# Patient Record
Sex: FEMALE | Race: WHITE | NOT HISPANIC OR LATINO | Employment: OTHER | URBAN - METROPOLITAN AREA
[De-identification: names, ages, dates, MRNs, and addresses within clinical notes are randomized per-mention and may not be internally consistent; named-entity substitution may affect disease eponyms.]

---

## 2018-04-12 ENCOUNTER — TRANSCRIBE ORDERS (OUTPATIENT)
Dept: ADMINISTRATIVE | Facility: HOSPITAL | Age: 58
End: 2018-04-12

## 2018-04-12 DIAGNOSIS — R91.1 COIN LESION: ICD-10-CM

## 2018-04-12 DIAGNOSIS — Z12.39 SCREENING BREAST EXAMINATION: Primary | ICD-10-CM

## 2018-04-14 ENCOUNTER — HOSPITAL ENCOUNTER (OUTPATIENT)
Dept: MAMMOGRAPHY | Facility: CLINIC | Age: 58
Discharge: HOME | End: 2018-04-14
Payer: COMMERCIAL

## 2018-04-14 DIAGNOSIS — Z12.39 SCREENING BREAST EXAMINATION: ICD-10-CM

## 2018-04-14 PROCEDURE — 77067 SCR MAMMO BI INCL CAD: CPT

## 2018-04-14 PROCEDURE — 77063 BREAST TOMOSYNTHESIS BI: CPT

## 2018-04-20 ENCOUNTER — HOSPITAL ENCOUNTER (OUTPATIENT)
Dept: CT IMAGING | Facility: CLINIC | Age: 58
Discharge: HOME | End: 2018-04-20
Payer: COMMERCIAL

## 2018-04-20 DIAGNOSIS — R91.1 COIN LESION: ICD-10-CM

## 2018-04-20 PROCEDURE — 71250 CT THORAX DX C-: CPT

## 2018-05-30 ENCOUNTER — APPOINTMENT (OUTPATIENT)
Dept: RADIOLOGY | Facility: CLINIC | Age: 58
End: 2018-05-30
Payer: COMMERCIAL

## 2018-05-30 DIAGNOSIS — M54.42 ACUTE BACK PAIN WITH SCIATICA, LEFT: ICD-10-CM

## 2018-05-30 PROCEDURE — 72220 X-RAY EXAM SACRUM TAILBONE: CPT

## 2018-05-30 PROCEDURE — 72110 X-RAY EXAM L-2 SPINE 4/>VWS: CPT

## 2019-03-14 ENCOUNTER — TRANSCRIBE ORDERS (OUTPATIENT)
Dept: ADMINISTRATIVE | Facility: HOSPITAL | Age: 59
End: 2019-03-14

## 2019-03-14 DIAGNOSIS — R91.1 COIN LESION: Primary | ICD-10-CM

## 2019-03-20 ENCOUNTER — HOSPITAL ENCOUNTER (OUTPATIENT)
Dept: CT IMAGING | Facility: CLINIC | Age: 59
Discharge: HOME | End: 2019-03-20
Payer: COMMERCIAL

## 2019-03-20 DIAGNOSIS — R91.1 COIN LESION: ICD-10-CM

## 2019-03-20 PROCEDURE — 71260 CT THORAX DX C+: CPT

## 2019-03-20 RX ADMIN — IOHEXOL 85 ML: 350 INJECTION, SOLUTION INTRAVENOUS at 09:46

## 2019-03-28 ENCOUNTER — TRANSCRIBE ORDERS (OUTPATIENT)
Dept: ADMINISTRATIVE | Facility: HOSPITAL | Age: 59
End: 2019-03-28

## 2019-03-28 DIAGNOSIS — R91.1 PULMONARY NODULE: Primary | ICD-10-CM

## 2019-04-04 ENCOUNTER — HOSPITAL ENCOUNTER (OUTPATIENT)
Dept: RADIOLOGY | Age: 59
Discharge: HOME | End: 2019-04-04
Payer: COMMERCIAL

## 2019-04-04 DIAGNOSIS — R91.1 PULMONARY NODULE: ICD-10-CM

## 2019-04-04 LAB — GLUCOSE SERPL-MCNC: 84 MG/DL (ref 65–140)

## 2019-04-04 PROCEDURE — 82948 REAGENT STRIP/BLOOD GLUCOSE: CPT

## 2019-04-04 PROCEDURE — 78815 PET IMAGE W/CT SKULL-THIGH: CPT

## 2019-04-04 PROCEDURE — A9552 F18 FDG: HCPCS

## 2019-05-16 ENCOUNTER — TRANSCRIBE ORDERS (OUTPATIENT)
Dept: ADMINISTRATIVE | Facility: HOSPITAL | Age: 59
End: 2019-05-16

## 2019-05-16 DIAGNOSIS — C34.90 MALIGNANT NEOPLASM OF BRONCHUS AND LUNG (HCC): Primary | ICD-10-CM

## 2019-06-03 ENCOUNTER — HOSPITAL ENCOUNTER (OUTPATIENT)
Dept: MRI IMAGING | Facility: HOSPITAL | Age: 59
Discharge: HOME | End: 2019-06-03
Payer: COMMERCIAL

## 2019-06-03 DIAGNOSIS — C34.90 MALIGNANT NEOPLASM OF BRONCHUS AND LUNG (HCC): ICD-10-CM

## 2019-06-03 PROCEDURE — 70553 MRI BRAIN STEM W/O & W/DYE: CPT

## 2019-06-03 PROCEDURE — A9585 GADOBUTROL INJECTION: HCPCS | Performed by: RADIOLOGY

## 2019-06-03 RX ADMIN — GADOBUTROL 8 ML: 604.72 INJECTION INTRAVENOUS at 20:02

## 2019-06-06 ENCOUNTER — TRANSCRIBE ORDERS (OUTPATIENT)
Dept: ADMINISTRATIVE | Facility: HOSPITAL | Age: 59
End: 2019-06-06

## 2019-06-06 DIAGNOSIS — R06.02 SHORTNESS OF BREATH: Primary | ICD-10-CM

## 2019-06-12 ENCOUNTER — HOSPITAL ENCOUNTER (OUTPATIENT)
Dept: NON INVASIVE DIAGNOSTICS | Facility: CLINIC | Age: 59
Discharge: HOME | End: 2019-06-12
Payer: COMMERCIAL

## 2019-06-12 DIAGNOSIS — R06.02 SHORTNESS OF BREATH: ICD-10-CM

## 2019-06-12 PROCEDURE — 93018 CV STRESS TEST I&R ONLY: CPT | Performed by: INTERNAL MEDICINE

## 2019-06-12 PROCEDURE — 78452 HT MUSCLE IMAGE SPECT MULT: CPT

## 2019-06-12 PROCEDURE — A9502 TC99M TETROFOSMIN: HCPCS

## 2019-06-12 PROCEDURE — 93017 CV STRESS TEST TRACING ONLY: CPT

## 2019-06-12 PROCEDURE — 93016 CV STRESS TEST SUPVJ ONLY: CPT | Performed by: INTERNAL MEDICINE

## 2019-06-12 PROCEDURE — 78452 HT MUSCLE IMAGE SPECT MULT: CPT | Performed by: INTERNAL MEDICINE

## 2019-06-12 RX ADMIN — REGADENOSON 0.4 MG: 0.08 INJECTION, SOLUTION INTRAVENOUS at 09:37

## 2019-06-13 LAB
CHEST PAIN STATEMENT: NORMAL
MAX DIASTOLIC BP: 82 MMHG
MAX HEART RATE: 103 BPM
MAX PREDICTED HEART RATE: 160 BPM
MAX. SYSTOLIC BP: 173 MMHG
PROTOCOL NAME: NORMAL
REASON FOR TERMINATION: NORMAL
TARGET HR FORMULA: NORMAL
TEST INDICATION: NORMAL
TIME IN EXERCISE PHASE: NORMAL

## 2019-06-18 ENCOUNTER — TRANSCRIBE ORDERS (OUTPATIENT)
Dept: ADMINISTRATIVE | Facility: HOSPITAL | Age: 59
End: 2019-06-18

## 2019-06-18 DIAGNOSIS — R01.1 MURMUR: Primary | ICD-10-CM

## 2019-06-20 ENCOUNTER — HOSPITAL ENCOUNTER (OUTPATIENT)
Dept: NON INVASIVE DIAGNOSTICS | Facility: CLINIC | Age: 59
Discharge: HOME | End: 2019-06-20
Payer: COMMERCIAL

## 2019-06-20 DIAGNOSIS — R01.1 MURMUR: ICD-10-CM

## 2019-06-20 PROCEDURE — 93306 TTE W/DOPPLER COMPLETE: CPT

## 2019-06-20 PROCEDURE — 93306 TTE W/DOPPLER COMPLETE: CPT | Performed by: INTERNAL MEDICINE

## 2019-08-13 ENCOUNTER — CLINICAL SUPPORT (OUTPATIENT)
Dept: PULMONOLOGY | Facility: HOSPITAL | Age: 59
End: 2019-08-13
Payer: COMMERCIAL

## 2019-08-13 DIAGNOSIS — Z90.2 STATUS POST LOBECTOMY OF LUNG: Primary | ICD-10-CM

## 2019-08-13 NOTE — PROGRESS NOTES
PULMONARY REHAB ASSESSMENT    Today's date: 2019  Patient name: Rosa Morales     : 1960       MRN: [id number]  PCP: Saundra Osman MD  Referring Physician: Ventura Yuan MD  Clinician: Kailash Ward MBA, RRT  Dx:RATS and right lower lobe lobectomy    Date of onset: 2019  Cultural needs: none reported    Height:    Wt Readings from Last 1 Encounters:   16 83 kg (183 lb)      Weight:   Ht Readings from Last 1 Encounters:   16 5' 5" (1 651 m)     Medical History:   Past Medical History:   Diagnosis Date    Hyperlipidemia     Hypertension          Physical Limitations: none    Risk Factors   Cholesterol: No  Smoking: Recent user, quit in last 6 months  HTN: No  DM: No  Obesity: No   Inactivity: No  Stress:  perceived  stress: 5/10   Stressors: Family   Goals for [address] more time to take care of herself    Family History:No family history on file  Allergies: Patient has no known allergies  ETOH:   Social History     Substance and Sexual Activity   Alcohol Use Yes    Comment: social         Current Medications:   Current Outpatient Medications   Medication Sig Dispense Refill    atorvastatin (LIPITOR) 20 mg tablet Take 20 mg by mouth daily   lisinopril (ZESTRIL) 20 mg tablet Take 20 mg by mouth daily   sertraline (ZOLOFT) 100 mg tablet Take 150 mg by mouth daily   VERAPAMIL HCL ER PO Take 1 tablet by mouth daily  No current facility-administered medications for this visit  Functional Status Prior to Diagnosis for Treatment   Occupation: unemployed  Recreation: walking  ADLs: No limitations  [place]: No limitations  Exercise: walking several times a week    Patient Specific Goals:   To be able to return to biking and being able to go for longer walks    Short Term Program Goals: increased strength improved energy/stamina with ADLs exercise 120-150 mins/wk wt loss 1-2 ppw    Long Term Goals: increased maximal walking duration  increased intial training workload  Improved functional capacity  Improved Quality of Life - [facility] score reduced    Ability to reach goals/rehabilitation potential:  Very Good     Projected return to function: 18 weeks  Objective tests: 6 MWT      Nutritional   Reviewed details of Rate your Plate  Discussed key elements of heart healthy eating  Reviewed patient goals for dietary modifications and their clinical implications  Reviewed most recent lipid profile  Goals for dietary modification: choose lean cuts of meat  poultry without the skin  low fat ground meat and poultry  eliminate processed meats      Emotional/Social  Patient reports he/she is coping well with good social support and denies depression or anxiety    SOCIAL SUPPORT NETWORK    Marital status:     Rate 1-5:    Marriage: 5   Family: 5   Financial: 5   Relationships: 5   Spirituality: 5   Intellectual: 5      Domestic Violence Screening: No    Comments: Patient reports feeling safe at home

## 2019-08-13 NOTE — PROGRESS NOTES
Exercise Prescription       Exercise Modality  Initial Workload MET Level    Nu-Step (NU) Level: 3 Steps/Minute: 60 2 5 METs         Airdyne Bike (AD-Bike) Level: 0 5 2 5 METs   Arm Ergometer (AE) Level: 2 0 RPM: 40-50 1 8 METs   Treadmill 1 0 mph 0 % grade 1 8 METs   Recumbent Bike (RB) Level: 1 RPM: 50-6- 3 2 METs   Resistance (RES) 5 lbs Weights 30 lbs Pulleys Level red Resistance Bands        Comments: Patient completed 6 min walk test today  She walked a total of 997 5 ft (304 meters)  At 2 5 METs  Will start exercise at 2-2 5 METS and increase time and intensity as tolerated  If necessary will use supplemental oxygen at 2L/ min and titrate as needed with exercise to maintain SpO2>90%

## 2019-08-13 NOTE — PROGRESS NOTES
Pulmonary Rehabilitation Plan of Care   Care Plan       Today's date: 2019   Visits: Initial  Patient name: Silas Jaeger      : 1960  Age: 62 y o  MRN: [id number]  Referring Physician: Ravin Diaz MD  Provider: [facility]  Clinician: Stephania Aguayo MBA, RRT    Dx: RATS and right lower lobe lobectomy  Date of onset: 19      SUMMARY OF PROGRESS:  Soraya Faulkner  Is ready to begin her pulmonary rehabilitation program  She completed her 6MWT, walking a total of 997 5 feet (304 meters) while maintaining her SPO2 between 90-93% with slight/moderate(4)  dyspnea reported  We will monitor her SPO2% while exercising and use oxygen as needed to maintain SPO2 > 90%  She is looking forward to returning  to her prior activity level before her RLL  lobectomy  She does have some family stress from an adult child temporarily living at home with her and her  and also caring for her grandson but  states she has a good support system and denies depression although her PHQ9 scored a 7  We will follow up with patient in 30 days regarding depression  [facility] is very excited to begin the program and will attend twice a week for 18 weeks  Medication compliance: Yes   Comments:     Fall Risk: Low   Comments:     EKG changes:       EXERCISE ASSESSMENT and PLAN    Current Exercise Program in Rehab:       Frequency: 2 days/week        Minutes: 20-30         METS: 2 4            HR: 120   RPE: 4-6         Modalities: Treadmill, Airdyne bike, UBE, Lifecycle, NuStep and Recumbent bike      Exercise Progression 30 Day Goals :    Frequency:  2-3days/week   Minutes: 30-40   METS: 2 5-3   HR: 120    RPE: 4-7   Modalities: Treadmill, Airdyne bike, UBE, Lifecycle, NuStep, Recumbent bike and Room walking    Strength training: Will be added following 2-3 weeks of monitored exercise sessions   Modalities: Leg Press, Chest Press, Pull Downs, Lateral Raise, Arm Extension and Arm Curl    Progressing:   In Progress    Home Exercise: Frequency: 2 days/week    Goals: 10% improvement in functional capacity, Reduced Dyspnea with physical activity  0-1/10, Improved 6MWT distance by 10%, Resume ADLs with increased strength and Exercise 5 days/wk, >150mins/wk  Education: home exercise guidelines and RPE scale   Plan:home exercise 30+ mins 2 days opposite CR  Readiness to change: Preparation      NUTRITION ASSESSMENT AND PLAN    Weight control:    Starting weight: 187   Current weight:     Waist circumference:    Starting:    Current:    Diabetes: N/A  Lipid management: NA  Goals:Wt  loss 1-2 ppw goal of 10 lbs  Education: healthy choices while dining out  portion control  Progressing: In Progress  Plan: Increase whole grains, increase fruits/vegs and eliminate processed meats  Readiness to change: Contemplation      PSYCHOSOCIAL ASSESSMENT AND PLAN    Emotional:  PHQ-9 =      5-9 = Mild Depression  Self-reported stress level: 6   Social support: Very Good  Goals:  Reduce perceived stress to 1-3/10 and PHQ-9 - reduced severity by one level  Education: coping mechanisms  Progressing: In Progress  Plan: PHQ-9 >5 will refer to MD  Readiness to change: Contemplation      OTHER CORE COMPONENTS     Tobacco:   Social History     Tobacco Use   Smoking Status Current Every Day Smoker    Packs/day: 1 00    Types: Cigarettes       Tobacco Use Intervention: Referral to tobacco expert:   N/A    Blood pressure:    Restin/74   Exercise:     Goals: consistent BP < 130/80  Education:  maintenance  Progressing: In Progress  Plan: no needs  Readiness to change: Maintenance

## 2019-08-19 ENCOUNTER — APPOINTMENT (EMERGENCY)
Dept: RADIOLOGY | Facility: HOSPITAL | Age: 59
End: 2019-08-19
Payer: COMMERCIAL

## 2019-08-19 ENCOUNTER — HOSPITAL ENCOUNTER (EMERGENCY)
Facility: HOSPITAL | Age: 59
Discharge: HOME | End: 2019-08-19
Attending: EMERGENCY MEDICINE
Payer: COMMERCIAL

## 2019-08-19 ENCOUNTER — APPOINTMENT (EMERGENCY)
Dept: CT IMAGING | Facility: HOSPITAL | Age: 59
End: 2019-08-19
Payer: COMMERCIAL

## 2019-08-19 VITALS
SYSTOLIC BLOOD PRESSURE: 172 MMHG | HEIGHT: 65 IN | BODY MASS INDEX: 31.16 KG/M2 | WEIGHT: 187 LBS | OXYGEN SATURATION: 93 % | DIASTOLIC BLOOD PRESSURE: 81 MMHG | HEART RATE: 80 BPM | RESPIRATION RATE: 18 BRPM | TEMPERATURE: 97.3 F

## 2019-08-19 DIAGNOSIS — R06.00 DYSPNEA: Primary | ICD-10-CM

## 2019-08-19 DIAGNOSIS — J90 PLEURAL EFFUSION: ICD-10-CM

## 2019-08-19 DIAGNOSIS — J44.1 COPD EXACERBATION (HCC): ICD-10-CM

## 2019-08-19 LAB
ALBUMIN SERPL BCP-MCNC: 3.9 G/DL (ref 3.5–5)
ALP SERPL-CCNC: 91 U/L (ref 46–116)
ALT SERPL W P-5'-P-CCNC: 21 U/L (ref 12–78)
ANION GAP BLD CALC-SCNC: 16 MMOL/L (ref 4–13)
ANION GAP SERPL CALCULATED.3IONS-SCNC: 1 MMOL/L (ref 4–13)
APTT PPP: 28 SECONDS (ref 23–37)
AST SERPL W P-5'-P-CCNC: 16 U/L (ref 5–45)
BASOPHILS # BLD AUTO: 0.03 THOUSANDS/ΜL (ref 0–0.1)
BASOPHILS NFR BLD AUTO: 1 % (ref 0–1)
BILIRUB SERPL-MCNC: 0.2 MG/DL (ref 0.2–1)
BUN BLD-MCNC: 10 MG/DL (ref 5–25)
BUN SERPL-MCNC: 13 MG/DL (ref 5–25)
CA-I BLD-SCNC: 1.2 MMOL/L (ref 1.12–1.32)
CALCIUM SERPL-MCNC: 9.4 MG/DL (ref 8.3–10.1)
CHLORIDE BLD-SCNC: 106 MMOL/L (ref 100–108)
CHLORIDE SERPL-SCNC: 97 MMOL/L (ref 100–108)
CO2 SERPL-SCNC: 25 MMOL/L (ref 21–32)
CREAT BLD-MCNC: 0.5 MG/DL (ref 0.6–1.3)
CREAT SERPL-MCNC: 0.63 MG/DL (ref 0.6–1.3)
DEPRECATED D DIMER PPP: 1133 NG/ML (FEU)
EOSINOPHIL # BLD AUTO: 0.07 THOUSAND/ΜL (ref 0–0.61)
EOSINOPHIL NFR BLD AUTO: 1 % (ref 0–6)
ERYTHROCYTE [DISTWIDTH] IN BLOOD BY AUTOMATED COUNT: 12.9 % (ref 11.6–15.1)
GFR SERPL CREATININE-BSD FRML MDRD: 105 ML/MIN/1.73SQ M
GFR SERPL CREATININE-BSD FRML MDRD: 97 ML/MIN/1.73SQ M
GLUCOSE SERPL-MCNC: 95 MG/DL (ref 65–140)
GLUCOSE SERPL-MCNC: 97 MG/DL (ref 65–140)
HCT VFR BLD AUTO: 39.2 % (ref 34.8–46.1)
HCT VFR BLD CALC: 38 % (ref 34.8–46.1)
HGB BLD-MCNC: 13.2 G/DL (ref 11.5–15.4)
HGB BLDA-MCNC: 12.9 G/DL (ref 11.5–15.4)
IMM GRANULOCYTES # BLD AUTO: 0.02 THOUSAND/UL (ref 0–0.2)
IMM GRANULOCYTES NFR BLD AUTO: 0 % (ref 0–2)
INR PPP: 0.9 (ref 0.84–1.19)
LYMPHOCYTES # BLD AUTO: 2.18 THOUSANDS/ΜL (ref 0.6–4.47)
LYMPHOCYTES NFR BLD AUTO: 34 % (ref 14–44)
MCH RBC QN AUTO: 31 PG (ref 26.8–34.3)
MCHC RBC AUTO-ENTMCNC: 33.7 G/DL (ref 31.4–37.4)
MCV RBC AUTO: 92 FL (ref 82–98)
MONOCYTES # BLD AUTO: 0.67 THOUSAND/ΜL (ref 0.17–1.22)
MONOCYTES NFR BLD AUTO: 11 % (ref 4–12)
NEUTROPHILS # BLD AUTO: 3.36 THOUSANDS/ΜL (ref 1.85–7.62)
NEUTS SEG NFR BLD AUTO: 53 % (ref 43–75)
NRBC BLD AUTO-RTO: 0 /100 WBCS
NT-PROBNP SERPL-MCNC: 170 PG/ML
PCO2 BLD: 23 MMOL/L (ref 21–32)
PLATELET # BLD AUTO: 218 THOUSANDS/UL (ref 149–390)
PMV BLD AUTO: 10.2 FL (ref 8.9–12.7)
POTASSIUM BLD-SCNC: 4.3 MMOL/L (ref 3.5–5.3)
POTASSIUM SERPL-SCNC: 3.4 MMOL/L (ref 3.5–5.3)
PROCALCITONIN SERPL-MCNC: <0.05 NG/ML
PROT SERPL-MCNC: 7.1 G/DL (ref 6.4–8.2)
PROTHROMBIN TIME: 11.9 SECONDS (ref 11.6–14.5)
RBC # BLD AUTO: 4.26 MILLION/UL (ref 3.81–5.12)
SODIUM BLD-SCNC: 140 MMOL/L (ref 136–145)
SODIUM SERPL-SCNC: 123 MMOL/L (ref 136–145)
SPECIMEN SOURCE: ABNORMAL
TROPONIN I SERPL-MCNC: <0.02 NG/ML
WBC # BLD AUTO: 6.33 THOUSAND/UL (ref 4.31–10.16)

## 2019-08-19 PROCEDURE — 71275 CT ANGIOGRAPHY CHEST: CPT

## 2019-08-19 PROCEDURE — 36415 COLL VENOUS BLD VENIPUNCTURE: CPT | Performed by: EMERGENCY MEDICINE

## 2019-08-19 PROCEDURE — 83880 ASSAY OF NATRIURETIC PEPTIDE: CPT | Performed by: EMERGENCY MEDICINE

## 2019-08-19 PROCEDURE — 85730 THROMBOPLASTIN TIME PARTIAL: CPT | Performed by: EMERGENCY MEDICINE

## 2019-08-19 PROCEDURE — 80053 COMPREHEN METABOLIC PANEL: CPT | Performed by: EMERGENCY MEDICINE

## 2019-08-19 PROCEDURE — 85025 COMPLETE CBC W/AUTO DIFF WBC: CPT | Performed by: EMERGENCY MEDICINE

## 2019-08-19 PROCEDURE — 84145 PROCALCITONIN (PCT): CPT | Performed by: EMERGENCY MEDICINE

## 2019-08-19 PROCEDURE — 94640 AIRWAY INHALATION TREATMENT: CPT

## 2019-08-19 PROCEDURE — 80047 BASIC METABLC PNL IONIZED CA: CPT

## 2019-08-19 PROCEDURE — 99285 EMERGENCY DEPT VISIT HI MDM: CPT | Performed by: EMERGENCY MEDICINE

## 2019-08-19 PROCEDURE — 85610 PROTHROMBIN TIME: CPT | Performed by: EMERGENCY MEDICINE

## 2019-08-19 PROCEDURE — 85379 FIBRIN DEGRADATION QUANT: CPT | Performed by: EMERGENCY MEDICINE

## 2019-08-19 PROCEDURE — 84484 ASSAY OF TROPONIN QUANT: CPT | Performed by: EMERGENCY MEDICINE

## 2019-08-19 PROCEDURE — 99285 EMERGENCY DEPT VISIT HI MDM: CPT

## 2019-08-19 PROCEDURE — 85014 HEMATOCRIT: CPT

## 2019-08-19 PROCEDURE — 96360 HYDRATION IV INFUSION INIT: CPT

## 2019-08-19 PROCEDURE — 71046 X-RAY EXAM CHEST 2 VIEWS: CPT

## 2019-08-19 PROCEDURE — 93005 ELECTROCARDIOGRAM TRACING: CPT

## 2019-08-19 RX ORDER — POTASSIUM CHLORIDE 20 MEQ/1
20 TABLET, EXTENDED RELEASE ORAL ONCE
Status: COMPLETED | OUTPATIENT
Start: 2019-08-19 | End: 2019-08-19

## 2019-08-19 RX ORDER — SODIUM CHLORIDE FOR INHALATION 0.9 %
VIAL, NEBULIZER (ML) INHALATION
Status: COMPLETED
Start: 2019-08-19 | End: 2019-08-19

## 2019-08-19 RX ORDER — PREDNISONE 20 MG/1
60 TABLET ORAL DAILY
Qty: 15 TABLET | Refills: 0 | Status: SHIPPED | OUTPATIENT
Start: 2019-08-19 | End: 2019-08-24

## 2019-08-19 RX ORDER — SODIUM CHLORIDE FOR INHALATION 0.9 %
3 VIAL, NEBULIZER (ML) INHALATION ONCE
Status: COMPLETED | OUTPATIENT
Start: 2019-08-19 | End: 2019-08-19

## 2019-08-19 RX ORDER — BUPROPION HYDROCHLORIDE 150 MG/1
300 TABLET ORAL EVERY MORNING
COMMUNITY
Start: 2019-04-20

## 2019-08-19 RX ORDER — ALBUTEROL SULFATE 90 UG/1
2 AEROSOL, METERED RESPIRATORY (INHALATION) EVERY 4 HOURS
Refills: 3 | COMMUNITY
Start: 2019-08-06

## 2019-08-19 RX ORDER — IPRATROPIUM BROMIDE AND ALBUTEROL SULFATE 2.5; .5 MG/3ML; MG/3ML
3 SOLUTION RESPIRATORY (INHALATION)
Status: DISCONTINUED | OUTPATIENT
Start: 2019-08-19 | End: 2019-08-20 | Stop reason: HOSPADM

## 2019-08-19 RX ORDER — TRAMADOL HYDROCHLORIDE 50 MG/1
50 TABLET ORAL EVERY 6 HOURS PRN
Refills: 0 | COMMUNITY
Start: 2019-07-06 | End: 2019-10-25 | Stop reason: ALTCHOICE

## 2019-08-19 RX ADMIN — SODIUM CHLORIDE 1000 ML: 0.9 INJECTION, SOLUTION INTRAVENOUS at 20:22

## 2019-08-19 RX ADMIN — Medication 3 ML: at 21:25

## 2019-08-19 RX ADMIN — IOHEXOL 100 ML: 350 INJECTION, SOLUTION INTRAVENOUS at 19:55

## 2019-08-19 RX ADMIN — IPRATROPIUM BROMIDE AND ALBUTEROL SULFATE 3 ML: 2.5; .5 SOLUTION RESPIRATORY (INHALATION) at 21:25

## 2019-08-19 RX ADMIN — POTASSIUM CHLORIDE 20 MEQ: 20 TABLET, EXTENDED RELEASE ORAL at 20:21

## 2019-08-19 RX ADMIN — ISODIUM CHLORIDE 3 ML: 0.03 SOLUTION RESPIRATORY (INHALATION) at 21:25

## 2019-08-19 NOTE — ED PROVIDER NOTES
History  Chief Complaint   Patient presents with    Shortness of Breath     Patient presents to the ED shortness of breath worsening over the past two days  Patient had right lower lobectomy in June 21st, patient reports having slight SOB since but worsening the past two days  Patient with hx of right lower lung cancer resection 6-19 [place] [place] and COPD complains of SOB over last 2 days worse with exertion and associated dry cough  Took albuterol inhaler without relief  Denies pain or fevers  Prior to Admission Medications   Prescriptions Last Dose Informant Patient Reported? Taking? VERAPAMIL HCL ER PO   Yes No   Sig: Take 1 tablet by mouth daily  albuterol (PROVENTIL HFA,VENTOLIN HFA) 90 mcg/act inhaler   Yes No   Sig: Inhale 2 puffs every 4 (four) hours   atorvastatin (LIPITOR) 20 mg tablet   Yes No   Sig: Take 20 mg by mouth daily  buPROPion (WELLBUTRIN XL) 150 mg 24 hr tablet   Yes Yes   Sig: Take 300 mg by mouth every morning   lisinopril (ZESTRIL) 20 mg tablet   Yes No   Sig: Take 20 mg by mouth daily  sertraline (ZOLOFT) 100 mg tablet   Yes No   Sig: Take 150 mg by mouth daily  traMADol (ULTRAM) 50 mg tablet   Yes No   Sig: Take 50 mg by mouth every 6 (six) hours as needed for pain      Facility-Administered Medications: None       Past Medical History:   Diagnosis Date    Cancer ([facility] [address] )     Hyperlipidemia     Hypertension        Past Surgical History:   Procedure Laterality Date    APPENDECTOMY      COLON SURGERY      HYSTERECTOMY      LUNG LOBECTOMY         History reviewed  No pertinent family history  I have reviewed and agree with the history as documented  Social History     Tobacco Use    Smoking status: Former Smoker     Packs/day: 0 00    Smokeless tobacco: Never Used   Substance Use Topics    Alcohol use: Yes     Comment: social    Drug use: No        Review of Systems   All other systems reviewed and are negative        Physical Exam  Physical Exam Constitutional: She appears well-developed and well-nourished  HENT:   Mouth/Throat: Oropharynx is clear and moist    Eyes: Pupils are equal, round, and reactive to light  Conjunctivae and EOM are normal    Neck: Normal range of motion  Neck supple  No spinous process tenderness present  Cardiovascular: Normal rate, regular rhythm, normal heart sounds and intact distal pulses  Pulmonary/Chest: Effort normal  No respiratory distress  She has decreased breath sounds in the right upper field, the right middle field and the right lower field  She has no wheezes  Abdominal: Soft  Bowel sounds are normal  She exhibits no distension  There is no tenderness  Musculoskeletal: Normal range of motion  Neurological: She is alert  She has normal strength  No sensory deficit  GCS eye subscore is 4  GCS verbal subscore is 5  GCS motor subscore is 6  Skin: Skin is warm and dry  No rash noted  Psychiatric: She has a normal mood and affect  Nursing note and vitals reviewed        Vital Signs  ED Triage Vitals   Temperature Pulse Respirations Blood Pressure SpO2   08/19/19 1829 08/19/19 1829 08/19/19 1829 08/19/19 1845 08/19/19 1829   (!) 97 3 °F (36 3 °C) 89 18 (!) 186/95 98 %      Temp Source Heart Rate Source Patient Position - Orthostatic VS BP Location FiO2 (%)   08/19/19 1829 08/19/19 1829 08/19/19 1845 08/19/19 1845 --   Tympanic Monitor Sitting Left arm       Pain Score       08/19/19 1829       1           Vitals:    08/19/19 1845 08/19/19 1846 08/19/19 2000 08/19/19 2157   BP: (!) 186/95 (!) 186/95 167/77 (!) 172/81   Pulse: 80  80 80   Patient Position - Orthostatic VS: Sitting Sitting Lying Lying         Visual Acuity      ED Medications  Medications   potassium chloride (K-DUR,KLOR-CON) CR tablet 20 mEq (20 mEq Oral Given 8/19/19 2021)   sodium chloride 0 9 % bolus 1,000 mL (0 mL Intravenous Stopped 8/19/19 2124)   iohexol (OMNIPAQUE) 350 MG/ML injection (MULTI-DOSE) 100 mL (100 mL Intravenous Given 8/19/19 1955)   sodium chloride 0 9 % inhalation solution 3 mL (3 mL Nebulization Given 8/19/19 2125)       Diagnostic Studies  Results Reviewed     Procedure Component Value Units Date/Time    POCT Chem 8+ [[id number]]  (Abnormal) Collected:  08/19/19 2139    Lab Status:  Final result Specimen:  Venous Updated:  08/19/19 2143     SODIUM, I-STAT 140 mmol/l      Potassium, i-STAT 4 3 mmol/L      Chloride, istat 106 mmol/L      CO2, i-STAT 23 mmol/L      Anion Gap, i-STAT 16 mmol/L      Calcium, Ionized i-STAT 1 20 mmol/L      BUN, I-STAT 10 mg/dl      Creatinine, i-STAT 0 5 mg/dl      eGFR 105 ml/min/1 73sq m      Glucose, i-STAT 95 mg/dl      Hct, i-STAT 38 %      Hgb, i-STAT 12 9 g/dl      Specimen Type VENOUS    Narrative:       National Kidney Disease Foundation guidelines for Chronic Kidney Disease (CKD):     Stage 1 with normal or high GFR (GFR > 90 mL/min/1 73 square meters)    Stage 2 Mild CKD (GFR = 60-89 mL/min/1 73 square meters)    Stage 3A Moderate CKD (GFR = 45-59 mL/min/1 73 square meters)    Stage 3B Moderate CKD (GFR = 30-44 mL/min/1 73 square meters)    Stage 4 Severe CKD (GFR = 15-29 mL/min/1 73 square meters)    Stage 5 End Stage CKD (GFR <15 mL/min/1 73 square meters)  Note: GFR calculation is accurate only with a steady state creatinine    Procalcitonin [[id number]]  (Normal) Collected:  08/19/19 1844    Lab Status:  Final result Specimen:  Blood from Arm, Right Updated:  08/19/19 2121     Procalcitonin <0 05 ng/ml     B-type natriuretic peptide [[id number]]  (Abnormal) Collected:  08/19/19 1844    Lab Status:  Final result Specimen:  Blood from Arm, Right Updated:  08/19/19 1925     NT-proBNP 170 pg/mL     D-dimer, quantitative [[id number]]  (Abnormal) Collected:  08/19/19 1844    Lab Status:  Final result Specimen:  Blood from Arm, Right Updated:  08/19/19 1921     D-Dimer, Quant 1,133 ng/ml (FEU)     Troponin I [[id number]]  (Normal) Collected:  08/19/19 5890    Lab Status:  Final result Specimen:  Blood from Arm, Right Updated:  08/19/19 1920     Troponin I <0 02 ng/mL     Comprehensive metabolic panel [[id number]]  (Abnormal) Collected:  08/19/19 1844    Lab Status:  Final result Specimen:  Blood from Arm, Right Updated:  08/19/19 1919     Sodium 123 mmol/L      Potassium 3 4 mmol/L      Chloride 97 mmol/L      CO2 25 mmol/L      ANION GAP 1 mmol/L      BUN 13 mg/dL      Creatinine 0 63 mg/dL      Glucose 97 mg/dL      Calcium 9 4 mg/dL      AST 16 U/L      ALT 21 U/L      Alkaline Phosphatase 91 U/L      Total Protein 7 1 g/dL      Albumin 3 9 g/dL      Total Bilirubin 0 20 mg/dL      eGFR 97 ml/min/1 73sq m     Narrative:       [place] guidelines for Chronic Kidney Disease (CKD):     Stage 1 with normal or high GFR (GFR > 90 mL/min/1 73 square meters)    Stage 2 Mild CKD (GFR = 60-89 mL/min/1 73 square meters)    Stage 3A Moderate CKD (GFR = 45-59 mL/min/1 73 square meters)    Stage 3B Moderate CKD (GFR = 30-44 mL/min/1 73 square meters)    Stage 4 Severe CKD (GFR = 15-29 mL/min/1 73 square meters)    Stage 5 End Stage CKD (GFR <15 mL/min/1 73 square meters)  Note: GFR calculation is accurate only with a steady state creatinine    APTT [[id number]]  (Normal) Collected:  08/19/19 1844    Lab Status:  Final result Specimen:  Blood from Arm, Right Updated:  08/19/19 1914     PTT 28 seconds     Protime-INR [[id number]]  (Normal) Collected:  08/19/19 1844    Lab Status:  Final result Specimen:  Blood from Arm, Right Updated:  08/19/19 1914     Protime 11 9 seconds      INR 0 90    CBC and differential [[id number]] Collected:  08/19/19 1844    Lab Status:  Final result Specimen:  Blood from Arm, Right Updated:  08/19/19 1901     WBC 6 33 Thousand/uL      RBC 4 26 Million/uL      Hemoglobin 13 2 g/dL      Hematocrit 39 2 %      MCV 92 fL      MCH 31 0 pg      MCHC 33 7 g/dL      RDW 12 9 %      MPV 10 2 fL      Platelets 759 Thousands/uL      nRBC 0 /100 WBCs Neutrophils Relative 53 %      Immat GRANS % 0 %      Lymphocytes Relative 34 %      Monocytes Relative 11 %      Eosinophils Relative 1 %      Basophils Relative 1 %      Neutrophils Absolute 3 36 Thousands/µL      Immature Grans Absolute 0 02 Thousand/uL      Lymphocytes Absolute 2 18 Thousands/µL      Monocytes Absolute 0 67 Thousand/µL      Eosinophils Absolute 0 07 Thousand/µL      Basophils Absolute 0 03 Thousands/µL                  CTA ED chest PE Study   Final Result by Kaykay Kan MD (08/19 2015)         1  No pulmonary embolism  2  Status post right lower lobectomy since the prior exam       3  New multifocal groundglass opacities in bilateral upper lobes and the right middle lobe, which may be related to infectious/inflammatory processes  Follow-up short-term chest CT in 3 months is recommended to evaluate for resolution  4  Small right pleural effusion, new since the prior exam       5  Stable small pulmonary nodules  Workstation performed: [id number]         XR chest 2 views   ED Interpretation by Renae Lagos MD (59/28 1923)   No PTX visualized      Final Result by Adore Birch MD (08/20 0800)      No acute cardiopulmonary disease  Interval right lower lobectomy  Workstation performed: [id number]                    Procedures  Procedures       ED Course  ED Course as of Aug 21 1948   Mon Aug 19, 2019   1910 Patient signed out to Dr Leo Rodriguez - CXR and labs including d-dimer ordered, may need cta to rule out pe                    PERC Rule for PE      Most Recent Value   PERC Rule for PE   Age >=50  0 Filed at: 08/19/2019 1847   HR >=100  0 Filed at: 08/19/2019 1847   O2 Sat on room air < 95%  0 Filed at: 08/19/2019 1847   History of PE or DVT  0 Filed at: 08/19/2019 1847   Recent trauma or surgery  1 Filed at: 08/19/2019 1847   Hemoptysis  0 Filed at: 08/19/2019 1847   Exogenous estrogen  0 Filed at: 08/19/2019 1847   Unilateral leg swelling  0 Filed at: 08/19/2019 1847   PERC Rule for PE Results  1 Filed at: 08/19/2019 1847                Wells' Criteria for PE      Most Recent Value   Wells' Criteria for PE   Clinical signs and symptoms of DVT  0 Filed at: 08/19/2019 9503   PE is primary diagnosis or equally likely  0 Filed at: 08/19/2019 1848   HR >100  0 Filed at: 08/19/2019 1848   Immobilization at least 3 days or Surgery in the previous 4 weeks  0 Filed at: 08/19/2019 1848   Previous, objectively diagnosed PE or DVT  0 Filed at: 08/19/2019 1848   Hemoptysis  0 Filed at: 08/19/2019 1848   Malignancy with treatment within 6 months or palliative  1 Filed at: 08/19/2019 1848   [facility]' Criteria Total  1 Filed at: 08/19/2019 1848            [facility]  Number of Diagnoses or Management Options  COPD exacerbation ([facility] [address] ): new and requires workup  Dyspnea: new and requires workup  Pleural effusion: new and requires workup     Amount and/or Complexity of Data Reviewed  Clinical lab tests: ordered and reviewed  Tests in the radiology section of CPT®: ordered and reviewed  Discuss the patient with other providers: yes    Patient Progress  Patient progress: improved      Disposition  Final diagnoses:   Dyspnea   COPD exacerbation ([address] )   Pleural effusion     Time reflects when diagnosis was documented in both MDM as applicable and the Disposition within this note     Time User Action Codes Description Comment    8/19/2019 10:19 PM Celio Marie S Add [R06 00] Dyspnea     8/19/2019 10:19 PM Celio BRIGHT Add [J44 1] COPD exacerbation ([address] )     8/19/2019 10:20 PM Génesis Del Cid Add [J90] Pleural effusion       ED Disposition     ED Disposition Condition Date/Time Comment    Discharge Stable Mon Aug 19, 2019 10:19 PM [facility] discharge to home/self care              Follow-up Information     Follow up With Specialties Details Why Contact Info Additional Information    Lanny Lott MD Family Medicine Schedule an appointment as soon as possible for a visit [address]       Your pulmonologist  Schedule an appointment as soon as possible for a visit   Make an appointment as soon as possible for a reevaluation  [facility] Emergency Department Emergency Medicine  If symptoms worsen [address] [facility] [address], [place], [place], [address]          Discharge Medication List as of 8/19/2019 10:21 PM      CONTINUE these medications which have NOT CHANGED    Details   buPROPion (WELLBUTRIN XL) 150 mg 24 hr tablet Take 300 mg by mouth every morning, Starting Sat 4/20/2019, Historical Med      albuterol (PROVENTIL HFA,VENTOLIN HFA) 90 mcg/act inhaler Inhale 2 puffs every 4 (four) hours, Starting Tue 8/6/2019, Historical Med      atorvastatin (LIPITOR) 20 mg tablet Take 20 mg by mouth daily  , Until Discontinued, Historical Med      lisinopril (ZESTRIL) 20 mg tablet Take 20 mg by mouth daily  , Until Discontinued, Historical Med      sertraline (ZOLOFT) 100 mg tablet Take 150 mg by mouth daily  , Until Discontinued, Historical Med      traMADol (ULTRAM) 50 mg tablet Take 50 mg by mouth every 6 (six) hours as needed for pain, Starting Sat 7/6/2019, Historical Med      VERAPAMIL HCL ER PO Take 1 tablet by mouth daily  , Until Discontinued, Historical Med           No discharge procedures on file      ED Provider  Electronically Signed by           Merna Hodge DO  08/21/19 1948

## 2019-08-19 NOTE — ED CARE HANDOFF
Emergency Department Sign Out Note        Sign out and transfer of care from Dr Alejo Payton  See Separate Emergency Department note  The patient, Oscar Thapa, was evaluated by the previous provider for SOB, cough  Workup Completed:  CTA ED chest PE Study   Final Result         1  No pulmonary embolism  2  Status post right lower lobectomy since the prior exam       3  New multifocal groundglass opacities in bilateral upper lobes and the right middle lobe, which may be related to infectious/inflammatory processes  Follow-up short-term chest CT in 3 months is recommended to evaluate for resolution  4  Small right pleural effusion, new since the prior exam       5  Stable small pulmonary nodules  Workstation performed: [id number]         XR chest 2 views   ED Interpretation   No PTX visualized         Labs Reviewed   COMPREHENSIVE METABOLIC PANEL - Abnormal       Result Value Ref Range Status    Sodium 123 (*) 136 - 145 mmol/L Final    Potassium 3 4 (*) 3 5 - 5 3 mmol/L Final    Chloride 97 (*) 100 - 108 mmol/L Final    CO2 25  21 - 32 mmol/L Final    ANION GAP 1 (*) 4 - 13 mmol/L Final    BUN 13  5 - 25 mg/dL Final    Creatinine 0 63  0 60 - 1 30 mg/dL Final    Comment: Standardized to IDMS reference method    Glucose 97  65 - 140 mg/dL Final    Comment:   If the patient is fasting, the ADA then defines impaired fasting glucose as > 100 mg/dL and diabetes as > or equal to 123 mg/dL  Specimen collection should occur prior to Sulfasalazine administration due to the potential for falsely depressed results  Specimen collection should occur prior to Sulfapyridine administration due to the potential for falsely elevated results  Calcium 9 4  8 3 - 10 1 mg/dL Final    AST 16  5 - 45 U/L Final    Comment:   Specimen collection should occur prior to Sulfasalazine administration due to the potential for falsely depressed results       ALT 21  12 - 78 U/L Final    Comment:   Specimen collection should occur prior to Sulfasalazine administration due to the potential for falsely depressed results  Alkaline Phosphatase 91  46 - 116 U/L Final    Total Protein 7 1  6 4 - 8 2 g/dL Final    Albumin 3 9  3 5 - 5 0 g/dL Final    Total Bilirubin 0 20  0 20 - 1 00 mg/dL Final    eGFR 97  ml/min/1 73sq m Final    Narrative:     [place] guidelines for Chronic Kidney Disease (CKD):     Stage 1 with normal or high GFR (GFR > 90 mL/min/1 73 square meters)    Stage 2 Mild CKD (GFR = 60-89 mL/min/1 73 square meters)    Stage 3A Moderate CKD (GFR = 45-59 mL/min/1 73 square meters)    Stage 3B Moderate CKD (GFR = 30-44 mL/min/1 73 square meters)    Stage 4 Severe CKD (GFR = 15-29 mL/min/1 73 square meters)    Stage 5 End Stage CKD (GFR <15 mL/min/1 73 square meters)  Note: GFR calculation is accurate only with a steady state creatinine   NT-BNP PRO (BRAIN NATRIURETIC PEPTIDE) - Abnormal    NT-proBNP 170 (*) <125 pg/mL Final   D-DIMER, QUANTITATIVE - Abnormal    D-Dimer, Quant 1,133 (*) <500 ng/ml (FEU) Final    Comment:   Reference and upper limits to exclude DVT and PE are the same  Do not use to exclude if clinical symptoms are present  Pregnant women:  1st trimester:  <220 - 1060 ng/ml (FEU)  2nd trimester:  <220 - 1880 ng/ml (FEU)  3rd trimester:   238 - 3280 ng/ml (FEU)    Note: Normal ranges may not apply to patients who are transgender, non-binary, or whose legal sex, sex at birth, and gender identity differ  POCT CHEM 8+ - Abnormal    SODIUM, I-STAT 140  136 - 145 mmol/l Final    Potassium, i-STAT 4 3  3 5 - 5 3 mmol/L Final    Chloride, istat 106  100 - 108 mmol/L Final    CO2, i-STAT 23  21 - 32 mmol/L Final    Anion Gap, i-STAT 16 (*) 4 - 13 mmol/L Final    Calcium, Ionized i-STAT 1 20  1  12 - 1 32 mmol/L Final    BUN, I-STAT 10  5 - 25 mg/dl Final    Creatinine, i-STAT 0 5 (*) 0 6 - 1 3 mg/dl Final    eGFR 105  ml/min/1 73sq m Final    Glucose, i-STAT 95  65 - 140 mg/dl Final Hct, i-STAT 38  34 8 - 46 1 % Final    Hgb, i-STAT 12 9  11 5 - 15 4 g/dl Final    Specimen Type VENOUS   Final    Narrative:     National Kidney Disease Foundation guidelines for Chronic Kidney Disease (CKD):     Stage 1 with normal or high GFR (GFR > 90 mL/min/1 73 square meters)    Stage 2 Mild CKD (GFR = 60-89 mL/min/1 73 square meters)    Stage 3A Moderate CKD (GFR = 45-59 mL/min/1 73 square meters)    Stage 3B Moderate CKD (GFR = 30-44 mL/min/1 73 square meters)    Stage 4 Severe CKD (GFR = 15-29 mL/min/1 73 square meters)    Stage 5 End Stage CKD (GFR <15 mL/min/1 73 square meters)  Note: GFR calculation is accurate only with a steady state creatinine   PROCALCITONIN TEST - Normal    Procalcitonin <0 05  <=0 25 ng/ml Final    Comment: Suspected Lower Respiratory Tract Infection (LRTI):  - LESS than or EQUAL to 0 25 ng/mL:   low likelihood for bacterial LRTI; antibiotics DISCOURAGED   - GREATER than 0 25 ng/mL:   increased likelihood for bacterial LRTI; antibiotics ENCOURAGED  Suspected Sepsis:  - Strongly consider initiating antibiotics in ALL UNSTABLE patients  - LESS than or EQUAL to 0 5 ng/mL:   low likelihood for bacterial sepsis; antibiotics DISCOURAGED   - GREATER than 0 5 ng/mL:   increased likelihood for bacterial sepsis; antibiotics ENCOURAGED   - GREATER than 2 ng/mL:   high risk for severe sepsis / septic shock; antibiotics strongly ENCOURAGED  Decisions on antibiotic use should not be based solely on Procalcitonin (PCT) levels  If PCT is low but uncertainty exists with stopping antibiotics, repeat PCT in 6-24 hours to confirm the low level   If antibiotics are administered (regardless if initial PCT was high or low), repeat PCT every 1-2 days to consider early antibiotic cessation (when GREATER than 80% decrease from the peak OR when PCT drops below designated cutoffs, whichever comes first), so long as the infection is NOT one that typically requires prolonged treatment durations (e g , bone/joint infections, endocarditis, Staph  aureus bacteremia)  Situations of FALSE-POSITIVE Procalcitonin values:  1) Newborns < 67 hours old  2) Massive stress from severe trauma / burns, major surgery, acute pancreatitis, cardiogenic / hemorrhagic shock, sickle cell crisis, or other organ perfusion abnormalities  3) Malaria and some Candidal infections  4) Treatment with agents that stimulate cytokines (e g , OKT3, anti-lymphocyte globulins, alemtuzumab, IL-2, granulocyte transfusion [NOT GCSFs])  5) Chronic renal disease causes elevated baseline levels (consider GREATER than 0 75 ng/mL as an abnormal cut-off); initiating HD/CRRT may cause transient decreases  6) Paraneoplastic syndromes from medullary thyroid or SCLC, some forms of vasculitis, and acute fdqwx-si-asic disease    Situations of FALSE-NEGATIVE Procalcitonin values:  1) Too early in clinical course for PCT to have reached its peak (may repeat in 6-24 hours to confirm low level)  2) Localized infection WITHOUT systemic (SIRS / sepsis) response (e g , an abscess, osteomyelitis, cystitis)  3) Mycobacteria (e g , Tuberculosis, MAC)  4) Cystic fibrosis exacerbations     TROPONIN I - Normal    Troponin I <0 02  <=0 04 ng/mL Final    Comment: 3Autovalidation override  [employer] Chemistry analyzer 99% cutoff is > 0 04 ng/mL in network labs     o cTnI 99% cutoff is useful only when applied to patients in the clinical setting of myocardial ischemia   o cTnI 99% cutoff should be interpreted in the context of clinical history, ECG findings and possibly cardiac imaging to establish correct diagnosis  o cTnI 99% cutoff may be suggestive but clearly not indicative of a coronary event without the clinical setting of myocardial ischemia       APTT - Normal    PTT 28  23 - 37 seconds Final    Comment: Therapeutic Heparin Range =  60-90 seconds   PROTIME-INR - Normal    Protime 11 9  11 6 - 14 5 seconds Final    INR 0 90  0 84 - 1 19 Final   CBC AND DIFFERENTIAL    WBC 6 33  4 31 - 10 16 Thousand/uL Final    RBC 4 26  3 81 - 5 12 Million/uL Final    Hemoglobin 13 2  11 5 - 15 4 g/dL Final    Hematocrit 39 2  34 8 - 46 1 % Final    MCV 92  82 - 98 fL Final    MCH 31 0  26 8 - 34 3 pg Final    MCHC 33 7  31 4 - 37 4 g/dL Final    RDW 12 9  11 6 - 15 1 % Final    MPV 10 2  8 9 - 12 7 fL Final    Platelets 612  942 - 390 Thousands/uL Final    nRBC 0  /100 WBCs Final    Neutrophils Relative 53  43 - 75 % Final    Immat GRANS % 0  0 - 2 % Final    Lymphocytes Relative 34  14 - 44 % Final    Monocytes Relative 11  4 - 12 % Final    Eosinophils Relative 1  0 - 6 % Final    Basophils Relative 1  0 - 1 % Final    Neutrophils Absolute 3 36  1 85 - 7 62 Thousands/µL Final    Immature Grans Absolute 0 02  0 00 - 0 20 Thousand/uL Final    Lymphocytes Absolute 2 18  0 60 - 4 47 Thousands/µL Final    Monocytes Absolute 0 67  0 17 - 1 22 Thousand/µL Final    Eosinophils Absolute 0 07  0 00 - 0 61 Thousand/µL Final    Basophils Absolute 0 03  0 00 - 0 10 Thousands/µL Final        ED Course / Workup Pending (followup):              PERC Rule for PE      Most Recent Value   PERC Rule for PE   Age >=50  0 Filed at: 08/19/2019 1847   HR >=100  0 Filed at: 08/19/2019 1847   O2 Sat on room air < 95%  0 Filed at: 08/19/2019 1847   History of PE or DVT  0 Filed at: 08/19/2019 1847   Recent trauma or surgery  1 Filed at: 08/19/2019 1847   Hemoptysis  0 Filed at: 08/19/2019 1847   Exogenous estrogen  0 Filed at: 08/19/2019 1847   Unilateral leg swelling  0 Filed at: 08/19/2019 1847   PERC Rule for PE Results  1 Filed at: 08/19/2019 1847              Wells' Criteria for PE      Most Recent Value   Wells' Criteria for PE   Clinical signs and symptoms of DVT  0 Filed at: 08/19/2019 1848   PE is primary diagnosis or equally likely  0 Filed at: 08/19/2019 1848   HR >100  0 Filed at: 08/19/2019 1848   Immobilization at least 3 days or Surgery in the previous 4 weeks  0 Filed at: 08/19/2019 0421 Previous, objectively diagnosed PE or DVT  0 Filed at: 08/19/2019 1848   Hemoptysis  0 Filed at: 08/19/2019 1848   Malignancy with treatment within 6 months or palliative  1 Filed at: 08/19/2019 1848   Uri' Criteria Total  1 Filed at: 08/19/2019 [facility]            ED Course as of Aug 19 2344   [facility] Aug 19, 2019   1904 Pt signed out from Dr Mirian Mckenna  Labs, imaging pend  Follows at [facility] for lung Ca, had recent R sided resection in June  Per Dr Mirian Mckenna, has been using albuterol at home with no relief  2103 Discussed CT results w/ patient and   She is in no distress  Reports that she has had the groundglass opacities in past as well  Lungs decreased on R, no wheezing  Has had low Na in past as well, but not quite this low  She does drink a lot of water  On my exam she is euvolemic  Will recheck chem 8, give neb, and reassess  2149 Repeat sodium WNL      2206 Pt feels improved after neb  Will pulse ox ambulate  2219 Pt had no issues with ambulation, sats >92% entire time, felt fine  Will d/c home to f/u with PCP and her pulmonologist  RTED instructions given  Pt and  agree with plan           Procedures  MDM  Number of Diagnoses or Management Options  COPD exacerbation ([facility] [address] ): new and requires workup  Dyspnea: new and requires workup  Pleural effusion: new and requires workup     Amount and/or Complexity of Data Reviewed  Clinical lab tests: reviewed  Tests in the radiology section of CPT®: ordered and reviewed  Tests in the medicine section of CPT®: reviewed  Discussion of test results with the performing providers: yes  Review and summarize past medical records: yes  Discuss the patient with other providers: yes  Independent visualization of images, tracings, or specimens: yes    Risk of Complications, Morbidity, and/or Mortality  Presenting problems: moderate  Diagnostic procedures: low  Management options: low    Patient Progress  Patient progress: improved      Disposition  Final diagnoses:   Dyspnea   COPD exacerbation ([facility] [address] )   Pleural effusion     Time reflects when diagnosis was documented in both MDM as applicable and the Disposition within this note     Time User Action Codes Description Comment    8/19/2019 10:19 PM Jesús Spittle S Add [R06 00] Dyspnea     8/19/2019 10:19 PM Jesús Spittle S Add [J44 1] COPD exacerbation ([facility] [address] )     8/19/2019 10:20 PM Jada Pilar Add [J90] Pleural effusion       ED Disposition     ED Disposition Condition Date/Time Comment    Discharge Stable Mon Aug 19, 2019 10:19 PM 1224 [facility] discharge to home/self care  Follow-up Information     Follow up With Specialties Details Why Contact Info Additional Information    Akbar Mario MD Family Medicine Schedule an appointment as soon as possible for a visit   [address] [place] [address]  700 [facility] 120 [facility]       Your pulmonologist  Schedule an appointment as soon as possible for a visit   Make an appointment as soon as possible for a reevaluation  201 [facility] Emergency Department Emergency Medicine  If symptoms worsen [address] ED, [facility], [address] [place], [place], [address]        Discharge Medication List as of 8/19/2019 10:21 PM      CONTINUE these medications which have NOT CHANGED    Details   buPROPion (WELLBUTRIN XL) 150 mg 24 hr tablet Take 300 mg by mouth every morning, Starting Sat 4/20/2019, Historical Med      albuterol (PROVENTIL HFA,VENTOLIN HFA) 90 mcg/act inhaler Inhale 2 puffs every 4 (four) hours, Starting Tue 8/6/2019, Historical Med      atorvastatin (LIPITOR) 20 mg tablet Take 20 mg by mouth daily  , Until Discontinued, Historical Med      lisinopril (ZESTRIL) 20 mg tablet Take 20 mg by mouth daily  , Until Discontinued, Historical Med      sertraline (ZOLOFT) 100 mg tablet Take 150 mg by mouth daily  , Until Discontinued, Historical Med      traMADol (ULTRAM) 50 mg tablet Take 50 mg by mouth every 6 (six) hours as needed for pain, Starting Sat 7/6/2019, Historical Med      VERAPAMIL HCL ER PO Take 1 tablet by mouth daily  , Until Discontinued, Historical Med           No discharge procedures on file         ED Provider  Electronically Signed by     Safia England MD  08/19/19 1608

## 2019-08-20 LAB
ATRIAL RATE: 82 BPM
P AXIS: 73 DEGREES
PR INTERVAL: 174 MS
QRS AXIS: 23 DEGREES
QRSD INTERVAL: 82 MS
QT INTERVAL: 392 MS
QTC INTERVAL: 457 MS
T WAVE AXIS: 68 DEGREES
VENTRICULAR RATE: 82 BPM

## 2019-08-20 PROCEDURE — 93010 ELECTROCARDIOGRAM REPORT: CPT | Performed by: INTERNAL MEDICINE

## 2019-08-20 NOTE — ED NOTES
Pt  Ambulated down patel on portable pulse ox while maintaining SpO2 greater than 90%     Daily Flynn RN  08/19/19 1057

## 2019-08-22 ENCOUNTER — APPOINTMENT (OUTPATIENT)
Dept: PULMONOLOGY | Facility: HOSPITAL | Age: 59
End: 2019-08-22
Payer: COMMERCIAL

## 2019-08-27 ENCOUNTER — CLINICAL SUPPORT (OUTPATIENT)
Dept: PULMONOLOGY | Facility: HOSPITAL | Age: 59
End: 2019-08-27
Payer: COMMERCIAL

## 2019-08-27 DIAGNOSIS — Z90.2 STATUS POST LOBECTOMY OF LUNG: ICD-10-CM

## 2019-08-27 PROCEDURE — G0239 OTH RESP PROC, GROUP: HCPCS

## 2019-08-29 ENCOUNTER — CLINICAL SUPPORT (OUTPATIENT)
Dept: PULMONOLOGY | Facility: HOSPITAL | Age: 59
End: 2019-08-29
Payer: COMMERCIAL

## 2019-08-29 DIAGNOSIS — J98.4 DISEASE OF LUNG: ICD-10-CM

## 2019-08-29 PROCEDURE — G0239 OTH RESP PROC, GROUP: HCPCS

## 2019-09-03 ENCOUNTER — APPOINTMENT (OUTPATIENT)
Dept: PULMONOLOGY | Facility: HOSPITAL | Age: 59
End: 2019-09-03
Payer: COMMERCIAL

## 2019-09-05 ENCOUNTER — APPOINTMENT (OUTPATIENT)
Dept: PULMONOLOGY | Facility: HOSPITAL | Age: 59
End: 2019-09-05
Payer: COMMERCIAL

## 2019-09-17 ENCOUNTER — APPOINTMENT (OUTPATIENT)
Dept: PULMONOLOGY | Facility: HOSPITAL | Age: 59
End: 2019-09-17
Payer: COMMERCIAL

## 2019-09-19 ENCOUNTER — APPOINTMENT (OUTPATIENT)
Dept: PULMONOLOGY | Facility: HOSPITAL | Age: 59
End: 2019-09-19
Payer: COMMERCIAL

## 2019-09-24 ENCOUNTER — APPOINTMENT (OUTPATIENT)
Dept: PULMONOLOGY | Facility: HOSPITAL | Age: 59
End: 2019-09-24
Payer: COMMERCIAL

## 2019-09-26 ENCOUNTER — CLINICAL SUPPORT (OUTPATIENT)
Dept: PULMONOLOGY | Facility: HOSPITAL | Age: 59
End: 2019-09-26
Payer: COMMERCIAL

## 2019-09-26 DIAGNOSIS — J98.4 DISEASE OF LUNG: ICD-10-CM

## 2019-09-26 PROCEDURE — G0239 OTH RESP PROC, GROUP: HCPCS

## 2019-10-01 ENCOUNTER — APPOINTMENT (OUTPATIENT)
Dept: PULMONOLOGY | Facility: HOSPITAL | Age: 59
End: 2019-10-01
Payer: COMMERCIAL

## 2019-10-03 ENCOUNTER — CLINICAL SUPPORT (OUTPATIENT)
Dept: PULMONOLOGY | Facility: HOSPITAL | Age: 59
End: 2019-10-03
Payer: COMMERCIAL

## 2019-10-03 DIAGNOSIS — J98.4 DISEASE OF LUNG: ICD-10-CM

## 2019-10-03 PROCEDURE — G0239 OTH RESP PROC, GROUP: HCPCS

## 2019-10-07 ENCOUNTER — APPOINTMENT (OUTPATIENT)
Dept: PULMONOLOGY | Facility: HOSPITAL | Age: 59
End: 2019-10-07
Payer: COMMERCIAL

## 2019-10-08 ENCOUNTER — APPOINTMENT (OUTPATIENT)
Dept: PULMONOLOGY | Facility: HOSPITAL | Age: 59
End: 2019-10-08
Payer: COMMERCIAL

## 2019-10-10 ENCOUNTER — CLINICAL SUPPORT (OUTPATIENT)
Dept: PULMONOLOGY | Facility: HOSPITAL | Age: 59
End: 2019-10-10
Payer: COMMERCIAL

## 2019-10-10 DIAGNOSIS — J98.4 DISEASE OF LUNG: ICD-10-CM

## 2019-10-10 PROCEDURE — G0239 OTH RESP PROC, GROUP: HCPCS

## 2019-10-14 ENCOUNTER — CLINICAL SUPPORT (OUTPATIENT)
Dept: PULMONOLOGY | Facility: HOSPITAL | Age: 59
End: 2019-10-14
Payer: COMMERCIAL

## 2019-10-14 DIAGNOSIS — J98.4 DISEASE OF LUNG: ICD-10-CM

## 2019-10-14 PROCEDURE — G0239 OTH RESP PROC, GROUP: HCPCS

## 2019-10-15 ENCOUNTER — APPOINTMENT (OUTPATIENT)
Dept: PULMONOLOGY | Facility: HOSPITAL | Age: 59
End: 2019-10-15
Payer: COMMERCIAL

## 2019-10-17 ENCOUNTER — CLINICAL SUPPORT (OUTPATIENT)
Dept: PULMONOLOGY | Facility: HOSPITAL | Age: 59
End: 2019-10-17
Payer: COMMERCIAL

## 2019-10-17 DIAGNOSIS — J98.4 DISEASE OF LUNG: ICD-10-CM

## 2019-10-17 PROCEDURE — G0239 OTH RESP PROC, GROUP: HCPCS

## 2019-10-22 ENCOUNTER — APPOINTMENT (OUTPATIENT)
Dept: PULMONOLOGY | Facility: HOSPITAL | Age: 59
End: 2019-10-22
Payer: COMMERCIAL

## 2019-10-24 ENCOUNTER — APPOINTMENT (OUTPATIENT)
Dept: PULMONOLOGY | Facility: HOSPITAL | Age: 59
End: 2019-10-24
Payer: COMMERCIAL

## 2019-10-25 ENCOUNTER — CONSULT (OUTPATIENT)
Dept: CARDIOLOGY CLINIC | Facility: CLINIC | Age: 59
End: 2019-10-25
Payer: COMMERCIAL

## 2019-10-25 VITALS
SYSTOLIC BLOOD PRESSURE: 122 MMHG | WEIGHT: 193 LBS | HEIGHT: 65 IN | BODY MASS INDEX: 32.15 KG/M2 | HEART RATE: 60 BPM | DIASTOLIC BLOOD PRESSURE: 70 MMHG

## 2019-10-25 DIAGNOSIS — I49.3 PVC (PREMATURE VENTRICULAR CONTRACTION): Primary | ICD-10-CM

## 2019-10-25 DIAGNOSIS — E78.5 DYSLIPIDEMIA: ICD-10-CM

## 2019-10-25 DIAGNOSIS — I10 BENIGN ESSENTIAL HTN: ICD-10-CM

## 2019-10-25 PROCEDURE — 99204 OFFICE O/P NEW MOD 45 MIN: CPT | Performed by: INTERNAL MEDICINE

## 2019-10-25 RX ORDER — INHALER, ASSIST DEVICES
SPACER (EA) MISCELLANEOUS
Refills: 0 | COMMUNITY
Start: 2019-08-07

## 2019-10-25 RX ORDER — DIMENHYDRINATE 50 MG
200 TABLET ORAL DAILY
COMMUNITY

## 2019-10-25 RX ORDER — LISINOPRIL AND HYDROCHLOROTHIAZIDE 25; 20 MG/1; MG/1
1 TABLET ORAL DAILY
COMMUNITY

## 2019-10-25 RX ORDER — IPRATROPIUM BROMIDE AND ALBUTEROL SULFATE 2.5; .5 MG/3ML; MG/3ML
SOLUTION RESPIRATORY (INHALATION)
COMMUNITY
Start: 2019-10-21

## 2019-10-25 RX ORDER — LORAZEPAM 1 MG/1
TABLET ORAL
Refills: 0 | COMMUNITY
Start: 2019-08-28

## 2019-10-25 NOTE — LETTER
October 25, 2019     Diandra Cash MD  901 [facility]  701 [facility]    Patient: Per Leahy   YOB: 1960   Date of Visit: 10/25/2019       Dear Dr Resendiz Members: Thank you for referring Georganna Severe to me for evaluation  Below are my notes for this consultation  If you have questions, please do not hesitate to call me  I look forward to following your patient along with you  Sincerely,        Sridhar Newman,         CC: MD Toño Reyes DO  10/25/2019  9:49 AM  Sign at close encounter                                             Cardiology Consultation        Per Leahy      1960      [id number]      Discussion/Summary:    1  PACs and PVCs:  As reported on sleep study, noted to be intermittent  Frequency not reported  In light of absence of symptoms, and normal cardiac structure and function on recent echo, as well as normal stress test 06/2019, I suspect these are benign  She does have sleep apnea, therefore some atrial or ventricular ectopy during sleep may not be surprising  At this time I do not recommend further cardiac testing at this time  I have requested she call me if she develops any palpitations or symptoms, in which case Holter monitoring may be reasonable  F/U PRN      Interval History: This is a very pleasant 68-year-old female with no prior cardiac history  She has a history of lung cancer, status post right lower lobe lobectomy 06/26/2019 and was a former heavy tobacco user  On 09/29/2019 she had a sleep study as ordered by her pulmonologist, which reported intermittent PVCs and PACs  She was noted to have sleep apnea and a CPAP/BiPAP titration study was subsequently recommended  She presents today for consultation in light of her PVCs and PACs noted during sleep  From a symptomatic standpoint she feels excellent  She denies exertional chest pain, and has chronic but stable exertional dyspnea    She has no lower extremity edema, palpitation, dizziness, presyncope or syncope, or lightheadedness  She denies orthopnea or paroxysmal nocturnal dyspnea  She underwent a nuclear stress test and echocardiogram 06/12/2018, both of which were unremarkable               Vitals:  Vitals:    10/25/19 0912   BP: 122/70   BP Location: Left arm   Patient Position: Sitting   Cuff Size: Standard   Pulse: 60   Weight: 87 5 kg (193 lb)   Height: 5' 5" (1 651 m)         Past Medical History:   Diagnosis Date    [facility]     Hyperlipidemia     Hypertension      Social History     Socioeconomic History    Marital status: /Civil Union     Spouse name: Not on file    Number of children: Not on file    Years of education: Not on file    Highest education level: Not on file   Occupational History    Not on file   Social Needs    Financial resource strain: Not on file    Food insecurity:     Worry: Not on file     Inability: Not on file    Transportation needs:     Medical: Not on file     Non-medical: Not on file   Tobacco Use    Smoking status: Former Smoker     Packs/day: 0 00    Smokeless tobacco: Never Used   Substance and Sexual Activity    Alcohol use: Yes     Comment: social    Drug use: No    Sexual activity: Not on file   Lifestyle    Physical activity:     Days per week: Not on file     Minutes per session: Not on file    Stress: Not on file   Relationships    Social connections:     Talks on phone: Not on file     Gets together: Not on file     Attends Hindu service: Not on file     Active member of club or organization: Not on file     Attends meetings of clubs or organizations: Not on file     Relationship status: Not on file    Intimate partner violence:     Fear of current or ex partner: Not on file     Emotionally abused: Not on file     Physically abused: Not on file     Forced sexual activity: Not on file   Other Topics Concern    Not on file   Social History Narrative    Not on file History reviewed  No pertinent family history  Past Surgical History:   Procedure Laterality Date    APPENDECTOMY      COLON SURGERY      HYSTERECTOMY      LUNG LOBECTOMY         Current Outpatient Medications:     albuterol (PROVENTIL HFA,VENTOLIN HFA) 90 mcg/act inhaler, Inhale 2 puffs every 4 (four) hours, Disp: , Rfl: 3    aspirin 81 MG tablet, Take 81 mg by mouth daily, Disp: , Rfl:     atorvastatin (LIPITOR) 20 mg tablet, Take 20 mg by mouth daily  , Disp: , Rfl:     buPROPion (WELLBUTRIN XL) 150 mg 24 hr tablet, Take 300 mg by mouth every morning, Disp: , Rfl:     coenzyme Q-10 100 MG capsule, Take 200 mg by mouth daily, Disp: , Rfl:     ipratropium-albuterol (DUO-NEB) 0 5-2 5 mg/3 mL nebulizer solution, , Disp: , Rfl:     lisinopril-hydrochlorothiazide (PRINZIDE,ZESTORETIC) 20-25 MG per tablet, Take 1 tablet by mouth daily, Disp: , Rfl:     LORazepam (ATIVAN) 1 mg tablet, 1/2 TABLET TWICE A DAY AS NEEDED FOR ANXIETY ORALLY 30 DAYS, Disp: , Rfl: 0    sertraline (ZOLOFT) 100 mg tablet, Take 200 mg by mouth daily , Disp: , Rfl:     Spacer/Aero-Holding Chambers [facility] JOSE) MISC, Use as directed, Disp: , Rfl: 0    verapamil (VERELAN PM) 180 MG 24 hr capsule, Take 1 tablet by mouth daily , Disp: , Rfl:         Review of Systems:  Review of Systems   Constitutional: Negative for activity change, fever and unexpected weight change  HENT: Negative for facial swelling, nosebleeds and voice change  Respiratory: Positive for shortness of breath  Negative for chest tightness and wheezing  Cardiovascular: Negative for chest pain, palpitations and leg swelling  Gastrointestinal: Negative for abdominal distention  Genitourinary: Negative for hematuria  Musculoskeletal: Negative for arthralgias  Skin: Negative for color change, pallor, rash and wound  Neurological: Negative for dizziness, seizures and syncope  Psychiatric/Behavioral: Negative for agitation           Physical Exam:  Physical Exam   Constitutional: She is oriented to person, place, and time  She appears well-developed and well-nourished  HENT:   Head: Normocephalic and atraumatic  Eyes: EOM are normal    Neck: Normal range of motion  Neck supple  Cardiovascular: Normal rate, regular rhythm, normal heart sounds and intact distal pulses  Pulmonary/Chest: Effort normal    Decreased breath sounds right base   Abdominal: Soft  Musculoskeletal: Normal range of motion  Neurological: She is alert and oriented to person, place, and time  Skin: Skin is warm and dry  Psychiatric: She has a normal mood and affect  Her behavior is normal  Judgment and thought content normal    Vitals reviewed  This note was completed in part utilizing [employer] Software  Grammatical errors, random word insertions, spelling mistakes, and incomplete sentences can be an occasional consequence of this system secondary to software limitations, ambient noise, and hardware issues  If you have any questions or concerns about the content, text, or information contained within the body of this dictation, please contact the provider for clarification

## 2019-10-25 NOTE — PROGRESS NOTES
Cardiology Consultation        Enid Matos      1960      [id number]      Discussion/Summary:    1  PACs and PVCs:  As reported on sleep study, noted to be intermittent  Frequency not reported  In light of absence of symptoms, and normal cardiac structure and function on recent echo, as well as normal stress test 06/2019, I suspect these are benign  She does have sleep apnea, therefore some atrial or ventricular ectopy during sleep may not be surprising  At this time I do not recommend further cardiac testing at this time  I have requested she call me if she develops any palpitations or symptoms, in which case Holter monitoring may be reasonable  F/U PRN      Interval History: This is a very pleasant 58-year-old female with no prior cardiac history  She has a history of lung cancer, status post right lower lobe lobectomy 06/26/2019 and was a former heavy tobacco user  On 09/29/2019 she had a sleep study as ordered by her pulmonologist, which reported intermittent PVCs and PACs  She was noted to have sleep apnea and a CPAP/BiPAP titration study was subsequently recommended  She presents today for consultation in light of her PVCs and PACs noted during sleep  From a symptomatic standpoint she feels excellent  She denies exertional chest pain, and has chronic but stable exertional dyspnea  She has no lower extremity edema, palpitation, dizziness, presyncope or syncope, or lightheadedness  She denies orthopnea or paroxysmal nocturnal dyspnea  She underwent a nuclear stress test and echocardiogram 06/12/2018, both of which were unremarkable               Vitals:  Vitals:    10/25/19 0912   BP: 122/70   BP Location: Left arm   Patient Position: Sitting   Cuff Size: Standard   Pulse: 60   Weight: 87 5 kg (193 lb)   Height: 5' 5" (1 651 m)         Past Medical History:   Diagnosis Date    Cancer ([address] )     Hyperlipidemia     Hypertension      Social History     Socioeconomic History    Marital status: /Civil Union     Spouse name: Not on file    Number of children: Not on file    Years of education: Not on file    Highest education level: Not on file   Occupational History    Not on file   Social Needs    Financial resource strain: Not on file    Food insecurity:     Worry: Not on file     Inability: Not on file    Transportation needs:     Medical: Not on file     Non-medical: Not on file   Tobacco Use    Smoking status: Former Smoker     Packs/day: 0 00    Smokeless tobacco: Never Used   Substance and Sexual Activity    Alcohol use: Yes     Comment: social    Drug use: No    Sexual activity: Not on file   Lifestyle    Physical activity:     Days per week: Not on file     Minutes per session: Not on file    Stress: Not on file   Relationships    Social connections:     Talks on phone: Not on file     Gets together: Not on file     Attends Mu-ism service: Not on file     Active member of club or organization: Not on file     Attends meetings of clubs or organizations: Not on file     Relationship status: Not on file    Intimate partner violence:     Fear of current or ex partner: Not on file     Emotionally abused: Not on file     Physically abused: Not on file     Forced sexual activity: Not on file   Other Topics Concern    Not on file   Social History Narrative    Not on file      History reviewed  No pertinent family history  Past Surgical History:   Procedure Laterality Date    APPENDECTOMY      COLON SURGERY      HYSTERECTOMY      LUNG LOBECTOMY         Current Outpatient Medications:     albuterol (PROVENTIL HFA,VENTOLIN HFA) 90 mcg/act inhaler, Inhale 2 puffs every 4 (four) hours, Disp: , Rfl: 3    aspirin 81 MG tablet, Take 81 mg by mouth daily, Disp: , Rfl:     atorvastatin (LIPITOR) 20 mg tablet, Take 20 mg by mouth daily  , Disp: , Rfl:     buPROPion (WELLBUTRIN XL) 150 mg 24 hr tablet, Take 300 mg by mouth every morning, Disp: , Rfl:     coenzyme Q-10 100 MG capsule, Take 200 mg by mouth daily, Disp: , Rfl:     ipratropium-albuterol (DUO-NEB) 0 5-2 5 mg/3 mL nebulizer solution, , Disp: , Rfl:     lisinopril-hydrochlorothiazide (PRINZIDE,ZESTORETIC) 20-25 MG per tablet, Take 1 tablet by mouth daily, Disp: , Rfl:     LORazepam (ATIVAN) 1 mg tablet, 1/2 TABLET TWICE A DAY AS NEEDED FOR ANXIETY ORALLY 30 DAYS, Disp: , Rfl: 0    sertraline (ZOLOFT) 100 mg tablet, Take 200 mg by mouth daily , Disp: , Rfl:     Spacer/Aero-Holding Chambers [employer] [facility]) MISC, Use as directed, Disp: , Rfl: 0    verapamil (VERELAN PM) 180 MG 24 hr capsule, Take 1 tablet by mouth daily , Disp: , Rfl:         Review of Systems:  Review of Systems   Constitutional: Negative for activity change, fever and unexpected weight change  HENT: Negative for facial swelling, nosebleeds and voice change  Respiratory: Positive for shortness of breath  Negative for chest tightness and wheezing  Cardiovascular: Negative for chest pain, palpitations and leg swelling  Gastrointestinal: Negative for abdominal distention  Genitourinary: Negative for hematuria  Musculoskeletal: Negative for arthralgias  Skin: Negative for color change, pallor, rash and wound  Neurological: Negative for dizziness, seizures and syncope  Psychiatric/Behavioral: Negative for agitation  Physical Exam:  Physical Exam   Constitutional: She is oriented to person, place, and time  She appears well-developed and well-nourished  HENT:   Head: Normocephalic and atraumatic  Eyes: EOM are normal    Neck: Normal range of motion  Neck supple  Cardiovascular: Normal rate, regular rhythm, normal heart sounds and intact distal pulses  Pulmonary/Chest: Effort normal    Decreased breath sounds right base   Abdominal: Soft  Musculoskeletal: Normal range of motion  Neurological: She is alert and oriented to person, place, and time     Skin: Skin is warm and dry    Psychiatric: She has a normal mood and affect  Her behavior is normal  Judgment and thought content normal    Vitals reviewed  This note was completed in part utilizing M-Modal Fluency Direct Software  Grammatical errors, random word insertions, spelling mistakes, and incomplete sentences can be an occasional consequence of this system secondary to software limitations, ambient noise, and hardware issues  If you have any questions or concerns about the content, text, or information contained within the body of this dictation, please contact the provider for clarification

## 2019-10-28 ENCOUNTER — CLINICAL SUPPORT (OUTPATIENT)
Dept: PULMONOLOGY | Facility: HOSPITAL | Age: 59
End: 2019-10-28
Payer: COMMERCIAL

## 2019-10-28 DIAGNOSIS — J98.4 DISEASE OF LUNG: ICD-10-CM

## 2019-10-28 PROCEDURE — G0239 OTH RESP PROC, GROUP: HCPCS

## 2019-10-29 ENCOUNTER — APPOINTMENT (OUTPATIENT)
Dept: PULMONOLOGY | Facility: HOSPITAL | Age: 59
End: 2019-10-29
Payer: COMMERCIAL

## 2019-11-21 NOTE — PROGRESS NOTES
Pulmonary Rehabilitation Plan of Care   Discharge      Today's date: 2019   Visits: 8 (final)  Patient name: Moira Pham      : 1960  Age: 62 y o  MRN: [id number]  Referring Physician: Deon Gates MD  Provider: Laurel  Clinician: Zaida De Guzman MBA, RRT    Dx: RATS and right lower lobe lobectomy  Date of onset: 19      SUMMARY OF PROGRESS:  Patient has been discharged from pulmonary rehab  program  She has only attended 8 sessiosn since start of program at  end of August with no communication in the last month  Pt noncompliant with program and no progress noted

## 2020-02-04 ENCOUNTER — TRANSCRIBE ORDERS (OUTPATIENT)
Dept: ADMINISTRATIVE | Facility: HOSPITAL | Age: 60
End: 2020-02-04

## 2020-02-04 DIAGNOSIS — Z78.0 MENOPAUSE: Primary | ICD-10-CM

## 2020-02-04 DIAGNOSIS — Z12.31 VISIT FOR SCREENING MAMMOGRAM: ICD-10-CM

## 2020-02-24 ENCOUNTER — HOSPITAL ENCOUNTER (OUTPATIENT)
Dept: BONE DENSITY | Facility: CLINIC | Age: 60
Discharge: HOME | End: 2020-02-24
Payer: COMMERCIAL

## 2020-02-24 DIAGNOSIS — Z12.31 VISIT FOR SCREENING MAMMOGRAM: ICD-10-CM

## 2020-02-24 DIAGNOSIS — Z78.0 MENOPAUSE: ICD-10-CM

## 2020-02-24 PROCEDURE — 77080 DXA BONE DENSITY AXIAL: CPT

## 2020-02-24 PROCEDURE — 77063 BREAST TOMOSYNTHESIS BI: CPT

## 2020-02-24 PROCEDURE — 77067 SCR MAMMO BI INCL CAD: CPT

## 2020-06-15 ENCOUNTER — APPOINTMENT (EMERGENCY)
Dept: RADIOLOGY | Facility: HOSPITAL | Age: 60
End: 2020-06-15
Payer: COMMERCIAL

## 2020-06-15 ENCOUNTER — HOSPITAL ENCOUNTER (EMERGENCY)
Facility: HOSPITAL | Age: 60
Discharge: HOME | End: 2020-06-15
Attending: EMERGENCY MEDICINE | Admitting: EMERGENCY MEDICINE
Payer: COMMERCIAL

## 2020-06-15 VITALS
OXYGEN SATURATION: 93 % | TEMPERATURE: 100.2 F | DIASTOLIC BLOOD PRESSURE: 72 MMHG | SYSTOLIC BLOOD PRESSURE: 138 MMHG | HEART RATE: 95 BPM | RESPIRATION RATE: 20 BRPM

## 2020-06-15 DIAGNOSIS — J18.9 COMMUNITY ACQUIRED PNEUMONIA: Primary | ICD-10-CM

## 2020-06-15 LAB
ALBUMIN SERPL BCP-MCNC: 4.1 G/DL (ref 3.5–5)
ALP SERPL-CCNC: 107 U/L (ref 46–116)
ALT SERPL W P-5'-P-CCNC: 37 U/L (ref 12–78)
ANION GAP SERPL CALCULATED.3IONS-SCNC: 8 MMOL/L (ref 4–13)
APTT PPP: 29 SECONDS (ref 23–37)
AST SERPL W P-5'-P-CCNC: 29 U/L (ref 5–45)
ATRIAL RATE: 94 BPM
BASOPHILS # BLD AUTO: 0.04 THOUSANDS/ΜL (ref 0–0.1)
BASOPHILS NFR BLD AUTO: 0 % (ref 0–1)
BILIRUB SERPL-MCNC: 0.7 MG/DL (ref 0.2–1)
BUN SERPL-MCNC: 5 MG/DL (ref 5–25)
CALCIUM SERPL-MCNC: 9.2 MG/DL (ref 8.3–10.1)
CHLORIDE SERPL-SCNC: 98 MMOL/L (ref 100–108)
CO2 SERPL-SCNC: 29 MMOL/L (ref 21–32)
CREAT SERPL-MCNC: 0.61 MG/DL (ref 0.6–1.3)
EOSINOPHIL # BLD AUTO: 0.03 THOUSAND/ΜL (ref 0–0.61)
EOSINOPHIL NFR BLD AUTO: 0 % (ref 0–6)
ERYTHROCYTE [DISTWIDTH] IN BLOOD BY AUTOMATED COUNT: 12.9 % (ref 11.6–15.1)
GFR SERPL CREATININE-BSD FRML MDRD: 98 ML/MIN/1.73SQ M
GLUCOSE SERPL-MCNC: 95 MG/DL (ref 65–140)
HCT VFR BLD AUTO: 39.3 % (ref 34.8–46.1)
HGB BLD-MCNC: 13.5 G/DL (ref 11.5–15.4)
IMM GRANULOCYTES # BLD AUTO: 0.08 THOUSAND/UL (ref 0–0.2)
IMM GRANULOCYTES NFR BLD AUTO: 1 % (ref 0–2)
INR PPP: 1.05 (ref 0.84–1.19)
LACTATE SERPL-SCNC: 1 MMOL/L (ref 0.5–2)
LYMPHOCYTES # BLD AUTO: 1.4 THOUSANDS/ΜL (ref 0.6–4.47)
LYMPHOCYTES NFR BLD AUTO: 9 % (ref 14–44)
MCH RBC QN AUTO: 31.8 PG (ref 26.8–34.3)
MCHC RBC AUTO-ENTMCNC: 34.4 G/DL (ref 31.4–37.4)
MCV RBC AUTO: 93 FL (ref 82–98)
MONOCYTES # BLD AUTO: 1.15 THOUSAND/ΜL (ref 0.17–1.22)
MONOCYTES NFR BLD AUTO: 8 % (ref 4–12)
NEUTROPHILS # BLD AUTO: 12.13 THOUSANDS/ΜL (ref 1.85–7.62)
NEUTS SEG NFR BLD AUTO: 82 % (ref 43–75)
NRBC BLD AUTO-RTO: 0 /100 WBCS
NT-PROBNP SERPL-MCNC: 77 PG/ML
P AXIS: 58 DEGREES
PLATELET # BLD AUTO: 265 THOUSANDS/UL (ref 149–390)
PMV BLD AUTO: 9.6 FL (ref 8.9–12.7)
POTASSIUM SERPL-SCNC: 3.6 MMOL/L (ref 3.5–5.3)
PR INTERVAL: 162 MS
PROCALCITONIN SERPL-MCNC: <0.05 NG/ML
PROT SERPL-MCNC: 8 G/DL (ref 6.4–8.2)
PROTHROMBIN TIME: 13.4 SECONDS (ref 11.6–14.5)
QRS AXIS: 6 DEGREES
QRSD INTERVAL: 78 MS
QT INTERVAL: 364 MS
QTC INTERVAL: 455 MS
RBC # BLD AUTO: 4.25 MILLION/UL (ref 3.81–5.12)
SARS-COV-2 RNA RESP QL NAA+PROBE: NEGATIVE
SODIUM SERPL-SCNC: 135 MMOL/L (ref 136–145)
T WAVE AXIS: 46 DEGREES
TROPONIN I SERPL-MCNC: <0.02 NG/ML
VENTRICULAR RATE: 94 BPM
WBC # BLD AUTO: 14.83 THOUSAND/UL (ref 4.31–10.16)

## 2020-06-15 PROCEDURE — 36415 COLL VENOUS BLD VENIPUNCTURE: CPT | Performed by: PHYSICIAN ASSISTANT

## 2020-06-15 PROCEDURE — 96365 THER/PROPH/DIAG IV INF INIT: CPT

## 2020-06-15 PROCEDURE — 85730 THROMBOPLASTIN TIME PARTIAL: CPT | Performed by: PHYSICIAN ASSISTANT

## 2020-06-15 PROCEDURE — 99285 EMERGENCY DEPT VISIT HI MDM: CPT

## 2020-06-15 PROCEDURE — 85025 COMPLETE CBC W/AUTO DIFF WBC: CPT | Performed by: PHYSICIAN ASSISTANT

## 2020-06-15 PROCEDURE — 84484 ASSAY OF TROPONIN QUANT: CPT | Performed by: PHYSICIAN ASSISTANT

## 2020-06-15 PROCEDURE — 83605 ASSAY OF LACTIC ACID: CPT | Performed by: PHYSICIAN ASSISTANT

## 2020-06-15 PROCEDURE — 93005 ELECTROCARDIOGRAM TRACING: CPT

## 2020-06-15 PROCEDURE — 80053 COMPREHEN METABOLIC PANEL: CPT | Performed by: PHYSICIAN ASSISTANT

## 2020-06-15 PROCEDURE — 71045 X-RAY EXAM CHEST 1 VIEW: CPT

## 2020-06-15 PROCEDURE — 93010 ELECTROCARDIOGRAM REPORT: CPT | Performed by: INTERNAL MEDICINE

## 2020-06-15 PROCEDURE — 83880 ASSAY OF NATRIURETIC PEPTIDE: CPT | Performed by: PHYSICIAN ASSISTANT

## 2020-06-15 PROCEDURE — 84145 PROCALCITONIN (PCT): CPT | Performed by: PHYSICIAN ASSISTANT

## 2020-06-15 PROCEDURE — 87635 SARS-COV-2 COVID-19 AMP PRB: CPT | Performed by: PHYSICIAN ASSISTANT

## 2020-06-15 PROCEDURE — 87040 BLOOD CULTURE FOR BACTERIA: CPT | Performed by: PHYSICIAN ASSISTANT

## 2020-06-15 PROCEDURE — 85610 PROTHROMBIN TIME: CPT | Performed by: PHYSICIAN ASSISTANT

## 2020-06-15 PROCEDURE — 99285 EMERGENCY DEPT VISIT HI MDM: CPT | Performed by: PHYSICIAN ASSISTANT

## 2020-06-15 RX ORDER — AMOXICILLIN 500 MG/1
1000 CAPSULE ORAL EVERY 12 HOURS SCHEDULED
Qty: 28 CAPSULE | Refills: 0 | Status: SHIPPED | OUTPATIENT
Start: 2020-06-15 | End: 2020-06-22

## 2020-06-15 RX ORDER — AZITHROMYCIN 250 MG/1
TABLET, FILM COATED ORAL
Qty: 6 TABLET | Refills: 0 | Status: SHIPPED | OUTPATIENT
Start: 2020-06-15 | End: 2020-06-19

## 2020-06-15 RX ORDER — CEFTRIAXONE 1 G/50ML
1000 INJECTION, SOLUTION INTRAVENOUS ONCE
Status: COMPLETED | OUTPATIENT
Start: 2020-06-15 | End: 2020-06-15

## 2020-06-15 RX ADMIN — CEFTRIAXONE 1000 MG: 1 INJECTION, SOLUTION INTRAVENOUS at 14:19

## 2020-06-16 ENCOUNTER — HOSPITAL ENCOUNTER (EMERGENCY)
Facility: HOSPITAL | Age: 60
Discharge: HOME | End: 2020-06-16
Attending: EMERGENCY MEDICINE | Admitting: EMERGENCY MEDICINE
Payer: COMMERCIAL

## 2020-06-16 VITALS
DIASTOLIC BLOOD PRESSURE: 60 MMHG | OXYGEN SATURATION: 94 % | HEART RATE: 93 BPM | HEIGHT: 65 IN | WEIGHT: 193 LBS | RESPIRATION RATE: 18 BRPM | BODY MASS INDEX: 32.15 KG/M2 | TEMPERATURE: 97.6 F | SYSTOLIC BLOOD PRESSURE: 109 MMHG

## 2020-06-16 DIAGNOSIS — J18.9 PNEUMONIA: ICD-10-CM

## 2020-06-16 DIAGNOSIS — L27.0 DRUG RASH: Primary | ICD-10-CM

## 2020-06-16 PROCEDURE — 99282 EMERGENCY DEPT VISIT SF MDM: CPT

## 2020-06-16 PROCEDURE — 99284 EMERGENCY DEPT VISIT MOD MDM: CPT | Performed by: EMERGENCY MEDICINE

## 2020-06-16 PROCEDURE — 96372 THER/PROPH/DIAG INJ SC/IM: CPT

## 2020-06-16 RX ORDER — METHYLPREDNISOLONE ACETATE 80 MG/ML
80 INJECTION, SUSPENSION INTRA-ARTICULAR; INTRALESIONAL; INTRAMUSCULAR; SOFT TISSUE ONCE
Status: COMPLETED | OUTPATIENT
Start: 2020-06-16 | End: 2020-06-16

## 2020-06-16 RX ORDER — DOXYCYCLINE HYCLATE 100 MG/1
100 CAPSULE ORAL 2 TIMES DAILY
Qty: 20 CAPSULE | Refills: 0 | Status: SHIPPED | OUTPATIENT
Start: 2020-06-16 | End: 2020-06-26

## 2020-06-16 RX ORDER — DIPHENHYDRAMINE HCL 25 MG
50 TABLET ORAL ONCE
Status: COMPLETED | OUTPATIENT
Start: 2020-06-16 | End: 2020-06-16

## 2020-06-16 RX ADMIN — METHYLPREDNISOLONE ACETATE 80 MG: 80 INJECTION, SUSPENSION INTRA-ARTICULAR; INTRALESIONAL; INTRAMUSCULAR; SOFT TISSUE at 09:22

## 2020-06-16 RX ADMIN — DIPHENHYDRAMINE HCL 50 MG: 25 TABLET, COATED ORAL at 09:22

## 2020-06-20 LAB
BACTERIA BLD CULT: NORMAL
BACTERIA BLD CULT: NORMAL

## 2020-11-20 ENCOUNTER — TRANSCRIBE ORDERS (OUTPATIENT)
Dept: ADMINISTRATIVE | Facility: HOSPITAL | Age: 60
End: 2020-11-20

## 2020-11-20 ENCOUNTER — HOSPITAL ENCOUNTER (OUTPATIENT)
Dept: RADIOLOGY | Facility: HOSPITAL | Age: 60
Discharge: HOME | End: 2020-11-20
Payer: COMMERCIAL

## 2020-11-20 DIAGNOSIS — R05.9 COUGH: ICD-10-CM

## 2020-11-20 DIAGNOSIS — R05.9 COUGH: Primary | ICD-10-CM

## 2020-11-20 PROCEDURE — 71046 X-RAY EXAM CHEST 2 VIEWS: CPT

## 2021-03-10 DIAGNOSIS — Z23 ENCOUNTER FOR IMMUNIZATION: ICD-10-CM

## 2021-07-05 ENCOUNTER — HOSPITAL ENCOUNTER (EMERGENCY)
Facility: HOSPITAL | Age: 61
Discharge: HOME | End: 2021-07-05
Attending: EMERGENCY MEDICINE
Payer: COMMERCIAL

## 2021-07-05 ENCOUNTER — APPOINTMENT (EMERGENCY)
Dept: RADIOLOGY | Facility: HOSPITAL | Age: 61
End: 2021-07-05
Payer: COMMERCIAL

## 2021-07-05 VITALS
HEART RATE: 68 BPM | DIASTOLIC BLOOD PRESSURE: 77 MMHG | SYSTOLIC BLOOD PRESSURE: 177 MMHG | RESPIRATION RATE: 16 BRPM | TEMPERATURE: 97 F | OXYGEN SATURATION: 93 %
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
## 2021-07-05 DIAGNOSIS — S30.0XXA COCCYX CONTUSION, INITIAL ENCOUNTER: ICD-10-CM

## 2021-07-05 DIAGNOSIS — S93.601A SPRAIN OF RIGHT FOOT, INITIAL ENCOUNTER: ICD-10-CM

## 2021-07-05 PROCEDURE — 99283 EMERGENCY DEPT VISIT LOW MDM: CPT

## 2021-07-05 PROCEDURE — 99284 EMERGENCY DEPT VISIT MOD MDM: CPT | Performed by: EMERGENCY MEDICINE

## 2021-07-05 PROCEDURE — 72220 X-RAY EXAM SACRUM TAILBONE: CPT

## 2021-07-05 RX ORDER — ACETAMINOPHEN 325 MG/1
975 TABLET ORAL ONCE
Status: COMPLETED | OUTPATIENT
Start: 2021-07-05 | End: 2021-07-05

## 2021-07-05 RX ORDER — LIDOCAINE 50 MG/G
1 PATCH TOPICAL ONCE
Status: DISCONTINUED | OUTPATIENT
Start: 2021-07-05 | End: 2021-07-05 | Stop reason: HOSPADM

## 2021-07-05 RX ADMIN — LIDOCAINE 5% 1 PATCH: 700 PATCH TOPICAL at 15:19

## 2021-07-05 RX ADMIN — ACETAMINOPHEN 975 MG: 325 TABLET, FILM COATED ORAL at 15:19

## 2021-07-05 NOTE — ED PROVIDER NOTES
Emergency Department Trauma Note  Nba Penny 56 y o  female MRN: [id number]  Unit/Bed#: ED 03/ED 03 Encounter: [id number]      Trauma Alert: Trauma Acuity: Trauma Evaluation  Model of Arrival:   via    Trauma Team: Current Providers  Attending Provider: Abbey Rodriguez DO  Registered Nurse: Tova Felix RN  Consultants: None      History of Present Illness     Chief Complaint:   Chief Complaint   Patient presents with    Fall     fall off of side of a truck and hit lower back and groin area - did hit back of head on the ground  - no LOC, no vomiting     HPI:  Nba Penny is a 56 y o  female who presents with fall  Mechanism:             67-year-old female takes aspirin complains  Of coccyx pain since falling last night  She was getting out of her 's truck at home and slipped twisted her right foot landed on her coccyx  She also hit her head on the ground and that hurt yesterday but resolved with Tylenol  Her right foot is sore as well but she is able to apply weight  She has no tenderness or instability at chest wall compression or pelvic compression        Review of Systems   Constitutional: Negative for chills and fever  HENT: Negative for rhinorrhea and sore throat  Respiratory: Negative for shortness of breath  Cardiovascular: Negative for chest pain  Gastrointestinal: Negative for constipation, diarrhea, nausea and vomiting  Genitourinary: Negative for dysuria and frequency  Skin: Negative for rash  All other systems reviewed and are negative  Historical Information     Immunizations: There is no immunization history on file for this patient      Past Medical History:   Diagnosis Date    [facility]     Hyperlipidemia     Hypertension     [facility] 06/26/2019    right lower lobe removed       Family History   Problem Relation Age of Onset    Lung cancer Mother     Colon cancer Maternal Grandmother     Colon cancer Maternal Aunt      Past Surgical History:   Procedure Laterality Date    APPENDECTOMY      BREAST BIOPSY Right     benign    COLON SURGERY      HYSTERECTOMY      age 48    LUNG LOBECTOMY      OOPHORECTOMY Bilateral     age 48     Social History     Tobacco Use    Smoking status: Current Every Day Smoker     Packs/day: 0 50    Smokeless tobacco: Never Used   Vaping Use    Vaping Use: Never assessed   Substance Use Topics    Alcohol use: Yes     Comment: social    Drug use: No     E-Cigarette/Vaping     E-Cigarette/Vaping Substances    Nicotine No     THC No     CBD No     Flavoring No     Other No     Unknown No        Family History: non-contributory    Meds/Allergies   Prior to Admission Medications   Prescriptions Last Dose Informant Patient Reported? Taking? LORazepam (ATIVAN) 1 mg tablet  Self Yes No   Si/2 TABLET TWICE A DAY AS NEEDED FOR ANXIETY ORALLY 30 DAYS   Spacer/Aero-Holding Chambers ([employer]) MISC  Self Yes No   Sig: Use as directed   albuterol (PROVENTIL HFA,VENTOLIN HFA) 90 mcg/act inhaler  Self Yes No   Sig: Inhale 2 puffs every 4 (four) hours   aspirin 81 MG tablet 2021 at Unknown time Self Yes Yes   Sig: Take 81 mg by mouth daily   atorvastatin (LIPITOR) 20 mg tablet  Self Yes No   Sig: Take 20 mg by mouth daily     buPROPion (WELLBUTRIN XL) 150 mg 24 hr tablet  Self Yes No   Sig: Take 300 mg by mouth every morning   coenzyme Q-10 100 MG capsule  Self Yes No   Sig: Take 200 mg by mouth daily   diphenhydrAMINE (BENADRYL) 50 MG tablet   No No   Sig: Take 1 tablet (50 mg total) by mouth every 6 (six) hours as needed for itching (Rash)   ipratropium-albuterol (DUO-NEB) 0 5-2 5 mg/3 mL nebulizer solution  Self Yes No   lisinopril-hydrochlorothiazide (PRINZIDE,ZESTORETIC) 20-25 MG per tablet  Self Yes No   Sig: Take 1 tablet by mouth daily   sertraline (ZOLOFT) 100 mg tablet  Self Yes No   Sig: Take 200 mg by mouth daily    verapamil (VERELAN PM) 180 MG 24 hr capsule  Self Yes No   Sig: Take 1 tablet by mouth daily       Facility-Administered Medications: None       Allergies   Allergen Reactions    Amoxicillin Hives    Vilazodone Hcl Dizziness and Nausea Only    Zithromax [Azithromycin] Hives       PHYSICAL EXAM    PE limited by: nothing    Objective   Vitals:   First set: Temperature: (!) 97 4 °F (36 3 °C) (07/05/21 1428)  Pulse: 82 (07/05/21 1428)  Respirations: 18 (07/05/21 1428)  Blood Pressure: 163/75 (07/05/21 1428)  SpO2: 94 % (07/05/21 1428)    Primary Survey:   (A) Airway: patent  (B) Breathing: clear  (C) Circulation: Pulses:   normal  (D) Disabliity:  GCS Total:  15  (E) Expose:  Completed    Secondary Survey: (Click on Physical Exam tab above)  Physical Exam  Vitals and nursing note reviewed  Constitutional:       Appearance: She is well-developed  HENT:      Head: Normocephalic and atraumatic  Comments: No contusion or hematoma of scalp noted, not tender     Right Ear: External ear normal       Left Ear: External ear normal       Nose: Nose normal    Eyes:      Conjunctiva/sclera: Conjunctivae normal       Pupils: Pupils are equal, round, and reactive to light  Cardiovascular:      Rate and Rhythm: Normal rate and regular rhythm  Heart sounds: Normal heart sounds  Pulmonary:      Effort: Pulmonary effort is normal  No respiratory distress  Breath sounds: Normal breath sounds  No wheezing  Chest:      Chest wall: No tenderness  Abdominal:      General: Bowel sounds are normal  There is no distension  Palpations: Abdomen is soft  Tenderness: There is no abdominal tenderness  Musculoskeletal:         General: No deformity  Normal range of motion  Cervical back: Normal range of motion and neck supple  No bony tenderness  No spinous process tenderness  Thoracic back: No bony tenderness  Lumbar back: No bony tenderness  Back:       Left foot: Normal range of motion  No swelling or deformity  Normal pulse  Legs:    Skin:     General: Skin is warm and dry  Findings: No rash  Neurological:      General: No focal deficit present  Mental Status: She is alert and oriented to person, place, and time  GCS: GCS eye subscore is 4  GCS verbal subscore is 5  GCS motor subscore is 6  Sensory: No sensory deficit  Psychiatric:         Mood and Affect: Mood normal          Cervical spine cleared by clinical criteria? Yes     Invasive Devices     None                 Lab Results:   Results Reviewed     None                 Imaging Studies:   Direct to CT: No  XR sacrum and coccyx   ED Interpretation by Guille Jewell DO (07/05 1628)   No acute abnl            Procedures  Procedures         ED Course           MDM  Number of Diagnoses or Management Options  Coccyx contusion, initial encounter: new and requires workup  Fall, initial encounter: new and requires workup  Sprain of right foot, initial encounter: new and does not require workup     Amount and/or Complexity of Data Reviewed  Tests in the radiology section of CPT®: ordered and reviewed    Patient Progress  Patient progress: improved          Disposition  [facility] Transfer: No  Final diagnoses:   Fall, initial encounter   Coccyx contusion, initial encounter   Sprain of right foot, initial encounter     Time reflects when diagnosis was documented in both MDM as applicable and the Disposition within this note     Time User Action Codes Description Comment    7/5/2021  4:31 PM Malinda Penny Add [W19  MXXP] Fall, initial encounter     7/5/2021  4:32 PM Malinda Penny Add [S30  0XXA] Coccyx contusion, initial encounter     7/5/2021  4:32 PM Malinda Penny Add [E09 855H] Sprain of right foot, initial encounter       ED Disposition     ED Disposition Condition Date/Time Comment    Discharge Stable Mon Jul 5, 2021  4:31 PM [facility] discharge to home/self care              Follow-up Information     Follow up With Specialties Details Why [address] Jazmine Kinney MD Family Medicine Call  As needed, [facility] symptoms worsen [facility] [address]  [phone]          Discharge Medication List as of 7/5/2021  4:32 PM      CONTINUE these medications which have NOT CHANGED    Details   aspirin 81 MG tablet Take 81 mg by mouth daily, Historical Med      albuterol (PROVENTIL HFA,VENTOLIN HFA) 90 mcg/act inhaler Inhale 2 puffs every 4 (four) hours, Starting Tue 8/6/2019, Historical Med      atorvastatin (LIPITOR) 20 mg tablet Take 20 mg by mouth daily  , Historical Med      buPROPion (WELLBUTRIN XL) 150 mg 24 hr tablet Take 300 mg by mouth every morning, Starting Sat 4/20/2019, Historical Med      coenzyme Q-10 100 MG capsule Take 200 mg by mouth daily, Historical Med      diphenhydrAMINE (BENADRYL) 50 MG tablet Take 1 tablet (50 mg total) by mouth every 6 (six) hours as needed for itching (Rash), Starting Tue 6/16/2020, Print      ipratropium-albuterol (DUO-NEB) 0 5-2 5 mg/3 mL nebulizer solution Starting Mon 10/21/2019, Historical Med      lisinopril-hydrochlorothiazide (PRINZIDE,ZESTORETIC) 20-25 MG per tablet Take 1 tablet by mouth daily, Historical Med      LORazepam (ATIVAN) 1 mg tablet 1/2 TABLET TWICE A DAY AS NEEDED FOR ANXIETY ORALLY 30 DAYS, Historical Med      sertraline (ZOLOFT) 100 mg tablet Take 200 mg by mouth daily , Historical Med      Spacer/Aero-Holding Chambers [facility]) MISC Use as directed, Starting Wed 8/7/2019, Historical Med      verapamil (VERELAN PM) 180 MG 24 hr capsule Take 1 tablet by mouth daily , Historical Med           No discharge procedures on file      PDMP Review     None          ED Provider  Electronically Signed by         Lady Negin DO  07/05/21 2776

## 2021-09-14 ENCOUNTER — APPOINTMENT (OUTPATIENT)
Dept: RADIOLOGY | Facility: CLINIC | Age: 61
End: 2021-09-14
Payer: COMMERCIAL

## 2021-09-14 ENCOUNTER — OFFICE VISIT (OUTPATIENT)
Dept: OBGYN CLINIC | Facility: CLINIC | Age: 61
End: 2021-09-14
Payer: COMMERCIAL

## 2021-09-14 VITALS
WEIGHT: 184.4 LBS | HEIGHT: 65 IN | BODY MASS INDEX: 30.72 KG/M2 | SYSTOLIC BLOOD PRESSURE: 146 MMHG | DIASTOLIC BLOOD PRESSURE: 74 MMHG

## 2021-09-14 DIAGNOSIS — M77.8 LEFT SHOULDER TENDINITIS: Primary | ICD-10-CM

## 2021-09-14 DIAGNOSIS — M75.42 IMPINGEMENT SYNDROME OF LEFT SHOULDER: ICD-10-CM

## 2021-09-14 DIAGNOSIS — M25.512 LEFT SHOULDER PAIN, UNSPECIFIED CHRONICITY: ICD-10-CM

## 2021-09-14 PROCEDURE — 99203 OFFICE O/P NEW LOW 30 MIN: CPT | Performed by: ORTHOPAEDIC SURGERY

## 2021-09-14 PROCEDURE — 20610 DRAIN/INJ JOINT/BURSA W/O US: CPT | Performed by: ORTHOPAEDIC SURGERY

## 2021-09-14 PROCEDURE — 73030 X-RAY EXAM OF SHOULDER: CPT

## 2021-09-14 RX ORDER — BETAMETHASONE SODIUM PHOSPHATE AND BETAMETHASONE ACETATE 3; 3 MG/ML; MG/ML
6 INJECTION, SUSPENSION INTRA-ARTICULAR; INTRALESIONAL; INTRAMUSCULAR; SOFT TISSUE
Status: COMPLETED | OUTPATIENT
Start: 2021-09-14 | End: 2021-09-14

## 2021-09-14 RX ORDER — LIDOCAINE HYDROCHLORIDE 10 MG/ML
5 INJECTION, SOLUTION EPIDURAL; INFILTRATION; INTRACAUDAL; PERINEURAL
Status: COMPLETED | OUTPATIENT
Start: 2021-09-14 | End: 2021-09-14

## 2021-09-14 RX ADMIN — LIDOCAINE HYDROCHLORIDE 5 ML: 10 INJECTION, SOLUTION EPIDURAL; INFILTRATION; INTRACAUDAL; PERINEURAL at 10:21

## 2021-09-14 RX ADMIN — BETAMETHASONE SODIUM PHOSPHATE AND BETAMETHASONE ACETATE 6 MG: 3; 3 INJECTION, SUSPENSION INTRA-ARTICULAR; INTRALESIONAL; INTRAMUSCULAR; SOFT TISSUE at 10:21

## 2021-09-14 NOTE — PROGRESS NOTES
Assessment:     1  Left shoulder tendinitis    2  Impingement syndrome of left shoulder        Plan:     Problem List Items Addressed This Visit        Musculoskeletal and Integument    Left shoulder tendinitis - Primary    Relevant Medications    lidocaine (PF) (XYLOCAINE-MPF) 1 % injection 5 mL (Completed)    betamethasone acetate-betamethasone sodium phosphate (CELESTONE) injection 6 mg (Completed)    Other Relevant Orders    XR shoulder 2+ vw left    Ambulatory referral to Physical Therapy    Large joint arthrocentesis: L subacromial bursa (Completed)       Other    Impingement syndrome of left shoulder        78-year-old female with left shoulder tendinitis and impingement  Discussed treatment options with the patient today  Patient's left shoulder x-ray was reviewed with her  Risks and benefits of steroid injection discussed in the office today  Patient elected to proceed  Patient was provided with a steroid injection to the left subacromial space joint in the office today  Patient tolerated this procedure well with no immediate complications  Post-injection instructions discussed with the patient  They should rest, apply ice, or take Tylenol/NSAIDs as needed for post-injection soreness  Also recommend a course of physical therapy for ROM, stretching, strengthening  She can try OTC diclofenac gel if she would like  Contact the office with any further questions or concerns prior to next follow-up, otherwise will plan to see her back in 4-6 weeks for repeat clinical evaluation  All patient's questions were answered to her satisfaction  This note is created using dictation transcription  It may contain typographical errors, grammatical errors, improperly dictated words, background noise and other errors  Subjective:     Patient ID: Leandra Cockayne is a 59 y o  female  Chief Complaint: Left shoulder pain  78-year-old RHD female presenting for evaluation of left shoulder pain    Patient states that she began having diffuse pain in her left shoulder in March of 2021 with no significant injury or inciting event  Her pain has been worsening over time and she has difficulty reaching overhead to him and taking off her shirt especially  She also states that her left shoulder occasionally "locks"  She saw her PCP in May who gave her an injection to the shoulder which did not help  She has also been using Naproxen for pain which is also not helping  Denies any weakness, radiation of pain, numbness, or tingling  She denies neck pain  Patient has not done any physical therapy yet for this  Information on patient's intake form was reviewed  Allergy:  Allergies   Allergen Reactions    Amoxicillin Hives    Vilazodone Hcl Dizziness and Nausea Only    Zithromax [Azithromycin] Hives     Medications:  all current active meds have been reviewed  Past Medical History:  Past Medical History:   Diagnosis Date    [facility] [address] [facility]     Hyperlipidemia     Hypertension     [facility]/2019    right lower lobe removed     Past Surgical History:  Past Surgical History:   Procedure Laterality Date    APPENDECTOMY      BREAST BIOPSY Right     benign    COLON SURGERY      HYSTERECTOMY      age 48   [facility] LUNG LOBECTOMY      OOPHORECTOMY Bilateral     age 48     Family History:  Family History   Problem Relation Age of Onset    Lung cancer Mother     Colon cancer Maternal Grandmother     Colon cancer Maternal Aunt      Social History:  Social History     Substance and Sexual Activity   Alcohol Use Yes    Comment: social     Social History     Substance and Sexual Activity   Drug Use No     Social History     Tobacco Use   Smoking Status Current Every Day Smoker    Packs/day: 0 50   Smokeless Tobacco Never Used     Review of Systems   Constitutional: Negative for appetite change and unexpected weight change  HENT: Negative for congestion and trouble swallowing  Eyes: Negative for visual disturbance     Respiratory: Negative for cough and shortness of breath  Cardiovascular: Negative for chest pain and palpitations  Gastrointestinal: Negative for nausea and vomiting  Endocrine: Negative for cold intolerance and heat intolerance  Genitourinary: Negative  Musculoskeletal: Positive for arthralgias (Left shoulder)  Negative for gait problem and neck pain  Skin: Negative for rash  Neurological: Negative for weakness and numbness  Psychiatric/Behavioral: Negative  Objective:  BP Readings from Last 1 Encounters:   09/14/21 146/74      Wt Readings from Last 1 Encounters:   09/14/21 83 6 kg (184 lb 6 4 oz)      BMI:   Estimated body mass index is 30 69 kg/m² as calculated from the following:    Height as of this encounter: 5' 5" (1 651 m)  Weight as of this encounter: 83 6 kg (184 lb 6 4 oz)  BSA:   Estimated body surface area is 1 91 meters squared as calculated from the following:    Height as of this encounter: 5' 5" (1 651 m)  Weight as of this encounter: 83 6 kg (184 lb 6 4 oz)  Physical Exam  Vitals and nursing note reviewed  Constitutional:       Appearance: Normal appearance  She is well-developed  HENT:      Head: Normocephalic and atraumatic  Right Ear: External ear normal       Left Ear: External ear normal    Eyes:      Extraocular Movements: Extraocular movements intact  Conjunctiva/sclera: Conjunctivae normal    Pulmonary:      Effort: Pulmonary effort is normal    Musculoskeletal:      Cervical back: Neck supple  Comments: See HPI   Skin:     General: Skin is warm and dry  Neurological:      General: No focal deficit present  Mental Status: She is alert and oriented to person, place, and time  Deep Tendon Reflexes: Reflexes are normal and symmetric  Psychiatric:         Mood and Affect: Mood normal          Behavior: Behavior normal        Left Shoulder Exam     Tenderness   The patient is experiencing no tenderness       Range of Motion   Left shoulder active abduction: Pain  Left shoulder forward flexion: Pain  Internal rotation 0 degrees:  Sacrum (Pain) abnormal   Left shoulder internal rotation 90 degrees: Pain  Muscle Strength   Left shoulder normal muscle strength: Pain with resistant abduction and internal rotation  Abduction: 5/5   Internal rotation: 4/5   External rotation: 5/5   Biceps: 5/5     Tests   Cross arm: negative  Impingement: positive  Drop arm: negative    Other   Erythema: absent  Sensation: normal  Pulse: present     Comments:  Skin intact  No significant swelling  ROM limited secondary to pain  No pain with resisted ER  Pain with resisted IR and abduction  Strength WNL  Negative Speed's  Positive empty can              I have personally reviewed pertinent films in PACS and my interpretation is no acute fractures or dislocations  Mild DJD, no soft tissue calcification  Type 2 acromial process present        Large joint arthrocentesis: L subacromial bursa  Universal Protocol:  Consent: Verbal consent obtained    Risks and benefits: risks, benefits and alternatives were discussed  Consent given by: patient  Patient understanding: patient states understanding of the procedure being performed  Site marked: the operative site was marked  Patient identity confirmed: verbally with patient    Supporting Documentation  Indications: pain   Procedure Details  Location: shoulder - L subacromial bursa  Preparation: Patient was prepped and draped in the usual sterile fashion  Needle size: 22 G  Ultrasound guidance: no  Approach: posterolateral  Medications administered: 5 mL lidocaine (PF) 1 %; 6 mg betamethasone acetate-betamethasone sodium phosphate 6 (3-3) mg/mL    Patient tolerance: patient tolerated the procedure well with no immediate complications  Dressing:  Sterile dressing applied        Scribe Attestation    I,:  Stan Mitchell am acting as a scribe while in the presence of the attending physician :       I,:  Dimitri Mensah MD personally performed the services described in this documentation    as scribed in my presence :

## 2021-09-17 ENCOUNTER — EVALUATION (OUTPATIENT)
Dept: PHYSICAL THERAPY | Facility: CLINIC | Age: 61
End: 2021-09-17
Payer: COMMERCIAL

## 2021-09-17 DIAGNOSIS — M77.8 LEFT SHOULDER TENDINITIS: Primary | ICD-10-CM

## 2021-09-17 PROCEDURE — 97162 PT EVAL MOD COMPLEX 30 MIN: CPT | Performed by: PHYSICAL THERAPIST

## 2021-09-17 NOTE — PROGRESS NOTES
PT Evaluation     Today's date: 2021  Patient name: Oscar Thapa  : 1960  MRN: [id number]  Referring provider: Pratik Gray MD  Dx:   Encounter Diagnosis     ICD-10-CM    1  Left shoulder tendinitis  M77 8 Ambulatory referral to Physical Therapy                  Assessment  Assessment details: Pt is a 59y o  year old female coming to outpatient PT with a diagnosis of L shoulder tendonitis and impingement onset several months ago  Pt presents with increased pain and TTP, decreased L shoulder A/PROM, decreased strength, and overall decreased functional mobility  Pt would benefit from skilled PT services in order to address these deficits and reach maximum level of function  Thank you kindly for the referral!  Impairments: abnormal or restricted ROM, activity intolerance, impaired physical strength, lacks appropriate home exercise program and pain with function  Understanding of Dx/Px/POC: good   Prognosis: good    Goals  STG's ( 3-4 weeks)  1  Pt will be independent in HEP  2  Pt will have improved L shoulder AROM by 10*  LTG's ( 6- 8 weeks)  1  Improve FOTO score by 8-10 points  2  Pt will be able to reach overhead with greater ease  3  Pt will have less pain when reaching behind her back  4   Pt will have improved shoulder strength by 1/2 grade    Plan  Patient would benefit from: PT eval and skilled physical therapy  Planned modality interventions: cryotherapy, TENS and ultrasound  Planned therapy interventions: joint mobilization, manual therapy, neuromuscular re-education, strengthening, stretching, therapeutic activities, therapeutic exercise, flexibility, functional ROM exercises and home exercise program  Frequency: 2x week  Duration in weeks: 6  Plan of Care beginning date: 2021  Plan of Care expiration date: 10/29/2021  Treatment plan discussed with: patient        Subjective Evaluation    History of Present Illness  Mechanism of injury: Pt report L shoulder pain onset 3/2021 2* unknown etiology  Pt has increased pain when reaching overhead, putting on deodorant, putting on a shirt, reaching behind her back, and reaching out to the side  Pt has pain at night with sleeping  Pt has had 2 steriod injections without significant relief  Pt has tried Naproxen and Voltaren gel  Pt has increased pain when placing her hand on the grocery cart and she needs to rest her arm in her pocket      Work: not working  Hobbies: taking care of her grandson  Gait: no abnormalities  Pain  At best pain rating: 3  At worst pain ratin  Location: L shoulder- cracking  Quality: burning  Aggravating factors: overhead activity    Social Support    Employment status: not working  Hand dominance: right    Treatments  Previous treatment: injection treatment and medication  Patient Goals  Patient goals for therapy: decreased pain and independence with ADLs/IADLs  Patient goal: to be able to do normal things        Objective     Neurological Testing     Sensation     Shoulder   Left Shoulder   Intact: light touch    Right Shoulder   Intact: light touch    Reflexes   Left   Biceps (C5/C6): normal (2+)  Brachioradialis (C6): normal (2+)  Triceps (C7): normal (2+)    Right   Biceps (C5/C6): normal (2+)  Brachioradialis (C6): normal (2+)  Triceps (C7): normal (2+)    Active Range of Motion   Left Shoulder   Flexion: 110 degrees with pain  Abduction: 80 degrees with pain  External rotation 45°: -10 degrees   Internal rotation 45°: 65 degrees     Right Shoulder   Flexion: 152 degrees   Abduction: WFL    Additional Active Range of Motion Details  (+) supraspinatus test  (+) impingement sign  (+) Speed's test  (+) TTP L shoulder subacromium      Passive Range of Motion   Left Shoulder   Flexion: 135 degrees with pain  Abduction: 110 degrees with pain  External rotation 45°: 25 degrees   Internal rotation 45°: 65 degrees     Additional Passive Range of Motion Details  Pain and hypomobility with inferior GHJ glide    Strength/Myotome Testing     Left Shoulder     Planes of Motion   Left shoulder forward flexion strength: NT    Left shoulder abduction strength: NT    External rotation at 0°: 4+   Internal rotation at 0°: 5     Right Shoulder   Normal muscle strength            Dx: L shoulder tendonitis/ impingement  EPOC: 10/29/21  CO-MORBIDITIES:  PERSONAL FACTORS:  Precautions: high pain      Manuals 9/17            L shoulder PROM             L shoulder MFR             K tape V down th                         Neuro Re-Ed             TB LPD             TB row                                                                              Ther Ex             pendulums             Table slides: flex, scap, ER             L shoulder isometrics: flex, ext, ER,IR, abd             Seated cane ER AAROM             Supine hand hold shld flex                                                    Ther Activity                                       Gait Training                                       Modalities             CUS x8'/1 4w/cm to L shld

## 2021-09-21 ENCOUNTER — OFFICE VISIT (OUTPATIENT)
Dept: PHYSICAL THERAPY | Facility: CLINIC | Age: 61
End: 2021-09-21
Payer: COMMERCIAL

## 2021-09-21 DIAGNOSIS — M77.8 LEFT SHOULDER TENDINITIS: Primary | ICD-10-CM

## 2021-09-21 DIAGNOSIS — M75.42 IMPINGEMENT SYNDROME OF LEFT SHOULDER: ICD-10-CM

## 2021-09-21 PROCEDURE — 97110 THERAPEUTIC EXERCISES: CPT | Performed by: PHYSICAL THERAPIST

## 2021-09-21 PROCEDURE — 97140 MANUAL THERAPY 1/> REGIONS: CPT | Performed by: PHYSICAL THERAPIST

## 2021-09-21 PROCEDURE — 97035 APP MDLTY 1+ULTRASOUND EA 15: CPT | Performed by: PHYSICAL THERAPIST

## 2021-09-21 NOTE — PROGRESS NOTES
Daily Note     Today's date: 2021  Patient name: Simone Paul  : 1960  MRN: [id number]  Referring provider: Emelia Orta MD  Dx:   Encounter Diagnosis     ICD-10-CM    1  Left shoulder tendinitis  M77 8    2  Impingement syndrome of left shoulder  M75 42                   Subjective: Pt reports her shoulder is really hurting her this week  Pain rated 7-8/10 currently  Pt notes the pain comes and goes      Objective: See treatment diary below      Assessment: Tolerated treatment fair  Patient exhibited good technique with therapeutic exercises  Pt is apprehensive about when the pain will start  Plan: Continue per plan of care  Focus on pain reduction and ROM  Pt was issued an updated HEP       Dx: L shoulder tendonitis/ impingement  EPOC: 10/29/21  CO-MORBIDITIES:  PERSONAL FACTORS:  Precautions: high pain      Manuals            L shoulder PROM  th           L shoulder MFR  th           K tape V down  th                        Neuro Re-Ed             TB LPD             TB row                                                                              Ther Ex             Pendulums: CW, CCW  10 ea           Table slides: flex, scap, ER             L shoulder isometrics: flex, ext, ER,IR, abd  5"x5 ea           Seated cane ER AAROM  10x5"           Supine hand hold shld flex  10x5"                                                  Ther Activity                                       Gait Training                                       Modalities             CUS x8'/1 4w/cm to L shld  th

## 2021-09-23 ENCOUNTER — OFFICE VISIT (OUTPATIENT)
Dept: PHYSICAL THERAPY | Facility: CLINIC | Age: 61
End: 2021-09-23
Payer: COMMERCIAL

## 2021-09-23 DIAGNOSIS — M75.42 IMPINGEMENT SYNDROME OF LEFT SHOULDER: ICD-10-CM

## 2021-09-23 DIAGNOSIS — M77.8 LEFT SHOULDER TENDINITIS: Primary | ICD-10-CM

## 2021-09-23 PROCEDURE — 97110 THERAPEUTIC EXERCISES: CPT

## 2021-09-23 PROCEDURE — 97140 MANUAL THERAPY 1/> REGIONS: CPT

## 2021-09-23 PROCEDURE — 97112 NEUROMUSCULAR REEDUCATION: CPT

## 2021-09-23 NOTE — PROGRESS NOTES
Daily Note     Today's date: 2021  Patient name: Roger Calvillo  : 1960  MRN: [id number]  Referring provider: Nba Laboy MD  Dx:   Encounter Diagnosis     ICD-10-CM    1  Left shoulder tendinitis  M77 8    2  Impingement syndrome of left shoulder  M75 42                   Subjective: Pt reports she did get relief post US LV  States she moves around in bed at night and has the shld sore this AM       Objective: See treatment diary below      Assessment: Tolerated treatment well  Patient w/ tightness in bicep and pec  Worked manually to mobilize winging scap  Pt reported no c/o' pain post Rx  Plan: Continue per plan of care  Progress treatment as tolerated         Dx: L shoulder tendonitis/ impingement  EPOC: 10/29/21  CO-MORBIDITIES:  PERSONAL FACTORS:  Precautions: high pain      Manuals           L shoulder PROM  th JK          L shoulder MFR  th JK          L scap PROM   jk          K tape V down th th jk                       Neuro Re-Ed             TB LPD             TB row                                                                              Ther Ex             Pendulums: CW, CCW  10 ea 10 ea          Table slides: flex, scap, ER   10x5"          L shoulder isometrics: flex, ext, ER,IR, abd  5"x5 ea 5"x5 ea          Seated cane ER AAROM  10x5" nv          Supine hand hold shld flex  10x5" nv          AAROM cane ext IR   10x3"                                    Ther Activity                                       Gait Training                                       Modalities             CUS x8'/1 4w/cm to L shld  th jk

## 2021-09-28 ENCOUNTER — OFFICE VISIT (OUTPATIENT)
Dept: PHYSICAL THERAPY | Facility: CLINIC | Age: 61
End: 2021-09-28
Payer: COMMERCIAL

## 2021-09-28 DIAGNOSIS — M75.42 IMPINGEMENT SYNDROME OF LEFT SHOULDER: ICD-10-CM

## 2021-09-28 DIAGNOSIS — M77.8 LEFT SHOULDER TENDINITIS: Primary | ICD-10-CM

## 2021-09-28 PROCEDURE — 97110 THERAPEUTIC EXERCISES: CPT

## 2021-09-28 PROCEDURE — 97112 NEUROMUSCULAR REEDUCATION: CPT

## 2021-09-28 PROCEDURE — 97140 MANUAL THERAPY 1/> REGIONS: CPT

## 2021-09-28 NOTE — PROGRESS NOTES
Daily Note     Today's date: 2021  Patient name: Norberto Burkitt  : 1960  MRN: [id number]  Referring provider: Jaren Yancey MD  Dx:   Encounter Diagnosis     ICD-10-CM    1  Left shoulder tendinitis  M77 8    2  Impingement syndrome of left shoulder  M75 42                   Subjective: Pt reports she had relief post LV the rest of the day  States still getting positions of motion that the shld "catches"    Objective: See treatment diary below      Assessment: Tolerated treatment fair  Patient cont'd w/ pain at end range  COn'td work on [employer]  Pt w/ fwd shld posture  Pt made aware of posture and told to take notice if she guards pulling ant and hiking  Plan: Continue per plan of care  Progress treatment as tolerated         Dx: L shoulder tendonitis/ impingement  EPOC: 10/29/21  CO-MORBIDITIES:  PERSONAL FACTORS:  Precautions: high pain      Manuals          L shoulder PROM  th JK JK         L shoulder MFR   JK JK         L scap PROM   jk JK         K tape V down th th jk                       Neuro Re-Ed             TB LPD    GTB 10         TB row    GTB 10                                                                          Ther Ex             Pendulums: CW, CCW  10 ea 10 ea 10 ea         Table slides: flex, scap, ER   10x5"          L shoulder isometrics: flex, ext, ER,IR, abd  5"x5 ea 5"x5 ea 5"x5 a         Seated cane ER AAROM  10x5" nv 10         Supine hand hold shld flex  10x5" nv 10x5"         AAROM cane ext IR   10x3"                                    Ther Activity                                       Gait Training                                       Modalities             CUS x8'/1 4w/cm to L shld  th jk  jk

## 2021-09-30 ENCOUNTER — APPOINTMENT (OUTPATIENT)
Dept: PHYSICAL THERAPY | Facility: CLINIC | Age: 61
End: 2021-09-30
Payer: COMMERCIAL

## 2021-10-01 ENCOUNTER — TELEPHONE (OUTPATIENT)
Dept: OBGYN CLINIC | Facility: HOSPITAL | Age: 61
End: 2021-10-01

## 2021-10-05 ENCOUNTER — OFFICE VISIT (OUTPATIENT)
Dept: PHYSICAL THERAPY | Facility: CLINIC | Age: 61
End: 2021-10-05
Payer: COMMERCIAL

## 2021-10-05 DIAGNOSIS — M75.42 IMPINGEMENT SYNDROME OF LEFT SHOULDER: ICD-10-CM

## 2021-10-05 DIAGNOSIS — M77.8 LEFT SHOULDER TENDINITIS: Primary | ICD-10-CM

## 2021-10-05 PROCEDURE — 97110 THERAPEUTIC EXERCISES: CPT | Performed by: PHYSICAL THERAPIST

## 2021-10-05 PROCEDURE — 97140 MANUAL THERAPY 1/> REGIONS: CPT | Performed by: PHYSICAL THERAPIST

## 2021-10-07 ENCOUNTER — OFFICE VISIT (OUTPATIENT)
Dept: PHYSICAL THERAPY | Facility: CLINIC | Age: 61
End: 2021-10-07
Payer: COMMERCIAL

## 2021-10-07 DIAGNOSIS — M75.42 IMPINGEMENT SYNDROME OF LEFT SHOULDER: ICD-10-CM

## 2021-10-07 DIAGNOSIS — M77.8 LEFT SHOULDER TENDINITIS: Primary | ICD-10-CM

## 2021-10-07 PROCEDURE — 97110 THERAPEUTIC EXERCISES: CPT

## 2021-10-07 PROCEDURE — 97140 MANUAL THERAPY 1/> REGIONS: CPT

## 2021-10-07 PROCEDURE — 97112 NEUROMUSCULAR REEDUCATION: CPT

## 2021-10-11 ENCOUNTER — HOSPITAL ENCOUNTER (EMERGENCY)
Facility: HOSPITAL | Age: 61
Discharge: HOME | End: 2021-10-11
Attending: EMERGENCY MEDICINE
Payer: COMMERCIAL

## 2021-10-11 VITALS
DIASTOLIC BLOOD PRESSURE: 66 MMHG | HEART RATE: 71 BPM | RESPIRATION RATE: 18 BRPM | OXYGEN SATURATION: 97 % | SYSTOLIC BLOOD PRESSURE: 156 MMHG | TEMPERATURE: 97.2 F

## 2021-10-11 DIAGNOSIS — T63.441A LOCAL REACTION TO BEE STING, ACCIDENTAL OR UNINTENTIONAL, INITIAL ENCOUNTER: Primary | ICD-10-CM

## 2021-10-11 PROCEDURE — 99284 EMERGENCY DEPT VISIT MOD MDM: CPT | Performed by: EMERGENCY MEDICINE

## 2021-10-11 PROCEDURE — 99282 EMERGENCY DEPT VISIT SF MDM: CPT

## 2021-10-11 RX ORDER — EPINEPHRINE 0.3 MG/.3ML
0.3 INJECTION SUBCUTANEOUS ONCE
Qty: 0.6 ML | Refills: 0 | Status: SHIPPED | OUTPATIENT
Start: 2021-10-11 | End: 2021-10-11

## 2021-10-12 ENCOUNTER — APPOINTMENT (OUTPATIENT)
Dept: PHYSICAL THERAPY | Facility: CLINIC | Age: 61
End: 2021-10-12
Payer: COMMERCIAL

## 2021-10-14 ENCOUNTER — EVALUATION (OUTPATIENT)
Dept: PHYSICAL THERAPY | Facility: CLINIC | Age: 61
End: 2021-10-14
Payer: COMMERCIAL

## 2021-10-14 DIAGNOSIS — M77.8 LEFT SHOULDER TENDINITIS: Primary | ICD-10-CM

## 2021-10-14 DIAGNOSIS — M75.42 IMPINGEMENT SYNDROME OF LEFT SHOULDER: ICD-10-CM

## 2021-10-14 PROCEDURE — 97112 NEUROMUSCULAR REEDUCATION: CPT | Performed by: PHYSICAL THERAPIST

## 2021-10-14 PROCEDURE — 97140 MANUAL THERAPY 1/> REGIONS: CPT | Performed by: PHYSICAL THERAPIST

## 2021-11-03 ENCOUNTER — OFFICE VISIT (OUTPATIENT)
Dept: OBGYN CLINIC | Facility: CLINIC | Age: 61
End: 2021-11-03
Payer: COMMERCIAL

## 2021-11-03 VITALS
SYSTOLIC BLOOD PRESSURE: 140 MMHG | DIASTOLIC BLOOD PRESSURE: 85 MMHG | HEIGHT: 65 IN | WEIGHT: 183 LBS | BODY MASS INDEX: 30.49 KG/M2

## 2021-11-03 DIAGNOSIS — M75.02 ADHESIVE CAPSULITIS OF LEFT SHOULDER: Primary | ICD-10-CM

## 2021-11-03 DIAGNOSIS — M77.8 LEFT SHOULDER TENDINITIS: ICD-10-CM

## 2021-11-03 PROCEDURE — 99213 OFFICE O/P EST LOW 20 MIN: CPT | Performed by: ORTHOPAEDIC SURGERY

## 2021-11-04 ENCOUNTER — OFFICE VISIT (OUTPATIENT)
Dept: PHYSICAL THERAPY | Facility: CLINIC | Age: 61
End: 2021-11-04
Payer: COMMERCIAL

## 2021-11-04 DIAGNOSIS — M75.42 IMPINGEMENT SYNDROME OF LEFT SHOULDER: ICD-10-CM

## 2021-11-04 DIAGNOSIS — M77.8 LEFT SHOULDER TENDINITIS: Primary | ICD-10-CM

## 2021-11-04 PROCEDURE — 97140 MANUAL THERAPY 1/> REGIONS: CPT

## 2021-11-04 PROCEDURE — 97112 NEUROMUSCULAR REEDUCATION: CPT

## 2021-11-04 PROCEDURE — 97110 THERAPEUTIC EXERCISES: CPT

## 2021-11-04 PROCEDURE — 97010 HOT OR COLD PACKS THERAPY: CPT

## 2021-11-08 ENCOUNTER — OFFICE VISIT (OUTPATIENT)
Dept: PHYSICAL THERAPY | Facility: CLINIC | Age: 61
End: 2021-11-08
Payer: COMMERCIAL

## 2021-11-08 DIAGNOSIS — M77.8 LEFT SHOULDER TENDINITIS: Primary | ICD-10-CM

## 2021-11-08 DIAGNOSIS — M75.42 IMPINGEMENT SYNDROME OF LEFT SHOULDER: ICD-10-CM

## 2021-11-08 PROCEDURE — 97110 THERAPEUTIC EXERCISES: CPT

## 2021-11-08 PROCEDURE — 97112 NEUROMUSCULAR REEDUCATION: CPT

## 2021-11-08 PROCEDURE — 97140 MANUAL THERAPY 1/> REGIONS: CPT

## 2021-11-08 PROCEDURE — 97010 HOT OR COLD PACKS THERAPY: CPT

## 2021-11-10 ENCOUNTER — EVALUATION (OUTPATIENT)
Dept: PHYSICAL THERAPY | Facility: CLINIC | Age: 61
End: 2021-11-10
Payer: COMMERCIAL

## 2021-11-10 DIAGNOSIS — M75.42 IMPINGEMENT SYNDROME OF LEFT SHOULDER: ICD-10-CM

## 2021-11-10 DIAGNOSIS — M77.8 LEFT SHOULDER TENDINITIS: Primary | ICD-10-CM

## 2021-11-10 DIAGNOSIS — M75.02 ADHESIVE CAPSULITIS OF LEFT SHOULDER: ICD-10-CM

## 2021-11-10 PROCEDURE — 97140 MANUAL THERAPY 1/> REGIONS: CPT | Performed by: PHYSICAL THERAPIST

## 2021-11-10 PROCEDURE — 97112 NEUROMUSCULAR REEDUCATION: CPT | Performed by: PHYSICAL THERAPIST

## 2021-11-10 PROCEDURE — 97110 THERAPEUTIC EXERCISES: CPT | Performed by: PHYSICAL THERAPIST

## 2021-11-16 ENCOUNTER — APPOINTMENT (OUTPATIENT)
Dept: PHYSICAL THERAPY | Facility: CLINIC | Age: 61
End: 2021-11-16
Payer: COMMERCIAL

## 2022-06-06 ENCOUNTER — APPOINTMENT (OUTPATIENT)
Dept: RADIOLOGY | Facility: CLINIC | Age: 62
End: 2022-06-06
Payer: COMMERCIAL

## 2022-06-06 DIAGNOSIS — R05.9 COUGH: ICD-10-CM

## 2022-06-06 PROCEDURE — 71046 X-RAY EXAM CHEST 2 VIEWS: CPT

## 2022-11-13 ENCOUNTER — APPOINTMENT (EMERGENCY)
Dept: CT IMAGING | Facility: HOSPITAL | Age: 62
End: 2022-11-13

## 2022-11-13 ENCOUNTER — HOSPITAL ENCOUNTER (OUTPATIENT)
Facility: HOSPITAL | Age: 62
Setting detail: OBSERVATION
Discharge: HOME | End: 2022-11-14
Attending: EMERGENCY MEDICINE | Admitting: INTERNAL MEDICINE

## 2022-11-13 ENCOUNTER — APPOINTMENT (EMERGENCY)
Dept: RADIOLOGY | Facility: HOSPITAL | Age: 62
End: 2022-11-13

## 2022-11-13 DIAGNOSIS — S22.42XA CLOSED FRACTURE OF MULTIPLE RIBS OF LEFT SIDE, INITIAL ENCOUNTER: Primary | ICD-10-CM

## 2022-11-13 DIAGNOSIS — Z72.0 TOBACCO ABUSE: ICD-10-CM

## 2022-11-13 DIAGNOSIS — J44.9 CHRONIC OBSTRUCTIVE PULMONARY DISEASE, UNSPECIFIED COPD TYPE (HCC): ICD-10-CM

## 2022-11-13 DIAGNOSIS — R91.8 PULMONARY NODULES: ICD-10-CM

## 2022-11-13 DIAGNOSIS — Q04.6 COLLOID CYST OF BRAIN (HCC): ICD-10-CM

## 2022-11-13 DIAGNOSIS — R51.9 ACUTE HEADACHE: ICD-10-CM

## 2022-11-13 DIAGNOSIS — E87.6 HYPOKALEMIA: ICD-10-CM
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
## 2022-11-13 PROBLEM — I10 HYPERTENSION: Status: ACTIVE | Noted: 2022-11-13
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
## 2022-11-13 PROBLEM — E78.00 HYPERCHOLESTEROLEMIA: Status: ACTIVE | Noted: 2022-11-13

## 2022-11-13 LAB
ANION GAP SERPL CALCULATED.3IONS-SCNC: 10 MMOL/L (ref 4–13)
BASOPHILS # BLD AUTO: 0.03 THOUSANDS/ÂΜL (ref 0–0.1)
BASOPHILS NFR BLD AUTO: 0 % (ref 0–1)
BUN SERPL-MCNC: 11 MG/DL (ref 5–25)
CALCIUM SERPL-MCNC: 9.2 MG/DL (ref 8.3–10.1)
CHLORIDE SERPL-SCNC: 101 MMOL/L (ref 96–108)
CO2 SERPL-SCNC: 26 MMOL/L (ref 21–32)
CREAT SERPL-MCNC: 0.68 MG/DL (ref 0.6–1.3)
EOSINOPHIL # BLD AUTO: 0.06 THOUSAND/ÂΜL (ref 0–0.61)
EOSINOPHIL NFR BLD AUTO: 1 % (ref 0–6)
ERYTHROCYTE [DISTWIDTH] IN BLOOD BY AUTOMATED COUNT: 12.4 % (ref 11.6–15.1)
GFR SERPL CREATININE-BSD FRML MDRD: 92 ML/MIN/1.73SQ M
GLUCOSE SERPL-MCNC: 117 MG/DL (ref 65–140)
HCT VFR BLD AUTO: 41 % (ref 34.8–46.1)
HGB BLD-MCNC: 13.7 G/DL (ref 11.5–15.4)
IMM GRANULOCYTES # BLD AUTO: 0.03 THOUSAND/UL (ref 0–0.2)
IMM GRANULOCYTES NFR BLD AUTO: 0 % (ref 0–2)
LYMPHOCYTES # BLD AUTO: 1.91 THOUSANDS/ÂΜL (ref 0.6–4.47)
LYMPHOCYTES NFR BLD AUTO: 24 % (ref 14–44)
MCH RBC QN AUTO: 31.6 PG (ref 26.8–34.3)
MCHC RBC AUTO-ENTMCNC: 33.4 G/DL (ref 31.4–37.4)
MCV RBC AUTO: 95 FL (ref 82–98)
MONOCYTES # BLD AUTO: 0.53 THOUSAND/ÂΜL (ref 0.17–1.22)
MONOCYTES NFR BLD AUTO: 7 % (ref 4–12)
NEUTROPHILS # BLD AUTO: 5.39 THOUSANDS/ÂΜL (ref 1.85–7.62)
NEUTS SEG NFR BLD AUTO: 68 % (ref 43–75)
NRBC BLD AUTO-RTO: 0 /100 WBCS
PLATELET # BLD AUTO: 266 THOUSANDS/UL (ref 149–390)
PMV BLD AUTO: 9.7 FL (ref 8.9–12.7)
POTASSIUM SERPL-SCNC: 3.3 MMOL/L (ref 3.5–5.3)
RBC # BLD AUTO: 4.33 MILLION/UL (ref 3.81–5.12)
SODIUM SERPL-SCNC: 137 MMOL/L (ref 135–147)
WBC # BLD AUTO: 7.95 THOUSAND/UL (ref 4.31–10.16)

## 2022-11-13 RX ORDER — ONDANSETRON 2 MG/ML
4 INJECTION INTRAMUSCULAR; INTRAVENOUS EVERY 6 HOURS PRN
Status: DISCONTINUED | OUTPATIENT
Start: 2022-11-13 | End: 2022-11-14 | Stop reason: HOSPADM

## 2022-11-13 RX ORDER — AMOXICILLIN 250 MG
1 CAPSULE ORAL 2 TIMES DAILY
Status: DISCONTINUED | OUTPATIENT
Start: 2022-11-13 | End: 2022-11-14 | Stop reason: HOSPADM

## 2022-11-13 RX ORDER — IPRATROPIUM BROMIDE AND ALBUTEROL SULFATE 2.5; .5 MG/3ML; MG/3ML
3 SOLUTION RESPIRATORY (INHALATION)
Status: DISCONTINUED | OUTPATIENT
Start: 2022-11-13 | End: 2022-11-13

## 2022-11-13 RX ORDER — ATORVASTATIN CALCIUM 20 MG/1
20 TABLET, FILM COATED ORAL DAILY
Status: DISCONTINUED | OUTPATIENT
Start: 2022-11-13 | End: 2022-11-14 | Stop reason: HOSPADM

## 2022-11-13 RX ORDER — BUPROPION HYDROCHLORIDE 300 MG/1
300 TABLET ORAL EVERY MORNING
Status: DISCONTINUED | OUTPATIENT
Start: 2022-11-13 | End: 2022-11-14 | Stop reason: HOSPADM

## 2022-11-13 RX ORDER — CALCIUM CARBONATE 200(500)MG
1000 TABLET,CHEWABLE ORAL DAILY PRN
Status: DISCONTINUED | OUTPATIENT
Start: 2022-11-13 | End: 2022-11-14 | Stop reason: HOSPADM

## 2022-11-13 RX ORDER — OXYCODONE HYDROCHLORIDE 5 MG/1
5 TABLET ORAL EVERY 4 HOURS PRN
Status: DISCONTINUED | OUTPATIENT
Start: 2022-11-13 | End: 2022-11-14 | Stop reason: HOSPADM

## 2022-11-13 RX ORDER — LEVALBUTEROL 1.25 MG/.5ML
1.25 SOLUTION, CONCENTRATE RESPIRATORY (INHALATION)
Status: DISCONTINUED | OUTPATIENT
Start: 2022-11-13 | End: 2022-11-14

## 2022-11-13 RX ORDER — HYDROMORPHONE HCL IN WATER/PF 6 MG/30 ML
0.2 PATIENT CONTROLLED ANALGESIA SYRINGE INTRAVENOUS EVERY 4 HOURS PRN
Status: DISCONTINUED | OUTPATIENT
Start: 2022-11-13 | End: 2022-11-14 | Stop reason: HOSPADM

## 2022-11-13 RX ORDER — LOSARTAN POTASSIUM 50 MG/1
100 TABLET ORAL DAILY
Status: DISCONTINUED | OUTPATIENT
Start: 2022-11-13 | End: 2022-11-14 | Stop reason: HOSPADM

## 2022-11-13 RX ORDER — NICOTINE 21 MG/24HR
1 PATCH, TRANSDERMAL 24 HOURS TRANSDERMAL DAILY
Status: DISCONTINUED | OUTPATIENT
Start: 2022-11-13 | End: 2022-11-14 | Stop reason: HOSPADM

## 2022-11-13 RX ORDER — LORAZEPAM 0.5 MG/1
0.5 TABLET ORAL 2 TIMES DAILY
Status: DISCONTINUED | OUTPATIENT
Start: 2022-11-13 | End: 2022-11-14 | Stop reason: HOSPADM

## 2022-11-13 RX ORDER — ASPIRIN 81 MG/1
81 TABLET ORAL DAILY
Status: DISCONTINUED | OUTPATIENT
Start: 2022-11-13 | End: 2022-11-14 | Stop reason: HOSPADM

## 2022-11-13 RX ORDER — VERAPAMIL HYDROCHLORIDE 240 MG/1
240 TABLET, FILM COATED, EXTENDED RELEASE ORAL DAILY
Status: DISCONTINUED | OUTPATIENT
Start: 2022-11-13 | End: 2022-11-14 | Stop reason: HOSPADM

## 2022-11-13 RX ORDER — FLUTICASONE FUROATE AND VILANTEROL 200; 25 UG/1; UG/1
1 POWDER RESPIRATORY (INHALATION) DAILY
Status: DISCONTINUED | OUTPATIENT
Start: 2022-11-13 | End: 2022-11-14 | Stop reason: HOSPADM

## 2022-11-13 RX ORDER — ACETAMINOPHEN 325 MG/1
975 TABLET ORAL EVERY 8 HOURS SCHEDULED
Status: DISCONTINUED | OUTPATIENT
Start: 2022-11-13 | End: 2022-11-14 | Stop reason: HOSPADM

## 2022-11-13 RX ORDER — POTASSIUM CHLORIDE 20 MEQ/1
20 TABLET, EXTENDED RELEASE ORAL ONCE
Status: COMPLETED | OUTPATIENT
Start: 2022-11-13 | End: 2022-11-13

## 2022-11-13 RX ORDER — ALBUTEROL SULFATE 90 UG/1
2 AEROSOL, METERED RESPIRATORY (INHALATION) EVERY 4 HOURS
Status: DISCONTINUED | OUTPATIENT
Start: 2022-11-13 | End: 2022-11-14

## 2022-11-13 RX ORDER — MAGNESIUM SULFATE HEPTAHYDRATE 40 MG/ML
2 INJECTION, SOLUTION INTRAVENOUS ONCE
Status: COMPLETED | OUTPATIENT
Start: 2022-11-13 | End: 2022-11-13

## 2022-11-13 RX ORDER — CHOLECALCIFEROL (VITAMIN D3) 125 MCG
200 CAPSULE ORAL DAILY
Status: DISCONTINUED | OUTPATIENT
Start: 2022-11-13 | End: 2022-11-14 | Stop reason: HOSPADM

## 2022-11-13 RX ORDER — LOSARTAN POTASSIUM AND HYDROCHLOROTHIAZIDE 25; 100 MG/1; MG/1
1 TABLET ORAL DAILY
COMMUNITY

## 2022-11-13 RX ORDER — METHOCARBAMOL 500 MG/1
500 TABLET, FILM COATED ORAL EVERY 6 HOURS SCHEDULED
Status: DISCONTINUED | OUTPATIENT
Start: 2022-11-13 | End: 2022-11-14 | Stop reason: HOSPADM

## 2022-11-13 RX ORDER — OXYCODONE HYDROCHLORIDE 5 MG/1
2.5 TABLET ORAL EVERY 4 HOURS PRN
Status: DISCONTINUED | OUTPATIENT
Start: 2022-11-13 | End: 2022-11-14 | Stop reason: HOSPADM

## 2022-11-13 RX ORDER — POLYETHYLENE GLYCOL 3350 17 G/17G
17 POWDER, FOR SOLUTION ORAL DAILY PRN
Status: DISCONTINUED | OUTPATIENT
Start: 2022-11-13 | End: 2022-11-14 | Stop reason: HOSPADM

## 2022-11-13 RX ORDER — DIPHENHYDRAMINE HCL 25 MG
50 TABLET ORAL EVERY 6 HOURS PRN
Status: DISCONTINUED | OUTPATIENT
Start: 2022-11-13 | End: 2022-11-14 | Stop reason: HOSPADM

## 2022-11-13 RX ORDER — HYDROCHLOROTHIAZIDE 25 MG/1
25 TABLET ORAL DAILY
Status: DISCONTINUED | OUTPATIENT
Start: 2022-11-13 | End: 2022-11-14 | Stop reason: HOSPADM

## 2022-11-13 RX ORDER — METOCLOPRAMIDE HYDROCHLORIDE 5 MG/ML
10 INJECTION INTRAMUSCULAR; INTRAVENOUS ONCE
Status: COMPLETED | OUTPATIENT
Start: 2022-11-13 | End: 2022-11-13

## 2022-11-13 RX ORDER — DIPHENHYDRAMINE HYDROCHLORIDE 50 MG/ML
25 INJECTION INTRAMUSCULAR; INTRAVENOUS ONCE
Status: COMPLETED | OUTPATIENT
Start: 2022-11-13 | End: 2022-11-13

## 2022-11-13 RX ORDER — LIDOCAINE 50 MG/G
1 PATCH TOPICAL DAILY
Status: DISCONTINUED | OUTPATIENT
Start: 2022-11-14 | End: 2022-11-14 | Stop reason: HOSPADM

## 2022-11-13 RX ORDER — HEPARIN SODIUM 5000 [USP'U]/ML
5000 INJECTION, SOLUTION INTRAVENOUS; SUBCUTANEOUS EVERY 8 HOURS SCHEDULED
Status: DISCONTINUED | OUTPATIENT
Start: 2022-11-13 | End: 2022-11-14 | Stop reason: HOSPADM

## 2022-11-13 RX ORDER — LIDOCAINE 50 MG/G
1 PATCH TOPICAL ONCE
Status: COMPLETED | OUTPATIENT
Start: 2022-11-13 | End: 2022-11-13

## 2022-11-13 RX ORDER — SERTRALINE HYDROCHLORIDE 100 MG/1
200 TABLET, FILM COATED ORAL DAILY
Status: DISCONTINUED | OUTPATIENT
Start: 2022-11-13 | End: 2022-11-14 | Stop reason: HOSPADM

## 2022-11-13 RX ADMIN — IPRATROPIUM BROMIDE AND ALBUTEROL SULFATE 3 ML: .5; 3 SOLUTION RESPIRATORY (INHALATION) at 13:46

## 2022-11-13 RX ADMIN — LORAZEPAM 0.5 MG: 0.5 TABLET ORAL at 18:36

## 2022-11-13 RX ADMIN — METHOCARBAMOL 500 MG: 500 TABLET ORAL at 18:37

## 2022-11-13 RX ADMIN — SENNOSIDES AND DOCUSATE SODIUM 1 TABLET: 50; 8.6 TABLET ORAL at 18:36

## 2022-11-13 RX ADMIN — ALBUTEROL SULFATE 2 PUFF: 90 AEROSOL, METERED RESPIRATORY (INHALATION) at 18:37

## 2022-11-13 RX ADMIN — HEPARIN SODIUM 5000 UNITS: 5000 INJECTION INTRAVENOUS; SUBCUTANEOUS at 13:46

## 2022-11-13 RX ADMIN — MAGNESIUM SULFATE HEPTAHYDRATE 2 G: 2 INJECTION, SOLUTION INTRAVENOUS at 10:48

## 2022-11-13 RX ADMIN — ACETAMINOPHEN 975 MG: 325 TABLET, FILM COATED ORAL at 13:45

## 2022-11-13 RX ADMIN — METHOCARBAMOL 500 MG: 500 TABLET ORAL at 14:23

## 2022-11-13 RX ADMIN — POTASSIUM CHLORIDE 20 MEQ: 1500 TABLET, EXTENDED RELEASE ORAL at 10:47

## 2022-11-13 RX ADMIN — ACETAMINOPHEN 975 MG: 325 TABLET, FILM COATED ORAL at 21:45

## 2022-11-13 RX ADMIN — METOCLOPRAMIDE HYDROCHLORIDE 10 MG: 5 INJECTION INTRAMUSCULAR; INTRAVENOUS at 10:47

## 2022-11-13 RX ADMIN — DIPHENHYDRAMINE HYDROCHLORIDE 25 MG: 50 INJECTION, SOLUTION INTRAMUSCULAR; INTRAVENOUS at 10:48

## 2022-11-13 RX ADMIN — LIDOCAINE 5% 1 PATCH: 700 PATCH TOPICAL at 11:42

## 2022-11-13 RX ADMIN — IPRATROPIUM BROMIDE 0.5 MG: 0.5 SOLUTION RESPIRATORY (INHALATION) at 19:27

## 2022-11-13 RX ADMIN — ATORVASTATIN CALCIUM 20 MG: 20 TABLET, FILM COATED ORAL at 21:48

## 2022-11-13 RX ADMIN — LEVALBUTEROL HYDROCHLORIDE 1.25 MG: 1.25 SOLUTION, CONCENTRATE RESPIRATORY (INHALATION) at 19:27

## 2022-11-13 RX ADMIN — IOHEXOL 85 ML: 350 INJECTION, SOLUTION INTRAVENOUS at 10:29

## 2022-11-13 RX ADMIN — HEPARIN SODIUM 5000 UNITS: 5000 INJECTION INTRAVENOUS; SUBCUTANEOUS at 21:45

## 2022-11-13 NOTE — H&P
[address] [facility] [address] 1960, 57 y o  female MRN: [id number]  Unit/Bed#: ED 09 Encounter: [id number]  Primary Care Provider: Sin Nunez MD   Date and time admitted to hospital: 11/13/2022  9:57 AM    * Fall at home, initial encounter  Assessment & Plan  Background:  Presented to the ED status post fall on 11/12(day prior to presentation) where she was stepping over a dog gate tripped and fell  · CT head without fracture or acute intracranial process  · CT chest with acute nondisplaced fracture of the left 4th, 6th, and 7th ribs  · Shoulder x-ray completed; awaiting formal read  · Rib fracture protocol  · Early mobilization  · Incentive spirometry  · Pain management  · PT/OT    Closed fracture of multiple ribs of left side  Assessment & Plan  · As noted on imaging  · See plan as noted under primary problem    Essential hypertension  Assessment & Plan  · Blood pressure elevated on admission  · Likely secondary to pain control  · Managed as an outpatient on verapamil, losartan, hydrochlorothiazide  · Continue PTA medication regimen  · Ensure adequate pain control  · Monitor with routine vitals    Hypercholesterolemia  Assessment & Plan  · Continue statin therapy    COPD (chronic obstructive pulmonary disease) (HCC)  Assessment & Plan  · Respiratory protocol  · Rib fracture protocol  · Without acute exacerbation  · Continue PTA medication regimen with nebulizers and inhalers    Tobacco abuse  Assessment & Plan  · Supplementation ordered    VTE Prophylaxis: Heparin  / sequential compression device   Code Status:  Level 3 DNR DNI  Discussion with family:   at the bedside upon admission    Anticipated Length of Stay:  Patient will be admitted on an Observation basis with an anticipated length of stay of  less than 2 midnights     Justification for Hospital Stay:  Rib fracture observation/pain control    Total Time for Visit, including Counseling / Coordination of Care: 45 minutes  Greater than 50% of this total time spent on direct patient counseling and coordination of care  Past Medical History:    Past Medical History:   Diagnosis Date   • Cancer ([place] [address] )    • Hyperlipidemia    • Hypertension    • Lung cancer ([facility] ) 06/26/2019    right lower lobe removed       Chief Complaint:   Fall (Patient complaint of "trip and fall yesterday approx 4 pm  yesterday , left side pain , no blood thinnners , denies loc )      History of Present Illness:    Chris Light is a 57 y o  female with past medical history as noted in table above who presents with Fall (Patient complaint of "trip and fall yesterday approx 4 pm  yesterday , left side pain , no blood thinnners , denies loc )    Patient initially presented to the hospital on 11/13 of following a fall at home  She reported on 11/12 at approximately 4:00 p m  She tripped over a dog gate and fell on her side landing mostly on her ribs, shoulders, and scuffing of her left leg  Trauma workup notable as above  Will admit under observation for rib fractures and ongoing pain control  Review of Systems:    Review of Systems   Constitutional: Negative for chills, fatigue and fever  Respiratory: Positive for cough and shortness of breath ("from the pain")  Cardiovascular: Positive for chest pain  Negative for palpitations and leg swelling  Gastrointestinal: Negative for diarrhea, nausea and vomiting  Genitourinary: Negative for difficulty urinating  Neurological: Positive for headaches  Negative for dizziness         Past Surgical History:     Past Medical History:   Diagnosis Date   • Cancer [facility])    • Hyperlipidemia    • Hypertension    • Lung cancer ([facility] ) 06/26/2019    right lower lobe removed       Past Surgical History:   Procedure Laterality Date   • APPENDECTOMY     • BREAST BIOPSY Right     benign   • COLON SURGERY     • HYSTERECTOMY      age 48   • LUNG LOBECTOMY     • OOPHORECTOMY Bilateral     age 48 Meds/Allergies:    Prior to Admission medications    Medication Sig Start Date End Date Taking? Authorizing Provider   Advair Diskus 100-50 MCG/DOSE inhaler 2 (two) times a day 10/17/21   Historical Provider, MD   albuterol (PROVENTIL HFA,VENTOLIN HFA) 90 mcg/act inhaler Inhale 2 puffs every 4 (four) hours 8/6/19   Historical Provider, MD   aspirin 81 MG tablet Take 81 mg by mouth daily  Patient not taking: Reported on 11/3/2021    Historical Provider, MD   atorvastatin (LIPITOR) 20 mg tablet Take 20 mg by mouth daily  Historical Provider, MD   buPROPion (WELLBUTRIN XL) 150 mg 24 hr tablet Take 300 mg by mouth every morning 4/20/19   Historical Provider, MD   coenzyme Q-10 100 MG capsule Take 200 mg by mouth daily    Historical Provider, MD   diphenhydrAMINE (BENADRYL) 50 MG tablet Take 1 tablet (50 mg total) by mouth every 6 (six) hours as needed for itching (Rash) 6/16/20   Steven Thayer, DO   EPINEPHrine (EPIPEN) 0 3 mg/0 3 mL SOAJ Inject 0 3 mL (0 3 mg total) into a muscle once for 1 dose 10/11/21 10/11/21  Valentino Lynch, DO   ipratropium-albuterol (DUO-NEB) 0 5-2 5 mg/3 mL nebulizer solution  10/21/19   Historical Provider, MD   lisinopril-hydrochlorothiazide (PRINZIDE,ZESTORETIC) 20-25 MG per tablet Take 1 tablet by mouth daily    Historical Provider, MD   LORazepam (ATIVAN) 1 mg tablet 1/2 TABLET TWICE A DAY AS NEEDED FOR ANXIETY ORALLY 30 DAYS 8/28/19   Historical Provider, MD   sertraline (ZOLOFT) 100 mg tablet Take 200 mg by mouth daily     Historical Provider, MD   Spacer/Aero-Holding Chambers ([facility]) MISC Use as directed 8/7/19   Historical Provider, MD   verapamil (VERELAN PM) 180 MG 24 hr capsule Take 360 mg by mouth daily     Historical Provider, MD     I have reviewed home medications with patient personally  Allergies:    Allergies   Allergen Reactions   • Amoxicillin Hives   • Vilazodone Hcl Dizziness and Nausea Only   • Wasp Venom Swelling   • Zithromax [Azithromycin] Hives       Social History:     Marital Status: /Civil Union   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)  Patient Pre-hospital Living Situation: multi-level home   Patient Pre-hospital Level of Mobility: no ambulatory aid   Patient Pre-hospital Diet Restrictions: none   Substance Use History:   Social History     Substance and Sexual Activity   Alcohol Use Yes    Comment: social     Social History     Tobacco Use   Smoking Status Current Every Day Smoker   • Packs/day: 0 50   Smokeless Tobacco Never Used     Social History     Substance and Sexual Activity   Drug Use No       Family History:    Family History   Problem Relation Age of Onset   • Lung cancer Mother    • Colon cancer Maternal Grandmother    • Colon cancer Maternal Aunt        Physical Exam:     Vitals:   Blood Pressure: (!) 180/84 (11/13/22 1040)  Pulse: 62 (11/13/22 1246)  Temperature: 97 9 °F (36 6 °C) (11/13/22 1000)  Temp Source: Oral (11/13/22 1000)  Respirations: 19 (11/13/22 1246)  Height: 5' 5" (165 1 cm) (11/13/22 0954)  Weight - Scale: 81 2 kg (179 lb) (11/13/22 0954)  SpO2: 95 % (11/13/22 1246)    Physical Exam  Constitutional:       Appearance: She is not ill-appearing  Cardiovascular:      Rate and Rhythm: Normal rate  Pulses: Normal pulses  Heart sounds: Normal heart sounds  Pulmonary:      Effort: Pulmonary effort is normal       Breath sounds: Wheezing present  Abdominal:      General: Bowel sounds are normal       Palpations: Abdomen is soft  Musculoskeletal:         General: Tenderness and signs of injury present  No swelling  Skin:     General: Skin is warm and dry  Capillary Refill: Capillary refill takes 2 to 3 seconds  Neurological:      Mental Status: She is alert and oriented to person, place, and time  Psychiatric:         Mood and Affect: Mood normal          Behavior: Behavior normal  Behavior is cooperative               Additional Data:     Lab Results: I have personally reviewed pertinent reports  Results from last 7 days   Lab Units 11/13/22  1018   WBC Thousand/uL 7 95   HEMOGLOBIN g/dL 13 7   HEMATOCRIT % 41 0   PLATELETS Thousands/uL 266   NEUTROS PCT % 68   LYMPHS PCT % 24   MONOS PCT % 7   EOS PCT % 1     Results from last 7 days   Lab Units 11/13/22  1018   SODIUM mmol/L 137   POTASSIUM mmol/L 3 3*   CHLORIDE mmol/L 101   CO2 mmol/L 26   BUN mg/dL 11   CREATININE mg/dL 0 68   ANION GAP mmol/L 10   CALCIUM mg/dL 9 2   GLUCOSE RANDOM mg/dL 117                       Imaging: I have personally reviewed pertinent reports  XR shoulder 2+ views LEFT   ED Interpretation by Carmelina Skinner MD (11/13 1050)   No acute fractures or dislocations      CT chest with contrast   Final Result by Chelsea Chavarria MD (11/13 1134)      No evidence of acute intrathoracic process  Acute nondisplaced fracture of the left fourth, sixth, and seventh ribs  New and enlarging pulmonary nodules since August 2019, the largest of which measures 2 5 cm  Based on current Fleischner Society 2017 Guidelines on incidental pulmonary nodule, either PET/CT scan evaluation, tissue sampling or short term interval    followup non-contrast CT followup (initially in 3 months) may be considered appropriate  Additional chronic findings and negatives as above  The study was marked in [facility] for immediate notification  This study demonstrates a finding requiring imaging follow-up and was documented as such in Epic  Workstation performed: [id number]         TRAUMA - CT head wo contrast   Final Result by Chelsea Chavarria MD (11/13 1134)      No acute intracranial process  No skull fracture  Chronic microangiopathy        5 mm well-circumscribed hyperdense nodule in the root of the third ventricle compatible with a colloid cyst       Workstation performed: [id number]         XR Trauma chest portable   ED Interpretation by Cas Hawk Arik Austin MD (11/13 1037)   No acute pulmonary pathology  No displaced rib fractures  No hemo/pneumothorax  EKG, Pathology, and Other Studies Reviewed on Admission:   · EKG:  No EKG obtained  · Outpatient documentation reviewed when available     Allscripts / Epic Records Reviewed: Yes     ** Please Note: This note has been constructed using a voice recognition system   **

## 2022-11-13 NOTE — ASSESSMENT & PLAN NOTE
Background:  Presented to the ED status post fall on 11/12(day prior to presentation) where she was stepping over a dog gate tripped and fell  · CT head without fracture or acute intracranial process  · CT chest with acute nondisplaced fracture of the left 4th, 6th, and 7th ribs    · Shoulder x-ray completed; awaiting formal read  · Rib fracture protocol  · Early mobilization  · Incentive spirometry  · Pain management  · PT/OT

## 2022-11-13 NOTE — ASSESSMENT & PLAN NOTE
· Respiratory protocol  · Rib fracture protocol  · Without acute exacerbation  · Continue PTA medication regimen with nebulizers and inhalers

## 2022-11-13 NOTE — PLAN OF CARE
Problem: DISCHARGE PLANNING - CARE MANAGEMENT  Goal: Discharge to post-acute care or home with appropriate resources  Description: INTERVENTIONS:   - Conduct assessment to determine patient/family and health care team treatment goals, and need for post-acute services based on payer coverage, community resources, and patient preferences, and barriers to discharge  - Address psychosocial, clinical, and financial barriers to discharge as identified in assessment in conjunction with the patient/family and health care team  - Arrange appropriate level of post-acute services according to patient’s   needs and preference and payer coverage in collaboration with the physician and health care team  - Communicate with and update the patient/family, physician, and health care team regarding progress on the discharge plan  - Arrange appropriate transportation to post-acute venues  Outcome: Progressing     Problem: PAIN - ADULT  Goal: Verbalizes/displays adequate comfort level or baseline comfort level  Description: Interventions:  - Encourage patient to monitor pain and request assistance  - Assess pain using appropriate pain scale  - Administer analgesics based on type and severity of pain and evaluate response  - Implement non-pharmacological measures as appropriate and evaluate response  - Consider cultural and social influences on pain and pain management  - Notify physician/advanced practitioner if interventions unsuccessful or patient reports new pain  Outcome: Progressing     Problem: INFECTION - ADULT  Goal: Absence or prevention of progression during hospitalization  Description: INTERVENTIONS:  - Assess and monitor for signs and symptoms of infection  - Monitor lab/diagnostic results  - Monitor all insertion sites, i e  indwelling lines, tubes, and drains  - Monitor endotracheal if appropriate and nasal secretions for changes in amount and color  - Varney appropriate cooling/warming therapies per order  - Administer medications as ordered  - Instruct and encourage patient and family to use good hand hygiene technique  - Identify and instruct in appropriate isolation precautions for identified infection/condition  Outcome: Progressing     Problem: SAFETY ADULT  Goal: Patient will remain free of falls  Description: INTERVENTIONS:  - Educate patient/family on patient safety including physical limitations  - Instruct patient to call for assistance with activity   - Consult OT/PT to assist with strengthening/mobility   - Keep Call bell within reach  - Keep bed low and locked with side rails adjusted as appropriate  - Keep care items and personal belongings within reach  - Initiate and maintain comfort rounds  - Make Fall Risk Sign visible to staff  - Offer Toileting every 4 Hours, in advance of need  - Obtain necessary fall risk management equipment: bed alarm   - Apply yellow socks and bracelet for high fall risk patients  - Consider moving patient to room near nurses station  Outcome: Progressing  Goal: Maintain or return to baseline ADL function  Description: INTERVENTIONS:  -  Assess patient's ability to carry out ADLs; assess patient's baseline for ADL function and identify physical deficits which impact ability to perform ADLs (bathing, care of mouth/teeth, toileting, grooming, dressing, etc )  - Assess/evaluate cause of self-care deficits   - Assess range of motion  - Assess patient's mobility; develop plan if impaired  - Assess patient's need for assistive devices and provide as appropriate  - Encourage maximum independence but intervene and supervise when necessary  - Involve family in performance of ADLs  - Assess for home care needs following discharge   - Consider OT consult to assist with ADL evaluation and planning for discharge  - Provide patient education as appropriate  Outcome: Progressing  Goal: Maintains/Returns to pre admission functional level  Description: INTERVENTIONS:  - Perform BMAT or MOVE assessment daily    - Set and communicate daily mobility goal to care team and patient/family/caregiver  - Collaborate with rehabilitation services on mobility goals if consulted  - Perform Range of Motion 2 times a day  - Reposition patient every 2 hours  - Dangle patient 2 times a day  - Stand patient 2 times a day  - Ambulate patient 2 times a day  - Out of bed to chair 2 times a day   - Out of bed for meals 2 times a day  - Out of bed for toileting  - Record patient progress and toleration of activity level   Outcome: Progressing     Problem: DISCHARGE PLANNING  Goal: Discharge to home or other facility with appropriate resources  Description: INTERVENTIONS:  - Identify barriers to discharge w/patient and caregiver  - Arrange for needed discharge resources and transportation as appropriate  - Identify discharge learning needs (meds, wound care, etc )  - Arrange for interpretive services to assist at discharge as needed  - Refer to Case Management Department for coordinating discharge planning if the patient needs post-hospital services based on physician/advanced practitioner order or complex needs related to functional status, cognitive ability, or social support system  Outcome: Progressing     Problem: Knowledge Deficit  Goal: Patient/family/caregiver demonstrates understanding of disease process, treatment plan, medications, and discharge instructions  Description: Complete learning assessment and assess knowledge base  Interventions:  - Provide teaching at level of understanding  - Provide teaching via preferred learning methods  Outcome: Progressing     Problem: Nutrition/Hydration-ADULT  Goal: Nutrient/Hydration intake appropriate for improving, restoring or maintaining nutritional needs  Description: Monitor and assess patient's nutrition/hydration status for malnutrition  Collaborate with interdisciplinary team and initiate plan and interventions as ordered    Monitor patient's weight and dietary intake as ordered or per policy  Utilize nutrition screening tool and intervene as necessary  Determine patient's food preferences and provide high-protein, high-caloric foods as appropriate       INTERVENTIONS:  - Monitor oral intake, urinary output, labs, and treatment plans  - Assess nutrition and hydration status and recommend course of action  - Evaluate amount of meals eaten  - Assist patient with eating if necessary   - Allow adequate time for meals  - Recommend/ encourage appropriate diets, oral nutritional supplements, and vitamin/mineral supplements  - Order, calculate, and assess calorie counts as needed  - Recommend, monitor, and adjust tube feedings and TPN/PPN based on assessed needs  - Assess need for intravenous fluids  - Provide specific nutrition/hydration education as appropriate  - Include patient/family/caregiver in decisions related to nutrition  Outcome: Progressing

## 2022-11-13 NOTE — ED PROVIDER NOTES
Emergency Department Trauma Note  Lucina Wilkinson 57 y o  female MRN: [id number]  Unit/Bed#: ED 09/ED 09 Encounter: [id number]      Trauma Alert: Trauma Acuity: Trauma Evaluation  Model of Arrival:   via    Trauma Team: Current Providers  Attending Provider: Daksha Chacon MD  Attending Provider: Heriberto Potts MD  Registered Nurse: Kelli Howard RN  Consultants:     None      History of Present Illness     Chief Complaint:   Chief Complaint   Patient presents with   • Fall     Patient complaint of "trip and fall yesterday approx 4 pm  yesterday , left side pain , no blood thinnners , denies loc      HPI:  Lucina Wilkinson is a 57 y o  female who presents with fall  Mechanism:Details of Incident: fall  Injury Date: 11/12/22 Injury Time: 1600 Injury Occurence Location - [address] [place] [address] Way: mechanical fall at home over a gate, landed on left side and left head hit floor    57year old female presents for evaluation of headache, chest wall pain and left shoulder pain since falling yesterday at 4 pm  Patient had been attempting to walk over a short gate when her foot became caught, causing her to fall forward, striking the left side of her body on the hard floor  No LOC  She is uncertain if she struck her head  Since the fall she has had a persistent left-sided headache which is not typical for her  No numbness or weakness  Patient also reports left chest wall pain which worsens with deep inspiration and with cough  She reports chronic left shoulder pain which is worse than usual  Patient took 500 mg naproxen and a dose of flexeril approximately 1 hour prior to arrival       History provided by:  Patient  Fall  Mechanism of injury: fall    Injury location: left side  Incident location:  Outdoors  Time since incident:  1 day  Fall:     Fall occurred:  Tripped    Height of fall:  Ground level    Impact surface:  Hard floor    Point of impact: left side    Suspicion of alcohol use: no    Suspicion of drug use: no    Tetanus status:  Up to date (2019)  Prior to arrival data:     Patient ambulatory at scene: yes      Blood loss:  None    Loss of consciousness: no      Immobilization:  None  Current pain details:     Pain quality:  Dull    Pain Severity:  Moderate    Pain timing:  Constant  Associated symptoms: chest pain (left chest wall) and headaches (dull, left sided)    Associated symptoms: no back pain      Review of Systems   Constitutional: Negative for chills and fever  HENT: Negative for congestion  Respiratory: Positive for cough (chronic smoker's cough, unchanged)  Negative for shortness of breath  Cardiovascular: Positive for chest pain (left chest wall)  Musculoskeletal: Positive for arthralgias  Negative for back pain  Skin: Negative for rash and wound  Neurological: Positive for headaches (dull, left sided)  All other systems reviewed and are negative        Historical Information     Immunizations:   Immunization History   Administered Date(s) Administered   • COVID-19 MODERNA VACC 0 5 ML IM 02/22/2021, 03/22/2021, 10/22/2021, 04/05/2022       Past Medical History:   Diagnosis Date   • Cancer ([facility] )    • Hyperlipidemia    • Hypertension    • Lung cancer ([facility] ) 06/26/2019    right lower lobe removed       Family History   Problem Relation Age of Onset   • Lung cancer Mother    • Colon cancer Maternal Grandmother    • Colon cancer Maternal Aunt      Past Surgical History:   Procedure Laterality Date   • APPENDECTOMY     • BREAST BIOPSY Right     benign   • COLON SURGERY     • HYSTERECTOMY      age 48   • LUNG LOBECTOMY     • OOPHORECTOMY Bilateral     age 48     Social History     Tobacco Use   • Smoking status: Current Every Day Smoker     Packs/day: 0 50   • Smokeless tobacco: Never Used   Vaping Use   • Vaping Use: Never used   Substance Use Topics   • Alcohol use: Yes     Comment: social   • Drug use: No     E-Cigarette/Vaping   • E-Cigarette Use Never User      E-Cigarette/Vaping Substances • Nicotine No    • THC No    • CBD No    • Flavoring No    • Other No    • Unknown No        Family History: non-contributory    Meds/Allergies   Prior to Admission Medications   Prescriptions Last Dose Informant Patient Reported? Taking? Advair Diskus 100-50 MCG/DOSE inhaler   Yes No   Si (two) times a day   EPINEPHrine (EPIPEN) 0 3 mg/0 3 mL SOAJ   No No   Sig: Inject 0 3 mL (0 3 mg total) into a muscle once for 1 dose   LORazepam (ATIVAN) 1 mg tablet  Self Yes No   Si/2 TABLET TWICE A DAY AS NEEDED FOR ANXIETY ORALLY 30 DAYS   Spacer/Aero-Holding Chambers ([employer]) MISC  Self Yes No   Sig: Use as directed   albuterol (PROVENTIL HFA,VENTOLIN HFA) 90 mcg/act inhaler  Self Yes No   Sig: Inhale 2 puffs every 4 (four) hours   aspirin 81 MG tablet  Self Yes No   Sig: Take 81 mg by mouth daily   Patient not taking: Reported on 11/3/2021   atorvastatin (LIPITOR) 20 mg tablet  Self Yes No   Sig: Take 20 mg by mouth daily     buPROPion (WELLBUTRIN XL) 150 mg 24 hr tablet  Self Yes No   Sig: Take 300 mg by mouth every morning   coenzyme Q-10 100 MG capsule  Self Yes No   Sig: Take 200 mg by mouth daily   diphenhydrAMINE (BENADRYL) 50 MG tablet   No No   Sig: Take 1 tablet (50 mg total) by mouth every 6 (six) hours as needed for itching (Rash)   ipratropium-albuterol (DUO-NEB) 0 5-2 5 mg/3 mL nebulizer solution  Self Yes No   Patient not taking: Reported on 11/3/2021   lisinopril-hydrochlorothiazide (PRINZIDE,ZESTORETIC) 20-25 MG per tablet  Self Yes No   Sig: Take 1 tablet by mouth daily   sertraline (ZOLOFT) 100 mg tablet  Self Yes No   Sig: Take 200 mg by mouth daily    verapamil (VERELAN PM) 180 MG 24 hr capsule  Self Yes No   Sig: Take 360 mg by mouth daily       Facility-Administered Medications: None       Allergies   Allergen Reactions   • Amoxicillin Hives   • Vilazodone Hcl Dizziness and Nausea Only   • Wasp Venom Swelling   • Zithromax [Azithromycin] Hives       PHYSICAL EXAM    PE limited by: none    Objective   Vitals:   First set: Temperature: 97 9 °F (36 6 °C) (11/13/22 0954)  Pulse: 82 (11/13/22 0954)  Respirations: 18 (11/13/22 0954)  Blood Pressure: 170/81 (11/13/22 0954)  SpO2: 93 % (11/13/22 0954)    Primary Survey:   (A) Airway: patent  (B) Breathing: bilateral breath sounds  (C) Circulation: Pulses:   normal  (D) Disabliity:  GCS Total:  15  (E) Expose:  Completed    Secondary Survey: (Click on Physical Exam tab above)  Physical Exam  Vitals and nursing note reviewed  Constitutional:       General: She is not in acute distress  Appearance: She is well-developed  She is not toxic-appearing or diaphoretic  HENT:      Head: Normocephalic and atraumatic  Right Ear: External ear normal       Left Ear: External ear normal       Nose: Nose normal    Eyes:      General: No scleral icterus  Cardiovascular:      Rate and Rhythm: Normal rate and regular rhythm  Heart sounds: Normal heart sounds  Pulmonary:      Effort: Pulmonary effort is normal  No respiratory distress  Breath sounds: Normal breath sounds  Chest:      Chest wall: Tenderness present  No crepitus  Abdominal:      General: There is no distension  Palpations: Abdomen is soft  Tenderness: There is no abdominal tenderness  Musculoskeletal:         General: No deformity  Normal range of motion  Comments: No midline C/T/L spine tenderness  No step offs or deformities  No pelvic tenderness or instability  No bony tenderness over the left shoulder; however, increased pain with ROM  Normal distal sensation and perfusion  Skin:     General: Skin is warm and dry  Findings: No rash  Neurological:      General: No focal deficit present  Mental Status: She is alert  Gait: Gait normal    Psychiatric:         Mood and Affect: Mood normal          Cervical spine cleared by clinical criteria?  Yes     Invasive Devices  Report    Peripheral Intravenous Line  Duration Peripheral IV 11/13/22 Left Antecubital <1 day                Lab Results:   Results Reviewed     Procedure Component Value Units Date/Time    Basic metabolic panel [[id number]]  (Abnormal) Collected: 11/13/22 1018    Lab Status: Final result Specimen: Blood from Arm, Left Updated: 11/13/22 1034     Sodium 137 mmol/L      Potassium 3 3 mmol/L      Chloride 101 mmol/L      CO2 26 mmol/L      ANION GAP 10 mmol/L      BUN 11 mg/dL      Creatinine 0 68 mg/dL      Glucose 117 mg/dL      Calcium 9 2 mg/dL      eGFR 92 ml/min/1 73sq m     Narrative:      [place] guidelines for Chronic Kidney Disease (CKD):   •  Stage 1 with normal or high GFR (GFR > 90 mL/min/1 73 square meters)  •  Stage 2 Mild CKD (GFR = 60-89 mL/min/1 73 square meters)  •  Stage 3A Moderate CKD (GFR = 45-59 mL/min/1 73 square meters)  •  Stage 3B Moderate CKD (GFR = 30-44 mL/min/1 73 square meters)  •  Stage 4 Severe CKD (GFR = 15-29 mL/min/1 73 square meters)  •  Stage 5 End Stage CKD (GFR <15 mL/min/1 73 square meters)  Note: GFR calculation is accurate only with a steady state creatinine    CBC and differential [[id number]] Collected: 11/13/22 1018    Lab Status: Final result Specimen: Blood from Arm, Left Updated: 11/13/22 1024     WBC 7 95 Thousand/uL      RBC 4 33 Million/uL      Hemoglobin 13 7 g/dL      Hematocrit 41 0 %      MCV 95 fL      MCH 31 6 pg      MCHC 33 4 g/dL      RDW 12 4 %      MPV 9 7 fL      Platelets 836 Thousands/uL      nRBC 0 /100 WBCs      Neutrophils Relative 68 %      Immat GRANS % 0 %      Lymphocytes Relative 24 %      Monocytes Relative 7 %      Eosinophils Relative 1 %      Basophils Relative 0 %      Neutrophils Absolute 5 39 Thousands/µL      Immature Grans Absolute 0 03 Thousand/uL      Lymphocytes Absolute 1 91 Thousands/µL      Monocytes Absolute 0 53 Thousand/µL      Eosinophils Absolute 0 06 Thousand/µL      Basophils Absolute 0 03 Thousands/µL                  Imaging Studies:   Direct to CT: No  XR shoulder 2+ views LEFT   ED Interpretation by Waldo Fitzpatrick MD (11/13 1050)   No acute fractures or dislocations      CT chest with contrast   Final Result by Kavon Lunsford MD (11/13 1136)      No evidence of acute intrathoracic process  Acute nondisplaced fracture of the left fourth, sixth, and seventh ribs  New and enlarging pulmonary nodules since August 2019, the largest of which measures 2 5 cm  Based on current Fleischner Society 2017 Guidelines on incidental pulmonary nodule, either PET/CT scan evaluation, tissue sampling or short term interval    followup non-contrast CT followup (initially in 3 months) may be considered appropriate  Additional chronic findings and negatives as above  The study was marked in [facility] for immediate notification  This study demonstrates a finding requiring imaging follow-up and was documented as such in Epic  Workstation performed: [id number]         TRAUMA - CT head wo contrast   Final Result by Kavon Lunsford MD (11/13 2326)      No acute intracranial process  No skull fracture  Chronic microangiopathy  5 mm well-circumscribed hyperdense nodule in the root of the third ventricle compatible with a colloid cyst       Workstation performed: [id number]         XR Trauma chest portable   ED Interpretation by Waldo Fitzpatrick MD (11/13 1037)   No acute pulmonary pathology  No displaced rib fractures  No hemo/pneumothorax  Procedures  Procedures         ED Course  ED Course as of 11/13/22 1152   Sun Nov 13, 2022   1001 Left chest wall tenderness  New persistent headache after closed head injury  Trauma evaluation initiated  Last aspirin dose 1 week ago             MDM  Number of Diagnoses or Management Options  Acute headache: new and requires workup  Closed fracture of multiple ribs of left side, initial encounter: new and requires workup  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  Fall, initial encounter: new and requires workup  Hypokalemia: new and requires workup  Pulmonary nodules: minor  Diagnosis management comments: 57year old female presents for evaluation after fall  Trauma evaluation initiated  Patient found to have 3 nondisplaced left sided rib fractures  Pain control  Incentive spirometry  Admission with rib fracture protocol  Patient informed of incidental findings  20 mEq Kdur given for mild hypokalemia  Amount and/or Complexity of Data Reviewed  Clinical lab tests: ordered and reviewed  Tests in the radiology section of CPT®: ordered and reviewed  Independent visualization of images, tracings, or specimens: yes    Patient Progress  Patient progress: stable          Disposition  [facility] Transfer: No  Final diagnoses:   Fall, initial encounter   Acute headache   Hypokalemia   Closed fracture of multiple ribs of left side, initial encounter - 4, 6, 7   Pulmonary nodules   Colloid cyst of brain ([facility] )     Time reflects when diagnosis was documented in both MDM as applicable and the Disposition within this note     Time User Action Codes Description Comment    11/13/2022 10:38 AM Anupam [place] Add [A88  IEPE] Fall, initial encounter     11/13/2022 10:38 AM Anupam Mindi Add [R51 9] Acute headache     11/13/2022 10:38 AM Paulette PELAEZ Add [E87 6] Hypokalemia     11/13/2022 11:36 AM Matt Pool Awe Add [S22 42XA] Closed fracture of multiple ribs of left side, initial encounter     11/13/2022 11:36 AM YrnIlir mendesius Awe Modify [S22 42XA] Closed fracture of multiple ribs of left side, initial encounter 4, 6, 7    11/13/2022 11:37 AM Anupam [place] Add [R91 8] Pulmonary nodules     11/13/2022 11:37 AM Anupam [place] Modify [X07  REDM] Fall, initial encounter     11/13/2022 11:37 AM YrnIlir mendesius Awe Modify [S22 42XA] Closed fracture of multiple ribs of left side, initial encounter 4, 6, 7    11/13/2022 11:37 AM Yrn Junius Awe Add [Q04 6] Colloid cyst of brain [facility])       ED Disposition     ED Disposition   Admit    Condition   Stable    Date/Time   Sun Nov 13, 2022 11:45 AM    Comment   Case was discussed with KATY and the patient's admission status was agreed to be Admission Status: observation status to the service of Dr Xiang Stephens   Follow-up Information    None       Patient's Medications   Discharge Prescriptions    No medications on file     No discharge procedures on file      PDMP Review     None          ED Provider  Electronically Signed by         Jayne Henry MD  11/13/22 7005

## 2022-11-13 NOTE — ASSESSMENT & PLAN NOTE
· Blood pressure elevated on admission  · Likely secondary to pain control  · Managed as an outpatient on verapamil, losartan, hydrochlorothiazide  · Continue PTA medication regimen  · Ensure adequate pain control  · Monitor with routine vitals

## 2022-11-14 VITALS
TEMPERATURE: 97.8 F | HEIGHT: 65 IN | BODY MASS INDEX: 29.82 KG/M2 | HEART RATE: 68 BPM | DIASTOLIC BLOOD PRESSURE: 57 MMHG | RESPIRATION RATE: 18 BRPM | OXYGEN SATURATION: 95 % | WEIGHT: 179 LBS | SYSTOLIC BLOOD PRESSURE: 120 MMHG

## 2022-11-14 LAB
ALBUMIN SERPL BCP-MCNC: 3.6 G/DL (ref 3.5–5)
ALP SERPL-CCNC: 91 U/L (ref 46–116)
ALT SERPL W P-5'-P-CCNC: 28 U/L (ref 12–78)
ANION GAP SERPL CALCULATED.3IONS-SCNC: 7 MMOL/L (ref 4–13)
APTT PPP: 26 SECONDS (ref 23–37)
AST SERPL W P-5'-P-CCNC: 16 U/L (ref 5–45)
BILIRUB SERPL-MCNC: 0.4 MG/DL (ref 0.2–1)
BUN SERPL-MCNC: 9 MG/DL (ref 5–25)
CALCIUM SERPL-MCNC: 9 MG/DL (ref 8.3–10.1)
CHLORIDE SERPL-SCNC: 99 MMOL/L (ref 96–108)
CO2 SERPL-SCNC: 26 MMOL/L (ref 21–32)
CREAT SERPL-MCNC: 0.66 MG/DL (ref 0.6–1.3)
ERYTHROCYTE [DISTWIDTH] IN BLOOD BY AUTOMATED COUNT: 12.3 % (ref 11.6–15.1)
GFR SERPL CREATININE-BSD FRML MDRD: 93 ML/MIN/1.73SQ M
GLUCOSE P FAST SERPL-MCNC: 94 MG/DL (ref 65–99)
GLUCOSE SERPL-MCNC: 94 MG/DL (ref 65–140)
HCT VFR BLD AUTO: 36.6 % (ref 34.8–46.1)
HGB BLD-MCNC: 12.5 G/DL (ref 11.5–15.4)
INR PPP: 0.87 (ref 0.84–1.19)
MAGNESIUM SERPL-MCNC: 2 MG/DL (ref 1.6–2.6)
MCH RBC QN AUTO: 31 PG (ref 26.8–34.3)
MCHC RBC AUTO-ENTMCNC: 34.2 G/DL (ref 31.4–37.4)
MCV RBC AUTO: 91 FL (ref 82–98)
PLATELET # BLD AUTO: 254 THOUSANDS/UL (ref 149–390)
PMV BLD AUTO: 10 FL (ref 8.9–12.7)
POTASSIUM SERPL-SCNC: 3.3 MMOL/L (ref 3.5–5.3)
PROT SERPL-MCNC: 7 G/DL (ref 6.4–8.4)
PROTHROMBIN TIME: 12.4 SECONDS (ref 11.6–14.5)
RBC # BLD AUTO: 4.03 MILLION/UL (ref 3.81–5.12)
SODIUM SERPL-SCNC: 132 MMOL/L (ref 135–147)
WBC # BLD AUTO: 5.65 THOUSAND/UL (ref 4.31–10.16)

## 2022-11-14 RX ORDER — FLUTICASONE FUROATE AND VILANTEROL 100; 25 UG/1; UG/1
1 POWDER RESPIRATORY (INHALATION) DAILY
Qty: 60 BLISTER | Refills: 0 | Status: SHIPPED | OUTPATIENT
Start: 2022-11-14 | End: 2022-12-14

## 2022-11-14 RX ORDER — ALBUTEROL SULFATE 90 UG/1
2 AEROSOL, METERED RESPIRATORY (INHALATION) EVERY 4 HOURS PRN
Status: DISCONTINUED | OUTPATIENT
Start: 2022-11-14 | End: 2022-11-14 | Stop reason: HOSPADM

## 2022-11-14 RX ORDER — METHOCARBAMOL 500 MG/1
500 TABLET, FILM COATED ORAL EVERY 6 HOURS SCHEDULED
Qty: 56 TABLET | Refills: 0 | Status: SHIPPED | OUTPATIENT
Start: 2022-11-14 | End: 2022-11-28

## 2022-11-14 RX ORDER — NICOTINE 21 MG/24HR
1 PATCH, TRANSDERMAL 24 HOURS TRANSDERMAL DAILY
Qty: 28 PATCH | Refills: 0 | Status: SHIPPED | OUTPATIENT
Start: 2022-11-15

## 2022-11-14 RX ORDER — LEVALBUTEROL 1.25 MG/.5ML
1.25 SOLUTION, CONCENTRATE RESPIRATORY (INHALATION)
Status: DISCONTINUED | OUTPATIENT
Start: 2022-11-14 | End: 2022-11-14 | Stop reason: HOSPADM

## 2022-11-14 RX ORDER — OXYCODONE HYDROCHLORIDE 5 MG/1
2.5 TABLET ORAL EVERY 4 HOURS PRN
Qty: 6 TABLET | Refills: 0 | Status: SHIPPED | OUTPATIENT
Start: 2022-11-14 | End: 2022-11-17

## 2022-11-14 RX ADMIN — ACETAMINOPHEN 975 MG: 325 TABLET, FILM COATED ORAL at 04:43

## 2022-11-14 RX ADMIN — LOSARTAN POTASSIUM 100 MG: 50 TABLET, FILM COATED ORAL at 08:46

## 2022-11-14 RX ADMIN — IPRATROPIUM BROMIDE 0.5 MG: 0.5 SOLUTION RESPIRATORY (INHALATION) at 07:21

## 2022-11-14 RX ADMIN — LORAZEPAM 0.5 MG: 0.5 TABLET ORAL at 08:46

## 2022-11-14 RX ADMIN — ASPIRIN 81 MG: 81 TABLET, COATED ORAL at 08:47

## 2022-11-14 RX ADMIN — BUPROPION HYDROCHLORIDE 300 MG: 300 TABLET, FILM COATED, EXTENDED RELEASE ORAL at 08:46

## 2022-11-14 RX ADMIN — Medication 200 MG: at 08:46

## 2022-11-14 RX ADMIN — LEVALBUTEROL HYDROCHLORIDE 1.25 MG: 1.25 SOLUTION, CONCENTRATE RESPIRATORY (INHALATION) at 07:21

## 2022-11-14 RX ADMIN — SERTRALINE 200 MG: 100 TABLET, FILM COATED ORAL at 08:46

## 2022-11-14 RX ADMIN — HEPARIN SODIUM 5000 UNITS: 5000 INJECTION INTRAVENOUS; SUBCUTANEOUS at 04:43

## 2022-11-14 RX ADMIN — HYDROCHLOROTHIAZIDE 25 MG: 25 TABLET ORAL at 08:47

## 2022-11-14 RX ADMIN — LIDOCAINE 5% 1 PATCH: 700 PATCH TOPICAL at 08:47

## 2022-11-14 RX ADMIN — FLUTICASONE FUROATE AND VILANTEROL TRIFENATATE 1 PUFF: 200; 25 POWDER RESPIRATORY (INHALATION) at 08:49

## 2022-11-14 RX ADMIN — METHOCARBAMOL 500 MG: 500 TABLET ORAL at 04:43

## 2022-11-14 RX ADMIN — ALBUTEROL SULFATE 2 PUFF: 90 AEROSOL, METERED RESPIRATORY (INHALATION) at 04:44

## 2022-11-14 RX ADMIN — SENNOSIDES AND DOCUSATE SODIUM 1 TABLET: 50; 8.6 TABLET ORAL at 08:46

## 2022-11-14 NOTE — PLAN OF CARE
Problem: DISCHARGE PLANNING - CARE MANAGEMENT  Goal: Discharge to post-acute care or home with appropriate resources  Description: INTERVENTIONS:  - Conduct assessment to determine patient/family and health care team treatment goals, and need for post-acute services based on payer coverage, community resources, and patient preferences, and barriers to discharge  - Address psychosocial, clinical, and financial barriers to discharge as identified in assessment in conjunction with the patient/family and health care team  - Arrange appropriate level of post-acute services according to patient’s   needs and preference and payer coverage in collaboration with the physician and health care team  - Communicate with and update the patient/family, physician, and health care team regarding progress on the discharge plan  - Arrange appropriate transportation to post-acute venues  Outcome: Progressing     Problem: PAIN - ADULT  Goal: Verbalizes/displays adequate comfort level or baseline comfort level  Description: Interventions:  - Encourage patient to monitor pain and request assistance  - Assess pain using appropriate pain scale  - Administer analgesics based on type and severity of pain and evaluate response  - Implement non-pharmacological measures as appropriate and evaluate response  - Consider cultural and social influences on pain and pain management  - Notify physician/advanced practitioner if interventions unsuccessful or patient reports new pain  Outcome: Progressing     Problem: DISCHARGE PLANNING  Goal: Discharge to home or other facility with appropriate resources  Description: INTERVENTIONS:  - Identify barriers to discharge w/patient and caregiver  - Arrange for needed discharge resources and transportation as appropriate  - Identify discharge learning needs (meds, wound care, etc )  - Arrange for interpretive services to assist at discharge as needed  - Refer to Case Management Department for coordinating discharge planning if the patient needs post-hospital services based on physician/advanced practitioner order or complex needs related to functional status, cognitive ability, or social support system  Outcome: Progressing     Problem: SAFETY ADULT  Goal: Patient will remain free of falls  Description: INTERVENTIONS:  - Educate patient/family on patient safety including physical limitations  - Instruct patient to call for assistance with activity   - Consult OT/PT to assist with strengthening/mobility   - Keep Call bell within reach  - Keep bed low and locked with side rails adjusted as appropriate  - Keep care items and personal belongings within reach  - Initiate and maintain comfort rounds  - Make Fall Risk Sign visible to staff  - Offer Toileting every 2 Hours, in advance of need  - Initiate/Maintain bed alarm  - Obtain necessary fall risk management equipment: walker  - Apply yellow socks and bracelet for high fall risk patients  - Consider moving patient to room near nurses station  Outcome: Progressing

## 2022-11-14 NOTE — PLAN OF CARE
Problem: PAIN - ADULT  Goal: Verbalizes/displays adequate comfort level or baseline comfort level  Description: Interventions:  - Encourage patient to monitor pain and request assistance  - Assess pain using appropriate pain scale  - Administer analgesics based on type and severity of pain and evaluate response  - Implement non-pharmacological measures as appropriate and evaluate response  - Consider cultural and social influences on pain and pain management  - Notify physician/advanced practitioner if interventions unsuccessful or patient reports new pain  Outcome: Progressing     Problem: INFECTION - ADULT  Goal: Absence or prevention of progression during hospitalization  Description: INTERVENTIONS:  - Assess and monitor for signs and symptoms of infection  - Monitor lab/diagnostic results  - Monitor all insertion sites, i e  indwelling lines, tubes, and drains  - Monitor endotracheal if appropriate and nasal secretions for changes in amount and color  - [place] appropriate cooling/warming therapies per order  - Administer medications as ordered  - Instruct and encourage patient and family to use good hand hygiene technique  - Identify and instruct in appropriate isolation precautions for identified infection/condition  Outcome: Progressing

## 2022-11-14 NOTE — DISCHARGE SUMMARY
[facility]  Discharge- Cedrick Webb 1960, 57 y o  female MRN: [id number]  Unit/Bed#: -01 Encounter: [id number]  Primary Care Provider: Taylor Reinoso MD   Date and time admitted to hospital: 11/13/2022  9:57 AM    * Fall at home, initial encounter  Assessment & Plan  Background:  Presented to the ED status post fall on 11/12(day prior to presentation) where she was stepping over a dog gate tripped and fell  · CT head without fracture or acute intracranial process  · CT chest with acute nondisplaced fracture of the left 4th, 6th, and 7th ribs    · Shoulder x-ray completed unremarkable  · Rib fracture protocol  · Early mobilization  · Incentive spirometry  · Pain management  · PT/OT cancel given fully ambulating and taking care of herself in room    Closed fracture of multiple ribs of left side  Assessment & Plan  · As noted on imaging  · See plan as noted under primary problem    Essential hypertension  Assessment & Plan  · Blood pressure elevated on admission  · Likely secondary to pain control  · Managed as an outpatient on verapamil, losartan, hydrochlorothiazide  · Continue PTA medication regimen  · Ensure adequate pain control  · Monitor with routine vitals    Hypercholesterolemia  Assessment & Plan  · Continue statin therapy    COPD (chronic obstructive pulmonary disease) (HCC)  Assessment & Plan  · Respiratory protocol  · Rib fracture protocol  · Without acute exacerbation  · Continue PTA medication regimen with nebulizers and inhalers    Tobacco abuse  Assessment & Plan  · Supplementation ordered at discharge      Discharging Physician / Practitioner: William Martin  PCP: Taylro Reinoso MD  Admission Date:   Admission Orders (From admission, onward)     Ordered        11/13/22 1148  Place in Observation  Once                      Discharge Date: 11/14/22    Medical Problems             Resolved Problems  Date Reviewed: 11/14/2022   None Consultations During Hospital Stay:  None    Procedures Performed:   XR Trauma chest portable Result Date: 11/13/2022  · Narrative: CHEST INDICATION:   TRAUMA  COMPARISON:  Chest radiograph June 6, 2022 EXAM PERFORMED/VIEWS:  XR CHEST PORTABLE FINDINGS: Cardiomediastinal silhouette appears unremarkable  Elevation of the right hemidiaphragm with right basilar scarring, stable  There are a few small patchy perihilar nodules, right more than left, better appreciated on the same day CT  No pulmonary edema  No pneumothorax or pleural effusion  Rib fractures better identified on concurrent CT  Impression: Few small patchy perihilar nodules, right more than left, better appreciated on the same day CT  Rib fractures better identified on concurrent CT  Workstation performed: [id number]   · XR shoulder 2+ views LEFT Result Date: 11/14/2022  · Narrative: LEFT SHOULDER INDICATION:   injury  Patient fell COMPARISON:  09/14/2021 VIEWS:  XR SHOULDER 2+ VW LEFT Images: 3 FINDINGS: There is no acute fracture or dislocation  Mild osteoarthritis of the glenohumeral and acromioclavicular joints  No lytic or blastic osseous lesion  Soft tissues are unremarkable  Impression: No acute osseous abnormality  Workstation performed: [id number]   · TRAUMA - CT head wo contrast Result Date: 11/13/2022  · Narrative: CT BRAIN - WITHOUT CONTRAST INDICATION:   TRAUMA  COMPARISON:  MRI brain 6/3/2019 TECHNIQUE:  CT examination of the brain was performed  In addition to axial images, sagittal and coronal 2D reformatted images were created and submitted for interpretation  Radiation dose length product (DLP) for this visit:  878 01 mGy-cm   This examination, like all CT scans performed in the [facility], was performed utilizing techniques to minimize radiation dose exposure, including the use of iterative  reconstruction and automated exposure control  IMAGE QUALITY:  Diagnostic   FINDINGS: PARENCHYMA:  No intracranial mass, mass effect or midline shift  No CT signs of acute infarction  No acute parenchymal hemorrhage  Periventricular, centrum semiovale, and subcortical white matter hypodensity is a nonspecific finding likely representing  chronic microangiopathy  Bilateral basal ganglia microcalcification  Calcification bilateral cavernous internal carotid and distal vertebral arteries  VENTRICLES AND EXTRA-AXIAL SPACES:  5 mm well-circumscribed hyperdense nodule in the roof of the third ventricle attributed to a colloid cyst  No hydrocephalus  VISUALIZED ORBITS AND PARANASAL SINUSES:  Small mucous retention cyst left maxillary sinus  Orbits unremarkable  CALVARIUM AND EXTRACRANIAL SOFT TISSUES:  Scalp unremarkable  No skull fracture  Chronic partial opacification of the right mastoid  Impression: No acute intracranial process  No skull fracture  Chronic microangiopathy  5 mm well-circumscribed hyperdense nodule in the root of the third ventricle compatible with a colloid cyst  Workstation performed: [id number]   · CT chest with contrast Result Date: 11/13/2022  · Narrative: CT CHEST WITH IV CONTRAST INDICATION:   Chest wall pain left chest wall tenderness, fall  COMPARISON:  CT chest 8/19/2019 TECHNIQUE: CT examination of the chest was performed  Axial, sagittal, and coronal 2D reformatted images were created from the source data and submitted for interpretation  Radiation dose length product (DLP) for this visit:  441 8 mGy-cm   This examination, like all CT scans performed in the [facility], was performed utilizing techniques to minimize radiation dose exposure, including the use of iterative reconstruction and automated exposure control  IV Contrast:  85 mL of iohexol (OMNIPAQUE)  350 FINDINGS: LUNGS:  No lung contusion or infiltrate  Post right lower lobectomy  COPD  Scattered areas of pulmonary scarring   Several abnormal nodular pulmonary opacities; representative lesions will be measured on series 3: Image 46, posterior right upper lobe, 2 5 cm solid nodule, new  Image 51, left lower lobe, 8 mm groundglass nodule, stable  Image 54, posterior right upper lobe, 1 8 cm groundglass nodule previously 1 5 cm  Image 68, right middle lobe, 1 3 cm solid nodule previously 6 mm  Image 74, posterior right lower lobe, 1 cm solid nodule unchanged in size although previously groundglass in appearance  New clustered punctate juxtapleural nodules in the right middle lobe marked on series 3  Mucosal strands in the trachea  Central airways otherwise clear  PLEURA:  Unremarkable  HEART/GREAT VESSELS: Heart is not enlarged  No pericardial effusion  Aortic and coronary artery calcification  No thoracic aortic aneurysm  MEDIASTINUM AND SUMIT:  Right perihilar postsurgical changes  No enlarged lymph nodes  CHEST WALL AND LOWER NECK:  Grossly stable nodule in the left lobe of the thyroid gland  Stable tiny calcifications in the right breast  VISUALIZED STRUCTURES IN THE UPPER ABDOMEN:  Bilateral extrarenal pelvis, left larger than right  OSSEOUS STRUCTURES:  Subtle acute nondisplaced fracture of the left fourth and sixth anterior and seventh anterolateral ribs  No osseous destructive lesion identified  Degenerative changes of the spine  Mild dextroconvex thoracic scoliosis  Impression: No evidence of acute intrathoracic process  Acute nondisplaced fracture of the left fourth, sixth, and seventh ribs  New and enlarging pulmonary nodules since August 2019, the largest of which measures 2 5 cm  Based on current Fleischner Society 2017 Guidelines on incidental pulmonary nodule, either PET/CT scan evaluation, tissue sampling or short term interval followup non-contrast CT followup (initially in 3 months) may be considered appropriate  Additional chronic findings and negatives as above  The study was marked in [facility] for immediate notification   This study demonstrates a finding requiring imaging follow-up and was documented as such in Epic  Workstation performed: [id number]       Significant Findings / Test Results:   · As noted above     Incidental Findings:   · As noted above     Test Results Pending at Discharge (will require follow up): · None      Outpatient Tests Requested:  · At discretion of PCP    Complications:  None     Past Medical History:   Diagnosis Date   • Cancer [facility])    • COPD (chronic obstructive pulmonary disease) ([facility] )    • Hyperlipidemia    • Hypertension    • Lung cancer ([facility] ) 06/26/2019    right lower lobe removed       Reason for Admission: Fall (Patient complaint of "trip and fall yesterday approx 4 pm  yesterday , left side pain , no blood thinnners , denies loc )       Hospital Course:     Janine Harrison is a 57 y o  female patient with past medical history of as noted in table above who originally presented to the hospital on 11/13/2022 due to Fall (Patient complaint of "trip and fall yesterday approx 4 pm  yesterday , left side pain , no blood thinnners , denies loc )     Patient she presented to the hospital status post fall over a dog gate  Trauma workup positive for rib rib fractures as monitor as an observational status overnight and cleared for discharge on 11/14 with appropriate pain control  Please see above list of diagnoses and related plan for additional information  Condition at Discharge: stable     Discharge Day Visit / Exam:     Subjective:  Pain to a 3 from a 10 on admission   Vitals: Blood Pressure: 120/57 (11/14/22 0740)  Pulse: 68 (11/14/22 0740)  Temperature: 97 8 °F (36 6 °C) (11/14/22 0740)  Temp Source: Oral (11/14/22 0740)  Respirations: 18 (11/14/22 0740)  Height: 5' 5" (165 1 cm) (11/13/22 0954)  Weight - Scale: 81 2 kg (179 lb) (11/13/22 0954)  SpO2: 95 % (11/14/22 0740)  Exam:   Physical Exam  Vitals and nursing note reviewed  Constitutional:       General: She is not in acute distress  Appearance: She is well-developed  She is not ill-appearing     HENT:      Head: Normocephalic and atraumatic  Eyes:      Conjunctiva/sclera: Conjunctivae normal    Cardiovascular:      Rate and Rhythm: Normal rate and regular rhythm  Heart sounds: No murmur heard  Pulmonary:      Effort: Pulmonary effort is normal  No respiratory distress  Breath sounds: Normal breath sounds  Abdominal:      Palpations: Abdomen is soft  Tenderness: There is no abdominal tenderness  Musculoskeletal:      Cervical back: Neck supple  Skin:     General: Skin is warm and dry  Capillary Refill: Capillary refill takes 2 to 3 seconds  Neurological:      Mental Status: She is alert and oriented to person, place, and time  Psychiatric:         Mood and Affect: Mood normal          Behavior: Behavior normal          Discussion with Family:  at the bedside     Discharge instructions/Information to patient and family:   See after visit summary for information provided to patient and family  Provisions for Follow-Up Care:  See after visit summary for information related to follow-up care and any pertinent home health orders  Disposition:     Home    Discharge Statement:  I spent 45 minutes discharging the patient  This time was spent on the day of discharge  I had direct contact with the patient on the day of discharge  Greater than 50% of the total time was spent examining patient, answering all patient questions, arranging and discussing plan of care with patient as well as directly providing post-discharge instructions  Additional time then spent on discharge activities  Discharge Medications:  See after visit summary for reconciled discharge medications provided to patient and family        ** Please Note: This note has been constructed using a voice recognition system **

## 2022-11-14 NOTE — ASSESSMENT & PLAN NOTE
Background:  Presented to the ED status post fall on 11/12(day prior to presentation) where she was stepping over a dog gate tripped and fell  · CT head without fracture or acute intracranial process  · CT chest with acute nondisplaced fracture of the left 4th, 6th, and 7th ribs    · Shoulder x-ray completed unremarkable  · Rib fracture protocol  · Early mobilization  · Incentive spirometry  · Pain management  · PT/OT cancel given fully ambulating and taking care of herself in room

## 2022-11-14 NOTE — CASE MANAGEMENT
Case Management Assessment & Discharge Planning Note    Patient name Samantha Olivas  Location /-53 MRN [id number]  : 1960 Date 2022       Current Admission Date: 2022  Current Admission Diagnosis:Fall at home, initial encounter   Patient Active Problem List    Diagnosis Date Noted   • Fall at home, initial encounter 2022   • Closed fracture of multiple ribs of left side 2022   • Tobacco abuse 2022   • COPD (chronic obstructive pulmonary disease) ([facility] ) 2022   • Hypercholesterolemia 2022   • Essential hypertension 2022   • Adhesive capsulitis of left shoulder 2021   • Left shoulder tendinitis 2021   • Impingement syndrome of left shoulder 2021      LOS (days): 0  Geometric Mean LOS (GMLOS) (days):   Days to GMLOS:     OBJECTIVE:              Current admission status: Observation       Preferred Pharmacy:   [facility]/pharmacy #[id number] [id number]  Phone: [phone] Fax: [phone]    Primary Care Provider: Dipti Good MD    Primary Insurance: BLUE CROSS  Secondary Insurance:     ASSESSMENT:  [address], [address] - Spouse   Primary Phone: [phone] (Mobile)  Home Phone: [phone]               Advance Directives  Does patient have a [address]?: Yes  Does patient have Advance Directives?: Yes  Advance Directives: Living will, Power of  for health care  Primary Contact: Lev Res - spouse         Readmission Root Cause  30 Day Readmission: No    Patient Information  Admitted from[de-identified] Home  Mental Status: Alert  During Assessment patient was accompanied by: Spouse  Assessment information provided by[de-identified] Patient  Primary Caregiver: Self  Support Systems: Spouse/significant other, Deven Whipple 59 of Residence: [address] [place] [address] do you live in?: [address] [place] [address] entry access options  Select all that apply : Stairs  Number of steps to enter home  : 1  Do the steps have railings?: Yes  Type of Current Residence: 2 story home  Upon entering residence, is there a bedroom on the main floor (no further steps)?: No  A bedroom is located on the following floor levels of residence (select all that apply):: 2nd Floor  Upon entering residence, is there a bathroom on the main floor (no further steps)?: Yes  Number of steps to 2nd floor from main floor: One Flight  In the last 12 months, was there a time when you were not able to pay the mortgage or rent on time?: No  In the last 12 months, how many places have you lived?: 1  In the last 12 months, was there a time when you did not have a steady place to sleep or slept in a shelter (including now)?: No  Homeless/housing insecurity resource given?: N/A  Living Arrangements: Lives w/ Spouse/significant other (son and 9year old grandson)  Is patient a ?: No    Activities of Daily Living Prior to Admission  Functional Status: Independent  Completes ADLs independently?: Yes  Ambulates independently?: Yes  Does patient use assisted devices?: No  Does patient currently own DME?: Yes  What DME does the patient currently own?: [employer]  Does patient have a history of Outpatient Therapy (PT/OT)?: Yes  Does the patient have a history of Short-Term Rehab?: No  Does patient have a history of HHC?: Yes ([facility])  Does patient currently have [address]?: No         Patient Information Continued  Income Source: SSI/SSD  Does patient have prescription coverage?: Yes  Within the past 12 months, you worried that your food would run out before you got the money to buy more : Never true  Within the past 12 months, the food you bought just didn't last and you didn't have money to get more : Never true  Food insecurity resource given?: N/A  Does patient receive dialysis treatments?: No  Does patient have a history of substance abuse?: No  Does patient have a history of Mental Health Diagnosis?: Yes (anxiety/depression)  Is patient receiving treatment for mental health?: Yes (medication)  Has patient received inpatient treatment related to mental health in the last 2 years?: No         Means of Transportation  Means of Transport to Appts[de-identified] Drives Self  In the past 12 months, has lack of transportation kept you from medical appointments or from getting medications?: No  In the past 12 months, has lack of transportation kept you from meetings, work, or from getting things needed for daily living?: No  Was application for public transport provided?: N/A        DISCHARGE DETAILS:    Discharge planning discussed with[de-identified] patient  Freedom of Choice: Yes  Comments - Freedom of Choice: patient plans on returning home at discharge - no needs  CM contacted family/caregiver?: Yes  Were Treatment Team discharge recommendations reviewed with patient/caregiver?: Yes  Did patient/caregiver verbalize understanding of patient care needs?: Yes  Were patient/caregiver advised of the risks associated with not following Treatment Team discharge recommendations?: Yes    Contacts  Patient Contacts: Gonsalo Urias - spouse  Relationship to Patient[de-identified] Family  Contact Method: In Person  Reason/Outcome: Emergency [address]         Is the patient interested in [place] [address] at discharge?: No    DME Referral Provided  Referral made for DME?: No      Treatment Team Recommendation: Home  Discharge Destination Plan[de-identified] Home  Transport at Discharge : Automobile, Family       Additional Comments: Patient lives in a two story home with her spouse  1 ELISA  She is independent adl's and ambulation, drives  No services anticipated  Spouse to transport home

## 2023-03-12 ENCOUNTER — HOSPITAL ENCOUNTER (OUTPATIENT)
Dept: CT IMAGING | Facility: HOSPITAL | Age: 63
Discharge: HOME | End: 2023-03-12

## 2023-03-12 DIAGNOSIS — R91.8 OTHER NONSPECIFIC ABNORMAL FINDING OF LUNG FIELD: ICD-10-CM

## 2023-03-23 ENCOUNTER — PATIENT OUTREACH (OUTPATIENT)
Dept: HEMATOLOGY ONCOLOGY | Facility: CLINIC | Age: 63
End: 2023-03-23

## 2023-03-23 ENCOUNTER — DOCUMENTATION (OUTPATIENT)
Dept: HEMATOLOGY ONCOLOGY | Facility: CLINIC | Age: 63
End: 2023-03-23

## 2023-03-23 DIAGNOSIS — C34.91 NON-SMALL CELL CANCER OF RIGHT LUNG (HCC): Primary | ICD-10-CM

## 2023-03-23 NOTE — PROGRESS NOTES
Initial outreach  Called and spoke to patient  Introduced myself and explained the role of a Nurse Navigator  Reviewed upcoming appointments including date, time and location  Assessed for barriers of care  Patient has a history of stage I NCSLC s/p right lower lobectomy on 6/26/19 at [facility]  She was being followed for RUL pulmonary nodules that have increased in size  She is scheduled for PET/CT on 4/6/23  Patient stated she is scheduled for thoracentesis on 3/24/23 in [facility]  She notes some discomfort in her ribs  She had quit smoking but admits to smoking a few cigarettes per day  Discuss the smoking cessation program   She declined at this time  Emailed information on the [employer] to [email or web address]  I provided my direct contact information for questions or concerns  I informed her either myself or Oncology Care Coordinator, Krys Larson will be reaching out periodically  She was appreciative

## 2023-03-23 NOTE — PROGRESS NOTES
Intake received, chart reviewed for need of external records  Patient noted to have a history of stage I NSCLC s/p right lower lobectomy on 6/26/2019 done at [facility]  She has been followed for RUL pulmonary nodules that are enlarging  She is scheduled with IR tomorrow 3/24 for a thoracentesis       Outside records requested from: [facility] [place]     Pathology completed on: 6/26/2019 Accession#: [id number]  Fax: [phone]     Imaging completed on:  - 3/12/23 CT Lung Nodule f/u [facility])  - 11/13/22 CT Chest [facility])  - PET/CT scheduled on 4/6 [facility])

## 2023-03-24 ENCOUNTER — HOSPITAL ENCOUNTER (OUTPATIENT)
Dept: INTERVENTIONAL RADIOLOGY/VASCULAR | Facility: HOSPITAL | Age: 63
Discharge: HOME | End: 2023-03-24
Attending: SPECIALIST

## 2023-03-24 DIAGNOSIS — J90 PLEURAL EFFUSION: ICD-10-CM

## 2023-03-24 LAB — LDH FLD L TO P-CCNC: 856 U/L

## 2023-03-24 NOTE — DISCHARGE INSTRUCTIONS
Thoracentesis   WHAT YOU NEED TO KNOW:   A thoracentesis is a procedure to remove extra fluid or air from between your lungs and your inner chest wall  Air or fluid buildup may make it hard for you to breathe  A thoracentesis allows your lungs to expand fully so you can breathe more easily  DISCHARGE INSTRUCTIONS:   Medicines:   Pain medicine: You may be given a prescription medicine to decrease pain  Do not wait until the pain is severe before you take your medicine  Antibiotics: This medicine helps fight or prevent an infection  Take your medicine as directed  Call your healthcare provider if you think your medicine is not helping or if you have side effects  Tell him if you are allergic to any medicine  Keep a list of the medicines, vitamins, and herbs you take  Include the amounts, and when and why you take them  Bring the list or the pill bottles to follow-up visits  Carry your medicine list with you in case of an emergency  Follow up with your healthcare provider as directed:  Write down your questions so you remember to ask them during your visits  Rest:  Rest when you feel it is needed  Slowly start to do more each day  Return to your daily activities as directed  Do not smoke: If you smoke, it is never too late to quit  Ask for information about how to stop smoking if you need help  Contact your healthcare provider if:   You have a fever  Your puncture site is red, warm, swollen, or draining pus  You have questions or concerns about your procedure, medicine, or care  If you have any questions regarding, call the IR department @ [phone]  Seek care immediately or call [phone] if:   Blood soaks through your bandage  There is blood in your spit

## 2023-03-24 NOTE — SEDATION DOCUMENTATION
Diagnostic right thoracentesis completed  650ml clear yellow fluid removed  Specimens sent to lab  Band aid to site  AVS reviewed and ambulated home with spouse

## 2023-03-26 LAB
BACTERIA SPEC BFLD CULT: NO GROWTH
GRAM STN SPEC: NORMAL
GRAM STN SPEC: NORMAL

## 2023-03-27 ENCOUNTER — PATIENT OUTREACH (OUTPATIENT)
Dept: HEMATOLOGY ONCOLOGY | Facility: CLINIC | Age: 63
End: 2023-03-27

## 2023-03-27 NOTE — PROGRESS NOTES
MSW received a referral for this pt from Navigation  Pt will be seen by Oncology on 4/12  MSW will plan to make outreach after that date to introduce self and complete assessments

## 2023-03-27 NOTE — PROGRESS NOTES
Following up on pathology slides request as we have not received them  I have faxed a request once again to Fax# [phone]  I also attempted to call [facility] pathology at P# [phone]   Phone continues to ring unable to speak with anyone or leave a message       Pathology completed on: 6/26/2019 Accession#: [id number]

## 2023-03-28 LAB
BACTERIA SPEC BFLD CULT: NO GROWTH
GRAM STN SPEC: NORMAL
GRAM STN SPEC: NORMAL

## 2023-03-30 ENCOUNTER — DOCUMENTATION (OUTPATIENT)
Dept: HEMATOLOGY ONCOLOGY | Facility: CLINIC | Age: 63
End: 2023-03-30

## 2023-03-30 NOTE — PROGRESS NOTES
Pathology slides received: 03/30/23  From [facility] via [employer]  Forwarded via [employer] to Aristides Nogueira in Pathology department for processing

## 2023-03-31 ENCOUNTER — PATIENT OUTREACH (OUTPATIENT)
Dept: HEMATOLOGY ONCOLOGY | Facility: CLINIC | Age: 63
End: 2023-03-31

## 2023-03-31 ENCOUNTER — TELEPHONE (OUTPATIENT)
Dept: OBGYN CLINIC | Facility: OTHER | Age: 63
End: 2023-03-31

## 2023-03-31 NOTE — TELEPHONE ENCOUNTER
Appointment Confirmation (to confirm pre existing appointments - ONLY)  No need to route   Who is calling to confirm? Patient   If someone other than patient is calling, are they listed on the communication consent form? N/A   Appointment with  Dr Star Bonds   Appointment date & time 4/12/23 940   Appointment location [facility]   Patient verbilized understanding Yes   Pt called to check if there was a sooner appt   Advised pt that there wasn't a sooner appt however I can add her to the waitlist

## 2023-03-31 NOTE — PROGRESS NOTES
Dr Victorino Sandoval to me in regards to patient  Patient is currently scheduled on 4/12 at [facility]  Dr Kranthi Johansen would like to see if patient would come to [place] on 4/7 or 4/10 at 430  Called and Left VM in detail to give me a callback at my direct number [phone]

## 2023-03-31 NOTE — PROGRESS NOTES
Pt returned my call  Pt agrees with 4/7 appointment at 4:30 at [facility] per our discussion  Provided address/ time and phone number  She also informed me she went for PET/ CT at [facility] today  She has downloaded disk on hand  Advised her to go to hospital up to registration and they will guide her on where to go  I have requested report to be faxed to us  I will reach out to her sometime post medical oncology consult  Provided my direct phone number for questions or concerns moving forward  Patient was appreciative

## 2023-04-07 ENCOUNTER — TELEPHONE (OUTPATIENT)
Dept: HEMATOLOGY ONCOLOGY | Facility: CLINIC | Age: 63
End: 2023-04-07

## 2023-04-07 ENCOUNTER — CONSULT (OUTPATIENT)
Dept: HEMATOLOGY ONCOLOGY | Facility: CLINIC | Age: 63
End: 2023-04-07

## 2023-04-07 VITALS
WEIGHT: 180 LBS | HEART RATE: 100 BPM | OXYGEN SATURATION: 95 % | DIASTOLIC BLOOD PRESSURE: 74 MMHG | SYSTOLIC BLOOD PRESSURE: 128 MMHG | TEMPERATURE: 98.2 F | BODY MASS INDEX: 29.99 KG/M2 | HEIGHT: 65 IN

## 2023-04-07 DIAGNOSIS — C34.91 NON-SMALL CELL CANCER OF RIGHT LUNG (HCC): Primary | ICD-10-CM

## 2023-04-07 RX ORDER — PROCHLORPERAZINE MALEATE 10 MG
10 TABLET ORAL EVERY 6 HOURS PRN
Qty: 30 TABLET | Refills: 3 | Status: SHIPPED | OUTPATIENT
Start: 2023-04-07

## 2023-04-07 RX ORDER — LIDOCAINE AND PRILOCAINE 25; 25 MG/G; MG/G
CREAM TOPICAL
Qty: 30 G | Refills: 3 | Status: SHIPPED | OUTPATIENT
Start: 2023-04-07

## 2023-04-07 RX ORDER — CYANOCOBALAMIN 1000 UG/ML
1000 INJECTION, SOLUTION INTRAMUSCULAR; SUBCUTANEOUS ONCE
OUTPATIENT
Start: 2023-04-07 | End: 2023-04-07

## 2023-04-07 RX ORDER — DEXAMETHASONE 4 MG/1
4 TABLET ORAL 2 TIMES DAILY WITH MEALS
Qty: 6 TABLET | Refills: 6 | Status: SHIPPED | OUTPATIENT
Start: 2023-04-07

## 2023-04-07 RX ORDER — ONDANSETRON HYDROCHLORIDE 8 MG/1
8 TABLET, FILM COATED ORAL EVERY 8 HOURS PRN
Qty: 30 TABLET | Refills: 3 | Status: SHIPPED | OUTPATIENT
Start: 2023-04-07

## 2023-04-07 NOTE — TELEPHONE ENCOUNTER
Schedule port placement and treatment at   Please schedule patient prior to cycle 2 with [facility]

## 2023-04-09 NOTE — PROGRESS NOTES
Oncology Outpatient Consult Note  Stacey Buchanan 57 y o  female MRN: @ Encounter: [id number]        Date:  4/8/2023        CC:        HPI:  Stacey Buchanan is seen for initial consultation 4/8/2023 regarding        ROS: As stated in history of present illness otherwise her 14 point review of systems today was negative  ECOG PS: 0    Cancer Staging:  Cancer Staging   No matching staging information was found for the patient  Molecular Testing:     Oncology History   Non-small cell cancer of right lung ([facility] )   4/7/2023 Initial Diagnosis    Non-small cell cancer of right lung ([facility] )     4/7/2023 -  Chemotherapy    cyanocobalamin, 1,000 mcg, Intramuscular, Once, 0 of 3 cycles  fosaprepitant (EMEND) IVPB, 150 mg, Intravenous, Once, 0 of 6 cycles  CARBOplatin (PARAPLATIN) IVPB (GOG AUC DOSING), , Intravenous, Once, 0 of 6 cycles  pemetrexed (ALIMTA) chemo infusion, 500 mg/m2, Intravenous, Once, 0 of 6 cycles  pembrolizumab (KEYTRUDA) IVPB, 200 mg, Intravenous, Once, 0 of 6 cycles             Test Results:    Imaging: IR thoracentesis    Result Date: 3/27/2023  Narrative: INDICATION: Recurrent pleural effusions PROCEDURE: 1  Ultrasound-guided right thoracentesis FINDINGS: 1   650 mL clear yellow fluid removed from the right pleural space  Impression: Thoracentesis  _________________________________________________________________ COMPARISON: None PROCEDURE DETAILS: Operators: Dr Akil Mccall Anesthesia:  Local Medications: 1% lidocaine COMMENTS: The patient was placed in a sitting position  A preprocedure timeout was performed per [facility] protocol  The back was prepped and draped in the usual sterile fashion  5-Romanian Yueh catheter was advanced using ultrasound guidance into the pleural space  Following drainage, skin was cleansed and sterile dressings were applied   Workstation performed: [id number]     CT lung nodule follow-up    Result Date: 3/20/2023  Narrative: CT CHEST WITHOUT IV CONTRAST INDICATION: R91 8: Other nonspecific abnormal finding of lung field  COMPARISON:  None  TECHNIQUE:  Unenhanced CT examination of the chest was performed utilizing a low radiation dose protocol  Axial, sagittal, and coronal 2D reformatted images were created from the source data and submitted for interpretation  Radiation dose length product (DLP) for this visit:  375 mGy-cm   This examination, like all CT scans performed in the [facility], was performed utilizing techniques to minimize radiation dose exposure, including the use of iterative reconstruction and automated exposure control  FINDINGS: LUNGS:  Moderate emphysema status post right lower lobectomy  Scattered areas of scarring are unchanged  The previously identified nodules in the right lung have increased in size and measured on series 4  The posterior right upper lobe nodule now measures approximately 4 2 x 4 3 cm (image 54) which previously measured 2 5 cm  The mass now extends into the periphery of the hilum  The previously described posterior right upper lobe nodule is now continuous with the above nodules  The right middle lobe nodule (77) is slightly larger in size measuring 1 6 x 1 3 cm, previously 1 3 cm  The inferior right upper lobe nodule, identified is posterior right lower lobe on the prior study, now measures 3 4 x 1 6 cm (72) previously measuring 1 cm  The nodules on the left are unchanged in size  No tracheal or endobronchial lesion  PLEURA:  Interval development of a moderate sized right pleural effusion  HEART/GREAT VESSELS:  New small pericardial effusion  Moderate coronary artery calcifications  Mild atherosclerotic calcifications within the thoracic aorta  MEDIASTINUM AND SUMIT:  Increasing size of multiple pretracheal lymph nodes  Although there is a lack of intravenous contrast, there is a suggestion of right hilar lymphadenopathy  CHEST WALL AND LOWER NECK:  Stable left thyroid nodule    Right breast calcifications are noted  VISUALIZED STRUCTURES IN THE UPPER ABDOMEN:  Unremarkable  OSSEOUS STRUCTURES:  No acute fracture or destructive osseous lesion  Impression: Interval enlargement of the multiple right upper lobe pulmonary nodules with extension into the hilum and increasing mediastinal and possibly right hilar lymph adenopathy  Differential considerations include worsening multifocal pneumonia or malignancy either metastatic or new primary  New moderate right pleural effusion and small pericardial effusion  Multiple stable left pulmonary nodules  The study was marked in EPIC for significant notification   Workstation performed: [id number]     NM PET outside images    Result Date: 3/31/2023  Narrative: 1 2 [id number] 2 55 3 [id number]      Labs:   Lab Results   Component Value Date    WBC 5 65 11/14/2022    HGB 12 5 11/14/2022    HCT 36 6 11/14/2022    MCV 91 11/14/2022     11/14/2022     Lab Results   Component Value Date    K 3 3 (L) 11/14/2022    CL 99 11/14/2022    CO2 26 11/14/2022    BUN 9 11/14/2022    CREATININE 0 66 11/14/2022    GLUCOSE 95 08/19/2019    GLUF 94 11/14/2022    CALCIUM 9 0 11/14/2022    AST 16 11/14/2022    ALT 28 11/14/2022    ALKPHOS 91 11/14/2022    EGFR 93 11/14/2022         No results found for: SPEP, UPEP    No results found for: PSA    No results found for: CEA    No results found for: AFP    No results found for: IRON, TIBC, FERRITIN    No results found for: [id number]            Active Problems:   Patient Active Problem List   Diagnosis   • Left shoulder tendinitis   • Impingement syndrome of left shoulder   • Adhesive capsulitis of left shoulder   • Fall at home, initial encounter   • Closed fracture of multiple ribs of left side   • Tobacco abuse   • COPD (chronic obstructive pulmonary disease) (HCC)   • Hypercholesterolemia   • Essential hypertension   • Non-small cell cancer of right lung [facility])       Past Medical History:   Past Medical History: Diagnosis Date   • Cancer [facility])    • [facility]    • Hyperlipidemia    • Hypertension    • Lung cancer ([facility] ) 06/26/2019    right lower lobe removed       Surgical History:   Past Surgical History:   Procedure Laterality Date   • APPENDECTOMY     • BREAST BIOPSY Right     benign   • COLON SURGERY     • HYSTERECTOMY      age 48   • IR THORACENTESIS  3/24/2023   • LUNG LOBECTOMY     • OOPHORECTOMY Bilateral     age 48       Family History:    Family History   Problem Relation Age of Onset   • Colon cancer Mother    • Hypertension Mother    • Hyperlipidemia Mother    • Hearing loss Mother    • Depression Mother    • COPD Mother    • Cancer Mother    • Arthritis Mother    • Lung cancer Mother    • Drug abuse Brother    • Diabetes Maternal Grandmother    • Cancer Maternal Grandmother    • Colon cancer Maternal Grandmother    • Cancer Maternal Aunt    • Colon cancer Maternal Aunt        Cancer-related family history includes Cancer in her maternal aunt, maternal grandmother, and mother; Colon cancer in her maternal aunt, maternal grandmother, and mother; Lung cancer in her mother      Social History:   Social History     Socioeconomic History   • Marital status: /Civil Union     Spouse name: Not on file   • Number of children: Not on file   • Years of education: Not on file   • Highest education level: Not on file   Occupational History   • Not on file   Tobacco Use   • Smoking status: Every Day     Packs/day: 0 50     Years: 50 00     Pack years: 25 00     Types: Cigarettes   • Smokeless tobacco: Never   Vaping Use   • Vaping Use: Never used   Substance and Sexual Activity   • Alcohol use: Not Currently     Comment: social   • Drug use: No   • Sexual activity: Not on file   Other Topics Concern   • Not on file   Social History Narrative   • Not on file     Social Determinants of Health     Financial Resource Strain: Not on file   Food Insecurity: No Food Insecurity   • Worried About [facility] in the Last Year: Never true   • Ran Out of Food in the Last Year: Never true   Transportation Needs: No Transportation Needs   • Lack of Transportation (Medical): No   • Lack of Transportation (Non-Medical): No   Physical Activity: Not on file   Stress: Not on file   Social Connections: Not on file   Intimate Partner Violence: Not on file   Housing Stability: Low Risk    • Unable to Pay for Housing in the Last Year: No   • Number of Places Lived in the Last Year: 1   • Unstable Housing in the Last Year: No       Current Medications:   Current Outpatient Medications   Medication Sig Dispense Refill   • albuterol (PROVENTIL HFA,VENTOLIN HFA) 90 mcg/act inhaler Inhale 2 puffs every 4 (four) hours  3   • aspirin 81 MG tablet Take 81 mg by mouth daily     • atorvastatin (LIPITOR) 20 mg tablet Take 20 mg by mouth daily       • buPROPion (WELLBUTRIN XL) 150 mg 24 hr tablet Take 300 mg by mouth every morning     • coenzyme Q-10 100 MG capsule Take 200 mg by mouth daily     • diphenhydrAMINE (BENADRYL) 50 MG tablet Take 1 tablet (50 mg total) by mouth every 6 (six) hours as needed for itching (Rash) 30 tablet 0   • lidocaine-prilocaine (EMLA) cream Apply to port 30 to 60 minutes prior to use 30 g 3   • LORazepam (ATIVAN) 1 mg tablet 1/2 TABLET TWICE A DAY AS NEEDED FOR ANXIETY ORALLY 30 DAYS  0   • losartan-hydrochlorothiazide (HYZAAR) 100-25 MG per tablet Take 1 tablet by mouth daily     • ondansetron (ZOFRAN) 8 mg tablet Take 1 tablet (8 mg total) by mouth every 8 (eight) hours as needed for nausea or vomiting 30 tablet 3   • prochlorperazine (COMPAZINE) 10 mg tablet Take 1 tablet (10 mg total) by mouth every 6 (six) hours as needed for nausea 30 tablet 3   • sertraline (ZOLOFT) 100 mg tablet Take 200 mg by mouth daily      • verapamil (VERELAN PM) 180 MG 24 hr capsule Take 360 mg by mouth daily      • dexamethasone (DECADRON) 4 mg tablet Take 1 tablet (4 mg total) by mouth 2 (two) times a day with meals Take 1 tablet by mouth the day before, the day of, and the day after chemotherapy  6 tablet 6   • EPINEPHrine (EPIPEN) 0 3 mg/0 3 mL SOAJ Inject 0 3 mL (0 3 mg total) into a muscle once for 1 dose 0 6 mL 0   • Fluticasone Furoate-Vilanterol (Breo Ellipta) 100-25 mcg/actuation inhaler Inhale 1 puff daily Rinse mouth after use  60 blister 0   • methocarbamol (ROBAXIN) 500 mg tablet Take 1 tablet (500 mg total) by mouth every 6 (six) hours for 14 days 56 tablet 0   • nicotine (NICODERM CQ) 21 mg/24 hr TD 24 hr patch Place 1 patch on the skin daily Do not start before November 15, 2022  28 patch 0   • Spacer/Aero-Holding Chambers [facility]) MISC Use as directed  0     No current facility-administered medications for this visit  Allergies: Allergies   Allergen Reactions   • Amoxicillin Hives   • Vilazodone Hcl Dizziness and Nausea Only   • Wasp Venom Swelling   • Zithromax [Azithromycin] Hives         Physical Exam:    Body surface area is 1 89 meters squared  Wt Readings from Last 3 Encounters:   04/07/23 81 6 kg (180 lb)   11/13/22 81 2 kg (179 lb)   11/03/21 83 kg (183 lb)        Temp Readings from Last 3 Encounters:   04/07/23 98 2 °F (36 8 °C) (Temporal)   11/14/22 97 8 °F (36 6 °C) (Oral)   10/11/21 (!) 97 2 °F (36 2 °C) (Temporal)        BP Readings from Last 3 Encounters:   04/07/23 128/74   11/14/22 120/57   11/03/21 140/85         Pulse Readings from Last 3 Encounters:   04/07/23 100   11/14/22 68   10/11/21 71     @LASTSAO2(3)@    Physical Exam     Constitutional   General appearance: No acute distress, well appearing and well nourished  Eyes   Conjunctiva and lids: No swelling, erythema or discharge  Pupils and irises: Equal, round and reactive to light  Ears, Nose, Mouth, and Throat   External inspection of ears and nose: Normal     Nasal mucosa, septum, and turbinates: Normal without edema or erythema      Oropharynx: Normal with no erythema, edema, exudate or lesions  Pulmonary   Respiratory effort: No increased work of breathing or signs of respiratory distress  Auscultation of lungs: Clear to auscultation  Cardiovascular   Palpation of heart: Normal PMI, no thrills  Auscultation of heart: Normal rate and rhythm, normal S1 and S2, without murmurs  Examination of extremities for edema and/or varicosities: Normal     Carotid pulses: Normal     Abdomen   Abdomen: Non-tender, no masses  Liver and spleen: No hepatomegaly or splenomegaly  Lymphatic   Palpation of lymph nodes in neck: No lymphadenopathy  Musculoskeletal   Gait and station: Normal     Digits and nails: Normal without clubbing or cyanosis  Inspection/palpation of joints, bones, and muscles: Normal     Skin   Skin and subcutaneous tissue: Normal without rashes or lesions  Neurologic   Cranial nerves: Cranial nerves 2-12 intact  Sensation: No sensory loss  Psychiatric   Orientation to person, place, and time: Normal     Mood and affect: Normal           Assessment/ Plan:          I spent 60 minutes in chart review, face to face counseling, coordination of care, and documentation  Normal           Assessment/ Plan:  56 yo female with a Stage IV relapse of her adenocarcinoma of the right lung  So far, it does not appear that she has disease outside of the chest based on PET scan  I am going to get an MRI of the brain to complete staging  We discussed carbo/pemetrexed/pembro as treatment without curative intent  Our goes is to control the disease for as long as possible with good QOL  Risks and benefits of carbo/pem/pembro were reviewed and consent was signed  I am going to try to send caris testing on her cell block if there is enough tissue  She will also get a port placed prior to starting treatment  She was understandably upset about this development with her health and emotional support was offered  It is good that she doesn't have a heavy burden of disease, so I am hopeful she will get good disease control  She will start FA and get a vit b12 injection ASAP to mitigate pemetrexed toxicity  I also send an rx for compazine, zofran and emla creat to her pharmacy  I spent 60 minutes in chart review, face to face counseling, coordination of care, and documentation

## 2023-04-10 NOTE — TELEPHONE ENCOUNTER
Please schedule patient for b12 & keytruda  Port is being place on 4/18  B12 must be 7 days before treatment begins  She does not have any preferences regarding days/times  She also said she will go anywhere except [place] or [place] for treatments  Please also schedule labs through port  Thank you!

## 2023-04-18 ENCOUNTER — APPOINTMENT (OUTPATIENT)
Dept: LAB | Facility: HOSPITAL | Age: 63
End: 2023-04-18
Attending: INTERNAL MEDICINE

## 2023-04-18 DIAGNOSIS — C34.91 NON-SMALL CELL CANCER OF RIGHT LUNG (HCC): ICD-10-CM

## 2023-04-18 LAB
ALBUMIN SERPL BCP-MCNC: 3.7 G/DL (ref 3.5–5)
ALP SERPL-CCNC: 92 U/L (ref 34–104)
ALT SERPL W P-5'-P-CCNC: 20 U/L (ref 7–52)
ANION GAP SERPL CALCULATED.3IONS-SCNC: 8 MMOL/L (ref 4–13)
AST SERPL W P-5'-P-CCNC: 19 U/L (ref 13–39)
BILIRUB SERPL-MCNC: 0.44 MG/DL (ref 0.2–1)
BUN SERPL-MCNC: 10 MG/DL (ref 5–25)
CALCIUM SERPL-MCNC: 9 MG/DL (ref 8.4–10.2)
CHLORIDE SERPL-SCNC: 103 MMOL/L (ref 96–108)
CO2 SERPL-SCNC: 26 MMOL/L (ref 21–32)
CREAT SERPL-MCNC: 0.47 MG/DL (ref 0.6–1.3)
GFR SERPL CREATININE-BSD FRML MDRD: 104 ML/MIN/1.73SQ M
GLUCOSE SERPL-MCNC: 116 MG/DL (ref 65–140)
POTASSIUM SERPL-SCNC: 3.5 MMOL/L (ref 3.5–5.3)
PROT SERPL-MCNC: 6.7 G/DL (ref 6.4–8.4)
SODIUM SERPL-SCNC: 137 MMOL/L (ref 135–147)
T3FREE SERPL-MCNC: 2.18 PG/ML (ref 2.3–4.2)
TSH SERPL DL<=0.05 MIU/L-ACNC: 1.15 UIU/ML (ref 0.45–4.5)

## 2023-04-19 LAB
BASOPHILS # BLD AUTO: 0.03 THOUSANDS/ΜL (ref 0–0.1)
BASOPHILS NFR BLD AUTO: 0 % (ref 0–1)
EOSINOPHIL # BLD AUTO: 0.09 THOUSAND/ΜL (ref 0–0.61)
EOSINOPHIL NFR BLD AUTO: 1 % (ref 0–6)
ERYTHROCYTE [DISTWIDTH] IN BLOOD BY AUTOMATED COUNT: 12.2 % (ref 11.6–15.1)
HCT VFR BLD AUTO: 38.9 % (ref 34.8–46.1)
HGB BLD-MCNC: 13.2 G/DL (ref 11.5–15.4)
IMM GRANULOCYTES # BLD AUTO: 0.05 THOUSAND/UL (ref 0–0.2)
IMM GRANULOCYTES NFR BLD AUTO: 1 % (ref 0–2)
LYMPHOCYTES # BLD AUTO: 1.02 THOUSANDS/ΜL (ref 0.6–4.47)
LYMPHOCYTES NFR BLD AUTO: 11 % (ref 14–44)
MCH RBC QN AUTO: 31.7 PG (ref 26.8–34.3)
MCHC RBC AUTO-ENTMCNC: 33.9 G/DL (ref 31.4–37.4)
MCV RBC AUTO: 94 FL (ref 82–98)
MONOCYTES # BLD AUTO: 0.67 THOUSAND/ΜL (ref 0.17–1.22)
MONOCYTES NFR BLD AUTO: 7 % (ref 4–12)
NEUTROPHILS # BLD AUTO: 7.83 THOUSANDS/ΜL (ref 1.85–7.62)
NEUTS SEG NFR BLD AUTO: 80 % (ref 43–75)
NRBC BLD AUTO-RTO: 0 /100 WBCS
PLATELET # BLD AUTO: 390 THOUSANDS/UL (ref 149–390)
PMV BLD AUTO: 9.2 FL (ref 8.9–12.7)
RBC # BLD AUTO: 4.16 MILLION/UL (ref 3.81–5.12)
WBC # BLD AUTO: 9.5 THOUSAND/UL (ref 4.31–10.16)

## 2023-04-20 ENCOUNTER — HOSPITAL ENCOUNTER (OUTPATIENT)
Dept: INFUSION CENTER | Facility: HOSPITAL | Age: 63
Discharge: HOME | End: 2023-04-20
Attending: INTERNAL MEDICINE

## 2023-04-20 VITALS
RESPIRATION RATE: 18 BRPM | HEART RATE: 89 BPM | DIASTOLIC BLOOD PRESSURE: 64 MMHG | SYSTOLIC BLOOD PRESSURE: 151 MMHG | BODY MASS INDEX: 30.38 KG/M2 | TEMPERATURE: 97.1 F | HEIGHT: 65 IN | WEIGHT: 182.32 LBS | OXYGEN SATURATION: 96 %

## 2023-04-20 DIAGNOSIS — C34.91 NON-SMALL CELL CANCER OF RIGHT LUNG (HCC): Primary | ICD-10-CM

## 2023-04-20 RX ORDER — SODIUM CHLORIDE 9 MG/ML
20 INJECTION, SOLUTION INTRAVENOUS ONCE
Status: COMPLETED | OUTPATIENT
Start: 2023-04-20 | End: 2023-04-20

## 2023-04-20 RX ADMIN — SODIUM CHLORIDE 20 ML/HR: 0.9 INJECTION, SOLUTION INTRAVENOUS at 08:40

## 2023-04-20 RX ADMIN — FOSAPREPITANT 150 MG: 150 INJECTION, POWDER, LYOPHILIZED, FOR SOLUTION INTRAVENOUS at 09:08

## 2023-04-20 RX ADMIN — CARBOPLATIN 553 MG: 10 INJECTION, SOLUTION INTRAVENOUS at 11:21

## 2023-04-20 RX ADMIN — DEXAMETHASONE SODIUM PHOSPHATE: 10 INJECTION, SOLUTION INTRAMUSCULAR; INTRAVENOUS at 08:40

## 2023-04-20 RX ADMIN — SODIUM CHLORIDE 200 MG: 9 INJECTION, SOLUTION INTRAVENOUS at 09:51

## 2023-04-20 RX ADMIN — PEMETREXED DISODIUM 900 MG: 500 INJECTION, POWDER, LYOPHILIZED, FOR SOLUTION INTRAVENOUS at 10:46

## 2023-04-20 NOTE — PROGRESS NOTES
Pt tolerated chemo without incident  Next appt confirmed, AVS declined  Pt provided with number for [facility] [place]

## 2023-04-23 ENCOUNTER — APPOINTMENT (EMERGENCY)
Dept: RADIOLOGY | Facility: HOSPITAL | Age: 63
End: 2023-04-23

## 2023-04-23 ENCOUNTER — HOSPITAL ENCOUNTER (INPATIENT)
Facility: HOSPITAL | Age: 63
LOS: 2 days | Discharge: HOME HEALTH | End: 2023-04-25
Attending: EMERGENCY MEDICINE | Admitting: INTERNAL MEDICINE

## 2023-04-23 ENCOUNTER — TELEPHONE (OUTPATIENT)
Dept: HEMATOLOGY ONCOLOGY | Facility: CLINIC | Age: 63
End: 2023-04-23

## 2023-04-23 ENCOUNTER — APPOINTMENT (EMERGENCY)
Dept: CT IMAGING | Facility: HOSPITAL | Age: 63
End: 2023-04-23

## 2023-04-23 DIAGNOSIS — R53.1 GENERALIZED WEAKNESS: ICD-10-CM

## 2023-04-23 DIAGNOSIS — R79.89 ELEVATED LFTS: ICD-10-CM

## 2023-04-23 DIAGNOSIS — R06.02 SHORTNESS OF BREATH: ICD-10-CM

## 2023-04-23 DIAGNOSIS — D84.9 IMMUNOCOMPROMISED STATE (HCC): ICD-10-CM

## 2023-04-23 DIAGNOSIS — E87.1 HYPONATREMIA: ICD-10-CM

## 2023-04-23 DIAGNOSIS — C34.91 NON-SMALL CELL CANCER OF RIGHT LUNG (HCC): ICD-10-CM

## 2023-04-23 DIAGNOSIS — J90 PLEURAL EFFUSION: Primary | ICD-10-CM

## 2023-04-23 PROBLEM — T45.1X5A CINV (CHEMOTHERAPY-INDUCED NAUSEA AND VOMITING): Status: ACTIVE | Noted: 2023-04-23

## 2023-04-23 PROBLEM — R11.2 CINV (CHEMOTHERAPY-INDUCED NAUSEA AND VOMITING): Status: ACTIVE | Noted: 2023-04-23

## 2023-04-23 LAB
ALBUMIN SERPL BCP-MCNC: 4 G/DL (ref 3.5–5)
ALP SERPL-CCNC: 102 U/L (ref 34–104)
ALT SERPL W P-5'-P-CCNC: 56 U/L (ref 7–52)
ANION GAP SERPL CALCULATED.3IONS-SCNC: 10 MMOL/L (ref 4–13)
APTT PPP: 24 SECONDS (ref 23–37)
AST SERPL W P-5'-P-CCNC: 45 U/L (ref 13–39)
BASOPHILS # BLD AUTO: 0.01 THOUSANDS/ΜL (ref 0–0.1)
BASOPHILS NFR BLD AUTO: 0 % (ref 0–1)
BILIRUB SERPL-MCNC: 0.48 MG/DL (ref 0.2–1)
BILIRUB UR QL STRIP: NEGATIVE
BNP SERPL-MCNC: 38 PG/ML (ref 0–100)
BUN SERPL-MCNC: 14 MG/DL (ref 5–25)
CALCIUM SERPL-MCNC: 9.4 MG/DL (ref 8.4–10.2)
CARDIAC TROPONIN I PNL SERPL HS: 2 NG/L
CHLORIDE SERPL-SCNC: 95 MMOL/L (ref 96–108)
CLARITY UR: CLEAR
CO2 SERPL-SCNC: 26 MMOL/L (ref 21–32)
COLOR UR: YELLOW
CREAT SERPL-MCNC: 0.58 MG/DL (ref 0.6–1.3)
D DIMER PPP FEU-MCNC: 0.88 UG/ML FEU
EOSINOPHIL # BLD AUTO: 0.03 THOUSAND/ΜL (ref 0–0.61)
EOSINOPHIL NFR BLD AUTO: 0 % (ref 0–6)
ERYTHROCYTE [DISTWIDTH] IN BLOOD BY AUTOMATED COUNT: 12.2 % (ref 11.6–15.1)
GFR SERPL CREATININE-BSD FRML MDRD: 97 ML/MIN/1.73SQ M
GLUCOSE SERPL-MCNC: 114 MG/DL (ref 65–140)
GLUCOSE UR STRIP-MCNC: NEGATIVE MG/DL
HCT VFR BLD AUTO: 41.4 % (ref 34.8–46.1)
HGB BLD-MCNC: 13.8 G/DL (ref 11.5–15.4)
HGB UR QL STRIP.AUTO: NEGATIVE
IMM GRANULOCYTES # BLD AUTO: 0.04 THOUSAND/UL (ref 0–0.2)
IMM GRANULOCYTES NFR BLD AUTO: 1 % (ref 0–2)
INR PPP: 0.88 (ref 0.84–1.19)
KETONES UR STRIP-MCNC: NEGATIVE MG/DL
LACTATE SERPL-SCNC: 1.5 MMOL/L (ref 0.5–2)
LEUKOCYTE ESTERASE UR QL STRIP: NEGATIVE
LYMPHOCYTES # BLD AUTO: 1.01 THOUSANDS/ΜL (ref 0.6–4.47)
LYMPHOCYTES NFR BLD AUTO: 11 % (ref 14–44)
MAGNESIUM SERPL-MCNC: 2.2 MG/DL (ref 1.9–2.7)
MCH RBC QN AUTO: 31.2 PG (ref 26.8–34.3)
MCHC RBC AUTO-ENTMCNC: 33.3 G/DL (ref 31.4–37.4)
MCV RBC AUTO: 94 FL (ref 82–98)
MONOCYTES # BLD AUTO: 0.26 THOUSAND/ΜL (ref 0.17–1.22)
MONOCYTES NFR BLD AUTO: 3 % (ref 4–12)
NEUTROPHILS # BLD AUTO: 7.49 THOUSANDS/ΜL (ref 1.85–7.62)
NEUTS SEG NFR BLD AUTO: 85 % (ref 43–75)
NITRITE UR QL STRIP: NEGATIVE
NRBC BLD AUTO-RTO: 0 /100 WBCS
PH UR STRIP.AUTO: 7 [PH]
PLATELET # BLD AUTO: 436 THOUSANDS/UL (ref 149–390)
PMV BLD AUTO: 9.3 FL (ref 8.9–12.7)
POTASSIUM SERPL-SCNC: 3.6 MMOL/L (ref 3.5–5.3)
PROCALCITONIN SERPL-MCNC: 0.06 NG/ML
PROT SERPL-MCNC: 6.9 G/DL (ref 6.4–8.4)
PROT UR STRIP-MCNC: NEGATIVE MG/DL
PROTHROMBIN TIME: 12.5 SECONDS (ref 11.6–14.5)
RBC # BLD AUTO: 4.43 MILLION/UL (ref 3.81–5.12)
SARS-COV-2 RNA RESP QL NAA+PROBE: NEGATIVE
SODIUM 24H UR-SCNC: 28 MOL/L
SODIUM SERPL-SCNC: 131 MMOL/L (ref 135–147)
SP GR UR STRIP.AUTO: 1.01 (ref 1–1.03)
UROBILINOGEN UR STRIP-ACNC: <2 MG/DL
WBC # BLD AUTO: 8.84 THOUSAND/UL (ref 4.31–10.16)

## 2023-04-23 RX ORDER — LEVALBUTEROL INHALATION SOLUTION 0.63 MG/3ML
0.63 SOLUTION RESPIRATORY (INHALATION) EVERY 8 HOURS PRN
Status: DISCONTINUED | OUTPATIENT
Start: 2023-04-23 | End: 2023-04-25 | Stop reason: HOSPADM

## 2023-04-23 RX ORDER — CEFTRIAXONE 1 G/50ML
1000 INJECTION, SOLUTION INTRAVENOUS EVERY 24 HOURS
Status: DISCONTINUED | OUTPATIENT
Start: 2023-04-24 | End: 2023-04-24

## 2023-04-23 RX ORDER — POLYETHYLENE GLYCOL 3350 17 G/17G
17 POWDER, FOR SOLUTION ORAL DAILY
Status: DISCONTINUED | OUTPATIENT
Start: 2023-04-23 | End: 2023-04-25 | Stop reason: HOSPADM

## 2023-04-23 RX ORDER — HYDROCHLOROTHIAZIDE 25 MG/1
25 TABLET ORAL DAILY
Status: DISCONTINUED | OUTPATIENT
Start: 2023-04-24 | End: 2023-04-25 | Stop reason: HOSPADM

## 2023-04-23 RX ORDER — ATORVASTATIN CALCIUM 20 MG/1
20 TABLET, FILM COATED ORAL DAILY
Status: DISCONTINUED | OUTPATIENT
Start: 2023-04-24 | End: 2023-04-25 | Stop reason: HOSPADM

## 2023-04-23 RX ORDER — LORAZEPAM 0.5 MG/1
0.5 TABLET ORAL
Status: DISCONTINUED | OUTPATIENT
Start: 2023-04-23 | End: 2023-04-25 | Stop reason: HOSPADM

## 2023-04-23 RX ORDER — PROCHLORPERAZINE MALEATE 5 MG/1
10 TABLET ORAL EVERY 6 HOURS PRN
Status: DISCONTINUED | OUTPATIENT
Start: 2023-04-23 | End: 2023-04-25 | Stop reason: HOSPADM

## 2023-04-23 RX ORDER — SERTRALINE HYDROCHLORIDE 100 MG/1
200 TABLET, FILM COATED ORAL DAILY
Status: DISCONTINUED | OUTPATIENT
Start: 2023-04-24 | End: 2023-04-25 | Stop reason: HOSPADM

## 2023-04-23 RX ORDER — PROCHLORPERAZINE MALEATE 5 MG/1
10 TABLET ORAL EVERY 6 HOURS PRN
Status: DISCONTINUED | OUTPATIENT
Start: 2023-04-23 | End: 2023-04-23

## 2023-04-23 RX ORDER — ONDANSETRON 2 MG/ML
4 INJECTION INTRAMUSCULAR; INTRAVENOUS EVERY 6 HOURS PRN
Status: DISCONTINUED | OUTPATIENT
Start: 2023-04-23 | End: 2023-04-24

## 2023-04-23 RX ORDER — NICOTINE 21 MG/24HR
1 PATCH, TRANSDERMAL 24 HOURS TRANSDERMAL DAILY
Status: DISCONTINUED | OUTPATIENT
Start: 2023-04-24 | End: 2023-04-25 | Stop reason: HOSPADM

## 2023-04-23 RX ORDER — GUAIFENESIN 600 MG/1
600 TABLET, EXTENDED RELEASE ORAL EVERY 12 HOURS SCHEDULED
Status: DISCONTINUED | OUTPATIENT
Start: 2023-04-23 | End: 2023-04-25 | Stop reason: HOSPADM

## 2023-04-23 RX ORDER — CEFEPIME HYDROCHLORIDE 2 G/50ML
2000 INJECTION, SOLUTION INTRAVENOUS ONCE
Status: COMPLETED | OUTPATIENT
Start: 2023-04-23 | End: 2023-04-23

## 2023-04-23 RX ORDER — SENNOSIDES 8.6 MG
1 TABLET ORAL DAILY
Status: DISCONTINUED | OUTPATIENT
Start: 2023-04-23 | End: 2023-04-25 | Stop reason: HOSPADM

## 2023-04-23 RX ORDER — LANOLIN ALCOHOL/MO/W.PET/CERES
6 CREAM (GRAM) TOPICAL
Status: DISCONTINUED | OUTPATIENT
Start: 2023-04-23 | End: 2023-04-25 | Stop reason: HOSPADM

## 2023-04-23 RX ORDER — ACETAMINOPHEN 325 MG/1
650 TABLET ORAL EVERY 6 HOURS PRN
Status: DISCONTINUED | OUTPATIENT
Start: 2023-04-23 | End: 2023-04-25 | Stop reason: HOSPADM

## 2023-04-23 RX ORDER — ENOXAPARIN SODIUM 100 MG/ML
40 INJECTION SUBCUTANEOUS DAILY
Status: DISCONTINUED | OUTPATIENT
Start: 2023-04-24 | End: 2023-04-24

## 2023-04-23 RX ORDER — BUPROPION HYDROCHLORIDE 300 MG/1
300 TABLET ORAL EVERY MORNING
Status: DISCONTINUED | OUTPATIENT
Start: 2023-04-24 | End: 2023-04-25 | Stop reason: HOSPADM

## 2023-04-23 RX ORDER — LOSARTAN POTASSIUM 50 MG/1
100 TABLET ORAL DAILY
Status: DISCONTINUED | OUTPATIENT
Start: 2023-04-24 | End: 2023-04-25 | Stop reason: HOSPADM

## 2023-04-23 RX ORDER — DOCUSATE SODIUM 100 MG/1
100 CAPSULE, LIQUID FILLED ORAL 2 TIMES DAILY
Status: DISCONTINUED | OUTPATIENT
Start: 2023-04-23 | End: 2023-04-25 | Stop reason: HOSPADM

## 2023-04-23 RX ADMIN — SENNOSIDES 8.6 MG: 8.6 TABLET, FILM COATED ORAL at 18:49

## 2023-04-23 RX ADMIN — POLYETHYLENE GLYCOL 3350 17 G: 17 POWDER, FOR SOLUTION ORAL at 18:48

## 2023-04-23 RX ADMIN — DOCUSATE SODIUM 100 MG: 100 CAPSULE, LIQUID FILLED ORAL at 18:49

## 2023-04-23 RX ADMIN — GUAIFENESIN 600 MG: 600 TABLET ORAL at 20:16

## 2023-04-23 RX ADMIN — VANCOMYCIN HYDROCHLORIDE 1250 MG: 5 INJECTION, POWDER, LYOPHILIZED, FOR SOLUTION INTRAVENOUS at 17:16

## 2023-04-23 RX ADMIN — CEFEPIME HYDROCHLORIDE 2000 MG: 2 INJECTION, SOLUTION INTRAVENOUS at 16:51

## 2023-04-23 RX ADMIN — IOHEXOL 85 ML: 350 INJECTION, SOLUTION INTRAVENOUS at 14:41

## 2023-04-23 RX ADMIN — SODIUM CHLORIDE 500 ML: 0.9 INJECTION, SOLUTION INTRAVENOUS at 16:51

## 2023-04-23 NOTE — ASSESSMENT & PLAN NOTE
· 1st chemo session 4/20, has been experiencing N/V mildly since however tolerating PO diet   · Also notes constipation   · Will treat supportively with antiemetics (), soft diet, bowel regimen

## 2023-04-23 NOTE — ASSESSMENT & PLAN NOTE
· Continue home verapamil 360 mg daily, losartan 100 mg daily, HCTZ 25 mg daily  · Blood pressure controlled

## 2023-04-23 NOTE — ASSESSMENT & PLAN NOTE
Presented for SOB/chest discomfort following initiation of chemotherapy on 4/20  Stable on RA on admission  · S/P thoracentesis x 3 (3/24, 4/12, 4/18) since March in setting of recurrent R pleural effusion likely due to lung cancer    · Last thoracentesis 4/18, 1500 cc removed  · CTA PE: Large R pleural effusion   · Will consult pulmonology & IR, consideration for repeat thora vs asept catheter given recurrence of pleural effusion in setting of malignancy  · Monitor O2 sats

## 2023-04-23 NOTE — ED PROVIDER NOTES
"History  Chief Complaint   Patient presents with   • Shortness of Breath     Pt states \"I have fluid around my lungs and I start getting short of breath  It got worst over night  I have an appointment for a thoracentesis on Friday, but there is no way it can wait\" Pt reports last thoracentesis was done on Tuesday and took out 1 5L  Pt is getting chemo for stage 4 lung ca  Denies cp  Patient is a 57year old female who presents with feeling like her right lung is filling up with fluid, mild shortness of breath with exertion, and feeling generally weak and tired with some chills s/p chemotherapy on   States that she has required prior thoracentesis and is scheduled on Friday, but does not feel that she will make it to Friday  Denies any chest pain, palpitations, fevers, vomiting, abdominal pain, dysuria, hematuria  Prior to Admission Medications   Prescriptions Last Dose Informant Patient Reported? Taking? EPINEPHrine (EPIPEN) 0 3 mg/0 3 mL SOAJ   No No   Sig: Inject 0 3 mL (0 3 mg total) into a muscle once for 1 dose   LORazepam (ATIVAN) 1 mg tablet   Yes No   Si/2 TABLET TWICE A DAY AS NEEDED FOR ANXIETY ORALLY 30 DAYS   albuterol (PROVENTIL HFA,VENTOLIN HFA) 90 mcg/act inhaler   Yes No   Sig: Inhale 2 puffs every 4 (four) hours   aspirin 81 MG tablet   Yes No   Sig: Take 81 mg by mouth daily   atorvastatin (LIPITOR) 20 mg tablet   Yes No   Sig: Take 20 mg by mouth daily  buPROPion (WELLBUTRIN XL) 150 mg 24 hr tablet   Yes No   Sig: Take 300 mg by mouth every morning   coenzyme Q-10 100 MG capsule   Yes No   Sig: Take 200 mg by mouth daily   dexamethasone (DECADRON) 4 mg tablet   No No   Sig: Take 1 tablet (4 mg total) by mouth 2 (two) times a day with meals Take 1 tablet by mouth the day before, the day of, and the day after chemotherapy     diphenhydrAMINE (BENADRYL) 50 MG tablet   No No   Sig: Take 1 tablet (50 mg total) by mouth every 6 (six) hours as needed for itching (Rash) " lidocaine-prilocaine (EMLA) cream   No No   Sig: Apply to port 30 to 60 minutes prior to use   losartan-hydrochlorothiazide (HYZAAR) 100-25 MG per tablet   Yes No   Sig: Take 1 tablet by mouth daily   ondansetron (ZOFRAN) 8 mg tablet   No No   Sig: Take 1 tablet (8 mg total) by mouth every 8 (eight) hours as needed for nausea or vomiting   prochlorperazine (COMPAZINE) 10 mg tablet   No No   Sig: Take 1 tablet (10 mg total) by mouth every 6 (six) hours as needed for nausea   sertraline (ZOLOFT) 100 mg tablet   Yes No   Sig: Take 200 mg by mouth daily    verapamil (VERELAN PM) 180 MG 24 hr capsule   Yes No   Sig: Take 360 mg by mouth daily       Facility-Administered Medications: None       Past Medical History:   Diagnosis Date   • Cancer ([facility] )    • COPD (ch[facility] obstructive [facility] disease[facility] ([facility] )    • Hyperlipidemia    • Hypertension    • Lung cancer ([facility] ) 06/26/2019    right lower lobe removed       Past Surgical History:   Procedure Laterality Date   • APPENDECTOMY     • BREAST BIOPSY Right     benign   • COLON SURGERY     • HYSTERECTOMY      age 48   • IR PORT PLACEMENT  4/18/2023   • IR THORACENTESIS  3/24/2023   • IR THORACENTESIS  4/12/2023   • IR THORACENTESIS  4/18/2023   • LUNG LOBECTOMY     • OOPHORECTOMY Bilateral     age 48       Family History   Problem Relation Age of Onset   • Colon cancer Mother    • Hypertension Mother    • Hyperlipidemia Mother    • Hearing loss Mother    • Depression Mother    • COPD Mother    • Cancer Mother    • Arthritis Mother    • Lung cancer Mother    • Drug abuse Brother    • Diabetes Maternal Grandmother    • Cancer Maternal Grandmother    • Colon cancer Maternal Grandmother    • Cancer Maternal Aunt    • Colon cancer Maternal Aunt      I have reviewed and agree with the history as documented      E-Cigarette/Vaping   • E-Cigarette Use Never User      E-Cigarette/Vaping Substances   • Nicotine No    • THC No    • CBD No    • Flavoring No    • Other No    • Unknown No Social History     Tobacco Use   • Smoking status: Every Day     Packs/day: 0 50     Years: 50 00     Pack years: 25 00     Types: Cigarettes   • Smokeless tobacco: Never   Vaping Use   • Vaping Use: Never used   Substance Use Topics   • Alcohol use: Not Currently     Comment: social   • Drug use: No       Review of Systems   Constitutional: Positive for chills and fatigue  Negative for fever  HENT: Negative for congestion  Respiratory: Positive for shortness of breath  Negative for chest tightness  Cardiovascular: Negative for chest pain, palpitations and leg swelling  Gastrointestinal: Negative for abdominal pain, diarrhea, nausea and vomiting  Musculoskeletal: Negative for back pain and neck pain  Neurological: Positive for weakness (generalized )  Negative for dizziness, light-headedness, numbness and headaches  Physical Exam  Physical Exam  Vitals and nursing note reviewed  Constitutional:       General: She is not in acute distress  Appearance: Normal appearance  She is well-developed  She is not ill-appearing, toxic-appearing or diaphoretic  HENT:      Head: Normocephalic and atraumatic  Mouth/Throat:      Mouth: Mucous membranes are moist    Eyes:      Conjunctiva/sclera: Conjunctivae normal       Pupils: Pupils are equal, round, and reactive to light  Cardiovascular:      Rate and Rhythm: Normal rate and regular rhythm  Pulses: Normal pulses  Heart sounds: Normal heart sounds  No murmur heard  Pulmonary:      Effort: Pulmonary effort is normal  No tachypnea, accessory muscle usage or respiratory distress  Breath sounds: No stridor  Examination of the right-lower field reveals decreased breath sounds  Decreased breath sounds present  No wheezing, rhonchi or rales  Chest:      Chest wall: No tenderness  Abdominal:      General: Bowel sounds are normal  There is no distension  Palpations: Abdomen is soft  Tenderness:  There is no abdominal tenderness  There is no guarding or rebound  Musculoskeletal:      Cervical back: Neck supple  Right lower leg: No edema  Left lower leg: No edema  Skin:     General: Skin is warm and dry  Neurological:      General: No focal deficit present  Mental Status: She is alert and oriented to person, place, and time  Mental status is at baseline     Psychiatric:         Mood and Affect: Mood normal          Behavior: Behavior normal          Vital Signs  ED Triage Vitals [04/23/23 1157]   Temperature Pulse Respirations Blood Pressure SpO2   98 2 °F (36 8 °C) 69 20 148/83 97 %      Temp Source Heart Rate Source Patient Position - Orthostatic VS BP Location FiO2 (%)   Oral Monitor Lying Right arm --      Pain Score       No Pain           Vitals:    04/23/23 1400 04/23/23 1430 04/23/23 1500 04/23/23 1530   BP: 105/56 114/56 122/60 119/58   Pulse: 80 81 83 82   Patient Position - Orthostatic VS: Sitting Sitting Sitting Sitting         Visual Acuity      ED Medications  Medications   cefepime (MAXIPIME) IVPB (premix in dextrose) 2,000 mg 50 mL (has no administration in time range)   vancomycin (VANCOCIN) 1250 mg in sodium chloride 0 9% 250 mL IVPB (has no administration in time range)   sodium chloride 0 9 % bolus 500 mL (has no administration in time range)   iohexol (OMNIPAQUE) 350 MG/ML injection (MULTI-DOSE) 85 mL (85 mL Intravenous Given 4/23/23 1441)       Diagnostic Studies  Results Reviewed     Procedure Component Value Units Date/Time    UA w Reflex to Microscopic w Reflex to Culture [[id number]]     Lab Status: No result Specimen: Urine     Procalcitonin [[id number]]  (Normal) Collected: 04/23/23 1226    Lab Status: Final result Specimen: Blood from Arm, Left Updated: 04/23/23 1332     Procalcitonin 0 06 ng/ml     Comprehensive metabolic panel [[id number]]  (Abnormal) Collected: 04/23/23 1226    Lab Status: Final result Specimen: Blood from Arm, Left Updated: 04/23/23 1317     Sodium 131 mmol/L Potassium 3 6 mmol/L      Chloride 95 mmol/L      CO2 26 mmol/L      ANION GAP 10 mmol/L      BUN 14 mg/dL      Creatinine 0 58 mg/dL      Glucose 114 mg/dL      Calcium 9 4 mg/dL      AST 45 U/L      ALT 56 U/L      Alkaline Phosphatase 102 U/L      Total Protein 6 9 g/dL      Albumin 4 0 g/dL      Total Bilirubin 0 48 mg/dL      eGFR 97 ml/min/1 73sq m     Narrative:      [place] guidelines for Chronic Kidney Disease (CKD):   •  Stage 1 with normal or high GFR (GFR > 90 mL/min/1 73 square meters)  •  Stage 2 Mild CKD (GFR = 60-89 mL/min/1 73 square meters)  •  Stage 3A Moderate CKD (GFR = 45-59 mL/min/1 73 square meters)  •  Stage 3B Moderate CKD (GFR = 30-44 mL/min/1 73 square meters)  •  Stage 4 Severe CKD (GFR = 15-29 mL/min/1 73 square meters)  •  Stage 5 End Stage CKD (GFR <15 mL/min/1 73 square meters)  Note: GFR calculation is accurate only with a steady state creatinine    Lactic acid, plasma (w/reflex if result > 2 0) [[id number]]  (Normal) Collected: 04/23/23 1243    Lab Status: Final result Specimen: Blood from Arm, Left Updated: 04/23/23 1316     LACTIC ACID 1 5 mmol/L     Narrative:      Result may be elevated if tourniquet was used during collection  D-Dimer [[id number]]  (Abnormal) Collected: 04/23/23 1226    Lab Status: Final result Specimen: Blood from Arm, Left Updated: 04/23/23 1313     D-Dimer, Quant 0 88 ug/ml FEU     Narrative: In the evaluation for possible pulmonary embolism, in the appropriate (Well's Score of 4 or less) patient, the age adjusted d-dimer cutoff for this patient can be calculated as:    Age x 0 01 (in ug/mL) for Age-adjusted D-dimer exclusion threshold for a patient over 50 years      HS Troponin 0hr (reflex protocol) [[id number]]  (Normal) Collected: 04/23/23 1226    Lab Status: Final result Specimen: Blood from Arm, Left Updated: 04/23/23 1306     hs TnI 0hr 2 ng/L     Protime-INR [[id number]]  (Normal) Collected: 04/23/23 6582    Lab Status: Final result Specimen: Blood from Arm, Left Updated: 04/23/23 1258     Protime 12 5 seconds      INR 0 88    APTT [[id number]]  (Normal) Collected: 04/23/23 1226    Lab Status: Final result Specimen: Blood from Arm, Left Updated: 04/23/23 1258     PTT 24 seconds     Blood culture #1 [[id number]] Collected: 04/23/23 1251    Lab Status: In process Specimen: Blood from Arm, Right Updated: 04/23/23 1254    Blood culture #2 [[id number]] Collected: 04/23/23 1243    Lab Status: In process Specimen: Blood from Arm, Left Updated: 04/23/23 1249    CBC and differential [[id number]]  (Abnormal) Collected: 04/23/23 1226    Lab Status: Final result Specimen: Blood from Arm, Left Updated: 04/23/23 1237     WBC 8 84 Thousand/uL      RBC 4 43 Million/uL      Hemoglobin 13 8 g/dL      Hematocrit 41 4 %      MCV 94 fL      MCH 31 2 pg      MCHC 33 3 g/dL      RDW 12 2 %      MPV 9 3 fL      Platelets 180 Thousands/uL      nRBC 0 /100 WBCs      Neutrophils Relative 85 %      Immat GRANS % 1 %      Lymphocytes Relative 11 %      Monocytes Relative 3 %      Eosinophils Relative 0 %      Basophils Relative 0 %      Neutrophils Absolute 7 49 Thousands/µL      Immature Grans Absolute 0 04 Thousand/uL      Lymphocytes Absolute 1 01 Thousands/µL      Monocytes Absolute 0 26 Thousand/µL      Eosinophils Absolute 0 03 Thousand/µL      Basophils Absolute 0 01 Thousands/µL                  CTA ED chest PE Study   Final Result by Manisha Mckeon MD (04/23 1626)      1  Again seen ill-defined right upper lobe mass extending to the hilum in keeping with known malignancy grossly similar to prior PET/CT 3/21/2023  Surrounding patchy opacities may be due to lymphangitic spread of tumor, atelectasis, as well as    pneumonia in the appropriate clinical context  This is similar to slightly increased from prior PET/CT  Additional bilateral lung opacities, continued attention on follow up  2   Again noted right hilar lymphadenopathy        3   Large right pleural effusion similar to prior PET/CT 3/31/2023  4   No evidence of pulmonary embolism  The study was marked in [facility] for immediate notification  Workstation performed: [id number]         XR chest 1 view portable   Final Result by Sangita Chairez MD (04/23 1433)      Right pleural effusion  Workstation performed: [id number]                    Procedures  ECG 12 Lead Documentation Only    Date/Time: 4/23/2023 4:10 PM  Performed by: Megan Elliott DO  Authorized by: Megan Elliott DO     Indications / Diagnosis:  SOB  ECG reviewed by me, the ED Provider: yes    Patient location:  ED  Previous ECG:     Previous ECG:  Compared to current    Comparison ECG info:  6/15/2020    Similarity:  No change  Interpretation:     Interpretation: non-specific    Rate:     ECG rate:  86    ECG rate assessment: normal    Rhythm:     Rhythm: sinus rhythm    Ectopy:     Ectopy: none    QRS:     QRS axis:  Normal    QRS intervals:  Normal  Conduction:     Conduction: normal    ST segments:     ST segments:  Non-specific  T waves:     T waves: non-specific    Comments:                     ED Course  ED Course as of 04/23/23 1655   Sun Apr 23, 2023   1313 D-Dimer, Quant(!): 0 88   1613 Called reading room- radiologist reviewing the CTA PE study     5 Tigertexted Dr Kunal Engle, hospitalist for admission             HEART Risk Score    Flowsheet Row Most Recent Value   Heart Score Risk Calculator    History 0 Filed at: 04/23/2023 1654   ECG 1 Filed at: 04/23/2023 1654   Age 1 Filed at: 04/23/2023 1654   Risk Factors 1 Filed at: 04/23/2023 1654   Troponin 0 Filed at: 04/23/2023 1654   HEART Score 3 Filed at: 04/23/2023 1654                        SBIRT 22yo+    Flowsheet Row Most Recent Value   Initial Alcohol Screen: US AUDIT-C     1  How often do you have a drink containing alcohol? 0 Filed at: 04/23/2023 1210   2   How many drinks containing alcohol do you have on a typical day you are drinking? 0 Filed at: 04/23/2023 1210   3a  Male UNDER 65: How often do you have five or more drinks on one occasion? 0 Filed at: 04/23/2023 1210   3b  FEMALE Any Age, or MALE 65+: How often do you have 4 or more drinks on one occassion? 0 Filed at: 04/23/2023 1210   Audit-C Score 0 Filed at: 04/23/2023 1210   [place]: How many times in the past year have you    Used an illegal drug or used a prescription medication for non-medical reasons? Never Filed at: 04/23/2023 1210          Wells' Criteria for PE    Flowsheet Row Most Recent Value   Wells' Criteria for PE    Clinical signs and symptoms of DVT 0 Filed at: 04/23/2023 1654   PE is primary diagnosis or equally likely 3 Filed at: 04/23/2023 1654   HR >100 0 Filed at: 04/23/2023 1654   Immobilization at least 3 days or Surgery in the previous 4 weeks 0 Filed at: 04/23/2023 1654   Previous, objectively diagnosed PE or DVT 0 Filed at: 04/23/2023 1654   Hemoptysis 0 Filed at: 04/23/2023 1654   Malignancy with treatment within 6 months or palliative 1 Filed at: 04/23/2023 1654   Wells' Criteria Total 4 Filed at: 04/23/2023 1654                Medical Decision Making  Assessment and Plan:   Differential includes recurrent right sided malignant pleural effusion v  PE v  ACS v  CHF v  Generalized weakness or dehydration or electrolyte abnormalities or bacteremia s/p recent chemotherapy  Will check labs to assess for electrolyte abnormalities, kidney and liver function, anemia, leukocytosis; chest x-ray to evaluate for pneumonia/right-sided pleural effusion; urinalysis to rule out urinary tract infection; D-dimer to rule out pulmonary embolism; EKG/troponin to evaluate for arrhythmia/ischemia, blood cultures for bacteremia  Review of medical records from discharge summary from 11/14/2022 shows that the patient has a past medical history significant for hypertension, hyperlipidemia, chronic obstructive pulmonary disease, tobacco dependence     Patient also has a past medical history significant for NSCLC that is post right lower lobe RATS, requiring frequent right sided thoracentesis for recurrent right sided malignant effusion    Amount and/or Complexity of Data Reviewed  Labs: ordered  Decision-making details documented in ED Course  Radiology: ordered  Risk  Prescription drug management  Disposition  Final diagnoses:   Generalized weakness   Immunocompromised state ([facility] )   Shortness of breath   Hyponatremia   Pleural effusion   Elevated LFTs     Time reflects when diagnosis was documented in both MDM as applicable and the Disposition within this note     Time User Action Codes Description Comment    4/23/2023  4:36 PM Clearnce Antu Add [R53 1] Generalized weakness     4/23/2023  4:36 PM Jes Joel, [address] [D84 9] Immunocompromised state ([facility] )     4/23/2023  4:36 PM Clearnce Antu Add [R06 02] Shortness of breath     4/23/2023  4:36 PM Clearnce Antu Add [E87 1] Hyponatremia     4/23/2023  4:37 PM Clearnce Antu Add [J90] Pleural effusion     4/23/2023  4:37 PM Clearnce Antu Add [R79 89] Elevated LFTs       ED Disposition     ED Disposition   Admit    Condition   Stable    Date/Time   Sun Apr 23, 2023  4:36 PM    Comment   Case was discussed with hospitalist and the patient's admission status was agreed to be Admission Status: inpatient status to the service of Dr Ester Aguilar   Follow-up Information    None         Patient's Medications   Discharge Prescriptions    No medications on file       No discharge procedures on file      PDMP Review     None          ED Provider  Electronically Signed by           Hector Shane DO  04/24/23 [id number]

## 2023-04-23 NOTE — PLAN OF CARE
Problem: Potential for Falls  Goal: Patient will remain free of falls  Description: INTERVENTIONS:  - Educate patient/family on patient safety including physical limitations  - Instruct patient to call for assistance with activity   - Consult OT/PT to assist with strengthening/mobility   - Keep Call bell within reach  - Keep bed low and locked with side rails adjusted as appropriate  - Keep care items and personal belongings within reach  - Initiate and maintain comfort rounds  - Make Fall Risk Sign visible to staff  - Offer Toileting every 3 Hours, in advance of need  - Initiate/Maintain alarm  - Obtain necessary fall risk management equipment  - Apply yellow socks and bracelet for high fall risk patients  - Consider moving patient to room near nurses station  Outcome: Progressing     Problem: PAIN - ADULT  Goal: Verbalizes/displays adequate comfort level or baseline comfort level  Description: Interventions:  - Encourage patient to monitor pain and request assistance  - Assess pain using appropriate pain scale  - Administer analgesics based on type and severity of pain and evaluate response  - Implement non-pharmacological measures as appropriate and evaluate response  - Consider cultural and social influences on pain and pain management  - Notify physician/advanced practitioner if interventions unsuccessful or patient reports new pain  Outcome: Progressing     Problem: INFECTION - ADULT  Goal: Absence or prevention of progression during hospitalization  Description: INTERVENTIONS:  - Assess and monitor for signs and symptoms of infection  - Monitor lab/diagnostic results  - Monitor all insertion sites, i e  indwelling lines, tubes, and drains  - Monitor endotracheal if appropriate and nasal secretions for changes in amount and color  - [place] appropriate cooling/warming therapies per order  - Administer medications as ordered  - Instruct and encourage patient and family to use good hand hygiene technique  - Identify and instruct in appropriate isolation precautions for identified infection/condition  Outcome: Progressing  Goal: Absence of fever/infection during neutropenic period  Description: INTERVENTIONS:  - Monitor WBC    Outcome: Progressing     Problem: SAFETY ADULT  Goal: Patient will remain free of falls  Description: INTERVENTIONS:  - Educate patient/family on patient safety including physical limitations  - Instruct patient to call for assistance with activity   - Consult OT/PT to assist with strengthening/mobility   - Keep Call bell within reach  - Keep bed low and locked with side rails adjusted as appropriate  - Keep care items and personal belongings within reach  - Initiate and maintain comfort rounds  - Make Fall Risk Sign visible to staff  - Offer Toileting every 3 Hours, in advance of need  - Initiate/Maintain alarm  - Obtain necessary fall risk management equipment  - Apply yellow socks and bracelet for high fall risk patients  - Consider moving patient to room near nurses station  Outcome: Progressing  Goal: Maintain or return to baseline ADL function  Description: INTERVENTIONS:  -  Assess patient's ability to carry out ADLs; assess patient's baseline for ADL function and identify physical deficits which impact ability to perform ADLs (bathing, care of mouth/teeth, toileting, grooming, dressing, etc )  - Assess/evaluate cause of self-care deficits   - Assess range of motion  - Assess patient's mobility; develop plan if impaired  - Assess patient's need for assistive devices and provide as appropriate  - Encourage maximum independence but intervene and supervise when necessary  - Involve family in performance of ADLs  - Assess for home care needs following discharge   - Consider OT consult to assist with ADL evaluation and planning for discharge  - Provide patient education as appropriate  Outcome: Progressing  Goal: Maintains/Returns to pre admission functional level  Description: INTERVENTIONS:  - Perform BMAT or MOVE assessment daily    - Set and communicate daily mobility goal to care team and patient/family/caregiver  - Collaborate with rehabilitation services on mobility goals if consulted  - Perform Range of Motion 3 times a day  - Reposition patient every 3 hours  - Dangle patient 3 times a day  - Stand patient 3 times a day  - Ambulate patient 3 times a day  - Out of bed to chair 3 times a day   - Out of bed for meals 3 times a day  - Out of bed for toileting  - Record patient progress and toleration of activity level   Outcome: Progressing     Problem: DISCHARGE PLANNING  Goal: Discharge to home or other facility with appropriate resources  Description: INTERVENTIONS:  - Identify barriers to discharge w/patient and caregiver  - Arrange for needed discharge resources and transportation as appropriate  - Identify discharge learning needs (meds, wound care, etc )  - Arrange for interpretive services to assist at discharge as needed  - Refer to Case Management Department for coordinating discharge planning if the patient needs post-hospital services based on physician/advanced practitioner order or complex needs related to functional status, cognitive ability, or social support system  Outcome: Progressing     Problem: Knowledge Deficit  Goal: Patient/family/caregiver demonstrates understanding of disease process, treatment plan, medications, and discharge instructions  Description: Complete learning assessment and assess knowledge base  Interventions:  - Provide teaching at level of understanding  - Provide teaching via preferred learning methods  Outcome: Progressing     Problem: Nutrition/Hydration-ADULT  Goal: Nutrient/Hydration intake appropriate for improving, restoring or maintaining nutritional needs  Description: Monitor and assess patient's nutrition/hydration status for malnutrition  Collaborate with interdisciplinary team and initiate plan and interventions as ordered    Monitor patient's weight and dietary intake as ordered or per policy  Utilize nutrition screening tool and intervene as necessary  Determine patient's food preferences and provide high-protein, high-caloric foods as appropriate       INTERVENTIONS:  - Monitor oral intake, urinary output, labs, and treatment plans  - Assess nutrition and hydration status and recommend course of action  - Evaluate amount of meals eaten  - Assist patient with eating if necessary   - Allow adequate time for meals  - Recommend/ encourage appropriate diets, oral nutritional supplements, and vitamin/mineral supplements  - Order, calculate, and assess calorie counts as needed  - Recommend, monitor, and adjust tube feedings and TPN/PPN based on assessed needs  - Assess need for intravenous fluids  - Provide specific nutrition/hydration education as appropriate  - Include patient/family/caregiver in decisions related to nutrition  Outcome: Progressing

## 2023-04-23 NOTE — ASSESSMENT & PLAN NOTE
"· CTA PE without evidence of PE, shows R pleural effusion + stable known lung mass  Also noted: \" Surrounding patchy opacities may be due to lymphangitic spread of tumor, atelectasis, as well as pneumonia in the appropriate clinical context  This is similar to sightly increased from prior PET/CT  \"  · Patient does not meet any SIRS criteria however is immunocompromised   · Clinically, no changes to chronic cough, chest pain, or other concern for PNA  · Procal neg x 1  · Covid neg, trop WNL  · ED started cefepime/vanc --> can continue on ceftriaxone for now given immunocompromise however do not suspect PNA, consider DC tomorrow if procal neg x 2  · Follow up PNA work up & [facility] ordered by ED    "

## 2023-04-23 NOTE — LETTER
Thank you for allowing us to participate in the care of your patient, Toribio Ochoa, who was hospitalized from 4/23 through 4/25/2023 with the admitting diagnosis of recurrent right pleural effusion in the setting of lung cancer  The patient was evaluated by pulmonology who recommended a separate catheter placement  Patient had a subcatheter placed by IR on 4/25, 1500 cc removed  Patient will be discharged with VNA services to help manage drainage every other day  If you have any additional questions or would like to discuss further, please feel free to contact me      [facility], JON[facility] [address] Internal Medicine, Hospitalist  [phone]

## 2023-04-23 NOTE — H&P
"[place]  H&P  Name: Yimi Luo 57 y o  female I MRN: [id number]  Unit/Bed#: -01 I Date of Admission: 4/23/2023   Date of Service: 4/23/2023 I Hospital Day: 0      Assessment/Plan   * Pleural effusion  Assessment & Plan  Presented for SOB/chest discomfort following initiation of chemotherapy on 4/20  Stable on RA on admission  · S/P thoracentesis x 3 (3/24, 4/12, 4/18) since March in setting of recurrent R pleural effusion likely due to lung cancer  · Last thoracentesis 4/18, 1500 cc removed  · CTA PE: Large R pleural effusion   · Will consult pulmonology & IR, consideration for repeat thora vs asept catheter given recurrence of pleural effusion in setting of malignancy  · Monitor O2 sats    Non-small cell cancer of right lung [facility])  Assessment & Plan  S/P prior right lower lobe resection due to lung cancer years ago, recently diagnosed with recurrent lung cancer, NSCLC  · Patient follows with Dr Radha Fofana, recurrence was diagnosed at the end of March 2023  · First round of chemotherapy initiated on 4/20/23  · CTA PE study: Again seen ill-defined RUL mass extending to the hilum in keeping with known malignancy grossly similar to prior PET/CT 3/21/2023  Surrounding patchy opacities may be due to lymphangitic spread of tumor, atelectasis, as well as pneumonia in the appropriate clinical context  This is similar to slightly increased from prior PET/CT  Additional bilateral lung opacities, continued attention on follow up  Again noted right hilar lymphadenopathy  SOB (shortness of breath)  Assessment & Plan  · CTA PE without evidence of PE, shows R pleural effusion + stable known lung mass  Also noted: \" Surrounding patchy opacities may be due to lymphangitic spread of tumor, atelectasis, as well as pneumonia in the appropriate clinical context  This is similar to sightly increased from prior PET/CT  \"  · Patient does not meet any SIRS criteria however is " immunocompromised   · Clinically, no changes to chronic cough, chest pain, or other concern for PNA  · Procal neg x 1  · Covid neg, trop WNL  · ED started cefepime/vanc --> can continue on ceftriaxone for now given immunocompromise however do not suspect PNA, consider DC tomorrow if procal neg x 2  · Follow up PNA work up & [facility] ordered by ED      CINV (chemotherapy-induced nausea and vomiting)  Assessment & Plan  · 1st chemo session 4/20, has been experiencing N/V mildly since however tolerating PO diet   · Also notes constipation   · Will treat supportively with antiemetics, soft diet, bowel regimen    Essential hypertension  Assessment & Plan  · Continue home verapamil 360 mg daily, losartan 100 mg daily, HCTZ 25 mg daily  · Blood pressure controlled    COPD (chronic obstructive pulmonary disease) ([facility])  Assessment & Plan  · No acute exacerbation  · Nebs as needed           VTE Pharmacologic Prophylaxis: VTE Score: 4 Moderate Risk (Score 3-4) - Pharmacological DVT Prophylaxis Ordered: enoxaparin (Lovenox)  Code Status: Level 1 - Full Code per patient   Discussion with family: Updated  () at bedside  Anticipated Length of Stay: Patient will be admitted on an inpatient basis with an anticipated length of stay of greater than 2 midnights secondary to IV meds  Total Time Spent on Date of Encounter in care of patient: 65 minutes This time was spent on one or more of the following: performing physical exam; counseling and coordination of care; obtaining or reviewing history; documenting in the medical record; reviewing/ordering tests, medications or procedures; communicating with other healthcare professionals and discussing with patient's family/caregivers      Chief Complaint: SOB, fatigue, N/V    History of Present Illness:  Kurt Weaver is a 57 y o  female with a PMH of HTN, H LD, tobacco use, non-small cell lung cancer currently undergoing chemotherapy who presents with complaint of "shortness of breath, fatigue, nausea/vomiting  Patient reports she has history of right-sided lung cancer years ago at which time she received right lower lobe resection however no chemotherapy  She was recently diagnosed with recurrent right-sided non-small cell lung cancer, follows with Dr Miesha Gupta as outpatient  Notably patient has had 3x thoracentesis (3/24, 4/12, last on 4/18 1 5 L removed) and had upcoming thoracentesis scheduled this coming Friday for recurrent pleural effusions however came to the ED today because she \" cannot make it that long\" due to discomfort in chest   Notably, patient began chemotherapy on 4/20, since which she has been feeling fatigued, with intermittent nausea and vomiting with one episode of small emesis this morning however she has been eating well  She denies any fevers or chills, chest pain, palpitations, changes in her chronic cough or new sputum production, localized abdominal pain, diarrhea (in fact reports constipation), dysuria, or other complaints  Patient is stable on room air, no increased work of breathing  Review of Systems:  Review of Systems   Constitutional: Positive for activity change, appetite change and fatigue  Negative for chills and fever  HENT: Negative for congestion, rhinorrhea and sore throat  Eyes: Negative for visual disturbance  Respiratory: Positive for chest tightness and shortness of breath  Negative for cough and wheezing  Cardiovascular: Negative for chest pain, palpitations and leg swelling  Gastrointestinal: Positive for constipation, nausea and vomiting  Negative for abdominal pain and diarrhea  Genitourinary: Negative for difficulty urinating, dysuria, frequency and urgency  Musculoskeletal: Negative for arthralgias, back pain and myalgias  Skin: Negative for rash and wound  Neurological: Negative for dizziness, light-headedness and headaches  All other systems reviewed and are negative        Past Medical and " Surgical History:   Past Medical History:   Diagnosis Date   • Cancer [facility])    • COPD (chronic obstructive pulmonary disease) ([facility] [address] )    • Hyperlipidemia    • Hypertension    • Lung cancer ([facility] [address] ) 06/26/2019    right lower lobe removed       Past Surgical History:   Procedure Laterality Date   • APPENDECTOMY     • BREAST BIOPSY Right     benign   • COLON SURGERY     • HYSTERECTOMY      age 48   • IR PORT PLACEMENT  4/18/2023   • IR THORACENTESIS  3/24/2023   • IR THORACENTESIS  4/12/2023   • IR THORACENTESIS  4/18/2023   • LUNG LOBECTOMY     • OOPHORECTOMY Bilateral     age 48       Meds/Allergies:  Prior to Admission medications    Medication Sig Start Date End Date Taking? Authorizing Provider   albuterol (PROVENTIL HFA,VENTOLIN HFA) 90 mcg/act inhaler Inhale 2 puffs every 4 (four) hours 8/6/19   Historical Provider, MD   aspirin 81 MG tablet Take 81 mg by mouth daily    Historical Provider, MD   atorvastatin (LIPITOR) 20 mg tablet Take 20 mg by mouth daily  Historical Provider, MD   buPROPion (WELLBUTRIN XL) 150 mg 24 hr tablet Take 300 mg by mouth every morning 4/20/19   Historical Provider, MD   coenzyme Q-10 100 MG capsule Take 200 mg by mouth daily    Historical Provider, MD   dexamethasone (DECADRON) 4 mg tablet Take 1 tablet (4 mg total) by mouth 2 (two) times a day with meals Take 1 tablet by mouth the day before, the day of, and the day after chemotherapy   4/7/23   Natanael Salazar, DO   diphenhydrAMINE (BENADRYL) 50 MG tablet Take 1 tablet (50 mg total) by mouth every 6 (six) hours as needed for itching (Rash) 6/16/20   Ivone May, DO   EPINEPHrine (EPIPEN) 0 3 mg/0 3 mL SOAJ Inject 0 3 mL (0 3 mg total) into a muscle once for 1 dose 10/11/21 11/13/22  Manuel Lindsay, DO   lidocaine-prilocaine (EMLA) cream Apply to port 30 to 60 minutes prior to use 4/7/23   Natanael Salazar, DO   LORazepam (ATIVAN) 1 mg tablet 1/2 TABLET TWICE A DAY AS NEEDED FOR ANXIETY ORALLY 30 DAYS 8/28/19   Historical Provider, MD   losartan-hydrochlorothiazide (HYZAAR) 100-25 MG per tablet Take 1 tablet by mouth daily    Historical Provider, MD   ondansetron (ZOFRAN) 8 mg tablet Take 1 tablet (8 mg total) by mouth every 8 (eight) hours as needed for nausea or vomiting 4/7/23   Barber Walton DO   prochlorperazine (COMPAZINE) 10 mg tablet Take 1 tablet (10 mg total) by mouth every 6 (six) hours as needed for nausea 4/7/23   Barber Walton DO   sertraline (ZOLOFT) 100 mg tablet Take 200 mg by mouth daily     Historical Provider, MD   verapamil (VERELAN PM) 180 MG 24 hr capsule Take 360 mg by mouth daily     Historical Provider, MD     I have reviewed home medications with patient personally  Allergies:    Allergies   Allergen Reactions   • Amoxicillin Hives   • Vilazodone Hcl Dizziness and Nausea Only   • Wasp Venom Swelling   • Zithromax [Azithromycin] Hives       Social History:  Marital Status: /Civil Union   Occupation: not assessed   Patient Pre-hospital Living Situation: Home, With spouse  Patient Pre-hospital Level of Mobility: walks  Patient Pre-hospital Diet Restrictions: none   Substance Use History:   Social History     Substance and Sexual Activity   Alcohol Use Not Currently    Comment: social     Social History     Tobacco Use   Smoking Status Every Day   • Packs/day: 0 50   • Years: 50 00   • Pack years: 25 00   • Types: Cigarettes   Smokeless Tobacco Never     Social History     Substance and Sexual Activity   Drug Use No       Family History:  Family History   Problem Relation Age of Onset   • Colon cancer Mother    • Hypertension Mother    • Hyperlipidemia Mother    • Hearing loss Mother    • Depression Mother    • COPD Mother    • Cancer Mother    • Arthritis Mother    • Lung cancer Mother    • Drug abuse Brother    • Diabetes Maternal Grandmother    • Cancer Maternal Grandmother    • Colon cancer Maternal Grandmother    • Cancer Maternal Aunt    • Colon cancer Maternal Aunt        Physical Exam: "    Vitals:   Blood Pressure: 137/63 (04/23/23 1700)  Pulse: 85 (04/23/23 1700)  Temperature: 98 2 °F (36 8 °C) (04/23/23 1157)  Temp Source: Oral (04/23/23 1157)  Respirations: 20 (04/23/23 1700)  Height: 5' 4 5\" (163 8 cm) (04/23/23 1157)  Weight - Scale: 81 6 kg (180 lb) (04/23/23 1157)  SpO2: 96 % (04/23/23 1700)    Physical Exam  Vitals and nursing note reviewed  Constitutional:       General: She is not in acute distress  Appearance: She is well-developed  She is not ill-appearing  HENT:      Head: Normocephalic and atraumatic  Eyes:      General:         Right eye: No discharge  Left eye: No discharge  Extraocular Movements: Extraocular movements intact  Conjunctiva/sclera: Conjunctivae normal    Cardiovascular:      Rate and Rhythm: Normal rate and regular rhythm  Heart sounds: No murmur heard  Pulmonary:      Effort: Pulmonary effort is normal  No respiratory distress  Breath sounds: No wheezing, rhonchi or rales  Comments: Stable on room air, BS mildly decreased on right side  Abdominal:      General: Bowel sounds are normal  There is no distension  Palpations: Abdomen is soft  Tenderness: There is no abdominal tenderness  Musculoskeletal:      Cervical back: Neck supple  Right lower leg: No edema  Left lower leg: No edema  Skin:     General: Skin is warm and dry  Capillary Refill: Capillary refill takes less than 2 seconds  Neurological:      General: No focal deficit present  Mental Status: She is alert and oriented to person, place, and time  Mental status is at baseline  Cranial Nerves: No cranial nerve deficit     Psychiatric:         Mood and Affect: Mood normal          Behavior: Behavior normal           Additional Data:     Lab Results:  Results from last 7 days   Lab Units 04/23/23  1226   WBC Thousand/uL 8 84   HEMOGLOBIN g/dL 13 8   HEMATOCRIT % 41 4   PLATELETS Thousands/uL 436*   NEUTROS PCT % 85*   LYMPHS PCT " % 11*   MONOS PCT % 3*   EOS PCT % 0     Results from last 7 days   Lab Units 04/23/23  1226   SODIUM mmol/L 131*   POTASSIUM mmol/L 3 6   CHLORIDE mmol/L 95*   CO2 mmol/L 26   BUN mg/dL 14   CREATININE mg/dL 0 58*   ANION GAP mmol/L 10   CALCIUM mg/dL 9 4   ALBUMIN g/dL 4 0   TOTAL BILIRUBIN mg/dL 0 48   ALK PHOS U/L 102   ALT U/L 56*   AST U/L 45*   GLUCOSE RANDOM mg/dL 114     Results from last 7 days   Lab Units 04/23/23  1226   INR  0 88             Results from last 7 days   Lab Units 04/23/23  1243 04/23/23  1226   LACTIC ACID mmol/L 1 5  --    PROCALCITONIN ng/ml  --  0 06       Lines/Drains:  Invasive Devices     Central Venous Catheter Line  Duration           Port A Cath 04/18/23 Right Subclavian 5 days          Peripheral Intravenous Line  Duration           Peripheral IV 04/23/23 Left Antecubital <1 day                Central Line:  Goal for removal: chronic port           Imaging: Reviewed radiology reports from this admission including: chest xray and chest CT scan  CTA ED chest PE Study   Final Result by Adiel Hinojosa MD (04/23 5836)      1  Again seen ill-defined right upper lobe mass extending to the hilum in keeping with known malignancy grossly similar to prior PET/CT 3/21/2023  Surrounding patchy opacities may be due to lymphangitic spread of tumor, atelectasis, as well as    pneumonia in the appropriate clinical context  This is similar to slightly increased from prior PET/CT  Additional bilateral lung opacities, continued attention on follow up  2   Again noted right hilar lymphadenopathy  3   Large right pleural effusion similar to prior PET/CT 3/31/2023  4   No evidence of pulmonary embolism  The study was marked in [facility] for immediate notification  Workstation performed: [id number]         XR chest 1 view portable   Final Result by Kervin Plascencia MD (04/23 5982)      Right pleural effusion                    Workstation performed: [id number]         IR IN-Patient Thoracentesis    (Results Pending)       EKG and Other Studies Reviewed on Admission:   · EKG: NSR  HR 86     ** Please Note: This note has been constructed using a voice recognition system   **

## 2023-04-23 NOTE — TELEPHONE ENCOUNTER
Patient stated chemo on Thursday she tends to get fluid around her lung she feels like there is alot there, very uncomfortable and a little shortness of breath from it

## 2023-04-23 NOTE — ASSESSMENT & PLAN NOTE
S/P prior right lower lobe resection due to lung cancer years ago, recently diagnosed with recurrent lung cancer, NSCLC  · Patient follows with Dr Cliff Dennison, recurrence was diagnosed at the end of March 2023  · First round of chemotherapy initiated on 4/20/23  · CTA PE study: Again seen ill-defined RUL mass extending to the hilum in keeping with known malignancy grossly similar to prior PET/CT 3/21/2023  Surrounding patchy opacities may be due to lymphangitic spread of tumor, atelectasis, as well as pneumonia in the appropriate clinical context  This is similar to slightly increased from prior PET/CT  Additional bilateral lung opacities, continued attention on follow up  Again noted right hilar lymphadenopathy

## 2023-04-24 LAB
ALBUMIN SERPL BCP-MCNC: 3.4 G/DL (ref 3.5–5)
ALP SERPL-CCNC: 84 U/L (ref 34–104)
ALT SERPL W P-5'-P-CCNC: 39 U/L (ref 7–52)
ANION GAP SERPL CALCULATED.3IONS-SCNC: 7 MMOL/L (ref 4–13)
AST SERPL W P-5'-P-CCNC: 23 U/L (ref 13–39)
BASOPHILS # BLD AUTO: 0.01 THOUSANDS/ΜL (ref 0–0.1)
BASOPHILS NFR BLD AUTO: 0 % (ref 0–1)
BILIRUB SERPL-MCNC: 0.54 MG/DL (ref 0.2–1)
BUN SERPL-MCNC: 10 MG/DL (ref 5–25)
CALCIUM ALBUM COR SERPL-MCNC: 9.3 MG/DL (ref 8.3–10.1)
CALCIUM SERPL-MCNC: 8.8 MG/DL (ref 8.4–10.2)
CHLORIDE SERPL-SCNC: 99 MMOL/L (ref 96–108)
CO2 SERPL-SCNC: 26 MMOL/L (ref 21–32)
CREAT SERPL-MCNC: 0.42 MG/DL (ref 0.6–1.3)
EOSINOPHIL # BLD AUTO: 0.08 THOUSAND/ΜL (ref 0–0.61)
EOSINOPHIL NFR BLD AUTO: 1 % (ref 0–6)
ERYTHROCYTE [DISTWIDTH] IN BLOOD BY AUTOMATED COUNT: 12 % (ref 11.6–15.1)
GFR SERPL CREATININE-BSD FRML MDRD: 108 ML/MIN/1.73SQ M
GLUCOSE SERPL-MCNC: 101 MG/DL (ref 65–140)
HBV CORE AB SER QL: NORMAL
HBV CORE IGM SER QL: NORMAL
HBV SURFACE AG SER QL: NORMAL
HCT VFR BLD AUTO: 36 % (ref 34.8–46.1)
HCV AB SER QL: NORMAL
HGB BLD-MCNC: 12.1 G/DL (ref 11.5–15.4)
IMM GRANULOCYTES # BLD AUTO: 0.02 THOUSAND/UL (ref 0–0.2)
IMM GRANULOCYTES NFR BLD AUTO: 0 % (ref 0–2)
INR PPP: 0.89 (ref 0.84–1.19)
L PNEUMO1 AG UR QL IA.RAPID: NEGATIVE
LYMPHOCYTES # BLD AUTO: 0.95 THOUSANDS/ΜL (ref 0.6–4.47)
LYMPHOCYTES NFR BLD AUTO: 14 % (ref 14–44)
MAGNESIUM SERPL-MCNC: 2.1 MG/DL (ref 1.9–2.7)
MCH RBC QN AUTO: 31.2 PG (ref 26.8–34.3)
MCHC RBC AUTO-ENTMCNC: 33.6 G/DL (ref 31.4–37.4)
MCV RBC AUTO: 93 FL (ref 82–98)
MONOCYTES # BLD AUTO: 0.21 THOUSAND/ΜL (ref 0.17–1.22)
MONOCYTES NFR BLD AUTO: 3 % (ref 4–12)
NEUTROPHILS # BLD AUTO: 5.39 THOUSANDS/ΜL (ref 1.85–7.62)
NEUTS SEG NFR BLD AUTO: 82 % (ref 43–75)
NRBC BLD AUTO-RTO: 0 /100 WBCS
OSMOLALITY UR: 237 MMOL/KG
PHOSPHATE SERPL-MCNC: 3 MG/DL (ref 2.3–4.1)
PLATELET # BLD AUTO: 361 THOUSANDS/UL (ref 149–390)
PMV BLD AUTO: 9 FL (ref 8.9–12.7)
POTASSIUM SERPL-SCNC: 3.9 MMOL/L (ref 3.5–5.3)
PROCALCITONIN SERPL-MCNC: 0.06 NG/ML
PROT SERPL-MCNC: 6.1 G/DL (ref 6.4–8.4)
PROTHROMBIN TIME: 12.6 SECONDS (ref 11.6–14.5)
RBC # BLD AUTO: 3.88 MILLION/UL (ref 3.81–5.12)
S PNEUM AG UR QL: NEGATIVE
SODIUM SERPL-SCNC: 132 MMOL/L (ref 135–147)
URATE SERPL-MCNC: 2.6 MG/DL (ref 2–7.5)
WBC # BLD AUTO: 6.66 THOUSAND/UL (ref 4.31–10.16)

## 2023-04-24 RX ORDER — ENOXAPARIN SODIUM 100 MG/ML
40 INJECTION SUBCUTANEOUS DAILY
Status: DISCONTINUED | OUTPATIENT
Start: 2023-04-26 | End: 2023-04-25 | Stop reason: HOSPADM

## 2023-04-24 RX ORDER — ONDANSETRON 2 MG/ML
4 INJECTION INTRAMUSCULAR; INTRAVENOUS EVERY 6 HOURS PRN
Status: DISCONTINUED | OUTPATIENT
Start: 2023-04-24 | End: 2023-04-25 | Stop reason: HOSPADM

## 2023-04-24 RX ORDER — FLUTICASONE PROPIONATE 220 UG/1
2 AEROSOL, METERED RESPIRATORY (INHALATION) EVERY 12 HOURS SCHEDULED
Status: DISCONTINUED | OUTPATIENT
Start: 2023-04-24 | End: 2023-04-25 | Stop reason: HOSPADM

## 2023-04-24 RX ADMIN — GUAIFENESIN 600 MG: 600 TABLET ORAL at 20:12

## 2023-04-24 RX ADMIN — ONDANSETRON 4 MG: 2 INJECTION INTRAMUSCULAR; INTRAVENOUS at 11:31

## 2023-04-24 RX ADMIN — FLUTICASONE PROPIONATE 2 PUFF: 220 AEROSOL, METERED RESPIRATORY (INHALATION) at 20:17

## 2023-04-24 RX ADMIN — ENOXAPARIN SODIUM 40 MG: 100 INJECTION SUBCUTANEOUS at 09:04

## 2023-04-24 RX ADMIN — HYDROCHLOROTHIAZIDE 25 MG: 25 TABLET ORAL at 09:05

## 2023-04-24 RX ADMIN — SERTRALINE HYDROCHLORIDE 200 MG: 100 TABLET ORAL at 09:05

## 2023-04-24 RX ADMIN — UMECLIDINIUM BROMIDE AND VILANTEROL TRIFENATATE 1 PUFF: 62.5; 25 POWDER RESPIRATORY (INHALATION) at 09:15

## 2023-04-24 RX ADMIN — ATORVASTATIN CALCIUM 20 MG: 20 TABLET, FILM COATED ORAL at 20:12

## 2023-04-24 RX ADMIN — DOCUSATE SODIUM 100 MG: 100 CAPSULE, LIQUID FILLED ORAL at 09:05

## 2023-04-24 RX ADMIN — BUPROPION HYDROCHLORIDE 300 MG: 300 TABLET, FILM COATED, EXTENDED RELEASE ORAL at 09:06

## 2023-04-24 RX ADMIN — DOCUSATE SODIUM 100 MG: 100 CAPSULE, LIQUID FILLED ORAL at 17:04

## 2023-04-24 RX ADMIN — CEFTRIAXONE 1000 MG: 1 INJECTION, SOLUTION INTRAVENOUS at 04:21

## 2023-04-24 RX ADMIN — POLYETHYLENE GLYCOL 3350 17 G: 17 POWDER, FOR SOLUTION ORAL at 09:04

## 2023-04-24 RX ADMIN — SENNOSIDES 8.6 MG: 8.6 TABLET, FILM COATED ORAL at 09:05

## 2023-04-24 RX ADMIN — GUAIFENESIN 600 MG: 600 TABLET ORAL at 09:06

## 2023-04-24 RX ADMIN — VERAPAMIL HYDROCHLORIDE 360 MG: 180 TABLET ORAL at 20:13

## 2023-04-24 RX ADMIN — LOSARTAN POTASSIUM 100 MG: 50 TABLET, FILM COATED ORAL at 09:05

## 2023-04-24 NOTE — ASSESSMENT & PLAN NOTE
Presented for SOB/chest discomfort following initiation of chemotherapy on 4/20  Stable on RA  · S/P thoracentesis x 3 (3/24, 4/12, 4/18) since March in setting of recurrent R pleural effusion likely due to lung cancer    · Last thoracentesis 4/18, 1500 cc removed  · CTA PE: Large R pleural effusion   · Will consult pulmonology & IR--> planning for asept catheter placement 4/25 if IR schedule is open  · Monitor O2 sats

## 2023-04-24 NOTE — ASSESSMENT & PLAN NOTE
"· CTA PE without evidence of PE, shows R pleural effusion + stable known lung mass  Also noted: \" Surrounding patchy opacities may be due to lymphangitic spread of tumor, atelectasis, as well as pneumonia in the appropriate clinical context  This is similar to sightly increased from prior PET/CT  \"  · Patient does not meet any SIRS criteria however is immunocompromised   · Clinically, no changes to chronic cough, chest pain, or other concern for PNA  · Procal neg x 1  · Covid neg, trop WNL  · ED started cefepime/vanc --> ABX DC'd as no evidence of infection and procal neg x 2  · BC still pending     "

## 2023-04-24 NOTE — ASSESSMENT & PLAN NOTE
S/P prior right lower lobe resection due to lung cancer years ago, recently diagnosed with recurrent lung cancer, NSCLC  · Patient follows with Dr Pablito Márquez, recurrence was diagnosed at the end of March 2023  · First round of chemotherapy initiated on 4/20/23  · CTA PE study: Again seen ill-defined RUL mass extending to the hilum in keeping with known malignancy grossly similar to prior PET/CT 3/21/2023  Surrounding patchy opacities may be due to lymphangitic spread of tumor, atelectasis, as well as pneumonia in the appropriate clinical context  This is similar to slightly increased from prior PET/CT  Additional bilateral lung opacities, continued attention on follow up  Again noted right hilar lymphadenopathy

## 2023-04-24 NOTE — PLAN OF CARE
Problem: Potential for Falls  Goal: Patient will remain free of falls  Description: INTERVENTIONS:  - Educate patient/family on patient safety including physical limitations  - Instruct patient to call for assistance with activity   - Consult OT/PT to assist with strengthening/mobility   - Keep Call bell within reach  - Keep bed low and locked with side rails adjusted as appropriate  - Keep care items and personal belongings within reach  - Initiate and maintain comfort rounds  - Make Fall Risk Sign visible to staff  - Offer Toileting every 2 Hours, in advance of need  - Initiate/Maintain alarm  - Obtain necessary fall risk management equipment: nonskid socks  - Apply yellow socks and bracelet for high fall risk patients  - Consider moving patient to room near nurses station  Outcome: Progressing     Problem: PAIN - ADULT  Goal: Verbalizes/displays adequate comfort level or baseline comfort level  Description: Interventions:  - Encourage patient to monitor pain and request assistance  - Assess pain using appropriate pain scale  - Administer analgesics based on type and severity of pain and evaluate response  - Implement non-pharmacological measures as appropriate and evaluate response  - Consider cultural and social influences on pain and pain management  - Notify physician/advanced practitioner if interventions unsuccessful or patient reports new pain  Outcome: Progressing     Problem: INFECTION - ADULT  Goal: Absence or prevention of progression during hospitalization  Description: INTERVENTIONS:  - Assess and monitor for signs and symptoms of infection  - Monitor lab/diagnostic results  - Monitor all insertion sites, i e  indwelling lines, tubes, and drains  - Monitor endotracheal if appropriate and nasal secretions for changes in amount and color  - [place] appropriate cooling/warming therapies per order  - Administer medications as ordered  - Instruct and encourage patient and family to use good hand hygiene technique  - Identify and instruct in appropriate isolation precautions for identified infection/condition  Outcome: Progressing  Goal: Absence of fever/infection during neutropenic period  Description: INTERVENTIONS:  - Monitor WBC    Outcome: Progressing     Problem: SAFETY ADULT  Goal: Patient will remain free of falls  Description: INTERVENTIONS:  - Educate patient/family on patient safety including physical limitations  - Instruct patient to call for assistance with activity   - Consult OT/PT to assist with strengthening/mobility   - Keep Call bell within reach  - Keep bed low and locked with side rails adjusted as appropriate  - Keep care items and personal belongings within reach  - Initiate and maintain comfort rounds  - Make Fall Risk Sign visible to staff  - Offer Toileting every 2 Hours, in advance of need  - Initiate/Maintain alarm  - Obtain necessary fall risk management equipment: nonskid socks  - Apply yellow socks and bracelet for high fall risk patients  - Consider moving patient to room near nurses station  Outcome: Progressing  Goal: Maintain or return to baseline ADL function  Description: INTERVENTIONS:  -  Assess patient's ability to carry out ADLs; assess patient's baseline for ADL function and identify physical deficits which impact ability to perform ADLs (bathing, care of mouth/teeth, toileting, grooming, dressing, etc )  - Assess/evaluate cause of self-care deficits   - Assess range of motion  - Assess patient's mobility; develop plan if impaired  - Assess patient's need for assistive devices and provide as appropriate  - Encourage maximum independence but intervene and supervise when necessary  - Involve family in performance of ADLs  - Assess for home care needs following discharge   - Consider OT consult to assist with ADL evaluation and planning for discharge  - Provide patient education as appropriate  Outcome: Progressing  Goal: Maintains/Returns to pre admission functional level  Description: INTERVENTIONS:  - Perform BMAT or MOVE assessment daily    - Set and communicate daily mobility goal to care team and patient/family/caregiver  - Collaborate with rehabilitation services on mobility goals if consulted  - Perform Range of Motion 3 times a day  - Reposition patient every 2 hours  - Dangle patient 3 times a day  - Stand patient 3 times a day  - Ambulate patient 3 times a day  - Out of bed to chair 3 times a day   - Out of bed for meals 3 times a day  - Out of bed for toileting  - Record patient progress and toleration of activity level   Outcome: Progressing     Problem: DISCHARGE PLANNING  Goal: Discharge to home or other facility with appropriate resources  Description: INTERVENTIONS:  - Identify barriers to discharge w/patient and caregiver  - Arrange for needed discharge resources and transportation as appropriate  - Identify discharge learning needs (meds, wound care, etc )  - Arrange for interpretive services to assist at discharge as needed  - Refer to Case Management Department for coordinating discharge planning if the patient needs post-hospital services based on physician/advanced practitioner order or complex needs related to functional status, cognitive ability, or social support system  Outcome: Progressing     Problem: Knowledge Deficit  Goal: Patient/family/caregiver demonstrates understanding of disease process, treatment plan, medications, and discharge instructions  Description: Complete learning assessment and assess knowledge base  Interventions:  - Provide teaching at level of understanding  - Provide teaching via preferred learning methods  Outcome: Progressing     Problem: Nutrition/Hydration-ADULT  Goal: Nutrient/Hydration intake appropriate for improving, restoring or maintaining nutritional needs  Description: Monitor and assess patient's nutrition/hydration status for malnutrition   Collaborate with interdisciplinary team and initiate plan and interventions as ordered  Monitor patient's weight and dietary intake as ordered or per policy  Utilize nutrition screening tool and intervene as necessary  Determine patient's food preferences and provide high-protein, high-caloric foods as appropriate       INTERVENTIONS:  - Monitor oral intake, urinary output, labs, and treatment plans  - Assess nutrition and hydration status and recommend course of action  - Evaluate amount of meals eaten  - Assist patient with eating if necessary   - Allow adequate time for meals  - Recommend/ encourage appropriate diets, oral nutritional supplements, and vitamin/mineral supplements  - Order, calculate, and assess calorie counts as needed  - Recommend, monitor, and adjust tube feedings and TPN/PPN based on assessed needs  - Assess need for intravenous fluids  - Provide specific nutrition/hydration education as appropriate  - Include patient/family/caregiver in decisions related to nutrition  Outcome: Progressing

## 2023-04-24 NOTE — CASE MANAGEMENT
Case Management Assessment & Discharge Planning Note    Patient name Delphia Klinefelter  Location /-01 MRN [id number]  : 1960 Date 2023       Current Admission Date: 2023  Current Admission Diagnosis:Pleural effusion   Patient Active Problem List    Diagnosis Date Noted   • Pleural effusion 2023   • SOB (shortness of breath) 2023   • CINV (chemotherapy-induced nausea and vomiting) 2023   • Non-small cell cancer of right lung ([facility] [address] ) 2023   • Fall at home, initial encounter 2022   • Closed fracture of multiple ribs of left side 2022   • Tobacco abuse 2022   • COPD (chronic obstructive pulmonary disease) ([facility] [address] ) 2022   • Hypercholesterolemia 2022   • Essential hypertension 2022   • Adhesive capsulitis of left shoulder 2021   • Left shoulder tendinitis 2021   • Impingement syndrome of left shoulder 2021      LOS (days): 1  Geometric Mean LOS (GMLOS) (days):   Days to GMLOS:     OBJECTIVE:    Risk of Unplanned Readmission Score: 20 24      Current admission status: Inpatient  Referral Reason: VNA    Preferred Pharmacy:   [facility]/pharmacy [id number] [address]  Phone: [phone] Fax: [phone]    Primary Care Provider: CAT Gutierrez    Primary Insurance: BLUE CROSS  Secondary Insurance:     ASSESSMENT:  [address] [facility], [address] - Spouse   Primary Phone: [phone] (Mobile)  Home Phone: [phone]               Advance Directives  Does patient have a [address]?: Yes  Does patient have Advance Directives?: Yes  Advance Directives: Living will, Power of  for health care  Primary Contact: Clemencia Whitehead    Readmission Root Cause  30 Day Readmission: No    Patient Information  Admitted from[de-identified] Home  Mental Status: Alert  During Assessment patient was accompanied by: Not accompanied during assessment  Assessment information provided by[de-identified] Patient  Primary Caregiver: Self  Support Systems: Spouse/significant other, Family members  [place] of Residence: [address] [facility] do you live in?: [address] [facility] [address] [facility] entry access options  Select all that apply : Stairs  Number of steps to enter home  : 1  Do the steps have railings?: Yes  Type of Current Residence: 2 story home  Upon entering residence, is there a bedroom on the main floor (no further steps)?: No  A bedroom is located on the following floor levels of residence (select all that apply):: 2nd Floor  Upon entering residence, is there a bathroom on the main floor (no further steps)?: Yes  Number of steps to 2nd floor from main floor: One Flight  In the last 12 months, was there a time when you were not able to pay the mortgage or rent on time?: No  In the last 12 months, how many places have you lived?: 1  In the last 12 months, was there a time when you did not have a steady place to sleep or slept in a shelter (including now)?: No  Homeless/housing insecurity resource given?: N/A  Living Arrangements: Lives w/ Spouse/significant other  Is patient a ?: No    Activities of Daily Living Prior to Admission  Functional Status: Independent  Completes ADLs independently?: Yes  Ambulates independently?: Yes  Does patient use assisted devices?: No  Does patient currently own DME?: Yes  What DME does the patient currently own?: [facility]  Does patient have a history of Outpatient Therapy (PT/OT)?: Yes  Does the patient have a history of Short-Term Rehab?: No  Does patient have a history of HHC?: Yes  Does patient currently have [facility]?: No    Patient Information Continued  Does patient have prescription coverage?: Yes  Within the past 12 months, you worried that your food would run out before you got the money to buy more : Never true  Within the past 12 months, the food you bought just didn't last and you didn't have money to get more : Never true  Food insecurity resource given?: N/A  Does patient receive dialysis treatments?: No  Does patient have a history of substance abuse?: No  Does patient have a history of Mental Health Diagnosis?: Yes  Is patient receiving treatment for mental health?: Yes  Has patient received inpatient treatment related to mental health in the last 2 years?: No    Means of Transportation  Means of Transport to Appts[de-identified] Drives Self  In the past 12 months, has lack of transportation kept you from medical appointments or from getting medications?: No  In the past 12 months, has lack of transportation kept you from meetings, work, or from getting things needed for daily living?: No  Was application for public transport provided?: N/A    DISCHARGE DETAILS:    Discharge planning discussed with[de-identified] patient  Freedom of Choice: Yes  Comments - Freedom of Choice: Discussed cm role  CM contacted family/caregiver?: No- see comments (pt is alert and oriented)  Were Treatment Team discharge recommendations reviewed with patient/caregiver?: Yes  Did patient/caregiver verbalize understanding of patient care needs?: Yes  Were patient/caregiver advised of the risks associated with not following Treatment Team discharge recommendations?: Yes    [address] [place] [address]         Is the patient interested in [address] at discharge?: Yes  Via Angle Braxton requested[de-identified] [address] Name[de-identified] [facility] Provider[de-identified] PCP  Home Health Services Needed[de-identified] Other (comment) (ascept cath drainage)  Homebound Criteria Met[de-identified] Uses an Assist Device (i e  cane, walker, etc), Requires the Assistance of Another Person for Safe Ambulation or to Leave the Home  Supporting Clincal Findings[de-identified] Limited Endurance, Fatigues Easliy in [employer]    DME Referral Provided  Referral made for DME?: No    Other Referral/Resources/Interventions Provided:  Interventions: C  Referral Comments: Home Rn referral sent through [facility] to [facility]      Treatment Team Recommendation: [address]  Discharge Destination Plan[de-identified] Home with Karen at Discharge : Family     Additional Comments: Cm met with pt and reviewed cm role  She lives in [place] with her spouse with 1 ELISA  Bedroom is on second floor  Pt is independent  She has a walker but does not use it  She drives  Spouse to transport home  Home health referral placed via [facility]idin for home RN with [facility]

## 2023-04-24 NOTE — PROGRESS NOTES
"Sohan Hardin  Progress Note  Name: Amira Moise  MRN: [id number]  Unit/Bed#: -01 I Date of Admission: 4/23/2023   Date of Service: 4/24/2023 I Hospital Day: 1    Assessment/Plan   * Pleural effusion  Assessment & Plan  Presented for SOB/chest discomfort following initiation of chemotherapy on 4/20  Stable on RA  · S/P thoracentesis x 3 (3/24, 4/12, 4/18) since March in setting of recurrent R pleural effusion likely due to lung cancer  · Last thoracentesis 4/18, 1500 cc removed  · CTA PE: Large R pleural effusion   · Will consult pulmonology & IR--> planning for asept catheter placement 4/25 if IR schedule is open  · Monitor O2 sats    Non-small cell cancer of right lung [facility])  Assessment & Plan  S/P prior right lower lobe resection due to lung cancer years ago, recently diagnosed with recurrent lung cancer, NSCLC  · Patient follows with Dr Jose Cain, recurrence was diagnosed at the end of March 2023  · First round of chemotherapy initiated on 4/20/23  · CTA PE study: Again seen ill-defined RUL mass extending to the hilum in keeping with known malignancy grossly similar to prior PET/CT 3/21/2023  Surrounding patchy opacities may be due to lymphangitic spread of tumor, atelectasis, as well as pneumonia in the appropriate clinical context  This is similar to slightly increased from prior PET/CT  Additional bilateral lung opacities, continued attention on follow up  Again noted right hilar lymphadenopathy  SOB (shortness of breath)  Assessment & Plan  · CTA PE without evidence of PE, shows R pleural effusion + stable known lung mass  Also noted: \" Surrounding patchy opacities may be due to lymphangitic spread of tumor, atelectasis, as well as pneumonia in the appropriate clinical context  This is similar to sightly increased from prior PET/CT  \"  · Patient does not meet any SIRS criteria however is immunocompromised   · Clinically, no changes to chronic cough, chest " pain, or other concern for PNA  · Procal neg x 1  · Covid neg, trop WNL  · ED started cefepime/vanc --> ABX DC'd as no evidence of infection and procal neg x 2  · BC still pending       CINV (chemotherapy-induced nausea and vomiting)  Assessment & Plan  · 1st chemo session 4/20, has been experiencing N/V mildly since however tolerating PO diet   · Also notes constipation   · Will treat supportively with antiemetics (), soft diet, bowel regimen    Essential hypertension  Assessment & Plan  · Continue home verapamil 360 mg daily, losartan 100 mg daily, HCTZ 25 mg daily  · Blood pressure controlled    COPD (chronic obstructive pulmonary disease) ([facility] [address] )  Assessment & Plan  · No acute exacerbation  · Nebs as needed               VTE Pharmacologic Prophylaxis: VTE Score: 4 Moderate Risk (Score 3-4) - Pharmacological DVT Prophylaxis Ordered: enoxaparin (Lovenox)  Patient Centered Rounds: I performed bedside rounds with nursing staff today  Discussions with Specialists or Other Care Team Provider: Pulm and IR     Education and Discussions with Family / Patient: Updated  () at bedside  Total Time Spent on Date of Encounter in care of patient: 35 minutes This time was spent on one or more of the following: performing physical exam; counseling and coordination of care; obtaining or reviewing history; documenting in the medical record; reviewing/ordering tests, medications or procedures; communicating with other healthcare professionals and discussing with patient's family/caregivers  Current Length of Stay: 1 day(s)  Current Patient Status: Inpatient   Certification Statement: The patient will continue to require additional inpatient hospital stay due to Asept catheter tomorrow   Discharge Plan: Anticipate discharge in 24-48 hrs to home  Code Status: Level 1 - Full Code    Subjective:   Mild nausea and 1 episode vomiting this am, resp status has remained stable   Nausea improving with zofran  Objective:     Vitals:   Temp (24hrs), Av 4 °F (36 9 °C), Min:98 3 °F (36 8 °C), Max:98 5 °F (36 9 °C)    Temp:  [98 3 °F (36 8 °C)-98 5 °F (36 9 °C)] 98 5 °F (36 9 °C)  HR:  [75-90] 90  Resp:  [14-20] 16  BP: (105-149)/(56-77) 128/77  SpO2:  [92 %-96 %] 92 %  Body mass index is 30 59 kg/m²  Input and Output Summary (last 24 hours): Intake/Output Summary (Last 24 hours) at 2023 1254  Last data filed at 2023 2100  Gross per 24 hour   Intake 50 ml   Output 400 ml   Net -350 ml       Physical Exam:   Physical Exam  Vitals and nursing note reviewed  Constitutional:       General: She is not in acute distress  Appearance: She is well-developed  She is not ill-appearing  HENT:      Head: Normocephalic and atraumatic  Eyes:      General:         Right eye: No discharge  Left eye: No discharge  Extraocular Movements: Extraocular movements intact  Conjunctiva/sclera: Conjunctivae normal    Cardiovascular:      Rate and Rhythm: Normal rate and regular rhythm  Heart sounds: No murmur heard  Pulmonary:      Effort: Pulmonary effort is normal  No respiratory distress  Breath sounds: No wheezing, rhonchi or rales  Comments: Diminished BS on R  Abdominal:      General: Bowel sounds are normal  There is no distension  Palpations: Abdomen is soft  Tenderness: There is no abdominal tenderness  Musculoskeletal:      Cervical back: Neck supple  Right lower leg: No edema  Left lower leg: No edema  Skin:     General: Skin is warm and dry  Capillary Refill: Capillary refill takes less than 2 seconds  Neurological:      General: No focal deficit present  Mental Status: She is alert and oriented to person, place, and time  Mental status is at baseline  Cranial Nerves: No cranial nerve deficit     Psychiatric:         Mood and Affect: Mood normal          Behavior: Behavior normal           Additional Data:     Labs:  Results from last 7 days   Lab Units 04/24/23  0417   WBC Thousand/uL 6 66   HEMOGLOBIN g/dL 12 1   HEMATOCRIT % 36 0   PLATELETS Thousands/uL 361   NEUTROS PCT % 82*   LYMPHS PCT % 14   MONOS PCT % 3*   EOS PCT % 1     Results from last 7 days   Lab Units 04/24/23  0417   SODIUM mmol/L 132*   POTASSIUM mmol/L 3 9   CHLORIDE mmol/L 99   CO2 mmol/L 26   BUN mg/dL 10   CREATININE mg/dL 0 42*   ANION GAP mmol/L 7   CALCIUM mg/dL 8 8   ALBUMIN g/dL 3 4*   TOTAL BILIRUBIN mg/dL 0 54   ALK PHOS U/L 84   ALT U/L 39   AST U/L 23   GLUCOSE RANDOM mg/dL 101     Results from last 7 days   Lab Units 04/24/23  0417   INR  0 89             Results from last 7 days   Lab Units 04/24/23  0417 04/23/23  1243 04/23/23  1226   LACTIC ACID mmol/L  --  1 5  --    PROCALCITONIN ng/ml 0 06  --  0 06       Lines/Drains:  Invasive Devices     Central Venous Catheter Line  Duration           Port A Cath 04/18/23 Right Subclavian 6 days          Peripheral Intravenous Line  Duration           Peripheral IV 04/23/23 Left Antecubital 1 day                Central Line:  Goal for removal: NA- port             Imaging: No pertinent imaging reviewed  Recent Cultures (last 7 days):   Results from last 7 days   Lab Units 04/23/23 2003 04/23/23  1251 04/23/23  1243   BLOOD CULTURE   --  Received in Microbiology Lab  Culture in Progress  Received in Microbiology Lab  Culture in Progress     LEGIONELLA URINARY ANTIGEN  Negative  --   --        Last 24 Hours Medication List:   Current Facility-Administered Medications   Medication Dose Route Frequency Provider Last Rate   • acetaminophen  650 mg Oral Q6H PRN Charles Arita PA-C     • atorvastatin  20 mg Oral Daily Charles Yu PA-C     • buPROPion  300 mg Oral QAM Charles Yu PA-C     • docusate sodium  100 mg Oral BID Charles Arita PA-C     • enoxaparin  40 mg Subcutaneous Daily Charles Arita PA-C     • guaiFENesin  600 mg Oral Q12H [facility] Sonya Feeling JOHN Arita     • losartan  100 mg Oral Daily Sonya Feeling [place], [place]      And   • hydrochlorothiazide  25 mg Oral Daily Osnya Feeling [place], [place]     • levalbuterol  0 63 mg Nebulization Q8H PRN Sonya Feeling THOMPSON AritaC     • LORazepam  0 5 mg Oral HS PRN Sonya Feeling THOMPSON AritaC     • melatonin  6 mg Oral HS PRN Sonya Feeling JOHN Arita     • nicotine  1 patch Transdermal Daily Sonya Feeling JOHN Arita     • ondansetron  4 mg Intravenous Q6H PRN Sonya Feeling JOHN Arita     • polyethylene glycol  17 g Oral Daily Sonya Feeling JOHN Yu     • prochlorperazine  10 mg Oral Q6H PRN Sonya Feeling JOHN Arita     • senna  1 tablet Oral Daily Sonya Feeling JOHN Yu     • sertraline  200 mg Oral Daily Sonya Feeling [place], [place]     • umeclidinium-vilanterol  1 puff Inhalation Daily Sonya Feeling [place], [place]     • verapamil  360 mg Oral Daily [place], [place]          [facility], [facility]    **Please Note: This note may have been constructed using a voice recognition system  **

## 2023-04-24 NOTE — UTILIZATION REVIEW
"Initial Clinical Review    Admission: Date/Time/Statement:   Admission Orders (From admission, onward)     Ordered        04/23/23 1719  INPATIENT ADMISSION  Once                      Orders Placed This Encounter   Procedures   • INPATIENT ADMISSION     Standing Status:   Standing     Number of Occurrences:   1     Order Specific Question:   Level of Care     Answer:   Med Surg [16]     Order Specific Question:   Estimated length of stay     Answer:   More than 2 Midnights     Order Specific Question:   Certification     Answer:   I certify that inpatient services are medically necessary for this patient for a duration of greater than two midnights  See H&P and MD Progress Notes for additional information about the patient's course of treatment  ED Arrival Information     Expected   -    Arrival   4/23/2023 11:50    Acuity   Urgent            Means of arrival   Walk-In    Escorted by   Family Member    Service   Hospitalist    Admission type   Emergency            Arrival complaint   Sob            Chief Complaint   Patient presents with   • Shortness of Breath     Pt states \"I have fluid around my lungs and I start getting short of breath  It got worst over night  I have an appointment for a thoracentesis on Friday, but there is no way it can wait\" Pt reports last thoracentesis was done on Tuesday and took out 1 5L  Pt is getting chemo for stage 4 lung ca  Denies cp  Initial Presentation: 57 y o  female to the ED from home with complaints of shortness of breath  H/O HTN, H LD, tobacco use, non-small cell lung cancer currently undergoing chemotherapy   Admitted to inpatient for pleural effusion, shortness of breath  Arrives with tachypnea, fatigue  REcently had thoracentesis x 3 (3/24, 4/12, 4/18 1 5 liters removed)  Has chest discomfort  Breath sounds decreased on right   CT chest shows mass right side of lung,Surrounding patchy opacities may be due to lymphangitic spread of tumor, atelectasis, as " well as pneumonia in the appropriate clinical context and large right pleural effusion  ED started on IV abx  Blood cultures pending  Continue iv abx  DC with procal neg x 2  Consult pulmonary and IR  Date: 4/24   Day 2: Will plan for asept catheter placement if ir able 4/25  Procal neg  X 2 Abx discontinued  Blood cultures pending  Breath sounds diminished  ED Triage Vitals [04/23/23 1157]   Temperature Pulse Respirations Blood Pressure SpO2   98 2 °F (36 8 °C) 69 20 148/83 97 %      Temp Source Heart Rate Source Patient Position - Orthostatic VS BP Location FiO2 (%)   Oral Monitor Lying Right arm --      Pain Score       No Pain          Wt Readings from Last 1 Encounters:   04/24/23 82 1 kg (181 lb)     Additional Vital Signs:   /Time Temp Pulse Resp BP MAP (mmHg) SpO2 O2 Device Patient Position - Orthostatic VS   04/24/23 07:36:18 98 5 °F (36 9 °C) 90 16 128/77 94 92 % -- --   04/23/23 21:33:27 98 3 °F (36 8 °C) 75 -- 147/72 97 92 % -- --   04/23/23 1800 98 3 °F (36 8 °C) 79 -- 149/74 99 -- -- --   04/23/23 1700 -- 85 20 137/63 91 96 % None (Room air) Sitting   04/23/23 1630 -- 78 16 120/60 -- 93 % None (Room air) Sitting   04/23/23 1530 -- 82 14 119/58 83 94 % None (Room air) Sitting   04/23/23 1500 -- 83 15 122/60 87 94 % None (Room air) Sitting   04/23/23 1430 -- 81 16 114/56 80 94 % None (Room air) Sitting   04/23/23 1400 -- 80 14 105/56 76 93 % None (Room air) Sitting   04/23/23 1300 -- 79 17 109/57 77 94 % None (Room air) Sitting   04/23/23 1230 -- 81 19 130/62 87 95 % None (Room air) Sitting   04/23/23 1210 -- -- -- -- -- -- None (Room air) --   04/23/23 1200 -- 85 19 134/70 94 96 % None (Room air) Sitting   04/23/23 1157 98 2 °F (36 8 °C) 69 20 148/83 108 97 % None (Room air) Lying       Pertinent Labs/Diagnostic Test Results:     CTA ED chest PE Study   Final Result by Niki Sanchez MD (04/23 9125)      1    Again seen ill-defined right upper lobe mass extending to the hilum in keeping with known malignancy grossly similar to prior PET/CT 3/21/2023  Surrounding patchy opacities may be due to lymphangitic spread of tumor, atelectasis, as well as    pneumonia in the appropriate clinical context  This is similar to slightly increased from prior PET/CT  Additional bilateral lung opacities, continued attention on follow up  2   Again noted right hilar lymphadenopathy  3   Large right pleural effusion similar to prior PET/CT 3/31/2023  4   No evidence of pulmonary embolism  The study was marked in [facility] for immediate notification  Workstation performed: [id number]         XR chest 1 view portable   Final Result by Leslee Taylor MD (04/23 1433)      Right pleural effusion                    Workstation performed: [id number]         IR IN-Patient Thoracentesis    (Results Pending)     Results from last 7 days   Lab Units 04/23/23  1715   SARS-COV-2  Negative     Results from last 7 days   Lab Units 04/24/23 0417 04/23/23  1226 04/18/23  0857   WBC Thousand/uL 6 66 8 84 9 50  9 50   HEMOGLOBIN g/dL 12 1 13 8 13 2  13 2   HEMATOCRIT % 36 0 41 4 38 9  38 9   PLATELETS Thousands/uL 361 436* 390  390   NEUTROS ABS Thousands/µL 5 39 7 49 7 83*         Results from last 7 days   Lab Units 04/24/23 0417 04/23/23  1226 04/18/23  0922   SODIUM mmol/L 132* 131* 137   POTASSIUM mmol/L 3 9 3 6 3 5   CHLORIDE mmol/L 99 95* 103   CO2 mmol/L 26 26 26   ANION GAP mmol/L 7 10 8   BUN mg/dL 10 14 10   CREATININE mg/dL 0 42* 0 58* 0 47*   EGFR ml/min/1 73sq m 108 97 104   CALCIUM mg/dL 8 8 9 4 9 0   MAGNESIUM mg/dL 2 1 2 2  --    PHOSPHORUS mg/dL 3 0  --   --      Results from last 7 days   Lab Units 04/24/23  0417 04/23/23  1226 04/18/23  0922   AST U/L 23 45* 19   ALT U/L 39 56* 20   ALK PHOS U/L 84 102 92   TOTAL PROTEIN g/dL 6 1* 6 9 6 7   ALBUMIN g/dL 3 4* 4 0 3 7   TOTAL BILIRUBIN mg/dL 0 54 0 48 0 44         Results from last 7 days   Lab Units 04/24/23  0417 04/23/23  1226 04/18/23  0922   GLUCOSE RANDOM mg/dL 101 114 116         Results from last 7 days   Lab Units 04/23/23  1226   HS TNI 0HR ng/L 2     Results from last 7 days   Lab Units 04/23/23  1226   D-DIMER QUANTITATIVE ug/ml FEU 0 88*     Results from last 7 days   Lab Units 04/24/23  0417 04/23/23  1226 04/18/23  0857   PROTIME seconds 12 6 12 5 13 6   INR  0 89 0 88 0 97   PTT seconds  --  24  --      Results from last 7 days   Lab Units 04/18/23  0922   TSH 3RD GENERATON uIU/mL 1 148     Results from last 7 days   Lab Units 04/24/23 0417 04/23/23  1226   PROCALCITONIN ng/ml 0 06 0 06     Results from last 7 days   Lab Units 04/23/23  1243   LACTIC ACID mmol/L 1 5             Results from last 7 days   Lab Units 04/23/23  1226   BNP pg/mL 38         Results from last 7 days   Lab Units 04/23/23 2003   OSMO UR mmol/*     Results from last 7 days   Lab Units 04/23/23 2003 04/23/23  1655   CLARITY UA   --  Clear   COLOR UA   --  Yellow   SPEC GRAV UA   --  1 010   PH UA   --  7 0   GLUCOSE UA mg/dl  --  Negative   KETONES UA mg/dl  --  Negative   BLOOD UA   --  Negative   PROTEIN UA mg/dl  --  Negative   NITRITE UA   --  Negative   BILIRUBIN UA   --  Negative   UROBILINOGEN UA (BE) mg/dl  --  <2 0   LEUKOCYTES UA   --  Negative   SODIUM UR  28  --      Results from last 7 days   Lab Units 04/23/23 2003 04/23/23  1655   STREP PNEUMONIAE ANTIGEN, URINE   --  Negative   LEGIONELLA URINARY ANTIGEN  Negative  --        Results from last 7 days   Lab Units 04/23/23  1251 04/23/23  1243   BLOOD CULTURE  Received in Microbiology Lab  Culture in Progress  Received in Microbiology Lab  Culture in Progress         ED Treatment:   Medication Administration from 04/23/2023 1149 to 04/23/2023 1753       Date/Time Order Dose Route Action Comments     04/23/2023 1651 EDT cefepime (MAXIPIME) IVPB (premix in dextrose) 2,000 mg 50 mL 2,000 mg Intravenous New Bag --     04/23/2023 1716 EDT vancomycin (VANCOCIN) 1250 mg in sodium chloride 0 9% 250 mL IVPB 1,250 mg Intravenous New Bag --     04/23/2023 1651 EDT sodium chloride 0 9 % bolus 500 mL 500 mL Intravenous New Bag --        Past Medical History:   Diagnosis Date   • Cancer ([place] [address] )    • COPD (chronic obstructive pulmonary disease) ([place] [address] )    • Hyperlipidemia    • Hypertension    • Lung cancer ([place] [address] ) 06/26/2019    right lower lobe removed     Admitting Diagnosis: Shortness of breath [R06 02]  Hyponatremia [E87 1]  Pleural effusion [J90]  Elevated LFTs [R79 89]  Immunocompromised state ([place] [address] ) [D84 9]  Generalized weakness [R53 1]  Non-small cell cancer of right lung (HCC) [C34 91]  Age/Sex: 57 y o  female  Admission Orders:  UP and OOB  Scds    Scheduled Medications:  atorvastatin, 20 mg, Oral, Daily  buPROPion, 300 mg, Oral, QAM  docusate sodium, 100 mg, Oral, BID  enoxaparin, 40 mg, Subcutaneous, Daily  guaiFENesin, 600 mg, Oral, Q12H ULISES  losartan, 100 mg, Oral, Daily   And  hydrochlorothiazide, 25 mg, Oral, Daily  nicotine, 1 patch, Transdermal, Daily  polyethylene glycol, 17 g, Oral, Daily  senna, 1 tablet, Oral, Daily  sertraline, 200 mg, Oral, Daily  umeclidinium-vilanterol, 1 puff, Inhalation, Daily  verapamil, 360 mg, Oral, Daily      Continuous IV Infusions:     PRN Meds:  acetaminophen, 650 mg, Oral, Q6H PRN  levalbuterol, 0 63 mg, Nebulization, Q8H PRN  LORazepam, 0 5 mg, Oral, HS PRN  melatonin, 6 mg, Oral, HS PRN  ondansetron, 4 mg, Intravenous, Q6H PRN  prochlorperazine, 10 mg, Oral, Q6H PRN        IP CONSULT TO PULMONOLOGY    Network Utilization Review Department  ATTENTION: Please call with any questions or concerns to [phone] and carefully listen to the prompts so that you are directed to the right person  All voicemails are confidential   Gregg Sanchez all requests for admission clinical reviews, approved or denied determinations and any other requests to dedicated fax number below belonging to the campus where the patient is receiving treatment   List of dedicated fax numbers for the Facilities:  FACILITY NAME UR FAX NUMBER   ADMISSION DENIALS (Administrative/Medical Necessity) [phone]   [address]  (Maternity/NICU/Pediatrics) [phone]   [address] [phone]   [address] [address] [facility] [address] [facility] [address] [facility] [phone]

## 2023-04-24 NOTE — CONSULTS
Pulmonary Consultation   Davina Weems 57 y o  female MRN: [id number]  Unit/Bed#: -01 Encounter: [id number]      Reason for consultation: Pleural effusion    Requesting physician: lElie Cooley    Impressions/Plans:    1  Acute respiratory insufficiency secondary to large metastatic recurrent right pleural effusion  · Consult IR for aseptic pleural catheter placement  · We will start with every other day drainage by [facility]NA as an outpatient  · Continue to observe off antibiotics    2  Metastatic adenocarcinoma right lung  · Chemotherapy/follow-up with oncology    3  COPD/emphysema without acute exacerbation  · Continue home Anoro once daily, Flovent twice daily, albuterol as needed    4  Nicotine dependence-recent smoking cessation  · Encouraged ongoing cessation  · Nicotine patch      History of Present Illness   HPI:  Davina Weems is a 57 y o  female who presents to the hospital with acute dyspnea on exertion as well as right-sided pleuritic chest pain and inability to take a deep breath  She has recently been diagnosed with metastatic/recurrent adenocarcinoma of the right lung  She was initially diagnosed with stage Ia in 2019 and had lobectomy  She had recurrence now with new pleural effusion  She has undergone 2 previous thoracentesis both for 1 5 L of fluid by interventional radiology  She tells me that both times the following day she would notice a significant improvement in her respiratory status but shortly thereafter her shortness of breath would recur  Most recently she has only had 5-6 days in between drainage  She has COPD/emphysema without significant debilitation  She uses Anoro once daily with Flovent twice daily  She has albuterol as needed  She does not require supplemental oxygen  She is a former smoker who has recently cut down after diagnosis of lung cancer, and over the past couple weeks has drastically cut down and has had none in the past 8 days      She denies any lower extremity edema  No abdominal pain  Review of systems:  12 point review of systems was completed and was otherwise negative except as listed in HPI        Historical Information   Past Medical History:   Diagnosis Date   • Cancer ([facility] )    • COPD (chronic obstructive pulmonary disease) ([facility] )    • Hyperlipidemia    • Hypertension    • Lung cancer ([facility] ) 06/26/2019    right lower lobe removed     Past Surgical History:   Procedure Laterality Date   • APPENDECTOMY     • BREAST BIOPSY Right     benign   • COLON SURGERY     • HYSTERECTOMY      age 48   • IR PORT PLACEMENT  4/18/2023   • IR THORACENTESIS  3/24/2023   • IR THORACENTESIS  4/12/2023   • IR THORACENTESIS  4/18/2023   • LUNG LOBECTOMY     • OOPHORECTOMY Bilateral     age 48     Family History   Problem Relation Age of Onset   • Colon cancer Mother    • Hypertension Mother    • Hyperlipidemia Mother    • Hearing loss Mother    • Depression Mother    • COPD Mother    • Cancer Mother    • Arthritis Mother    • Lung cancer Mother    • Drug abuse Brother    • Diabetes Maternal Grandmother    • Cancer Maternal Grandmother    • Colon cancer Maternal Grandmother    • Cancer Maternal Aunt    • Colon cancer Maternal Aunt      Social History:  Social History     Tobacco Use   Smoking Status Every Day   • Packs/day: 0 50   • Years: 50 00   • Pack years: 25 00   • Types: Cigarettes   Smokeless Tobacco Never     Social History     Substance and Sexual Activity   Alcohol Use Not Currently    Comment: social     Social History     Substance and Sexual Activity   Drug Use No     Marital Status: /Civil Union      Meds/Allergies   Current Facility-Administered Medications   Medication Dose Route Frequency   • acetaminophen (TYLENOL) tablet 650 mg  650 mg Oral Q6H PRN   • atorvastatin (LIPITOR) tablet 20 mg  20 mg Oral Daily   • buPROPion (WELLBUTRIN XL) 24 hr tablet 300 mg  300 mg Oral QAM   • docusate sodium (COLACE) capsule 100 mg  100 mg Oral BID   • [START ON 4/26/2023] enoxaparin (LOVENOX) subcutaneous injection 40 mg  40 mg Subcutaneous Daily   • guaiFENesin (MUCINEX) 12 hr tablet 600 mg  600 mg Oral Q12H [address]   • losartan (COZAAR) tablet 100 mg  100 mg Oral Daily    And   • hydrochlorothiazide (HYDRODIURIL) tablet 25 mg  25 mg Oral Daily   • levalbuterol (XOPENEX) inhalation solution 0 63 mg  0 63 mg Nebulization Q8H PRN   • LORazepam (ATIVAN) tablet 0 5 mg  0 5 mg Oral HS PRN   • melatonin tablet 6 mg  6 mg Oral HS PRN   • nicotine (NICODERM CQ) 14 mg/24hr TD 24 hr patch 1 patch  1 patch Transdermal Daily   • ondansetron (ZOFRAN) injection 4 mg  4 mg Intravenous Q6H PRN   • polyethylene glycol (MIRALAX) packet 17 g  17 g Oral Daily   • prochlorperazine (COMPAZINE) tablet 10 mg  10 mg Oral Q6H PRN   • senna (SENOKOT) tablet 8 6 mg  1 tablet Oral Daily   • sertraline (ZOLOFT) tablet 200 mg  200 mg Oral Daily   • umeclidinium-vilanterol 62 5-25 mcg/actuation inhaler 1 puff  1 puff Inhalation Daily   • verapamil (CALAN-SR) CR tablet 360 mg  360 mg Oral Daily     Medications Prior to Admission   Medication   • albuterol (PROVENTIL HFA,VENTOLIN HFA) 90 mcg/act inhaler   • aspirin 81 MG tablet   • atorvastatin (LIPITOR) 20 mg tablet   • buPROPion (WELLBUTRIN XL) 150 mg 24 hr tablet   • coenzyme Q-10 100 MG capsule   • dexamethasone (DECADRON) 4 mg tablet   • diphenhydrAMINE (BENADRYL) 50 MG tablet   • lidocaine-prilocaine (EMLA) cream   • losartan-hydrochlorothiazide (HYZAAR) 100-25 MG per tablet   • ondansetron (ZOFRAN) 8 mg tablet   • sertraline (ZOLOFT) 100 mg tablet   • verapamil (VERELAN PM) 180 MG 24 hr capsule   • EPINEPHrine (EPIPEN) 0 3 mg/0 3 mL SOAJ   • LORazepam (ATIVAN) 1 mg tablet   • prochlorperazine (COMPAZINE) 10 mg tablet     Allergies   Allergen Reactions   • Amoxicillin Hives   • Vilazodone Hcl Dizziness and Nausea Only   • Wasp Venom Swelling   • Zithromax [Azithromycin] Hives       Vitals: Blood pressure 129/77, pulse 79, temperature 98 7 °F (37 1 °C), "resp  rate 16, height 5' 4 5\" (1 638 m), weight 82 1 kg (181 lb), SpO2 90 %  ,  Room air, Body mass index is 30 59 kg/m²  Intake/Output Summary (Last 24 hours) at 4/24/2023 1539  Last data filed at 4/24/2023 0900  Gross per 24 hour   Intake 50 ml   Output 700 ml   Net -650 ml       Physical exam:    General Appearance:    Alert, cooperative, no conversational dyspnea or accessory     muscle use       Head/eyes:    Normocephalic, without obvious abnormality, atraumatic,         PERRL, sclera non-injected, nonicteric    Nose:   Nares normal, mucosa normal, no drainage or sinus tenderness   Mouth:   Moist mucous membranes, no thrush, normal dentition   Neck:   Supple, trachea midline, no adenopathy; JVD not elevated   Lungs:     Decreased BS right base - no wheeze   Chest Wall:    No tenderness or deformity    Heart:    Regular rate and rhythm, S1 and S2 normal, no murmur, rub   or gallop   Abdomen:     Soft, non-tender, bowel sounds active all four quadrants,     no masses, no organomegaly   Extremities:   Extremities normal, atraumatic, no cyanosis or edema   Skin:   Warm, dry, turgor normal, no rashes or lesions   Lymph nodes:   No Cervical or supraclavicular lymphadenopathy   Neurologic:   CNII-XII grossly intact, normal strength, non-focal         Labs:   CBC:   Lab Results   Component Value Date    WBC 6 66 04/24/2023    HGB 12 1 04/24/2023    HCT 36 0 04/24/2023    MCV 93 04/24/2023     04/24/2023    MCH 31 2 04/24/2023    MCHC 33 6 04/24/2023    RDW 12 0 04/24/2023    MPV 9 0 04/24/2023    NRBC 0 04/24/2023   , CMP:   Lab Results   Component Value Date    SODIUM 132 (L) 04/24/2023    K 3 9 04/24/2023    CL 99 04/24/2023    CO2 26 04/24/2023    BUN 10 04/24/2023    CREATININE 0 42 (L) 04/24/2023    CALCIUM 8 8 04/24/2023    AST 23 04/24/2023    ALT 39 04/24/2023    ALKPHOS 84 04/24/2023    EGFR 108 04/24/2023       Imaging and other studies: I have personally reviewed pertinent reports     and I have " "personally reviewed pertinent films in PACS  CT chest - large right pleural effusion  Central hilar mass      Abhinav De La Torre DO      Portions of the record may have been created with voice recognition software  Occasional wrong word or \"sound a like\" substitutions may have occurred due to the inherent limitations of voice recognition software  Read the chart carefully and recognize, using context, where substitutions have occurred      "

## 2023-04-25 ENCOUNTER — HOME HEALTH ADMISSION (OUTPATIENT)
Dept: HOME HEALTH SERVICES | Facility: HOME HEALTHCARE | Age: 63
End: 2023-04-25
Payer: COMMERCIAL

## 2023-04-25 ENCOUNTER — APPOINTMENT (INPATIENT)
Dept: RADIOLOGY | Facility: HOSPITAL | Age: 63
End: 2023-04-25

## 2023-04-25 ENCOUNTER — APPOINTMENT (INPATIENT)
Dept: INTERVENTIONAL RADIOLOGY/VASCULAR | Facility: HOSPITAL | Age: 63
End: 2023-04-25
Attending: RADIOLOGY

## 2023-04-25 VITALS
SYSTOLIC BLOOD PRESSURE: 120 MMHG | RESPIRATION RATE: 15 BRPM | WEIGHT: 179.6 LBS | OXYGEN SATURATION: 90 % | DIASTOLIC BLOOD PRESSURE: 56 MMHG | HEIGHT: 65 IN | BODY MASS INDEX: 29.92 KG/M2 | TEMPERATURE: 98.7 F | HEART RATE: 79 BPM

## 2023-04-25 LAB
ALBUMIN SERPL BCP-MCNC: 3.5 G/DL (ref 3.5–5)
ALP SERPL-CCNC: 87 U/L (ref 34–104)
ALT SERPL W P-5'-P-CCNC: 40 U/L (ref 7–52)
ANION GAP SERPL CALCULATED.3IONS-SCNC: 6 MMOL/L (ref 4–13)
AST SERPL W P-5'-P-CCNC: 24 U/L (ref 13–39)
BASOPHILS # BLD AUTO: 0.01 THOUSANDS/ΜL (ref 0–0.1)
BASOPHILS NFR BLD AUTO: 0 % (ref 0–1)
BILIRUB SERPL-MCNC: 0.55 MG/DL (ref 0.2–1)
BUN SERPL-MCNC: 11 MG/DL (ref 5–25)
CALCIUM SERPL-MCNC: 8.8 MG/DL (ref 8.4–10.2)
CHLORIDE SERPL-SCNC: 98 MMOL/L (ref 96–108)
CO2 SERPL-SCNC: 28 MMOL/L (ref 21–32)
CREAT SERPL-MCNC: 0.43 MG/DL (ref 0.6–1.3)
EOSINOPHIL # BLD AUTO: 0.09 THOUSAND/ΜL (ref 0–0.61)
EOSINOPHIL NFR BLD AUTO: 1 % (ref 0–6)
ERYTHROCYTE [DISTWIDTH] IN BLOOD BY AUTOMATED COUNT: 11.9 % (ref 11.6–15.1)
GFR SERPL CREATININE-BSD FRML MDRD: 107 ML/MIN/1.73SQ M
GLUCOSE SERPL-MCNC: 102 MG/DL (ref 65–140)
HCT VFR BLD AUTO: 36.7 % (ref 34.8–46.1)
HGB BLD-MCNC: 12.3 G/DL (ref 11.5–15.4)
IMM GRANULOCYTES # BLD AUTO: 0.03 THOUSAND/UL (ref 0–0.2)
IMM GRANULOCYTES NFR BLD AUTO: 1 % (ref 0–2)
LYMPHOCYTES # BLD AUTO: 0.77 THOUSANDS/ΜL (ref 0.6–4.47)
LYMPHOCYTES NFR BLD AUTO: 12 % (ref 14–44)
MCH RBC QN AUTO: 31 PG (ref 26.8–34.3)
MCHC RBC AUTO-ENTMCNC: 33.5 G/DL (ref 31.4–37.4)
MCV RBC AUTO: 92 FL (ref 82–98)
MONOCYTES # BLD AUTO: 0.26 THOUSAND/ΜL (ref 0.17–1.22)
MONOCYTES NFR BLD AUTO: 4 % (ref 4–12)
MRSA NOSE QL CULT: NORMAL
NEUTROPHILS # BLD AUTO: 5.27 THOUSANDS/ΜL (ref 1.85–7.62)
NEUTS SEG NFR BLD AUTO: 82 % (ref 43–75)
NRBC BLD AUTO-RTO: 0 /100 WBCS
PLATELET # BLD AUTO: 363 THOUSANDS/UL (ref 149–390)
PMV BLD AUTO: 9.3 FL (ref 8.9–12.7)
POTASSIUM SERPL-SCNC: 3.8 MMOL/L (ref 3.5–5.3)
PROCALCITONIN SERPL-MCNC: 0.06 NG/ML
PROT SERPL-MCNC: 6 G/DL (ref 6.4–8.4)
RBC # BLD AUTO: 3.97 MILLION/UL (ref 3.81–5.12)
SODIUM SERPL-SCNC: 132 MMOL/L (ref 135–147)
WBC # BLD AUTO: 6.43 THOUSAND/UL (ref 4.31–10.16)

## 2023-04-25 PROCEDURE — 0W9930Z DRAINAGE OF RIGHT PLEURAL CAVITY WITH DRAINAGE DEVICE, PERCUTANEOUS APPROACH: ICD-10-PCS | Performed by: RADIOLOGY

## 2023-04-25 RX ORDER — MIDAZOLAM HYDROCHLORIDE 2 MG/2ML
INJECTION, SOLUTION INTRAMUSCULAR; INTRAVENOUS AS NEEDED
Status: COMPLETED | OUTPATIENT
Start: 2023-04-25 | End: 2023-04-25

## 2023-04-25 RX ORDER — ONDANSETRON HYDROCHLORIDE 8 MG/1
8 TABLET, FILM COATED ORAL EVERY 8 HOURS PRN
Qty: 30 TABLET | Refills: 0 | Status: SHIPPED | OUTPATIENT
Start: 2023-04-25 | End: 2023-05-25

## 2023-04-25 RX ORDER — FENTANYL CITRATE 50 UG/ML
INJECTION, SOLUTION INTRAMUSCULAR; INTRAVENOUS AS NEEDED
Status: COMPLETED | OUTPATIENT
Start: 2023-04-25 | End: 2023-04-25

## 2023-04-25 RX ADMIN — GUAIFENESIN 600 MG: 600 TABLET ORAL at 09:26

## 2023-04-25 RX ADMIN — BUPROPION HYDROCHLORIDE 300 MG: 300 TABLET, FILM COATED, EXTENDED RELEASE ORAL at 09:26

## 2023-04-25 RX ADMIN — DOCUSATE SODIUM 100 MG: 100 CAPSULE, LIQUID FILLED ORAL at 09:26

## 2023-04-25 RX ADMIN — ONDANSETRON 4 MG: 2 INJECTION INTRAMUSCULAR; INTRAVENOUS at 13:13

## 2023-04-25 RX ADMIN — MIDAZOLAM 0.5 MG: 1 INJECTION INTRAMUSCULAR; INTRAVENOUS at 14:00

## 2023-04-25 RX ADMIN — DOCUSATE SODIUM 100 MG: 100 CAPSULE, LIQUID FILLED ORAL at 17:12

## 2023-04-25 RX ADMIN — FENTANYL CITRATE 50 MCG: 50 INJECTION, SOLUTION INTRAMUSCULAR; INTRAVENOUS at 14:00

## 2023-04-25 RX ADMIN — MIDAZOLAM 0.5 MG: 1 INJECTION INTRAMUSCULAR; INTRAVENOUS at 14:07

## 2023-04-25 RX ADMIN — LOSARTAN POTASSIUM 100 MG: 50 TABLET, FILM COATED ORAL at 09:26

## 2023-04-25 RX ADMIN — UMECLIDINIUM BROMIDE AND VILANTEROL TRIFENATATE 1 PUFF: 62.5; 25 POWDER RESPIRATORY (INHALATION) at 09:27

## 2023-04-25 RX ADMIN — FENTANYL CITRATE 50 MCG: 50 INJECTION, SOLUTION INTRAMUSCULAR; INTRAVENOUS at 14:07

## 2023-04-25 RX ADMIN — HYDROCHLOROTHIAZIDE 25 MG: 25 TABLET ORAL at 09:26

## 2023-04-25 RX ADMIN — Medication 10 ML: at 14:09

## 2023-04-25 RX ADMIN — VERAPAMIL HYDROCHLORIDE 360 MG: 180 TABLET ORAL at 09:26

## 2023-04-25 RX ADMIN — SERTRALINE HYDROCHLORIDE 200 MG: 100 TABLET ORAL at 09:26

## 2023-04-25 RX ADMIN — SENNOSIDES 8.6 MG: 8.6 TABLET, FILM COATED ORAL at 09:26

## 2023-04-25 RX ADMIN — Medication 10 ML: at 14:08

## 2023-04-25 RX ADMIN — FLUTICASONE PROPIONATE 2 PUFF: 220 AEROSOL, METERED RESPIRATORY (INHALATION) at 09:27

## 2023-04-25 NOTE — BRIEF OP NOTE (RAD/CATH)
INTERVENTIONAL RADIOLOGY PROCEDURE NOTE    Date: 4/25/2023    Procedure: Tunnelled right pleural catheter  Procedure Summary     Date:  Room / Location:     Anesthesia Start:  Anesthesia Stop:     Procedure:  Diagnosis:     Scheduled Providers:  Responsible Provider:     Anesthesia Type: Not recorded ASA Status: Not recorded          Preoperative diagnosis:   1  Generalized weakness    2  Immunocompromised state ([address] )    3  Shortness of breath    4  Hyponatremia    5  Pleural effusion    6  Elevated LFTs    7  Non-small cell cancer of right lung (HCC)         Postoperative diagnosis: Same  Surgeon: Shruti Oswald MD     Assistant: None  No qualified resident was available  Blood loss: None    Specimens: None     Findings: Right Asept catheter placed and 1500cc of serous fluid aspirated at which time patient started coughing  The patient has shortness of breath secondary to accumulation of pleural fluid  The patient had a Pleural Asept catheter placed and requires Home Health for teaching and catheter management  Complications: None immediate      Anesthesia: local and IV sedation

## 2023-04-25 NOTE — DISCHARGE INSTRUCTIONS
How to Care for Your Chest or Abdominal Catheter   WHAT YOU NEED TO KNOW:   A chest or abdominal catheter helps remove fluid from your chest or abdomen  This may decrease symptoms such as shortness of breath, pain, or nausea  One end of the catheter sits inside your abdomen or chest  The other end sits outside of your body  Your healthcare provider will show you or your caregiver how to drain fluid through the catheter  DISCHARGE INSTRUCTIONS:   Call 911 or have someone else call for any of the following: You have trouble breathing  You faint or lose consciousness when you drain your catheter  Seek care immediately if:   Your catheter comes out  You feel weak, dizzy, or faint  You have severe pain or shortness of breath when you drain fluid or after you stop draining  Contact your healthcare provider if:   Your symptoms, such as pain or shortness of breath, do not get better after you drain fluid  You have redness, pain, or pus where the catheter is located  You have a fever  You cannot drain fluid  You see a change in the color of fluid that you drain  You see fluid leaking from around your catheter  You drain foul-smelling fluid or bloody fluid from your catheter  You see a hole or tear in your catheter and need to use the emergency clip  You have questions or concerns about your condition or care  Inteventional Radiology [phone]  How often you should drain fluid: Your healthcare provider will tell you how often to drain fluid  He may tell you to follow a schedule, such as draining once a day or once every other day  He may instead tell you to drain fluid when you have symptoms such as pain  Before you drain fluid from your catheter:   Wash your hands  Remove all rings  Use soap and water or an alcohol-based hand rub  This will help prevent an infection  Gather your supplies  Get a drainage kit and place it on a firm, clean surface   Drainage kits contain either a 500 mL or 1000 mL bottle and a procedure kit  Your healthcare provider will tell you which bottle to use  Gently remove the old bandage  Do not pull on the drain when you remove the bandage  Throw the bandage away  Wash your hands a second time  Use soap and water or an alcohol-based hand rub  Open the drainage kit and remove the procedure kit  Remove the bandage and place it on your clean surface  Leave the bottle with drainage tubing in the package  Remove the procedure kit and place it on your clean surface with the folded side facing up  Carefully unfold the corners of the procedure kit  Do not touch anything inside of the procedure kit  Everything inside of the procedure kit is sterile  Prepare the drainage tubing and bottle  Uncoil the drainage tubing that is connected to the bottle  Do not touch the end of the drainage tubing  Do not remove the cover from the end of the drainage tubing  Lay the drainage tubing on the procedure kit  Put on gloves  Both gloves fit either hand   each glove up by the folded part and put them on  Do not touch any part of the outside of the gloves with your bare hand  Do not let them touch anything that is not sterile  Open the smaller packages inside of the procedure kit  Open the package that contains the new catheter cap  Let the cap gently fall onto the procedure kit  Tear open the alcohol pads, but do not remove them from their pouches  Squeeze or roll the clamp closed on the drainage tubing  If the clamp rolls, roll it towards the bottle  Remove the cap on the end of your catheter  Hold the catheter close to the cap  Twist the cap counter clockwise and pull it off gently  Throw the cap away  Do not let the end of the catheter touch anything  Clean the end of the catheter  Use 1 alcohol pad from the procedure kit  Wipe around the end of the catheter in circles   Do not put anything into the end of the catheter to clean it  This could damage the catheter  How to drain fluid from your catheter:   Connect the end of your catheter to the drainage tubing  Remove the cover from the end of the drainage tubing  Hold the end of your catheter in one hand and the drainage tubing in your other hand  Insert the drainage tubing into your catheter until you hear or feel a click  Remove the lock clip on the bottle  Twist and pull gently to remove it  Push down on the bottle's plunger  Use 1 hand to hold the bottle steady  Push down gently on the T-shaped plunger at the top of the bottle  This will create a vacuum inside the bottle and help drain fluid  Keep the bottle on a firm surface  Open the clamp on the drainage tubing  This will start the flow of fluid  Drain as much fluid as directed  Do not drain more than 1000 mL of fluid from a chest catheter  Do not remove more than 2000 mL of fluid from an abdominal catheter  To make the fluid drain slower, roll the clamp toward the bottle or gently squeeze the clamp  To make the fluid drain faster, slide the clamp away from the bottle or slowly release the clamp  It is normal to feel pain or cough when fluid is drained  To decrease pain or coughing, slow down the flow of fluid  You can also close the clamp and take a break  If your symptoms do not get better when you stop draining, do not continue to drain fluid  Change the bottle when it is full  If you need to use a second bottle, close the clamp on the drainage tubing  This will stop fluid from draining  Hold the end of your catheter  Pull out the end of the drainage tubing from your catheter  Do not let the end of your catheter touch anything  Remove the cap on the end of the new drainage tubing  Insert the drainage tubing into your catheter  Remove the support clip on the bottle  Push down on the bottle plunger  Open the clamp on the drainage tubing  Continue to remove as much fluid as directed  Close the clamp on the drainage tubing to stop fluid from draining  Check that the clamp is completely closed  Pull out the end of the drainage tubing from your catheter  Do not let the end of your catheter touch anything  Clean the end of your catheter with a new alcohol pad  This will help prevent infection  Place the new cap over the end of your catheter  Twist it clockwise until you hear or feel a click  After you drain fluid from your catheter:   Clean the skin around your catheter with a new alcohol pad  Start closest to where the catheter is inserted in your skin  Move the alcohol pad in circles away from your catheter  Place the foam pad around your catheter  The foam pad should have a small cut in it  This cut lets you fit the foam pad around your catheter  Loop the end of the catheter  Place the looped catheter on top of the foam pad  Hold it on top of the foam pad  Place the gauze from your procedure kit over the catheter  Make sure the catheter is completely covered by the gauze  Remove your gloves  This will make it easier to handle the clear sticky bandage  Place the sticky bandage over the gauze  There are 3 layers you need to remove from the bandage  Remove the larger white layer from the bandage  Place the bandage over your gauze so the gauze is in the center of the bandage  Hold one corner of the bandage and remove the larger clear layer of the bandage  Remove the last white layer of the bandage  Press gently on all corners of the bandage to help it stick to your skin  Write down the amount of fluid you drained  There are measurements on the side of the bottle  Also write down the color of the fluid, the date, and the time  Bring this record with you to your follow-up visits  Empty the fluid from the bottle  Hold the bottle steady with one hand  Push down on the plunger at the top of the bottle  Move the plunger in circles   Open the clamp on the drainage tubing for 10 seconds and then close it  Pull back the plunger  Push sideways and down on the side of the bottle cap to loosen it  Remove the drainage tubing from the bottle  Empty the bottle of fluid into the toilet  Place the drainage tubing and bottle in a sealable plastic bag  Throw the bag away with your regular garbage  Never recycle the plastic bottle  Other important information:   If your catheter comes out, cover the opening in your skin with sterile gauze and a bandage  Use the sterile gauze and bandage from your drainage kit  Do not take a bath or swim in hot tubs or pools  This can cause an infection  You can shower or take a sponge bath  Make sure your bandage covers your catheter before you bathe  The edges of the bandage should make contact with your skin on all sides  This will prevent water from getting into your drain  If your bandage gets wet, remove the bandage  Clean your skin around the catheter and replace the bandage as directed  Follow up with your healthcare provider as directed: Write down your questions so you remember to ask them during your visits  © 2017 [facility] Information is for End User's use only and may not be sold, redistributed or otherwise used for commercial purposes  All illustrations and images included in CareNotes® are the copyrighted property of [employer]  or [employer]  The above information is an  only  It is not intended as medical advice for individual conditions or treatments  Talk to your doctor, nurse or pharmacist before following any medical regimen to see if it is safe and effective for you

## 2023-04-25 NOTE — PLAN OF CARE
Problem: Potential for Falls  Goal: Patient will remain free of falls  Description: INTERVENTIONS:  - Educate patient/family on patient safety including physical limitations  - Instruct patient to call for assistance with activity   - Consult OT/PT to assist with strengthening/mobility   - Keep Call bell within reach  - Keep bed low and locked with side rails adjusted as appropriate  - Keep care items and personal belongings within reach  - Initiate and maintain comfort rounds  - Make Fall Risk Sign visible to staff  - Offer Toileting every 2 Hours, in advance of need    - Apply yellow socks and bracelet for high fall risk patients  - Consider moving patient to room near nurses station  Outcome: Progressing     Problem: PAIN - ADULT  Goal: Verbalizes/displays adequate comfort level or baseline comfort level  Description: Interventions:  - Encourage patient to monitor pain and request assistance  - Assess pain using appropriate pain scale  - Administer analgesics based on type and severity of pain and evaluate response  - Implement non-pharmacological measures as appropriate and evaluate response  - Consider cultural and social influences on pain and pain management  - Notify physician/advanced practitioner if interventions unsuccessful or patient reports new pain  Outcome: Progressing     Problem: INFECTION - ADULT  Goal: Absence or prevention of progression during hospitalization  Description: INTERVENTIONS:  - Assess and monitor for signs and symptoms of infection  - Monitor lab/diagnostic results  - Monitor all insertion sites, i e  indwelling lines, tubes, and drains  - Monitor endotracheal if appropriate and nasal secretions for changes in amount and color  - [place] appropriate cooling/warming therapies per order  - Administer medications as ordered  - Instruct and encourage patient and family to use good hand hygiene technique  - Identify and instruct in appropriate isolation precautions for identified infection/condition  Outcome: Progressing  Goal: Absence of fever/infection during neutropenic period  Description: INTERVENTIONS:  - Monitor WBC    Outcome: Progressing     Problem: SAFETY ADULT  Goal: Patient will remain free of falls  Description: INTERVENTIONS:  - Educate patient/family on patient safety including physical limitations  - Instruct patient to call for assistance with activity   - Consult OT/PT to assist with strengthening/mobility   - Keep Call bell within reach  - Keep bed low and locked with side rails adjusted as appropriate  - Keep care items and personal belongings within reach  - Initiate and maintain comfort rounds  - Make Fall Risk Sign visible to staff  - Offer Toileting every 2 Hours, in advance of need    - Apply yellow socks and bracelet for high fall risk patients  - Consider moving patient to room near nurses station  Outcome: Progressing  Goal: Maintain or return to baseline ADL function  Description: INTERVENTIONS:  - Educate patient/family on patient safety including physical limitations  - Instruct patient to call for assistance with activity   - Consult OT/PT to assist with strengthening/mobility   - Keep Call bell within reach  - Keep bed low and locked with side rails adjusted as appropriate  - Keep care items and personal belongings within reach  - Initiate and maintain comfort rounds  - Make Fall Risk Sign visible to staff  - Offer Toileting every 2 Hours, in advance of need    - Apply yellow socks and bracelet for high fall risk patients  - Consider moving patient to room near nurses station  Outcome: Progressing  Goal: Maintains/Returns to pre admission functional level  Description: INTERVENTIONS:  - Perform BMAT or MOVE assessment daily    - Set and communicate daily mobility goal to care team and patient/family/caregiver  - Collaborate with rehabilitation services on mobility goals if consulted  - Perform Range of Motion 2 times a day    - Reposition patient every 2 hours   - Dangle patient 2 times a day  - Stand patient 2 times a day  - Ambulate patient 2 times a day  - Out of bed to chair 2 times a day   - Out of bed for meals 2 times a day  - Out of bed for toileting  - Record patient progress and toleration of activity level   Outcome: Progressing     Problem: DISCHARGE PLANNING  Goal: Discharge to home or other facility with appropriate resources  Description: INTERVENTIONS:  - Identify barriers to discharge w/patient and caregiver  - Arrange for needed discharge resources and transportation as appropriate  - Identify discharge learning needs (meds, wound care, etc )  - Arrange for interpretive services to assist at discharge as needed  - Refer to Case Management Department for coordinating discharge planning if the patient needs post-hospital services based on physician/advanced practitioner order or complex needs related to functional status, cognitive ability, or social support system  Outcome: Progressing     Problem: Knowledge Deficit  Goal: Patient/family/caregiver demonstrates understanding of disease process, treatment plan, medications, and discharge instructions  Description: Complete learning assessment and assess knowledge base  Interventions:  - Provide teaching at level of understanding  - Provide teaching via preferred learning methods  Outcome: Progressing     Problem: Nutrition/Hydration-ADULT  Goal: Nutrient/Hydration intake appropriate for improving, restoring or maintaining nutritional needs  Description: Monitor and assess patient's nutrition/hydration status for malnutrition  Collaborate with interdisciplinary team and initiate plan and interventions as ordered  Monitor patient's weight and dietary intake as ordered or per policy  Utilize nutrition screening tool and intervene as necessary  Determine patient's food preferences and provide high-protein, high-caloric foods as appropriate       INTERVENTIONS:  - Monitor oral intake, urinary output, labs, and treatment plans  - Assess nutrition and hydration status and recommend course of action  - Evaluate amount of meals eaten  - Assist patient with eating if necessary   - Allow adequate time for meals  - Recommend/ encourage appropriate diets, oral nutritional supplements, and vitamin/mineral supplements  - Order, calculate, and assess calorie counts as needed  - Recommend, monitor, and adjust tube feedings and TPN/PPN based on assessed needs  - Assess need for intravenous fluids  - Provide specific nutrition/hydration education as appropriate  - Include patient/family/caregiver in decisions related to nutrition  Outcome: Progressing

## 2023-04-25 NOTE — SEDATION DOCUMENTATION
Right ASEPT catheter placed in IR by Dr Mina Restrepo  [facility] start time [id number]

## 2023-04-25 NOTE — INCIDENTAL FINDINGS
The following findings require follow up:  Radiographic finding   Finding: Again seen ill-defined right upper lobe mass extending to the hilum in keeping with known malignancy grossly similar to prior PET/CT 3/21/2023  Surrounding patchy opacities may be due to lymphangitic spread of tumor, atelectasis, as well as pneumonia in the appropriate clinical context  This is similar to slightly increased from prior PET/CT  Additional bilateral lung opacities, continued attention on follow up  Again noted right hilar lymphadenopathy      Follow up: outpatient lung cancer surveillance per outpatient oncology     Please notify the following clinician to assist with the follow up:   Dr Donna Mensah

## 2023-04-25 NOTE — ASSESSMENT & PLAN NOTE
Presented for SOB/chest discomfort following initiation of chemotherapy on 4/20  Stable on RA  · S/P thoracentesis x 3 (3/24, 4/12, 4/18) since March in setting of recurrent R pleural effusion likely due to lung cancer  · Last thoracentesis 4/18, 1500 cc removed  · CTA PE: Large R pleural effusion   · Pulmonology following  · S/P IR asept catheter placement 4/25  · Repeat CXR- decreased size R pleural effusion, no pneumothorax  · Cleared for DC by pulm today 4/25, OP follow up on 5/2  · Monitor O2 sats  · Home VNA with Della Patience established by CM --> will have drainage every other day, no more than 1000 cc drained per session  Next expected drainage on 4/27 at earliest  Supplies provided at discharge

## 2023-04-25 NOTE — DISCHARGE SUMMARY
[place]  Discharge- Diomedes Alfredo 1960, 57 y o  female MRN: [id number]  Unit/Bed#: -01 Encounter: [id number]  Primary Care Provider: CAT Hernandez   Date and time admitted to hospital: 4/23/2023 11:52 AM    * Pleural effusion  Assessment & Plan  Presented for SOB/chest discomfort following initiation of chemotherapy on 4/20  Stable on RA  · S/P thoracentesis x 3 (3/24, 4/12, 4/18) since March in setting of recurrent R pleural effusion likely due to lung cancer  · Last thoracentesis 4/18, 1500 cc removed  · CTA PE: Large R pleural effusion   · Pulmonology following  · S/P IR asept catheter placement 4/25  · Repeat CXR- decreased size R pleural effusion, no pneumothorax  · Cleared for DC by pulm today 4/25, OP follow up on 5/2  · Monitor O2 sats  · Home VNA with Catha Stalls established by CM --> will have drainage every other day, no more than 1000 cc drained per session  Next expected drainage on 4/27 at earliest  Supplies provided at discharge  Non-small cell cancer of right lung [facility])  Assessment & Plan  S/P prior right lower lobe resection due to lung cancer years ago, recently diagnosed with recurrent lung cancer, NSCLC  · Patient follows with Dr Bhumika Amaya, recurrence was diagnosed at the end of March 2023  · First round of chemotherapy initiated on 4/20/23  · CTA PE study: Again seen ill-defined RUL mass extending to the hilum in keeping with known malignancy grossly similar to prior PET/CT 3/21/2023  Surrounding patchy opacities may be due to lymphangitic spread of tumor, atelectasis, as well as pneumonia in the appropriate clinical context  This is similar to slightly increased from prior PET/CT  Additional bilateral lung opacities, continued attention on follow up  Again noted right hilar lymphadenopathy    · Next OP appt tomorrow morning     SOB (shortness of breath)  Assessment & Plan  · CTA PE without evidence of PE, shows R pleural effusion + "stable known lung mass  Also noted: \" Surrounding patchy opacities may be due to lymphangitic spread of tumor, atelectasis, as well as pneumonia in the appropriate clinical context  This is similar to sightly increased from prior PET/CT  \"  · Patient does not meet any SIRS criteria however is immunocompromised   · Clinically, no changes to chronic cough, chest pain, or other concern for PNA  · Procal neg x 1  · Covid neg, trop WNL  · ED started cefepime/vanc --> ABX DC'd as no evidence of infection and procal neg x 2  · BC neg x 24 hours       CINV (chemotherapy-induced nausea and vomiting)  Assessment & Plan  · 1st chemo session 4/20, has been experiencing N/V mildly since however tolerating PO diet   · Also notes constipation   · Will treat supportively with antiemetics (), soft diet, bowel regimen    Essential hypertension  Assessment & Plan  · Continue home verapamil 360 mg daily, losartan 100 mg daily, HCTZ 25 mg daily  · Blood pressure controlled    COPD (chronic obstructive pulmonary disease) ([facility] [address] )  Assessment & Plan  · No acute exacerbation  · Nebs as needed      Medical Problems     Resolved Problems  Date Reviewed: 4/25/2023   None       Discharging Physician / Practitioner: Selena Coreas PA-C  PCP: CAT Hammond  Admission Date:   Admission Orders (From admission, onward)     Ordered        04/23/23 1719  INPATIENT ADMISSION  Once                      Discharge Date: 04/25/23    Consultations During Hospital Stay:  · Pulmonology     Procedures Performed:   · Asept catheter placement with IR on 4/25     Significant Findings / Test Results:   XR chest portable   Final Result by Rolanda Galan MD (04/25 6905)   Moderate right pleural effusion is slightly decreased in size     Right perihilar opacity is similar to prior study, please refer to prior CT for further discussion         Workstation performed: [id number]         IR pleural effusion long-term catheter placement   Final " Result by Juanita Prasad MD (04/25 3044)   Impression:   1  Successful placement of a 16 Bulgarian tunneled Asept catheter into the right pleural cavity  2  Successful thorocentesis of 1500 cc's of serous yellow pleural fluid  Workstation performed: [id number]         CTA ED chest PE Study   Final Result by Kiana Mcconnell MD (04/23 1626)      1  Again seen ill-defined right upper lobe mass extending to the hilum in keeping with known malignancy grossly similar to prior PET/CT 3/21/2023  Surrounding patchy opacities may be due to lymphangitic spread of tumor, atelectasis, as well as    pneumonia in the appropriate clinical context  This is similar to slightly increased from prior PET/CT  Additional bilateral lung opacities, continued attention on follow up  2   Again noted right hilar lymphadenopathy  3   Large right pleural effusion similar to prior PET/CT 3/31/2023  4   No evidence of pulmonary embolism  The study was marked in [facility] for immediate notification  Workstation performed: [id number]         XR chest 1 view portable   Final Result by Jair Tarango MD (04/23 9411)      Right pleural effusion  Workstation performed: [id number]           ·     Incidental Findings:   · None     Test Results Pending at Discharge (will require follow up): · None      Outpatient Tests Requested:  · None     Complications:  None     Reason for Admission: SOB     Hospital Course:   Therese Heard is a 57 y o  female patient with PMH of HTN, H LD, tobacco use, non-small cell lung cancer currently undergoing chemotherapy  who originally presented to the hospital on 4/23/2023 due to SOB, fatigue, N/V  CTA PE study on admission showed recurrent large R pleural effusion as well as stable known hilar lymphadenopathy and lung cancer   Patient notably has had 3 thoracentesis procedures since march for recurrent R pleural effusion and had one scheduled the "following week however was not able to make it to next appt given her symptoms  Pulmonology recommended placement of asept catheter rather than repeat thoracentesis  This was performed by IR on 3/25, 1500 cc pleural fluid drained today  CXR following asept catheter showed slightly decreased size of R pleural effusion, no pneumothorax  Patient received catheter/drainage material and education prior to discharge  Throughout admission she remained stable on RA without additional complaints/lab abnormalities  Post chemo nausea was relieved by zofran  She will be discharged home with Home VNA for tube care  She has follow up appointment scheduled with pulmonology on may 2  She has follow up with heme onc tomorrow  Please see above list of diagnoses and related plan for additional information  Condition at Discharge: stable    Discharge Day Visit / Exam:   Subjective:  Patient feeling well, breathing improved following placement of asept catheter  Mild tenderness around site  Vitals: Blood Pressure: 120/56 (04/25/23 1448)  Pulse: 79 (04/25/23 1448)  Temperature: 98 7 °F (37 1 °C) (04/25/23 1448)  Temp Source: Oral (04/25/23 1448)  Respirations: 15 (04/25/23 1419)  Height: 5' 4 5\" (163 8 cm) (04/23/23 1157)  Weight - Scale: 81 5 kg (179 lb 9 6 oz) (04/25/23 0541)  SpO2: 90 % (04/25/23 1448)  Exam:   Physical Exam  Vitals and nursing note reviewed  Constitutional:       General: She is not in acute distress  Appearance: She is well-developed  HENT:      Head: Normocephalic and atraumatic  Eyes:      General:         Right eye: No discharge  Left eye: No discharge  Extraocular Movements: Extraocular movements intact  Conjunctiva/sclera: Conjunctivae normal    Cardiovascular:      Rate and Rhythm: Normal rate and regular rhythm  Heart sounds: No murmur heard  Pulmonary:      Effort: Pulmonary effort is normal  No respiratory distress  Breath sounds: Normal breath sounds   No " wheezing, rhonchi or rales  Comments: Stable on RA, BS on R improved after catheter placement  Aspet in place  Abdominal:      General: Bowel sounds are normal  There is no distension  Palpations: Abdomen is soft  Tenderness: There is no abdominal tenderness  Musculoskeletal:      Cervical back: Neck supple  Right lower leg: No edema  Left lower leg: No edema  Skin:     General: Skin is warm and dry  Capillary Refill: Capillary refill takes less than 2 seconds  Neurological:      General: No focal deficit present  Mental Status: She is alert and oriented to person, place, and time  Mental status is at baseline  Cranial Nerves: No cranial nerve deficit  Psychiatric:         Mood and Affect: Mood normal          Behavior: Behavior normal           Discussion with Family: Updated  () at bedside  Discharge instructions/Information to patient and family:   See after visit summary for information provided to patient and family  Provisions for Follow-Up Care:  See after visit summary for information related to follow-up care and any pertinent home health orders  Disposition:   Home with VNA Services (Reminder: Complete face to face encounter)    Planned Readmission: None      Discharge Statement:  I spent 65 minutes discharging the patient  This time was spent on the day of discharge  I had direct contact with the patient on the day of discharge  Greater than 50% of the total time was spent examining patient, answering all patient questions, arranging and discussing plan of care with patient as well as directly providing post-discharge instructions  Additional time then spent on discharge activities  Discharge Medications:  See after visit summary for reconciled discharge medications provided to patient and/or family        **Please Note: This note may have been constructed using a voice recognition system**

## 2023-04-25 NOTE — DISCHARGE INSTR - AVS FIRST PAGE
Please follow up with PCP within 1 week, we sent a message to your PCP with details regarding your treatment  Please follow up with Pulmonology at scheduled appointment on May 2, 2023  Please follow up with your oncologist tomorrow April 26, 2023    Please continue all previous home medications  You may treat pain and tenderness around site with tylenol/ibuprofen as needed  Please drain asept catheter with assistance from home nursing every other day beginning on 4/27 at the earliest   Please do not drain more than 1000 cc fluid in 1 session  Please refer to instruction sheet provided by IR

## 2023-04-25 NOTE — ASSESSMENT & PLAN NOTE
"· CTA PE without evidence of PE, shows R pleural effusion + stable known lung mass  Also noted: \" Surrounding patchy opacities may be due to lymphangitic spread of tumor, atelectasis, as well as pneumonia in the appropriate clinical context  This is similar to sightly increased from prior PET/CT  \"  · Patient does not meet any SIRS criteria however is immunocompromised   · Clinically, no changes to chronic cough, chest pain, or other concern for PNA  · Procal neg x 1  · Covid neg, trop WNL  · ED started cefepime/vanc --> ABX DC'd as no evidence of infection and procal neg x 2  · BC neg x 24 hours     "

## 2023-04-25 NOTE — ASSESSMENT & PLAN NOTE
S/P prior right lower lobe resection due to lung cancer years ago, recently diagnosed with recurrent lung cancer, NSCLC  · Patient follows with Dr Jonah Martinez, recurrence was diagnosed at the end of March 2023  · First round of chemotherapy initiated on 4/20/23  · CTA PE study: Again seen ill-defined RUL mass extending to the hilum in keeping with known malignancy grossly similar to prior PET/CT 3/21/2023  Surrounding patchy opacities may be due to lymphangitic spread of tumor, atelectasis, as well as pneumonia in the appropriate clinical context  This is similar to slightly increased from prior PET/CT  Additional bilateral lung opacities, continued attention on follow up  Again noted right hilar lymphadenopathy    · Next OP appt tomorrow morning

## 2023-04-25 NOTE — CASE MANAGEMENT
Case Management Discharge Planning Note    Patient name Toribio Ochoa  Location /-63 MRN [id number]  : 1960 Date 2023       Current Admission Date: 2023  Current Admission Diagnosis:Pleural effusion   Patient Active Problem List    Diagnosis Date Noted   • Pleural effusion 2023   • SOB (shortness of breath) 2023   • CINV (chemotherapy-induced nausea and vomiting) 2023   • Non-small cell cancer of right lung ([facility] [address] ) 2023   • Fall at home, initial encounter 2022   • Closed fracture of multiple ribs of left side 2022   • Tobacco abuse 2022   • COPD (chronic obstructive pulmonary disease) ([facility] [address] ) 2022   • Hypercholesterolemia 2022   • Essential hypertension 2022   • Adhesive capsulitis of left shoulder 2021   • Left shoulder tendinitis 2021   • Impingement syndrome of left shoulder 2021      LOS (days): 2  Geometric Mean LOS (GMLOS) (days):   Days to GMLOS:     OBJECTIVE:  Risk of Unplanned Readmission Score: 20 47      Current admission status: Inpatient   Preferred Pharmacy:   [facility]pharmacy [id number] [id number]  Phone: [phone] Fax: [phone]    Primary Care Provider: CAT Nash    Primary Insurance: BLUE CROSS  Secondary Insurance:     DISCHARGE DETAILS:    Discharge planning discussed with[de-identified] patient  Freedom of Choice: Yes  Comments - Freedom of Choice: Home RN scheduled through [facility]    CM contacted family/caregiver?: No- see comments (pt is alert and oriented)  Were Treatment Team discharge recommendations reviewed with patient/caregiver?: Yes  Did patient/caregiver verbalize understanding of patient care needs?: Yes  Were patient/caregiver advised of the risks associated with not following Treatment Team discharge recommendations?: Yes    Requested  [address]         Is the patient interested in [facility] at discharge?: Yes  Home Health Discipline requested[de-identified] [address] Name[de-identified] [facility] Provider[de-identified] PCP  Home Health Services Needed[de-identified] Other (comment) (asept cath drainage)  Homebound Criteria Met[de-identified] Uses an Assist Device (i e  cane, walker, etc), Requires the Assistance of Another Person for Safe Ambulation or to Leave the Home  Supporting Clincal Findings[de-identified] Limited Endurance, Fatigues Easliy in [employer]    DME Referral Provided  Referral made for DME?: No    Other Referral/Resources/Interventions Provided:  Interventions: HHC  Referral Comments: [facility]    Treatment Team Recommendation: Home with [address]  Discharge Destination Plan[de-identified] Home with [place] at Discharge : Family     Additional Comments: AR[place]INA placed [facility] on AVS  [place] will [facility] pharmacy at [phone] once cm is aware what time pt is leaving today

## 2023-04-25 NOTE — PROGRESS NOTES
"Pulmonary Progress Note  Cruz Rivers 57 y o  female MRN: [id number]  Unit/Bed#: -01 Encounter: [id number]      Impressions:    1  Acute respiratory insufficiency secondary to large metastatic recurrent right pleural effusion  • Plan for aseptic pleural catheter placement by IR today  • Initial drainage every other day by VNA as an outpatient     2  Metastatic adenocarcinoma right lung  • Outpatient follow-up with oncology for ongoing treatment     3  COPD/emphysema without acute exacerbation  • Anoro daily, Flovent twice daily, albuterol as needed (Trelegy not covered by her insurance)     4  Nicotine dependence-recent smoking cessation  • Encourage ongoing smoking cessation    After aseptic pleural catheter placement today, patient will be stable for discharge home from a pulmonary standpoint  She will follow-up with pulmonary as per discharge instructions    Chief Complaint:  Doing better    Subjective:  Overall the patient states that she is doing better  While in bed she has no significant respiratory limitations  She occasionally still feels like she is unable to take a deep breath  Vitals: Blood pressure 137/80, pulse 94, temperature 98 8 °F (37 1 °C), resp  rate 16, height 5' 4 5\" (1 638 m), weight 81 5 kg (179 lb 9 6 oz), SpO2 92 %  ,  Room air, Body mass index is 30 35 kg/m²        Intake/Output Summary (Last 24 hours) at 4/25/2023 0912  Last data filed at 4/24/2023 2018  Gross per 24 hour   Intake 480 ml   Output --   Net 480 ml       Physical exam:  General:  Patient is awake, alert, non-toxic and in no acute respiratory distress  Neck: No JVD  CV:  Regular, +S1 and S2, No murmurs, gallops or rubs appreciated  Lungs: Diminished breath sounds right lung base, otherwise lungs are clear without wheeze or Rales  Abdomen: Soft, +BS, Non-tender, non-distended  Extremities: No clubbing, cyanosis or edema  Neuro: No focal deficits    Labs:   CBC:   Lab Results   Component Value Date    WBC 6 43 " "04/25/2023    HGB 12 3 04/25/2023    HCT 36 7 04/25/2023    MCV 92 04/25/2023     04/25/2023    MCH 31 0 04/25/2023    MCHC 33 5 04/25/2023    RDW 11 9 04/25/2023    MPV 9 3 04/25/2023    NRBC 0 04/25/2023   , CMP:   Lab Results   Component Value Date    SODIUM 132 (L) 04/25/2023    K 3 8 04/25/2023    CL 98 04/25/2023    CO2 28 04/25/2023    BUN 11 04/25/2023    CREATININE 0 43 (L) 04/25/2023    CALCIUM 8 8 04/25/2023    AST 24 04/25/2023    ALT 40 04/25/2023    ALKPHOS 87 04/25/2023    EGFR 107 04/25/2023       [place], DO      Portions of the record may have been created with voice recognition software  Occasional wrong word or \"sound a like\" substitutions may have occurred due to the inherent limitations of voice recognition software  Read the chart carefully and recognize, using context, where substitutions have occurred      "

## 2023-04-26 ENCOUNTER — TELEPHONE (OUTPATIENT)
Age: 63
End: 2023-04-26

## 2023-04-26 ENCOUNTER — OFFICE VISIT (OUTPATIENT)
Age: 63
End: 2023-04-26

## 2023-04-26 VITALS
HEIGHT: 64 IN | RESPIRATION RATE: 18 BRPM | OXYGEN SATURATION: 94 % | WEIGHT: 177 LBS | HEART RATE: 96 BPM | TEMPERATURE: 98 F | SYSTOLIC BLOOD PRESSURE: 120 MMHG | BODY MASS INDEX: 30.22 KG/M2 | DIASTOLIC BLOOD PRESSURE: 90 MMHG

## 2023-04-26 DIAGNOSIS — C34.91 NON-SMALL CELL CANCER OF RIGHT LUNG (HCC): Primary | ICD-10-CM

## 2023-04-26 LAB
ATRIAL RATE: 86 BPM
P AXIS: 70 DEGREES
PR INTERVAL: 160 MS
QRS AXIS: -17 DEGREES
QRSD INTERVAL: 78 MS
QT INTERVAL: 372 MS
QTC INTERVAL: 445 MS
T WAVE AXIS: 71 DEGREES
VENTRICULAR RATE: 86 BPM

## 2023-04-26 RX ORDER — FOLIC ACID 1 MG/1
1 TABLET ORAL DAILY
Qty: 90 TABLET | Refills: 1 | Status: SHIPPED | OUTPATIENT
Start: 2023-04-26

## 2023-04-26 RX ORDER — SODIUM CHLORIDE 9 MG/ML
20 INJECTION, SOLUTION INTRAVENOUS ONCE
OUTPATIENT
Start: 2023-05-11

## 2023-04-26 NOTE — UTILIZATION REVIEW
NOTIFICATION OF ADMISSION DISCHARGE   This is a Notification of Discharge from [address]  Please be advised that this patient has been discharge from our facility  Below you will find the admission and discharge date and time including the patient’s disposition  UTILIZATION REVIEW CONTACT:  Marino King  Utilization   Network Utilization Review Department  Phone: [phone] carefully listen to the prompts  All voicemails are confidential   Email: [email or web address]     ADMISSION INFORMATION  PRESENTATION DATE: 4/23/2023 11:52 AM  OBERVATION ADMISSION DATE:   INPATIENT ADMISSION DATE: 4/23/23  5:19 PM   DISCHARGE DATE: 4/25/2023  6:47 PM   DISPOSITION:Home with Home Health Care    IMPORTANT INFORMATION:  Send all requests for admission clinical reviews, approved or denied determinations and any other requests to dedicated fax number below belonging to the campus where the patient is receiving treatment   List of dedicated fax numbers:  [address] DENIALS (Administrative/Medical Necessity) [phone]   [address] (Maternity/NICU/Pediatrics) [phone]   [facility] [phone]   [place] [address] [phone]   [address] [phone]   220 [facility] [phone] 90 [facility] [phone]   [address] [place] [address] 119 [phone]   [facility]  [phone] [facility] [phone]   [address] [facility] [address] [facility] [phone]

## 2023-04-26 NOTE — TELEPHONE ENCOUNTER
Medication Timeout    Patient scheduled 4/28/23  Alimta dose reduced from 500mg/m2 to 400mg/m2 due to nausea  Carboplatin dose reduced from AUC 5 to AUC 4 due to nausea  Patient made aware at her appointment today with Dr Maty Tran

## 2023-04-27 ENCOUNTER — HOME CARE VISIT (OUTPATIENT)
Dept: HOME HEALTH SERVICES | Facility: HOME HEALTHCARE | Age: 63
End: 2023-04-27

## 2023-04-27 VITALS
DIASTOLIC BLOOD PRESSURE: 70 MMHG | HEART RATE: 83 BPM | OXYGEN SATURATION: 96 % | SYSTOLIC BLOOD PRESSURE: 132 MMHG | RESPIRATION RATE: 16 BRPM

## 2023-04-28 ENCOUNTER — TELEPHONE (OUTPATIENT)
Dept: HEMATOLOGY ONCOLOGY | Facility: CLINIC | Age: 63
End: 2023-04-28

## 2023-04-28 LAB
BACTERIA BLD CULT: NORMAL
BACTERIA BLD CULT: NORMAL

## 2023-04-28 NOTE — TELEPHONE ENCOUNTER
Zoltan Elliott,   Dr Frances Stanley is going to certify me for the medical card  She said I needed to register, which I have done  Then she needed a number from me, I'm guessing she needs my patient ID from my registration  My patient ID is [id number], if that's not the number she wanted, let me know and I will get her the correct information  I hope you all have a great weekend  Thank you for all your help,  Grady Bishop  1960  [email or web address]  [phone]    Returned e-mail to patient and told her I will let Dr Frances Stanley know to certify her

## 2023-04-29 ENCOUNTER — HOME CARE VISIT (OUTPATIENT)
Dept: HOME HEALTH SERVICES | Facility: HOME HEALTHCARE | Age: 63
End: 2023-04-29

## 2023-04-29 VITALS
RESPIRATION RATE: 20 BRPM | SYSTOLIC BLOOD PRESSURE: 120 MMHG | DIASTOLIC BLOOD PRESSURE: 68 MMHG | TEMPERATURE: 97 F | HEART RATE: 85 BPM | OXYGEN SATURATION: 97 %

## 2023-04-29 NOTE — Clinical Note
Austen Black,    At the time of that message, she had only been drained 2x  500 ml 1st and 550 ml 2nd  Thank you,  Yesenia Decker   ----- Message -----  From: Jewell Barrera DO  Sent: 5/1/2023   5:32 PM EDT  To: YARIEL Gutiérrez  How many times has she been drained? On average how much with each drainage?     Thanks    ----- Message -----  From: Chris Chavez RN  Sent: 4/29/2023  11:21 AM EDT  To: Swathi García pleural cath drainage total this week since her admission to [facility]: 1050 ml

## 2023-04-29 NOTE — PROGRESS NOTES
HEMATOLOGY / [address] FOLLOW UP NOTE    Primary Care Provider: CAT Monroe  Referring Provider:    MRN: [id number]  : 1960    Reason for Encounter:       Oncology History Overview Note   2019 - Stage IA adenocarcinoma of the right lung s/p RLLectomy    2022 - fall after tripping on a dog gate - CT showed pulmonary nodules that required 3 month followup    3/2023 - CT showed right pleural effusion with enlarging lung lesion, she was otherwise symptomatic, thoracentesis cell block showed adenocarcinoma c/w her prior lung primary    PET - no disease outside the chest     Non-small cell cancer of right lung ([facility] )   2023 Initial Diagnosis    Non-small cell cancer of right lung ([facility] )     2023 -  Chemotherapy    cyanocobalamin, 1,000 mcg, Intramuscular, Once, 1 of 3 cycles  Administration: 1,000 mcg (2023)  fosaprepitant (EMEND) IVPB, 150 mg, Intravenous, Once, 1 of 6 cycles  Administration: 150 mg (2023)  CARBOplatin (PARAPLATIN) IVPB (GOG AUC DOSING), 553 mg, Intravenous, Once, 1 of 6 cycles  Administration: 553 mg (2023)  pemetrexed (ALIMTA) chemo infusion, 945 mg, Intravenous, Once, 1 of 6 cycles  Dose modification: 400 mg/m2 (original dose 500 mg/m2, Cycle 3, Reason: Nausea/Vomiting, Comment: 20% dose reduction due to nausea)  Administration: 900 mg (2023)  pembrolizumab (KEYTRUDA) IVPB, 200 mg, Intravenous, Once, 1 of 6 cycles  Administration: 200 mg (2023)         Interval History:         REVIEW OF SYSTEMS:  Please note that a 14-point review of systems was performed to include Constitutional, HEENT, Respiratory, CVS, GI, , Musculoskeletal, Integumentary, Neurologic, Rheumatologic, Endocrinologic, Psychiatric, Lymphatic, and Hematologic/Oncologic systems were reviewed and are negative unless otherwise stated in HPI  Positive and negative findings pertinent to this evaluation are incorporated into the history of present illness        ECOG PS: 0    PROBLEM LIST:  Patient Active Problem List   Diagnosis   • Left shoulder tendinitis   • Impingement syndrome of left shoulder   • Adhesive capsulitis of left shoulder   • Fall at home, initial encounter   • Closed fracture of multiple ribs of left side   • Tobacco abuse   • COPD (chronic obstructive pulmonary disease) (HCC)   • Hypercholesterolemia   • Essential hypertension   • Non-small cell cancer of right lung (HCC)   • Pleural effusion   • SOB (shortness of breath)   • CINV (chemotherapy-induced nausea and vomiting)       Assessment / Plan:        I spent 30 minutes on chart review, face to face counseling time, coordination of care and documentation  Past Medical History:   has a past medical history of Cancer ([facility] ), [facility] disease[facility] ([facility] ), Hyperlipidemia, Hypertension, and Lung cancer ([facility] ) (06/26/2019)  PAST SURGICAL HISTORY:   has a past surgical history that includes Appendectomy; Colon surgery; Lung lobectomy; Hysterectomy; Oophorectomy (Bilateral); Breast biopsy (Right); IR thoracentesis (3/24/2023); IR thoracentesis (4/12/2023); IR thoracentesis (4/18/2023); IR port placement (4/18/2023); and IR pleural effusion long-term catheter placement (4/25/2023)  CURRENT MEDICATIONS  Current Outpatient Medications   Medication Sig Dispense Refill   • albuterol (PROVENTIL HFA,VENTOLIN HFA) 90 mcg/act inhaler Inhale 2 puffs every 4 (four) hours  3   • aspirin 81 MG tablet Take 81 mg by mouth daily     • atorvastatin (LIPITOR) 20 mg tablet Take 20 mg by mouth daily  • buPROPion (WELLBUTRIN XL) 150 mg 24 hr tablet Take 300 mg by mouth every morning     • coenzyme Q-10 100 MG capsule Take 200 mg by mouth daily     • dexamethasone (DECADRON) 4 mg tablet Take 1 tablet (4 mg total) by mouth 2 (two) times a day with meals Take 1 tablet by mouth the day before, the day of, and the day after chemotherapy   6 tablet 6   • diphenhydrAMINE (BENADRYL) 50 MG tablet Take 1 tablet (50 mg total) by mouth every 6 (six) hours as needed for itching (Rash) 30 tablet 0   • EPINEPHrine (EPIPEN) 0 3 mg/0 3 mL SOAJ Inject 0 3 mL (0 3 mg total) into a muscle once for 1 dose 0 6 mL 0   • folic acid (KP Folic Acid) 1 mg tablet Take 1 tablet (1 mg total) by mouth daily 90 tablet 1   • lidocaine-prilocaine (EMLA) cream Apply to port 30 to 60 minutes prior to use 30 g 3   • LORazepam (ATIVAN) 1 mg tablet 1/2 TABLET TWICE A DAY AS NEEDED FOR ANXIETY ORALLY 30 DAYS  0   • losartan-hydrochlorothiazide (HYZAAR) 100-25 MG per tablet Take 1 tablet by mouth daily     • ondansetron (ZOFRAN) 8 mg tablet Take 1 tablet (8 mg total) by mouth every 8 (eight) hours as needed for nausea or vomiting 30 tablet 0   • sertraline (ZOLOFT) 100 mg tablet Take 200 mg by mouth daily      • verapamil (VERELAN PM) 180 MG 24 hr capsule Take 360 mg by mouth daily      • prochlorperazine (COMPAZINE) 10 mg tablet Take 1 tablet (10 mg total) by mouth every 6 (six) hours as needed for nausea (Patient not taking: Reported on 4/23/2023) 30 tablet 3     No current facility-administered medications for this visit  [unfilled]    SOCIAL HISTORY:   reports that she has been smoking  She has a 25 00 pack-year smoking history  She has never used smokeless tobacco  She reports that she does not currently use alcohol  She reports that she does not use drugs  FAMILY HISTORY:  family history includes Arthritis in her mother; COPD in her mother; Cancer in her maternal aunt, maternal grandmother, and mother; Colon cancer in her maternal aunt, maternal grandmother, and mother; Depression in her mother; Diabetes in her maternal grandmother; Drug abuse in her brother; Hearing loss in her mother; Hyperlipidemia in her mother; Hypertension in her mother; Lung cancer in her mother  ALLERGIES:  is allergic to amoxicillin, vilazodone hcl, wasp venom, and zithromax [azithromycin]        Physical Exam:  Vital Signs:   Visit Vitals  /90 (BP Location: Right arm, "Patient Position: Sitting, Cuff Size: Adult)   Pulse 96   Temp 98 °F (36 7 °C)   Resp 18   Ht 5' 4\" (1 626 m)   Wt 80 3 kg (177 lb)   SpO2 94%   BMI 30 38 kg/m²   OB Status Hysterectomy   Smoking Status Every Day   BSA 1 86 m²     Body mass index is 30 38 kg/m²  Body surface area is 1 86 meters squared  GEN: Alert, awake oriented x3, in no acute distress  HEENT- No pallor, icterus, cyanosis, no oral mucosal lesions,   LAD - no palpable cervical, clavicle, axillary, inguinal LAD  Heart- normal S1 S2, regular rate and rhythm, No murmur, rubs     Lungs- clear breathing sound bilateral    Abdomen- soft, Non tender, bowel sounds present  Extremities- No cyanosis, clubbing, edema  Neuro- No focal neurological deficit    Labs:  Lab Results   Component Value Date    WBC 6 43 04/25/2023    HGB 12 3 04/25/2023    HCT 36 7 04/25/2023    MCV 92 04/25/2023     04/25/2023     Lab Results   Component Value Date    SODIUM 132 (L) 04/25/2023    K 3 8 04/25/2023    CL 98 04/25/2023    CO2 28 04/25/2023    AGAP 6 04/25/2023    BUN 11 04/25/2023    CREATININE 0 43 (L) 04/25/2023    GLUC 102 04/25/2023    GLUF 94 11/14/2022    CALCIUM 8 8 04/25/2023    AST 24 04/25/2023    ALT 40 04/25/2023    ALKPHOS 87 04/25/2023    TP 6 0 (L) 04/25/2023    TBILI 0 55 04/25/2023    EGFR 107 04/25/2023       "

## 2023-04-29 NOTE — Clinical Note
Of course! I'll put it in her facesheet instructions   ----- Message -----  From: Duane Lucas DO  Sent: 5/4/2023  10:02 AM EDT  To: Adiel Sousa RN  Subject: RE:                                                Ana Demetrius  Thanks  Could you have someone let me know the drainage amounts every 1-2 weeks please? Julia Yates  ----- Message -----  From: Adiel Sousa RN  Sent: 5/4/2023   7:36 AM EDT  To: Duane Lucas DO  Subject: RE:                                              Austen Escalante,    At the time of that message, she had only been drained 2x  500 ml 1st and 550 ml 2nd  Thank you,  Kurt Pedro   ----- Message -----  From: Duane Lucas DO  Sent: 5/1/2023   5:32 PM EDT  To: YARIEL Cantu  How many times has she been drained? On average how much with each drainage?     Thanks    ----- Message -----  From: Adiel Sousa RN  Sent: 4/29/2023  11:21 AM EDT  To: Yanci Moncada pleural cath drainage total this week since her admission to [facility]: 1050 ml

## 2023-04-29 NOTE — CASE COMMUNICATION
Fay Cline pleural cath drainage [facility]

## 2023-05-01 ENCOUNTER — HOME CARE VISIT (OUTPATIENT)
Dept: HOME HEALTH SERVICES | Facility: HOME HEALTHCARE | Age: 63
End: 2023-05-01

## 2023-05-02 ENCOUNTER — OFFICE VISIT (OUTPATIENT)
Dept: PULMONOLOGY | Facility: HOSPITAL | Age: 63
End: 2023-05-02

## 2023-05-02 VITALS
WEIGHT: 178.1 LBS | OXYGEN SATURATION: 97 % | HEART RATE: 92 BPM | DIASTOLIC BLOOD PRESSURE: 80 MMHG | BODY MASS INDEX: 30.57 KG/M2 | SYSTOLIC BLOOD PRESSURE: 132 MMHG | TEMPERATURE: 98.1 F

## 2023-05-02 DIAGNOSIS — J44.9 CHRONIC OBSTRUCTIVE PULMONARY DISEASE, UNSPECIFIED COPD TYPE (HCC): Primary | ICD-10-CM

## 2023-05-02 DIAGNOSIS — J91.0 MALIGNANT PLEURAL EFFUSION: ICD-10-CM

## 2023-05-02 PROBLEM — R06.02 SOB (SHORTNESS OF BREATH): Status: RESOLVED | Noted: 2023-04-23 | Resolved: 2023-05-02

## 2023-05-02 PROBLEM — F17.211 CIGARETTE NICOTINE DEPENDENCE IN REMISSION: Status: ACTIVE | Noted: 2022-11-13

## 2023-05-02 RX ORDER — IPRATROPIUM BROMIDE AND ALBUTEROL SULFATE 2.5; .5 MG/3ML; MG/3ML
SOLUTION RESPIRATORY (INHALATION)
COMMUNITY
Start: 2023-04-20 | End: 2023-05-02

## 2023-05-02 RX ORDER — UMECLIDINIUM BROMIDE AND VILANTEROL TRIFENATATE 62.5; 25 UG/1; UG/1
POWDER RESPIRATORY (INHALATION)
COMMUNITY
Start: 2023-04-27

## 2023-05-02 RX ORDER — DEXAMETHASONE 4 MG/1
TABLET ORAL
COMMUNITY
Start: 2023-04-27 | End: 2023-05-02

## 2023-05-02 RX ORDER — ALBUTEROL SULFATE 2.5 MG/3ML
2.5 SOLUTION RESPIRATORY (INHALATION) EVERY 6 HOURS PRN
Qty: 270 ML | Refills: 4 | Status: SHIPPED | OUTPATIENT
Start: 2023-05-02

## 2023-05-02 NOTE — PATIENT INSTRUCTIONS
-Stop DuoNeb nebulizer treatments and Flovent and Mucinex  -Continue Anoro once daily  -Albuterol puffer or nebulizer as needed for shortness of breath/chest tightness/coughing

## 2023-05-02 NOTE — ASSESSMENT & PLAN NOTE
She had ASEPT placed last week  Has had 3 drainages since discharge 500cc-800cc)  She will continue to drain every other day at this time  Because she had some discomfort at 800 cc, I have asked that they slowly increase with next drainage maxing at 500 cc, then 600 cc, then 700 cc  If she is having significant discomfort, will back off again  I will be getting values from VNA for us to determine amount of drainage days per week

## 2023-05-02 NOTE — ASSESSMENT & PLAN NOTE
She is doing well in regards to COPD  I have asked her to stop Flovent  She will continue Anoro once daily with albuterol as needed  She will increase activity as tolerated  Fortunately she has quit smoking

## 2023-05-02 NOTE — ASSESSMENT & PLAN NOTE
She has recently quit smoking, and I congratulated her on her efforts  She will continue with goal of ongoing abstinence    She is not appropriate for lung cancer screening CTs, as she will have frequent imaging with active lung cancer

## 2023-05-02 NOTE — PROGRESS NOTES
Assessment:    Malignant pleural effusion  She had ASEPT placed last week  Has had 3 drainages since discharge 500cc-800cc)  She will continue to drain every other day at this time  Because she had some discomfort at 800 cc, I have asked that they slowly increase with next drainage maxing at 500 cc, then 600 cc, then 700 cc  If she is having significant discomfort, will back off again  I will be getting values from VNA for us to determine amount of drainage days per week  COPD (chronic obstructive pulmonary disease) ([facility] )  She is doing well in regards to COPD  I have asked her to stop Flovent  She will continue Anoro once daily with albuterol as needed  She will increase activity as tolerated  Fortunately she has quit smoking  Cigarette nicotine dependence in remission  She has recently quit smoking, and I congratulated her on her efforts  She will continue with goal of ongoing abstinence  She is not appropriate for lung cancer screening CTs, as she will have frequent imaging with active lung cancer      Plan:    Diagnoses and all orders for this visit:    Chronic obstructive pulmonary disease, unspecified COPD type ([facility] )  -     albuterol (2 5 mg/3 mL) 0 083 % nebulizer solution; Take 3 mL (2 5 mg total) by nebulization every 6 (six) hours as needed for wheezing or shortness of breath    Malignant pleural effusion    Other orders  -     Discontinue: Flovent  MCG/ACT inhaler  -     Anoro Ellipta 62 5-25 MCG/ACT inhaler  -     Discontinue: ipratropium-albuterol (DUO-NEB) 0 5-2 5 mg/3 mL nebulizer solution; INHALE 3 ML TWICE A DAY BY NEBULIZATION ROUTE FOR 30 DAYS  Follow-up: 3 months      HPI:  Was hospitalized last week and had ASEPT pleural catheter placed   She has  3 TIMES since discharge: [address] [place] [address], [address] [place] [id number]    No pain with drainage but there was a discomfort yesterday  She is more active since starting the drainage - slightly less dyspnea  She has never felt good after breaking ribs in the fall  No cough - stop mucinex  She is on Anoro and flovent  Albuterol prn - Not really using    Dexamethasone for chemo only    No smoking at all in 3-4 weeks      Review of Systems   Constitutional: Negative for appetite change and fever  HENT: Negative for ear pain, postnasal drip, rhinorrhea, sneezing, sore throat and trouble swallowing  Respiratory: Negative for cough and wheezing  Cardiovascular: Negative for chest pain  Musculoskeletal: Negative for myalgias  Neurological: Negative for headaches         The following portions of the patient's history were reviewed and updated as appropriate: allergies, current medications, past family history, past medical history, past social history, past surgical history and problem list     VITALS:  Vitals:    05/02/23 1310 05/02/23 1313   BP: 132/80    BP Location: Left arm    Patient Position: Sitting    Cuff Size: Standard    Pulse: 92    Temp: 98 1 °F (36 7 °C)    TempSrc: Tympanic    SpO2: 97% 97%   Weight: 80 8 kg (178 lb 1 6 oz)        Physical Exam  General:  Patient is awake, alert, non-toxic and in no acute respiratory distress  Neck: No JVD  CV:  Regular, +S1 and S2, No murmurs, gallops or rubs appreciated  Lungs: Diminished breath sounds at right lung base with mild inspiratory crackles, no wheezes appreciated  Abdomen: Soft, +BS, Non-tender, non-distended  Extremities: No clubbing, cyanosis or edema  Neuro: No focal deficits        Diagnostic Testing:    CBC:  Lab Results   Component Value Date    WBC 6 43 04/25/2023    HGB 12 3 04/25/2023    HCT 36 7 04/25/2023    MCV 92 04/25/2023     04/25/2023         BMP:   Lab Results   Component Value Date    K 3 8 04/25/2023    CL 98 04/25/2023    CO2 28 04/25/2023    BUN 11 04/25/2023    CREATININE 0 43 (L) 04/25/2023    GLUCOSE 95 08/19/2019    GLUF 94 11/14/2022    CALCIUM 8 8 04/25/2023    CORRECTEDCA 9 3 04/24/2023    AST 24 04/25/2023    ALT 40 04/25/2023    ALKPHOS 87 04/25/2023 EGFR 107 04/25/2023       Deborah Galicia, DO  Answers for HPI/ROS submitted by the patient on 4/29/2023  Chronicity: chronic  When did you first notice your symptoms?: more than 1 month ago  How often do your symptoms occur?: intermittently  Since you first noticed this problem, how has it changed?: gradually improving  Do you have shortness of breath that occurs with effort or exertion?: Yes  Do you have ear congestion?: No  Do you have heartburn?: No  Do you have fatigue?: Yes  Do you have nasal congestion?: No  Do you have shortness of breath when lying flat?: No  Do you have shortness of breath when you wake up?: No  Do you have sweats?: No  Have you experienced weight loss?: No  Which of the following makes your symptoms worse?: nothing  Which of the following makes your symptoms better?: nothing  Risk factors for lung disease: smoking/tobacco exposure

## 2023-05-03 ENCOUNTER — HOME CARE VISIT (OUTPATIENT)
Dept: HOME HEALTH SERVICES | Facility: HOME HEALTHCARE | Age: 63
End: 2023-05-03

## 2023-05-03 VITALS
OXYGEN SATURATION: 98 % | TEMPERATURE: 97.6 F | SYSTOLIC BLOOD PRESSURE: 130 MMHG | RESPIRATION RATE: 18 BRPM | HEART RATE: 76 BPM | DIASTOLIC BLOOD PRESSURE: 60 MMHG

## 2023-05-05 ENCOUNTER — HOME CARE VISIT (OUTPATIENT)
Dept: HOME HEALTH SERVICES | Facility: HOME HEALTHCARE | Age: 63
End: 2023-05-05

## 2023-05-05 VITALS
TEMPERATURE: 97.8 F | HEART RATE: 78 BPM | DIASTOLIC BLOOD PRESSURE: 70 MMHG | OXYGEN SATURATION: 96 % | SYSTOLIC BLOOD PRESSURE: 130 MMHG

## 2023-05-07 ENCOUNTER — HOME CARE VISIT (OUTPATIENT)
Dept: HOME HEALTH SERVICES | Facility: HOME HEALTHCARE | Age: 63
End: 2023-05-07

## 2023-05-07 VITALS
RESPIRATION RATE: 18 BRPM | OXYGEN SATURATION: 93 % | DIASTOLIC BLOOD PRESSURE: 64 MMHG | SYSTOLIC BLOOD PRESSURE: 118 MMHG | HEART RATE: 84 BPM | TEMPERATURE: 97.7 F

## 2023-05-07 NOTE — CASE COMMUNICATION
Fay Miller,    week of 5/1/23-5/7/23 drainage total: 2360 ml  Drainage between 460-800 ml with an average between 500-600 ml

## 2023-05-09 ENCOUNTER — HOSPITAL ENCOUNTER (OUTPATIENT)
Dept: INFUSION CENTER | Facility: HOSPITAL | Age: 63
Discharge: HOME | End: 2023-05-09

## 2023-05-09 ENCOUNTER — PATIENT OUTREACH (OUTPATIENT)
Dept: HEMATOLOGY ONCOLOGY | Facility: CLINIC | Age: 63
End: 2023-05-09

## 2023-05-09 ENCOUNTER — HOME CARE VISIT (OUTPATIENT)
Dept: HOME HEALTH SERVICES | Facility: HOME HEALTHCARE | Age: 63
End: 2023-05-09

## 2023-05-09 VITALS
OXYGEN SATURATION: 96 % | TEMPERATURE: 97.6 F | RESPIRATION RATE: 20 BRPM | HEART RATE: 74 BPM | DIASTOLIC BLOOD PRESSURE: 54 MMHG | SYSTOLIC BLOOD PRESSURE: 138 MMHG

## 2023-05-09 DIAGNOSIS — C34.91 NON-SMALL CELL CANCER OF RIGHT LUNG (HCC): Primary | ICD-10-CM

## 2023-05-09 LAB
ALBUMIN SERPL BCP-MCNC: 3.7 G/DL (ref 3.5–5)
ALP SERPL-CCNC: 100 U/L (ref 34–104)
ALT SERPL W P-5'-P-CCNC: 26 U/L (ref 7–52)
ANION GAP SERPL CALCULATED.3IONS-SCNC: 7 MMOL/L (ref 4–13)
AST SERPL W P-5'-P-CCNC: 22 U/L (ref 13–39)
BASOPHILS # BLD AUTO: 0.01 THOUSANDS/ÂΜL (ref 0–0.1)
BASOPHILS NFR BLD AUTO: 0 % (ref 0–1)
BILIRUB SERPL-MCNC: 0.36 MG/DL (ref 0.2–1)
BUN SERPL-MCNC: 10 MG/DL (ref 5–25)
CALCIUM SERPL-MCNC: 9 MG/DL (ref 8.4–10.2)
CHLORIDE SERPL-SCNC: 102 MMOL/L (ref 96–108)
CO2 SERPL-SCNC: 25 MMOL/L (ref 21–32)
CREAT SERPL-MCNC: 0.47 MG/DL (ref 0.6–1.3)
EOSINOPHIL # BLD AUTO: 0.02 THOUSAND/ÂΜL (ref 0–0.61)
EOSINOPHIL NFR BLD AUTO: 0 % (ref 0–6)
ERYTHROCYTE [DISTWIDTH] IN BLOOD BY AUTOMATED COUNT: 13.4 % (ref 11.6–15.1)
GFR SERPL CREATININE-BSD FRML MDRD: 104 ML/MIN/1.73SQ M
GLUCOSE SERPL-MCNC: 101 MG/DL (ref 65–140)
HCT VFR BLD AUTO: 33.7 % (ref 34.8–46.1)
HGB BLD-MCNC: 11.3 G/DL (ref 11.5–15.4)
IMM GRANULOCYTES # BLD AUTO: 0.02 THOUSAND/UL (ref 0–0.2)
IMM GRANULOCYTES NFR BLD AUTO: 0 % (ref 0–2)
LYMPHOCYTES # BLD AUTO: 0.82 THOUSANDS/ÂΜL (ref 0.6–4.47)
LYMPHOCYTES NFR BLD AUTO: 17 % (ref 14–44)
MCH RBC QN AUTO: 31.2 PG (ref 26.8–34.3)
MCHC RBC AUTO-ENTMCNC: 33.5 G/DL (ref 31.4–37.4)
MCV RBC AUTO: 93 FL (ref 82–98)
MONOCYTES # BLD AUTO: 0.62 THOUSAND/ÂΜL (ref 0.17–1.22)
MONOCYTES NFR BLD AUTO: 13 % (ref 4–12)
NEUTROPHILS # BLD AUTO: 3.27 THOUSANDS/ÂΜL (ref 1.85–7.62)
NEUTS SEG NFR BLD AUTO: 70 % (ref 43–75)
NRBC BLD AUTO-RTO: 0 /100 WBCS
PLATELET # BLD AUTO: 359 THOUSANDS/UL (ref 149–390)
PMV BLD AUTO: 8.4 FL (ref 8.9–12.7)
POTASSIUM SERPL-SCNC: 3.3 MMOL/L (ref 3.5–5.3)
PROT SERPL-MCNC: 6.3 G/DL (ref 6.4–8.4)
RBC # BLD AUTO: 3.62 MILLION/UL (ref 3.81–5.12)
SODIUM SERPL-SCNC: 134 MMOL/L (ref 135–147)
T3FREE SERPL-MCNC: 2.63 PG/ML (ref 2.3–4.2)
TSH SERPL DL<=0.05 MIU/L-ACNC: 1.89 UIU/ML (ref 0.45–4.5)
WBC # BLD AUTO: 4.76 THOUSAND/UL (ref 4.31–10.16)

## 2023-05-09 NOTE — PROGRESS NOTES
Pt's port accessed using sterile technique  Labs drawn without difficulty  Port de-accessed  Band-aid applied  Pt ambulated with a steady gait off unit   Declined AVS

## 2023-05-09 NOTE — PROGRESS NOTES
MSW met with the pt when she was in the infusion center this day  The pt was pleasant and offered no complaints  She explained how sick she had been after her first chemo which led to a hospitalization  The pt then further shared that as a result, she had Asept catheters placed and has [facility] coming out to drain them  The pt is hopeful that her next infusion is much easier  The infusion nurse was present and offered suggestions to the pt for her nausea which the pt was going to try in the hopes of eliminating another hospital stay  Pt's  was present and the pt shared how supportive he has been and how he has been taking over many household duties  The pt expressed feeling quite lethargic and unable to do many tasks  She has been listening to her body and resting as needed  She shared that her grandson has been doing well with everything and adjusting appropriately  The pt is eagerly awaiting her son to visit from [facility] as he is coming in for work and staying for a few days  Support was offered and MSW will continue to follow

## 2023-05-11 ENCOUNTER — HOME CARE VISIT (OUTPATIENT)
Dept: HOME HEALTH SERVICES | Facility: HOME HEALTHCARE | Age: 63
End: 2023-05-11

## 2023-05-11 ENCOUNTER — HOSPITAL ENCOUNTER (OUTPATIENT)
Dept: INFUSION CENTER | Facility: HOSPITAL | Age: 63
Discharge: HOME | End: 2023-05-11
Attending: INTERNAL MEDICINE

## 2023-05-11 VITALS
RESPIRATION RATE: 18 BRPM | TEMPERATURE: 97.6 F | DIASTOLIC BLOOD PRESSURE: 70 MMHG | OXYGEN SATURATION: 97 % | HEART RATE: 60 BPM | SYSTOLIC BLOOD PRESSURE: 130 MMHG

## 2023-05-11 VITALS
WEIGHT: 181 LBS | TEMPERATURE: 98.2 F | RESPIRATION RATE: 18 BRPM | HEIGHT: 64 IN | OXYGEN SATURATION: 95 % | SYSTOLIC BLOOD PRESSURE: 142 MMHG | HEART RATE: 91 BPM | DIASTOLIC BLOOD PRESSURE: 63 MMHG | BODY MASS INDEX: 30.9 KG/M2

## 2023-05-11 DIAGNOSIS — C34.91 NON-SMALL CELL CANCER OF RIGHT LUNG (HCC): Primary | ICD-10-CM

## 2023-05-11 RX ORDER — SODIUM CHLORIDE 9 MG/ML
20 INJECTION, SOLUTION INTRAVENOUS ONCE
Status: COMPLETED | OUTPATIENT
Start: 2023-05-11 | End: 2023-05-11

## 2023-05-11 RX ADMIN — DEXAMETHASONE SODIUM PHOSPHATE: 10 INJECTION, SOLUTION INTRAMUSCULAR; INTRAVENOUS at 08:37

## 2023-05-11 RX ADMIN — CARBOPLATIN 442.4 MG: 10 INJECTION, SOLUTION INTRAVENOUS at 10:58

## 2023-05-11 RX ADMIN — SODIUM CHLORIDE 20 ML/HR: 0.9 INJECTION, SOLUTION INTRAVENOUS at 08:38

## 2023-05-11 RX ADMIN — SODIUM CHLORIDE 200 MG: 9 INJECTION, SOLUTION INTRAVENOUS at 09:42

## 2023-05-11 RX ADMIN — FOSAPREPITANT 150 MG: 150 INJECTION, POWDER, LYOPHILIZED, FOR SOLUTION INTRAVENOUS at 09:04

## 2023-05-11 RX ADMIN — PEMETREXED DISODIUM 756 MG: 100 INJECTION, POWDER, LYOPHILIZED, FOR SOLUTION INTRAVENOUS at 10:36

## 2023-05-11 NOTE — PROGRESS NOTES
Patient completed chemotherapy infusion with no adverse reactions  Dressing CD&I  Declined AVS and left unit ambulatory with steady gait accompanied by

## 2023-05-13 ENCOUNTER — HOME CARE VISIT (OUTPATIENT)
Dept: HOME HEALTH SERVICES | Facility: HOME HEALTHCARE | Age: 63
End: 2023-05-13

## 2023-05-13 VITALS
HEART RATE: 78 BPM | OXYGEN SATURATION: 99 % | SYSTOLIC BLOOD PRESSURE: 138 MMHG | DIASTOLIC BLOOD PRESSURE: 64 MMHG | RESPIRATION RATE: 20 BRPM | TEMPERATURE: 97.5 F

## 2023-05-15 ENCOUNTER — HOME CARE VISIT (OUTPATIENT)
Dept: HOME HEALTH SERVICES | Facility: HOME HEALTHCARE | Age: 63
End: 2023-05-15

## 2023-05-15 VITALS
HEART RATE: 84 BPM | SYSTOLIC BLOOD PRESSURE: 110 MMHG | TEMPERATURE: 98.6 F | DIASTOLIC BLOOD PRESSURE: 70 MMHG | RESPIRATION RATE: 16 BRPM | OXYGEN SATURATION: 96 %

## 2023-05-17 ENCOUNTER — HOME CARE VISIT (OUTPATIENT)
Dept: HOME HEALTH SERVICES | Facility: HOME HEALTHCARE | Age: 63
End: 2023-05-17

## 2023-05-17 VITALS
OXYGEN SATURATION: 98 % | DIASTOLIC BLOOD PRESSURE: 80 MMHG | SYSTOLIC BLOOD PRESSURE: 140 MMHG | TEMPERATURE: 97.5 F | HEART RATE: 80 BPM | RESPIRATION RATE: 18 BRPM

## 2023-05-19 ENCOUNTER — HOME CARE VISIT (OUTPATIENT)
Dept: HOME HEALTH SERVICES | Facility: HOME HEALTHCARE | Age: 63
End: 2023-05-19

## 2023-05-19 VITALS
TEMPERATURE: 97.9 F | OXYGEN SATURATION: 98 % | DIASTOLIC BLOOD PRESSURE: 70 MMHG | SYSTOLIC BLOOD PRESSURE: 130 MMHG | RESPIRATION RATE: 18 BRPM | HEART RATE: 76 BPM

## 2023-05-20 ENCOUNTER — HOME CARE VISIT (OUTPATIENT)
Dept: HOME HEALTH SERVICES | Facility: HOME HEALTHCARE | Age: 63
End: 2023-05-20

## 2023-05-20 NOTE — CASE COMMUNICATION
5 20 23 at 0920  pt called asking if the nurse is coming tomorrow 5/21     Pt is on the schedule to be seen by

## 2023-05-21 ENCOUNTER — HOME CARE VISIT (OUTPATIENT)
Dept: HOME HEALTH SERVICES | Facility: HOME HEALTHCARE | Age: 63
End: 2023-05-21

## 2023-05-21 VITALS
SYSTOLIC BLOOD PRESSURE: 120 MMHG | DIASTOLIC BLOOD PRESSURE: 70 MMHG | HEART RATE: 78 BPM | OXYGEN SATURATION: 98 % | RESPIRATION RATE: 20 BRPM | TEMPERATURE: 98.4 F

## 2023-05-22 DIAGNOSIS — J91.0 MALIGNANT PLEURAL EFFUSION: Primary | ICD-10-CM

## 2023-05-23 ENCOUNTER — HOME CARE VISIT (OUTPATIENT)
Dept: HOME HEALTH SERVICES | Facility: HOME HEALTHCARE | Age: 63
End: 2023-05-23

## 2023-05-23 VITALS
HEART RATE: 68 BPM | SYSTOLIC BLOOD PRESSURE: 120 MMHG | RESPIRATION RATE: 18 BRPM | OXYGEN SATURATION: 98 % | TEMPERATURE: 96.6 F | DIASTOLIC BLOOD PRESSURE: 64 MMHG

## 2023-05-25 ENCOUNTER — HOME CARE VISIT (OUTPATIENT)
Dept: HOME HEALTH SERVICES | Facility: HOME HEALTHCARE | Age: 63
End: 2023-05-25

## 2023-05-25 VITALS
TEMPERATURE: 97.4 F | SYSTOLIC BLOOD PRESSURE: 120 MMHG | OXYGEN SATURATION: 98 % | RESPIRATION RATE: 18 BRPM | HEART RATE: 68 BPM | DIASTOLIC BLOOD PRESSURE: 60 MMHG

## 2023-05-26 ENCOUNTER — APPOINTMENT (OUTPATIENT)
Dept: RADIOLOGY | Facility: HOSPITAL | Age: 63
End: 2023-05-26
Payer: COMMERCIAL

## 2023-05-26 ENCOUNTER — HOSPITAL ENCOUNTER (OUTPATIENT)
Dept: RADIOLOGY | Facility: HOSPITAL | Age: 63
End: 2023-05-26
Attending: INTERNAL MEDICINE

## 2023-05-26 DIAGNOSIS — J91.0 MALIGNANT PLEURAL EFFUSION: ICD-10-CM

## 2023-05-27 ENCOUNTER — HOME CARE VISIT (OUTPATIENT)
Dept: HOME HEALTH SERVICES | Facility: HOME HEALTHCARE | Age: 63
End: 2023-05-27

## 2023-05-27 VITALS
RESPIRATION RATE: 20 BRPM | TEMPERATURE: 97.2 F | DIASTOLIC BLOOD PRESSURE: 58 MMHG | OXYGEN SATURATION: 97 % | SYSTOLIC BLOOD PRESSURE: 120 MMHG | HEART RATE: 84 BPM

## 2023-05-27 NOTE — CASE COMMUNICATION
Hi Dr Asif Mary you are well! asept cath drainage for 5/21/23-5/27/23: Total drainage: 1090 ml  Average drainage is variable due to patient's discomfort  Ranged from 150-400 ml      Thank you,  Anatoliy Carrington

## 2023-05-27 NOTE — PROGRESS NOTES
Patient has had steady decrease in pleural fluid output with drainage every other day of aseptic pleural catheter  Asept cath drainage for 5/21/23-5/27/23: Total drainage: 1090 ml   Average  drainage is variable due to patient's discomfort  Ranged from 150-400 ml  I reviewed chest x-ray which showed overall decreased size of pleural effusion, pleural catheter in place     -I asked visiting nurses to decrease drainage to 2 times weekly

## 2023-05-29 ENCOUNTER — HOME CARE VISIT (OUTPATIENT)
Dept: HOME HEALTH SERVICES | Facility: HOME HEALTHCARE | Age: 63
End: 2023-05-29

## 2023-05-30 ENCOUNTER — HOSPITAL ENCOUNTER (OUTPATIENT)
Dept: INFUSION CENTER | Facility: HOSPITAL | Age: 63
Discharge: HOME | End: 2023-05-30
Payer: COMMERCIAL

## 2023-05-30 DIAGNOSIS — C34.91 NON-SMALL CELL CANCER OF RIGHT LUNG (HCC): Primary | ICD-10-CM

## 2023-05-30 LAB
ALBUMIN SERPL BCP-MCNC: 3.7 G/DL (ref 3.5–5)
ALP SERPL-CCNC: 114 U/L (ref 34–104)
ALT SERPL W P-5'-P-CCNC: 42 U/L (ref 7–52)
ANION GAP SERPL CALCULATED.3IONS-SCNC: 8 MMOL/L (ref 4–13)
AST SERPL W P-5'-P-CCNC: 27 U/L (ref 13–39)
BASOPHILS # BLD AUTO: 0 THOUSANDS/ÂΜL (ref 0–0.1)
BASOPHILS NFR BLD AUTO: 0 % (ref 0–1)
BILIRUB SERPL-MCNC: 0.38 MG/DL (ref 0.2–1)
BUN SERPL-MCNC: 8 MG/DL (ref 5–25)
CALCIUM SERPL-MCNC: 9.1 MG/DL (ref 8.4–10.2)
CHLORIDE SERPL-SCNC: 103 MMOL/L (ref 96–108)
CO2 SERPL-SCNC: 25 MMOL/L (ref 21–32)
CREAT SERPL-MCNC: 0.58 MG/DL (ref 0.6–1.3)
EOSINOPHIL # BLD AUTO: 0.03 THOUSAND/ÂΜL (ref 0–0.61)
EOSINOPHIL NFR BLD AUTO: 1 % (ref 0–6)
ERYTHROCYTE [DISTWIDTH] IN BLOOD BY AUTOMATED COUNT: 15.3 % (ref 11.6–15.1)
GFR SERPL CREATININE-BSD FRML MDRD: 97 ML/MIN/1.73SQ M
GLUCOSE P FAST SERPL-MCNC: 123 MG/DL (ref 65–99)
GLUCOSE SERPL-MCNC: 123 MG/DL (ref 65–140)
HCT VFR BLD AUTO: 34.2 % (ref 34.8–46.1)
HGB BLD-MCNC: 11.5 G/DL (ref 11.5–15.4)
IMM GRANULOCYTES # BLD AUTO: 0.01 THOUSAND/UL (ref 0–0.2)
IMM GRANULOCYTES NFR BLD AUTO: 0 % (ref 0–2)
LYMPHOCYTES # BLD AUTO: 0.79 THOUSANDS/ÂΜL (ref 0.6–4.47)
LYMPHOCYTES NFR BLD AUTO: 20 % (ref 14–44)
MCH RBC QN AUTO: 32.1 PG (ref 26.8–34.3)
MCHC RBC AUTO-ENTMCNC: 33.6 G/DL (ref 31.4–37.4)
MCV RBC AUTO: 96 FL (ref 82–98)
MONOCYTES # BLD AUTO: 0.57 THOUSAND/ÂΜL (ref 0.17–1.22)
MONOCYTES NFR BLD AUTO: 14 % (ref 4–12)
NEUTROPHILS # BLD AUTO: 2.63 THOUSANDS/ÂΜL (ref 1.85–7.62)
NEUTS SEG NFR BLD AUTO: 65 % (ref 43–75)
NRBC BLD AUTO-RTO: 0 /100 WBCS
PLATELET # BLD AUTO: 407 THOUSANDS/UL (ref 149–390)
PMV BLD AUTO: 8.6 FL (ref 8.9–12.7)
POTASSIUM SERPL-SCNC: 3.5 MMOL/L (ref 3.5–5.3)
PROT SERPL-MCNC: 6.4 G/DL (ref 6.4–8.4)
RBC # BLD AUTO: 3.58 MILLION/UL (ref 3.81–5.12)
SODIUM SERPL-SCNC: 136 MMOL/L (ref 135–147)
T3FREE SERPL-MCNC: 7.07 PG/ML (ref 2.5–3.9)
TSH SERPL DL<=0.05 MIU/L-ACNC: 1.51 UIU/ML (ref 0.45–4.5)
WBC # BLD AUTO: 4.03 THOUSAND/UL (ref 4.31–10.16)

## 2023-05-30 PROCEDURE — 84443 ASSAY THYROID STIM HORMONE: CPT | Performed by: INTERNAL MEDICINE

## 2023-05-30 PROCEDURE — 84481 FREE ASSAY (FT-3): CPT | Performed by: INTERNAL MEDICINE

## 2023-05-30 PROCEDURE — 80053 COMPREHEN METABOLIC PANEL: CPT | Performed by: INTERNAL MEDICINE

## 2023-05-30 PROCEDURE — 85025 COMPLETE CBC W/AUTO DIFF WBC: CPT | Performed by: INTERNAL MEDICINE

## 2023-05-31 ENCOUNTER — OFFICE VISIT (OUTPATIENT)
Age: 63
End: 2023-05-31

## 2023-05-31 ENCOUNTER — HOME CARE VISIT (OUTPATIENT)
Dept: HOME HEALTH SERVICES | Facility: HOME HEALTHCARE | Age: 63
End: 2023-05-31

## 2023-05-31 VITALS
TEMPERATURE: 97.9 F | OXYGEN SATURATION: 96 % | SYSTOLIC BLOOD PRESSURE: 160 MMHG | DIASTOLIC BLOOD PRESSURE: 80 MMHG | RESPIRATION RATE: 20 BRPM | HEART RATE: 88 BPM

## 2023-05-31 VITALS
OXYGEN SATURATION: 93 % | HEIGHT: 64 IN | WEIGHT: 186 LBS | RESPIRATION RATE: 17 BRPM | TEMPERATURE: 98 F | SYSTOLIC BLOOD PRESSURE: 120 MMHG | HEART RATE: 99 BPM | DIASTOLIC BLOOD PRESSURE: 70 MMHG | BODY MASS INDEX: 31.76 KG/M2

## 2023-05-31 DIAGNOSIS — C34.91 NON-SMALL CELL CANCER OF RIGHT LUNG (HCC): Primary | ICD-10-CM

## 2023-05-31 RX ORDER — CYANOCOBALAMIN 1000 UG/ML
1000 INJECTION, SOLUTION INTRAMUSCULAR; SUBCUTANEOUS ONCE
Status: CANCELLED | OUTPATIENT
Start: 2023-06-01 | End: 2023-06-01

## 2023-05-31 RX ORDER — SODIUM CHLORIDE 9 MG/ML
20 INJECTION, SOLUTION INTRAVENOUS ONCE
Status: CANCELLED | OUTPATIENT
Start: 2023-06-01

## 2023-06-01 ENCOUNTER — PATIENT OUTREACH (OUTPATIENT)
Dept: HEMATOLOGY ONCOLOGY | Facility: CLINIC | Age: 63
End: 2023-06-01

## 2023-06-01 ENCOUNTER — HOSPITAL ENCOUNTER (OUTPATIENT)
Dept: INFUSION CENTER | Facility: HOSPITAL | Age: 63
Discharge: HOME | End: 2023-06-01
Attending: INTERNAL MEDICINE
Payer: COMMERCIAL

## 2023-06-01 VITALS
WEIGHT: 185.63 LBS | OXYGEN SATURATION: 98 % | SYSTOLIC BLOOD PRESSURE: 123 MMHG | HEART RATE: 83 BPM | BODY MASS INDEX: 31.69 KG/M2 | HEIGHT: 64 IN | RESPIRATION RATE: 16 BRPM | TEMPERATURE: 97.1 F | DIASTOLIC BLOOD PRESSURE: 57 MMHG

## 2023-06-01 DIAGNOSIS — C34.91 NON-SMALL CELL CANCER OF RIGHT LUNG (HCC): Primary | ICD-10-CM

## 2023-06-01 PROCEDURE — 96367 TX/PROPH/DG ADDL SEQ IV INF: CPT

## 2023-06-01 PROCEDURE — 96413 CHEMO IV INFUSION 1 HR: CPT

## 2023-06-01 PROCEDURE — 96417 CHEMO IV INFUS EACH ADDL SEQ: CPT

## 2023-06-01 PROCEDURE — 96411 CHEMO IV PUSH ADDL DRUG: CPT

## 2023-06-01 PROCEDURE — 96372 THER/PROPH/DIAG INJ SC/IM: CPT

## 2023-06-01 RX ORDER — CYANOCOBALAMIN 1000 UG/ML
1000 INJECTION, SOLUTION INTRAMUSCULAR; SUBCUTANEOUS ONCE
Status: COMPLETED | OUTPATIENT
Start: 2023-06-01 | End: 2023-06-01

## 2023-06-01 RX ORDER — SODIUM CHLORIDE 9 MG/ML
20 INJECTION, SOLUTION INTRAVENOUS ONCE
Status: COMPLETED | OUTPATIENT
Start: 2023-06-01 | End: 2023-06-01

## 2023-06-01 RX ADMIN — SODIUM CHLORIDE 200 MG: 9 INJECTION, SOLUTION INTRAVENOUS at 09:35

## 2023-06-01 RX ADMIN — FOSAPREPITANT 150 MG: 150 INJECTION, POWDER, LYOPHILIZED, FOR SOLUTION INTRAVENOUS at 08:56

## 2023-06-01 RX ADMIN — PEMETREXED DISODIUM 756 MG: 100 INJECTION, POWDER, LYOPHILIZED, FOR SOLUTION INTRAVENOUS at 10:38

## 2023-06-01 RX ADMIN — SODIUM CHLORIDE 20 ML/HR: 0.9 INJECTION, SOLUTION INTRAVENOUS at 08:29

## 2023-06-01 RX ADMIN — CYANOCOBALAMIN 1000 MCG: 1000 INJECTION, SOLUTION INTRAMUSCULAR at 09:18

## 2023-06-01 RX ADMIN — CARBOPLATIN 442.4 MG: 10 INJECTION, SOLUTION INTRAVENOUS at 10:58

## 2023-06-01 RX ADMIN — DEXAMETHASONE SODIUM PHOSPHATE: 10 INJECTION, SOLUTION INTRAMUSCULAR; INTRAVENOUS at 08:29

## 2023-06-01 NOTE — PROGRESS NOTES
MSW met with the pt in the infusion center this day  Pt was pleasant and conversational   She denied pain at this time  The pt's spouse was present with her and he continues to be a good support for the pt   Pt shared that her son, who lives out of the [place], came in for a visit and she enjoyed their time together  Pt continues to deal with lethargy and finds it much worse the few days post treatment  She has learned to acclimate to this new normal   She spoke of her grandson and how she is hopeful to take him to the beach in August   Pt expressed concern with planning as she still has [facility] for her drains  Pt was encouraged to plan the trip and MSW can make accommodations for the pt, if needed, while she is away  Support was offered and MSW will continue to follow

## 2023-06-02 ENCOUNTER — HOME CARE VISIT (OUTPATIENT)
Dept: HOME HEALTH SERVICES | Facility: HOME HEALTHCARE | Age: 63
End: 2023-06-02

## 2023-06-02 VITALS
SYSTOLIC BLOOD PRESSURE: 140 MMHG | TEMPERATURE: 97.5 F | RESPIRATION RATE: 18 BRPM | HEART RATE: 84 BPM | OXYGEN SATURATION: 97 % | DIASTOLIC BLOOD PRESSURE: 70 MMHG

## 2023-06-02 NOTE — PROGRESS NOTES
HEMATOLOGY / [address] FOLLOW UP NOTE    Primary Care Provider: CAT Garza  Referring Provider:    MRN: [id number]  : 1960    Reason for Encounter: follow up       Oncology History Overview Note   2019 - Stage IA adenocarcinoma of the right lung s/p RLLectomy    2022 - fall after tripping on a dog gate - CT showed pulmonary nodules that required 3 month followup    3/2023 - CT showed right pleural effusion with enlarging lung lesion, she was otherwise symptomatic, thoracentesis cell block showed adenocarcinoma c/w her prior lung primary    PET - no disease outside the chest    2023 - carbo/pem/pembro    -23 - cycle 2, pemetrexed dose reduced by 20% and carbo dose reduced to AUC 4 due to nausea and fatigue     Non-small cell cancer of right lung ([facility] [address] )   2023 Initial Diagnosis    Non-small cell cancer of right lung ([facility] [address] )     2023 -  Chemotherapy    cyanocobalamin, 1,000 mcg, Intramuscular, Once, 2 of 3 cycles  Administration: 1,000 mcg (2023), 1,000 mcg (2023)  alteplase (CATHFLO), 2 mg, Intracatheter, Every 1 Minute as needed, 3 of 6 cycles  fosaprepitant (EMEND) IVPB, 150 mg, Intravenous, Once, 3 of 6 cycles  Administration: 150 mg (2023), 150 mg (2023), 150 mg (2023)  CARBOplatin (PARAPLATIN) IVPB (GOG AUC DOSING), 553 mg, Intravenous, Once, 3 of 6 cycles  Administration: 553 mg (2023), 442 4 mg (2023), 442 4 mg (2023)  pemetrexed (ALIMTA) chemo infusion, 945 mg, Intravenous, Once, 3 of 6 cycles  Dose modification: 400 mg/m2 (original dose 500 mg/m2, Cycle 3, Reason: Nausea/Vomiting, Comment: 20% dose reduction due to nausea)  Administration: 900 mg (2023), 756 mg (2023), 756 mg (2023)  pembrolizumab (KEYTRUDA) IVPB, 200 mg, Intravenous, Once, 3 of 6 cycles  Administration: 200 mg (2023), 200 mg (2023), 200 mg (2023)         Interval History: Patient presents for follow up of          REVIEW OF SYSTEMS:  Please note that a 14-point review of systems was performed to include Constitutional, HEENT, Respiratory, CVS, GI, , Musculoskeletal, Integumentary, Neurologic, Rheumatologic, Endocrinologic, Psychiatric, Lymphatic, and Hematologic/Oncologic systems were reviewed and are negative unless otherwise stated in HPI  Positive and negative findings pertinent to this evaluation are incorporated into the history of present illness  ECOG PS: 0    PROBLEM LIST:  Patient Active Problem List   Diagnosis   • Left shoulder tendinitis   • Impingement syndrome of left shoulder   • Adhesive capsulitis of left shoulder   • Fall at home, initial encounter   • Closed fracture of multiple ribs of left side   • Cigarette nicotine dependence in remission   • COPD (chronic obstructive pulmonary disease) ([facility])   • Hypercholesterolemia   • Essential hypertension   • Non-small cell cancer of right lung (HCC)   • Malignant pleural effusion   • CINV (chemotherapy-induced nausea and vomiting)       Assessment / Plan:       I spent 20 minutes on chart review, face to face counseling time, coordination of care and documentation  Past Medical History:   has a past medical history of Cancer ([facility] [address] ), COPD (chronic obstructive pulmonary disease) ([facility] [address] ), Emphysema of lung ([facility] [address] ), Hyperlipidemia, Hypertension, Lung cancer ([facility] [address] ) (06/26/2019), Lung nodule, and Pleural effusion  PAST SURGICAL HISTORY:   has a past surgical history that includes Appendectomy; Colon surgery; Lung lobectomy; Hysterectomy; Oophorectomy (Bilateral); Breast biopsy (Right); IR thoracentesis (03/24/2023); IR thoracentesis (04/12/2023); IR thoracentesis (04/18/2023); IR port placement (04/18/2023); IR pleural effusion long-term catheter placement (04/25/2023); Bronchoscopy (06/2019); and Lung biopsy (06/2019)      CURRENT MEDICATIONS  Current Outpatient Medications   Medication Sig Dispense Refill   • albuterol (2 5 mg/3 mL) 0 083 % nebulizer solution Take 3 mL (2 5 mg total) by nebulization every 6 (six) hours as needed for wheezing or shortness of breath 270 mL 4   • albuterol (PROVENTIL HFA,VENTOLIN HFA) 90 mcg/act inhaler Inhale 2 puffs every 4 (four) hours  3   • Anoro Ellipta 62 5-25 MCG/ACT inhaler      • aspirin 81 MG tablet Take 81 mg by mouth daily     • atorvastatin (LIPITOR) 20 mg tablet Take 20 mg by mouth daily  • buPROPion (WELLBUTRIN XL) 150 mg 24 hr tablet Take 300 mg by mouth every morning     • coenzyme Q-10 100 MG capsule Take 200 mg by mouth daily     • dexamethasone (DECADRON) 4 mg tablet Take 1 tablet (4 mg total) by mouth 2 (two) times a day with meals Take 1 tablet by mouth the day before, the day of, and the day after chemotherapy  6 tablet 6   • diphenhydrAMINE (BENADRYL) 50 MG tablet Take 1 tablet (50 mg total) by mouth every 6 (six) hours as needed for itching (Rash) 30 tablet 0   • diphenhydrAMINE-APAP, sleep, (TYLENOL PM EXTRA STRENGTH PO) Take 325 mg by mouth daily at bedtime  Indications: sleep     • folic acid (KP Folic Acid) 1 mg tablet Take 1 tablet (1 mg total) by mouth daily 90 tablet 1   • lidocaine-prilocaine (EMLA) cream Apply to port 30 to 60 minutes prior to use 30 g 3   • LORazepam (ATIVAN) 1 mg tablet 1/2 TABLET TWICE A DAY AS NEEDED FOR ANXIETY ORALLY 30 DAYS  0   • losartan-hydrochlorothiazide (HYZAAR) 100-25 MG per tablet Take 1 tablet by mouth daily     • prochlorperazine (COMPAZINE) 10 mg tablet Take 1 tablet (10 mg total) by mouth every 6 (six) hours as needed for nausea 30 tablet 3   • Sennosides (Senna) 8 6 MG CAPS Take 2 capsules by mouth as needed (constipation)  Indications: Constipation     • sertraline (ZOLOFT) 100 mg tablet Take 200 mg by mouth daily      • verapamil (VERELAN PM) 180 MG 24 hr capsule Take 360 mg by mouth daily      • EPINEPHrine (EPIPEN) 0 3 mg/0 3 mL SOAJ Inject 0 3 mL (0 3 mg total) into a muscle once for 1 dose 0 6 mL 0   • famotidine (PEPCID) 20 mg tablet Take 20 mg by mouth daily  "Indications: Heartburn     • ondansetron (ZOFRAN) 8 mg tablet Take 1 tablet (8 mg total) by mouth every 8 (eight) hours as needed for nausea or vomiting 30 tablet 0     No current facility-administered medications for this visit  Facility-Administered Medications Ordered in Other Visits   Medication Dose Route Frequency Provider Last Rate Last Admin   • alteplase (CATHFLO) injection 2 mg  2 mg Intracatheter Q1MIN PRN Zayda Rios DO         [unfilled]    SOCIAL HISTORY:   reports that she quit smoking about 2 months ago  Her smoking use included cigarettes  She has a 25 00 pack-year smoking history  She has never used smokeless tobacco  She reports that she does not currently use alcohol  She reports that she does not use drugs  FAMILY HISTORY:  family history includes Arthritis in her mother; COPD in her mother; Cancer in her maternal aunt, maternal aunt, maternal grandmother, and mother; Colon cancer in her maternal aunt, maternal grandmother, and mother; Depression in her mother; Diabetes in her maternal grandmother; Drug abuse in her brother; Hearing loss in her mother; Hyperlipidemia in her mother; Hypertension in her mother; Lung cancer in her mother  ALLERGIES:  is allergic to amoxicillin, vilazodone hcl, wasp venom, and zithromax [azithromycin]  Physical Exam:  Vital Signs:   Visit Vitals  /70 (BP Location: Left arm, Patient Position: Sitting, Cuff Size: Adult)   Pulse 99   Temp 98 °F (36 7 °C)   Resp 17   Ht 5' 4\" (1 626 m)   Wt 84 4 kg (186 lb)   SpO2 93%   BMI 31 93 kg/m²   OB Status Hysterectomy   Smoking Status Former   BSA 1 9 m²     Body mass index is 31 93 kg/m²  Body surface area is 1 9 meters squared  GEN: Alert, awake oriented x3, in no acute distress  HEENT- No pallor, icterus, cyanosis, no oral mucosal lesions,   LAD - no palpable cervical, clavicle, axillary, inguinal LAD  Heart- normal S1 S2, regular rate and rhythm, No murmur, rubs     Lungs- clear breathing sound " bilateral    Abdomen- soft, Non tender, bowel sounds present  Extremities- No cyanosis, clubbing, edema  Neuro- No focal neurological deficit    Labs:  Lab Results   Component Value Date    HCT 34 2 (L) 05/30/2023    HGB 11 5 05/30/2023    MCV 96 05/30/2023     (H) 05/30/2023    WBC 4 03 (L) 05/30/2023     Lab Results   Component Value Date    AGAP 8 05/30/2023    ALKPHOS 114 (H) 05/30/2023    ALT 42 05/30/2023    AST 27 05/30/2023    BUN 8 05/30/2023    CALCIUM 9 1 05/30/2023     05/30/2023    CO2 25 05/30/2023    CREATININE 0 58 (L) 05/30/2023    EGFR 97 05/30/2023    GLUC 123 05/30/2023    GLUF 123 (H) 05/30/2023    K 3 5 05/30/2023    SODIUM 136 05/30/2023    TBILI 0 38 05/30/2023    TP 6 4 05/30/2023

## 2023-06-03 VITALS
RESPIRATION RATE: 18 BRPM | HEART RATE: 79 BPM | OXYGEN SATURATION: 96 % | DIASTOLIC BLOOD PRESSURE: 62 MMHG | SYSTOLIC BLOOD PRESSURE: 140 MMHG

## 2023-06-06 ENCOUNTER — HOME CARE VISIT (OUTPATIENT)
Dept: HOME HEALTH SERVICES | Facility: HOME HEALTHCARE | Age: 63
End: 2023-06-06
Payer: COMMERCIAL

## 2023-06-06 ENCOUNTER — CONSULT (OUTPATIENT)
Age: 63
End: 2023-06-06

## 2023-06-06 VITALS
DIASTOLIC BLOOD PRESSURE: 62 MMHG | BODY MASS INDEX: 32.03 KG/M2 | HEART RATE: 99 BPM | WEIGHT: 186.6 LBS | TEMPERATURE: 97.6 F | SYSTOLIC BLOOD PRESSURE: 124 MMHG | OXYGEN SATURATION: 95 %

## 2023-06-06 VITALS — SYSTOLIC BLOOD PRESSURE: 130 MMHG | HEART RATE: 70 BPM | DIASTOLIC BLOOD PRESSURE: 70 MMHG | OXYGEN SATURATION: 98 %

## 2023-06-06 DIAGNOSIS — J44.9 CHRONIC OBSTRUCTIVE PULMONARY DISEASE, UNSPECIFIED COPD TYPE (HCC): ICD-10-CM

## 2023-06-06 DIAGNOSIS — Z51.5 PALLIATIVE CARE PATIENT: ICD-10-CM

## 2023-06-06 DIAGNOSIS — J91.0 MALIGNANT PLEURAL EFFUSION: Primary | ICD-10-CM

## 2023-06-06 DIAGNOSIS — T45.1X5A CINV (CHEMOTHERAPY-INDUCED NAUSEA AND VOMITING): ICD-10-CM

## 2023-06-06 DIAGNOSIS — R11.2 CINV (CHEMOTHERAPY-INDUCED NAUSEA AND VOMITING): ICD-10-CM

## 2023-06-06 DIAGNOSIS — C34.91 NON-SMALL CELL CANCER OF RIGHT LUNG (HCC): ICD-10-CM

## 2023-06-06 PROCEDURE — G0299 HHS/HOSPICE OF RN EA 15 MIN: HCPCS

## 2023-06-06 NOTE — PROGRESS NOTES
Outpatient Consultation - Palliative and [facility] Roshan Smith [age] y o  female [id number]    Assessment & Plan  Problems actively addressed:  1  Malignant pleural effusion    2  Non-small cell cancer of right lung ([facility] [address] )    3  Chronic obstructive pulmonary disease, unspecified COPD type ([facility] [address] )    4  CINV (chemotherapy-induced nausea and vomiting)    5  Palliative care patient      Plan:  · I introduced the role of palliative and supportive care  · At this time, Ronny Brown reports stable symptoms  · She has been certified for [employer] and has product at home, however has not yet felt the need to use it  · Overall, has good appetite  · Visiting nursing comes in to assist with Ascept catheter 2/x week  · Will have next chemo on the 22nd, then follow up imaging to help determine plan  · ACP - Ronny Brown reports that she is DNR/DNI  She has a Living Will/POA, however would like to review documents before bringing them in to be scanned  I offered to assist with ACP completion if that is helpful  · RTC - 2 months for symptom follow up  Medications adjusted this encounter:  Requested Prescriptions      No prescriptions requested or ordered in this encounter     No orders of the defined types were placed in this encounter  There are no discontinued medications  PPS: 90%      Minor Corin was seen today for symptoms and planning cares related to above illnesses  Above orders were sent electronically, or provided in hardcopy in clinic  I have reviewed the patient's controlled substance dispensing history in the Prescription Drug Monitoring Program in compliance with the [facility] regulations before prescribing any controlled substances    Filled  Written  Sold  ID  Drug  QTY  Days  Prescriber  RX #  Dispenser  Refill  Daily Dose*  Pymt Type      11/14/2022 11/14/2022   1  Oxycodone Hcl (Ir) 5 Mg Tablet 6 00  2  Ma Zba  [id number]   Pen (4854) 89 57 MME  Comm Ins  [place]    09/01/2022 09/01/2022   1 Lorazepam 1 Mg Tablet 30 00  30  Al Zel  [id number]   Pen (6052)   1 00 LME  Comm Ins  [place]    11/23/2021 11/23/2021   1  Lorazepam 1 Mg Tablet 30 00  30  An Pel  [id number]   Pen (6052)   1 00 LME  Comm Ins  [place]        We appreciate the referral, and wish for her to continue to follow with us  If there are questions or concerns, please contact us through our clinic/answering service 24 hours a day, seven days a week  Sienna Astorga PA-C  [facility]  [phone]    Visit Information    Accompanied By: presents alone (spouse drove her to appointment but reamined in waiting room)      Source of History: Self    History Limitations: None    History of Present Illness      Rosenda Raymond is a 57 y o  female who presents as a referral from Dr Yoana Gonzáles of heme/onc for primary palliative diagnosis of metastatic lung cancer  Consultation is requested for: symptom management, goal of care assessment and decisional support, disease process education and discussion of prognosis, advance care planning, emotional support in the setting of serious illness  Gabriela Nogueira is being treated for metastatic, recurrent stage IV lung cancer with Poly, pemetrexed, and pembro with palliative intent  She also has been set up with FA and vitamin B12 injection to mitigate drug toxicity  She has visiting nursing coming to the home to assist with pleural catheter  Today, 6/6/2023, Gabriela Nogueira presents for palliative and supportive care for initial consultation  I reviewed the role of palliative and supportive care, how we come alongside her treatment team to help promote quality of life and functionality  We reviewed diabetes symptoms  She states that she is coping well and has an upbeat mood  Her appetite is adequate  There will be a day or 2 after treatment that she has to be careful about what she eats/listen to her body for what foods are appealing, otherwise she is keeping up with her nutrition  Nausea is minimal   He is not having significant pain at the port or the catheter site  She is independent with ADLs  Her  drives her to appointments  Farnaz Hummel states her grandson Bean Nguyen is her inspiration and she wants to be around for him  She quit smoking because of him wants as much time as possible to be with him  Social:  · Supportive spouse who works from home  · 2 adult sons  · One local grandson Bean Nguyen (7) and another grandson Boris Mccloud (2) who lives in [place]  · Has good social support       Pertinent Palliative Care Domains    Physical Symptoms:ambulates    Psychological Symptoms:no anxiety, good insight, coping well    Social Aspects: good family support, particularly from spouse      Advance Directive and Living Will:     yes however she would like to amend before submitting to EMR  Power of :   yes but not on file   POLST:      Historical Data  Past Medical History:   Diagnosis Date   • Cancer ([facility] )    • COPD (chronic obstructive pulmonary disease) ([facility] )    • Emphysema of lung ([facility] )    • Hyperlipidemia    • Hypertension    • Lung cancer ([facility] ) 06/26/2019    right lower lobe removed   • Lung nodule    • Pleural effusion      Past Surgical History:   Procedure Laterality Date   • APPENDECTOMY     • BREAST BIOPSY Right     benign   • BRONCHOSCOPY  06/2019   • COLON SURGERY     • HYSTERECTOMY      age 48   • IR PLEURAL EFFUSION LONG-TERM CATHETER PLACEMENT  04/25/2023   • IR PORT PLACEMENT  04/18/2023   • IR THORACENTESIS  03/24/2023   • IR THORACENTESIS  04/12/2023   • IR THORACENTESIS  04/18/2023   • LUNG BIOPSY  06/2019   • LUNG LOBECTOMY     • OOPHORECTOMY Bilateral     age 48     Social History     Socioeconomic History   • Marital status: /Civil Union     Spouse name: Not on file   • Number of children: Not on file   • Years of education: Not on file   • Highest education level: Not on file   Occupational History   • Not on file   Tobacco Use   • Smoking status: Former Packs/day: 0 50     Years: 50 00     Total pack years: 25 00     Types: Cigarettes     Quit date: 3/15/2023     Years since quittin 2   • Smokeless tobacco: Never   Vaping Use   • Vaping Use: Never used   Substance and Sexual Activity   • Alcohol use: Not Currently     Comment: Rarely drink, socially only   • Drug use: No   • Sexual activity: Not Currently     Partners: Male     Birth control/protection: Post-menopausal, Female Sterilization   Other Topics Concern   • Not on file   Social History Narrative   • Not on file     Social Determinants of Health     Financial Resource Strain: Not on file   Food Insecurity: No Food Insecurity (2023)    Hunger Vital Sign    • Worried About Running Out of Food in the Last Year: Never true    • Ran Out of Food in the Last Year: Never true   Transportation Needs: No Transportation Needs (2023)    PRAPARE - Transportation    • Lack of Transportation (Medical): No    • Lack of Transportation (Non-Medical):  No   Physical Activity: Not on file   Stress: Not on file   Social Connections: Not on file   Intimate Partner Violence: Not on file   Housing Stability: Low Risk  (2023)    Housing Stability Vital Sign    • Unable to Pay for Housing in the Last Year: No    • Number of Places Lived in the Last Year: 1    • Unstable Housing in the Last Year: No     Family History   Problem Relation Age of Onset   • Colon cancer Mother    • Hypertension Mother    • Hyperlipidemia Mother    • Hearing loss Mother    • Depression Mother    • COPD Mother    • Cancer Mother         Lung   • Arthritis Mother    • Lung cancer Mother    • Drug abuse Brother    • Diabetes Maternal Grandmother    • Cancer Maternal Grandmother         Colon   • Colon cancer Maternal Grandmother    • Cancer Maternal Aunt    • Colon cancer Maternal Aunt    • Cancer Maternal Aunt         Colon     Allergies   Allergen Reactions   • Amoxicillin Hives   • Vilazodone Hcl Dizziness and Nausea Only   • Wasp Venom Swelling   • Zithromax [Azithromycin] Hives     Current Outpatient Medications   Medication Sig Dispense Refill   • albuterol (2 5 mg/3 mL) 0 083 % nebulizer solution Take 3 mL (2 5 mg total) by nebulization every 6 (six) hours as needed for wheezing or shortness of breath 270 mL 4   • albuterol (PROVENTIL HFA,VENTOLIN HFA) 90 mcg/act inhaler Inhale 2 puffs every 4 (four) hours  3   • Anoro Ellipta 62 5-25 MCG/ACT inhaler      • aspirin 81 MG tablet Take 81 mg by mouth daily     • atorvastatin (LIPITOR) 20 mg tablet Take 20 mg by mouth daily  • buPROPion (WELLBUTRIN XL) 150 mg 24 hr tablet Take 300 mg by mouth every morning     • coenzyme Q-10 100 MG capsule Take 200 mg by mouth daily     • dexamethasone (DECADRON) 4 mg tablet Take 1 tablet (4 mg total) by mouth 2 (two) times a day with meals Take 1 tablet by mouth the day before, the day of, and the day after chemotherapy  6 tablet 6   • diphenhydrAMINE-APAP, sleep, (TYLENOL PM EXTRA STRENGTH PO) Take 325 mg by mouth daily at bedtime  Indications: sleep     • famotidine (PEPCID) 20 mg tablet Take 20 mg by mouth daily  Indications: Heartburn     • folic acid (KP Folic Acid) 1 mg tablet Take 1 tablet (1 mg total) by mouth daily 90 tablet 1   • LORazepam (ATIVAN) 1 mg tablet 1/2 TABLET TWICE A DAY AS NEEDED FOR ANXIETY ORALLY 30 DAYS  0   • losartan-hydrochlorothiazide (HYZAAR) 100-25 MG per tablet Take 1 tablet by mouth daily     • prochlorperazine (COMPAZINE) 10 mg tablet Take 1 tablet (10 mg total) by mouth every 6 (six) hours as needed for nausea 30 tablet 3   • Sennosides (Senna) 8 6 MG CAPS Take 2 capsules by mouth as needed (constipation)   Indications: Constipation     • sertraline (ZOLOFT) 100 mg tablet Take 200 mg by mouth daily      • verapamil (VERELAN PM) 180 MG 24 hr capsule Take 360 mg by mouth daily      • diphenhydrAMINE (BENADRYL) 50 MG tablet Take 1 tablet (50 mg total) by mouth every 6 (six) hours as needed for itching (Rash) (Patient not taking: Reported on 6/6/2023) 30 tablet 0   • EPINEPHrine (EPIPEN) 0 3 mg/0 3 mL SOAJ Inject 0 3 mL (0 3 mg total) into a muscle once for 1 dose 0 6 mL 0   • lidocaine-prilocaine (EMLA) cream Apply to port 30 to 60 minutes prior to use 30 g 3   • ondansetron (ZOFRAN) 8 mg tablet Take 1 tablet (8 mg total) by mouth every 8 (eight) hours as needed for nausea or vomiting 30 tablet 0     No current facility-administered medications for this visit  Review of Systems   Constitutional: Positive for decreased appetite (very mild for 2 days after tx)  HENT: Negative for congestion  Eyes: Negative for blurred vision and double vision  Cardiovascular: Negative for chest pain and claudication  Musculoskeletal: Negative for arthritis and back pain  Gastrointestinal: Negative for bloating, abdominal pain, nausea and vomiting  Genitourinary: Negative for bladder incontinence, decreased libido, pelvic pain and urgency  Neurological: Negative for aphonia and brief paralysis  Psychiatric/Behavioral: Negative for altered mental status, depression and memory loss  The patient is not nervous/anxious  Vital Signs    /62 (BP Location: Left arm, Patient Position: Sitting, Cuff Size: Standard)   Pulse 99   Temp 97 6 °F (36 4 °C) (Temporal)   Wt 84 6 kg (186 lb 9 6 oz)   SpO2 95%   BMI 32 03 kg/m²     Physical Exam and Objective Data  Physical Exam  Vitals and nursing note reviewed  Constitutional:       General: She is not in acute distress  Appearance: She is well-developed  She is obese  She is not ill-appearing  Comments: Extremely pleasant and fully conversational     HENT:      Head: Normocephalic and atraumatic  Eyes:      General:         Right eye: No discharge  Left eye: No discharge  Conjunctiva/sclera: Conjunctivae normal    Cardiovascular:      Rate and Rhythm: Normal rate  Pulmonary:      Effort: Pulmonary effort is normal  No respiratory distress  Musculoskeletal:         General: No swelling  Cervical back: Neck supple  Skin:     General: Skin is warm and dry  Neurological:      Mental Status: She is alert and oriented to person, place, and time  Psychiatric:         Mood and Affect: Mood normal          Behavior: Behavior normal        Radiology and Laboratory:  I personally reviewed and interpreted the following results: CBC, CMP    60 minutes was spent face to face with [address] [place] [address] with greater than 50% of the time spent in counseling or coordination of care including discussions of etiology of diagnosis, risk factors and risk reduction of disease, patient and family counseling/involvement in care and compliance with treatment regimen  reviewed chart, reviewed lab values, reviewed imaging, provided medical updates, discussed palliative care and symptom management, opioid titration, discussed code status, discussed advanced directives, assessed POA/HCA, provided supportive listening, provided anticipatory guidance, provided psychosocial and emotional support, assessed competency and decision-making and facilitated interdisciplinary communication  All of the patient's questions were answered during this discussion

## 2023-06-08 ENCOUNTER — OFFICE VISIT (OUTPATIENT)
Dept: FAMILY MEDICINE CLINIC | Facility: HOSPITAL | Age: 63
End: 2023-06-08
Payer: COMMERCIAL

## 2023-06-08 VITALS
OXYGEN SATURATION: 95 % | RESPIRATION RATE: 16 BRPM | HEART RATE: 89 BPM | SYSTOLIC BLOOD PRESSURE: 128 MMHG | WEIGHT: 185 LBS | HEIGHT: 64 IN | BODY MASS INDEX: 31.58 KG/M2 | DIASTOLIC BLOOD PRESSURE: 50 MMHG

## 2023-06-08 DIAGNOSIS — I10 ESSENTIAL HYPERTENSION: ICD-10-CM

## 2023-06-08 DIAGNOSIS — Z12.12 SCREENING FOR COLORECTAL CANCER: ICD-10-CM

## 2023-06-08 DIAGNOSIS — J44.9 CHRONIC OBSTRUCTIVE PULMONARY DISEASE, UNSPECIFIED COPD TYPE (HCC): Primary | ICD-10-CM

## 2023-06-08 DIAGNOSIS — I10 ESSENTIAL HYPERTENSION: Primary | ICD-10-CM

## 2023-06-08 DIAGNOSIS — Z12.11 SCREENING FOR COLORECTAL CANCER: ICD-10-CM

## 2023-06-08 DIAGNOSIS — E78.00 HYPERCHOLESTEROLEMIA: ICD-10-CM

## 2023-06-08 DIAGNOSIS — C34.91 NON-SMALL CELL CANCER OF RIGHT LUNG (HCC): ICD-10-CM

## 2023-06-08 PROBLEM — M75.02 ADHESIVE CAPSULITIS OF LEFT SHOULDER: Status: RESOLVED | Noted: 2021-11-03 | Resolved: 2023-06-08
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
## 2023-06-08 PROBLEM — M77.8 LEFT SHOULDER TENDINITIS: Status: RESOLVED | Noted: 2021-09-14 | Resolved: 2023-06-08
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
## 2023-06-08 PROCEDURE — 99204 OFFICE O/P NEW MOD 45 MIN: CPT | Performed by: INTERNAL MEDICINE

## 2023-06-08 RX ORDER — UMECLIDINIUM BROMIDE AND VILANTEROL TRIFENATATE 62.5; 25 UG/1; UG/1
1 POWDER RESPIRATORY (INHALATION) DAILY
Qty: 90 BLISTER | Refills: 3 | Status: SHIPPED | OUTPATIENT
Start: 2023-06-08 | End: 2023-09-06

## 2023-06-08 RX ORDER — VERAPAMIL HYDROCHLORIDE 360 MG/1
360 CAPSULE, DELAYED RELEASE PELLETS ORAL
Qty: 90 CAPSULE | Refills: 3 | Status: SHIPPED | OUTPATIENT
Start: 2023-06-08 | End: 2023-06-08

## 2023-06-08 RX ORDER — GLUCOSAMINE/D3/BOSWELLIA SERRA 1500MG-400
TABLET ORAL
COMMUNITY

## 2023-06-08 RX ORDER — AMLODIPINE BESYLATE 5 MG/1
5 TABLET ORAL DAILY
COMMUNITY
Start: 2023-05-27 | End: 2023-06-08

## 2023-06-08 RX ORDER — MULTIVIT WITH MINERALS/LUTEIN
1 TABLET ORAL EVERY 24 HOURS
COMMUNITY
End: 2023-06-08

## 2023-06-08 RX ORDER — VERAPAMIL HYDROCHLORIDE 300 MG/1
CAPSULE, EXTENDED RELEASE ORAL
Refills: 0 | OUTPATIENT
Start: 2023-06-08

## 2023-06-08 RX ORDER — LANOLIN ALCOHOL/MO/W.PET/CERES
CREAM (GRAM) TOPICAL EVERY 24 HOURS
COMMUNITY
End: 2023-06-08

## 2023-06-08 RX ORDER — MIRTAZAPINE 45 MG/1
45 TABLET, FILM COATED ORAL
COMMUNITY
Start: 2023-03-28 | End: 2023-06-08 | Stop reason: ALTCHOICE

## 2023-06-08 RX ORDER — FLUTICASONE PROPIONATE 110 UG/1
AEROSOL, METERED RESPIRATORY (INHALATION) EVERY 12 HOURS
COMMUNITY
End: 2023-06-08 | Stop reason: ALTCHOICE

## 2023-06-08 RX ORDER — MULTIVIT WITH MINERALS/LUTEIN
TABLET ORAL
COMMUNITY

## 2023-06-08 RX ORDER — IPRATROPIUM BROMIDE AND ALBUTEROL SULFATE 2.5; .5 MG/3ML; MG/3ML
SOLUTION RESPIRATORY (INHALATION)
COMMUNITY
Start: 2023-05-15 | End: 2023-06-08 | Stop reason: ALTCHOICE

## 2023-06-08 RX ORDER — LIDOCAINE HYDROCHLORIDE 30 MG/G
CREAM TOPICAL
COMMUNITY

## 2023-06-08 RX ORDER — VERAPAMIL HYDROCHLORIDE 240 MG/1
240 TABLET, FILM COATED, EXTENDED RELEASE ORAL
Qty: 90 TABLET | Refills: 3 | Status: SHIPPED | OUTPATIENT
Start: 2023-06-08

## 2023-06-08 RX ORDER — VERAPAMIL HYDROCHLORIDE 240 MG/1
TABLET, FILM COATED, EXTENDED RELEASE ORAL
COMMUNITY
Start: 2023-06-03 | End: 2023-06-08

## 2023-06-08 RX ORDER — BUPROPION HYDROCHLORIDE 300 MG/1
TABLET ORAL
COMMUNITY
Start: 2023-05-27

## 2023-06-08 NOTE — ASSESSMENT & PLAN NOTE
Pt has copd  Breathing is stable  Lungs are clear  Former smoker  Continue anoro elipta and albuterol

## 2023-06-08 NOTE — PATIENT INSTRUCTIONS
DASH Eating Plan   AMBULATORY CARE:   The DASH (Dietary Approaches to Stop Hypertension) Eating Plan  is designed to help prevent or lower high blood pressure  It can also help to lower LDL (bad) cholesterol and decrease your risk for heart disease  The plan is low in sodium, sugar, unhealthy fats, and total fat  It is high in potassium, calcium, magnesium, and fiber  These nutrients are added when you eat more fruits, vegetables, and whole grains  With the DASH eating plan, you need to eat a certain number of servings from each food group  This will help you get enough of certain nutrients and limit others  The amount of servings you should eat depends on how many calories you need  Your dietitian can help you create meal plans with the right number of servings for each food group  What you need to know about sodium:  Your dietitian will tell you how much sodium is safe for you to have each day  People with high blood pressure should have no more than 1,500 to 2,300 mg of sodium in a day  A teaspoon (tsp) of salt has 2,300 mg of sodium  This may seem like a difficult goal, but small changes to the foods you eat can make a big difference  Your healthcare provider or dietitian can help you create a meal plan that follows your sodium limit  Read food labels  Food labels can help you choose foods that are low in sodium  The amount of sodium is listed in milligrams (mg)  The % Daily Value (DV) column tells you how much of your daily needs are met by 1 serving of the food for each nutrient listed  Choose foods that have less than 5% of the DV of sodium  These foods are considered low in sodium  Foods that have 20% or more of the DV of sodium are considered high in sodium  Avoid foods that have more than 300 mg of sodium in each serving  Choose foods that say low-sodium, reduced-sodium, or no salt added on the food label  Limit added salt  Do not salt food at the table if you add salt when you cook  Use herbs and spices, such as onions, garlic, and salt-free seasonings to add flavor  Try lemon or lime juice or vinegar to add a tart flavor  Use hot peppers or a small amount of hot pepper sauce to add a spicy flavor  Limit foods high in added salt, such as the following:    Seasonings made with salt, such as garlic salt, celery salt, onion salt, seasoned salt, meat tenderizers, and monosodium glutamate (MSG)    Miso soup and canned or dried soup mixes    Regular soy sauce, barbecue sauce, teriyaki sauce, steak sauce, Worcestershire sauce, and most flavored vinegars    Snack foods, such as salted chips, popcorn, pretzels, pork rinds, salted crackers, and salted nuts    Frozen foods, such as dinners, entrees, vegetables with sauces, and breaded meats    Ask about salt substitutes  Ask your healthcare provider if you may use salt substitutes  Some salt substitutes have ingredients that can be harmful if you have certain health conditions  Choose foods carefully at restaurants  Meals from restaurants, especially fast food restaurants, are often high in sodium  Some restaurants have nutrition information that tells you the amount of sodium in their foods  Ask to have your food prepared with less, or no salt  What you need to know about fats:  Healthy fats include unsaturated fats and omega-3 fatty acids  Unhealthy fats include saturated fats and trans fats    Include healthy fats, such as the following:      Cooking oils, such as soybean, canola, olive, or sunflower    Fatty fish, such as salmon, tuna, mackerel, or sardines    Flaxseed oil or ground flaxseed    ½ cup of cooked beans, such as black beans, kidney beans, or null beans    1½ ounces of low-sodium nuts, such as almonds or walnuts    Low-sugar, low-sodium peanut butter    Seeds such as willam seeds or sunflower seeds       Limit or do not have unhealthy fats, such as the following:      Foods that contain fat from animals, such as fatty meats, whole milk, butter, and cream    Shortening, stick margarine, palm oil, and coconut oil    Full-fat or creamy salad dressing    Creamy soup    Crackers, chips, and baked goods made with margarine or shortening    Foods that are fried in unhealthy fats    Gravy and sauces, such as Mack or cheese sauces    What you need to know about carbohydrates (carbs): All carbs break down into sugar  Complex carbs contain more fiber than simple carbs  This means complex carbs go into the bloodstream more slowly and cause less of a blood sugar spike  Try to include more complex carbs and fewer simple carbs  Include complex carbs, such as the followin slice of whole-grain bread    1 ounce of dry cereal that does not contain added sugar    ½ cup of cooked oatmeal    2 ounces of cooked whole-grain pasta    ½ cup of cooked brown rice    Limit or do not have simple carbs, such as the following:      [employer], such as doughnuts, pastries, and cookies    Mixes for cornbread and biscuits    White rice and pasta mixes, such as boxed macaroni and cheese    Instant and cold cereals that contain sugar    Jelly, jam, and ice cream that contain sugar    Condiments such as ketchup    Drinks high in sugar, such as soft drinks, lemonade, and fruit juice    What you need to know about vegetables and fruits:  Vegetables and fruits can be fresh, frozen, or canned  If possible, try to choose low-sodium canned options    Include a variety of vegetables and fruits, such as the followin medium apple, pear, or peach (about ½ cup chopped)    ½ small banana    ½ cup berries, such as blueberries, strawberries, or blackberries    1 cup of raw leafy greens, such as lettuce, spinach, kale, or galdino greens    ½ cup of frozen or canned (no added salt) vegetables, such as green beans    ½ cup of fresh, frozen, or canned fruit (canned in light syrup or fruit juice)    ½ cup of vegetable or fruit juice    Limit or do not have vegetables and fruits made in the following ways:      Frozen fruit such as cherries that have added sugar    Fruit in cream or butter sauce    Canned vegetables that are high in sodium    Sauerkraut, pickled vegetables, and other foods prepared in brine    Fried vegetables or vegetables in butter or high-fat sauces    What you need to know about protein foods: Include lean or low-fat protein foods, such as the following:      Poultry (chicken, turkey) with no skin    Fish (especially fatty fish, such as salmon, fresh tuna, or mackerel)    Lean beef and pork (loin, round, extra lean hamburger)    Egg whites and egg substitutes    1 cup of nonfat (skim) or 1% milk    1½ ounces of fat-free or low-fat cheese    6 ounces of nonfat or low-fat yogurt    Limit or do not have high-fat protein foods, such as the following:      Smoked or cured meat, such as corned beef, stevens, ham, hot dogs, and sausage    Canned beans and canned meats or spreads, such as potted meats, sardines, anchovies, and imitation seafood    Deli or lunch meats, such as bologna, ham, turkey, and roast beef    High-fat meat (T-bone steak, regular hamburger, and ribs)    Whole eggs and egg yolks    Whole milk, 2% milk, and cream    Regular cheese and processed cheese    Other guidelines to follow:   Maintain a healthy weight  Your risk for heart disease is higher if you are overweight  Your healthcare provider may suggest that you lose weight if you are overweight  You can lose weight by eating fewer calories and foods that have added sugars and fat  The DASH meal plan can help you do this  Decrease calories by eating smaller portions at each meal and fewer snacks  Ask your healthcare provider for more information about how to lose weight  Exercise regularly  Regular exercise can help you reach or maintain a healthy weight  Regular exercise can also help decrease your blood pressure and improve your cholesterol levels   Get 30 minutes or more of moderate exercise each day of the week  To lose weight, get at least 60 minutes of exercise  Talk to your healthcare provider about the best exercise program for you  Limit alcohol  Women should limit alcohol to 1 drink a day  Men should limit alcohol to 2 drinks a day  A drink of alcohol is 12 ounces of beer, 5 ounces of wine, or 1½ ounces of liquor  For more information:   [facility] [address]  [address]  [place] [address]  Phone: [phone]  Web Address: [facility] no    © Copyright [employer] 2022 Information is for End User's use only and may not be sold, redistributed or otherwise used for commercial purposes  The above information is an  only  It is not intended as medical advice for individual conditions or treatments  Talk to your doctor, nurse or pharmacist before following any medical regimen to see if it is safe and effective for you

## 2023-06-08 NOTE — ASSESSMENT & PLAN NOTE
Pt presents as a new pt to establish care at this practice accompanied by     She has chronic htn  Electrolytes and thyroid fxn as well as renal fxn were normal on 5/30    BP is controlled    Continue losartan/hctz, verapamil 360mg qd  I discontinued norvasc since she's on verapamil  Recheck in 2 months

## 2023-06-08 NOTE — ASSESSMENT & PLAN NOTE
Pt has one more chemo treatment left for right nscca of the lung  Has indwelling pleural catheter on the right for recurrent effusion

## 2023-06-08 NOTE — PROGRESS NOTES
Assessment/Plan:    Essential hypertension  Pt presents as a new pt to establish care at this practice accompanied by     She has chronic htn  Electrolytes and thyroid fxn as well as renal fxn were normal on 5/30    BP is controlled    Continue losartan/hctz, verapamil 360mg qd  I discontinued norvasc since she's on verapamil  Recheck in 2 months    COPD (chronic obstructive pulmonary disease) ([facility] [address] )  Pt has copd  Breathing is stable  Lungs are clear  Former smoker  Continue anoro elipta and albuterol    Non-small cell cancer of right lung ([facility] [address] )  Pt has one more chemo treatment left for right nscca of the lung  Has indwelling pleural catheter on the right for recurrent effusion    Hypercholesterolemia  She has hyperlipidemia  Denies myalgias on verapamil and lipitor  LFTs were normal on 5/30  Continue lipitor  She has no hx of ASCVD, d/c aspirin       Diagnoses and all orders for this visit:    Chronic obstructive pulmonary disease, unspecified COPD type (HCC)  -     Anoro Ellipta 62 5-25 MCG/ACT inhaler; Inhale 1 puff daily 1 puff once daily    Non-small cell cancer of right lung (HCC)    Essential hypertension  -     verapamil (VERELAN PM) 360 MG 24 hr capsule; Take 1 capsule (360 mg total) by mouth daily at bedtime    Hypercholesterolemia    Screening for colorectal cancer  -     Ambulatory referral to Gastroenterology; Future    Other orders  -     Discontinue: mirtazapine (REMERON) 45 MG tablet; Take 45 mg by mouth daily at bedtime (Patient not taking: Reported on 6/8/2023)  -     Lidocaine HCl 3 % CREA; as directed Externally  -     buPROPion (WELLBUTRIN XL) 300 mg 24 hr tablet; TAKE 1 TABLET BY MOUTH EVERY DAY IN THE MORNING FOR 90 DAYS  -     Discontinue: ipratropium-albuterol (DUO-NEB) 0 5-2 5 mg/3 mL nebulizer solution;  (Patient not taking: Reported on 6/8/2023)  -     Discontinue: fluticasone (Flovent HFA) 110 MCG/ACT inhaler;  Every 12 hours (Patient not taking: Reported on 6/8/2023)  - "Discontinue: amLODIPine (NORVASC) 5 mg tablet; Take 5 mg by mouth daily  -     Discontinue: Fish Oil-Krill Oil (MEGARED ADVANCED 4 IN 1 PO); 1 daily  -     Biotin 95895 MCG TABS; 1 daily  -     Discontinue: verapamil (CALAN-SR) 240 mg CR tablet; 1 hs  -     Discontinue: verapamil (CALAN-SR) 120 mg CR tablet; 1 hs  -     Discontinue: vitamin E, tocopherol, 1,000 units capsule; 1 capsule every 24 hours  -     Cholecalciferol (Vitamin D3) 25 MCG (1000 UT) CAPS; 1 capsule every 24 hours  -     Ascorbic Acid (vitamin C) 1000 MG tablet; 1 daily  -     Discontinue: vitamin B-12 (VITAMIN B-12) 1,000 mcg tablet; every 24 hours  -     Probiotic Product (PROBIOTIC DAILY PO); 1 daily  -     Multiple Vitamin (MULTIVITAMINS PO); every 24 hours  -     Discontinue: Milk Thistle 250 MG CAPS; 1 daily          Subjective:      Patient ID: Ofelia Louis is a 57 y o  female  HPI    Patient presents for follow-up of chronic conditions as detailed in the assessment and plan  The following portions of the patient's history were reviewed and updated as appropriate: current medications, past family history, past medical history, past social history, past surgical history and problem list     Review of Systems   Constitutional: Negative for fever  HENT: Negative for hearing loss  Eyes: Negative for visual disturbance  Respiratory: Negative for cough and wheezing  Cardiovascular: Negative for chest pain and palpitations  Gastrointestinal: Negative for abdominal pain, blood in stool, constipation and diarrhea  Endocrine: Negative for polydipsia and polyphagia  Genitourinary: Negative for dysuria and hematuria  Musculoskeletal: Negative for arthralgias and myalgias  Skin: Negative for rash  Neurological: Negative for headaches  Psychiatric/Behavioral: Negative for dysphoric mood  All other systems reviewed and are negative          Objective:    /50   Pulse 89   Resp 16   Ht 5' 3 5\" (1 613 m)   Wt " 83 9 kg (185 lb)   SpO2 95%   BMI 32 26 kg/m²      Physical Exam  HENT:      Head: Normocephalic  Mouth/Throat:      Mouth: Mucous membranes are moist    Eyes:      Conjunctiva/sclera: Conjunctivae normal    Cardiovascular:      Rate and Rhythm: Normal rate  Heart sounds: No murmur heard  Comments: Extra beats are present but rhythm is regular  Pulmonary:      Effort: No respiratory distress  Breath sounds: No wheezing or rales  Abdominal:      General: Bowel sounds are normal       Palpations: Abdomen is soft  Tenderness: There is no abdominal tenderness  Musculoskeletal:         General: No tenderness  Cervical back: Neck supple  Skin:     General: Skin is warm and dry  Neurological:      Mental Status: She is alert and oriented to person, place, and time  Cranial Nerves: No cranial nerve deficit  Motor: No weakness  Gait: Gait normal    Psychiatric:         Mood and Affect: Mood normal              Depression Screening and Follow-up Plan: Patient was screened for depression during today's encounter  They screened negative with a PHQ-2 score of 1        Nika Woods MD

## 2023-06-08 NOTE — TELEPHONE ENCOUNTER
Mess to pt  Chart updated  ----- Message from Patricia Garsia MD sent at 6/8/2023  1:49 PM EDT -----  Please call pt: her pharmacy is not able to get verapamil 360mg pill therefore she should continue taking the 120 and 240mg pills  We tried but they are not able to order it

## 2023-06-08 NOTE — ASSESSMENT & PLAN NOTE
She has hyperlipidemia  Denies myalgias on verapamil and lipitor  LFTs were normal on 5/30  Continue lipitor  She has no hx of ASCVD, d/c aspirin

## 2023-06-09 ENCOUNTER — HOME CARE VISIT (OUTPATIENT)
Dept: HOME HEALTH SERVICES | Facility: HOME HEALTHCARE | Age: 63
End: 2023-06-09
Payer: COMMERCIAL

## 2023-06-09 VITALS
SYSTOLIC BLOOD PRESSURE: 130 MMHG | OXYGEN SATURATION: 98 % | DIASTOLIC BLOOD PRESSURE: 70 MMHG | TEMPERATURE: 97.6 F | HEART RATE: 72 BPM | RESPIRATION RATE: 18 BRPM

## 2023-06-09 PROCEDURE — G0299 HHS/HOSPICE OF RN EA 15 MIN: HCPCS

## 2023-06-11 ENCOUNTER — HOME CARE VISIT (OUTPATIENT)
Dept: HOME HEALTH SERVICES | Facility: HOME HEALTHCARE | Age: 63
End: 2023-06-11
Payer: COMMERCIAL

## 2023-06-13 ENCOUNTER — HOME CARE VISIT (OUTPATIENT)
Dept: HOME HEALTH SERVICES | Facility: HOME HEALTHCARE | Age: 63
End: 2023-06-13
Payer: COMMERCIAL

## 2023-06-13 VITALS
HEART RATE: 80 BPM | DIASTOLIC BLOOD PRESSURE: 60 MMHG | SYSTOLIC BLOOD PRESSURE: 120 MMHG | OXYGEN SATURATION: 96 % | TEMPERATURE: 97.6 F | RESPIRATION RATE: 16 BRPM

## 2023-06-13 PROCEDURE — G0300 HHS/HOSPICE OF LPN EA 15 MIN: HCPCS

## 2023-06-16 ENCOUNTER — HOME CARE VISIT (OUTPATIENT)
Dept: HOME HEALTH SERVICES | Facility: HOME HEALTHCARE | Age: 63
End: 2023-06-16
Payer: COMMERCIAL

## 2023-06-16 VITALS
DIASTOLIC BLOOD PRESSURE: 60 MMHG | SYSTOLIC BLOOD PRESSURE: 140 MMHG | OXYGEN SATURATION: 95 % | TEMPERATURE: 97.5 F | RESPIRATION RATE: 16 BRPM | HEART RATE: 89 BPM

## 2023-06-16 PROCEDURE — G0299 HHS/HOSPICE OF RN EA 15 MIN: HCPCS

## 2023-06-19 ENCOUNTER — TELEPHONE (OUTPATIENT)
Dept: HEMATOLOGY ONCOLOGY | Facility: CLINIC | Age: 63
End: 2023-06-19

## 2023-06-19 ENCOUNTER — HOME CARE VISIT (OUTPATIENT)
Dept: HOME HEALTH SERVICES | Facility: HOME HEALTHCARE | Age: 63
End: 2023-06-19
Payer: COMMERCIAL

## 2023-06-19 ENCOUNTER — TELEPHONE (OUTPATIENT)
Age: 63
End: 2023-06-19

## 2023-06-19 VITALS
SYSTOLIC BLOOD PRESSURE: 136 MMHG | TEMPERATURE: 97.2 F | OXYGEN SATURATION: 97 % | HEART RATE: 80 BPM | DIASTOLIC BLOOD PRESSURE: 76 MMHG | RESPIRATION RATE: 18 BRPM

## 2023-06-19 PROCEDURE — G0299 HHS/HOSPICE OF RN EA 15 MIN: HCPCS

## 2023-06-19 NOTE — TELEPHONE ENCOUNTER
Appointment Change  Cancel, Reschedule, Change to Virtual      Who are you speaking with? self   If it is not the patient, are they listed on an active communication consent form? self   Which provider is the appointment scheduled with? Natalio Hester   When is the appointment scheduled? Please list date and time 6/21   At which location is the appointment scheduled to take place? [facility]   Was the appointment rescheduled or changed from an in person visit to a virtual visit? If so, please list the details of the change  Yes, 6/21 8am Jo-Ann Dawn   What is the reason for the appointment change? sooner   Was [facility] transport scheduled for this visit? no   Does [facility] transport need to be scheduled for the new visit (if applicable) no   Does the patient need an infusion appointment rescheduled? no   Does the patient have an infusion appointment scheduled? If so, when? 6/20 10am   Is the patient undergoing chemotherapy? yes   Was the no-show policy reviewed for appointments being changed with less then 24 hours of notice?  yes

## 2023-06-19 NOTE — TELEPHONE ENCOUNTER
Called patient and LVM to see if patient could see Violeta at 8:00-9:00 on the same day  LVM with hope line # provided

## 2023-06-20 ENCOUNTER — HOSPITAL ENCOUNTER (OUTPATIENT)
Dept: INFUSION CENTER | Facility: HOSPITAL | Age: 63
Discharge: HOME | End: 2023-06-20
Payer: COMMERCIAL

## 2023-06-20 DIAGNOSIS — C34.91 NON-SMALL CELL CANCER OF RIGHT LUNG (HCC): Primary | ICD-10-CM

## 2023-06-20 LAB
ALBUMIN SERPL BCP-MCNC: 3.8 G/DL (ref 3.5–5)
ALP SERPL-CCNC: 101 U/L (ref 34–104)
ALT SERPL W P-5'-P-CCNC: 36 U/L (ref 7–52)
ANION GAP SERPL CALCULATED.3IONS-SCNC: 6 MMOL/L
AST SERPL W P-5'-P-CCNC: 30 U/L (ref 13–39)
BASOPHILS # BLD AUTO: 0.02 THOUSANDS/ÂΜL (ref 0–0.1)
BASOPHILS NFR BLD AUTO: 1 % (ref 0–1)
BILIRUB SERPL-MCNC: 0.39 MG/DL (ref 0.2–1)
BUN SERPL-MCNC: 7 MG/DL (ref 5–25)
CALCIUM SERPL-MCNC: 9.1 MG/DL (ref 8.4–10.2)
CHLORIDE SERPL-SCNC: 103 MMOL/L (ref 96–108)
CO2 SERPL-SCNC: 27 MMOL/L (ref 21–32)
CREAT SERPL-MCNC: 0.58 MG/DL (ref 0.6–1.3)
EOSINOPHIL # BLD AUTO: 0.04 THOUSAND/ÂΜL (ref 0–0.61)
EOSINOPHIL NFR BLD AUTO: 1 % (ref 0–6)
ERYTHROCYTE [DISTWIDTH] IN BLOOD BY AUTOMATED COUNT: 16 % (ref 11.6–15.1)
GFR SERPL CREATININE-BSD FRML MDRD: 97 ML/MIN/1.73SQ M
GLUCOSE SERPL-MCNC: 97 MG/DL (ref 65–140)
HCT VFR BLD AUTO: 33.4 % (ref 34.8–46.1)
HGB BLD-MCNC: 11.3 G/DL (ref 11.5–15.4)
IMM GRANULOCYTES # BLD AUTO: 0.02 THOUSAND/UL (ref 0–0.2)
IMM GRANULOCYTES NFR BLD AUTO: 1 % (ref 0–2)
LYMPHOCYTES # BLD AUTO: 0.85 THOUSANDS/ÂΜL (ref 0.6–4.47)
LYMPHOCYTES NFR BLD AUTO: 20 % (ref 14–44)
MCH RBC QN AUTO: 32.6 PG (ref 26.8–34.3)
MCHC RBC AUTO-ENTMCNC: 33.8 G/DL (ref 31.4–37.4)
MCV RBC AUTO: 96 FL (ref 82–98)
MONOCYTES # BLD AUTO: 0.63 THOUSAND/ÂΜL (ref 0.17–1.22)
MONOCYTES NFR BLD AUTO: 15 % (ref 4–12)
NEUTROPHILS # BLD AUTO: 2.66 THOUSANDS/ÂΜL (ref 1.85–7.62)
NEUTS SEG NFR BLD AUTO: 62 % (ref 43–75)
NRBC BLD AUTO-RTO: 0 /100 WBCS
PLATELET # BLD AUTO: 310 THOUSANDS/UL (ref 149–390)
PMV BLD AUTO: 8.9 FL (ref 8.9–12.7)
POTASSIUM SERPL-SCNC: 3.5 MMOL/L (ref 3.5–5.3)
PROT SERPL-MCNC: 6.5 G/DL (ref 6.4–8.4)
RBC # BLD AUTO: 3.47 MILLION/UL (ref 3.81–5.12)
SODIUM SERPL-SCNC: 136 MMOL/L (ref 135–147)
T3FREE SERPL-MCNC: 3.02 PG/ML (ref 2.5–3.9)
TSH SERPL DL<=0.05 MIU/L-ACNC: 1.69 UIU/ML (ref 0.45–4.5)
WBC # BLD AUTO: 4.22 THOUSAND/UL (ref 4.31–10.16)

## 2023-06-20 PROCEDURE — 84443 ASSAY THYROID STIM HORMONE: CPT | Performed by: INTERNAL MEDICINE

## 2023-06-20 PROCEDURE — 85025 COMPLETE CBC W/AUTO DIFF WBC: CPT | Performed by: INTERNAL MEDICINE

## 2023-06-20 PROCEDURE — 84481 FREE ASSAY (FT-3): CPT | Performed by: INTERNAL MEDICINE

## 2023-06-20 PROCEDURE — 80053 COMPREHEN METABOLIC PANEL: CPT | Performed by: INTERNAL MEDICINE

## 2023-06-20 NOTE — PROGRESS NOTES
HEMATOLOGY / [address] FOLLOW UP NOTE    Primary Care Provider: Yessy Tay MD  Referring Provider:    MRN: [id number]  : 1960    Reason for Encounter: Follow-up stage IV non-small cell lung cancer    Oncology History Overview Note   2019 - Stage IA adenocarcinoma of the right lung s/p RLLectomy    2022 - fall after tripping on a dog gate - CT showed pulmonary nodules that required 3 month followup    3/2023 - CT showed right pleural effusion with enlarging lung lesion, she was otherwise symptomatic, thoracentesis cell block showed adenocarcinoma c/w her prior lung primary    PET - no disease outside the chest    2023 - carbo/pem/pembro    -23 - cycle 2, pemetrexed dose reduced by 20% and carbo dose reduced to AUC 4 due to nausea and fatigue     Non-small cell cancer of right lung ([facility] [address] )   2023 Initial Diagnosis    Non-small cell cancer of right lung ([facility] [address] )     2023 -  Chemotherapy    cyanocobalamin, 1,000 mcg, Intramuscular, Once, 2 of 3 cycles  Administration: 1,000 mcg (2023), 1,000 mcg (2023)  alteplase (CATHFLO), 2 mg, Intracatheter, Every 1 Minute as needed, 3 of 6 cycles  fosaprepitant (EMEND) IVPB, 150 mg, Intravenous, Once, 3 of 6 cycles  Administration: 150 mg (2023), 150 mg (2023), 150 mg (2023)  CARBOplatin (PARAPLATIN) IVPB (GOG AUC DOSING), 553 mg, Intravenous, Once, 3 of 6 cycles  Administration: 553 mg (2023), 442 4 mg (2023), 442 4 mg (2023)  pemetrexed (ALIMTA) chemo infusion, 945 mg, Intravenous, Once, 3 of 6 cycles  Dose modification: 400 mg/m2 (original dose 500 mg/m2, Cycle 3, Reason: Nausea/Vomiting, Comment: 20% dose reduction due to nausea)  Administration: 900 mg (2023), 756 mg (2023), 756 mg (2023)  pembrolizumab (KEYTRUDA) IVPB, 200 mg, Intravenous, Once, 3 of 6 cycles  Administration: 200 mg (2023), 200 mg (2023), 200 mg (2023)     2023 -  Cancer Staged    Staging form: Guillermo Sidhu 8th Edition  - Clinical: Stage ALESHA (cT1c, cN2, cM1a) - Signed by Ernie Roberson DO on 6/9/2023           Interval History: Patient presents for follow-up prior to cycle 4 of carbo Alimta and Pembro scheduled for 6/22  Blood work is in acceptable treatment range  Thyroid panel normal  Overall, she has tolerated treatment fairly well  She states she has 2 days where fatigue gets the best of her, that is usually Sunday and Monday following treatment on Thursdays  Otherwise, denies nausea/vomiting  No neuropathy  No chest pain, shortness of breath  Does not always sleep well, secondary to anxiety  She has been certified for medical marijuana use has not tried yet  She is taking folic acid  Draining pleural catheter twice a week for approximately 500 mL      REVIEW OF SYSTEMS:  Please note that a 14-point review of systems was performed to include Constitutional, HEENT, Respiratory, CVS, GI, , Musculoskeletal, Integumentary, Neurologic, Rheumatologic, Endocrinologic, Psychiatric, Lymphatic, and Hematologic/Oncologic systems were reviewed and are negative unless otherwise stated in HPI  Positive and negative findings pertinent to this evaluation are incorporated into the history of present illness  ECOG PS: 0    PROBLEM LIST:  Patient Active Problem List   Diagnosis   • Impingement syndrome of left shoulder   • Closed fracture of multiple ribs of left side   • Cigarette nicotine dependence in remission   • COPD (chronic obstructive pulmonary disease) (HCC)   • Hypercholesterolemia   • Essential hypertension   • Non-small cell cancer of right lung (HCC)   • Malignant pleural effusion   • CINV (chemotherapy-induced nausea and vomiting)   • Palliative care patient       Assessment / Plan:  1  Non-small cell cancer of right lung ([address] )    2  Malignant pleural effusion      Overall, she is doing quite well  Blood work remains in acceptable treatment range    She will proceed with cycle 4 of carbo Alimta and Keytruda without any further dose adjustment as scheduled  I have placed orders for PET CT scan to be done prior to cycle 5 to assess for treatment response  She is already scheduled with Dr Carson Dodge for follow-up to review these findings  She is encouraged to try medical marijuana for symptom management  Time spent providing supportive listening and encouragement  Patient was in agreement with plan of care  She knows to call with any questions or concerns      I spent 30 minutes on chart review, face to face counseling time, coordination of care and documentation  Past Medical History:   has a past medical history of Cancer ([facility] [address] ), COPD (chronic obstructive pulmonary disease) ([facility] [address] ), [facility] lung ([facility] ), Hyperlipidemia, Hypertension, Lung cancer ([facility] ) (06/26/2019), Lung nodule, and Pleural effusion  PAST SURGICAL HISTORY:   has a past surgical history that includes Appendectomy; Colon surgery; Lung lobectomy; Hysterectomy; Oophorectomy (Bilateral); Breast biopsy (Right); IR thoracentesis (03/24/2023); IR thoracentesis (04/12/2023); IR thoracentesis (04/18/2023); IR port placement (04/18/2023); IR pleural effusion long-term catheter placement (04/25/2023); Bronchoscopy (06/2019); and Lung biopsy (06/2019)  CURRENT MEDICATIONS  Current Outpatient Medications   Medication Sig Dispense Refill   • albuterol (2 5 mg/3 mL) 0 083 % nebulizer solution Take 3 mL (2 5 mg total) by nebulization every 6 (six) hours as needed for wheezing or shortness of breath 270 mL 4   • albuterol (PROVENTIL HFA,VENTOLIN HFA) 90 mcg/act inhaler Inhale 2 puffs every 4 (four) hours  3   • Anoro Ellipta 62 5-25 MCG/ACT inhaler Inhale 1 puff daily 1 puff once daily 90 blister 3   • Ascorbic Acid (vitamin C) 1000 MG tablet 1 daily     • atorvastatin (LIPITOR) 20 mg tablet Take 20 mg by mouth daily       • Biotin 31735 MCG TABS 1 daily     • buPROPion (WELLBUTRIN XL) 300 mg 24 hr tablet TAKE 1 TABLET BY MOUTH EVERY DAY IN THE MORNING FOR 90 DAYS     • Cholecalciferol (Vitamin D3) 25 MCG (1000 UT) CAPS 1 capsule every 24 hours     • dexamethasone (DECADRON) 4 mg tablet Take 1 tablet (4 mg total) by mouth 2 (two) times a day with meals Take 1 tablet by mouth the day before, the day of, and the day after chemotherapy  6 tablet 6   • diphenhydrAMINE (BENADRYL) 50 MG tablet Take 1 tablet (50 mg total) by mouth every 6 (six) hours as needed for itching (Rash) 30 tablet 0   • diphenhydrAMINE-APAP, sleep, (TYLENOL PM EXTRA STRENGTH PO) Take 325 mg by mouth daily at bedtime  Indications: sleep     • famotidine (PEPCID) 20 mg tablet Take 20 mg by mouth daily  Indications: Heartburn     • folic acid (KP Folic Acid) 1 mg tablet Take 1 tablet (1 mg total) by mouth daily 90 tablet 1   • Lidocaine HCl 3 % CREA as directed Externally     • lidocaine-prilocaine (EMLA) cream Apply to port 30 to 60 minutes prior to use 30 g 3   • LORazepam (ATIVAN) 1 mg tablet 1/2 TABLET TWICE A DAY AS NEEDED FOR ANXIETY ORALLY 30 DAYS  0   • losartan-hydrochlorothiazide (HYZAAR) 100-25 MG per tablet Take 1 tablet by mouth daily     • Multiple Vitamin (MULTIVITAMINS PO) every 24 hours     • Probiotic Product (PROBIOTIC DAILY PO) 1 daily     • prochlorperazine (COMPAZINE) 10 mg tablet Take 1 tablet (10 mg total) by mouth every 6 (six) hours as needed for nausea 30 tablet 3   • Sennosides (Senna) 8 6 MG CAPS Take 2 capsules by mouth as needed (constipation)   Indications: Constipation     • sertraline (ZOLOFT) 100 mg tablet 2 daily     • verapamil (CALAN-SR) 120 mg CR tablet Take 1 tablet (120 mg total) by mouth daily at bedtime 1 hs 90 tablet 3   • verapamil (CALAN-SR) 240 mg CR tablet Take 1 tablet (240 mg total) by mouth daily at bedtime 1 hs 90 tablet 3   • EPINEPHrine (EPIPEN) 0 3 mg/0 3 mL SOAJ Inject 0 3 mL (0 3 mg total) into a muscle once for 1 dose (Patient not taking: Reported on 6/8/2023) 0 6 mL 0   • ondansetron (ZOFRAN) 8 mg tablet Take 1 tablet (8 mg total) by "mouth every 8 (eight) hours as needed for nausea or vomiting 30 tablet 0     No current facility-administered medications for this visit  Facility-Administered Medications Ordered in Other Visits   Medication Dose Route Frequency Provider Last Rate Last Admin   • alteplase (CATHFLO) injection 2 mg  2 mg Intracatheter Q1MIN PRN Tarah Mckenna DO         [unfilled]    SOCIAL HISTORY:   reports that she quit smoking about 3 months ago  Her smoking use included cigarettes  She has a 25 00 pack-year smoking history  She has never used smokeless tobacco  She reports that she does not currently use alcohol  She reports that she does not use drugs  FAMILY HISTORY:  family history includes Arthritis in her mother; COPD in her mother; Cancer in her maternal aunt, maternal aunt, maternal grandmother, and mother; Colon cancer in her maternal aunt, maternal aunt, maternal grandmother, and mother; Depression in her mother; Diabetes in her maternal grandmother; Drug abuse in her brother; Hearing loss in her mother; Hyperlipidemia in her mother; Hypertension in her mother; Lung cancer in her mother  ALLERGIES:  is allergic to amoxicillin, vilazodone hcl, wasp venom, and zithromax [azithromycin]  Physical Exam:  Vital Signs:   Visit Vitals  /76 (BP Location: Left arm, Patient Position: Sitting, Cuff Size: Adult)   Pulse 85   Temp 98 °F (36 7 °C)   Resp 17   Ht 5' 3\" (1 6 m)   Wt 83 9 kg (185 lb)   SpO2 96%   BMI 32 77 kg/m²   OB Status Hysterectomy   Smoking Status Former   BSA 1 87 m²     Body mass index is 32 77 kg/m²  Body surface area is 1 87 meters squared  Physical Exam  Constitutional:       General: She is not in acute distress  Appearance: Normal appearance  HENT:      Head: Normocephalic and atraumatic  Eyes:      General: No scleral icterus  Right eye: No discharge  Left eye: No discharge        Conjunctiva/sclera: Conjunctivae normal    Cardiovascular:      Rate and " Rhythm: Normal rate and regular rhythm  Pulmonary:      Effort: Pulmonary effort is normal  No respiratory distress  Breath sounds: Normal breath sounds  Abdominal:      General: Bowel sounds are normal  There is no distension  Palpations: Abdomen is soft  There is no mass  Tenderness: There is no abdominal tenderness  Musculoskeletal:         General: Normal range of motion  Lymphadenopathy:      Cervical: No cervical adenopathy  Upper Body:      Right upper body: No supraclavicular, axillary or pectoral adenopathy  Left upper body: No supraclavicular, axillary or pectoral adenopathy  Skin:     General: Skin is warm and dry  Neurological:      General: No focal deficit present  Mental Status: She is alert and oriented to person, place, and time     Psychiatric:         Mood and Affect: Mood normal          Behavior: Behavior normal          Labs:  Lab Results   Component Value Date    WBC 4 22 (L) 06/20/2023    HGB 11 3 (L) 06/20/2023    HCT 33 4 (L) 06/20/2023    MCV 96 06/20/2023     06/20/2023     Lab Results   Component Value Date    SODIUM 136 06/20/2023    K 3 5 06/20/2023     06/20/2023    CO2 27 06/20/2023    AGAP 6 06/20/2023    BUN 7 06/20/2023    CREATININE 0 58 (L) 06/20/2023    GLUC 97 06/20/2023    GLUF 123 (H) 05/30/2023    CALCIUM 9 1 06/20/2023    AST 30 06/20/2023    ALT 36 06/20/2023    ALKPHOS 101 06/20/2023    TP 6 5 06/20/2023    TBILI 0 39 06/20/2023    EGFR 97 06/20/2023

## 2023-06-21 ENCOUNTER — OFFICE VISIT (OUTPATIENT)
Age: 63
End: 2023-06-21
Payer: COMMERCIAL

## 2023-06-21 VITALS
OXYGEN SATURATION: 96 % | SYSTOLIC BLOOD PRESSURE: 149 MMHG | WEIGHT: 185 LBS | RESPIRATION RATE: 17 BRPM | TEMPERATURE: 98 F | HEIGHT: 63 IN | DIASTOLIC BLOOD PRESSURE: 76 MMHG | BODY MASS INDEX: 32.78 KG/M2 | HEART RATE: 85 BPM

## 2023-06-21 DIAGNOSIS — J91.0 MALIGNANT PLEURAL EFFUSION: ICD-10-CM

## 2023-06-21 DIAGNOSIS — C34.91 NON-SMALL CELL CANCER OF RIGHT LUNG (HCC): Primary | ICD-10-CM

## 2023-06-21 PROCEDURE — 99214 OFFICE O/P EST MOD 30 MIN: CPT | Performed by: NURSE PRACTITIONER

## 2023-06-21 RX ORDER — SODIUM CHLORIDE 9 MG/ML
20 INJECTION, SOLUTION INTRAVENOUS ONCE
Status: CANCELLED | OUTPATIENT
Start: 2023-06-22

## 2023-06-22 ENCOUNTER — HOSPITAL ENCOUNTER (OUTPATIENT)
Dept: INFUSION CENTER | Facility: HOSPITAL | Age: 63
Discharge: HOME | End: 2023-06-22
Attending: INTERNAL MEDICINE
Payer: COMMERCIAL

## 2023-06-22 VITALS
DIASTOLIC BLOOD PRESSURE: 79 MMHG | HEART RATE: 79 BPM | OXYGEN SATURATION: 96 % | TEMPERATURE: 98.3 F | WEIGHT: 186.6 LBS | HEIGHT: 63 IN | RESPIRATION RATE: 16 BRPM | SYSTOLIC BLOOD PRESSURE: 150 MMHG | BODY MASS INDEX: 33.06 KG/M2

## 2023-06-22 DIAGNOSIS — C34.91 NON-SMALL CELL CANCER OF RIGHT LUNG (HCC): Primary | ICD-10-CM

## 2023-06-22 DIAGNOSIS — I10 ESSENTIAL HYPERTENSION: Primary | ICD-10-CM

## 2023-06-22 PROCEDURE — 96411 CHEMO IV PUSH ADDL DRUG: CPT

## 2023-06-22 PROCEDURE — 96417 CHEMO IV INFUS EACH ADDL SEQ: CPT

## 2023-06-22 PROCEDURE — 96367 TX/PROPH/DG ADDL SEQ IV INF: CPT

## 2023-06-22 PROCEDURE — 96413 CHEMO IV INFUSION 1 HR: CPT

## 2023-06-22 RX ORDER — SODIUM CHLORIDE 9 MG/ML
20 INJECTION, SOLUTION INTRAVENOUS ONCE
Status: COMPLETED | OUTPATIENT
Start: 2023-06-22 | End: 2023-06-22

## 2023-06-22 RX ORDER — TELMISARTAN AND HYDROCHLORTHIAZIDE 80; 25 MG/1; MG/1
1 TABLET ORAL DAILY
Qty: 30 TABLET | Refills: 3 | Status: SHIPPED | OUTPATIENT
Start: 2023-06-22

## 2023-06-22 RX ADMIN — SODIUM CHLORIDE 200 MG: 9 INJECTION, SOLUTION INTRAVENOUS at 09:27

## 2023-06-22 RX ADMIN — CARBOPLATIN 442.4 MG: 10 INJECTION, SOLUTION INTRAVENOUS at 10:44

## 2023-06-22 RX ADMIN — SODIUM CHLORIDE 20 ML/HR: 9 INJECTION, SOLUTION INTRAVENOUS at 08:19

## 2023-06-22 RX ADMIN — FOSAPREPITANT 150 MG: 150 INJECTION, POWDER, LYOPHILIZED, FOR SOLUTION INTRAVENOUS at 08:44

## 2023-06-22 RX ADMIN — PEMETREXED DISODIUM 756 MG: 500 INJECTION, POWDER, LYOPHILIZED, FOR SOLUTION INTRAVENOUS at 10:31

## 2023-06-22 RX ADMIN — DEXAMETHASONE SODIUM PHOSPHATE: 10 INJECTION, SOLUTION INTRAMUSCULAR; INTRAVENOUS at 08:19

## 2023-06-22 NOTE — PROGRESS NOTES
Pt here for chemo tolerated well no adverse reactions  AVS provided and left ambulatory with steady gait

## 2023-06-23 ENCOUNTER — HOME CARE VISIT (OUTPATIENT)
Dept: HOME HEALTH SERVICES | Facility: HOME HEALTHCARE | Age: 63
End: 2023-06-23
Payer: COMMERCIAL

## 2023-06-23 VITALS
OXYGEN SATURATION: 98 % | RESPIRATION RATE: 20 BRPM | HEART RATE: 72 BPM | DIASTOLIC BLOOD PRESSURE: 80 MMHG | SYSTOLIC BLOOD PRESSURE: 140 MMHG | TEMPERATURE: 97.1 F

## 2023-06-23 PROCEDURE — G0299 HHS/HOSPICE OF RN EA 15 MIN: HCPCS

## 2023-06-26 ENCOUNTER — HOME CARE VISIT (OUTPATIENT)
Dept: HOME HEALTH SERVICES | Facility: HOME HEALTHCARE | Age: 63
End: 2023-06-26
Payer: COMMERCIAL

## 2023-06-26 VITALS
SYSTOLIC BLOOD PRESSURE: 140 MMHG | DIASTOLIC BLOOD PRESSURE: 82 MMHG | HEART RATE: 84 BPM | RESPIRATION RATE: 18 BRPM | OXYGEN SATURATION: 98 % | TEMPERATURE: 97.8 F

## 2023-06-26 PROCEDURE — G0299 HHS/HOSPICE OF RN EA 15 MIN: HCPCS

## 2023-06-26 PROCEDURE — 400014 VN F/U

## 2023-06-30 ENCOUNTER — HOME CARE VISIT (OUTPATIENT)
Dept: HOME HEALTH SERVICES | Facility: HOME HEALTHCARE | Age: 63
End: 2023-06-30
Payer: COMMERCIAL

## 2023-06-30 VITALS
TEMPERATURE: 97.8 F | OXYGEN SATURATION: 99 % | HEART RATE: 76 BPM | SYSTOLIC BLOOD PRESSURE: 140 MMHG | RESPIRATION RATE: 18 BRPM | DIASTOLIC BLOOD PRESSURE: 80 MMHG

## 2023-06-30 PROCEDURE — G0299 HHS/HOSPICE OF RN EA 15 MIN: HCPCS

## 2023-07-03 ENCOUNTER — HOME CARE VISIT (OUTPATIENT)
Dept: HOME HEALTH SERVICES | Facility: HOME HEALTHCARE | Age: 63
End: 2023-07-03
Payer: COMMERCIAL

## 2023-07-03 VITALS
TEMPERATURE: 97.8 F | SYSTOLIC BLOOD PRESSURE: 138 MMHG | RESPIRATION RATE: 20 BRPM | OXYGEN SATURATION: 99 % | DIASTOLIC BLOOD PRESSURE: 80 MMHG | HEART RATE: 80 BPM

## 2023-07-03 PROCEDURE — G0299 HHS/HOSPICE OF RN EA 15 MIN: HCPCS

## 2023-07-06 ENCOUNTER — TELEPHONE (OUTPATIENT)
Dept: HEMATOLOGY ONCOLOGY | Facility: CLINIC | Age: 63
End: 2023-07-06

## 2023-07-07 ENCOUNTER — HOME CARE VISIT (OUTPATIENT)
Dept: HOME HEALTH SERVICES | Facility: HOME HEALTHCARE | Age: 63
End: 2023-07-07
Payer: COMMERCIAL

## 2023-07-07 VITALS
OXYGEN SATURATION: 96 % | SYSTOLIC BLOOD PRESSURE: 134 MMHG | RESPIRATION RATE: 16 BRPM | HEART RATE: 83 BPM | DIASTOLIC BLOOD PRESSURE: 64 MMHG | TEMPERATURE: 97.2 F

## 2023-07-07 PROCEDURE — G0299 HHS/HOSPICE OF RN EA 15 MIN: HCPCS

## 2023-07-07 NOTE — CASE COMMUNICATION
Pt had an output of 500ml on 7/3/23 and 400ml on 7/7/23 from her right asept catheter. VNA will continue 2 x week drains on Monday and Friday.

## 2023-07-10 ENCOUNTER — HOSPITAL ENCOUNTER (OUTPATIENT)
Dept: NUCLEAR MEDICINE | Facility: HOSPITAL | Age: 63
Discharge: HOME | End: 2023-07-10
Payer: COMMERCIAL

## 2023-07-10 DIAGNOSIS — C34.91 NON-SMALL CELL CANCER OF RIGHT LUNG (HCC): ICD-10-CM

## 2023-07-10 LAB — GLUCOSE SERPL-MCNC: 85 MG/DL (ref 65–140)

## 2023-07-10 PROCEDURE — G1004 CDSM NDSC: HCPCS

## 2023-07-10 PROCEDURE — 78815 PET IMAGE W/CT SKULL-THIGH: CPT

## 2023-07-10 PROCEDURE — A9552 F18 FDG: HCPCS

## 2023-07-10 PROCEDURE — 82948 REAGENT STRIP/BLOOD GLUCOSE: CPT

## 2023-07-11 ENCOUNTER — HOME CARE VISIT (OUTPATIENT)
Dept: HOME HEALTH SERVICES | Facility: HOME HEALTHCARE | Age: 63
End: 2023-07-11
Payer: COMMERCIAL

## 2023-07-11 VITALS
HEART RATE: 87 BPM | RESPIRATION RATE: 18 BRPM | SYSTOLIC BLOOD PRESSURE: 146 MMHG | OXYGEN SATURATION: 96 % | DIASTOLIC BLOOD PRESSURE: 80 MMHG

## 2023-07-11 PROCEDURE — G0299 HHS/HOSPICE OF RN EA 15 MIN: HCPCS

## 2023-07-12 ENCOUNTER — TELEPHONE (OUTPATIENT)
Age: 63
End: 2023-07-12

## 2023-07-12 ENCOUNTER — OFFICE VISIT (OUTPATIENT)
Age: 63
End: 2023-07-12
Payer: MEDICARE

## 2023-07-12 ENCOUNTER — HOSPITAL ENCOUNTER (OUTPATIENT)
Dept: INFUSION CENTER | Facility: HOSPITAL | Age: 63
Discharge: HOME | End: 2023-07-12
Payer: COMMERCIAL

## 2023-07-12 VITALS
OXYGEN SATURATION: 96 % | SYSTOLIC BLOOD PRESSURE: 142 MMHG | TEMPERATURE: 97 F | HEART RATE: 90 BPM | RESPIRATION RATE: 17 BRPM | BODY MASS INDEX: 33.31 KG/M2 | DIASTOLIC BLOOD PRESSURE: 90 MMHG | HEIGHT: 63 IN | WEIGHT: 188 LBS

## 2023-07-12 DIAGNOSIS — C34.91 NON-SMALL CELL CANCER OF RIGHT LUNG (HCC): Primary | ICD-10-CM

## 2023-07-12 LAB
ALBUMIN SERPL BCP-MCNC: 3.9 G/DL (ref 3.5–5)
ALP SERPL-CCNC: 96 U/L (ref 34–104)
ALT SERPL W P-5'-P-CCNC: 26 U/L (ref 7–52)
ANION GAP SERPL CALCULATED.3IONS-SCNC: 8 MMOL/L
AST SERPL W P-5'-P-CCNC: 25 U/L (ref 13–39)
BASOPHILS # BLD AUTO: 0.01 THOUSANDS/ÂΜL (ref 0–0.1)
BASOPHILS NFR BLD AUTO: 0 % (ref 0–1)
BILIRUB SERPL-MCNC: 0.28 MG/DL (ref 0.2–1)
BUN SERPL-MCNC: 10 MG/DL (ref 5–25)
CALCIUM SERPL-MCNC: 9.4 MG/DL (ref 8.4–10.2)
CHLORIDE SERPL-SCNC: 100 MMOL/L (ref 96–108)
CO2 SERPL-SCNC: 26 MMOL/L (ref 21–32)
CREAT SERPL-MCNC: 0.57 MG/DL (ref 0.6–1.3)
EOSINOPHIL # BLD AUTO: 0.03 THOUSAND/ÂΜL (ref 0–0.61)
EOSINOPHIL NFR BLD AUTO: 1 % (ref 0–6)
ERYTHROCYTE [DISTWIDTH] IN BLOOD BY AUTOMATED COUNT: 15.9 % (ref 11.6–15.1)
GFR SERPL CREATININE-BSD FRML MDRD: 98 ML/MIN/1.73SQ M
GLUCOSE SERPL-MCNC: 118 MG/DL (ref 65–140)
HCT VFR BLD AUTO: 31.2 % (ref 34.8–46.1)
HGB BLD-MCNC: 10.6 G/DL (ref 11.5–15.4)
IMM GRANULOCYTES # BLD AUTO: 0.02 THOUSAND/UL (ref 0–0.2)
IMM GRANULOCYTES NFR BLD AUTO: 1 % (ref 0–2)
LYMPHOCYTES # BLD AUTO: 0.62 THOUSANDS/ÂΜL (ref 0.6–4.47)
LYMPHOCYTES NFR BLD AUTO: 15 % (ref 14–44)
MCH RBC QN AUTO: 33.3 PG (ref 26.8–34.3)
MCHC RBC AUTO-ENTMCNC: 34 G/DL (ref 31.4–37.4)
MCV RBC AUTO: 98 FL (ref 82–98)
MONOCYTES # BLD AUTO: 0.73 THOUSAND/ÂΜL (ref 0.17–1.22)
MONOCYTES NFR BLD AUTO: 17 % (ref 4–12)
NEUTROPHILS # BLD AUTO: 2.88 THOUSANDS/ÂΜL (ref 1.85–7.62)
NEUTS SEG NFR BLD AUTO: 66 % (ref 43–75)
NRBC BLD AUTO-RTO: 0 /100 WBCS
PLATELET # BLD AUTO: 322 THOUSANDS/UL (ref 149–390)
PMV BLD AUTO: 8.7 FL (ref 8.9–12.7)
POTASSIUM SERPL-SCNC: 3 MMOL/L (ref 3.5–5.3)
PROT SERPL-MCNC: 6.7 G/DL (ref 6.4–8.4)
RBC # BLD AUTO: 3.18 MILLION/UL (ref 3.81–5.12)
SODIUM SERPL-SCNC: 134 MMOL/L (ref 135–147)
T3FREE SERPL-MCNC: 2.89 PG/ML (ref 2.5–3.9)
TSH SERPL DL<=0.05 MIU/L-ACNC: 1.33 UIU/ML (ref 0.45–4.5)
WBC # BLD AUTO: 4.29 THOUSAND/UL (ref 4.31–10.16)

## 2023-07-12 PROCEDURE — 84443 ASSAY THYROID STIM HORMONE: CPT | Performed by: INTERNAL MEDICINE

## 2023-07-12 PROCEDURE — 85025 COMPLETE CBC W/AUTO DIFF WBC: CPT | Performed by: INTERNAL MEDICINE

## 2023-07-12 PROCEDURE — 84481 FREE ASSAY (FT-3): CPT | Performed by: INTERNAL MEDICINE

## 2023-07-12 PROCEDURE — 80053 COMPREHEN METABOLIC PANEL: CPT | Performed by: INTERNAL MEDICINE

## 2023-07-12 PROCEDURE — 99214 OFFICE O/P EST MOD 30 MIN: CPT | Performed by: INTERNAL MEDICINE

## 2023-07-12 RX ORDER — CYANOCOBALAMIN 1000 UG/ML
1000 INJECTION, SOLUTION INTRAMUSCULAR; SUBCUTANEOUS ONCE
OUTPATIENT
Start: 2023-08-03 | End: 2023-08-03

## 2023-07-12 RX ORDER — SODIUM CHLORIDE 9 MG/ML
20 INJECTION, SOLUTION INTRAVENOUS ONCE
OUTPATIENT
Start: 2023-08-03

## 2023-07-12 RX ORDER — SODIUM CHLORIDE 9 MG/ML
20 INJECTION, SOLUTION INTRAVENOUS ONCE
Status: CANCELLED | OUTPATIENT
Start: 2023-07-13

## 2023-07-12 NOTE — PROGRESS NOTES
Pt due for chemo tomorrow and needs labs drawn. Arrived amb. Port accessed, labs drawn, de-accessed, dsd applied. Disch amb to home, steady gait.

## 2023-07-13 ENCOUNTER — HOSPITAL ENCOUNTER (OUTPATIENT)
Dept: INFUSION CENTER | Facility: HOSPITAL | Age: 63
Discharge: HOME | End: 2023-07-13
Attending: INTERNAL MEDICINE
Payer: COMMERCIAL

## 2023-07-13 VITALS
HEART RATE: 87 BPM | BODY MASS INDEX: 33.16 KG/M2 | OXYGEN SATURATION: 96 % | DIASTOLIC BLOOD PRESSURE: 63 MMHG | SYSTOLIC BLOOD PRESSURE: 128 MMHG | RESPIRATION RATE: 16 BRPM | TEMPERATURE: 98.4 F | WEIGHT: 187.17 LBS | HEIGHT: 63 IN

## 2023-07-13 DIAGNOSIS — E87.6 HYPOKALEMIA: ICD-10-CM

## 2023-07-13 DIAGNOSIS — C34.91 NON-SMALL CELL CANCER OF RIGHT LUNG (HCC): Primary | ICD-10-CM

## 2023-07-13 PROCEDURE — 96413 CHEMO IV INFUSION 1 HR: CPT

## 2023-07-13 PROCEDURE — 96367 TX/PROPH/DG ADDL SEQ IV INF: CPT

## 2023-07-13 PROCEDURE — 96411 CHEMO IV PUSH ADDL DRUG: CPT

## 2023-07-13 RX ORDER — SODIUM CHLORIDE 9 MG/ML
20 INJECTION, SOLUTION INTRAVENOUS ONCE
Status: COMPLETED | OUTPATIENT
Start: 2023-07-13 | End: 2023-07-13

## 2023-07-13 RX ORDER — POTASSIUM CHLORIDE 20 MEQ/1
20 TABLET, EXTENDED RELEASE ORAL DAILY
Qty: 30 TABLET | Refills: 1 | Status: SHIPPED | OUTPATIENT
Start: 2023-07-13

## 2023-07-13 RX ADMIN — SODIUM CHLORIDE 200 MG: 9 INJECTION, SOLUTION INTRAVENOUS at 09:39

## 2023-07-13 RX ADMIN — SODIUM CHLORIDE 20 ML/HR: 0.9 INJECTION, SOLUTION INTRAVENOUS at 08:44

## 2023-07-13 RX ADMIN — PEMETREXED DISODIUM 756 MG: 100 INJECTION, POWDER, LYOPHILIZED, FOR SOLUTION INTRAVENOUS at 10:34

## 2023-07-13 RX ADMIN — DEXAMETHASONE SODIUM PHOSPHATE: 10 INJECTION, SOLUTION INTRAMUSCULAR; INTRAVENOUS at 08:44

## 2023-07-13 NOTE — PROGRESS NOTES
Pt tolerated chemotherapy infusion without any adverse reactions. Port flushed and de-accessed. Pt ambulated out of unit with a steady gait.  Declined AVS

## 2023-07-14 ENCOUNTER — HOME CARE VISIT (OUTPATIENT)
Dept: HOME HEALTH SERVICES | Facility: HOME HEALTHCARE | Age: 63
End: 2023-07-14
Payer: COMMERCIAL

## 2023-07-14 VITALS
TEMPERATURE: 98.2 F | SYSTOLIC BLOOD PRESSURE: 120 MMHG | OXYGEN SATURATION: 97 % | DIASTOLIC BLOOD PRESSURE: 60 MMHG | HEART RATE: 60 BPM | RESPIRATION RATE: 16 BRPM

## 2023-07-14 DIAGNOSIS — I10 ESSENTIAL HYPERTENSION: ICD-10-CM

## 2023-07-14 PROCEDURE — G0300 HHS/HOSPICE OF LPN EA 15 MIN: HCPCS

## 2023-07-14 RX ORDER — TELMISARTAN AND HYDROCHLORTHIAZIDE 80; 25 MG/1; MG/1
TABLET ORAL
Qty: 90 TABLET | Refills: 2 | Status: SHIPPED | OUTPATIENT
Start: 2023-07-14

## 2023-07-15 NOTE — PROGRESS NOTES
HEMATOLOGY / [address] [facility] FOLLOW UP NOTE    Primary Care Provider: Chastity Cantor MD  Referring Provider:    MRN: [id number]  : 1960    Reason for Encounter: follow up stage IV non-small cell lung cancer       Oncology History Overview Note   2019 - Stage IA adenocarcinoma of the right lung s/p RLLectomy    2022 - fall after tripping on a dog gate - CT showed pulmonary nodules that required 3 month followup    3/2023 - CT showed right pleural effusion with enlarging lung lesion, she was otherwise symptomatic, thoracentesis cell block showed adenocarcinoma c/w her prior lung primary    PET - no disease outside the chest    2023 - carbo/pem/pembro    - - cycle 2, pemetrexed dose reduced by 20% and carbo dose reduced to AUC 4 due to nausea and fatigue    2023 - removed Poly as of cycle 5 due to good response and increasing fatigue     Non-small cell cancer of right lung ([address])   2023 Initial Diagnosis    Non-small cell cancer of right lung ([address])     2023 -  Chemotherapy    cyanocobalamin, 1,000 mcg, Intramuscular, Once, 2 of 8 cycles  Administration: 1,000 mcg (2023), 1,000 mcg (2023)  alteplase (CATHFLO), 2 mg, Intracatheter, Every 1 Minute as needed, 5 of 11 cycles  fosaprepitant (EMEND) IVPB, 150 mg, Intravenous, Once, 4 of 4 cycles  Administration: 150 mg (2023), 150 mg (2023), 150 mg (2023), 150 mg (2023)  CARBOplatin (PARAPLATIN) IVPB (GOG AUC DOSING), 553 mg, Intravenous, Once, 4 of 4 cycles  Administration: 553 mg (2023), 442.4 mg (2023), 442.4 mg (2023), 442.4 mg (2023)  pemetrexed (ALIMTA) chemo infusion, 945 mg, Intravenous, Once, 5 of 11 cycles  Dose modification: 400 mg/m2 (original dose 500 mg/m2, Cycle 3, Reason: Nausea/Vomiting, Comment: 20% dose reduction due to nausea)  Administration: 900 mg (2023), 756 mg (2023), 756 mg (2023), 756 mg (2023), 756 mg (2023)  pembrolizumab (KEYTRUDA) IVPB, 200 mg, Intravenous, Once, 5 of 11 cycles  Administration: 200 mg (4/20/2023), 200 mg (6/1/2023), 200 mg (6/22/2023), 200 mg (5/11/2023), 200 mg (7/13/2023)     6/9/2023 -  Cancer Staged    Staging form: Lung, AJCC 8th Edition  - Clinical: Stage ALESHA (cT1c, cN2, cM1a) - Signed by Anali Kennedy DO on 6/9/2023           Interval History: Patient presents for follow up of stage IV non-small cell lung cancer. She is status post 4 cycles of carboplatin, pembrolizumab, pemetrexed. She has tolerated it well. She is starting to have a buildup of fatigue that is limiting her a bit more. She had a PET scan prior to this visit that showed a very good partial response to therapy. She denies any fevers or infections. No neuropathy. No diarrhea. No rash. REVIEW OF SYSTEMS:  Please note that a 14-point review of systems was performed to include Constitutional, HEENT, Respiratory, CVS, GI, , Musculoskeletal, Integumentary, Neurologic, Rheumatologic, Endocrinologic, Psychiatric, Lymphatic, and Hematologic/Oncologic systems were reviewed and are negative unless otherwise stated in HPI. Positive and negative findings pertinent to this evaluation are incorporated into the history of present illness. ECOG PS: 0    PROBLEM LIST:  Patient Active Problem List   Diagnosis   • Impingement syndrome of left shoulder   • Closed fracture of multiple ribs of left side   • Cigarette nicotine dependence in remission   • COPD (chronic obstructive pulmonary disease) (HCC)   • Hypercholesterolemia   • Essential hypertension   • Non-small cell cancer of right lung (HCC)   • Malignant pleural effusion   • CINV (chemotherapy-induced nausea and vomiting)   • Palliative care patient       Assessment / Plan: 47-year-old female with a stage IV adenocarcinoma of the lung. Her PET scan shows a good response to therapy and there are no new areas of disease.   Because of increasing fatigue I am going to take carboplatin out of the treatment regimen as of this cycle. She will be maintained with Alimta and pemetrexed for as long as she is tolerating it for up to 2 years. I will see her with every other cycle of treatment since we are taking the carboplatin now. It should be much better tolerated at this point. She will follow-up in 6 weeks. I spent 30 minutes on chart review, face to face counseling time, coordination of care and documentation. Past Medical History:   has a past medical history of Cancer ([address]), COPD (chronic obstructive pulmonary disease) ([address]), Emphysema of lung ([address]), Hyperlipidemia, Hypertension, Lung cancer ([address]) (06/26/2019), Lung nodule, and Pleural effusion. PAST SURGICAL HISTORY:   has a past surgical history that includes Appendectomy; Colon surgery; Lung lobectomy; Hysterectomy; Oophorectomy (Bilateral); Breast biopsy (Right); IR thoracentesis (03/24/2023); IR thoracentesis (04/12/2023); IR thoracentesis (04/18/2023); IR port placement (04/18/2023); IR pleural effusion long-term catheter placement (04/25/2023); Bronchoscopy (06/2019); and Lung biopsy (06/2019). CURRENT MEDICATIONS  Current Outpatient Medications   Medication Sig Dispense Refill   • albuterol (2.5 mg/3 mL) 0.083 % nebulizer solution Take 3 mL (2.5 mg total) by nebulization every 6 (six) hours as needed for wheezing or shortness of breath 270 mL 4   • albuterol (PROVENTIL HFA,VENTOLIN HFA) 90 mcg/act inhaler Inhale 2 puffs every 4 (four) hours  3   • Anoro Ellipta 62.5-25 MCG/ACT inhaler Inhale 1 puff daily 1 puff once daily 90 blister 3   • Ascorbic Acid (vitamin C) 1000 MG tablet 1 daily     • atorvastatin (LIPITOR) 20 mg tablet Take 20 mg by mouth daily.      • Biotin 45924 MCG TABS 1 daily     • buPROPion (WELLBUTRIN XL) 300 mg 24 hr tablet TAKE 1 TABLET BY MOUTH EVERY DAY IN THE MORNING FOR 90 DAYS     • Cholecalciferol (Vitamin D3) 25 MCG (1000 UT) CAPS 1 capsule every 24 hours     • dexamethasone (DECADRON) 4 mg tablet Take 1 tablet (4 mg total) by mouth 2 (two) times a day with meals Take 1 tablet by mouth the day before, the day of, and the day after chemotherapy. 6 tablet 6   • diphenhydrAMINE (BENADRYL) 50 MG tablet Take 1 tablet (50 mg total) by mouth every 6 (six) hours as needed for itching (Rash) 30 tablet 0   • famotidine (PEPCID) 20 mg tablet Take 20 mg by mouth daily. Indications: Heartburn     • folic acid ( Folic Acid) 1 mg tablet Take 1 tablet (1 mg total) by mouth daily 90 tablet 1   • Lidocaine HCl 3 % CREA as directed Externally     • lidocaine-prilocaine (EMLA) cream Apply to port 30 to 60 minutes prior to use 30 g 3   • LORazepam (ATIVAN) 1 mg tablet 1/2 TABLET TWICE A DAY AS NEEDED FOR ANXIETY ORALLY 30 DAYS  0   • Multiple Vitamin (MULTIVITAMINS PO) every 24 hours     • Probiotic Product (PROBIOTIC DAILY PO) 1 daily     • prochlorperazine (COMPAZINE) 10 mg tablet Take 1 tablet (10 mg total) by mouth every 6 (six) hours as needed for nausea 30 tablet 3   • Sennosides (Senna) 8.6 MG CAPS Take 2 capsules by mouth as needed (constipation). Indications: Constipation     • sertraline (ZOLOFT) 100 mg tablet 2 daily     • telmisartan-hydrochlorothiazide (MICARDIS HCT) 80-25 MG per tablet Take 1 tablet by mouth daily. Indications: High Blood Pressure Disorder     • verapamil (CALAN-SR) 120 mg CR tablet Take 1 tablet (120 mg total) by mouth daily at bedtime 1 hs 90 tablet 3   • verapamil (CALAN-SR) 240 mg CR tablet Take 1 tablet (240 mg total) by mouth daily at bedtime 1 hs 90 tablet 3   • diphenhydrAMINE-APAP, sleep, (TYLENOL PM EXTRA STRENGTH PO) Take 325 mg by mouth daily at bedtime.  Indications: sleep (Patient not taking: Reported on 7/12/2023)     • EPINEPHrine (EPIPEN) 0.3 mg/0.3 mL SOAJ Inject 0.3 mL (0.3 mg total) into a muscle once for 1 dose (Patient not taking: Reported on 6/8/2023) 0.6 mL 0   • ondansetron (ZOFRAN) 8 mg tablet Take 1 tablet (8 mg total) by mouth every 8 (eight) hours as needed for nausea or vomiting 30 tablet 0   • potassium chloride (K-DUR,KLOR-CON) 20 mEq tablet Take 1 tablet (20 mEq total) by mouth daily 30 tablet 1   • telmisartan-hydrochlorothiazide (MICARDIS HCT) 80-25 MG per tablet TAKE 1 TABLET BY MOUTH EVERY DAY 90 tablet 2     No current facility-administered medications for this visit. Facility-Administered Medications Ordered in Other Visits   Medication Dose Route Frequency Provider Last Rate Last Admin   • alteplase (CATHFLO) injection 2 mg  2 mg Intracatheter Q1MIN PRN Margo Valencia DO         [unfilled]    SOCIAL HISTORY:   reports that she quit smoking about 4 months ago. Her smoking use included cigarettes. She has a 25.00 pack-year smoking history. She has never used smokeless tobacco. She reports that she does not currently use alcohol. She reports that she does not use drugs. FAMILY HISTORY:  family history includes Arthritis in her mother; COPD in her mother; Cancer in her maternal aunt, maternal aunt, maternal grandmother, and mother; Colon cancer in her maternal aunt, maternal aunt, maternal grandmother, and mother; Depression in her mother; Diabetes in her maternal grandmother; Drug abuse in her brother; Hearing loss in her mother; Hyperlipidemia in her mother; Hypertension in her mother; Lung cancer in her mother. ALLERGIES:  is allergic to amoxicillin, vilazodone hcl, wasp venom, and zithromax [azithromycin]. Physical Exam:  Vital Signs:   Visit Vitals  /90 (BP Location: Left arm, Patient Position: Sitting, Cuff Size: Adult)   Pulse 90   Temp (!) 97 °F (36.1 °C)   Resp 17   Ht 5' 3" (1.6 m)   Wt 85.3 kg (188 lb)   SpO2 96%   BMI 33.30 kg/m²   OB Status Hysterectomy   Smoking Status Former   BSA 1.88 m²     Body mass index is 33.3 kg/m². Body surface area is 1.88 meters squared.     GEN: Alert, awake oriented x3, in no acute distress  HEENT- No pallor, icterus, cyanosis, no oral mucosal lesions,   LAD - no palpable cervical, clavicle, axillary, inguinal LAD  Heart- normal S1 S2, regular rate and rhythm, No murmur, rubs.    Lungs- clear breathing sound bilateral.   Abdomen- soft, Non tender, bowel sounds present  Extremities- No cyanosis, clubbing, edema  Neuro- No focal neurological deficit    Labs:  Lab Results   Component Value Date    WBC 4.29 (L) 07/12/2023    HGB 10.6 (L) 07/12/2023    HCT 31.2 (L) 07/12/2023    MCV 98 07/12/2023     07/12/2023     Lab Results   Component Value Date    SODIUM 134 (L) 07/12/2023    K 3.0 (L) 07/12/2023     07/12/2023    CO2 26 07/12/2023    AGAP 8 07/12/2023    BUN 10 07/12/2023    CREATININE 0.57 (L) 07/12/2023    GLUC 118 07/12/2023    GLUF 123 (H) 05/30/2023    CALCIUM 9.4 07/12/2023    AST 25 07/12/2023    ALT 26 07/12/2023    ALKPHOS 96 07/12/2023    TP 6.7 07/12/2023    TBILI 0.28 07/12/2023    EGFR 98 07/12/2023

## 2023-07-17 ENCOUNTER — HOME CARE VISIT (OUTPATIENT)
Dept: HOME HEALTH SERVICES | Facility: HOME HEALTHCARE | Age: 63
End: 2023-07-17
Payer: COMMERCIAL

## 2023-07-17 VITALS
TEMPERATURE: 97.5 F | DIASTOLIC BLOOD PRESSURE: 70 MMHG | OXYGEN SATURATION: 96 % | RESPIRATION RATE: 18 BRPM | HEART RATE: 96 BPM | SYSTOLIC BLOOD PRESSURE: 130 MMHG

## 2023-07-17 PROCEDURE — G0299 HHS/HOSPICE OF RN EA 15 MIN: HCPCS

## 2023-07-21 ENCOUNTER — HOME CARE VISIT (OUTPATIENT)
Dept: HOME HEALTH SERVICES | Facility: HOME HEALTHCARE | Age: 63
End: 2023-07-21
Payer: COMMERCIAL

## 2023-07-21 VITALS
OXYGEN SATURATION: 95 % | SYSTOLIC BLOOD PRESSURE: 150 MMHG | TEMPERATURE: 97.5 F | DIASTOLIC BLOOD PRESSURE: 80 MMHG | HEART RATE: 88 BPM | RESPIRATION RATE: 20 BRPM

## 2023-07-21 PROCEDURE — G0299 HHS/HOSPICE OF RN EA 15 MIN: HCPCS

## 2023-07-24 ENCOUNTER — HOME CARE VISIT (OUTPATIENT)
Dept: HOME HEALTH SERVICES | Facility: HOME HEALTHCARE | Age: 63
End: 2023-07-24
Payer: COMMERCIAL

## 2023-07-24 VITALS
DIASTOLIC BLOOD PRESSURE: 90 MMHG | HEART RATE: 80 BPM | OXYGEN SATURATION: 97 % | SYSTOLIC BLOOD PRESSURE: 140 MMHG | TEMPERATURE: 97.5 F | RESPIRATION RATE: 18 BRPM

## 2023-07-24 DIAGNOSIS — J44.9 CHRONIC OBSTRUCTIVE PULMONARY DISEASE, UNSPECIFIED COPD TYPE (HCC): ICD-10-CM

## 2023-07-24 PROCEDURE — G0299 HHS/HOSPICE OF RN EA 15 MIN: HCPCS

## 2023-07-24 RX ORDER — UMECLIDINIUM BROMIDE AND VILANTEROL TRIFENATATE 62.5; 25 UG/1; UG/1
1 POWDER RESPIRATORY (INHALATION) DAILY
Qty: 90 BLISTER | Refills: 3 | Status: SHIPPED | OUTPATIENT
Start: 2023-07-24 | End: 2023-10-22

## 2023-07-28 ENCOUNTER — HOME CARE VISIT (OUTPATIENT)
Dept: HOME HEALTH SERVICES | Facility: HOME HEALTHCARE | Age: 63
End: 2023-07-28
Payer: COMMERCIAL

## 2023-07-28 ENCOUNTER — TELEPHONE (OUTPATIENT)
Age: 63
End: 2023-07-28

## 2023-07-28 PROCEDURE — G0299 HHS/HOSPICE OF RN EA 15 MIN: HCPCS

## 2023-07-28 NOTE — TELEPHONE ENCOUNTER
Per Dr. Douglas Bennett, schedule patient for 1:40pm on 7/31. Left detailed message with appointment details and my teams number in case she needs to call back.

## 2023-07-29 VITALS
DIASTOLIC BLOOD PRESSURE: 82 MMHG | OXYGEN SATURATION: 98 % | TEMPERATURE: 97.4 F | RESPIRATION RATE: 18 BRPM | HEART RATE: 64 BPM | SYSTOLIC BLOOD PRESSURE: 120 MMHG

## 2023-07-31 ENCOUNTER — TELEPHONE (OUTPATIENT)
Age: 63
End: 2023-07-31

## 2023-07-31 ENCOUNTER — HOME CARE VISIT (OUTPATIENT)
Dept: HOME HEALTH SERVICES | Facility: HOME HEALTHCARE | Age: 63
End: 2023-07-31
Payer: COMMERCIAL

## 2023-07-31 ENCOUNTER — HOSPITAL ENCOUNTER (OUTPATIENT)
Dept: INFUSION CENTER | Facility: HOSPITAL | Age: 63
Discharge: HOME | End: 2023-07-31
Payer: COMMERCIAL

## 2023-07-31 ENCOUNTER — OFFICE VISIT (OUTPATIENT)
Age: 63
End: 2023-07-31
Payer: COMMERCIAL

## 2023-07-31 VITALS
DIASTOLIC BLOOD PRESSURE: 80 MMHG | BODY MASS INDEX: 35.33 KG/M2 | TEMPERATURE: 98 F | HEIGHT: 62 IN | WEIGHT: 192 LBS | RESPIRATION RATE: 17 BRPM | SYSTOLIC BLOOD PRESSURE: 160 MMHG | HEART RATE: 85 BPM | OXYGEN SATURATION: 98 %

## 2023-07-31 VITALS
SYSTOLIC BLOOD PRESSURE: 150 MMHG | RESPIRATION RATE: 20 BRPM | HEART RATE: 84 BPM | DIASTOLIC BLOOD PRESSURE: 82 MMHG | TEMPERATURE: 97.4 F | OXYGEN SATURATION: 97 %

## 2023-07-31 DIAGNOSIS — C34.91 NON-SMALL CELL CANCER OF RIGHT LUNG (HCC): Primary | ICD-10-CM

## 2023-07-31 LAB
ALBUMIN SERPL BCP-MCNC: 3.9 G/DL (ref 3.5–5)
ALP SERPL-CCNC: 100 U/L (ref 34–104)
ALT SERPL W P-5'-P-CCNC: 24 U/L (ref 7–52)
ANION GAP SERPL CALCULATED.3IONS-SCNC: 8 MMOL/L
AST SERPL W P-5'-P-CCNC: 23 U/L (ref 13–39)
BASOPHILS # BLD AUTO: 0.01 THOUSANDS/ÂΜL (ref 0–0.1)
BASOPHILS NFR BLD AUTO: 0 % (ref 0–1)
BILIRUB SERPL-MCNC: 0.36 MG/DL (ref 0.2–1)
BUN SERPL-MCNC: 6 MG/DL (ref 5–25)
CALCIUM SERPL-MCNC: 9.4 MG/DL (ref 8.4–10.2)
CHLORIDE SERPL-SCNC: 103 MMOL/L (ref 96–108)
CO2 SERPL-SCNC: 25 MMOL/L (ref 21–32)
CREAT SERPL-MCNC: 0.55 MG/DL (ref 0.6–1.3)
EOSINOPHIL # BLD AUTO: 0.02 THOUSAND/ÂΜL (ref 0–0.61)
EOSINOPHIL NFR BLD AUTO: 0 % (ref 0–6)
ERYTHROCYTE [DISTWIDTH] IN BLOOD BY AUTOMATED COUNT: 15 % (ref 11.6–15.1)
GFR SERPL CREATININE-BSD FRML MDRD: 98 ML/MIN/1.73SQ M
GLUCOSE P FAST SERPL-MCNC: 102 MG/DL (ref 65–99)
GLUCOSE SERPL-MCNC: 102 MG/DL (ref 65–140)
HCT VFR BLD AUTO: 32.5 % (ref 34.8–46.1)
HGB BLD-MCNC: 10.8 G/DL (ref 11.5–15.4)
IMM GRANULOCYTES # BLD AUTO: 0.02 THOUSAND/UL (ref 0–0.2)
IMM GRANULOCYTES NFR BLD AUTO: 0 % (ref 0–2)
LYMPHOCYTES # BLD AUTO: 0.66 THOUSANDS/ÂΜL (ref 0.6–4.47)
LYMPHOCYTES NFR BLD AUTO: 12 % (ref 14–44)
MCH RBC QN AUTO: 32.9 PG (ref 26.8–34.3)
MCHC RBC AUTO-ENTMCNC: 33.2 G/DL (ref 31.4–37.4)
MCV RBC AUTO: 99 FL (ref 82–98)
MONOCYTES # BLD AUTO: 0.72 THOUSAND/ÂΜL (ref 0.17–1.22)
MONOCYTES NFR BLD AUTO: 13 % (ref 4–12)
NEUTROPHILS # BLD AUTO: 4.26 THOUSANDS/ÂΜL (ref 1.85–7.62)
NEUTS SEG NFR BLD AUTO: 75 % (ref 43–75)
NRBC BLD AUTO-RTO: 0 /100 WBCS
PLATELET # BLD AUTO: 385 THOUSANDS/UL (ref 149–390)
PMV BLD AUTO: 9 FL (ref 8.9–12.7)
POTASSIUM SERPL-SCNC: 3.5 MMOL/L (ref 3.5–5.3)
PROT SERPL-MCNC: 7 G/DL (ref 6.4–8.4)
RBC # BLD AUTO: 3.28 MILLION/UL (ref 3.81–5.12)
SODIUM SERPL-SCNC: 136 MMOL/L (ref 135–147)
T3FREE SERPL-MCNC: 3.14 PG/ML (ref 2.5–3.9)
TSH SERPL DL<=0.05 MIU/L-ACNC: 1.86 UIU/ML (ref 0.45–4.5)
WBC # BLD AUTO: 5.69 THOUSAND/UL (ref 4.31–10.16)

## 2023-07-31 PROCEDURE — 85025 COMPLETE CBC W/AUTO DIFF WBC: CPT | Performed by: INTERNAL MEDICINE

## 2023-07-31 PROCEDURE — 80053 COMPREHEN METABOLIC PANEL: CPT | Performed by: INTERNAL MEDICINE

## 2023-07-31 PROCEDURE — 84481 FREE ASSAY (FT-3): CPT | Performed by: INTERNAL MEDICINE

## 2023-07-31 PROCEDURE — 84443 ASSAY THYROID STIM HORMONE: CPT | Performed by: INTERNAL MEDICINE

## 2023-07-31 PROCEDURE — G0299 HHS/HOSPICE OF RN EA 15 MIN: HCPCS

## 2023-07-31 PROCEDURE — 99214 OFFICE O/P EST MOD 30 MIN: CPT | Performed by: INTERNAL MEDICINE

## 2023-07-31 NOTE — PROGRESS NOTES
Pt had labs drawn without difficulty. Port de-accessed and bandage applied. Pt aware of next appt. Left ambulatory for d/c.

## 2023-07-31 NOTE — PROGRESS NOTES
HEMATOLOGY / [facility] FOLLOW UP NOTE    Primary Care Provider: Jatin Torres MD  Referring Provider:    MRN: [id number]  : 1960    Reason for Encounter: follow up stage IV non-small cell lung cancer       Oncology History Overview Note   2019 - Stage IA adenocarcinoma of the right lung s/p RLLectomy    2022 - fall after tripping on a dog gate - CT showed pulmonary nodules that required 3 month followup    3/2023 - CT showed right pleural effusion with enlarging lung lesion, she was otherwise symptomatic, thoracentesis cell block showed adenocarcinoma c/w her prior lung primary    PET - no disease outside the chest    2023 - carbo/pem/pembro    - - cycle 2, pemetrexed dose reduced by 20% and carbo dose reduced to AUC 4 due to nausea and fatigue    2023 - removed Poly as of cycle 5 due to good response and increasing fatigue    2023 - add back Poly for cycle 6 and 7 due to increasing output from right pleural catheter     Non-small cell cancer of right lung ([address])   2023 Initial Diagnosis    Non-small cell cancer of right lung ([address])     2023 -  Chemotherapy    cyanocobalamin, 1,000 mcg, Intramuscular, Once, 2 of 8 cycles  Administration: 1,000 mcg (2023), 1,000 mcg (2023)  alteplase (CATHFLO), 2 mg, Intracatheter, Every 1 Minute as needed, 5 of 11 cycles  fosaprepitant (EMEND) IVPB, 150 mg, Intravenous, Once, 4 of 4 cycles  Administration: 150 mg (2023), 150 mg (2023), 150 mg (2023), 150 mg (2023)  CARBOplatin (PARAPLATIN) IVPB (GOG AUC DOSING), 553 mg, Intravenous, Once, 4 of 4 cycles  Administration: 553 mg (2023), 442.4 mg (2023), 442.4 mg (2023), 442.4 mg (2023)  pemetrexed (ALIMTA) chemo infusion, 945 mg, Intravenous, Once, 5 of 11 cycles  Dose modification: 400 mg/m2 (original dose 500 mg/m2, Cycle 3, Reason: Nausea/Vomiting, Comment: 20% dose reduction due to nausea)  Administration: 900 mg (2023), 756 mg (6/1/2023), 756 mg (6/22/2023), 756 mg (5/11/2023), 756 mg (7/13/2023)  pembrolizumab (KEYTRUDA) IVPB, 200 mg, Intravenous, Once, 5 of 11 cycles  Administration: 200 mg (4/20/2023), 200 mg (6/1/2023), 200 mg (6/22/2023), 200 mg (5/11/2023), 200 mg (7/13/2023)     6/9/2023 -  Cancer Staged    Staging form: Lung, AJCC 8th Edition  - Clinical: Stage ALESHA (cT1c, cN2, cM1a) - Signed by Jesu Harrison DO on 6/9/2023           Interval History: Patient presents for follow up of her stage IV non-small cell lung cancer. I brought Emelina García in today to discuss the increasing output of her right pleural catheter. Over the past week it has increased to 600 to 650 mL of fluid. This is up from the 350 that was her norm prior to this last cycle of chemotherapy. I took out carboplatin for the last cycle because she was starting to have increased fatigue. She is feeling nervous that stopping the carboplatin is leading to worsening of her disease. She denies any fevers or cough. She did feel better in terms of her energy with the removal of the carboplatin. REVIEW OF SYSTEMS:  Please note that a 14-point review of systems was performed to include Constitutional, HEENT, Respiratory, CVS, GI, , Musculoskeletal, Integumentary, Neurologic, Rheumatologic, Endocrinologic, Psychiatric, Lymphatic, and Hematologic/Oncologic systems were reviewed and are negative unless otherwise stated in HPI. Positive and negative findings pertinent to this evaluation are incorporated into the history of present illness.       ECOG PS: 0    PROBLEM LIST:  Patient Active Problem List   Diagnosis   • Impingement syndrome of left shoulder   • Closed fracture of multiple ribs of left side   • Cigarette nicotine dependence in remission   • COPD (chronic obstructive pulmonary disease) (HCC)   • Hypercholesterolemia   • Essential hypertension   • Non-small cell cancer of right lung (HCC)   • Malignant pleural effusion   • CINV (chemotherapy-induced nausea and vomiting)   • Palliative care patient       Assessment / Plan: 18-year-old female with stage IV non-small cell lung cancer. I am dictating this note after our visit. She went home and the visiting nurse came to drain the catheter again. It put out 550 mL. Because of the increased volumes I will add back in carboplatin for 2 more cycles and see if that makes a difference. I explained to her that there is always going to be some level of active disease with her cancer. We are controlling it with the treatments but it will always be present to some degree. It will be hard to do carboplatin for more than 6 cycles but we can add on a few more for her comfort level. We will plan on doing another PET scan after cycle 7 of treatment. I am hoping to get to a point where we can maintain her on the Alimta and Keytruda. I will see her back prior to the next cycle of treatment and we will monitor what happens with her pleural fluid volumes. I spent 30 minutes on chart review, face to face counseling time, coordination of care and documentation. Past Medical History:   has a past medical history of Cancer ([address]), COPD (chronic obstructive pulmonary disease) ([address]), Emphysema of lung ([address]), Hyperlipidemia, Hypertension, Lung cancer ([address]) (06/26/2019), Lung nodule, and Pleural effusion. PAST SURGICAL HISTORY:   has a past surgical history that includes Appendectomy; Colon surgery; Lung lobectomy; Hysterectomy; Oophorectomy (Bilateral); Breast biopsy (Right); IR thoracentesis (03/24/2023); IR thoracentesis (04/12/2023); IR thoracentesis (04/18/2023); IR port placement (04/18/2023); IR pleural effusion long-term catheter placement (04/25/2023); Bronchoscopy (06/2019); and Lung biopsy (06/2019).     CURRENT MEDICATIONS  Current Outpatient Medications   Medication Sig Dispense Refill   • albuterol (2.5 mg/3 mL) 0.083 % nebulizer solution Take 3 mL (2.5 mg total) by nebulization every 6 (six) hours as needed for wheezing or shortness of breath 270 mL 4   • albuterol (PROVENTIL HFA,VENTOLIN HFA) 90 mcg/act inhaler Inhale 2 puffs every 4 (four) hours  3   • Anoro Ellipta 62.5-25 MCG/ACT inhaler Inhale 1 puff daily 1 puff once daily 90 blister 3   • Ascorbic Acid (vitamin C) 1000 MG tablet 1 daily     • atorvastatin (LIPITOR) 20 mg tablet Take 20 mg by mouth daily. • Biotin 00717 MCG TABS 1 daily     • buPROPion (WELLBUTRIN XL) 300 mg 24 hr tablet TAKE 1 TABLET BY MOUTH EVERY DAY IN THE MORNING FOR 90 DAYS     • Cholecalciferol (Vitamin D3) 25 MCG (1000 UT) CAPS 1 capsule every 24 hours     • dexamethasone (DECADRON) 4 mg tablet Take 1 tablet (4 mg total) by mouth 2 (two) times a day with meals Take 1 tablet by mouth the day before, the day of, and the day after chemotherapy. 6 tablet 6   • diphenhydrAMINE (BENADRYL) 50 MG tablet Take 1 tablet (50 mg total) by mouth every 6 (six) hours as needed for itching (Rash) 30 tablet 0   • famotidine (PEPCID) 20 mg tablet Take 20 mg by mouth daily. Indications: Heartburn     • folic acid (KP Folic Acid) 1 mg tablet Take 1 tablet (1 mg total) by mouth daily 90 tablet 1   • Lidocaine HCl 3 % CREA as directed Externally     • lidocaine-prilocaine (EMLA) cream Apply to port 30 to 60 minutes prior to use 30 g 3   • LORazepam (ATIVAN) 1 mg tablet 1/2 TABLET TWICE A DAY AS NEEDED FOR ANXIETY ORALLY 30 DAYS  0   • Multiple Vitamin (MULTIVITAMINS PO) every 24 hours     • potassium chloride (K-DUR,KLOR-CON) 20 mEq tablet Take 1 tablet (20 mEq total) by mouth daily 30 tablet 1   • Probiotic Product (PROBIOTIC DAILY PO) 1 daily     • prochlorperazine (COMPAZINE) 10 mg tablet Take 1 tablet (10 mg total) by mouth every 6 (six) hours as needed for nausea 30 tablet 3   • Sennosides (Senna) 8.6 MG CAPS Take 2 capsules by mouth as needed (constipation).  Indications: Constipation     • sertraline (ZOLOFT) 100 mg tablet 2 daily     • telmisartan-hydrochlorothiazide (MICARDIS HCT) 80-25 MG per tablet Take 1 tablet by mouth daily. Indications: High Blood Pressure Disorder     • telmisartan-hydrochlorothiazide (MICARDIS HCT) 80-25 MG per tablet TAKE 1 TABLET BY MOUTH EVERY DAY 90 tablet 2   • verapamil (CALAN-SR) 120 mg CR tablet Take 1 tablet (120 mg total) by mouth daily at bedtime 1 hs 90 tablet 3   • verapamil (CALAN-SR) 240 mg CR tablet Take 1 tablet (240 mg total) by mouth daily at bedtime 1 hs 90 tablet 3   • diphenhydrAMINE-APAP, sleep, (TYLENOL PM EXTRA STRENGTH PO) Take 325 mg by mouth daily at bedtime. Indications: sleep (Patient not taking: Reported on 7/12/2023)     • EPINEPHrine (EPIPEN) 0.3 mg/0.3 mL SOAJ Inject 0.3 mL (0.3 mg total) into a muscle once for 1 dose (Patient not taking: Reported on 6/8/2023) 0.6 mL 0   • ondansetron (ZOFRAN) 8 mg tablet Take 1 tablet (8 mg total) by mouth every 8 (eight) hours as needed for nausea or vomiting 30 tablet 0     No current facility-administered medications for this visit. Facility-Administered Medications Ordered in Other Visits   Medication Dose Route Frequency Provider Last Rate Last Admin   • alteplase (CATHFLO) injection 2 mg  2 mg Intracatheter Q1MIN PRN Beverly Talamantes DO         [unfilled]    SOCIAL HISTORY:   reports that she quit smoking about 4 months ago. Her smoking use included cigarettes. She has a 25.00 pack-year smoking history. She has never used smokeless tobacco. She reports that she does not currently use alcohol. She reports that she does not use drugs. FAMILY HISTORY:  family history includes Arthritis in her mother; COPD in her mother; Cancer in her maternal aunt, maternal aunt, maternal grandmother, and mother; Colon cancer in her maternal aunt, maternal aunt, maternal grandmother, and mother; Depression in her mother; Diabetes in her maternal grandmother; Drug abuse in her brother; Hearing loss in her mother; Hyperlipidemia in her mother; Hypertension in her mother; Lung cancer in her mother.      ALLERGIES:  is allergic to amoxicillin, vilazodone hcl, wasp venom, and zithromax [azithromycin]. Physical Exam:  Vital Signs:   Visit Vitals  /80 (BP Location: Left arm, Patient Position: Sitting, Cuff Size: Adult)   Pulse 85   Temp 98 °F (36.7 °C)   Resp 17   Ht 5' 2" (1.575 m)   Wt 87.1 kg (192 lb)   SpO2 98%   BMI 35.12 kg/m²   OB Status Hysterectomy   Smoking Status Former   BSA 1.88 m²     Body mass index is 35.12 kg/m². Body surface area is 1.88 meters squared. GEN: Alert, awake oriented x3, in no acute distress  HEENT- No pallor, icterus, cyanosis, no oral mucosal lesions,   LAD - no palpable cervical, clavicle, axillary, inguinal LAD  Heart- normal S1 S2, regular rate and rhythm, No murmur, rubs.    Lungs- clear breathing sound bilateral.   Abdomen- soft, Non tender, bowel sounds present  Extremities- No cyanosis, clubbing, edema  Neuro- No focal neurological deficit    Labs:  Lab Results   Component Value Date    WBC 5.69 07/31/2023    HGB 10.8 (L) 07/31/2023    HCT 32.5 (L) 07/31/2023    MCV 99 (H) 07/31/2023     07/31/2023     Lab Results   Component Value Date    SODIUM 136 07/31/2023    K 3.5 07/31/2023     07/31/2023    CO2 25 07/31/2023    AGAP 8 07/31/2023    BUN 6 07/31/2023    CREATININE 0.55 (L) 07/31/2023    GLUC 102 07/31/2023    GLUF 102 (H) 07/31/2023    CALCIUM 9.4 07/31/2023    AST 23 07/31/2023    ALT 24 07/31/2023    ALKPHOS 100 07/31/2023    TP 7.0 07/31/2023    TBILI 0.36 07/31/2023    EGFR 98 07/31/2023

## 2023-07-31 NOTE — TELEPHONE ENCOUNTER
Medication Timeout    Date patient scheduled: 8/3    Carboplatin added back into plan for cycle 6,     Office RN notified patient? ? Yes, patient in the office for a visit with Dr. Stevan Mccray. Is the patient scheduled within 24 hours? ? If yes, follow up with verbal telephone call. Office RN to route to [facility]. Infusion  pool routes to [facility]. Infusion tech to receive message, confirm scheduled treatment duration matches ordered treatment duration or adjust accordingly, and re-link appointment request orders. Infusion tech to notify pharmacy and finance.

## 2023-07-31 NOTE — PLAN OF CARE
Problem: DISCHARGE PLANNING  Goal: Discharge to home or other facility with appropriate resources  Description: INTERVENTIONS:  - Identify barriers to discharge w/patient and caregiver  - Arrange for needed discharge resources and transportation as appropriate  - Identify discharge learning needs (meds, wound care, etc.)  - Arrange for interpretive services to assist at discharge as needed  - Refer to Case Management Department for coordinating discharge planning if the patient needs post-hospital services based on physician/advanced practitioner order or complex needs related to functional status, cognitive ability, or social support system  7/31/2023 1044 by Vic Stone RN  Outcome: Progressing  7/31/2023 1043 by Vic Stone RN  Outcome: Progressing     Problem: Knowledge Deficit  Goal: Patient/family/caregiver demonstrates understanding of disease process, treatment plan, medications, and discharge instructions  Description: Complete learning assessment and assess knowledge base.   Interventions:  - Provide teaching at level of understanding  - Provide teaching via preferred learning methods  7/31/2023 1044 by Vic Stone RN  Outcome: Progressing  7/31/2023 1043 by Vic Stone RN  Outcome: Progressing

## 2023-08-02 ENCOUNTER — HOSPITAL ENCOUNTER (OUTPATIENT)
Dept: INFUSION CENTER | Facility: HOSPITAL | Age: 63
End: 2023-08-02
Attending: INTERNAL MEDICINE

## 2023-08-03 ENCOUNTER — HOSPITAL ENCOUNTER (OUTPATIENT)
Dept: INFUSION CENTER | Facility: HOSPITAL | Age: 63
Discharge: HOME | End: 2023-08-03
Attending: INTERNAL MEDICINE
Payer: COMMERCIAL

## 2023-08-03 VITALS
DIASTOLIC BLOOD PRESSURE: 77 MMHG | WEIGHT: 188.71 LBS | BODY MASS INDEX: 33.44 KG/M2 | TEMPERATURE: 97.6 F | HEIGHT: 63 IN | OXYGEN SATURATION: 96 % | SYSTOLIC BLOOD PRESSURE: 149 MMHG | HEART RATE: 86 BPM

## 2023-08-03 DIAGNOSIS — C34.91 NON-SMALL CELL CANCER OF RIGHT LUNG (HCC): Primary | ICD-10-CM

## 2023-08-03 PROCEDURE — 96367 TX/PROPH/DG ADDL SEQ IV INF: CPT

## 2023-08-03 PROCEDURE — 96413 CHEMO IV INFUSION 1 HR: CPT

## 2023-08-03 PROCEDURE — 96372 THER/PROPH/DIAG INJ SC/IM: CPT

## 2023-08-03 PROCEDURE — 96417 CHEMO IV INFUS EACH ADDL SEQ: CPT

## 2023-08-03 RX ORDER — CYANOCOBALAMIN 1000 UG/ML
1000 INJECTION, SOLUTION INTRAMUSCULAR; SUBCUTANEOUS ONCE
Status: COMPLETED | OUTPATIENT
Start: 2023-08-03 | End: 2023-08-03

## 2023-08-03 RX ORDER — SODIUM CHLORIDE 9 MG/ML
20 INJECTION, SOLUTION INTRAVENOUS ONCE
Status: COMPLETED | OUTPATIENT
Start: 2023-08-03 | End: 2023-08-03

## 2023-08-03 RX ADMIN — SODIUM CHLORIDE 20 ML/HR: 9 INJECTION, SOLUTION INTRAVENOUS at 08:33

## 2023-08-03 RX ADMIN — CYANOCOBALAMIN 1000 MCG: 1000 INJECTION, SOLUTION INTRAMUSCULAR at 08:36

## 2023-08-03 RX ADMIN — CARBOPLATIN 438 MG: 10 INJECTION, SOLUTION INTRAVENOUS at 11:06

## 2023-08-03 RX ADMIN — DEXAMETHASONE SODIUM PHOSPHATE: 10 INJECTION, SOLUTION INTRAMUSCULAR; INTRAVENOUS at 08:33

## 2023-08-03 RX ADMIN — FOSAPREPITANT 150 MG: 150 INJECTION, POWDER, LYOPHILIZED, FOR SOLUTION INTRAVENOUS at 09:00

## 2023-08-03 RX ADMIN — PEMETREXED DISODIUM 756 MG: 100 INJECTION, POWDER, LYOPHILIZED, FOR SOLUTION INTRAVENOUS at 10:40

## 2023-08-03 RX ADMIN — SODIUM CHLORIDE 200 MG: 9 INJECTION, SOLUTION INTRAVENOUS at 09:40

## 2023-08-04 ENCOUNTER — HOME CARE VISIT (OUTPATIENT)
Dept: HOME HEALTH SERVICES | Facility: HOME HEALTHCARE | Age: 63
End: 2023-08-04
Payer: COMMERCIAL

## 2023-08-04 PROCEDURE — G0299 HHS/HOSPICE OF RN EA 15 MIN: HCPCS

## 2023-08-06 VITALS
OXYGEN SATURATION: 97 % | RESPIRATION RATE: 18 BRPM | SYSTOLIC BLOOD PRESSURE: 140 MMHG | HEART RATE: 72 BPM | DIASTOLIC BLOOD PRESSURE: 80 MMHG

## 2023-08-07 ENCOUNTER — HOME CARE VISIT (OUTPATIENT)
Dept: HOME HEALTH SERVICES | Facility: HOME HEALTHCARE | Age: 63
End: 2023-08-07
Payer: COMMERCIAL

## 2023-08-07 VITALS
HEART RATE: 88 BPM | TEMPERATURE: 97.4 F | DIASTOLIC BLOOD PRESSURE: 62 MMHG | OXYGEN SATURATION: 100 % | SYSTOLIC BLOOD PRESSURE: 122 MMHG

## 2023-08-07 PROCEDURE — G0299 HHS/HOSPICE OF RN EA 15 MIN: HCPCS

## 2023-08-10 ENCOUNTER — OFFICE VISIT (OUTPATIENT)
Age: 63
End: 2023-08-10
Payer: COMMERCIAL

## 2023-08-10 VITALS
DIASTOLIC BLOOD PRESSURE: 68 MMHG | SYSTOLIC BLOOD PRESSURE: 132 MMHG | BODY MASS INDEX: 33.49 KG/M2 | OXYGEN SATURATION: 96 % | TEMPERATURE: 98.7 F | HEIGHT: 63 IN | HEART RATE: 92 BPM | WEIGHT: 189 LBS

## 2023-08-10 DIAGNOSIS — C34.91 NON-SMALL CELL CANCER OF RIGHT LUNG (HCC): ICD-10-CM

## 2023-08-10 DIAGNOSIS — J44.9 CHRONIC OBSTRUCTIVE PULMONARY DISEASE, UNSPECIFIED COPD TYPE (HCC): Primary | ICD-10-CM

## 2023-08-10 DIAGNOSIS — J91.0 MALIGNANT PLEURAL EFFUSION: ICD-10-CM

## 2023-08-10 PROCEDURE — 99213 OFFICE O/P EST LOW 20 MIN: CPT | Performed by: INTERNAL MEDICINE

## 2023-08-10 NOTE — PROGRESS NOTES
Pulmonary Follow Up Note   Ibrahima Yen 70 y.o. female MRN: [id number]  8/10/2023      Assessment/Plan:     COPD (chronic obstructive pulmonary disease) ([address])  COPD appears to be well-controlled with Anoro Ellipta once daily and albuterol as needed. She is up-to-date on her prescription    Malignant pleural effusion  She has a right-sided malignant pleural effusion status post indwelling pleural catheter placement in April 2023. Visiting nurses are draining it twice weekly, typically between 300-600 mL. She does have some discomfort with the drainage sessions, but it is tolerable. She understands that the catheter will remain in place until she has less than 50 mL per session on at least 3 consecutive drainage attempts. Non-small cell cancer of right lung [facility])  She is currently on chemotherapy at the direction of Dr. Lord August. She is hoping to transition to immunotherapy in the near future. We will review PET scan once completed per oncology      Visit orders:    Diagnoses and all orders for this visit:    Chronic obstructive pulmonary disease, unspecified COPD type ([address])    Malignant pleural effusion    Non-small cell cancer of right lung ([address])        No follow-ups on file. History of Present Illness   HPI:  Ibrahima Yen is a 70 y.o. female who is here today for follow-up regarding COPD and non-small cell lung cancer status post right lower lobectomy, currently on chemotherapy. She has a right-sided malignant pleural effusion and an indwelling pleural catheter was placed her in April of this year. Visiting nurses are draining it twice weekly, yielding between 300-600 mL per session. She still has some shortness of breath with exertion but it is reasonably well-controlled. She is compliant with Anoro Ellipta once daily and rarely needs albuterol. Review of Systems   Constitutional: Negative for appetite change and fever.    HENT: Negative for ear pain, postnasal drip, rhinorrhea, sneezing, sore throat and trouble swallowing. Cardiovascular: Negative for chest pain. Musculoskeletal: Negative for myalgias. Neurological: Positive for headaches.    All others negative    Medical, Family and Social history reviewed and updated as appropriate    Historical Information   Past Medical History:   Diagnosis Date   • Cancer ([address])    • COPD (chronic obstructive pulmonary disease) ([address])    • Emphysema of lung ([address])    • Hyperlipidemia    • Hypertension    • Lung cancer ([address]) 2019    right lower lobe removed   • Lung nodule    • Pleural effusion      Past Surgical History:   Procedure Laterality Date   • APPENDECTOMY     • BREAST BIOPSY Right     benign   • BRONCHOSCOPY  2019   • COLON SURGERY     • HYSTERECTOMY      age 48   • IR PLEURAL EFFUSION LONG-TERM CATHETER PLACEMENT  2023   • IR PORT PLACEMENT  2023   • IR THORACENTESIS  2023   • IR THORACENTESIS  2023   • IR THORACENTESIS  2023   • LUNG BIOPSY  2019   • LUNG LOBECTOMY     • OOPHORECTOMY Bilateral     age 48     Family History   Problem Relation Age of Onset   • Colon cancer Mother    • Hypertension Mother    • Hyperlipidemia Mother    • Hearing loss Mother    • Depression Mother    • COPD Mother    • Cancer Mother         Lung   • Arthritis Mother    • Lung cancer Mother    • Drug abuse Brother    • Diabetes Maternal Grandmother    • Cancer Maternal Grandmother         Colon   • Colon cancer Maternal Grandmother    • Cancer Maternal Aunt    • Colon cancer Maternal Aunt    • Colon cancer Maternal Aunt    • Cancer Maternal Aunt         Colon       Social History     Tobacco Use   Smoking Status Former   • Packs/day: 1.50   • Years: 50.00   • Total pack years: 75.00   • Types: Cigarettes   • Start date: 5   • Quit date: 3/15/2023   • Years since quittin.4   Smokeless Tobacco Never     Meds/Allergies     Current Outpatient Medications:   •  albuterol (2.5 mg/3 mL) 0.083 % nebulizer solution, Take 3 mL (2.5 mg total) by nebulization every 6 (six) hours as needed for wheezing or shortness of breath, Disp: 270 mL, Rfl: 4  •  albuterol (PROVENTIL HFA,VENTOLIN HFA) 90 mcg/act inhaler, Inhale 2 puffs every 4 (four) hours, Disp: , Rfl: 3  •  Anoro Ellipta 62.5-25 MCG/ACT inhaler, Inhale 1 puff daily 1 puff once daily, Disp: 90 blister, Rfl: 3  •  Ascorbic Acid (vitamin C) 1000 MG tablet, 1 daily, Disp: , Rfl:   •  atorvastatin (LIPITOR) 20 mg tablet, Take 20 mg by mouth daily. , Disp: , Rfl:   •  Biotin 64358 MCG TABS, 1 daily, Disp: , Rfl:   •  buPROPion (WELLBUTRIN XL) 300 mg 24 hr tablet, TAKE 1 TABLET BY MOUTH EVERY DAY IN THE MORNING FOR 90 DAYS, Disp: , Rfl:   •  Cholecalciferol (Vitamin D3) 25 MCG (1000 UT) CAPS, 1 capsule every 24 hours, Disp: , Rfl:   •  dexamethasone (DECADRON) 4 mg tablet, Take 1 tablet (4 mg total) by mouth 2 (two) times a day with meals Take 1 tablet by mouth the day before, the day of, and the day after chemotherapy. , Disp: 6 tablet, Rfl: 6  •  diphenhydrAMINE (BENADRYL) 50 MG tablet, Take 1 tablet (50 mg total) by mouth every 6 (six) hours as needed for itching (Rash), Disp: 30 tablet, Rfl: 0  •  diphenhydrAMINE-APAP, sleep, (TYLENOL PM EXTRA STRENGTH PO), Take 325 mg by mouth daily at bedtime, Disp: , Rfl:   •  EPINEPHrine (EPIPEN) 0.3 mg/0.3 mL SOAJ, Inject 0.3 mL (0.3 mg total) into a muscle once for 1 dose, Disp: 0.6 mL, Rfl: 0  •  famotidine (PEPCID) 20 mg tablet, Take 20 mg by mouth daily.  Indications: Heartburn, Disp: , Rfl:   •  folic acid (KP Folic Acid) 1 mg tablet, Take 1 tablet (1 mg total) by mouth daily, Disp: 90 tablet, Rfl: 1  •  Lidocaine HCl 3 % CREA, as directed Externally, Disp: , Rfl:   •  lidocaine-prilocaine (EMLA) cream, Apply to port 30 to 60 minutes prior to use, Disp: 30 g, Rfl: 3  •  LORazepam (ATIVAN) 1 mg tablet, 1/2 TABLET TWICE A DAY AS NEEDED FOR ANXIETY ORALLY 30 DAYS, Disp: , Rfl: 0  •  Multiple Vitamin (MULTIVITAMINS PO), every 24 hours, Disp: , Rfl:   • ondansetron (ZOFRAN) 8 mg tablet, Take 1 tablet (8 mg total) by mouth every 8 (eight) hours as needed for nausea or vomiting, Disp: 30 tablet, Rfl: 0  •  potassium chloride (K-DUR,KLOR-CON) 20 mEq tablet, Take 1 tablet (20 mEq total) by mouth daily, Disp: 30 tablet, Rfl: 1  •  Probiotic Product (PROBIOTIC DAILY PO), 1 daily, Disp: , Rfl:   •  Sennosides (Senna) 8.6 MG CAPS, Take 2 capsules by mouth as needed (constipation). Indications: Constipation, Disp: , Rfl:   •  sertraline (ZOLOFT) 100 mg tablet, 2 daily, Disp: , Rfl:   •  telmisartan-hydrochlorothiazide (MICARDIS HCT) 80-25 MG per tablet, Take 1 tablet by mouth daily. Indications: High Blood Pressure Disorder, Disp: , Rfl:   •  telmisartan-hydrochlorothiazide (MICARDIS HCT) 80-25 MG per tablet, TAKE 1 TABLET BY MOUTH EVERY DAY, Disp: 90 tablet, Rfl: 2  •  verapamil (CALAN-SR) 120 mg CR tablet, Take 1 tablet (120 mg total) by mouth daily at bedtime 1 hs, Disp: 90 tablet, Rfl: 3  •  verapamil (CALAN-SR) 240 mg CR tablet, Take 1 tablet (240 mg total) by mouth daily at bedtime 1 hs, Disp: 90 tablet, Rfl: 3  •  prochlorperazine (COMPAZINE) 10 mg tablet, Take 1 tablet (10 mg total) by mouth every 6 (six) hours as needed for nausea (Patient not taking: Reported on 8/10/2023), Disp: 30 tablet, Rfl: 3  Allergies   Allergen Reactions   • Amoxicillin Hives   • Vilazodone Hcl Nausea Only and Dizziness     (Viibryd)   • Wasp Venom Swelling   • Zithromax [Azithromycin] Hives       Vitals: Blood pressure 132/68, pulse 92, temperature 98.7 °F (37.1 °C), temperature source Tympanic, height 5' 2.9" (1.598 m), weight 85.7 kg (189 lb), SpO2 96 %. Body mass index is 33.59 kg/m². Oxygen Therapy  SpO2: 96 %  Oxygen Therapy: None (Room air)    Physical Exam   Physical Exam  Constitutional:       General: She is not in acute distress. HENT:      Head: Normocephalic. Mouth/Throat:      Pharynx: No oropharyngeal exudate. Eyes:      General: No scleral icterus.   Neck: Vascular: No JVD. Cardiovascular:      Rate and Rhythm: Normal rate and regular rhythm. Pulmonary:      Breath sounds: No wheezing, rhonchi or rales. Comments: Decreased breath sounds right mid to lower hemithorax  Abdominal:      Palpations: Abdomen is soft. Tenderness: There is no abdominal tenderness. Musculoskeletal:      Cervical back: Neck supple. Skin:     General: Skin is warm and dry. Neurological:      Mental Status: She is alert and oriented to person, place, and time. Psychiatric:         Mood and Affect: Mood normal.         Labs: I have personally reviewed pertinent lab results. Lab Results   Component Value Date    WBC 5.69 07/31/2023    HGB 10.8 (L) 07/31/2023    HCT 32.5 (L) 07/31/2023    MCV 99 (H) 07/31/2023     07/31/2023     Lab Results   Component Value Date    GLUCOSE 95 08/19/2019    CALCIUM 9.4 07/31/2023    K 3.5 07/31/2023    CO2 25 07/31/2023     07/31/2023    BUN 6 07/31/2023    CREATININE 0.55 (L) 07/31/2023     No results found for: "IGE"  Lab Results   Component Value Date    ALT 24 07/31/2023    AST 23 07/31/2023    ALKPHOS 100 07/31/2023       Imaging and other studies: I have personally reviewed pertinent reports. and I have personally reviewed pertinent films in PACS PET/CT from July 2023 shows partial metabolic response to therapy. There is a moderate right pleural effusion.   Answers for HPI/ROS submitted by the patient on 8/3/2023  Do you have shortness of breath that occurs with effort or exertion?: Yes  Do you have ear congestion?: No  Do you have heartburn?: No  Do you have fatigue?: Yes  Do you have nasal congestion?: No  Do you have shortness of breath when lying flat?: No  Do you have shortness of breath when you wake up?: No  Do you have sweats?: No  Have you experienced weight loss?: No

## 2023-08-10 NOTE — ASSESSMENT & PLAN NOTE
COPD appears to be well-controlled with Anoro Ellipta once daily and albuterol as needed.   She is up-to-date on her prescription

## 2023-08-10 NOTE — LETTER
August 10, 2023     [place] [address]    Patient: Zee Miranda   YOB: 1960   Date of Visit: 8/10/2023       Dear Dr. Donis Stoner: Thank you for referring Anna Titus to me for evaluation. Below are my notes for this consultation. If you have questions, please do not hesitate to call me. I look forward to following your patient along with you. Sincerely,        Jose Hartley DO        CC: DO Alan Granados DO Velna Singh, DO  8/10/2023 10:57 AM  Sign when Signing Visit  Pulmonary Follow Up Note   Zee Miranda 70 y.o. female MRN: [id number]  8/10/2023      Assessment/Plan:     COPD (chronic obstructive pulmonary disease) ([address])  COPD appears to be well-controlled with Anoro Ellipta once daily and albuterol as needed. She is up-to-date on her prescription    Malignant pleural effusion  She has a right-sided malignant pleural effusion status post indwelling pleural catheter placement in April 2023. Visiting nurses are draining it twice weekly, typically between 300-600 mL. She does have some discomfort with the drainage sessions, but it is tolerable. She understands that the catheter will remain in place until she has less than 50 mL per session on at least 3 consecutive drainage attempts. Non-small cell cancer of right lung [facility])  She is currently on chemotherapy at the direction of Dr. Mat Powell. She is hoping to transition to immunotherapy in the near future. We will review PET scan once completed per oncology      Visit orders:    Diagnoses and all orders for this visit:    Chronic obstructive pulmonary disease, unspecified COPD type ([address])    Malignant pleural effusion    Non-small cell cancer of right lung ([address])        No follow-ups on file.     History of Present Illness   HPI:  Zee Miranda is a 70 y.o. female who is here today for follow-up regarding COPD and non-small cell lung cancer status post right lower lobectomy, currently on chemotherapy. She has a right-sided malignant pleural effusion and an indwelling pleural catheter was placed her in April of this year. Visiting nurses are draining it twice weekly, yielding between 300-600 mL per session. She still has some shortness of breath with exertion but it is reasonably well-controlled. She is compliant with Anoro Ellipta once daily and rarely needs albuterol. Review of Systems   Constitutional: Negative for appetite change and fever. HENT: Negative for ear pain, postnasal drip, rhinorrhea, sneezing, sore throat and trouble swallowing. Cardiovascular: Negative for chest pain. Musculoskeletal: Negative for myalgias. Neurological: Positive for headaches.    All others negative    Medical, Family and Social history reviewed and updated as appropriate    Historical Information   Past Medical History:   Diagnosis Date   • Cancer ([address])    • COPD (chronic obstructive pulmonary disease) ([address])    • Emphysema of lung ([address])    • Hyperlipidemia    • Hypertension    • Lung cancer ([address]) 06/26/2019    right lower lobe removed   • Lung nodule    • Pleural effusion      Past Surgical History:   Procedure Laterality Date   • APPENDECTOMY     • BREAST BIOPSY Right     benign   • BRONCHOSCOPY  06/2019   • COLON SURGERY     • HYSTERECTOMY      age 48   • IR PLEURAL EFFUSION LONG-TERM CATHETER PLACEMENT  04/25/2023   • IR PORT PLACEMENT  04/18/2023   • IR THORACENTESIS  03/24/2023   • IR THORACENTESIS  04/12/2023   • IR THORACENTESIS  04/18/2023   • LUNG BIOPSY  06/2019   • LUNG LOBECTOMY     • OOPHORECTOMY Bilateral     age 48     Family History   Problem Relation Age of Onset   • Colon cancer Mother    • Hypertension Mother    • Hyperlipidemia Mother    • Hearing loss Mother    • Depression Mother    • COPD Mother    • Cancer Mother         Lung   • Arthritis Mother    • Lung cancer Mother    • Drug abuse Brother    • Diabetes Maternal Grandmother    • Cancer Maternal Grandmother         Colon   • Colon cancer Maternal Grandmother    • Cancer Maternal Aunt    • Colon cancer Maternal Aunt    • Colon cancer Maternal Aunt    • Cancer Maternal Aunt         Colon       Social History     Tobacco Use   Smoking Status Former   • Packs/day: 1.50   • Years: 50.00   • Total pack years: 75.00   • Types: Cigarettes   • Start date: 5   • Quit date: 3/15/2023   • Years since quittin.4   Smokeless Tobacco Never     Meds/Allergies     Current Outpatient Medications:   •  albuterol (2.5 mg/3 mL) 0.083 % nebulizer solution, Take 3 mL (2.5 mg total) by nebulization every 6 (six) hours as needed for wheezing or shortness of breath, Disp: 270 mL, Rfl: 4  •  albuterol (PROVENTIL HFA,VENTOLIN HFA) 90 mcg/act inhaler, Inhale 2 puffs every 4 (four) hours, Disp: , Rfl: 3  •  Anoro Ellipta 62.5-25 MCG/ACT inhaler, Inhale 1 puff daily 1 puff once daily, Disp: 90 blister, Rfl: 3  •  Ascorbic Acid (vitamin C) 1000 MG tablet, 1 daily, Disp: , Rfl:   •  atorvastatin (LIPITOR) 20 mg tablet, Take 20 mg by mouth daily. , Disp: , Rfl:   •  Biotin 73823 MCG TABS, 1 daily, Disp: , Rfl:   •  buPROPion (WELLBUTRIN XL) 300 mg 24 hr tablet, TAKE 1 TABLET BY MOUTH EVERY DAY IN THE MORNING FOR 90 DAYS, Disp: , Rfl:   •  Cholecalciferol (Vitamin D3) 25 MCG (1000 UT) CAPS, 1 capsule every 24 hours, Disp: , Rfl:   •  dexamethasone (DECADRON) 4 mg tablet, Take 1 tablet (4 mg total) by mouth 2 (two) times a day with meals Take 1 tablet by mouth the day before, the day of, and the day after chemotherapy. , Disp: 6 tablet, Rfl: 6  •  diphenhydrAMINE (BENADRYL) 50 MG tablet, Take 1 tablet (50 mg total) by mouth every 6 (six) hours as needed for itching (Rash), Disp: 30 tablet, Rfl: 0  •  diphenhydrAMINE-APAP, sleep, (TYLENOL PM EXTRA STRENGTH PO), Take 325 mg by mouth daily at bedtime, Disp: , Rfl:   •  EPINEPHrine (EPIPEN) 0.3 mg/0.3 mL SOAJ, Inject 0.3 mL (0.3 mg total) into a muscle once for 1 dose, Disp: 0.6 mL, Rfl: 0  •  famotidine (PEPCID) 20 mg tablet, Take 20 mg by mouth daily. Indications: Heartburn, Disp: , Rfl:   •  folic acid (KP Folic Acid) 1 mg tablet, Take 1 tablet (1 mg total) by mouth daily, Disp: 90 tablet, Rfl: 1  •  Lidocaine HCl 3 % CREA, as directed Externally, Disp: , Rfl:   •  lidocaine-prilocaine (EMLA) cream, Apply to port 30 to 60 minutes prior to use, Disp: 30 g, Rfl: 3  •  LORazepam (ATIVAN) 1 mg tablet, 1/2 TABLET TWICE A DAY AS NEEDED FOR ANXIETY ORALLY 30 DAYS, Disp: , Rfl: 0  •  Multiple Vitamin (MULTIVITAMINS PO), every 24 hours, Disp: , Rfl:   •  ondansetron (ZOFRAN) 8 mg tablet, Take 1 tablet (8 mg total) by mouth every 8 (eight) hours as needed for nausea or vomiting, Disp: 30 tablet, Rfl: 0  •  potassium chloride (K-DUR,KLOR-CON) 20 mEq tablet, Take 1 tablet (20 mEq total) by mouth daily, Disp: 30 tablet, Rfl: 1  •  Probiotic Product (PROBIOTIC DAILY PO), 1 daily, Disp: , Rfl:   •  Sennosides (Senna) 8.6 MG CAPS, Take 2 capsules by mouth as needed (constipation). Indications: Constipation, Disp: , Rfl:   •  sertraline (ZOLOFT) 100 mg tablet, 2 daily, Disp: , Rfl:   •  telmisartan-hydrochlorothiazide (MICARDIS HCT) 80-25 MG per tablet, Take 1 tablet by mouth daily.  Indications: High Blood Pressure Disorder, Disp: , Rfl:   •  telmisartan-hydrochlorothiazide (MICARDIS HCT) 80-25 MG per tablet, TAKE 1 TABLET BY MOUTH EVERY DAY, Disp: 90 tablet, Rfl: 2  •  verapamil (CALAN-SR) 120 mg CR tablet, Take 1 tablet (120 mg total) by mouth daily at bedtime 1 hs, Disp: 90 tablet, Rfl: 3  •  verapamil (CALAN-SR) 240 mg CR tablet, Take 1 tablet (240 mg total) by mouth daily at bedtime 1 hs, Disp: 90 tablet, Rfl: 3  •  prochlorperazine (COMPAZINE) 10 mg tablet, Take 1 tablet (10 mg total) by mouth every 6 (six) hours as needed for nausea (Patient not taking: Reported on 8/10/2023), Disp: 30 tablet, Rfl: 3  Allergies   Allergen Reactions   • Amoxicillin Hives   • Vilazodone Hcl Nausea Only and Dizziness     (Viibryd)   • Wasp Venom Swelling   • Zithromax [Azithromycin] Hives       Vitals: Blood pressure 132/68, pulse 92, temperature 98.7 °F (37.1 °C), temperature source Tympanic, height 5' 2.9" (1.598 m), weight 85.7 kg (189 lb), SpO2 96 %. Body mass index is 33.59 kg/m². Oxygen Therapy  SpO2: 96 %  Oxygen Therapy: None (Room air)    Physical Exam   Physical Exam  Constitutional:       General: She is not in acute distress. HENT:      Head: Normocephalic. Mouth/Throat:      Pharynx: No oropharyngeal exudate. Eyes:      General: No scleral icterus. Neck:      Vascular: No JVD. Cardiovascular:      Rate and Rhythm: Normal rate and regular rhythm. Pulmonary:      Breath sounds: No wheezing, rhonchi or rales. Comments: Decreased breath sounds right mid to lower hemithorax  Abdominal:      Palpations: Abdomen is soft. Tenderness: There is no abdominal tenderness. Musculoskeletal:      Cervical back: Neck supple. Skin:     General: Skin is warm and dry. Neurological:      Mental Status: She is alert and oriented to person, place, and time. Psychiatric:         Mood and Affect: Mood normal.         Labs: I have personally reviewed pertinent lab results. Lab Results   Component Value Date    WBC 5.69 07/31/2023    HGB 10.8 (L) 07/31/2023    HCT 32.5 (L) 07/31/2023    MCV 99 (H) 07/31/2023     07/31/2023     Lab Results   Component Value Date    GLUCOSE 95 08/19/2019    CALCIUM 9.4 07/31/2023    K 3.5 07/31/2023    CO2 25 07/31/2023     07/31/2023    BUN 6 07/31/2023    CREATININE 0.55 (L) 07/31/2023     No results found for: "IGE"  Lab Results   Component Value Date    ALT 24 07/31/2023    AST 23 07/31/2023    ALKPHOS 100 07/31/2023       Imaging and other studies: I have personally reviewed pertinent reports. and I have personally reviewed pertinent films in PACS PET/CT from July 2023 shows partial metabolic response to therapy.   There is a moderate right pleural effusion.   Answers for HPI/ROS submitted by the patient on 8/3/2023  Do you have shortness of breath that occurs with effort or exertion?: Yes  Do you have ear congestion?: No  Do you have heartburn?: No  Do you have fatigue?: Yes  Do you have nasal congestion?: No  Do you have shortness of breath when lying flat?: No  Do you have shortness of breath when you wake up?: No  Do you have sweats?: No  Have you experienced weight loss?: No

## 2023-08-10 NOTE — ASSESSMENT & PLAN NOTE
She is currently on chemotherapy at the direction of Dr. Kelly Chen. She is hoping to transition to immunotherapy in the near future.   We will review PET scan once completed per oncology

## 2023-08-10 NOTE — ASSESSMENT & PLAN NOTE
She has a right-sided malignant pleural effusion status post indwelling pleural catheter placement in April 2023. Visiting nurses are draining it twice weekly, typically between 300-600 mL. She does have some discomfort with the drainage sessions, but it is tolerable. She understands that the catheter will remain in place until she has less than 50 mL per session on at least 3 consecutive drainage attempts.

## 2023-08-11 ENCOUNTER — HOME CARE VISIT (OUTPATIENT)
Dept: HOME HEALTH SERVICES | Facility: HOME HEALTHCARE | Age: 63
End: 2023-08-11
Payer: COMMERCIAL

## 2023-08-11 PROCEDURE — G0299 HHS/HOSPICE OF RN EA 15 MIN: HCPCS

## 2023-08-12 VITALS
TEMPERATURE: 97.6 F | DIASTOLIC BLOOD PRESSURE: 60 MMHG | HEART RATE: 72 BPM | SYSTOLIC BLOOD PRESSURE: 146 MMHG | OXYGEN SATURATION: 97 % | RESPIRATION RATE: 18 BRPM

## 2023-08-14 ENCOUNTER — HOME CARE VISIT (OUTPATIENT)
Dept: HOME HEALTH SERVICES | Facility: HOME HEALTHCARE | Age: 63
End: 2023-08-14
Payer: COMMERCIAL

## 2023-08-14 VITALS
DIASTOLIC BLOOD PRESSURE: 70 MMHG | TEMPERATURE: 97.9 F | OXYGEN SATURATION: 98 % | RESPIRATION RATE: 16 BRPM | HEART RATE: 88 BPM | SYSTOLIC BLOOD PRESSURE: 138 MMHG

## 2023-08-14 PROCEDURE — G0300 HHS/HOSPICE OF LPN EA 15 MIN: HCPCS

## 2023-08-15 ENCOUNTER — OFFICE VISIT (OUTPATIENT)
Age: 63
End: 2023-08-15
Payer: COMMERCIAL

## 2023-08-15 ENCOUNTER — TELEPHONE (OUTPATIENT)
Dept: PALLIATIVE MEDICINE | Facility: CLINIC | Age: 63
End: 2023-08-15

## 2023-08-15 ENCOUNTER — CLINICAL SUPPORT (OUTPATIENT)
Age: 63
End: 2023-08-15
Payer: COMMERCIAL

## 2023-08-15 VITALS
SYSTOLIC BLOOD PRESSURE: 160 MMHG | BODY MASS INDEX: 32.6 KG/M2 | HEART RATE: 87 BPM | HEIGHT: 63 IN | OXYGEN SATURATION: 97 % | WEIGHT: 184 LBS | DIASTOLIC BLOOD PRESSURE: 88 MMHG | TEMPERATURE: 98.9 F

## 2023-08-15 DIAGNOSIS — J44.9 CHRONIC OBSTRUCTIVE PULMONARY DISEASE, UNSPECIFIED COPD TYPE (HCC): ICD-10-CM

## 2023-08-15 DIAGNOSIS — T45.1X5A CINV (CHEMOTHERAPY-INDUCED NAUSEA AND VOMITING): ICD-10-CM

## 2023-08-15 DIAGNOSIS — J91.0 MALIGNANT PLEURAL EFFUSION: ICD-10-CM

## 2023-08-15 DIAGNOSIS — Z71.89 COUNSELING AND COORDINATION OF CARE: Primary | ICD-10-CM

## 2023-08-15 DIAGNOSIS — Z51.5 PALLIATIVE CARE PATIENT: ICD-10-CM

## 2023-08-15 DIAGNOSIS — R11.2 CINV (CHEMOTHERAPY-INDUCED NAUSEA AND VOMITING): ICD-10-CM

## 2023-08-15 DIAGNOSIS — C34.91 NON-SMALL CELL CANCER OF RIGHT LUNG (HCC): Primary | ICD-10-CM

## 2023-08-15 PROCEDURE — 99497 ADVNCD CARE PLAN 30 MIN: CPT | Performed by: PHYSICIAN ASSISTANT

## 2023-08-15 PROCEDURE — 99214 OFFICE O/P EST MOD 30 MIN: CPT | Performed by: PHYSICIAN ASSISTANT

## 2023-08-15 NOTE — PROGRESS NOTES
This note was not shared with the patient due to this is a psychotherapy note    Palliative Supportive Care  met with patient/family to continue to provide emotional support and guidance. Updated biopsychosocial information relevant to support:  Identified areas of need include:  Patient was able to identify that she has emotionally struggled at times with her physical limitations. She has always been a strong independent woman who took care of everything inside while  did the outside chores. She does at times struggle to ask for help and  struggles at times to allow her independence. LCSW offered for both to come into the office to discuss the emotional aspect of a cancer diagnosis. Patient also identifies that the focus of her concerns revolve around providing support and nurturing to her 6year old grandson. She has unfortunately had to change some of her activities she does with him - playing outside and the beach. Resources provided:  [facility] advanced directive  Areas that need future follow-up include:  Potential therapy for patient and .   Assistance with completion of advanced directive documentation     I have spent 40 minutes with Patient  today in which greater than 50% of this time was spent in counseling/coordination of care     Palliative  will follow-up as requested by patient, family, and primary team.  Please contact with any specific requests

## 2023-08-15 NOTE — TELEPHONE ENCOUNTER
Patient was seen today in [address] [place] [address] with Dieudonne. She needs a 4 month follow up appt per Dieudonne. Left a message for patient to call office back to schedule an appointment with Palliative Care.

## 2023-08-15 NOTE — PROGRESS NOTES
Outpatient Follow-Up - Palliative and [address] [facility] [address] Emile 70 y.o. female [id number]    Assessment & Plan  1. Non-small cell cancer of right lung ([address])    2. Chronic obstructive pulmonary disease, unspecified COPD type ([address])    3. CINV (chemotherapy-induced nausea and vomiting)    4. Malignant pleural effusion    5. Palliative care patient      Plan:  · Overall, Izabella has stable symptoms. · She has excellent appetite and adequate energy at this time. · Sleep is OK but limited due to nocturia after tx. Takes tylenol-3. Did not tolerate Ambien in the past.   · Denies pain. 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 Has LW/POA however we gave her updated ACP documents today as she wants to make some changes. · RTC in 4 months     Medications adjusted this encounter:  Requested Prescriptions      No prescriptions requested or ordered in this encounter     No orders of the defined types were placed in this encounter. There are no discontinued medications. Zee Miranda was seen today for symptoms and planning cares related to above illnesses. I have reviewed the patient's controlled substance dispensing history in the Prescription Drug Monitoring Program in compliance with the [facility] regulations before prescribing any controlled substances. Filled  Written  Sold  ID  Drug  QTY  Days  Prescriber  RX #  Dispenser  Refill  Daily Dose*  Pymt Type      11/14/2022 11/14/2022   1  Oxycodone Hcl (Ir) 5 Mg Tablet 6.00  2  Ma Zba  [id number]   Pen (6052)  0  22.50 MME  Comm Ins  [place]    09/01/2022 09/01/2022   1  Lorazepam 1 Mg Tablet 30.00  30  Al Zel  [id number]   Pen (6052)  0  1.00 LME  Comm Ins  [place]    11/23/2021 11/23/2021   1  Lorazepam 1 Mg Tablet 30.00  30  An Pel  [id number]   Pen (6052)  0  1.00 LME  Comm Ins  [place]        They are invited to continue to follow with us. If there are questions or concerns, please contact us through our clinic/answering service 24 hours a day, seven days a week. Zoraida Gaspar PA-C  [facility]  [phone]    Visit Information    Accompanied By: presents alone    Source of History: Self    History Limitations: None    History of Present Illness      Bobo Solis is a 70 y.o. female who presents in follow up of symptoms related to stage IV non-small cell lung cancer. Pertinent issues include: symptom management, pain, neoplasm related, breathlessness, depression or anxiety, fatigue, assessment of goals of care, disease process education and discussion of prognosis, advance care planning. Diana Kumar is under the care of Dr. Michael Posey for stage IV non-small cell lung cancer, being treated with Alimta, Keytruda, and Carboplatin. For a brief period of time the carboplatin was discontinued due to fatigue however Diana Kumar had increased output from her pleural catheter for malignant effusion and carboplatin was re-added. Eventually, the hope is for her to be maintained on Alimta and Keytruda. Diana Kumar established care with palliative and supportive care in June 2023. At that time she had relatively stable symptoms. We discussed advance care planning and she reported that she had a living will/POA and was DNR/DNI. She wanted to review the documents before brining them in. Today, 8/15/2023, Diana Kumar presents for routine follow-up with palliative care. Her symptoms are overall stable and she is not requiring any medication adjustments at this time. We discussed advance care planning extensively and gave her new documents to complete. She is welcome to call us if she needs anything explained and we could have her and her spouse come in for an appointment for this as well. We also discussed therapy as they processed difficult changes she will consider. Social:   · Diana Kumar has a supportive spouse.   They may consider doing therapy together to help with emotional processing. · She also has a very supportive friend who helps her process. · She is very close with her grandson Heather Alexander who is 8. He inspires her. Past medical, surgical, social, and family histories are reviewed and pertinent updates are made. Review of Systems   Constitutional: Negative for decreased appetite and malaise/fatigue. Cardiovascular: Positive for dyspnea on exertion. Negative for chest pain. Respiratory: Negative for cough. Gastrointestinal: Negative for bloating, abdominal pain, anorexia, nausea and vomiting. Genitourinary: Negative for bladder incontinence. Neurological: Negative for aphonia and difficulty with concentration. Psychiatric/Behavioral: Negative for altered mental status and hypervigilance. The patient has insomnia (occasional). Vital Signs    /88   Pulse 87   Temp 98.9 °F (37.2 °C)   Ht 5' 2.9" (1.598 m)   Wt 83.5 kg (184 lb)   SpO2 97%   BMI 32.70 kg/m²      Physical Exam and Objective Data  Physical Exam  Vitals and nursing note reviewed. Exam conducted with a chaperone present. Constitutional:       General: She is not in acute distress. Appearance: She is well-developed. She is not ill-appearing. Comments: Extremely pleasant and in good humor    HENT:      Head: Normocephalic and atraumatic. Eyes:      General:         Right eye: No discharge. Left eye: No discharge. Conjunctiva/sclera: Conjunctivae normal.   Cardiovascular:      Rate and Rhythm: Normal rate. Pulmonary:      Effort: Pulmonary effort is normal. No respiratory distress. Musculoskeletal:         General: No swelling. Cervical back: Neck supple. Skin:     General: Skin is warm and dry. Capillary Refill: Capillary refill takes less than 2 seconds. Coloration: Skin is not pale. Neurological:      Mental Status: She is alert and oriented to person, place, and time.  Mental status is at baseline. Psychiatric:         Mood and Affect: Mood normal.         Behavior: Behavior normal.       Radiology and Laboratory:  I personally reviewed and interpreted the following results: CBC, CMP    40 minutes was spent face to face with [address] [place] [address] with greater than 50% of the time spent in counseling or coordination of care including discussions of etiology of diagnosis, pathogenesis of diagnosis, follow up requirements, risk factors and risk reduction of disease, patient and family counseling/involvement in care and compliance with treatment regimen. reviewed chart, reviewed lab values, reviewed imaging, provided medical updates, discussed palliative care and symptom management, discussed goals of care, discussed code status, discussed advanced directives, assessed POA/HCA, provided supportive listening, provided anticipatory guidance, provided psychosocial and emotional support, assessed competency and decision-making and facilitated interdisciplinary communication. All of the patient's or agent's questions were answered during this discussion.

## 2023-08-18 ENCOUNTER — HOME CARE VISIT (OUTPATIENT)
Dept: HOME HEALTH SERVICES | Facility: HOME HEALTHCARE | Age: 63
End: 2023-08-18
Payer: COMMERCIAL

## 2023-08-18 VITALS
OXYGEN SATURATION: 95 % | HEART RATE: 78 BPM | DIASTOLIC BLOOD PRESSURE: 68 MMHG | TEMPERATURE: 97.3 F | RESPIRATION RATE: 20 BRPM | SYSTOLIC BLOOD PRESSURE: 140 MMHG

## 2023-08-18 PROCEDURE — G0299 HHS/HOSPICE OF RN EA 15 MIN: HCPCS

## 2023-08-21 ENCOUNTER — HOME CARE VISIT (OUTPATIENT)
Dept: HOME HEALTH SERVICES | Facility: HOME HEALTHCARE | Age: 63
End: 2023-08-21
Payer: COMMERCIAL

## 2023-08-21 ENCOUNTER — HOSPITAL ENCOUNTER (OUTPATIENT)
Dept: INFUSION CENTER | Facility: HOSPITAL | Age: 63
Discharge: HOME | End: 2023-08-21
Payer: COMMERCIAL

## 2023-08-21 VITALS
OXYGEN SATURATION: 96 % | RESPIRATION RATE: 18 BRPM | DIASTOLIC BLOOD PRESSURE: 80 MMHG | TEMPERATURE: 97.8 F | HEART RATE: 76 BPM | SYSTOLIC BLOOD PRESSURE: 160 MMHG

## 2023-08-21 DIAGNOSIS — C34.91 NON-SMALL CELL CANCER OF RIGHT LUNG (HCC): Primary | ICD-10-CM

## 2023-08-21 LAB
ALBUMIN SERPL BCP-MCNC: 3.8 G/DL (ref 3.5–5)
ALP SERPL-CCNC: 100 U/L (ref 34–104)
ALT SERPL W P-5'-P-CCNC: 22 U/L (ref 7–52)
ANION GAP SERPL CALCULATED.3IONS-SCNC: 4 MMOL/L
AST SERPL W P-5'-P-CCNC: 21 U/L (ref 13–39)
BASOPHILS # BLD AUTO: 0.01 THOUSANDS/ÂΜL (ref 0–0.1)
BASOPHILS NFR BLD AUTO: 0 % (ref 0–1)
BILIRUB SERPL-MCNC: 0.36 MG/DL (ref 0.2–1)
BUN SERPL-MCNC: 8 MG/DL (ref 5–25)
CALCIUM SERPL-MCNC: 9.3 MG/DL (ref 8.4–10.2)
CHLORIDE SERPL-SCNC: 100 MMOL/L (ref 96–108)
CO2 SERPL-SCNC: 28 MMOL/L (ref 21–32)
CREAT SERPL-MCNC: 0.5 MG/DL (ref 0.6–1.3)
EOSINOPHIL # BLD AUTO: 0.03 THOUSAND/ÂΜL (ref 0–0.61)
EOSINOPHIL NFR BLD AUTO: 1 % (ref 0–6)
ERYTHROCYTE [DISTWIDTH] IN BLOOD BY AUTOMATED COUNT: 13.8 % (ref 11.6–15.1)
GFR SERPL CREATININE-BSD FRML MDRD: 101 ML/MIN/1.73SQ M
GLUCOSE SERPL-MCNC: 105 MG/DL (ref 65–140)
HCT VFR BLD AUTO: 31.7 % (ref 34.8–46.1)
HGB BLD-MCNC: 10.7 G/DL (ref 11.5–15.4)
IMM GRANULOCYTES # BLD AUTO: 0.01 THOUSAND/UL (ref 0–0.2)
IMM GRANULOCYTES NFR BLD AUTO: 0 % (ref 0–2)
LYMPHOCYTES # BLD AUTO: 0.6 THOUSANDS/ÂΜL (ref 0.6–4.47)
LYMPHOCYTES NFR BLD AUTO: 13 % (ref 14–44)
MCH RBC QN AUTO: 33.1 PG (ref 26.8–34.3)
MCHC RBC AUTO-ENTMCNC: 33.8 G/DL (ref 31.4–37.4)
MCV RBC AUTO: 98 FL (ref 82–98)
MONOCYTES # BLD AUTO: 0.68 THOUSAND/ÂΜL (ref 0.17–1.22)
MONOCYTES NFR BLD AUTO: 15 % (ref 4–12)
NEUTROPHILS # BLD AUTO: 3.21 THOUSANDS/ÂΜL (ref 1.85–7.62)
NEUTS SEG NFR BLD AUTO: 71 % (ref 43–75)
NRBC BLD AUTO-RTO: 0 /100 WBCS
PLATELET # BLD AUTO: 294 THOUSANDS/UL (ref 149–390)
PMV BLD AUTO: 8.6 FL (ref 8.9–12.7)
POTASSIUM SERPL-SCNC: 3.5 MMOL/L (ref 3.5–5.3)
PROT SERPL-MCNC: 6.8 G/DL (ref 6.4–8.4)
RBC # BLD AUTO: 3.23 MILLION/UL (ref 3.81–5.12)
SODIUM SERPL-SCNC: 132 MMOL/L (ref 135–147)
T3FREE SERPL-MCNC: 2.8 PG/ML (ref 2.5–3.9)
TSH SERPL DL<=0.05 MIU/L-ACNC: 1.67 UIU/ML (ref 0.45–4.5)
WBC # BLD AUTO: 4.54 THOUSAND/UL (ref 4.31–10.16)

## 2023-08-21 PROCEDURE — 85025 COMPLETE CBC W/AUTO DIFF WBC: CPT | Performed by: INTERNAL MEDICINE

## 2023-08-21 PROCEDURE — G0299 HHS/HOSPICE OF RN EA 15 MIN: HCPCS

## 2023-08-21 PROCEDURE — 80053 COMPREHEN METABOLIC PANEL: CPT | Performed by: INTERNAL MEDICINE

## 2023-08-21 PROCEDURE — 84481 FREE ASSAY (FT-3): CPT | Performed by: INTERNAL MEDICINE

## 2023-08-21 PROCEDURE — 84443 ASSAY THYROID STIM HORMONE: CPT | Performed by: INTERNAL MEDICINE

## 2023-08-21 NOTE — PROGRESS NOTES
Pt here for port labs tolerated well no adverse reactions declined AVS and left ambulatory with steady gait.

## 2023-08-22 ENCOUNTER — OFFICE VISIT (OUTPATIENT)
Dept: FAMILY MEDICINE CLINIC | Facility: HOSPITAL | Age: 63
End: 2023-08-22
Payer: COMMERCIAL

## 2023-08-22 VITALS
SYSTOLIC BLOOD PRESSURE: 138 MMHG | OXYGEN SATURATION: 96 % | HEIGHT: 63 IN | RESPIRATION RATE: 16 BRPM | WEIGHT: 185 LBS | HEART RATE: 76 BPM | DIASTOLIC BLOOD PRESSURE: 78 MMHG | BODY MASS INDEX: 32.78 KG/M2

## 2023-08-22 DIAGNOSIS — I10 ESSENTIAL HYPERTENSION: ICD-10-CM

## 2023-08-22 DIAGNOSIS — E87.1 HYPONATREMIA: ICD-10-CM

## 2023-08-22 DIAGNOSIS — Z00.00 ANNUAL PHYSICAL EXAM: Primary | ICD-10-CM

## 2023-08-22 DIAGNOSIS — J44.9 CHRONIC OBSTRUCTIVE PULMONARY DISEASE, UNSPECIFIED COPD TYPE (HCC): ICD-10-CM

## 2023-08-22 DIAGNOSIS — Z78.0 ASYMPTOMATIC POSTMENOPAUSAL STATE: ICD-10-CM

## 2023-08-22 DIAGNOSIS — Z12.31 ENCOUNTER FOR SCREENING MAMMOGRAM FOR BREAST CANCER: ICD-10-CM

## 2023-08-22 PROCEDURE — 99397 PER PM REEVAL EST PAT 65+ YR: CPT | Performed by: INTERNAL MEDICINE

## 2023-08-22 PROCEDURE — 99213 OFFICE O/P EST LOW 20 MIN: CPT | Performed by: INTERNAL MEDICINE

## 2023-08-22 RX ORDER — FUROSEMIDE 20 MG/1
20 TABLET ORAL EVERY MORNING
Qty: 90 TABLET | Refills: 1 | Status: SHIPPED | OUTPATIENT
Start: 2023-08-22 | End: 2024-02-18

## 2023-08-22 RX ORDER — TELMISARTAN 80 MG/1
80 TABLET ORAL DAILY
Qty: 90 TABLET | Refills: 1 | Status: SHIPPED | OUTPATIENT
Start: 2023-08-22 | End: 2024-02-18

## 2023-08-22 NOTE — ASSESSMENT & PLAN NOTE
Sodium was 132 yesterday. Patient is euvolemic on examination. Renal function was normal.  TSH was normal.  Hyponatremia is likely due to HCTZ.   I discontinued HCTZ and replaced it with furosemide

## 2023-08-22 NOTE — PROGRESS NOTES
[address] [facility]  PRIMARY CARE [address]    NAME: Faviola Austin  AGE: 70 y.o. SEX: female  : 1960     DATE: 2023     Assessment and Plan:     Problem List Items Addressed This Visit        Respiratory    COPD (chronic obstructive pulmonary disease) ([address])     Patient abstains from smoking. I heard no expiratory wheezing today on exam.  Continue Anoro Ellipta and as needed albuterol. COPD is stable            Cardiovascular and Mediastinum    Essential hypertension     Patient has hypertension. She was not taking her medication for 2 weeks due to a mistake. Blood pressure is acceptable today. She was hyponatremic yesterday with normal renal function and thyroid function. She is euvolemic today. Suspect hyponatremia is due to HCTZ    Discontinued HCTZ    Continue telmisartan 80 mg daily and verapamil 360 mg daily  Start furosemide 20 mg in the morning    Recheck electrolytes with next blood draw in the middle of September         Relevant Medications    telmisartan (MICARDIS) 80 MG tablet    furosemide (LASIX) 20 mg tablet    Other Relevant Orders    Basic metabolic panel    Lipid panel       Other    Hyponatremia     Sodium was 132 yesterday. Patient is euvolemic on examination. Renal function was normal.  TSH was normal.  Hyponatremia is likely due to HCTZ. I discontinued HCTZ and replaced it with furosemide        Other Visit Diagnoses     Annual physical exam    -  Primary    Encounter for screening mammogram for breast cancer        Relevant Orders    Mammo screening bilateral w 3d & cad    Asymptomatic postmenopausal state        Relevant Orders    DXA bone density spine hip and pelvis          Immunizations and preventive care screenings were discussed with patient today. Appropriate education was printed on patient's after visit summary. Counseling:  She will get colonoscopy done once her chemotherapy is finished. I ordered mammogram to screen for breast cancer. Advised her to get the flu shot and COVID booster shot this fall  Ordered DEXA scan      Falls Plan of Care: balance, strength, and gait training instructions were provided. Return in about 4 months (around 12/22/2023) for Recheck. Chief Complaint:     Chief Complaint   Patient presents with   • Annual Exam      History of Present Illness:     Adult Annual Physical   Patient here for a comprehensive physical exam. The patient reports problems - As above. Diet and Physical Activity  Diet/Nutrition: well balanced diet. Exercise: walking. Depression Screening  PHQ-2/9 Depression Screening         General Health  Sleep: Variable sleep. Hearing: normal - bilateral.  Vision: no vision problems. Dental: regular dental visits. Review of Systems:     Review of Systems   Constitutional: Negative for fever. HENT: Negative for hearing loss. Eyes: Negative for visual disturbance. Respiratory: Negative for cough and shortness of breath. Cardiovascular: Negative for chest pain and palpitations. Gastrointestinal: Negative for abdominal pain, blood in stool, constipation and diarrhea. Endocrine: Negative for polydipsia and polyphagia. Genitourinary: Negative for dysuria and hematuria. Musculoskeletal: Negative for arthralgias and myalgias. Skin: Negative for rash. Neurological: Negative for headaches. Psychiatric/Behavioral: Negative for dysphoric mood. All other systems reviewed and are negative.      Past Medical History:     Past Medical History:   Diagnosis Date   • Cancer ([address])    • COPD (chronic obstructive pulmonary disease) ([address])    • Emphysema of lung ([address])    • Hyperlipidemia    • Hypertension    • Lung cancer ([address]) 06/26/2019    right lower lobe removed   • Lung nodule    • Pleural effusion       Past Surgical History:     Past Surgical History:   Procedure Laterality Date   • APPENDECTOMY     • BREAST BIOPSY Right     benign   • BRONCHOSCOPY  2019   • COLON SURGERY     • HYSTERECTOMY      age 48   • IR PLEURAL EFFUSION LONG-TERM CATHETER PLACEMENT  2023   • IR PORT PLACEMENT  2023   • IR THORACENTESIS  2023   • IR THORACENTESIS  2023   • IR THORACENTESIS  2023   • LUNG BIOPSY  2019   • LUNG LOBECTOMY     • OOPHORECTOMY Bilateral     age 48      Social History:     Social History     Socioeconomic History   • Marital status: /Civil Union     Spouse name: None   • Number of children: None   • Years of education: None   • Highest education level: None   Occupational History   • None   Tobacco Use   • Smoking status: Former     Packs/day: 1.50     Years: 50.00     Total pack years: 75.00     Types: Cigarettes     Start date: 5     Quit date: 3/15/2023     Years since quittin.4   • Smokeless tobacco: Never   Vaping Use   • Vaping Use: Never used   Substance and Sexual Activity   • Alcohol use: Not Currently     Comment: Rarely drink, socially only   • Drug use: No   • Sexual activity: Not Currently     Partners: Male     Birth control/protection: Post-menopausal, Female Sterilization   Other Topics Concern   • None   Social History Narrative    Lives with      Social Determinants of Health     Financial Resource Strain: Not on file   Food Insecurity: No Food Insecurity (2023)    Hunger Vital Sign    • Worried About Running Out of Food in the Last Year: Never true    • Ran Out of Food in the Last Year: Never true   Transportation Needs: No Transportation Needs (2023)    PRAPARE - Transportation    • Lack of Transportation (Medical): No    • Lack of Transportation (Non-Medical):  No   Physical Activity: Not on file   Stress: Not on file   Social Connections: Not on file   Intimate Partner Violence: Not on file   Housing Stability: Low Risk  (2023)    Housing Stability Vital Sign    • Unable to Pay for Housing in the Last Year: No    • Number of Places Lived in the Last Year: 1    • Unstable Housing in the Last Year: No      Family History:     Family History   Problem Relation Age of Onset   • Colon cancer Mother    • Hypertension Mother    • Hyperlipidemia Mother    • Hearing loss Mother    • Depression Mother    • COPD Mother    • Cancer Mother         Lung   • Arthritis Mother    • Lung cancer Mother    • Drug abuse Brother    • Diabetes Maternal Grandmother    • Cancer Maternal Grandmother         Colon   • Colon cancer Maternal Grandmother    • Cancer Maternal Aunt    • Colon cancer Maternal Aunt    • Colon cancer Maternal Aunt    • Cancer Maternal Aunt         Colon      Current Medications:     Current Outpatient Medications   Medication Sig Dispense Refill   • albuterol (2.5 mg/3 mL) 0.083 % nebulizer solution Take 3 mL (2.5 mg total) by nebulization every 6 (six) hours as needed for wheezing or shortness of breath 270 mL 4   • albuterol (PROVENTIL HFA,VENTOLIN HFA) 90 mcg/act inhaler Inhale 2 puffs every 4 (four) hours  3   • Anoro Ellipta 62.5-25 MCG/ACT inhaler Inhale 1 puff daily 1 puff once daily 90 blister 3   • Ascorbic Acid (vitamin C) 1000 MG tablet 1 daily     • atorvastatin (LIPITOR) 20 mg tablet Take 20 mg by mouth daily. • Biotin 84173 MCG TABS 1 daily     • buPROPion (WELLBUTRIN XL) 300 mg 24 hr tablet TAKE 1 TABLET BY MOUTH EVERY DAY IN THE MORNING FOR 90 DAYS     • Cholecalciferol (Vitamin D3) 25 MCG (1000 UT) CAPS 1 capsule every 24 hours     • dexamethasone (DECADRON) 4 mg tablet Take 1 tablet (4 mg total) by mouth 2 (two) times a day with meals Take 1 tablet by mouth the day before, the day of, and the day after chemotherapy.  6 tablet 6   • diphenhydrAMINE (BENADRYL) 50 MG tablet Take 1 tablet (50 mg total) by mouth every 6 (six) hours as needed for itching (Rash) 30 tablet 0   • diphenhydrAMINE-APAP, sleep, (TYLENOL PM EXTRA STRENGTH PO) Take 325 mg by mouth daily at bedtime     • EPINEPHrine (EPIPEN) 0.3 mg/0.3 mL SOAJ Inject 0.3 mL (0.3 mg total) into a muscle once for 1 dose 0.6 mL 0   • famotidine (PEPCID) 20 mg tablet Take 20 mg by mouth daily. Indications: Heartburn     • folic acid (KP Folic Acid) 1 mg tablet Take 1 tablet (1 mg total) by mouth daily 90 tablet 1   • furosemide (LASIX) 20 mg tablet Take 1 tablet (20 mg total) by mouth every morning 90 tablet 1   • lidocaine-prilocaine (EMLA) cream Apply to port 30 to 60 minutes prior to use 30 g 3   • LORazepam (ATIVAN) 1 mg tablet 1/2 TABLET TWICE A DAY AS NEEDED FOR ANXIETY ORALLY 30 DAYS  0   • Multiple Vitamin (MULTIVITAMINS PO) every 24 hours     • ondansetron (ZOFRAN) 8 mg tablet Take 1 tablet (8 mg total) by mouth every 8 (eight) hours as needed for nausea or vomiting 30 tablet 0   • potassium chloride (K-DUR,KLOR-CON) 20 mEq tablet Take 1 tablet (20 mEq total) by mouth daily 30 tablet 1   • Probiotic Product (PROBIOTIC DAILY PO) 1 daily     • Sennosides (Senna) 8.6 MG CAPS Take 2 capsules by mouth as needed (constipation). Indications: Constipation     • sertraline (ZOLOFT) 100 mg tablet 2 daily     • telmisartan (MICARDIS) 80 MG tablet Take 1 tablet (80 mg total) by mouth daily 90 tablet 1   • verapamil (CALAN-SR) 120 mg CR tablet Take 1 tablet (120 mg total) by mouth daily at bedtime 1 hs 90 tablet 3   • verapamil (CALAN-SR) 240 mg CR tablet Take 1 tablet (240 mg total) by mouth daily at bedtime 1 hs 90 tablet 3   • prochlorperazine (COMPAZINE) 10 mg tablet Take 1 tablet (10 mg total) by mouth every 6 (six) hours as needed for nausea (Patient not taking: Reported on 8/22/2023) 30 tablet 3     No current facility-administered medications for this visit. Facility-Administered Medications Ordered in Other Visits   Medication Dose Route Frequency Provider Last Rate Last Admin   • alteplase (CATHFLO) injection 2 mg  2 mg Intracatheter Q1MIN PRN Carole Chester DO          Allergies:      Allergies   Allergen Reactions   • Amoxicillin Hives   • Vilazodone Hcl Nausea Only and Dizziness     (Viibryd)   • Wasp Venom Swelling   • Zithromax [Azithromycin] Hives      Physical Exam:     /78 (BP Location: Left arm, Patient Position: Sitting)   Pulse 76   Resp 16   Ht 5' 2.9" (1.598 m)   Wt 83.9 kg (185 lb)   SpO2 96%   BMI 32.88 kg/m²     Physical Exam  Constitutional:       General: She is not in acute distress. Appearance: She is well-developed. She is not toxic-appearing. HENT:      Head: Normocephalic. Right Ear: Tympanic membrane normal. There is no impacted cerumen. Left Ear: Tympanic membrane normal. There is no impacted cerumen. Mouth/Throat:      Mouth: Mucous membranes are moist.      Pharynx: No oropharyngeal exudate. Eyes:      Conjunctiva/sclera: Conjunctivae normal.   Cardiovascular:      Rate and Rhythm: Normal rate and regular rhythm. Heart sounds: No murmur heard. Pulmonary:      Effort: No respiratory distress. Breath sounds: No wheezing or rales. Abdominal:      General: Bowel sounds are normal.      Palpations: Abdomen is soft. Tenderness: There is no abdominal tenderness. Musculoskeletal:      Cervical back: Neck supple. Skin:     General: Skin is warm and dry. Findings: No rash. Neurological:      Mental Status: She is alert and oriented to person, place, and time. Cranial Nerves: No cranial nerve deficit. Motor: No weakness.       Gait: Gait normal.   Psychiatric:         Mood and Affect: Mood normal.          Angel Forman MD  [address]

## 2023-08-22 NOTE — ASSESSMENT & PLAN NOTE
Patient has hypertension. She was not taking her medication for 2 weeks due to a mistake. Blood pressure is acceptable today. She was hyponatremic yesterday with normal renal function and thyroid function. She is euvolemic today.   Suspect hyponatremia is due to HCTZ    Discontinued HCTZ    Continue telmisartan 80 mg daily and verapamil 360 mg daily  Start furosemide 20 mg in the morning    Recheck electrolytes with next blood draw in the middle of September

## 2023-08-22 NOTE — ASSESSMENT & PLAN NOTE
Patient abstains from smoking. I heard no expiratory wheezing today on exam.  Continue Anoro Ellipta and as needed albuterol.   COPD is stable

## 2023-08-23 ENCOUNTER — OFFICE VISIT (OUTPATIENT)
Age: 63
End: 2023-08-23
Payer: COMMERCIAL

## 2023-08-23 VITALS
WEIGHT: 189.2 LBS | TEMPERATURE: 98 F | HEIGHT: 63 IN | BODY MASS INDEX: 33.52 KG/M2 | RESPIRATION RATE: 16 BRPM | SYSTOLIC BLOOD PRESSURE: 138 MMHG | OXYGEN SATURATION: 97 % | HEART RATE: 81 BPM | DIASTOLIC BLOOD PRESSURE: 76 MMHG

## 2023-08-23 DIAGNOSIS — H10.89 OTHER CONJUNCTIVITIS OF BOTH EYES: ICD-10-CM

## 2023-08-23 DIAGNOSIS — C34.91 NON-SMALL CELL CANCER OF RIGHT LUNG (HCC): ICD-10-CM

## 2023-08-23 DIAGNOSIS — C34.91 NON-SMALL CELL CANCER OF RIGHT LUNG (HCC): Primary | ICD-10-CM

## 2023-08-23 PROCEDURE — 99214 OFFICE O/P EST MOD 30 MIN: CPT | Performed by: INTERNAL MEDICINE

## 2023-08-23 RX ORDER — DEXAMETHASONE 4 MG/1
4 TABLET ORAL 2 TIMES DAILY WITH MEALS
Qty: 6 TABLET | Refills: 6 | Status: SHIPPED | OUTPATIENT
Start: 2023-08-23

## 2023-08-23 RX ORDER — CYANOCOBALAMIN 1000 UG/ML
1000 INJECTION, SOLUTION INTRAMUSCULAR; SUBCUTANEOUS ONCE
OUTPATIENT
Start: 2023-10-05 | End: 2023-10-05

## 2023-08-23 RX ORDER — CYANOCOBALAMIN 1000 UG/ML
1000 INJECTION, SOLUTION INTRAMUSCULAR; SUBCUTANEOUS ONCE
Status: CANCELLED | OUTPATIENT
Start: 2023-08-24 | End: 2023-08-24

## 2023-08-23 RX ORDER — CYANOCOBALAMIN 1000 UG/ML
1000 INJECTION, SOLUTION INTRAMUSCULAR; SUBCUTANEOUS ONCE
OUTPATIENT
Start: 2023-09-14 | End: 2023-09-14

## 2023-08-23 RX ORDER — SODIUM CHLORIDE 9 MG/ML
20 INJECTION, SOLUTION INTRAVENOUS ONCE
Status: CANCELLED | OUTPATIENT
Start: 2023-08-24

## 2023-08-23 RX ORDER — SODIUM CHLORIDE 9 MG/ML
20 INJECTION, SOLUTION INTRAVENOUS ONCE
OUTPATIENT
Start: 2023-09-14

## 2023-08-23 RX ORDER — SODIUM CHLORIDE 9 MG/ML
20 INJECTION, SOLUTION INTRAVENOUS ONCE
OUTPATIENT
Start: 2023-10-05

## 2023-08-24 ENCOUNTER — HOME CARE VISIT (OUTPATIENT)
Dept: HOME HEALTH SERVICES | Facility: HOME HEALTHCARE | Age: 63
End: 2023-08-24
Payer: COMMERCIAL

## 2023-08-24 ENCOUNTER — HOSPITAL ENCOUNTER (OUTPATIENT)
Dept: INFUSION CENTER | Facility: HOSPITAL | Age: 63
Discharge: HOME | End: 2023-08-24
Attending: INTERNAL MEDICINE
Payer: COMMERCIAL

## 2023-08-24 VITALS
TEMPERATURE: 98.4 F | HEIGHT: 63 IN | DIASTOLIC BLOOD PRESSURE: 78 MMHG | RESPIRATION RATE: 16 BRPM | SYSTOLIC BLOOD PRESSURE: 158 MMHG | HEART RATE: 87 BPM | WEIGHT: 190.48 LBS | OXYGEN SATURATION: 95 % | BODY MASS INDEX: 33.75 KG/M2

## 2023-08-24 VITALS
TEMPERATURE: 97.5 F | SYSTOLIC BLOOD PRESSURE: 140 MMHG | DIASTOLIC BLOOD PRESSURE: 76 MMHG | HEART RATE: 72 BPM | OXYGEN SATURATION: 95 % | RESPIRATION RATE: 18 BRPM

## 2023-08-24 DIAGNOSIS — C34.91 NON-SMALL CELL CANCER OF RIGHT LUNG (HCC): Primary | ICD-10-CM

## 2023-08-24 PROCEDURE — 96411 CHEMO IV PUSH ADDL DRUG: CPT

## 2023-08-24 PROCEDURE — 96372 THER/PROPH/DIAG INJ SC/IM: CPT

## 2023-08-24 PROCEDURE — 96367 TX/PROPH/DG ADDL SEQ IV INF: CPT

## 2023-08-24 PROCEDURE — G0299 HHS/HOSPICE OF RN EA 15 MIN: HCPCS

## 2023-08-24 PROCEDURE — 96413 CHEMO IV INFUSION 1 HR: CPT

## 2023-08-24 RX ORDER — DEXAMETHASONE SODIUM PHOSPHATE 1 MG/ML
1 SOLUTION/ DROPS OPHTHALMIC
Qty: 5 ML | Refills: 5 | Status: SHIPPED | OUTPATIENT
Start: 2023-08-24

## 2023-08-24 RX ORDER — CYANOCOBALAMIN 1000 UG/ML
1000 INJECTION, SOLUTION INTRAMUSCULAR; SUBCUTANEOUS ONCE
Status: COMPLETED | OUTPATIENT
Start: 2023-08-24 | End: 2023-08-24

## 2023-08-24 RX ORDER — SODIUM CHLORIDE 9 MG/ML
20 INJECTION, SOLUTION INTRAVENOUS ONCE
Status: COMPLETED | OUTPATIENT
Start: 2023-08-24 | End: 2023-08-24

## 2023-08-24 RX ADMIN — PEMETREXED DISODIUM 756 MG: 100 INJECTION, POWDER, LYOPHILIZED, FOR SOLUTION INTRAVENOUS at 10:07

## 2023-08-24 RX ADMIN — CYANOCOBALAMIN 1000 MCG: 1000 INJECTION, SOLUTION INTRAMUSCULAR at 08:50

## 2023-08-24 RX ADMIN — SODIUM CHLORIDE 200 MG: 9 INJECTION, SOLUTION INTRAVENOUS at 09:16

## 2023-08-24 RX ADMIN — DEXAMETHASONE SODIUM PHOSPHATE: 10 INJECTION, SOLUTION INTRAMUSCULAR; INTRAVENOUS at 08:49

## 2023-08-24 RX ADMIN — SODIUM CHLORIDE 20 ML/HR: 0.9 INJECTION, SOLUTION INTRAVENOUS at 08:49

## 2023-08-26 NOTE — PROGRESS NOTES
HEMATOLOGY / [facility] FOLLOW UP NOTE    Primary Care Provider: Kelly Whitehead MD  Referring Provider:    MRN: [id number]  : 1960    Reason for Encounter: follow up [facility]       Oncology History Overview Note   2019 - Stage IA adenocarcinoma of the right lung s/p RLLectomy    2022 - fall after tripping on a dog gate - CT showed pulmonary nodules that required 3 month followup    3/2023 - CT showed right pleural effusion with enlarging lung lesion, she was otherwise symptomatic, thoracentesis cell block showed adenocarcinoma c/w her prior lung primary    PET - no disease outside the chest    2023 - carbo/pem/pembro    - - cycle 2, pemetrexed dose reduced by 20% and carbo dose reduced to AUC 4 due to nausea and fatigue    2023 - removed Poly as of cycle 5 due to good response and increasing fatigue    2023 - add back Poly for cycle 6 due to increasing output from right pleural catheter    2023 - remove carboplatin for cycle 7 because no impact was made on output from pleural catheter     Non-small cell cancer of right lung (HCC)   2023 Initial Diagnosis    Non-small cell cancer of right lung ([address])     2023 -  Chemotherapy    cyanocobalamin, 1,000 mcg, Intramuscular, Once, 4 of 10 cycles  Administration: 1,000 mcg (2023), 1,000 mcg (2023), 1,000 mcg (8/3/2023), 1,000 mcg (2023)  alteplase (CATHFLO), 2 mg, Intracatheter, Every 1 Minute as needed, 7 of 13 cycles  fosaprepitant (EMEND) IVPB, 150 mg, Intravenous, Once, 5 of 5 cycles  Administration: 150 mg (2023), 150 mg (2023), 150 mg (2023), 150 mg (2023), 150 mg (8/3/2023)  CARBOplatin (PARAPLATIN) IVPB (GOG AUC DOSING), 553 mg, Intravenous, Once, 5 of 5 cycles  Administration: 553 mg (2023), 442.4 mg (2023), 442.4 mg (2023), 442.4 mg (2023), 438 mg (8/3/2023)  pemetrexed (ALIMTA) chemo infusion, 945 mg, Intravenous, Once, 7 of 13 cycles  Dose modification: 400 mg/m2 (original dose 500 mg/m2, Cycle 3, Reason: Nausea/Vomiting, Comment: 20% dose reduction due to nausea)  Administration: 900 mg (4/20/2023), 756 mg (6/1/2023), 756 mg (6/22/2023), 756 mg (8/3/2023), 756 mg (5/11/2023), 756 mg (7/13/2023), 756 mg (8/24/2023)  pembrolizumab (KEYTRUDA) IVPB, 200 mg, Intravenous, Once, 7 of 13 cycles  Administration: 200 mg (4/20/2023), 200 mg (6/1/2023), 200 mg (6/22/2023), 200 mg (8/3/2023), 200 mg (5/11/2023), 200 mg (7/13/2023), 200 mg (8/24/2023)     6/9/2023 -  Cancer Staged    Staging form: Lung, AJCC 8th Edition  - Clinical: Stage ALESHA (cT1c, cN2, cM1a) - Signed by Mariano Jiang DO on 6/9/2023           Interval History: Patient presents for follow up of her stage IV non-small cell lung cancer. We added back in carboplatin for the last cycle but this did not seem to have an impact on her output from her pleural catheter. Is still running around 500 cc when it is not drained. She is also noticed some dry scratchy feeling in her eyes. For now rewetting drops have been helping. Labs prior to this visit show a slightly low sodium of 132. Her hemoglobin is 10.7. All other labs are normal.  She did have increased fatigue with the addition back of carboplatin at last cycle. REVIEW OF SYSTEMS:  Please note that a 14-point review of systems was performed to include Constitutional, HEENT, Respiratory, CVS, GI, , Musculoskeletal, Integumentary, Neurologic, Rheumatologic, Endocrinologic, Psychiatric, Lymphatic, and Hematologic/Oncologic systems were reviewed and are negative unless otherwise stated in HPI. Positive and negative findings pertinent to this evaluation are incorporated into the history of present illness.       ECOG PS: 0    PROBLEM LIST:  Patient Active Problem List   Diagnosis   • Impingement syndrome of left shoulder   • Closed fracture of multiple ribs of left side   • Cigarette nicotine dependence in remission   • COPD (chronic obstructive pulmonary disease) ([address])   • Hypercholesterolemia   • Essential hypertension   • Non-small cell cancer of right lung (HCC)   • Malignant pleural effusion   • CINV (chemotherapy-induced nausea and vomiting)   • Palliative care patient   • Hyponatremia       Assessment / Plan: 43-year-old female with stage IV non-small cell lung cancer. I am can remove the carboplatin from the cycle and get her back on a maintenance schedule of Alimta and pembrolizumab. The carboplatin adds a lot to fatigue and did not significantly impact her output after her last cycle with her pleural fluid. I will let her go 6 weeks without a visit and I will see her prior to her chemo on October 5. She knows that if she is having any issues in the interim she can certainly call us. The irritated sensation that she is having could be conjunctivitis from the pembrolizumab. It is mild right now and relieved with rewetting drops but I told her if it bothers her more we can try dexamethasone eyedrop. She will let me know. I will plan on doing another PET scan before her chemotherapy at the end of October right now plan for October 26. I spent 30 minutes on chart review, face to face counseling time, coordination of care and documentation. Past Medical History:   has a past medical history of Cancer ([address]), COPD (chronic obstructive pulmonary disease) ([address]), Emphysema of lung ([address]), Hyperlipidemia, Hypertension, Lung cancer ([address]) (06/26/2019), Lung nodule, and Pleural effusion. PAST SURGICAL HISTORY:   has a past surgical history that includes Appendectomy; Colon surgery; Lung lobectomy; Hysterectomy; Oophorectomy (Bilateral); Breast biopsy (Right); IR thoracentesis (03/24/2023); IR thoracentesis (04/12/2023); IR thoracentesis (04/18/2023); IR port placement (04/18/2023); IR pleural effusion long-term catheter placement (04/25/2023); Bronchoscopy (06/2019); and Lung biopsy (06/2019).     CURRENT MEDICATIONS  Current Outpatient Medications Medication Sig Dispense Refill   • albuterol (2.5 mg/3 mL) 0.083 % nebulizer solution Take 3 mL (2.5 mg total) by nebulization every 6 (six) hours as needed for wheezing or shortness of breath 270 mL 4   • albuterol (PROVENTIL HFA,VENTOLIN HFA) 90 mcg/act inhaler Inhale 2 puffs every 4 (four) hours  3   • Anoro Ellipta 62.5-25 MCG/ACT inhaler Inhale 1 puff daily 1 puff once daily 90 blister 3   • Ascorbic Acid (vitamin C) 1000 MG tablet 1 daily     • atorvastatin (LIPITOR) 20 mg tablet Take 20 mg by mouth daily. • Biotin 18755 MCG TABS 1 daily     • buPROPion (WELLBUTRIN XL) 300 mg 24 hr tablet TAKE 1 TABLET BY MOUTH EVERY DAY IN THE MORNING FOR 90 DAYS     • Cholecalciferol (Vitamin D3) 25 MCG (1000 UT) CAPS 1 capsule every 24 hours     • dexamethasone sodium phosphate 0.1 % ophthalmic solution Administer 1 drop to both eyes every 3 (three) hours as needed (eye irritation) 5 mL 5   • diphenhydrAMINE (BENADRYL) 50 MG tablet Take 1 tablet (50 mg total) by mouth every 6 (six) hours as needed for itching (Rash) 30 tablet 0   • diphenhydrAMINE-APAP, sleep, (TYLENOL PM EXTRA STRENGTH PO) Take 325 mg by mouth daily at bedtime     • famotidine (PEPCID) 20 mg tablet Take 20 mg by mouth daily. Indications: Heartburn     • folic acid (KP Folic Acid) 1 mg tablet Take 1 tablet (1 mg total) by mouth daily 90 tablet 1   • furosemide (LASIX) 20 mg tablet Take 1 tablet (20 mg total) by mouth every morning 90 tablet 1   • lidocaine-prilocaine (EMLA) cream Apply to port 30 to 60 minutes prior to use 30 g 3   • LORazepam (ATIVAN) 1 mg tablet 1/2 TABLET TWICE A DAY AS NEEDED FOR ANXIETY ORALLY 30 DAYS  0   • Multiple Vitamin (MULTIVITAMINS PO) every 24 hours     • potassium chloride (K-DUR,KLOR-CON) 20 mEq tablet Take 1 tablet (20 mEq total) by mouth daily 30 tablet 1   • Probiotic Product (PROBIOTIC DAILY PO) 1 daily     • Sennosides (Senna) 8.6 MG CAPS Take 2 capsules by mouth as needed (constipation).  Indications: Constipation • sertraline (ZOLOFT) 100 mg tablet 2 daily     • telmisartan (MICARDIS) 80 MG tablet Take 1 tablet (80 mg total) by mouth daily 90 tablet 1   • verapamil (CALAN-SR) 120 mg CR tablet Take 1 tablet (120 mg total) by mouth daily at bedtime 1 hs 90 tablet 3   • verapamil (CALAN-SR) 240 mg CR tablet Take 1 tablet (240 mg total) by mouth daily at bedtime 1 hs 90 tablet 3   • dexamethasone (DECADRON) 4 mg tablet TAKE 1 TABLET (4 MG TOTAL) BY MOUTH 2 (TWO) TIMES A DAY WITH MEALS TAKE 1 TABLET BY MOUTH THE DAY BEFORE, THE DAY OF, AND THE DAY AFTER CHEMOTHERAPY. 6 tablet 6   • EPINEPHrine (EPIPEN) 0.3 mg/0.3 mL SOAJ Inject 0.3 mL (0.3 mg total) into a muscle once for 1 dose 0.6 mL 0   • ondansetron (ZOFRAN) 8 mg tablet Take 1 tablet (8 mg total) by mouth every 8 (eight) hours as needed for nausea or vomiting 30 tablet 0   • prochlorperazine (COMPAZINE) 10 mg tablet Take 1 tablet (10 mg total) by mouth every 6 (six) hours as needed for nausea (Patient not taking: Reported on 8/22/2023) 30 tablet 3     No current facility-administered medications for this visit. Facility-Administered Medications Ordered in Other Visits   Medication Dose Route Frequency Provider Last Rate Last Admin   • alteplase (CATHFLO) injection 2 mg  2 mg Intracatheter Q1MIN DARRYLN Víctor Francisco DO         [unfilled]    SOCIAL HISTORY:   reports that she quit smoking about 5 months ago. Her smoking use included cigarettes. She started smoking about 53 years ago. She has a 75.00 pack-year smoking history. She has never used smokeless tobacco. She reports that she does not currently use alcohol. She reports that she does not use drugs.      FAMILY HISTORY:  family history includes Arthritis in her mother; COPD in her mother; Cancer in her maternal aunt, maternal aunt, maternal grandmother, and mother; Colon cancer in her maternal aunt, maternal aunt, maternal grandmother, and mother; Depression in her mother; Diabetes in her maternal grandmother; Drug abuse in her brother; Hearing loss in her mother; Hyperlipidemia in her mother; Hypertension in her mother; Lung cancer in her mother. ALLERGIES:  is allergic to amoxicillin, vilazodone hcl, wasp venom, and zithromax [azithromycin]. Physical Exam:  Vital Signs:   Visit Vitals  /76 (BP Location: Left arm, Patient Position: Sitting, Cuff Size: Standard)   Pulse 81   Temp 98 °F (36.7 °C) (Temporal)   Resp 16   Ht 5' 2.9" (1.598 m)   Wt 85.8 kg (189 lb 3.2 oz)   SpO2 97%   BMI 33.62 kg/m²   OB Status Hysterectomy   Smoking Status Former   BSA 1.89 m²     Body mass index is 33.62 kg/m². Body surface area is 1.89 meters squared. GEN: Alert, awake oriented x3, in no acute distress  HEENT- No pallor, icterus, cyanosis, no oral mucosal lesions,   LAD - no palpable cervical, clavicle, axillary, inguinal LAD  Heart- normal S1 S2, regular rate and rhythm, No murmur, rubs.    Lungs- clear breathing sound bilateral.   Abdomen- soft, Non tender, bowel sounds present  Extremities- No cyanosis, clubbing, edema  Neuro- No focal neurological deficit    Labs:  Lab Results   Component Value Date    WBC 4.54 08/21/2023    HGB 10.7 (L) 08/21/2023    HCT 31.7 (L) 08/21/2023    MCV 98 08/21/2023     08/21/2023     Lab Results   Component Value Date    SODIUM 132 (L) 08/21/2023    K 3.5 08/21/2023     08/21/2023    CO2 28 08/21/2023    AGAP 4 08/21/2023    BUN 8 08/21/2023    CREATININE 0.50 (L) 08/21/2023    GLUC 105 08/21/2023    GLUF 102 (H) 07/31/2023    CALCIUM 9.3 08/21/2023    AST 21 08/21/2023    ALT 22 08/21/2023    ALKPHOS 100 08/21/2023    TP 6.8 08/21/2023    TBILI 0.36 08/21/2023    EGFR 101 08/21/2023

## 2023-08-28 ENCOUNTER — TELEPHONE (OUTPATIENT)
Dept: HEMATOLOGY ONCOLOGY | Facility: CLINIC | Age: 63
End: 2023-08-28

## 2023-08-28 ENCOUNTER — HOME CARE VISIT (OUTPATIENT)
Dept: HOME HEALTH SERVICES | Facility: HOME HEALTHCARE | Age: 63
End: 2023-08-28
Payer: COMMERCIAL

## 2023-08-28 VITALS
RESPIRATION RATE: 18 BRPM | HEART RATE: 99 BPM | OXYGEN SATURATION: 96 % | DIASTOLIC BLOOD PRESSURE: 68 MMHG | SYSTOLIC BLOOD PRESSURE: 138 MMHG

## 2023-08-28 PROCEDURE — 400014 VN F/U

## 2023-08-28 PROCEDURE — G0299 HHS/HOSPICE OF RN EA 15 MIN: HCPCS

## 2023-08-28 NOTE — TELEPHONE ENCOUNTER
Lvm on answering machine that additional cycles have been scheduled and updated on [facility] [employer]. Patient has my teams number if rescheduling is needed.

## 2023-08-31 PROCEDURE — G0179 MD RECERTIFICATION HHA PT: HCPCS | Performed by: INTERNAL MEDICINE

## 2023-09-01 ENCOUNTER — HOME CARE VISIT (OUTPATIENT)
Dept: HOME HEALTH SERVICES | Facility: HOME HEALTHCARE | Age: 63
End: 2023-09-01
Payer: COMMERCIAL

## 2023-09-01 VITALS
DIASTOLIC BLOOD PRESSURE: 80 MMHG | OXYGEN SATURATION: 98 % | SYSTOLIC BLOOD PRESSURE: 156 MMHG | TEMPERATURE: 97.5 F | HEART RATE: 76 BPM | RESPIRATION RATE: 18 BRPM

## 2023-09-01 PROCEDURE — G0299 HHS/HOSPICE OF RN EA 15 MIN: HCPCS

## 2023-09-05 ENCOUNTER — HOSPITAL ENCOUNTER (OUTPATIENT)
Dept: RADIOLOGY | Facility: HOSPITAL | Age: 63
Discharge: HOME | End: 2023-09-05
Payer: COMMERCIAL

## 2023-09-05 ENCOUNTER — HOME CARE VISIT (OUTPATIENT)
Dept: HOME HEALTH SERVICES | Facility: HOME HEALTHCARE | Age: 63
End: 2023-09-05
Payer: COMMERCIAL

## 2023-09-05 VITALS — OXYGEN SATURATION: 96 % | HEART RATE: 76 BPM

## 2023-09-05 DIAGNOSIS — J91.0 MALIGNANT PLEURAL EFFUSION: ICD-10-CM

## 2023-09-05 DIAGNOSIS — J91.0 MALIGNANT PLEURAL EFFUSION: Primary | ICD-10-CM

## 2023-09-05 PROCEDURE — 71046 X-RAY EXAM CHEST 2 VIEWS: CPT

## 2023-09-05 PROCEDURE — G0299 HHS/HOSPICE OF RN EA 15 MIN: HCPCS

## 2023-09-05 NOTE — PROGRESS NOTES
Clarissa Jeans ([facility]) messaged because the patient had increased drainage from her ASEPT today with increased respiratory symptoms (dyspnea, cough)  O2 sats 96%  No fever  No color change to the fluid    I ordered CXR for today  Will have her come to [facility] office tomorrow for appt with me

## 2023-09-06 ENCOUNTER — HOME CARE VISIT (OUTPATIENT)
Dept: HOME HEALTH SERVICES | Facility: HOME HEALTHCARE | Age: 63
End: 2023-09-06
Payer: COMMERCIAL

## 2023-09-06 ENCOUNTER — OFFICE VISIT (OUTPATIENT)
Dept: PULMONOLOGY | Facility: CLINIC | Age: 63
End: 2023-09-06
Payer: COMMERCIAL

## 2023-09-06 ENCOUNTER — HOSPITAL ENCOUNTER (OUTPATIENT)
Dept: CT IMAGING | Facility: HOSPITAL | Age: 63
Discharge: HOME | End: 2023-09-06
Attending: INTERNAL MEDICINE
Payer: COMMERCIAL

## 2023-09-06 VITALS
HEART RATE: 90 BPM | TEMPERATURE: 99.3 F | OXYGEN SATURATION: 97 % | DIASTOLIC BLOOD PRESSURE: 70 MMHG | WEIGHT: 187 LBS | HEIGHT: 65 IN | BODY MASS INDEX: 31.16 KG/M2 | SYSTOLIC BLOOD PRESSURE: 140 MMHG

## 2023-09-06 DIAGNOSIS — J44.9 CHRONIC OBSTRUCTIVE PULMONARY DISEASE, UNSPECIFIED COPD TYPE (HCC): ICD-10-CM

## 2023-09-06 DIAGNOSIS — C34.91 NON-SMALL CELL CANCER OF RIGHT LUNG (HCC): ICD-10-CM

## 2023-09-06 DIAGNOSIS — R06.02 SHORTNESS OF BREATH: Primary | ICD-10-CM

## 2023-09-06 DIAGNOSIS — R06.02 SHORTNESS OF BREATH: ICD-10-CM

## 2023-09-06 DIAGNOSIS — J91.0 MALIGNANT PLEURAL EFFUSION: ICD-10-CM

## 2023-09-06 PROCEDURE — G1004 CDSM NDSC: HCPCS

## 2023-09-06 PROCEDURE — 71275 CT ANGIOGRAPHY CHEST: CPT

## 2023-09-06 PROCEDURE — 99215 OFFICE O/P EST HI 40 MIN: CPT | Performed by: INTERNAL MEDICINE

## 2023-09-06 RX ADMIN — IOHEXOL 85 ML: 350 INJECTION, SOLUTION INTRAVENOUS at 18:50

## 2023-09-06 NOTE — PROGRESS NOTES
Assessment:    COPD (chronic obstructive pulmonary disease) (HCC)  Clinically she is not in an exacerbation. If CT is negative for obvious source of dyspnea, and she doesn't get relief with weather change, we will do repeat PFTs and consider changing Anoro to Trelegy    Malignant pleural effusion  Would plan to continue twice weekly drainage. CXR shows great drainage. She would like to talk to Thoracic Surgery about possible pleurodesis. Shortness of breath  She has had an increase of dyspnea over the past several days. In light of cancer, her differential includes PE, pneumonitis, COPD with sudden weather change/increased humidity. We will do a CTA today to rule out PE and to look for pneumonitis. If positive will treat appropriately. If negative, will continue current inhalers, drainage and monitor for improvement      Plan:    Diagnoses and all orders for this visit:    Shortness of breath  -     CTA chest pe study; Future  -     Ambulatory referral to Thoracic Surgery; Future    Non-small cell cancer of right lung (HCC)  -     CTA chest pe study; Future  -     Ambulatory referral to Thoracic Surgery; Future    Malignant pleural effusion  -     CTA chest pe study; Future  -     Ambulatory referral to Thoracic Surgery; Future    Chronic obstructive pulmonary disease, unspecified COPD type ([address])        Follow-up: 2 months      HPI:  Here for dyspnea - that started about 4-5 days ago. Came on after her ASEPT was drained and developed chest pain. Increased pain persisted over the weekend and it was hard to take a deep breath. However she was drained yesterday (675 cc) and now can take a deep breath  Usually drains on Monday and Friday  Previously drained 3 times per week but then was down to consistently less than 500 and was having pain so went to 2 times per week. Dry and wheezy cough -  notices closer to time of drainage.   Anoro once daily  Neb x 2 - no help    Review of Systems Constitutional: Positive for activity change and appetite change. Negative for fever. HENT: Negative for ear pain, postnasal drip, rhinorrhea, sneezing, sore throat and trouble swallowing. Respiratory: Positive for cough, shortness of breath and wheezing. Cardiovascular: Negative for chest pain, palpitations and leg swelling. Musculoskeletal: Negative for myalgias. Neurological: Negative for headaches. All other systems reviewed and are negative. The following portions of the patient's history were reviewed and updated as appropriate: allergies, current medications, past family history, past medical history, past social history, past surgical history and problem list.    VITALS:  Vitals:    09/06/23 1302   BP: 140/70   BP Location: Left arm   Patient Position: Sitting   Pulse: 90   Temp: 99.3 °F (37.4 °C)   SpO2: 97%   Weight: 84.8 kg (187 lb)   Height: 5' 5" (1.651 m)       Physical Exam  General:  Patient is awake, alert, non-toxic and in no acute respiratory distress  Neck: No JVD  CV:  Regular, +S1 and S2, No murmurs, gallops or rubs appreciated  Lungs: Decreased BS at right base  Abdomen: Soft, +BS, Non-tender, non-distended  Extremities: No clubbing, cyanosis or edema  Neuro: No focal deficits        Diagnostic Testing:    CBC:  Lab Results   Component Value Date    WBC 4.54 08/21/2023    HGB 10.7 (L) 08/21/2023    HCT 31.7 (L) 08/21/2023    MCV 98 08/21/2023     08/21/2023         BMP:   Lab Results   Component Value Date    K 3.5 08/21/2023     08/21/2023    CO2 28 08/21/2023    BUN 8 08/21/2023    CREATININE 0.50 (L) 08/21/2023    GLUCOSE 95 08/19/2019    GLUF 102 (H) 07/31/2023    CALCIUM 9.3 08/21/2023    CORRECTEDCA 9.3 04/24/2023    AST 21 08/21/2023    ALT 22 08/21/2023    ALKPHOS 100 08/21/2023    EGFR 101 08/21/2023         Imaging studies:  I have personally reviewed pertinent reports.    and I have personally reviewed pertinent films in PACS  CXR - 9/5/23 - persistent right base elevation, ASEPT pleural catheter in place, no pneumothorax, no significant change      Nicolás Coyle, DO  Answers for HPI/ROS submitted by the patient on 9/5/2023  Do you have chest tightness?: Yes  Do you experience frequent throat clearing?: Yes  Chronicity: new  When did you first notice your symptoms?: in the past 7 days  How often do your symptoms occur?: constantly  Since you first noticed this problem, how has it changed?: gradually worsening  Do you have shortness of breath that occurs with effort or exertion?: Yes  Do you have ear congestion?: No  Do you have heartburn?: No  Do you have fatigue?: Yes  Do you have nasal congestion?: No  Do you have shortness of breath when lying flat?: No  Do you have shortness of breath when you wake up?: No  Do you have sweats?: No  Have you experienced weight loss?: No  Which of the following makes your symptoms worse?: minimal activity  Which of the following makes your symptoms better?: nothing  Risk factors for lung disease: smoking/tobacco exposure

## 2023-09-06 NOTE — ASSESSMENT & PLAN NOTE
Clinically she is not in an exacerbation.   If CT is negative for obvious source of dyspnea, and she doesn't get relief with weather change, we will do repeat PFTs and consider changing Anoro to Trelegy

## 2023-09-06 NOTE — ASSESSMENT & PLAN NOTE
Would plan to continue twice weekly drainage. CXR shows great drainage. She would like to talk to Thoracic Surgery about possible pleurodesis.

## 2023-09-06 NOTE — ASSESSMENT & PLAN NOTE
She has had an increase of dyspnea over the past several days. In light of cancer, her differential includes PE, pneumonitis, COPD with sudden weather change/increased humidity. We will do a CTA today to rule out PE and to look for pneumonitis. If positive will treat appropriately.   If negative, will continue current inhalers, drainage and monitor for improvement

## 2023-09-06 NOTE — LETTER
September 11, 2023     Mau Hanson, [address] [place]  [place] [id number]    Patient: Waldo Suarez   YOB: 1960   Date of Visit: 9/6/2023       Dear Dr. Franky Roland: Thank you for referring Ellie Bustamante to me for evaluation. Below are my notes for this consultation. If you have questions, please do not hesitate to call me. I look forward to following your patient along with you. Sincerely,        Little Tay, DO        CC: Steve Millan, DO Little Tay DO  9/6/2023  5:20 PM  Sign when Signing Visit  Assessment:    COPD (chronic obstructive pulmonary disease) ([address])  Clinically she is not in an exacerbation. If CT is negative for obvious source of dyspnea, and she doesn't get relief with weather change, we will do repeat PFTs and consider changing Anoro to Trelegy    Malignant pleural effusion  Would plan to continue twice weekly drainage. CXR shows great drainage. She would like to talk to Thoracic Surgery about possible pleurodesis. Shortness of breath  She has had an increase of dyspnea over the past several days. In light of cancer, her differential includes PE, pneumonitis, COPD with sudden weather change/increased humidity. We will do a CTA today to rule out PE and to look for pneumonitis. If positive will treat appropriately. If negative, will continue current inhalers, drainage and monitor for improvement      Plan:    Diagnoses and all orders for this visit:    Shortness of breath  -     CTA chest pe study; Future  -     Ambulatory referral to Thoracic Surgery; Future    Non-small cell cancer of right lung (HCC)  -     CTA chest pe study; Future  -     Ambulatory referral to Thoracic Surgery; Future    Malignant pleural effusion  -     CTA chest pe study; Future  -     Ambulatory referral to Thoracic Surgery;  Future    Chronic obstructive pulmonary disease, unspecified COPD type ([address])        Follow-up: 2 months      HPI:  Here for dyspnea - that started about 4-5 days ago. Came on after her ASEPT was drained and developed chest pain. Increased pain persisted over the weekend and it was hard to take a deep breath. However she was drained yesterday (675 cc) and now can take a deep breath  Usually drains on Monday and Friday  Previously drained 3 times per week but then was down to consistently less than 500 and was having pain so went to 2 times per week. Dry and wheezy cough -  notices closer to time of drainage. Anoro once daily  Neb x 2 - no help    Review of Systems   Constitutional: Positive for activity change and appetite change. Negative for fever. HENT: Negative for ear pain, postnasal drip, rhinorrhea, sneezing, sore throat and trouble swallowing. Respiratory: Positive for cough, shortness of breath and wheezing. Cardiovascular: Negative for chest pain, palpitations and leg swelling. Musculoskeletal: Negative for myalgias. Neurological: Negative for headaches.        The following portions of the patient's history were reviewed and updated as appropriate: allergies, current medications, past family history, past medical history, past social history, past surgical history and problem list.    VITALS:  Vitals:    09/06/23 1302   BP: 140/70   BP Location: Left arm   Patient Position: Sitting   Pulse: 90   Temp: 99.3 °F (37.4 °C)   SpO2: 97%   Weight: 84.8 kg (187 lb)   Height: 5' 5" (1.651 m)       Physical Exam  General:  Patient is awake, alert, non-toxic and in no acute respiratory distress  Neck: No JVD  CV:  Regular, +S1 and S2, No murmurs, gallops or rubs appreciated  Lungs: Decreased BS at right base  Abdomen: Soft, +BS, Non-tender, non-distended  Extremities: No clubbing, cyanosis or edema  Neuro: No focal deficits        Diagnostic Testing:    CBC:  Lab Results   Component Value Date    WBC 4.54 08/21/2023    HGB 10.7 (L) 08/21/2023    HCT 31.7 (L) 08/21/2023    MCV 98 08/21/2023     08/21/2023         BMP: Lab Results   Component Value Date    K 3.5 08/21/2023     08/21/2023    CO2 28 08/21/2023    BUN 8 08/21/2023    CREATININE 0.50 (L) 08/21/2023    GLUCOSE 95 08/19/2019    GLUF 102 (H) 07/31/2023    CALCIUM 9.3 08/21/2023    CORRECTEDCA 9.3 04/24/2023    AST 21 08/21/2023    ALT 22 08/21/2023    ALKPHOS 100 08/21/2023    EGFR 101 08/21/2023         Imaging studies:  I have personally reviewed pertinent reports.    and I have personally reviewed pertinent films in PACS  CXR - 9/5/23 - persistent right base elevation, ASEPT pleural catheter in place, no pneumothorax, no significant change      Bo Martinez, DO  Answers for HPI/ROS submitted by the patient on 9/5/2023  Do you have chest tightness?: Yes  Do you experience frequent throat clearing?: Yes  Chronicity: new  When did you first notice your symptoms?: in the past 7 days  How often do your symptoms occur?: constantly  Since you first noticed this problem, how has it changed?: gradually worsening  Do you have shortness of breath that occurs with effort or exertion?: Yes  Do you have ear congestion?: No  Do you have heartburn?: No  Do you have fatigue?: Yes  Do you have nasal congestion?: No  Do you have shortness of breath when lying flat?: No  Do you have shortness of breath when you wake up?: No  Do you have sweats?: No  Have you experienced weight loss?: No  Which of the following makes your symptoms worse?: minimal activity  Which of the following makes your symptoms better?: nothing  Risk factors for lung disease: smoking/tobacco exposure

## 2023-09-07 DIAGNOSIS — J44.9 CHRONIC OBSTRUCTIVE PULMONARY DISEASE, UNSPECIFIED COPD TYPE (HCC): ICD-10-CM

## 2023-09-07 DIAGNOSIS — C34.91 NON-SMALL CELL CANCER OF RIGHT LUNG (HCC): ICD-10-CM

## 2023-09-07 DIAGNOSIS — I26.99 PULMONARY EMBOLISM, OTHER, UNSPECIFIED CHRONICITY, UNSPECIFIED WHETHER ACUTE COR PULMONALE PRESENT (HCC): Primary | ICD-10-CM

## 2023-09-07 RX ORDER — FLUTICASONE FUROATE, UMECLIDINIUM BROMIDE AND VILANTEROL TRIFENATATE 200; 62.5; 25 UG/1; UG/1; UG/1
1 POWDER RESPIRATORY (INHALATION) DAILY
Qty: 60 BLISTER | Refills: 0 | Status: SHIPPED | OUTPATIENT
Start: 2023-09-07 | End: 2023-09-08

## 2023-09-07 RX ORDER — LIDOCAINE AND PRILOCAINE 25; 25 MG/G; MG/G
CREAM TOPICAL
Qty: 30 G | Refills: 3 | Status: SHIPPED | OUTPATIENT
Start: 2023-09-07

## 2023-09-08 ENCOUNTER — TELEPHONE (OUTPATIENT)
Dept: HEMATOLOGY ONCOLOGY | Facility: CLINIC | Age: 63
End: 2023-09-08

## 2023-09-08 ENCOUNTER — HOME CARE VISIT (OUTPATIENT)
Dept: HOME HEALTH SERVICES | Facility: HOME HEALTHCARE | Age: 63
End: 2023-09-08
Payer: COMMERCIAL

## 2023-09-08 VITALS
RESPIRATION RATE: 20 BRPM | SYSTOLIC BLOOD PRESSURE: 160 MMHG | DIASTOLIC BLOOD PRESSURE: 80 MMHG | HEART RATE: 60 BPM | TEMPERATURE: 97.6 F | OXYGEN SATURATION: 97 %

## 2023-09-08 DIAGNOSIS — J44.9 CHRONIC OBSTRUCTIVE PULMONARY DISEASE, UNSPECIFIED COPD TYPE (HCC): Primary | ICD-10-CM

## 2023-09-08 DIAGNOSIS — C34.91 NON-SMALL CELL CANCER OF RIGHT LUNG (HCC): ICD-10-CM

## 2023-09-08 DIAGNOSIS — E87.6 HYPOKALEMIA: ICD-10-CM

## 2023-09-08 PROCEDURE — G0299 HHS/HOSPICE OF RN EA 15 MIN: HCPCS

## 2023-09-08 RX ORDER — FLUTICASONE PROPIONATE AND SALMETEROL 500; 50 UG/1; UG/1
1 POWDER RESPIRATORY (INHALATION) 2 TIMES DAILY
Qty: 180 BLISTER | Refills: 3 | Status: SHIPPED | OUTPATIENT
Start: 2023-09-08 | End: 2023-12-07

## 2023-09-08 RX ORDER — POTASSIUM CHLORIDE 1500 MG/1
20 TABLET, EXTENDED RELEASE ORAL DAILY
Qty: 30 TABLET | Refills: 1 | Status: SHIPPED | OUTPATIENT
Start: 2023-09-08

## 2023-09-08 NOTE — TELEPHONE ENCOUNTER
I called Mago Chen in response to a referral that was received for patient to establish care with Thoracic Surgery. Outreach was made to complete patient's intake questionnaire . I left a voicemail explaining the reason for my call and advised patient to call \[facility]\"" at [phone]. Another attempt will be made to contact patient.

## 2023-09-11 ENCOUNTER — HOSPITAL ENCOUNTER (OUTPATIENT)
Dept: INFUSION CENTER | Facility: HOSPITAL | Age: 63
Discharge: HOME | End: 2023-09-11
Payer: COMMERCIAL

## 2023-09-11 ENCOUNTER — HOME CARE VISIT (OUTPATIENT)
Dept: HOME HEALTH SERVICES | Facility: HOME HEALTHCARE | Age: 63
End: 2023-09-11
Payer: COMMERCIAL

## 2023-09-11 VITALS
DIASTOLIC BLOOD PRESSURE: 70 MMHG | TEMPERATURE: 98.4 F | RESPIRATION RATE: 16 BRPM | SYSTOLIC BLOOD PRESSURE: 128 MMHG | OXYGEN SATURATION: 98 % | HEART RATE: 92 BPM

## 2023-09-11 DIAGNOSIS — E87.1 HYPONATREMIA: Primary | ICD-10-CM

## 2023-09-11 DIAGNOSIS — E78.00 HYPERCHOLESTEROLEMIA: ICD-10-CM

## 2023-09-11 DIAGNOSIS — C34.91 NON-SMALL CELL CANCER OF RIGHT LUNG (HCC): Primary | ICD-10-CM

## 2023-09-11 DIAGNOSIS — I10 ESSENTIAL HYPERTENSION: ICD-10-CM

## 2023-09-11 LAB
ALBUMIN SERPL BCP-MCNC: 3.8 G/DL (ref 3.5–5)
ALP SERPL-CCNC: 102 U/L (ref 34–104)
ALT SERPL W P-5'-P-CCNC: 19 U/L (ref 7–52)
ANION GAP SERPL CALCULATED.3IONS-SCNC: 7 MMOL/L
AST SERPL W P-5'-P-CCNC: 22 U/L (ref 13–39)
BASOPHILS # BLD AUTO: 0.01 THOUSANDS/ÂΜL (ref 0–0.1)
BASOPHILS NFR BLD AUTO: 0 % (ref 0–1)
BILIRUB SERPL-MCNC: 0.35 MG/DL (ref 0.2–1)
BUN SERPL-MCNC: 6 MG/DL (ref 5–25)
CALCIUM SERPL-MCNC: 9.3 MG/DL (ref 8.4–10.2)
CHLORIDE SERPL-SCNC: 100 MMOL/L (ref 96–108)
CHOLEST SERPL-MCNC: 179 MG/DL
CO2 SERPL-SCNC: 25 MMOL/L (ref 21–32)
CREAT SERPL-MCNC: 0.48 MG/DL (ref 0.6–1.3)
EOSINOPHIL # BLD AUTO: 0.02 THOUSAND/ÂΜL (ref 0–0.61)
EOSINOPHIL NFR BLD AUTO: 0 % (ref 0–6)
ERYTHROCYTE [DISTWIDTH] IN BLOOD BY AUTOMATED COUNT: 13.6 % (ref 11.6–15.1)
GFR SERPL CREATININE-BSD FRML MDRD: 103 ML/MIN/1.73SQ M
GLUCOSE P FAST SERPL-MCNC: 105 MG/DL (ref 65–99)
GLUCOSE SERPL-MCNC: 105 MG/DL (ref 65–140)
HCT VFR BLD AUTO: 31 % (ref 34.8–46.1)
HDLC SERPL-MCNC: 47 MG/DL
HGB BLD-MCNC: 10.1 G/DL (ref 11.5–15.4)
IMM GRANULOCYTES # BLD AUTO: 0.06 THOUSAND/UL (ref 0–0.2)
IMM GRANULOCYTES NFR BLD AUTO: 1 % (ref 0–2)
LDLC SERPL CALC-MCNC: 98 MG/DL (ref 0–100)
LYMPHOCYTES # BLD AUTO: 0.52 THOUSANDS/ÂΜL (ref 0.6–4.47)
LYMPHOCYTES NFR BLD AUTO: 8 % (ref 14–44)
MCH RBC QN AUTO: 32.9 PG (ref 26.8–34.3)
MCHC RBC AUTO-ENTMCNC: 32.6 G/DL (ref 31.4–37.4)
MCV RBC AUTO: 101 FL (ref 82–98)
MONOCYTES # BLD AUTO: 0.74 THOUSAND/ÂΜL (ref 0.17–1.22)
MONOCYTES NFR BLD AUTO: 11 % (ref 4–12)
NEUTROPHILS # BLD AUTO: 5.14 THOUSANDS/ÂΜL (ref 1.85–7.62)
NEUTS SEG NFR BLD AUTO: 80 % (ref 43–75)
NONHDLC SERPL-MCNC: 132 MG/DL
NRBC BLD AUTO-RTO: 0 /100 WBCS
PLATELET # BLD AUTO: 426 THOUSANDS/UL (ref 149–390)
PMV BLD AUTO: 8.4 FL (ref 8.9–12.7)
POTASSIUM SERPL-SCNC: 3.9 MMOL/L (ref 3.5–5.3)
PROT SERPL-MCNC: 6.8 G/DL (ref 6.4–8.4)
RBC # BLD AUTO: 3.07 MILLION/UL (ref 3.81–5.12)
SODIUM SERPL-SCNC: 132 MMOL/L (ref 135–147)
T3FREE SERPL-MCNC: 2.33 PG/ML (ref 2.5–3.9)
TRIGL SERPL-MCNC: 171 MG/DL
TSH SERPL DL<=0.05 MIU/L-ACNC: 1.96 UIU/ML (ref 0.45–4.5)
WBC # BLD AUTO: 6.49 THOUSAND/UL (ref 4.31–10.16)

## 2023-09-11 PROCEDURE — 84481 FREE ASSAY (FT-3): CPT | Performed by: INTERNAL MEDICINE

## 2023-09-11 PROCEDURE — G0300 HHS/HOSPICE OF LPN EA 15 MIN: HCPCS

## 2023-09-11 PROCEDURE — 80053 COMPREHEN METABOLIC PANEL: CPT | Performed by: INTERNAL MEDICINE

## 2023-09-11 PROCEDURE — 84443 ASSAY THYROID STIM HORMONE: CPT | Performed by: INTERNAL MEDICINE

## 2023-09-11 PROCEDURE — 80061 LIPID PANEL: CPT

## 2023-09-11 PROCEDURE — 85025 COMPLETE CBC W/AUTO DIFF WBC: CPT | Performed by: INTERNAL MEDICINE

## 2023-09-11 RX ORDER — ROSUVASTATIN CALCIUM 5 MG/1
5 TABLET, COATED ORAL DAILY
Qty: 90 TABLET | Refills: 3 | Status: SHIPPED | OUTPATIENT
Start: 2023-09-11 | End: 2023-09-11

## 2023-09-11 NOTE — RESULT ENCOUNTER NOTE
Call patient: Palak Blow that her bad cholesterol is higher that it should be. She's at intermediate risk for having a heart attack or stroke in the next 10 yrs, her risk is 7.5% at the low end of intermediate risk range. She could try low fat diet or I could prescribe a statin medication for her. Other labs showed that her sodium was 132, still decreased. I ordered a serum and urine osmolarity test to further assess the hyponatremia.

## 2023-09-11 NOTE — PROGRESS NOTES
Pt arrived amb for port labs. Also needs labs for PCP. Port accessed, labs obtained, de-accessed, dsd applied. Disch amb to home, steady gait.

## 2023-09-12 ENCOUNTER — HOSPITAL ENCOUNTER (OUTPATIENT)
Dept: NON INVASIVE DIAGNOSTICS | Facility: HOSPITAL | Age: 63
Discharge: HOME | End: 2023-09-12
Payer: COMMERCIAL

## 2023-09-12 DIAGNOSIS — I26.99 PULMONARY EMBOLISM, OTHER, UNSPECIFIED CHRONICITY, UNSPECIFIED WHETHER ACUTE COR PULMONALE PRESENT (HCC): ICD-10-CM

## 2023-09-12 PROCEDURE — 93970 EXTREMITY STUDY: CPT | Performed by: SURGERY

## 2023-09-12 PROCEDURE — 93970 EXTREMITY STUDY: CPT

## 2023-09-14 ENCOUNTER — PATIENT OUTREACH (OUTPATIENT)
Dept: HEMATOLOGY ONCOLOGY | Facility: CLINIC | Age: 63
End: 2023-09-14

## 2023-09-14 ENCOUNTER — HOSPITAL ENCOUNTER (OUTPATIENT)
Dept: INFUSION CENTER | Facility: HOSPITAL | Age: 63
Discharge: HOME | End: 2023-09-14
Attending: INTERNAL MEDICINE
Payer: COMMERCIAL

## 2023-09-14 VITALS
BODY MASS INDEX: 31.4 KG/M2 | RESPIRATION RATE: 16 BRPM | TEMPERATURE: 98.8 F | HEART RATE: 89 BPM | HEIGHT: 65 IN | OXYGEN SATURATION: 96 % | SYSTOLIC BLOOD PRESSURE: 143 MMHG | WEIGHT: 188.49 LBS | DIASTOLIC BLOOD PRESSURE: 70 MMHG

## 2023-09-14 DIAGNOSIS — C34.91 NON-SMALL CELL CANCER OF RIGHT LUNG (HCC): Primary | ICD-10-CM

## 2023-09-14 PROCEDURE — 96367 TX/PROPH/DG ADDL SEQ IV INF: CPT

## 2023-09-14 PROCEDURE — 96413 CHEMO IV INFUSION 1 HR: CPT

## 2023-09-14 PROCEDURE — 96372 THER/PROPH/DIAG INJ SC/IM: CPT

## 2023-09-14 PROCEDURE — 96417 CHEMO IV INFUS EACH ADDL SEQ: CPT

## 2023-09-14 RX ORDER — SODIUM CHLORIDE 9 MG/ML
20 INJECTION, SOLUTION INTRAVENOUS ONCE
Status: COMPLETED | OUTPATIENT
Start: 2023-09-14 | End: 2023-09-14

## 2023-09-14 RX ORDER — CYANOCOBALAMIN 1000 UG/ML
1000 INJECTION, SOLUTION INTRAMUSCULAR; SUBCUTANEOUS ONCE
Status: COMPLETED | OUTPATIENT
Start: 2023-09-14 | End: 2023-09-14

## 2023-09-14 RX ADMIN — DEXAMETHASONE SODIUM PHOSPHATE: 10 INJECTION, SOLUTION INTRAMUSCULAR; INTRAVENOUS at 09:16

## 2023-09-14 RX ADMIN — CYANOCOBALAMIN 1000 MCG: 1000 INJECTION, SOLUTION INTRAMUSCULAR at 09:39

## 2023-09-14 RX ADMIN — SODIUM CHLORIDE 20 ML/HR: 0.9 INJECTION, SOLUTION INTRAVENOUS at 09:16

## 2023-09-14 RX ADMIN — SODIUM CHLORIDE 200 MG: 9 INJECTION, SOLUTION INTRAVENOUS at 09:41

## 2023-09-14 RX ADMIN — SODIUM CHLORIDE 756 MG: 9 INJECTION, SOLUTION INTRAVENOUS at 10:38

## 2023-09-14 NOTE — PROGRESS NOTES
MSW met with the pt and her spouse in the infusion center this day. The pt was pleasant and open to conversation. She shared that they were unable to take their grandson to the beach as they had planned and how disappointed she felt over this. The pt explained that she was not able to be without the VNA as she continues to have fluid draining and needs to have them visit a few times per week. As a result, she did not feel safe leaving the area. The pt has an appointment with Thoracic Surgery, to learn if she is a candidate for surgery which could prevent her from needing the catheters for the fluid. During this conversation, the pt did share that she has been feeling a "bit more down" as her diagnosis has started to "sink in." She realizes that she was not fully realistic of her diagnosis in the beginning and now that she is, there are days when she feels quite sad. MSW spent time listening and validated her feelings. She expressed frustration, at times, with her , who she described as "incredibly positive" and doesn't like her to be showing any signs of sadness. MSW acknowledged his role as the caregiver and suggested the pt the pt limit herself the time she is feeling sad. An example given was if she is having a sad day, allow herself time to feel those feelings but limit it to a 2 hour maximum. The pt's spouse was encouraged to give the pt this time before he attempted to cheer her up with his jokes. They were both in agreement with this plan. Pt spoke of her grandsons and how they are her "reason for doing all of this."  MSW provided the pt the [facility] calendar and encouraged her to try a support group. Pt appeared interested and was going to consider. Emotional support offered and MSW will continue to follow. Detail Level: Detailed

## 2023-09-14 NOTE — PROGRESS NOTES
Port easily accessed, infusions tolerated well. Pt discharged with steady gait. AVS declined in favor of MyChart.

## 2023-09-15 ENCOUNTER — HOME CARE VISIT (OUTPATIENT)
Dept: HOME HEALTH SERVICES | Facility: HOME HEALTHCARE | Age: 63
End: 2023-09-15
Payer: COMMERCIAL

## 2023-09-15 VITALS
TEMPERATURE: 97.8 F | DIASTOLIC BLOOD PRESSURE: 66 MMHG | RESPIRATION RATE: 18 BRPM | HEART RATE: 89 BPM | OXYGEN SATURATION: 97 % | SYSTOLIC BLOOD PRESSURE: 120 MMHG

## 2023-09-15 PROCEDURE — G0299 HHS/HOSPICE OF RN EA 15 MIN: HCPCS

## 2023-09-17 NOTE — PROGRESS NOTES
I called and spoke with the patient  She is doing ok - still with some increased dyspnea compared to her previous baseline  She gets some discomfort at the end of her drainage  This week 650cc x 2    We discussed going back to draining 3x/week to see if this would help her breathing  She has an appt with Dr Akilah Fernandes tomorrow to discuss pleurodesis.    She will let me know their plan and if continuing with ASEPT then will increase to TIW

## 2023-09-18 ENCOUNTER — HOME CARE VISIT (OUTPATIENT)
Dept: HOME HEALTH SERVICES | Facility: HOME HEALTHCARE | Age: 63
End: 2023-09-18
Payer: COMMERCIAL

## 2023-09-18 ENCOUNTER — OFFICE VISIT (OUTPATIENT)
Dept: CARDIAC SURGERY | Facility: CLINIC | Age: 63
End: 2023-09-18
Payer: COMMERCIAL

## 2023-09-18 ENCOUNTER — DOCUMENTATION (OUTPATIENT)
Dept: CARDIAC SURGERY | Facility: CLINIC | Age: 63
End: 2023-09-18

## 2023-09-18 VITALS
RESPIRATION RATE: 18 BRPM | HEART RATE: 64 BPM | OXYGEN SATURATION: 96 % | TEMPERATURE: 97.6 F | SYSTOLIC BLOOD PRESSURE: 122 MMHG | DIASTOLIC BLOOD PRESSURE: 60 MMHG

## 2023-09-18 VITALS
OXYGEN SATURATION: 95 % | HEIGHT: 65 IN | BODY MASS INDEX: 31 KG/M2 | WEIGHT: 186.07 LBS | TEMPERATURE: 98.2 F | DIASTOLIC BLOOD PRESSURE: 79 MMHG | RESPIRATION RATE: 14 BRPM | SYSTOLIC BLOOD PRESSURE: 160 MMHG | HEART RATE: 117 BPM

## 2023-09-18 DIAGNOSIS — C34.91 NON-SMALL CELL CANCER OF RIGHT LUNG (HCC): ICD-10-CM

## 2023-09-18 DIAGNOSIS — J91.0 MALIGNANT PLEURAL EFFUSION: ICD-10-CM

## 2023-09-18 DIAGNOSIS — R06.02 SHORTNESS OF BREATH: ICD-10-CM

## 2023-09-18 PROCEDURE — 99205 OFFICE O/P NEW HI 60 MIN: CPT | Performed by: SURGERY

## 2023-09-18 PROCEDURE — G0299 HHS/HOSPICE OF RN EA 15 MIN: HCPCS

## 2023-09-18 NOTE — PROGRESS NOTES
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 She is maintained on Alimta and pembrolizumab under discretion of Dr Jadyn Paulino. She has an ASEPT, she has had increased drainage and is now draining twice a week. Dr Kayla Reid would like her to increase her drainage of her ASEPT to 3 times a week. Dr Kayla Reid discussed the option of a R VATS exploration, possible talc pleurodesis. He explained that this would be an explorative surgery, if the lung is expanded he would be able to perform a talc pleurodesis in an attempt to stop the drainage. Should there be a trapped lung and an open space, talc pleurodesis would be unable to be performed and she would be left with an ASEPT catheter. This would require at least a 3 day hospital stay and she would need to be off her chemotherapy luz-operatively. At this point Jo Dueñas would like to avoid surgery and prefers to increase her drainage to three times a week and a message was sent to her [address]. She will contact us again if she would like to discuss surgery in the future. She needs on scheduled follow-up with our office but has our contact information should she have any questions/concerns in the future        Diagnoses and all orders for this visit:    Shortness of breath  -     Ambulatory referral to Thoracic Surgery    Non-small cell cancer of right lung [facility])  -     Ambulatory referral to Thoracic Surgery    Malignant pleural effusion  -     Ambulatory referral to Thoracic Surgery          Thoracic History   Diagnosis: Stage IV right adenocarcinoma, malignant pleural effusion    Procedures/Surgeries: 2019 R lower lobectomy Leo Johnson    Pathology:    Adjuvant Therapy:       Subjective:    Patient ID: Nathaneil Denver is a 70 y.o. female.    Cancer Staging   Non-small cell cancer of right lung ([address])  Staging form: Lung, AJCC 8th Edition  - Clinical: Stage ALESHA (cT1c, cN2, cM1a) - Signed by Arslan Ayala DO on 6/9/2023  Oncology History Overview Note   5/2019 - Stage IA adenocarcinoma of the right lung s/p RLLectomy    11/2022 - fall after tripping on a dog gate - CT showed pulmonary nodules that required 3 month followup    3/2023 - CT showed right pleural effusion with enlarging lung lesion, she was otherwise symptomatic, thoracentesis cell block showed adenocarcinoma c/w her prior lung primary    PET - no disease outside the chest    4/20/2023 - carbo/pem/pembro    5/11/2-23 - cycle 2, pemetrexed dose reduced by 20% and carbo dose reduced to AUC 4 due to nausea and fatigue    7/2023 - removed Poly as of cycle 5 due to good response and increasing fatigue    8/2023 - add back Poly for cycle 6 due to increasing output from right pleural catheter    8/24/2023 - remove carboplatin for cycle 7 because no impact was made on output from pleural catheter     Non-small cell cancer of right lung (HCC)   4/7/2023 Initial Diagnosis    Non-small cell cancer of right lung ([address])     4/20/2023 -  Chemotherapy    cyanocobalamin, 1,000 mcg, Intramuscular, Once, 5 of 10 cycles  Administration: 1,000 mcg (4/13/2023), 1,000 mcg (6/1/2023), 1,000 mcg (8/3/2023), 1,000 mcg (8/24/2023), 1,000 mcg (9/14/2023)  alteplase (CATHFLO), 2 mg, Intracatheter, Every 1 Minute as needed, 8 of 13 cycles  fosaprepitant (EMEND) IVPB, 150 mg, Intravenous, Once, 5 of 5 cycles  Administration: 150 mg (4/20/2023), 150 mg (6/1/2023), 150 mg (6/22/2023), 150 mg (5/11/2023), 150 mg (8/3/2023)  CARBOplatin (PARAPLATIN) IVPB (GOG AUC DOSING), 553 mg, Intravenous, Once, 5 of 5 cycles  Administration: 553 mg (4/20/2023), 442.4 mg (6/1/2023), 442.4 mg (6/22/2023), 442.4 mg (5/11/2023), 438 mg (8/3/2023)  pemetrexed (ALIMTA) chemo infusion, 945 mg, Intravenous, Once, 8 of 13 cycles  Dose modification: 400 mg/m2 (original dose 500 mg/m2, Cycle 3, Reason: Nausea/Vomiting, Comment: 20% dose reduction due to nausea)  Administration: 900 mg (4/20/2023), 756 mg (6/1/2023), 756 mg (6/22/2023), 756 mg (8/3/2023), 756 mg (5/11/2023), 756 mg (7/13/2023), 756 mg (8/24/2023), 756 mg (9/14/2023)  pembrolizumab (KEYTRUDA) IVPB, 200 mg, Intravenous, Once, 8 of 13 cycles  Administration: 200 mg (4/20/2023), 200 mg (6/1/2023), 200 mg (6/22/2023), 200 mg (8/3/2023), 200 mg (5/11/2023), 200 mg (7/13/2023), 200 mg (8/24/2023), 200 mg (9/14/2023)     6/9/2023 -  Cancer Staged    Staging form: Lung, AJCC 8th Edition  - Clinical: Stage ALESHA (cT1c, cN2, cM1a) - Signed by Miles Garcia DO on 6/9/2023           HPI   ECOG 1    Ms Barb Kaplan is a 70year-old female with a PMH of a right lower lobe adenocarcinoma s/p right lower lobectomy 2019 ([facility]io [facility]) now with metastatic pleural effusion upstaging her to Stage IV adenocarcinoma currently on Alimta and pembrolizumab under the direction of Dr Sis Horton, former tobacco abuse, 75pk/yr history quit March 2023, COPD, HLD, HTN who was referred to our office by Dr Tammy Sandoval for evaluation of a recurrent malignant pleural effusion. She currently has an ASEPT in place and drains about 650mL every two weeks. CTA chest on 9/6/2023 was personally reviewed by myself in PACS and reveals a small loculated right pleural effusion with indwelling tunneled pleural catheter. PSH: Robotic assisted right lower lobectomy 2019 [facility] Kenyon), no other CT surgeries    They are on no AC/AP medications. On discussion today she has some SOB while walking up the stairs and doing housework, she has a dry cough, no fevers/chills or recent illness. She was last drain 9/15/2023. Once the fluid is drained she has minimal SOB and is feeling much better.        The following portions of the patient's history were reviewed and updated as appropriate: allergies, current medications, past family history, past medical history, past social history, past surgical history and problem list.    Past Medical History:   Diagnosis Date   • Cancer ([address])    • COPD (chronic obstructive pulmonary disease) ([address])    • Emphysema of lung ([address])    • Hyperlipidemia    • Hypertension    • Lung cancer ([address]) 2019    right lower lobe removed   • Lung nodule    • Pleural effusion       Past Surgical History:   Procedure Laterality Date   • APPENDECTOMY     • BREAST BIOPSY Right     benign   • BRONCHOSCOPY  2019   • COLON SURGERY     • HYSTERECTOMY      age 48   • IR PLEURAL EFFUSION LONG-TERM CATHETER PLACEMENT  2023   • IR PORT PLACEMENT  2023   • IR THORACENTESIS  2023   • IR THORACENTESIS  2023   • IR THORACENTESIS  2023   • LUNG BIOPSY  2019   • LUNG LOBECTOMY     • OOPHORECTOMY Bilateral     age 48      Family History   Problem Relation Age of Onset   • Colon cancer Mother    • Hypertension Mother    • Hyperlipidemia Mother    • Hearing loss Mother    • Depression Mother    • COPD Mother    • Cancer Mother         Lung   • Arthritis Mother    • Lung cancer Mother    • Drug abuse Brother    • Diabetes Maternal Grandmother    • Cancer Maternal Grandmother         Colon   • Colon cancer Maternal Grandmother    • Cancer Maternal Aunt    • Colon cancer Maternal Aunt    • Colon cancer Maternal Aunt    • Cancer Maternal Aunt         Colon      Social History     Socioeconomic History   • Marital status: /Civil Union     Spouse name: Not on file   • Number of children: Not on file   • Years of education: Not on file   • Highest education level: Not on file   Occupational History   • Not on file   Tobacco Use   • Smoking status: Former     Packs/day: 1.50     Years: 50.00     Total pack years: 75.00     Types: Cigarettes     Start date: 5     Quit date: 3/15/2023     Years since quittin.5   • Smokeless tobacco: Never   Vaping Use   • Vaping Use: Never used   Substance and Sexual Activity   • Alcohol use: Not Currently     Comment: Rarely drink, socially only   • Drug use: No   • Sexual activity: Not Currently     Partners: Male     Birth control/protection: Post-menopausal, Female Sterilization   Other Topics Concern   • Not on file   Social History Narrative    Lives with      Social Determinants of Health     Financial Resource Strain: Not on file   Food Insecurity: No Food Insecurity (4/24/2023)    Hunger Vital Sign    • Worried About Running Out of Food in the Last Year: Never true    • Ran Out of Food in the Last Year: Never true   Transportation Needs: No Transportation Needs (4/24/2023)    PRAPARE - Transportation    • Lack of Transportation (Medical): No    • Lack of Transportation (Non-Medical): No   Physical Activity: Not on file   Stress: Not on file   Social Connections: Not on file   Intimate Partner Violence: Not on file   Housing Stability: Low Risk  (4/24/2023)    Housing Stability Vital Sign    • Unable to Pay for Housing in the Last Year: No    • Number of Places Lived in the Last Year: 1    • Unstable Housing in the Last Year: No      Review of Systems   Constitutional: Negative for chills, diaphoresis and unexpected weight change. HENT: Negative. Eyes: Negative. Respiratory: Positive for cough and shortness of breath. Negative for wheezing. Cardiovascular: Negative for chest pain and leg swelling. Gastrointestinal: Negative for abdominal pain, diarrhea and nausea. Endocrine: Negative. Genitourinary: Negative. Musculoskeletal: Negative. Skin: Negative. Allergic/Immunologic: Negative. Neurological: Negative. Hematological: Negative for adenopathy. Does not bruise/bleed easily. Psychiatric/Behavioral: Negative. All other systems reviewed and are negative. Objective:   Physical Exam  Vitals reviewed. Constitutional:       General: She is not in acute distress. Appearance: Normal appearance. She is not ill-appearing. HENT:      Head: Normocephalic.       Nose: Nose normal. Mouth/Throat:      Mouth: Mucous membranes are moist.   Eyes:      Pupils: Pupils are equal, round, and reactive to light. Cardiovascular:      Rate and Rhythm: Normal rate and regular rhythm. Heart sounds: Normal heart sounds. Pulmonary:      Effort: Pulmonary effort is normal.      Breath sounds: No wheezing or rhonchi. Comments: R rales, decreased breath sounds on right  Abdominal:      Palpations: Abdomen is soft. Musculoskeletal:         General: No swelling. Normal range of motion. Lymphadenopathy:      Cervical: No cervical adenopathy. Skin:     General: Skin is warm. Neurological:      General: No focal deficit present. Mental Status: She is alert and oriented to person, place, and time. Psychiatric:         Mood and Affect: Mood normal.     /79 (BP Location: Left arm, Patient Position: Sitting, Cuff Size: Standard)   Pulse (!) 117   Temp 98.2 °F (36.8 °C) (Temporal)   Resp 14   Ht 5' 5" (1.651 m)   Wt 84.4 kg (186 lb 1.1 oz)   SpO2 95%   BMI 30.96 kg/m²     XR chest pa & lateral    Result Date: 9/6/2023  Impression Right chest tube with small right effusion with no pneumothorax. Persistent right base opacity with nodular opacity in the right midlung. Workstation performed: [id number]     XR chest pa & lateral    Result Date: 5/28/2023  Impression Right pleural drainage catheter with trace right effusion with no pneumothorax. Right base opacity, due to tumor and likely atelectasis/scar when compared with the CT. Workstation performed: [id number]      CT chest with contrast    Result Date: 11/13/2022  Narrative CT CHEST WITH IV CONTRAST INDICATION:   Chest wall pain left chest wall tenderness, fall. COMPARISON:  CT chest 8/19/2019 TECHNIQUE: CT examination of the chest was performed. Axial, sagittal, and coronal 2D reformatted images were created from the source data and submitted for interpretation. Radiation dose length product (DLP) for this visit:  441.8 mGy-cm . This examination, like all CT scans performed in the [place], was performed utilizing techniques to minimize radiation dose exposure, including the use of iterative reconstruction and automated exposure control. IV Contrast:  85 mL of iohexol (OMNIPAQUE)  350 FINDINGS: LUNGS:  No lung contusion or infiltrate. Post right lower lobectomy. COPD. Scattered areas of pulmonary scarring. Several abnormal nodular pulmonary opacities; representative lesions will be measured on series 3: Image 46, posterior right upper lobe, 2.5 cm solid nodule, new. Image 51, left lower lobe, 8 mm groundglass nodule, stable. Image 54, posterior right upper lobe, 1.8 cm groundglass nodule previously 1.5 cm. Image 68, right middle lobe, 1.3 cm solid nodule previously 6 mm. Image 74, posterior right lower lobe, 1 cm solid nodule unchanged in size although previously groundglass in appearance. New clustered punctate juxtapleural nodules in the right middle lobe marked on series 3. Mucosal strands in the trachea. Central airways otherwise clear. PLEURA:  Unremarkable. HEART/GREAT VESSELS: Heart is not enlarged. No pericardial effusion. Aortic and coronary artery calcification. No thoracic aortic aneurysm. MEDIASTINUM AND SUMIT:  Right perihilar postsurgical changes. No enlarged lymph nodes. CHEST WALL AND LOWER NECK:  Grossly stable nodule in the left lobe of the thyroid gland. Stable tiny calcifications in the right breast. VISUALIZED STRUCTURES IN THE UPPER ABDOMEN:  Bilateral extrarenal pelvis, left larger than right. OSSEOUS STRUCTURES:  Subtle acute nondisplaced fracture of the left fourth and sixth anterior and seventh anterolateral ribs. No osseous destructive lesion identified. Degenerative changes of the spine. Mild dextroconvex thoracic scoliosis. Impression No evidence of acute intrathoracic process. Acute nondisplaced fracture of the left fourth, sixth, and seventh ribs.  New and enlarging pulmonary nodules since August 2019, the largest of which measures 2.5 cm. Based on current Fleischner Society 2017 Guidelines on incidental pulmonary nodule, either PET/CT scan evaluation, tissue sampling or short term interval followup non-contrast CT followup (initially in 3 months) may be considered appropriate. Additional chronic findings and negatives as above. The study was marked in [facility] for immediate notification. This study demonstrates a finding requiring imaging follow-up and was documented as such in Epic. Workstation performed: [id number]     No CT Chest,Abdomen,Pelvis results available for this patient. NM PET CT skull base to mid thigh    Result Date: 7/10/2023  Narrative PET/CT SCAN INDICATION: C34.91: Malignant neoplasm of unspecified part of right bronchus or lung   ; 5/2019 - Stage IA adenocarcinoma of the right lung s/p RLLectomy 11/2022 - fall after tripping on a dog gate - CT showed pulmonary nodules that required 3 month followup 3/2023 - CT showed right pleural effusion with enlarging lung lesion, she was otherwise symptomatic, thoracentesis cell block showed adenocarcinoma c/w her prior lung primary PET - no disease outside the chest 4/20/2023 - carbo/pem/pembro 5/11/2-23 - cycle 2, pemetrexed dose reduced by 20% and carbo dose reduced to AUC 4 due to nausea and fatigue MODIFIER: PI PS COMPARISON: FDG PET/CT 3/31/2023. CELL TYPE: RLL lobectomy (+) invasive adenocarcinoma, acinar predominant  , recent thoracentesis on 3/24/2023 demonstrated metastatic adenocarcinoma. TECHNIQUE:   10.3 mCi F-18-FDG administered IV. Multiplanar attenuation corrected and non attenuation corrected PET images are available for interpretation, and contiguous, low dose, axial CT sections were obtained from the skull base through the femurs. Intravenous contrast material was not utilized.  This examination, like all CT scans performed in the [facility], was performed utilizing techniques to minimize radiation dose exposure, including the use of iterative reconstruction and automated exposure control. Fasting serum glucose: 85 mg/dl FINDINGS: VISUALIZED BRAIN: No acute abnormalities are seen. HEAD/NECK: There is a physiologic distribution of FDG. No FDG avid cervical adenopathy is seen. CT images: Unremarkable. CHEST: Again seen is a patchy opacity in the posterior right upper lobe with decreased uptake compared to previously (SUV max 4.0, previously 12.0). It is similar in size but decreased in density, now measuring 3.3 x 3.8 cm (previously 3.3 x 4.0 cm). Decrease in size of a nodular opacity along the right heart border measuring 1.0 x 1.6 cm (series 4, image 84; SUV max 2.1, previously 5.2 on the recent PET/CT), and previously measuring 1.4 x 2.0 cm on the CT of April 23, 2023. 1.2 x 0.9 cm non-FDG avid nodule within the middle lobe (series 4, image 87), previously 1.3 x 1.1 cm on the CT of 4/23/2023. 0.6 x 0.7 cm non-FDG avid groundglass nodule in the middle lobe (series 4, image 83) near the adjacent fissure, previously 1.2 x 1.4 cm on the PET/CT of 3/31/2023. It is less well seen on the recent chest CT due to atelectatic changes in that region. CT images: 0.8 x 1.0 cm groundglass nodule along the left oblique fissure (series 4, image 73), stable. Improving moderate right pleural effusion with loculated features, with indwelling right apical pleural catheter background emphysema. Atherosclerotic  changes of the aorta and branching vessels including the coronary arteries. Right chest port catheter in satisfactory position. Non- metabolically active thyroid nodule. ABDOMEN: No FDG avid soft tissue lesions are seen. CT images: Atherosclerotic changes of the aorta and branching vessels including the coronary arteries. Stable appearance of the left kidney with left pelviectasis, but normal caliber left ureter. PELVIS: No FDG avid soft tissue lesions are seen. CT images: Bowel staple line again noted in the rectosigmoid colon. OSSEOUS STRUCTURES: No FDG avid lesions are seen. CT images: Grade 2 anterolisthesis of L5 on S1 with associated pars defects at those levels. Impression 1. Partial metabolic response to therapy. There is significantly decreased uptake corresponding to the patchy opacity in the posterior right upper lobe, which is also mildly decreased in size. 2. There is also decrease in size of nodular opacity along the right heart border, which is also significantly decreased in uptake. 3. There are a few additional smaller right lung pulmonary nodules which are decreased in size and are not metabolically active on PET on the current examination. 4. Decrease in size of a moderate right pleural effusion compared to the CT of 4/23/2023. 5. No new or progressing lesions. Workstation performed: [id number]      No Barium Swallow results available for this patient.

## 2023-09-18 NOTE — PROGRESS NOTES
I met with Caryl Tapia at her visit today with Dr. Nirav Blake. I introduced myself to her and explained my role. Questions answered, support provided.

## 2023-09-18 NOTE — ASSESSMENT & PLAN NOTE
Ms Blair Deleon has stage IV adenocarcinoma of the right lung with a malignant right pleural effusion with a previous robotic right lower lobectomy in 2019. She is maintained on Alimta and pembrolizumab under discretion of Dr Michael Troy. She has an ASEPT, she has had increased drainage and is now draining twice a week. Dr Eugene Aggarwal would like her to increase her drainage of her ASEPT to 3 times a week. Dr Eugene Aggarwal discussed the option of a R VATS exploration, possible talc pleurodesis. He explained that this would be an explorative surgery, if the lung is expanded he would be able to perform a talc pleurodesis in an attempt to stop the drainage. Should there be a trapped lung and an open space, talc pleurodesis would be unable to be performed and she would be left with an ASEPT catheter. This would require at least a 3 day hospital stay and she would need to be off her chemotherapy luz-operatively. At this point Janessa So would like to avoid surgery and prefers to increase her drainage to three times a week and a message was sent to her [address] [place] [address]. She will contact us again if she would like to discuss surgery in the future.  She needs on scheduled follow-up with our office but has our contact information should she have any questions/concerns in the future

## 2023-09-18 NOTE — LETTER
September 18, 2023     Sarah Kellee, [address] [place] [id number]    Patient: Lou Kumar   YOB: 1960   Date of Visit: 9/18/2023       Dear Dr. Arnie Harkins: Thank you for referring Suresh Liang to me for evaluation. Below are my notes for this consultation. If you have questions, please do not hesitate to call me. I look forward to following your patient along with you. 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 She is maintained on Alimta and pembrolizumab under discretion of Dr Connie Escobar. She has an ASEPT, she has had increased drainage and is now draining twice a week. Dr Joselo Michele would like her to increase her drainage of her ASEPT to 3 times a week. Dr Joselo Michele discussed the option of a R VATS exploration, possible talc pleurodesis. He explained that this would be an explorative surgery, if the lung is expanded he would be able to perform a talc pleurodesis in an attempt to stop the drainage. Should there be a trapped lung and an open space, talc pleurodesis would be unable to be performed and she would be left with an ASEPT catheter. This would require at least a 3 day hospital stay and she would need to be off her chemotherapy luz-operatively. At this point Adrianna Mabry would like to avoid surgery and prefers to increase her drainage to three times a week and a message was sent to her [address] [place] [address]. She will contact us again if she would like to discuss surgery in the future.  She needs on scheduled follow-up with our office but has our contact information should she have any questions/concerns in the future       Diagnoses and all orders for this visit:    Shortness of breath  -     Ambulatory referral to Thoracic Surgery    Non-small cell cancer of right lung [facility])  -     Ambulatory referral to Thoracic Surgery    Malignant pleural effusion  -     Ambulatory referral to Thoracic Surgery         Thoracic History   Diagnosis: Stage IV right adenocarcinoma, malignant pleural effusion    Procedures/Surgeries: 2019 R lower lobectomy [facility])    Pathology:    Adjuvant Therapy:      Subjective:   Patient ID: Louann Gandhi is a 70 y.o. female.    Cancer Staging   Non-small cell cancer of right lung (HCC)  Staging form: Lung, AJCC 8th Edition  - Clinical: Stage ALESHA (cT1c, cN2, cM1a) - Signed by Vicky Ramirez DO on 6/9/2023  Oncology History Overview Note   5/2019 - Stage IA adenocarcinoma of the right lung s/p RLLectomy    11/2022 - fall after tripping on a dog gate - CT showed pulmonary nodules that required 3 month followup    3/2023 - CT showed right pleural effusion with enlarging lung lesion, she was otherwise symptomatic, thoracentesis cell block showed adenocarcinoma c/w her prior lung primary    PET - no disease outside the chest    4/20/2023 - carbo/pem/pembro    5/11/2-23 - cycle 2, pemetrexed dose reduced by 20% and carbo dose reduced to AUC 4 due to nausea and fatigue    7/2023 - removed Poly as of cycle 5 due to good response and increasing fatigue    8/2023 - add back Poly for cycle 6 due to increasing output from right pleural catheter    8/24/2023 - remove carboplatin for cycle 7 because no impact was made on output from pleural catheter     Non-small cell cancer of right lung ([address])   4/7/2023 Initial Diagnosis    Non-small cell cancer of right lung ([address])     4/20/2023 -  Chemotherapy    cyanocobalamin, 1,000 mcg, Intramuscular, Once, 5 of 10 cycles  Administration: 1,000 mcg (4/13/2023), 1,000 mcg (6/1/2023), 1,000 mcg (8/3/2023), 1,000 mcg (8/24/2023), 1,000 mcg (9/14/2023)  alteplase (CATHFLO), 2 mg, Intracatheter, Every 1 Minute as needed, 8 of 13 cycles  fosaprepitant (EMEND) IVPB, 150 mg, Intravenous, Once, 5 of 5 cycles  Administration: 150 mg (4/20/2023), 150 mg (6/1/2023), 150 mg (6/22/2023), 150 mg (5/11/2023), 150 mg (8/3/2023)  CARBOplatin (PARAPLATIN) IVPB (GOG AUC DOSING), 553 mg, Intravenous, Once, 5 of 5 cycles  Administration: 553 mg (4/20/2023), 442.4 mg (6/1/2023), 442.4 mg (6/22/2023), 442.4 mg (5/11/2023), 438 mg (8/3/2023)  pemetrexed (ALIMTA) chemo infusion, 945 mg, Intravenous, Once, 8 of 13 cycles  Dose modification: 400 mg/m2 (original dose 500 mg/m2, Cycle 3, Reason: Nausea/Vomiting, Comment: 20% dose reduction due to nausea)  Administration: 900 mg (4/20/2023), 756 mg (6/1/2023), 756 mg (6/22/2023), 756 mg (8/3/2023), 756 mg (5/11/2023), 756 mg (7/13/2023), 756 mg (8/24/2023), 756 mg (9/14/2023)  pembrolizumab (KEYTRUDA) IVPB, 200 mg, Intravenous, Once, 8 of 13 cycles  Administration: 200 mg (4/20/2023), 200 mg (6/1/2023), 200 mg (6/22/2023), 200 mg (8/3/2023), 200 mg (5/11/2023), 200 mg (7/13/2023), 200 mg (8/24/2023), 200 mg (9/14/2023)     6/9/2023 -  Cancer Staged    Staging form: Lung, AJCC 8th Edition  - Clinical: Stage ALESHA (cT1c, cN2, cM1a) - Signed by Víctor Francisco DO on 6/9/2023           HPI   ECOG 1    Ms Sara Grater is a 70year-old female with a PMH of a right lower lobe adenocarcinoma s/p right lower lobectomy 2019 ([facility]) now with metastatic pleural effusion upstaging her to Stage IV adenocarcinoma currently on Alimta and pembrolizumab under the direction of Dr Kortney Rouse, former tobacco abuse, 75pk/yr history quit March 2023, COPD, HLD, HTN who was referred to our office by Dr Ashleigh Martinez for evaluation of a recurrent malignant pleural effusion. She currently has an ASEPT in place and drains about 650mL every two weeks. CTA chest on 9/6/2023 was personally reviewed by myself in PACS and reveals a small loculated right pleural effusion with indwelling tunneled pleural catheter.      PSH: Robotic assisted right lower lobectomy 2019 [facility]), no other CT surgeries    They are on no AC/AP medications. On discussion today she has some SOB while walking up the stairs and doing housework, she has a dry cough, no fevers/chills or recent illness. She was last drain 9/15/2023. Once the fluid is drained she has minimal SOB and is feeling much better.        The following portions of the patient's history were reviewed and updated as appropriate: allergies, current medications, past family history, past medical history, past social history, past surgical history and problem list.    Past Medical History:   Diagnosis Date   • Cancer ([address])    • COPD (chronic obstructive pulmonary disease) ([address])    • Emphysema of lung ([address])    • Hyperlipidemia    • Hypertension    • Lung cancer ([address]) 06/26/2019    right lower lobe removed   • Lung nodule    • Pleural effusion       Past Surgical History:   Procedure Laterality Date   • APPENDECTOMY     • BREAST BIOPSY Right     benign   • BRONCHOSCOPY  06/2019   • COLON SURGERY     • HYSTERECTOMY      age 48   • IR PLEURAL EFFUSION LONG-TERM CATHETER PLACEMENT  04/25/2023   • IR PORT PLACEMENT  04/18/2023   • IR THORACENTESIS  03/24/2023   • IR THORACENTESIS  04/12/2023   • IR THORACENTESIS  04/18/2023   • LUNG BIOPSY  06/2019   • LUNG LOBECTOMY     • OOPHORECTOMY Bilateral     age 48      Family History   Problem Relation Age of Onset   • Colon cancer Mother    • Hypertension Mother    • Hyperlipidemia Mother    • Hearing loss Mother    • Depression Mother    • COPD Mother    • Cancer Mother         Lung   • Arthritis Mother    • Lung cancer Mother    • Drug abuse Brother    • Diabetes Maternal Grandmother    • Cancer Maternal Grandmother         Colon   • Colon cancer Maternal Grandmother    • Cancer Maternal Aunt    • Colon cancer Maternal Aunt    • Colon cancer Maternal Aunt    • Cancer Maternal Aunt         Colon      Social History     Socioeconomic History   • Marital status: /[employer]     Spouse name: Not on file   • Number of children: Not on file   • Years of education: Not on file   • Highest education level: Not on file   Occupational History   • Not on file   Tobacco Use   • Smoking status: Former     Packs/day: 1.50     Years: 50.00     Total pack years: 75.00     Types: Cigarettes     Start date: 5     Quit date: 3/15/2023     Years since quittin.5   • Smokeless tobacco: Never   Vaping Use   • Vaping Use: Never used   Substance and Sexual Activity   • Alcohol use: Not Currently     Comment: Rarely drink, socially only   • Drug use: No   • Sexual activity: Not Currently     Partners: Male     Birth control/protection: Post-menopausal, Female Sterilization   Other Topics Concern   • Not on file   Social History Narrative    Lives with      Social Determinants of Health     Financial Resource Strain: Not on file   Food Insecurity: No Food Insecurity (2023)    Hunger Vital Sign    • Worried About Running Out of Food in the Last Year: Never true    • Ran Out of Food in the Last Year: Never true   Transportation Needs: No Transportation Needs (2023)    PRAPARE - Transportation    • Lack of Transportation (Medical): No    • Lack of Transportation (Non-Medical): No   Physical Activity: Not on file   Stress: Not on file   Social Connections: Not on file   Intimate Partner Violence: Not on file   Housing Stability: Low Risk  (2023)    Housing Stability Vital Sign    • Unable to Pay for Housing in the Last Year: No    • Number of Places Lived in the Last Year: 1    • Unstable Housing in the Last Year: No      Review of Systems   Constitutional: Negative for chills, diaphoresis and unexpected weight change. HENT: Negative. Eyes: Negative. Respiratory: Positive for cough and shortness of breath. Negative for wheezing. Cardiovascular: Negative for chest pain and leg swelling. Gastrointestinal: Negative for abdominal pain, diarrhea and nausea. Endocrine: Negative. Genitourinary: Negative. Musculoskeletal: Negative. Skin: Negative. Allergic/Immunologic: Negative. Neurological: Negative. Hematological: Negative for adenopathy. Does not bruise/bleed easily. Psychiatric/Behavioral: Negative. All other systems reviewed and are negative. Objective:  Physical Exam  Vitals reviewed. Constitutional:       General: She is not in acute distress. Appearance: Normal appearance. She is not ill-appearing. HENT:      Head: Normocephalic. Nose: Nose normal.      Mouth/Throat:      Mouth: Mucous membranes are moist.   Eyes:      Pupils: Pupils are equal, round, and reactive to light. Cardiovascular:      Rate and Rhythm: Normal rate and regular rhythm. Heart sounds: Normal heart sounds. Pulmonary:      Effort: Pulmonary effort is normal.      Breath sounds: No wheezing or rhonchi. Comments: R rales, decreased breath sounds on right  Abdominal:      Palpations: Abdomen is soft. Musculoskeletal:         General: No swelling. Normal range of motion. Lymphadenopathy:      Cervical: No cervical adenopathy. Skin:     General: Skin is warm. Neurological:      General: No focal deficit present. Mental Status: She is alert and oriented to person, place, and time. Psychiatric:         Mood and Affect: Mood normal.     /79 (BP Location: Left arm, Patient Position: Sitting, Cuff Size: Standard)   Pulse (!) 117   Temp 98.2 °F (36.8 °C) (Temporal)   Resp 14   Ht 5' 5" (1.651 m)   Wt 84.4 kg (186 lb 1.1 oz)   SpO2 95%   BMI 30.96 kg/m²    XR chest pa & lateral    Result Date: 9/6/2023  Impression Right chest tube with small right effusion with no pneumothorax. Persistent right base opacity with nodular opacity in the right midlung.  Workstation performed: [id number]     XR chest pa & lateral    Result Date: 5/28/2023  Impression Right pleural drainage catheter with trace right effusion with no pneumothorax. Right base opacity, due to tumor and likely atelectasis/scar when compared with the CT. Workstation performed: [id number]      CT chest with contrast    Result Date: 11/13/2022  Narrative CT CHEST WITH IV CONTRAST INDICATION:   Chest wall pain left chest wall tenderness, fall. COMPARISON:  CT chest 8/19/2019 TECHNIQUE: CT examination of the chest was performed. Axial, sagittal, and coronal 2D reformatted images were created from the source data and submitted for interpretation. Radiation dose length product (DLP) for this visit:  441.8 mGy-cm . This examination, like all CT scans performed in the [facility], was performed utilizing techniques to minimize radiation dose exposure, including the use of iterative reconstruction and automated exposure control. IV Contrast:  85 mL of iohexol (OMNIPAQUE)  350 FINDINGS: LUNGS:  No lung contusion or infiltrate. Post right lower lobectomy. COPD. Scattered areas of pulmonary scarring. Several abnormal nodular pulmonary opacities; representative lesions will be measured on series 3: Image 46, posterior right upper lobe, 2.5 cm solid nodule, new. Image 51, left lower lobe, 8 mm groundglass nodule, stable. Image 54, posterior right upper lobe, 1.8 cm groundglass nodule previously 1.5 cm. Image 68, right middle lobe, 1.3 cm solid nodule previously 6 mm. Image 74, posterior right lower lobe, 1 cm solid nodule unchanged in size although previously groundglass in appearance. New clustered punctate juxtapleural nodules in the right middle lobe marked on series 3. Mucosal strands in the trachea. Central airways otherwise clear. PLEURA:  Unremarkable. HEART/GREAT VESSELS: Heart is not enlarged. No pericardial effusion. Aortic and coronary artery calcification. No thoracic aortic aneurysm. MEDIASTINUM AND SUMIT:  Right perihilar postsurgical changes. No enlarged lymph nodes.  CHEST WALL AND LOWER NECK:  Grossly stable nodule in the left lobe of the thyroid gland. Stable tiny calcifications in the right breast. VISUALIZED STRUCTURES IN THE UPPER ABDOMEN:  Bilateral extrarenal pelvis, left larger than right. OSSEOUS STRUCTURES:  Subtle acute nondisplaced fracture of the left fourth and sixth anterior and seventh anterolateral ribs. No osseous destructive lesion identified. Degenerative changes of the spine. Mild dextroconvex thoracic scoliosis. Impression No evidence of acute intrathoracic process. Acute nondisplaced fracture of the left fourth, sixth, and seventh ribs. New and enlarging pulmonary nodules since August 2019, the largest of which measures 2.5 cm. Based on current Fleischner Society 2017 Guidelines on incidental pulmonary nodule, either PET/CT scan evaluation, tissue sampling or short term interval followup non-contrast CT followup (initially in 3 months) may be considered appropriate. Additional chronic findings and negatives as above. The study was marked in [facility] for immediate notification. This study demonstrates a finding requiring imaging follow-up and was documented as such in Epic. Workstation performed: [id number]     No CT Chest,Abdomen,Pelvis results available for this patient. NM PET CT skull base to mid thigh    Result Date: 7/10/2023  Narrative PET/CT SCAN INDICATION: C34.91: Malignant neoplasm of unspecified part of right bronchus or lung   ; 5/2019 - Stage IA adenocarcinoma of the right lung s/p RLLectomy 11/2022 - fall after tripping on a dog gate - CT showed pulmonary nodules that required 3 month followup 3/2023 - CT showed right pleural effusion with enlarging lung lesion, she was otherwise symptomatic, thoracentesis cell block showed adenocarcinoma c/w her prior lung primary PET - no disease outside the chest 4/20/2023 - carbo/pem/pembro 5/11/2-23 - cycle 2, pemetrexed dose reduced by 20% and carbo dose reduced to AUC 4 due to nausea and fatigue MODIFIER: PI PS COMPARISON: FDG PET/CT 3/31/2023.  CELL TYPE: RLL lobectomy (+) invasive adenocarcinoma, acinar predominant  , recent thoracentesis on 3/24/2023 demonstrated metastatic adenocarcinoma. TECHNIQUE:   10.3 mCi F-18-FDG administered IV. Multiplanar attenuation corrected and non attenuation corrected PET images are available for interpretation, and contiguous, low dose, axial CT sections were obtained from the skull base through the femurs. Intravenous contrast material was not utilized. This examination, like all CT scans performed in the [facility], was performed utilizing techniques to minimize radiation dose exposure, including the use of iterative reconstruction and automated exposure control. Fasting serum glucose: 85 mg/dl FINDINGS: VISUALIZED BRAIN: No acute abnormalities are seen. HEAD/NECK: There is a physiologic distribution of FDG. No FDG avid cervical adenopathy is seen. CT images: Unremarkable. CHEST: Again seen is a patchy opacity in the posterior right upper lobe with decreased uptake compared to previously (SUV max 4.0, previously 12.0). It is similar in size but decreased in density, now measuring 3.3 x 3.8 cm (previously 3.3 x 4.0 cm). Decrease in size of a nodular opacity along the right heart border measuring 1.0 x 1.6 cm (series 4, image 84; SUV max 2.1, previously 5.2 on the recent PET/CT), and previously measuring 1.4 x 2.0 cm on the CT of April 23, 2023. 1.2 x 0.9 cm non-FDG avid nodule within the middle lobe (series 4, image 87), previously 1.3 x 1.1 cm on the CT of 4/23/2023. 0.6 x 0.7 cm non-FDG avid groundglass nodule in the middle lobe (series 4, image 83) near the adjacent fissure, previously 1.2 x 1.4 cm on the PET/CT of 3/31/2023. It is less well seen on the recent chest CT due to atelectatic changes in that region. CT images: 0.8 x 1.0 cm groundglass nodule along the left oblique fissure (series 4, image 73), stable.  Improving moderate right pleural effusion with loculated features, with indwelling right apical pleural catheter background emphysema. Atherosclerotic  changes of the aorta and branching vessels including the coronary arteries. Right chest port catheter in satisfactory position. Non- metabolically active thyroid nodule. ABDOMEN: No FDG avid soft tissue lesions are seen. CT images: Atherosclerotic changes of the aorta and branching vessels including the coronary arteries. Stable appearance of the left kidney with left pelviectasis, but normal caliber left ureter. PELVIS: No FDG avid soft tissue lesions are seen. CT images: Bowel staple line again noted in the rectosigmoid colon. OSSEOUS STRUCTURES: No FDG avid lesions are seen. CT images: Grade 2 anterolisthesis of L5 on S1 with associated pars defects at those levels. Impression 1. Partial metabolic response to therapy. There is significantly decreased uptake corresponding to the patchy opacity in the posterior right upper lobe, which is also mildly decreased in size. 2. There is also decrease in size of nodular opacity along the right heart border, which is also significantly decreased in uptake. 3. There are a few additional smaller right lung pulmonary nodules which are decreased in size and are not metabolically active on PET on the current examination. 4. Decrease in size of a moderate right pleural effusion compared to the CT of 4/23/2023. 5. No new or progressing lesions. Workstation performed: [id number]      No Barium Swallow results available for this patient.

## 2023-09-18 NOTE — LETTER
September 18, 2023       No Recipients    Patient: Sarbjit Rangel   YOB: 1960   Date of Visit: 9/18/2023       Dear Dr. Renetta Finnegan: Thank you for referring Surekha Patel to me for evaluation. Below are my notes for this consultation. If you have questions, please do not hesitate to call me. I look forward to following your patient along with you. 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 She is maintained on Alimta and pembrolizumab under discretion of Dr Petr Gallardo. She has an ASEPT, she has had increased drainage and is now draining twice a week. Dr Dave Gotti would like her to increase her drainage of her ASEPT to 3 times a week. Dr Dave Gotti discussed the option of a R VATS exploration, possible talc pleurodesis. He explained that this would be an explorative surgery, if the lung is expanded he would be able to perform a talc pleurodesis in an attempt to stop the drainage. Should there be a trapped lung and an open space, talc pleurodesis would be unable to be performed and she would be left with an ASEPT catheter. This would require at least a 3 day hospital stay and she would need to be off her chemotherapy luz-operatively. At this point Ross Onofre would like to avoid surgery and prefers to increase her drainage to three times a week. She will contact us again if she would like to discuss surgery in the future.  She needs on scheduled follow-up with our office but has our contact information should she have any questions/concerns in the future       Diagnoses and all orders for this visit:    Shortness of breath  -     Ambulatory referral to Thoracic Surgery    Non-small cell cancer of right lung [facility])  -     Ambulatory referral to Thoracic Surgery    Malignant pleural effusion  -     Ambulatory referral to Thoracic Surgery         Thoracic History   Diagnosis: Stage IV right adenocarcinoma, malignant pleural effusion    Procedures/Surgeries: 2019 R lower lobectomy [facility])    Pathology:    Adjuvant Therapy:      Subjective:   Patient ID: Prosper Arguelles is a 70 y.o. female.    Cancer Staging   Non-small cell cancer of right lung (HCC)  Staging form: Lung, AJCC 8th Edition  - Clinical: Stage ALESHA (cT1c, cN2, cM1a) - Signed by Miles Garcia DO on 6/9/2023  Oncology History Overview Note   5/2019 - Stage IA adenocarcinoma of the right lung s/p RLLectomy    11/2022 - fall after tripping on a dog gate - CT showed pulmonary nodules that required 3 month followup    3/2023 - CT showed right pleural effusion with enlarging lung lesion, she was otherwise symptomatic, thoracentesis cell block showed adenocarcinoma c/w her prior lung primary    PET - no disease outside the chest    4/20/2023 - carbo/pem/pembro    5/11/2-23 - cycle 2, pemetrexed dose reduced by 20% and carbo dose reduced to AUC 4 due to nausea and fatigue    7/2023 - removed Poly as of cycle 5 due to good response and increasing fatigue    8/2023 - add back Poly for cycle 6 due to increasing output from right pleural catheter    8/24/2023 - remove carboplatin for cycle 7 because no impact was made on output from pleural catheter     Non-small cell cancer of right lung ([address])   4/7/2023 Initial Diagnosis    Non-small cell cancer of right lung ([address])     4/20/2023 -  Chemotherapy    cyanocobalamin, 1,000 mcg, Intramuscular, Once, 5 of 10 cycles  Administration: 1,000 mcg (4/13/2023), 1,000 mcg (6/1/2023), 1,000 mcg (8/3/2023), 1,000 mcg (8/24/2023), 1,000 mcg (9/14/2023)  alteplase (CATHFLO), 2 mg, Intracatheter, Every 1 Minute as needed, 8 of 13 cycles  fosaprepitant (EMEND) IVPB, 150 mg, Intravenous, Once, 5 of 5 cycles  Administration: 150 mg (4/20/2023), 150 mg (6/1/2023), 150 mg (6/22/2023), 150 mg (5/11/2023), 150 mg (8/3/2023)  CARBOplatin (PARAPLATIN) IVPB (GOG AUC DOSING), 553 mg, Intravenous, Once, 5 of 5 cycles  Administration: 553 mg (4/20/2023), 442.4 mg (6/1/2023), 442.4 mg (6/22/2023), 442.4 mg (5/11/2023), 438 mg (8/3/2023)  pemetrexed (ALIMTA) chemo infusion, 945 mg, Intravenous, Once, 8 of 13 cycles  Dose modification: 400 mg/m2 (original dose 500 mg/m2, Cycle 3, Reason: Nausea/Vomiting, Comment: 20% dose reduction due to nausea)  Administration: 900 mg (4/20/2023), 756 mg (6/1/2023), 756 mg (6/22/2023), 756 mg (8/3/2023), 756 mg (5/11/2023), 756 mg (7/13/2023), 756 mg (8/24/2023), 756 mg (9/14/2023)  pembrolizumab (KEYTRUDA) IVPB, 200 mg, Intravenous, Once, 8 of 13 cycles  Administration: 200 mg (4/20/2023), 200 mg (6/1/2023), 200 mg (6/22/2023), 200 mg (8/3/2023), 200 mg (5/11/2023), 200 mg (7/13/2023), 200 mg (8/24/2023), 200 mg (9/14/2023)     6/9/2023 -  Cancer Staged    Staging form: Lung, AJCC 8th Edition  - Clinical: Stage ALESHA (cT1c, cN2, cM1a) - Signed by Rae Irvin DO on 6/9/2023           HPI   ECOG 1    Ms Jazmin Petersen is a 70year-old female with a PMH of a right lower lobe adenocarcinoma s/p right lower lobectomy 2019 (Brian Xiao) now with metastatic pleural effusion upstaging her to Stage IV adenocarcinoma currently on Alimta and pembrolizumab under the direction of Dr Ana Paula Chase, former tobacco abuse, 75pk/yr history quit March 2023, COPD, HLD, HTN who was referred to our office by Dr Leela Vaca for evaluation of a recurrent malignant pleural effusion. She currently has an ASEPT in place and drains about 650mL every two weeks. CTA chest on 9/6/2023 was personally reviewed by myself in PACS and reveals a small loculated right pleural effusion with indwelling tunneled pleural catheter. PSH: Robotic assisted right lower lobectomy 2019 Brian Xiao), no other CT surgeries    They are on no AC/AP medications.      On discussion today she has some SOB while walking up the stairs and doing housework, she has a dry cough, no fevers/chills or recent illness. She was last drain 9/15/2023. Once the fluid is drained she has minimal SOB and is feeling much better.        The following portions of the patient's history were reviewed and updated as appropriate: allergies, current medications, past family history, past medical history, past social history, past surgical history and problem list.    Past Medical History:   Diagnosis Date   • Cancer ([address])    • COPD (chronic obstructive pulmonary disease) ([address])    • Emphysema of lung ([address])    • Hyperlipidemia    • Hypertension    • Lung cancer ([address]) 06/26/2019    right lower lobe removed   • Lung nodule    • Pleural effusion       Past Surgical History:   Procedure Laterality Date   • APPENDECTOMY     • BREAST BIOPSY Right     benign   • BRONCHOSCOPY  06/2019   • COLON SURGERY     • HYSTERECTOMY      age 48   • IR PLEURAL EFFUSION LONG-TERM CATHETER PLACEMENT  04/25/2023   • IR PORT PLACEMENT  04/18/2023   • IR THORACENTESIS  03/24/2023   • IR THORACENTESIS  04/12/2023   • IR THORACENTESIS  04/18/2023   • LUNG BIOPSY  06/2019   • LUNG LOBECTOMY     • OOPHORECTOMY Bilateral     age 48      Family History   Problem Relation Age of Onset   • Colon cancer Mother    • Hypertension Mother    • Hyperlipidemia Mother    • Hearing loss Mother    • Depression Mother    • COPD Mother    • Cancer Mother         Lung   • Arthritis Mother    • Lung cancer Mother    • Drug abuse Brother    • Diabetes Maternal Grandmother    • Cancer Maternal Grandmother         Colon   • Colon cancer Maternal Grandmother    • Cancer Maternal Aunt    • Colon cancer Maternal Aunt    • Colon cancer Maternal Aunt    • Cancer Maternal Aunt         Colon      Social History     Socioeconomic History   • Marital status: /Civil Union     Spouse name: Not on file   • Number of children: Not on file   • Years of education: Not on file   • Highest education level: Not on file   Occupational History   • Not on file   Tobacco Use   • Smoking status: Former     Packs/day: 1.50     Years: 50.00     Total pack years: 75.00     Types: Cigarettes     Start date: 5     Quit date: 3/15/2023     Years since quittin.5   • Smokeless tobacco: Never   Vaping Use   • Vaping Use: Never used   Substance and Sexual Activity   • Alcohol use: Not Currently     Comment: Rarely drink, socially only   • Drug use: No   • Sexual activity: Not Currently     Partners: Male     Birth control/protection: Post-menopausal, Female Sterilization   Other Topics Concern   • Not on file   Social History Narrative    Lives with      Social Determinants of Health     Financial Resource Strain: Not on file   Food Insecurity: No Food Insecurity (2023)    Hunger Vital Sign    • Worried About Running Out of Food in the Last Year: Never true    • Ran Out of Food in the Last Year: Never true   Transportation Needs: No Transportation Needs (2023)    PRAPARE - Transportation    • Lack of Transportation (Medical): No    • Lack of Transportation (Non-Medical): No   Physical Activity: Not on file   Stress: Not on file   Social Connections: Not on file   Intimate Partner Violence: Not on file   Housing Stability: Low Risk  (2023)    Housing Stability Vital Sign    • Unable to Pay for Housing in the Last Year: No    • Number of Places Lived in the Last Year: 1    • Unstable Housing in the Last Year: No      Review of Systems   Constitutional: Negative for chills, diaphoresis and unexpected weight change. HENT: Negative. Eyes: Negative. Respiratory: Positive for cough and shortness of breath. Negative for wheezing. Cardiovascular: Negative for chest pain and leg swelling. Gastrointestinal: Negative for abdominal pain, diarrhea and nausea. Endocrine: Negative. Genitourinary: Negative. Musculoskeletal: Negative. Skin: Negative.     Allergic/Immunologic: Negative. Neurological: Negative. Hematological: Negative for adenopathy. Does not bruise/bleed easily. Psychiatric/Behavioral: Negative. All other systems reviewed and are negative. Objective:  Physical Exam  Vitals reviewed. Constitutional:       General: She is not in acute distress. Appearance: Normal appearance. She is not ill-appearing. HENT:      Head: Normocephalic. Nose: Nose normal.      Mouth/Throat:      Mouth: Mucous membranes are moist.   Eyes:      Pupils: Pupils are equal, round, and reactive to light. Cardiovascular:      Rate and Rhythm: Normal rate and regular rhythm. Heart sounds: Normal heart sounds. Pulmonary:      Effort: Pulmonary effort is normal.      Breath sounds: Normal breath sounds. No wheezing, rhonchi or rales. Abdominal:      Palpations: Abdomen is soft. Musculoskeletal:         General: No swelling. Normal range of motion. Lymphadenopathy:      Cervical: No cervical adenopathy. Skin:     General: Skin is warm. Neurological:      General: No focal deficit present. Mental Status: She is alert and oriented to person, place, and time. Psychiatric:         Mood and Affect: Mood normal.     /79 (BP Location: Left arm, Patient Position: Sitting, Cuff Size: Standard)   Pulse (!) 117   Temp 98.2 °F (36.8 °C) (Temporal)   Resp 14   Ht 5' 5" (1.651 m)   Wt 84.4 kg (186 lb 1.1 oz)   SpO2 95%   BMI 30.96 kg/m²    XR chest pa & lateral    Result Date: 9/6/2023  Impression Right chest tube with small right effusion with no pneumothorax. Persistent right base opacity with nodular opacity in the right midlung. Workstation performed: [id number]     XR chest pa & lateral    Result Date: 5/28/2023  Impression Right pleural drainage catheter with trace right effusion with no pneumothorax. Right base opacity, due to tumor and likely atelectasis/scar when compared with the CT.  Workstation performed: [id number]      CT chest with contrast    Result Date: 11/13/2022  Narrative CT CHEST WITH IV CONTRAST INDICATION:   Chest wall pain left chest wall tenderness, fall. COMPARISON:  CT chest 8/19/2019 TECHNIQUE: CT examination of the chest was performed. Axial, sagittal, and coronal 2D reformatted images were created from the source data and submitted for interpretation. Radiation dose length product (DLP) for this visit:  441.8 mGy-cm . This examination, like all CT scans performed in the [facility], was performed utilizing techniques to minimize radiation dose exposure, including the use of iterative reconstruction and automated exposure control. IV Contrast:  85 mL of iohexol (OMNIPAQUE)  350 FINDINGS: LUNGS:  No lung contusion or infiltrate. Post right lower lobectomy. COPD. Scattered areas of pulmonary scarring. Several abnormal nodular pulmonary opacities; representative lesions will be measured on series 3: Image 46, posterior right upper lobe, 2.5 cm solid nodule, new. Image 51, left lower lobe, 8 mm groundglass nodule, stable. Image 54, posterior right upper lobe, 1.8 cm groundglass nodule previously 1.5 cm. Image 68, right middle lobe, 1.3 cm solid nodule previously 6 mm. Image 74, posterior right lower lobe, 1 cm solid nodule unchanged in size although previously groundglass in appearance. New clustered punctate juxtapleural nodules in the right middle lobe marked on series 3. Mucosal strands in the trachea. Central airways otherwise clear. PLEURA:  Unremarkable. HEART/GREAT VESSELS: Heart is not enlarged. No pericardial effusion. Aortic and coronary artery calcification. No thoracic aortic aneurysm. MEDIASTINUM AND SUMIT:  Right perihilar postsurgical changes. No enlarged lymph nodes. CHEST WALL AND LOWER NECK:  Grossly stable nodule in the left lobe of the thyroid gland. Stable tiny calcifications in the right breast. VISUALIZED STRUCTURES IN THE UPPER ABDOMEN:  Bilateral extrarenal pelvis, left larger than right. OSSEOUS STRUCTURES:  Subtle acute nondisplaced fracture of the left fourth and sixth anterior and seventh anterolateral ribs. No osseous destructive lesion identified. Degenerative changes of the spine. Mild dextroconvex thoracic scoliosis. Impression No evidence of acute intrathoracic process. Acute nondisplaced fracture of the left fourth, sixth, and seventh ribs. New and enlarging pulmonary nodules since August 2019, the largest of which measures 2.5 cm. Based on current Fleischner Society 2017 Guidelines on incidental pulmonary nodule, either PET/CT scan evaluation, tissue sampling or short term interval followup non-contrast CT followup (initially in 3 months) may be considered appropriate. Additional chronic findings and negatives as above. The study was marked in [facility] for immediate notification. This study demonstrates a finding requiring imaging follow-up and was documented as such in Epic. Workstation performed: [id number]     No CT Chest,Abdomen,Pelvis results available for this patient. NM PET CT skull base to mid thigh    Result Date: 7/10/2023  Narrative PET/CT SCAN INDICATION: C34.91: Malignant neoplasm of unspecified part of right bronchus or lung   ; 5/2019 - Stage IA adenocarcinoma of the right lung s/p RLLectomy 11/2022 - fall after tripping on a dog gate - CT showed pulmonary nodules that required 3 month followup 3/2023 - CT showed right pleural effusion with enlarging lung lesion, she was otherwise symptomatic, thoracentesis cell block showed adenocarcinoma c/w her prior lung primary PET - no disease outside the chest 4/20/2023 - carbo/pem/pembro 5/11/2-23 - cycle 2, pemetrexed dose reduced by 20% and carbo dose reduced to AUC 4 due to nausea and fatigue MODIFIER: PI PS COMPARISON: FDG PET/CT 3/31/2023. CELL TYPE: RLL lobectomy (+) invasive adenocarcinoma, acinar predominant  , recent thoracentesis on 3/24/2023 demonstrated metastatic adenocarcinoma.  TECHNIQUE:   10.3 mCi F-18-FDG administered IV. Multiplanar attenuation corrected and non attenuation corrected PET images are available for interpretation, and contiguous, low dose, axial CT sections were obtained from the skull base through the femurs. Intravenous contrast material was not utilized. This examination, like all CT scans performed in the [facility], was performed utilizing techniques to minimize radiation dose exposure, including the use of iterative reconstruction and automated exposure control. Fasting serum glucose: 85 mg/dl FINDINGS: VISUALIZED BRAIN: No acute abnormalities are seen. HEAD/NECK: There is a physiologic distribution of FDG. No FDG avid cervical adenopathy is seen. CT images: Unremarkable. CHEST: Again seen is a patchy opacity in the posterior right upper lobe with decreased uptake compared to previously (SUV max 4.0, previously 12.0). It is similar in size but decreased in density, now measuring 3.3 x 3.8 cm (previously 3.3 x 4.0 cm). Decrease in size of a nodular opacity along the right heart border measuring 1.0 x 1.6 cm (series 4, image 84; SUV max 2.1, previously 5.2 on the recent PET/CT), and previously measuring 1.4 x 2.0 cm on the CT of April 23, 2023. 1.2 x 0.9 cm non-FDG avid nodule within the middle lobe (series 4, image 87), previously 1.3 x 1.1 cm on the CT of 4/23/2023. 0.6 x 0.7 cm non-FDG avid groundglass nodule in the middle lobe (series 4, image 83) near the adjacent fissure, previously 1.2 x 1.4 cm on the PET/CT of 3/31/2023. It is less well seen on the recent chest CT due to atelectatic changes in that region. CT images: 0.8 x 1.0 cm groundglass nodule along the left oblique fissure (series 4, image 73), stable. Improving moderate right pleural effusion with loculated features, with indwelling right apical pleural catheter background emphysema. Atherosclerotic  changes of the aorta and branching vessels including the coronary arteries.  Right chest port catheter in satisfactory position. Non- metabolically active thyroid nodule. ABDOMEN: No FDG avid soft tissue lesions are seen. CT images: Atherosclerotic changes of the aorta and branching vessels including the coronary arteries. Stable appearance of the left kidney with left pelviectasis, but normal caliber left ureter. PELVIS: No FDG avid soft tissue lesions are seen. CT images: Bowel staple line again noted in the rectosigmoid colon. OSSEOUS STRUCTURES: No FDG avid lesions are seen. CT images: Grade 2 anterolisthesis of L5 on S1 with associated pars defects at those levels. Impression 1. Partial metabolic response to therapy. There is significantly decreased uptake corresponding to the patchy opacity in the posterior right upper lobe, which is also mildly decreased in size. 2. There is also decrease in size of nodular opacity along the right heart border, which is also significantly decreased in uptake. 3. There are a few additional smaller right lung pulmonary nodules which are decreased in size and are not metabolically active on PET on the current examination. 4. Decrease in size of a moderate right pleural effusion compared to the CT of 4/23/2023. 5. No new or progressing lesions. Workstation performed: [id number]      No Barium Swallow results available for this patient. Sanaz Li PA-C  9/18/2023  9:53 AM  Sign when Signing Visit  Thoracic Consult  Assessment/Plan:    No problem-specific Assessment & Plan notes found for this encounter.       Diagnoses and all orders for this visit:    Shortness of breath  -     Ambulatory referral to Thoracic Surgery    Non-small cell cancer of right lung [facility])  -     Ambulatory referral to Thoracic Surgery    Malignant pleural effusion  -     Ambulatory referral to Thoracic Surgery         Thoracic History   Diagnosis: Stage IV right adenocarcinoma, malignant pleural effusion    Procedures/Surgeries: 2019 R lower lobectomy [facility])    Pathology:    Adjuvant Therapy: Subjective:   Patient ID: Leyda Ford is a 70 y.o. female.    Cancer Staging   Non-small cell cancer of right lung (HCC)  Staging form: Lung, AJCC 8th Edition  - Clinical: Stage ALESHA (cT1c, cN2, cM1a) - Signed by Denise Huston DO on 6/9/2023  Oncology History Overview Note   5/2019 - Stage IA adenocarcinoma of the right lung s/p RLLectomy    11/2022 - fall after tripping on a dog gate - CT showed pulmonary nodules that required 3 month followup    3/2023 - CT showed right pleural effusion with enlarging lung lesion, she was otherwise symptomatic, thoracentesis cell block showed adenocarcinoma c/w her prior lung primary    PET - no disease outside the chest    4/20/2023 - carbo/pem/pembro    5/11/2-23 - cycle 2, pemetrexed dose reduced by 20% and carbo dose reduced to AUC 4 due to nausea and fatigue    7/2023 - removed Poly as of cycle 5 due to good response and increasing fatigue    8/2023 - add back Poly for cycle 6 due to increasing output from right pleural catheter    8/24/2023 - remove carboplatin for cycle 7 because no impact was made on output from pleural catheter     Non-small cell cancer of right lung (HCC)   4/7/2023 Initial Diagnosis    Non-small cell cancer of right lung ([address])     4/20/2023 -  Chemotherapy    cyanocobalamin, 1,000 mcg, Intramuscular, Once, 5 of 10 cycles  Administration: 1,000 mcg (4/13/2023), 1,000 mcg (6/1/2023), 1,000 mcg (8/3/2023), 1,000 mcg (8/24/2023), 1,000 mcg (9/14/2023)  alteplase (CATHFLO), 2 mg, Intracatheter, Every 1 Minute as needed, 8 of 13 cycles  fosaprepitant (EMEND) IVPB, 150 mg, Intravenous, Once, 5 of 5 cycles  Administration: 150 mg (4/20/2023), 150 mg (6/1/2023), 150 mg (6/22/2023), 150 mg (5/11/2023), 150 mg (8/3/2023)  CARBOplatin (PARAPLATIN) IVPB (GOG AUC DOSING), 553 mg, Intravenous, Once, 5 of 5 cycles  Administration: 553 mg (4/20/2023), 442.4 mg (6/1/2023), 442.4 mg (6/22/2023), 442.4 mg (5/11/2023), 438 mg (8/3/2023)  pemetrexed (Clarissa Aden) chemo infusion, 945 mg, Intravenous, Once, 8 of 13 cycles  Dose modification: 400 mg/m2 (original dose 500 mg/m2, Cycle 3, Reason: Nausea/Vomiting, Comment: 20% dose reduction due to nausea)  Administration: 900 mg (4/20/2023), 756 mg (6/1/2023), 756 mg (6/22/2023), 756 mg (8/3/2023), 756 mg (5/11/2023), 756 mg (7/13/2023), 756 mg (8/24/2023), 756 mg (9/14/2023)  pembrolizumab (KEYTRUDA) IVPB, 200 mg, Intravenous, Once, 8 of 13 cycles  Administration: 200 mg (4/20/2023), 200 mg (6/1/2023), 200 mg (6/22/2023), 200 mg (8/3/2023), 200 mg (5/11/2023), 200 mg (7/13/2023), 200 mg (8/24/2023), 200 mg (9/14/2023)     6/9/2023 -  Cancer Staged    Staging form: Lung, AJCC 8th Edition  - Clinical: Stage ALESHA (cT1c, cN2, cM1a) - Signed by Mau Hanson DO on 6/9/2023           HPI   ECOG ***    Ms Bg Han is a 70year-old female with a PMH of a right lower lobe adenocarcinoma s/p right lower lobectomy 2019 (Darryl Gill) now with metastatic pleural effusion upstaging her to Stage IV adenocarcinoma currently on Alimta and pembrolizumab under the direction of Dr Franky Roland, COPD, HLD, HTN who was referred to our office by Dr Nixon Montero for evaluation of a recurrent malignant pleural effusion. She currently has an ASEPT in place and drains *** *** radiation     CTA chest on 9/6/2023 was personally reviewed by myself in PACS and reveals a small loculated right pleural effusion with indwelling tunneled pleural catheter. PSH: VATS *** Right lowerl obectomy 2019 [facility]), no other CT surgeries    They are on no AC/AP medications.      On discussion ***      The following portions of the patient's history were reviewed and updated as appropriate: allergies, current medications, past family history, past medical history, past social history, past surgical history and problem list.    Past Medical History:   Diagnosis Date   • Cancer ([address])    • COPD (chronic obstructive pulmonary disease) ([address])    • Emphysema of lung ([address])    • Hyperlipidemia    • Hypertension    • Lung cancer ([address] [facility]) 2019    right lower lobe removed   • Lung nodule    • Pleural effusion       Past Surgical History:   Procedure Laterality Date   • APPENDECTOMY     • BREAST BIOPSY Right     benign   • BRONCHOSCOPY  2019   • COLON SURGERY     • HYSTERECTOMY      age 48   • IR PLEURAL EFFUSION LONG-TERM CATHETER PLACEMENT  2023   • IR PORT PLACEMENT  2023   • IR THORACENTESIS  2023   • IR THORACENTESIS  2023   • IR THORACENTESIS  2023   • LUNG BIOPSY  2019   • LUNG LOBECTOMY     • OOPHORECTOMY Bilateral     age 48      Family History   Problem Relation Age of Onset   • Colon cancer Mother    • Hypertension Mother    • Hyperlipidemia Mother    • Hearing loss Mother    • Depression Mother    • COPD Mother    • Cancer Mother         Lung   • Arthritis Mother    • Lung cancer Mother    • Drug abuse Brother    • Diabetes Maternal Grandmother    • Cancer Maternal Grandmother         Colon   • Colon cancer Maternal Grandmother    • Cancer Maternal Aunt    • Colon cancer Maternal Aunt    • Colon cancer Maternal Aunt    • Cancer Maternal Aunt         Colon      Social History     Socioeconomic History   • Marital status: /Civil Union     Spouse name: Not on file   • Number of children: Not on file   • Years of education: Not on file   • Highest education level: Not on file   Occupational History   • Not on file   Tobacco Use   • Smoking status: Former     Packs/day: 1.50     Years: 50.00     Total pack years: 75.00     Types: Cigarettes     Start date:      Quit date: 3/15/2023     Years since quittin.5   • Smokeless tobacco: Never   Vaping Use   • Vaping Use: Never used   Substance and Sexual Activity   • Alcohol use: Not Currently     Comment: Rarely drink, socially only   • Drug use: No   • Sexual activity: Not Currently     Partners: Male     Birth control/protection: Post-menopausal, Female Sterilization   Other Topics Concern   • Not on file   Social History Narrative    Lives with      Social Determinants of Health     Financial Resource Strain: Not on file   Food Insecurity: No Food Insecurity (4/24/2023)    Hunger Vital Sign    • Worried About Running Out of Food in the Last Year: Never true    • Ran Out of Food in the Last Year: Never true   Transportation Needs: No Transportation Needs (4/24/2023)    PRAPARE - Transportation    • Lack of Transportation (Medical): No    • Lack of Transportation (Non-Medical): No   Physical Activity: Not on file   Stress: Not on file   Social Connections: Not on file   Intimate Partner Violence: Not on file   Housing Stability: Low Risk  (4/24/2023)    Housing Stability Vital Sign    • Unable to Pay for Housing in the Last Year: No    • Number of Places Lived in the Last Year: 1    • Unstable Housing in the Last Year: No      Review of Systems   Constitutional: Negative for chills, diaphoresis and unexpected weight change. HENT: Negative. Eyes: Negative. Respiratory: Negative for cough, shortness of breath and wheezing. Cardiovascular: Negative for chest pain and leg swelling. Gastrointestinal: Negative for abdominal pain, diarrhea and nausea. Endocrine: Negative. Genitourinary: Negative. Musculoskeletal: Negative. Skin: Negative. Allergic/Immunologic: Negative. Neurological: Negative. Hematological: Negative for adenopathy. Does not bruise/bleed easily. Psychiatric/Behavioral: Negative. All other systems reviewed and are negative. Objective:  Physical Exam  Vitals reviewed. Constitutional:       General: She is not in acute distress. Appearance: Normal appearance. She is not ill-appearing. HENT:      Head: Normocephalic. Nose: Nose normal.      Mouth/Throat:      Mouth: Mucous membranes are moist.   Eyes:      Pupils: Pupils are equal, round, and reactive to light.    Cardiovascular:      Rate and Rhythm: Normal rate and regular rhythm. Heart sounds: Normal heart sounds. Pulmonary:      Effort: Pulmonary effort is normal.      Breath sounds: Normal breath sounds. No wheezing, rhonchi or rales. Abdominal:      Palpations: Abdomen is soft. Musculoskeletal:         General: No swelling. Normal range of motion. Lymphadenopathy:      Cervical: No cervical adenopathy. Skin:     General: Skin is warm. Neurological:      General: No focal deficit present. Mental Status: She is alert and oriented to person, place, and time. Psychiatric:         Mood and Affect: Mood normal.     There were no vitals taken for this visit. XR chest pa & lateral    Result Date: 9/6/2023  Impression Right chest tube with small right effusion with no pneumothorax. Persistent right base opacity with nodular opacity in the right midlung. Workstation performed: [id number]     XR chest pa & lateral    Result Date: 5/28/2023  Impression Right pleural drainage catheter with trace right effusion with no pneumothorax. Right base opacity, due to tumor and likely atelectasis/scar when compared with the CT. Workstation performed: [id number]      CT chest with contrast    Result Date: 11/13/2022  Narrative CT CHEST WITH IV CONTRAST INDICATION:   Chest wall pain left chest wall tenderness, fall. COMPARISON:  CT chest 8/19/2019 TECHNIQUE: CT examination of the chest was performed. Axial, sagittal, and coronal 2D reformatted images were created from the source data and submitted for interpretation. Radiation dose length product (DLP) for this visit:  441.8 mGy-cm . This examination, like all CT scans performed in the [facility], was performed utilizing techniques to minimize radiation dose exposure, including the use of iterative reconstruction and automated exposure control. IV Contrast:  85 mL of iohexol (OMNIPAQUE)  350 FINDINGS: LUNGS:  No lung contusion or infiltrate. Post right lower lobectomy. COPD.  Scattered areas of pulmonary scarring. Several abnormal nodular pulmonary opacities; representative lesions will be measured on series 3: Image 46, posterior right upper lobe, 2.5 cm solid nodule, new. Image 51, left lower lobe, 8 mm groundglass nodule, stable. Image 54, posterior right upper lobe, 1.8 cm groundglass nodule previously 1.5 cm. Image 68, right middle lobe, 1.3 cm solid nodule previously 6 mm. Image 74, posterior right lower lobe, 1 cm solid nodule unchanged in size although previously groundglass in appearance. New clustered punctate juxtapleural nodules in the right middle lobe marked on series 3. Mucosal strands in the trachea. Central airways otherwise clear. PLEURA:  Unremarkable. HEART/GREAT VESSELS: Heart is not enlarged. No pericardial effusion. Aortic and coronary artery calcification. No thoracic aortic aneurysm. MEDIASTINUM AND SUMIT:  Right perihilar postsurgical changes. No enlarged lymph nodes. CHEST WALL AND LOWER NECK:  Grossly stable nodule in the left lobe of the thyroid gland. Stable tiny calcifications in the right breast. VISUALIZED STRUCTURES IN THE UPPER ABDOMEN:  Bilateral extrarenal pelvis, left larger than right. OSSEOUS STRUCTURES:  Subtle acute nondisplaced fracture of the left fourth and sixth anterior and seventh anterolateral ribs. No osseous destructive lesion identified. Degenerative changes of the spine. Mild dextroconvex thoracic scoliosis. Impression No evidence of acute intrathoracic process. Acute nondisplaced fracture of the left fourth, sixth, and seventh ribs. New and enlarging pulmonary nodules since August 2019, the largest of which measures 2.5 cm. Based on current Fleischner Society 2017 Guidelines on incidental pulmonary nodule, either PET/CT scan evaluation, tissue sampling or short term interval followup non-contrast CT followup (initially in 3 months) may be considered appropriate. Additional chronic findings and negatives as above.  The study was marked in [facility] for immediate notification. This study demonstrates a finding requiring imaging follow-up and was documented as such in Epic. Workstation performed: [id number]     No CT Chest,Abdomen,Pelvis results available for this patient. NM PET CT skull base to mid thigh    Result Date: 7/10/2023  Narrative PET/CT SCAN INDICATION: C34.91: Malignant neoplasm of unspecified part of right bronchus or lung   ; 5/2019 - Stage IA adenocarcinoma of the right lung s/p RLLectomy 11/2022 - fall after tripping on a dog gate - CT showed pulmonary nodules that required 3 month followup 3/2023 - CT showed right pleural effusion with enlarging lung lesion, she was otherwise symptomatic, thoracentesis cell block showed adenocarcinoma c/w her prior lung primary PET - no disease outside the chest 4/20/2023 - carbo/pem/pembro 5/11/2-23 - cycle 2, pemetrexed dose reduced by 20% and carbo dose reduced to AUC 4 due to nausea and fatigue MODIFIER: PI PS COMPARISON: FDG PET/CT 3/31/2023. CELL TYPE: RLL lobectomy (+) invasive adenocarcinoma, acinar predominant  , recent thoracentesis on 3/24/2023 demonstrated metastatic adenocarcinoma. TECHNIQUE:   10.3 mCi F-18-FDG administered IV. Multiplanar attenuation corrected and non attenuation corrected PET images are available for interpretation, and contiguous, low dose, axial CT sections were obtained from the skull base through the femurs. Intravenous contrast material was not utilized. This examination, like all CT scans performed in the [facility], was performed utilizing techniques to minimize radiation dose exposure, including the use of iterative reconstruction and automated exposure control. Fasting serum glucose: 85 mg/dl FINDINGS: VISUALIZED BRAIN: No acute abnormalities are seen. HEAD/NECK: There is a physiologic distribution of FDG. No FDG avid cervical adenopathy is seen. CT images: Unremarkable.  CHEST: Again seen is a patchy opacity in the posterior right upper lobe with decreased uptake compared to previously (SUV max 4.0, previously 12.0). It is similar in size but decreased in density, now measuring 3.3 x 3.8 cm (previously 3.3 x 4.0 cm). Decrease in size of a nodular opacity along the right heart border measuring 1.0 x 1.6 cm (series 4, image 84; SUV max 2.1, previously 5.2 on the recent PET/CT), and previously measuring 1.4 x 2.0 cm on the CT of April 23, 2023. 1.2 x 0.9 cm non-FDG avid nodule within the middle lobe (series 4, image 87), previously 1.3 x 1.1 cm on the CT of 4/23/2023. 0.6 x 0.7 cm non-FDG avid groundglass nodule in the middle lobe (series 4, image 83) near the adjacent fissure, previously 1.2 x 1.4 cm on the PET/CT of 3/31/2023. It is less well seen on the recent chest CT due to atelectatic changes in that region. CT images: 0.8 x 1.0 cm groundglass nodule along the left oblique fissure (series 4, image 73), stable. Improving moderate right pleural effusion with loculated features, with indwelling right apical pleural catheter background emphysema. Atherosclerotic  changes of the aorta and branching vessels including the coronary arteries. Right chest port catheter in satisfactory position. Non- metabolically active thyroid nodule. ABDOMEN: No FDG avid soft tissue lesions are seen. CT images: Atherosclerotic changes of the aorta and branching vessels including the coronary arteries. Stable appearance of the left kidney with left pelviectasis, but normal caliber left ureter. PELVIS: No FDG avid soft tissue lesions are seen. CT images: Bowel staple line again noted in the rectosigmoid colon. OSSEOUS STRUCTURES: No FDG avid lesions are seen. CT images: Grade 2 anterolisthesis of L5 on S1 with associated pars defects at those levels. Impression 1. Partial metabolic response to therapy. There is significantly decreased uptake corresponding to the patchy opacity in the posterior right upper lobe, which is also mildly decreased in size.  2. There is also decrease in size of nodular opacity along the right heart border, which is also significantly decreased in uptake. 3. There are a few additional smaller right lung pulmonary nodules which are decreased in size and are not metabolically active on PET on the current examination. 4. Decrease in size of a moderate right pleural effusion compared to the CT of 4/23/2023. 5. No new or progressing lesions. Workstation performed: [id number]      No Barium Swallow results available for this patient.

## 2023-09-21 ENCOUNTER — HOME CARE VISIT (OUTPATIENT)
Dept: HOME HEALTH SERVICES | Facility: HOME HEALTHCARE | Age: 63
End: 2023-09-21
Payer: COMMERCIAL

## 2023-09-21 VITALS
SYSTOLIC BLOOD PRESSURE: 120 MMHG | DIASTOLIC BLOOD PRESSURE: 70 MMHG | RESPIRATION RATE: 16 BRPM | OXYGEN SATURATION: 96 % | TEMPERATURE: 98.1 F | HEART RATE: 100 BPM

## 2023-09-21 PROCEDURE — G0300 HHS/HOSPICE OF LPN EA 15 MIN: HCPCS

## 2023-09-23 ENCOUNTER — HOME CARE VISIT (OUTPATIENT)
Dept: HOME HEALTH SERVICES | Facility: HOME HEALTHCARE | Age: 63
End: 2023-09-23
Payer: COMMERCIAL

## 2023-09-23 VITALS
TEMPERATURE: 97.8 F | OXYGEN SATURATION: 96 % | DIASTOLIC BLOOD PRESSURE: 72 MMHG | HEART RATE: 102 BPM | SYSTOLIC BLOOD PRESSURE: 148 MMHG | RESPIRATION RATE: 20 BRPM

## 2023-09-23 PROCEDURE — G0299 HHS/HOSPICE OF RN EA 15 MIN: HCPCS

## 2023-09-25 ENCOUNTER — HOME CARE VISIT (OUTPATIENT)
Dept: HOME HEALTH SERVICES | Facility: HOME HEALTHCARE | Age: 63
End: 2023-09-25
Payer: COMMERCIAL

## 2023-09-25 VITALS
SYSTOLIC BLOOD PRESSURE: 138 MMHG | RESPIRATION RATE: 20 BRPM | DIASTOLIC BLOOD PRESSURE: 65 MMHG | HEART RATE: 92 BPM | OXYGEN SATURATION: 96 %

## 2023-09-25 PROCEDURE — G0299 HHS/HOSPICE OF RN EA 15 MIN: HCPCS

## 2023-09-27 ENCOUNTER — HOME CARE VISIT (OUTPATIENT)
Dept: HOME HEALTH SERVICES | Facility: HOME HEALTHCARE | Age: 63
End: 2023-09-27
Payer: COMMERCIAL

## 2023-09-27 VITALS
OXYGEN SATURATION: 94 % | DIASTOLIC BLOOD PRESSURE: 70 MMHG | RESPIRATION RATE: 18 BRPM | HEART RATE: 102 BPM | SYSTOLIC BLOOD PRESSURE: 142 MMHG

## 2023-09-27 PROCEDURE — G0299 HHS/HOSPICE OF RN EA 15 MIN: HCPCS

## 2023-09-28 ENCOUNTER — HOSPITAL ENCOUNTER (INPATIENT)
Facility: HOSPITAL | Age: 63
LOS: 1 days | Discharge: HOME | End: 2023-09-29
Attending: EMERGENCY MEDICINE | Admitting: HOSPITALIST
Payer: COMMERCIAL

## 2023-09-28 ENCOUNTER — APPOINTMENT (EMERGENCY)
Dept: CT IMAGING | Facility: HOSPITAL | Age: 63
End: 2023-09-28
Payer: COMMERCIAL

## 2023-09-28 DIAGNOSIS — J18.9 PNEUMONIA: Primary | ICD-10-CM

## 2023-09-28 DIAGNOSIS — J91.0 MALIGNANT PLEURAL EFFUSION: ICD-10-CM

## 2023-09-28 DIAGNOSIS — C34.91 NON-SMALL CELL CANCER OF RIGHT LUNG (HCC): ICD-10-CM

## 2023-09-28 PROBLEM — A41.9 SEPSIS (HCC): Status: ACTIVE | Noted: 2023-09-28

## 2023-09-28 LAB
ALBUMIN SERPL BCP-MCNC: 3.5 G/DL (ref 3.5–5)
ALP SERPL-CCNC: 119 U/L (ref 34–104)
ALT SERPL W P-5'-P-CCNC: 36 U/L (ref 7–52)
ANION GAP SERPL CALCULATED.3IONS-SCNC: 10 MMOL/L
AST SERPL W P-5'-P-CCNC: 30 U/L (ref 13–39)
BASOPHILS # BLD AUTO: 0.01 THOUSANDS/ÂΜL (ref 0–0.1)
BASOPHILS NFR BLD AUTO: 0 % (ref 0–1)
BILIRUB SERPL-MCNC: 0.24 MG/DL (ref 0.2–1)
BILIRUB UR QL STRIP: NEGATIVE
BNP SERPL-MCNC: 26 PG/ML (ref 0–100)
BUN SERPL-MCNC: 12 MG/DL (ref 5–25)
CALCIUM SERPL-MCNC: 9.4 MG/DL (ref 8.4–10.2)
CARDIAC TROPONIN I PNL SERPL HS: 4 NG/L
CHLORIDE SERPL-SCNC: 96 MMOL/L (ref 96–108)
CLARITY UR: CLEAR
CO2 SERPL-SCNC: 25 MMOL/L (ref 21–32)
COLOR UR: ABNORMAL
CREAT SERPL-MCNC: 0.58 MG/DL (ref 0.6–1.3)
EOSINOPHIL # BLD AUTO: 0.02 THOUSAND/ÂΜL (ref 0–0.61)
EOSINOPHIL NFR BLD AUTO: 0 % (ref 0–6)
ERYTHROCYTE [DISTWIDTH] IN BLOOD BY AUTOMATED COUNT: 14 % (ref 11.6–15.1)
FLUAV RNA RESP QL NAA+PROBE: NEGATIVE
FLUBV RNA RESP QL NAA+PROBE: NEGATIVE
GFR SERPL CREATININE-BSD FRML MDRD: 97 ML/MIN/1.73SQ M
GLUCOSE SERPL-MCNC: 108 MG/DL (ref 65–140)
GLUCOSE UR STRIP-MCNC: NEGATIVE MG/DL
HCT VFR BLD AUTO: 28.7 % (ref 34.8–46.1)
HGB BLD-MCNC: 9.3 G/DL (ref 11.5–15.4)
HGB UR QL STRIP.AUTO: NEGATIVE
IMM GRANULOCYTES # BLD AUTO: 0.07 THOUSAND/UL (ref 0–0.2)
IMM GRANULOCYTES NFR BLD AUTO: 1 % (ref 0–2)
KETONES UR STRIP-MCNC: NEGATIVE MG/DL
LEUKOCYTE ESTERASE UR QL STRIP: NEGATIVE
LIPASE SERPL-CCNC: 10 U/L (ref 11–82)
LYMPHOCYTES # BLD AUTO: 0.55 THOUSANDS/ÂΜL (ref 0.6–4.47)
LYMPHOCYTES NFR BLD AUTO: 7 % (ref 14–44)
MCH RBC QN AUTO: 32.4 PG (ref 26.8–34.3)
MCHC RBC AUTO-ENTMCNC: 32.4 G/DL (ref 31.4–37.4)
MCV RBC AUTO: 100 FL (ref 82–98)
MONOCYTES # BLD AUTO: 1.43 THOUSAND/ÂΜL (ref 0.17–1.22)
MONOCYTES NFR BLD AUTO: 17 % (ref 4–12)
NEUTROPHILS # BLD AUTO: 6.39 THOUSANDS/ÂΜL (ref 1.85–7.62)
NEUTS SEG NFR BLD AUTO: 75 % (ref 43–75)
NITRITE UR QL STRIP: NEGATIVE
NRBC BLD AUTO-RTO: 0 /100 WBCS
PH UR STRIP.AUTO: 6 [PH]
PLATELET # BLD AUTO: 374 THOUSANDS/UL (ref 149–390)
PMV BLD AUTO: 9.1 FL (ref 8.9–12.7)
POTASSIUM SERPL-SCNC: 3.7 MMOL/L (ref 3.5–5.3)
PROCALCITONIN SERPL-MCNC: 0.1 NG/ML
PROT SERPL-MCNC: 6.7 G/DL (ref 6.4–8.4)
PROT UR STRIP-MCNC: NEGATIVE MG/DL
RBC # BLD AUTO: 2.87 MILLION/UL (ref 3.81–5.12)
RSV RNA RESP QL NAA+PROBE: NEGATIVE
SARS-COV-2 RNA RESP QL NAA+PROBE: NEGATIVE
SODIUM SERPL-SCNC: 131 MMOL/L (ref 135–147)
SP GR UR STRIP.AUTO: <1.005 (ref 1–1.03)
UROBILINOGEN UR STRIP-ACNC: <2 MG/DL
WBC # BLD AUTO: 8.47 THOUSAND/UL (ref 4.31–10.16)

## 2023-09-28 PROCEDURE — 81003 URINALYSIS AUTO W/O SCOPE: CPT

## 2023-09-28 PROCEDURE — 99223 1ST HOSP IP/OBS HIGH 75: CPT | Performed by: INTERNAL MEDICINE

## 2023-09-28 PROCEDURE — 74177 CT ABD & PELVIS W/CONTRAST: CPT

## 2023-09-28 PROCEDURE — 99285 EMERGENCY DEPT VISIT HI MDM: CPT

## 2023-09-28 PROCEDURE — 93005 ELECTROCARDIOGRAM TRACING: CPT

## 2023-09-28 PROCEDURE — 84484 ASSAY OF TROPONIN QUANT: CPT

## 2023-09-28 PROCEDURE — 84145 PROCALCITONIN (PCT): CPT

## 2023-09-28 PROCEDURE — 85025 COMPLETE CBC W/AUTO DIFF WBC: CPT

## 2023-09-28 PROCEDURE — 87040 BLOOD CULTURE FOR BACTERIA: CPT | Performed by: INTERNAL MEDICINE

## 2023-09-28 PROCEDURE — 80053 COMPREHEN METABOLIC PANEL: CPT

## 2023-09-28 PROCEDURE — G1004 CDSM NDSC: HCPCS

## 2023-09-28 PROCEDURE — 71275 CT ANGIOGRAPHY CHEST: CPT

## 2023-09-28 PROCEDURE — 0241U HB NFCT DS VIR RESP RNA 4 TRGT: CPT

## 2023-09-28 PROCEDURE — 85049 AUTOMATED PLATELET COUNT: CPT | Performed by: INTERNAL MEDICINE

## 2023-09-28 PROCEDURE — 83880 ASSAY OF NATRIURETIC PEPTIDE: CPT

## 2023-09-28 PROCEDURE — 36415 COLL VENOUS BLD VENIPUNCTURE: CPT

## 2023-09-28 PROCEDURE — 83690 ASSAY OF LIPASE: CPT

## 2023-09-28 PROCEDURE — 83605 ASSAY OF LACTIC ACID: CPT | Performed by: INTERNAL MEDICINE

## 2023-09-28 RX ORDER — SERTRALINE HYDROCHLORIDE 100 MG/1
200 TABLET, FILM COATED ORAL DAILY
Status: DISCONTINUED | OUTPATIENT
Start: 2023-09-29 | End: 2023-09-29 | Stop reason: HOSPADM

## 2023-09-28 RX ORDER — CEFTRIAXONE 1 G/50ML
1000 INJECTION, SOLUTION INTRAVENOUS ONCE
Status: COMPLETED | OUTPATIENT
Start: 2023-09-28 | End: 2023-09-29

## 2023-09-28 RX ORDER — FLUTICASONE FUROATE AND VILANTEROL 200; 25 UG/1; UG/1
1 POWDER RESPIRATORY (INHALATION)
Status: DISCONTINUED | OUTPATIENT
Start: 2023-09-29 | End: 2023-09-29 | Stop reason: HOSPADM

## 2023-09-28 RX ORDER — MELATONIN
1000 DAILY
Status: DISCONTINUED | OUTPATIENT
Start: 2023-09-29 | End: 2023-09-29 | Stop reason: HOSPADM

## 2023-09-28 RX ORDER — HEPARIN SODIUM 5000 [USP'U]/ML
5000 INJECTION, SOLUTION INTRAVENOUS; SUBCUTANEOUS EVERY 8 HOURS SCHEDULED
Status: DISCONTINUED | OUTPATIENT
Start: 2023-09-29 | End: 2023-09-29 | Stop reason: HOSPADM

## 2023-09-28 RX ORDER — POTASSIUM CHLORIDE 20 MEQ/1
20 TABLET, EXTENDED RELEASE ORAL DAILY
Status: DISCONTINUED | OUTPATIENT
Start: 2023-09-29 | End: 2023-09-29 | Stop reason: HOSPADM

## 2023-09-28 RX ORDER — LORAZEPAM 0.5 MG/1
0.5 TABLET ORAL 2 TIMES DAILY PRN
Status: DISCONTINUED | OUTPATIENT
Start: 2023-09-28 | End: 2023-09-29 | Stop reason: HOSPADM

## 2023-09-28 RX ORDER — SENNOSIDES 8.6 MG
2 TABLET ORAL DAILY PRN
Status: DISCONTINUED | OUTPATIENT
Start: 2023-09-28 | End: 2023-09-29 | Stop reason: HOSPADM

## 2023-09-28 RX ORDER — ALBUTEROL SULFATE 90 UG/1
2 AEROSOL, METERED RESPIRATORY (INHALATION) EVERY 6 HOURS PRN
Status: DISCONTINUED | OUTPATIENT
Start: 2023-09-28 | End: 2023-09-29 | Stop reason: HOSPADM

## 2023-09-28 RX ORDER — FOLIC ACID 1 MG/1
1 TABLET ORAL DAILY
Status: DISCONTINUED | OUTPATIENT
Start: 2023-09-29 | End: 2023-09-29 | Stop reason: HOSPADM

## 2023-09-28 RX ORDER — ALBUTEROL SULFATE 90 UG/1
2 AEROSOL, METERED RESPIRATORY (INHALATION) EVERY 4 HOURS
Status: DISCONTINUED | OUTPATIENT
Start: 2023-09-28 | End: 2023-09-28

## 2023-09-28 RX ORDER — DIPHENHYDRAMINE HCL 25 MG
25 TABLET ORAL
Status: DISCONTINUED | OUTPATIENT
Start: 2023-09-28 | End: 2023-09-29 | Stop reason: HOSPADM

## 2023-09-28 RX ORDER — FUROSEMIDE 20 MG/1
20 TABLET ORAL EVERY MORNING
Status: DISCONTINUED | OUTPATIENT
Start: 2023-09-29 | End: 2023-09-29 | Stop reason: HOSPADM

## 2023-09-28 RX ORDER — CEFTRIAXONE 1 G/50ML
1000 INJECTION, SOLUTION INTRAVENOUS EVERY 24 HOURS
Status: DISCONTINUED | OUTPATIENT
Start: 2023-09-29 | End: 2023-09-29

## 2023-09-28 RX ORDER — ASCORBIC ACID 500 MG
1000 TABLET ORAL DAILY
Status: DISCONTINUED | OUTPATIENT
Start: 2023-09-29 | End: 2023-09-29 | Stop reason: HOSPADM

## 2023-09-28 RX ORDER — ATORVASTATIN CALCIUM 20 MG/1
20 TABLET, FILM COATED ORAL
Status: DISCONTINUED | OUTPATIENT
Start: 2023-09-28 | End: 2023-09-29 | Stop reason: HOSPADM

## 2023-09-28 RX ORDER — LOSARTAN POTASSIUM 50 MG/1
100 TABLET ORAL DAILY
Status: DISCONTINUED | OUTPATIENT
Start: 2023-09-29 | End: 2023-09-29 | Stop reason: HOSPADM

## 2023-09-28 RX ORDER — BUPROPION HYDROCHLORIDE 300 MG/1
300 TABLET ORAL DAILY
Status: DISCONTINUED | OUTPATIENT
Start: 2023-09-29 | End: 2023-09-29 | Stop reason: HOSPADM

## 2023-09-28 RX ORDER — ALBUTEROL SULFATE 2.5 MG/3ML
2.5 SOLUTION RESPIRATORY (INHALATION) EVERY 6 HOURS PRN
Status: DISCONTINUED | OUTPATIENT
Start: 2023-09-28 | End: 2023-09-29 | Stop reason: HOSPADM

## 2023-09-28 RX ADMIN — IOHEXOL 100 ML: 350 INJECTION, SOLUTION INTRAVENOUS at 19:43

## 2023-09-28 NOTE — ED PROVIDER NOTES
History  Chief Complaint   Patient presents with    Shortness of Breath     Patient presents to the ED with c/o SOB, states lung CA patient, states had CT three weeks ago for same but has not improved      This is a 71 y/o female with PMH lung cancer, HTN, HLD who presents to the ER today with shortness of breath x 3 weeks and lower abdominal pain x one week. Patient states she has shortness of breath both at rest and with activity which is new for her. She admits to some chest discomfort with the shortness of breath but denies any chest pain. She also admits to a slight cough and feeling hot and cold. Says over the past week she has developed some lower abdominal pain that is worse when she has a bowel movement. Says its in the middle and it radiates over to the left. Also having some intermittent nausea. She denies any dizziness, coughing up blood, vomiting, headaches, numbness, tingling, weakness, URI symptoms, fevers, chills. Denies any recent travel, hospitalizations or surgeries. Per chart review patient has an ASEPT catheter for a chronic malignant pleural effusion that she has drained 3x per week and she saw her oncologist for her shortness of breath 3 weeks ago and had an outpatient CT that did not show any acute findings. History provided by:  Patient   used: No        Prior to Admission Medications   Prescriptions Last Dose Informant Patient Reported? Taking?    Ascorbic Acid (vitamin C) 1000 MG tablet  Self Yes No   Si daily   Biotin 61172 MCG TABS  Self Yes No   Si daily   Cholecalciferol (Vitamin D3) 25 MCG (1000 UT) CAPS  Self Yes No   Si capsule every 24 hours   EPINEPHrine (EPIPEN) 0.3 mg/0.3 mL SOAJ  Self No No   Sig: Inject 0.3 mL (0.3 mg total) into a muscle once for 1 dose   Patient taking differently: Inject 0.3 mg into a muscle once PRN   Fluticasone-Salmeterol (Advair Diskus) 500-50 mcg/dose inhaler   No No   Sig: Inhale 1 puff 2 (two) times a day Rinse mouth after use. Klor-Con M20 20 MEQ tablet   No No   Sig: TAKE 1 TABLET BY MOUTH EVERY DAY   LORazepam (ATIVAN) 1 mg tablet  Self Yes No   Si/2 TABLET TWICE A DAY AS NEEDED FOR ANXIETY ORALLY 30 DAYS   Multiple Vitamin (MULTIVITAMINS PO)  Self Yes No   Sig: every 24 hours   Probiotic Product (PROBIOTIC DAILY PO)  Self Yes No   Si daily   Sennosides (Senna) 8.6 MG CAPS  Self Yes No   Sig: Take 2 capsules by mouth as needed (constipation). Indications: Constipation   albuterol (2.5 mg/3 mL) 0.083 % nebulizer solution  Self No No   Sig: Take 3 mL (2.5 mg total) by nebulization every 6 (six) hours as needed for wheezing or shortness of breath   albuterol (PROVENTIL HFA,VENTOLIN HFA) 90 mcg/act inhaler  Self Yes No   Sig: Inhale 2 puffs every 4 (four) hours   atorvastatin (LIPITOR) 20 mg tablet  Self Yes No   Sig: Take 20 mg by mouth daily. buPROPion (WELLBUTRIN XL) 300 mg 24 hr tablet  Self Yes No   Sig: TAKE 1 TABLET BY MOUTH EVERY DAY IN THE MORNING FOR 90 DAYS   dexamethasone (DECADRON) 4 mg tablet  Self No No   Sig: TAKE 1 TABLET (4 MG TOTAL) BY MOUTH 2 (TWO) TIMES A DAY WITH MEALS TAKE 1 TABLET BY MOUTH THE DAY BEFORE, THE DAY OF, AND THE DAY AFTER CHEMOTHERAPY. dexamethasone sodium phosphate 0.1 % ophthalmic solution  Self No No   Sig: Administer 1 drop to both eyes every 3 (three) hours as needed (eye irritation)   diphenhydrAMINE (BENADRYL) 50 MG tablet  Self No No   Sig: Take 1 tablet (50 mg total) by mouth every 6 (six) hours as needed for itching (Rash)   diphenhydrAMINE-APAP, sleep, (TYLENOL PM EXTRA STRENGTH PO)  Self Yes No   Sig: Take 325 mg by mouth daily at bedtime   famotidine (PEPCID) 20 mg tablet  Self Yes No   Sig: Take 20 mg by mouth daily.  Indications: Heartburn   folic acid (KP Folic Acid) 1 mg tablet  Self No No   Sig: Take 1 tablet (1 mg total) by mouth daily   furosemide (LASIX) 20 mg tablet  Self No No   Sig: Take 1 tablet (20 mg total) by mouth every morning lidocaine-prilocaine (EMLA) cream   No No   Sig: APPLY TO PORT 30 TO 60 MINUTES PRIOR TO USE   ondansetron (ZOFRAN) 8 mg tablet  Self No No   Sig: Take 1 tablet (8 mg total) by mouth every 8 (eight) hours as needed for nausea or vomiting   Patient taking differently: Take 8 mg by mouth every 8 (eight) hours as needed for nausea or vomiting PRN   prochlorperazine (COMPAZINE) 10 mg tablet  Self No No   Sig: Take 1 tablet (10 mg total) by mouth every 6 (six) hours as needed for nausea   sertraline (ZOLOFT) 100 mg tablet  Self Yes No   Si daily   telmisartan (MICARDIS) 80 MG tablet  Self No No   Sig: Take 1 tablet (80 mg total) by mouth daily   verapamil (CALAN-SR) 120 mg CR tablet  Self No No   Sig: Take 1 tablet (120 mg total) by mouth daily at bedtime 1 hs   verapamil (CALAN-SR) 240 mg CR tablet  Self No No   Sig: Take 1 tablet (240 mg total) by mouth daily at bedtime 1 hs      Facility-Administered Medications: None       Past Medical History:   Diagnosis Date    Cancer ([facility])     COPD (chronic obstructive pulmonary disease) (HCC)     Emphysema of lung (HCC)     Hyperlipidemia     Hypertension     Lung cancer ([address]) 2019    right lower lobe removed    Lung nodule     Pleural effusion        Past Surgical History:   Procedure Laterality Date    APPENDECTOMY      BREAST BIOPSY Right     benign    BRONCHOSCOPY  2019    COLON SURGERY      HYSTERECTOMY      age 48    IR PLEURAL EFFUSION LONG-TERM CATHETER PLACEMENT  2023    IR PORT PLACEMENT  2023    IR THORACENTESIS  2023    IR THORACENTESIS  2023    IR THORACENTESIS  2023    LUNG BIOPSY  2019    LUNG LOBECTOMY      OOPHORECTOMY Bilateral     age 48       Family History   Problem Relation Age of Onset    Colon cancer Mother     Hypertension Mother     Hyperlipidemia Mother     Hearing loss Mother     Depression Mother     COPD Mother     Cancer Mother         Lung    Arthritis Mother     Lung cancer Mother     Drug abuse Brother     Diabetes Maternal Grandmother     Cancer Maternal Grandmother         Colon    Colon cancer Maternal Grandmother     Cancer Maternal Aunt     Colon cancer Maternal Aunt     Colon cancer Maternal Aunt     Cancer Maternal Aunt         Colon     I have reviewed and agree with the history as documented. E-Cigarette/Vaping    E-Cigarette Use Never User      E-Cigarette/Vaping Substances    Nicotine No     THC No     CBD No     Flavoring No     Other No     Unknown No      Social History     Tobacco Use    Smoking status: Former     Packs/day: 1.50     Years: 50.00     Total pack years: 75.00     Types: Cigarettes     Start date: 5     Quit date: 3/15/2023     Years since quittin.5    Smokeless tobacco: Never   Vaping Use    Vaping Use: Never used   Substance Use Topics    Alcohol use: Not Currently     Comment: Rarely drink, socially only    Drug use: No       Review of Systems   Constitutional:  Positive for chills. Negative for fever. HENT:  Negative for congestion, rhinorrhea and sore throat. Respiratory:  Positive for cough and shortness of breath. Negative for chest tightness. Cardiovascular:  Negative for chest pain. Gastrointestinal:  Positive for nausea. Negative for abdominal pain and vomiting. Genitourinary:  Negative for difficulty urinating. Skin:  Negative for color change. Neurological:  Negative for dizziness and headaches. Psychiatric/Behavioral:  Negative for behavioral problems and sleep disturbance. All other systems reviewed and are negative. Physical Exam  Physical Exam  Vitals and nursing note reviewed. Constitutional:       General: She is awake. Appearance: Normal appearance. She is well-developed. HENT:      Head: Normocephalic and atraumatic. Right Ear: External ear normal.      Left Ear: External ear normal.      Nose: Nose normal.   Eyes:      General: No scleral icterus. Extraocular Movements: Extraocular movements intact. Cardiovascular:      Rate and Rhythm: Normal rate and regular rhythm. Heart sounds: Normal heart sounds, S1 normal and S2 normal. No murmur heard. No gallop. Pulmonary:      Effort: Pulmonary effort is normal.      Breath sounds: Examination of the right-lower field reveals decreased breath sounds. Examination of the left-lower field reveals decreased breath sounds. Decreased breath sounds present. No wheezing, rhonchi or rales. Comments: O2 sat 90-95% on RA. Slight Increased work of breathing when talking. Musculoskeletal:         General: Normal range of motion. Cervical back: Normal range of motion. Skin:     General: Skin is warm and dry. Neurological:      General: No focal deficit present. Mental Status: She is alert. Psychiatric:         Attention and Perception: Attention and perception normal.         Mood and Affect: Mood normal.         Behavior: Behavior normal. Behavior is cooperative.          Vital Signs  ED Triage Vitals   Temperature Pulse Respirations Blood Pressure SpO2   09/28/23 1729 09/28/23 1728 09/28/23 1728 09/28/23 1728 09/28/23 1728   98.7 °F (37.1 °C) 95 18 141/67 93 %      Temp Source Heart Rate Source Patient Position - Orthostatic VS BP Location FiO2 (%)   09/28/23 1729 09/28/23 1728 09/28/23 1728 09/28/23 1728 --   Temporal Monitor Sitting Left arm       Pain Score       09/28/23 1728       No Pain           Vitals:    09/28/23 2000 09/28/23 2030 09/28/23 2100 09/28/23 2249   BP: 146/68 131/60 130/60 141/74   Pulse: 93 93 95 96   Patient Position - Orthostatic VS:             Visual Acuity      ED Medications  Medications   iohexol injection SOLN 50 mL (has no administration in time range)   cefTRIAXone (ROCEPHIN) IVPB (premix in dextrose) 1,000 mg 50 mL (has no administration in time range)   cefTRIAXone (ROCEPHIN) IVPB (premix in dextrose) 1,000 mg 50 mL (has no administration in time range)   albuterol (PROVENTIL HFA,VENTOLIN HFA) inhaler 2 puff (has no administration in time range)   atorvastatin (LIPITOR) tablet 20 mg (has no administration in time range)   cholecalciferol (VITAMIN D3) tablet 1,000 Units (has no administration in time range)   diphenhydrAMINE (BENADRYL) tablet 25 mg (has no administration in time range)   fluticasone-vilanterol 200-25 mcg/actuation 1 puff (has no administration in time range)   folic acid (FOLVITE) tablet 1 mg (has no administration in time range)   furosemide (LASIX) tablet 20 mg (has no administration in time range)   potassium chloride (K-DUR,KLOR-CON) CR tablet 20 mEq (has no administration in time range)   multivitamin stress formula tablet 1 tablet (has no administration in time range)   losartan (COZAAR) tablet 100 mg (has no administration in time range)   verapamil (CALAN-SR) CR tablet 360 mg (has no administration in time range)   albuterol inhalation solution 2.5 mg (has no administration in time range)   senna (SENOKOT) tablet 17.2 mg (has no administration in time range)   ascorbic acid (VITAMIN C) tablet 1,000 mg (has no administration in time range)   buPROPion (WELLBUTRIN XL) 24 hr tablet 300 mg (has no administration in time range)   LORazepam (ATIVAN) tablet 0.5 mg (has no administration in time range)   sertraline (ZOLOFT) tablet 200 mg (has no administration in time range)   heparin (porcine) subcutaneous injection 5,000 Units (has no administration in time range)   iohexol (OMNIPAQUE) 350 MG/ML injection (MULTI-DOSE) 100 mL (100 mL Intravenous Given 9/28/23 1943)       Diagnostic Studies  Results Reviewed       Procedure Component Value Units Date/Time    Procalcitonin [[id number]]  (Normal) Collected: 09/28/23 1811    Lab Status: Final result Specimen: Blood from Arm, Left Updated: 09/28/23 2309     Procalcitonin 0.10 ng/ml     Blood culture #1 [[id number]]     Lab Status: No result Specimen: Blood     Blood culture #2 [[id number]]     Lab Status: No result Specimen: Blood     Lactic acid [[id number]] Lab Status: No result Specimen: Blood     B-Type Natriuretic Peptide(BNP) [[id number]]  (Normal) Collected: 09/28/23 1807    Lab Status: Final result Specimen: Blood from Arm, Right Updated: 09/28/23 1937     BNP 26 pg/mL     FLU/RSV/COVID - if FLU/RSV clinically relevant [[id number]]  (Normal) Collected: 09/28/23 1807    Lab Status: Final result Specimen: Nares from Nose Updated: 09/28/23 1933     SARS-CoV-2 Negative     INFLUENZA A PCR Negative     INFLUENZA B PCR Negative     RSV PCR Negative    Narrative:      FOR PEDIATRIC PATIENTS - copy/paste COVID Guidelines URL to browser: [email or web address]    SARS-CoV-2 assay is a Nucleic Acid Amplification assay intended for the  qualitative detection of nucleic acid from SARS-CoV-2 in nasopharyngeal  swabs. Results are for the presumptive identification of SARS-CoV-2 RNA. Positive results are indicative of infection with SARS-CoV-2, the virus  causing COVID-19, but do not rule out bacterial infection or co-infection  with other viruses. Laboratories within the [facility] and its  territories are required to report all positive results to the appropriate  public health authorities. Negative results do not preclude SARS-CoV-2  infection and should not be used as the sole basis for treatment or other  patient management decisions. Negative results must be combined with  clinical observations, patient history, and epidemiological information. This test has not been FDA cleared or approved. This test has been authorized by FDA under an Emergency Use Authorization  (EUA). This test is only authorized for the duration of time the  declaration that circumstances exist justifying the authorization of the  emergency use of an in vitro diagnostic tests for detection of SARS-CoV-2  virus and/or diagnosis of COVID-19 infection under section 564(b)(1) of  the Act, 21 U. S.C. 344ALG-5(X)(9), unless the authorization is terminated  or revoked sooner. The test has been validated but independent review by FDA  and CLIA is pending. Test performed using [employer] GeneXpert: This RT-PCR assay targets N2,  a region unique to SARS-CoV-2. A conserved region in the E-gene was chosen  for pan-Sarbecovirus detection which includes SARS-CoV-2. According to CMS-2020-01-R, this platform meets the definition of high-throughput technology.     HS Troponin 0hr (reflex protocol) [[id number]]  (Normal) Collected: 09/28/23 1807    Lab Status: Final result Specimen: Blood from Arm, Right Updated: 09/28/23 1902     hs TnI 0hr 4 ng/L     Comprehensive metabolic panel [[id number]]  (Abnormal) Collected: 09/28/23 1807    Lab Status: Final result Specimen: Blood from Arm, Right Updated: 09/28/23 1900     Sodium 131 mmol/L      Potassium 3.7 mmol/L      Chloride 96 mmol/L      CO2 25 mmol/L      ANION GAP 10 mmol/L      BUN 12 mg/dL      Creatinine 0.58 mg/dL      Glucose 108 mg/dL      Calcium 9.4 mg/dL      AST 30 U/L      ALT 36 U/L      Alkaline Phosphatase 119 U/L      Total Protein 6.7 g/dL      Albumin 3.5 g/dL      Total Bilirubin 0.24 mg/dL      eGFR 97 ml/min/1.73sq m     Narrative:      [place] guidelines for Chronic Kidney Disease (CKD):     Stage 1 with normal or high GFR (GFR > 90 mL/min/1.73 square meters)    Stage 2 Mild CKD (GFR = 60-89 mL/min/1.73 square meters)    Stage 3A Moderate CKD (GFR = 45-59 mL/min/1.73 square meters)    Stage 3B Moderate CKD (GFR = 30-44 mL/min/1.73 square meters)    Stage 4 Severe CKD (GFR = 15-29 mL/min/1.73 square meters)    Stage 5 End Stage CKD (GFR <15 mL/min/1.73 square meters)  Note: GFR calculation is accurate only with a steady state creatinine    Lipase [[id number]]  (Abnormal) Collected: 09/28/23 1811    Lab Status: Final result Specimen: Blood from Arm, Left Updated: 09/28/23 1858     Lipase 10 u/L     UA w Reflex to Microscopic w Reflex to Culture [[id number]]  (Abnormal) Collected: 09/28/23 1812    Lab Status: Final result Specimen: Urine, Clean Catch Updated: 09/28/23 1848     Color, UA Light Yellow     Clarity, UA Clear     Specific Gravity, UA <1.005     pH, UA 6.0     Leukocytes, UA Negative     Nitrite, UA Negative     Protein, UA Negative mg/dl      Glucose, UA Negative mg/dl      Ketones, UA Negative mg/dl      Urobilinogen, UA <2.0 mg/dl      Bilirubin, UA Negative     Occult Blood, UA Negative    CBC and differential [[id number]]  (Abnormal) Collected: 09/28/23 1807    Lab Status: Final result Specimen: Blood from Arm, Right Updated: 09/28/23 1842     WBC 8.47 Thousand/uL      RBC 2.87 Million/uL      Hemoglobin 9.3 g/dL      Hematocrit 28.7 %       fL      MCH 32.4 pg      MCHC 32.4 g/dL      RDW 14.0 %      MPV 9.1 fL      Platelets 345 Thousands/uL      nRBC 0 /100 WBCs      Neutrophils Relative 75 %      Immat GRANS % 1 %      Lymphocytes Relative 7 %      Monocytes Relative 17 %      Eosinophils Relative 0 %      Basophils Relative 0 %      Neutrophils Absolute 6.39 Thousands/µL      Immature Grans Absolute 0.07 Thousand/uL      Lymphocytes Absolute 0.55 Thousands/µL      Monocytes Absolute 1.43 Thousand/µL      Eosinophils Absolute 0.02 Thousand/µL      Basophils Absolute 0.01 Thousands/µL                    PE Study with CT Abdomen and Pelvis with contrast   Final Result by Rylan Garcia MD (09/28 2119)      Masslike consolidation along the posterior right upper lobe extending to the right hilum reflecting known underlying neoplasm is unchanged compared to 9/5/2023 and increased in size compared to 4/23/2023. Superimposed pneumonia, atelectasis, or    lymphangitic spread of tumor not excluded. Several new left upper lobe nodules measuring up to 7 mm are concerning for tumoral spread.       New groundglass airspace disease at the left upper lobe/lingula concerning for pneumonia      Small loculated right pleural effusion status post tunneled pleural catheter. Increasing retroperitoneal adenopathy. This finding should be followed on subsequent imaging studies to exclude narinder spread of disease                  Workstation performed: [id number]                    Procedures  ECG 12 Lead Documentation Only    Date/Time: 9/28/2023 5:32 PM    Performed by: Ann Verma PA-C  Authorized by: Ann Verma PA-C    Indications / Diagnosis:  Shortness of breath  Patient location:  ED  Previous ECG:     Previous ECG:  Unavailable  Interpretation:     Interpretation: normal    Rate:     ECG rate:  98    ECG rate assessment: normal    Rhythm:     Rhythm: sinus rhythm    Ectopy:     Ectopy: none    ST segments:     ST segments:  Normal           ED Course  ED Course as of 09/28/23 2328   Thu Sep 28, 2023   2144 Ambulatory pulse ox was 91-93%   2204 TT SLIM             SBIRT 22yo+      Flowsheet Row Most Recent Value   Initial Alcohol Screen: US AUDIT-C     1. How often do you have a drink containing alcohol? 0 Filed at: 09/28/2023 1730   2. How many drinks containing alcohol do you have on a typical day you are drinking? 0 Filed at: 09/28/2023 1730   3a. Male UNDER 65: How often do you have five or more drinks on one occasion? 0 Filed at: 09/28/2023 1730   3b. FEMALE Any Age, or MALE 65+: How often do you have 4 or more drinks on one occassion? 0 Filed at: 09/28/2023 1730   Audit-C Score 0 Filed at: 09/28/2023 1730   BEVERLEY: How many times in the past year have you. .. Used an illegal drug or used a prescription medication for non-medical reasons?  Never Filed at: 09/28/2023 1730                      Medical Decision Making  69 y/o female here for SOB x 3 weeks  Differential diagnosis including but not limited to: metastasis/worsening malignancy, ACS, arrhythmia, PE, pleural effusion, myocarditis, pneumonia, diverticulitis, UTI, ureterolithiasis, appendicitis,   Assessment: pneumonia complicated by non-small cell cancer of right lung and malignant pleural effusion   Plan:  patients lab showed chronic anemia and hyponatremia but no signs of immunodepression or leukocytosis. However with possible worsening cancer as well as new pneumonia and hypoxia, discussed with patient the benefits of hospitalization for IV antibiotics and she agreed with this plan. Heart score of 3. Discussed case with ECHO Watson and patient accepted to internal med service under Dr Shah Blood and/or Complexity of Data Reviewed  Labs: ordered. Radiology: ordered. Risk  Prescription drug management. Decision regarding hospitalization. Disposition  Final diagnoses:   Pneumonia   Non-small cell cancer of right lung ([address])   Malignant pleural effusion     Time reflects when diagnosis was documented in both MDM as applicable and the Disposition within this note       Time User Action Codes Description Comment    9/28/2023 10:14 PM Milton Roman Add [J18.9] Pneumonia     9/28/2023 10:14 PM Milton Roman Add [C34.91] Non-small cell cancer of right lung ([address])     9/28/2023 10:14 PM Milton Roman Add [J91.0] Malignant pleural effusion           ED Disposition       ED Disposition   Admit    Condition   Stable    Date/Time   u Sep 28, 2023 2214    Comment   Case was discussed with Osito Watson and the patient's admission status was agreed to be Admission Status: inpatient status to the service of Dr. Freddy Singleton .                Follow-up Information    None         Current Discharge Medication List        CONTINUE these medications which have NOT CHANGED    Details   albuterol (2.5 mg/3 mL) 0.083 % nebulizer solution Take 3 mL (2.5 mg total) by nebulization every 6 (six) hours as needed for wheezing or shortness of breath  Qty: 270 mL, Refills: 4    Associated Diagnoses: Chronic obstructive pulmonary disease, unspecified COPD type (HCC)      albuterol (PROVENTIL HFA,VENTOLIN HFA) 90 mcg/act inhaler Inhale 2 puffs every 4 (four) hours  Refills: 3    Comments: <!--EPICS-->Substitution to a formulary equivalent within the same pharmaceutical class is authorized. <!--EPICE-->      Ascorbic Acid (vitamin C) 1000 MG tablet 1 daily      atorvastatin (LIPITOR) 20 mg tablet Take 20 mg by mouth daily. Biotin 73076 MCG TABS 1 daily      buPROPion (WELLBUTRIN XL) 300 mg 24 hr tablet TAKE 1 TABLET BY MOUTH EVERY DAY IN THE MORNING FOR 90 DAYS      Cholecalciferol (Vitamin D3) 25 MCG (1000 UT) CAPS 1 capsule every 24 hours      dexamethasone (DECADRON) 4 mg tablet TAKE 1 TABLET (4 MG TOTAL) BY MOUTH 2 (TWO) TIMES A DAY WITH MEALS TAKE 1 TABLET BY MOUTH THE DAY BEFORE, THE DAY OF, AND THE DAY AFTER CHEMOTHERAPY. Qty: 6 tablet, Refills: 6    Associated Diagnoses: Non-small cell cancer of right lung (HCC)      dexamethasone sodium phosphate 0.1 % ophthalmic solution Administer 1 drop to both eyes every 3 (three) hours as needed (eye irritation)  Qty: 5 mL, Refills: 5    Associated Diagnoses: Other conjunctivitis of both eyes      diphenhydrAMINE (BENADRYL) 50 MG tablet Take 1 tablet (50 mg total) by mouth every 6 (six) hours as needed for itching (Rash)  Qty: 30 tablet, Refills: 0    Associated Diagnoses: Drug rash      diphenhydrAMINE-APAP, sleep, (TYLENOL PM EXTRA STRENGTH PO) Take 325 mg by mouth daily at bedtime      EPINEPHrine (EPIPEN) 0.3 mg/0.3 mL SOAJ Inject 0.3 mL (0.3 mg total) into a muscle once for 1 dose  Qty: 0.6 mL, Refills: 0    Associated Diagnoses: Local reaction to bee sting, accidental or unintentional, initial encounter      famotidine (PEPCID) 20 mg tablet Take 20 mg by mouth daily. Indications: Heartburn      Fluticasone-Salmeterol (Advair Diskus) 500-50 mcg/dose inhaler Inhale 1 puff 2 (two) times a day Rinse mouth after use. Qty: 180 blister, Refills: 3    Comments: Substitution to a formulary equivalent within the same pharmaceutical class is authorized.   Associated Diagnoses: Chronic obstructive pulmonary disease, unspecified COPD type ([address])      folic acid ( Folic Acid) 1 mg tablet Take 1 tablet (1 mg total) by mouth daily  Qty: 90 tablet, Refills: 1    Associated Diagnoses: Non-small cell cancer of right lung (HCC)      furosemide (LASIX) 20 mg tablet Take 1 tablet (20 mg total) by mouth every morning  Qty: 90 tablet, Refills: 1    Associated Diagnoses: Essential hypertension      Klor-Con M20 20 MEQ tablet TAKE 1 TABLET BY MOUTH EVERY DAY  Qty: 30 tablet, Refills: 1    Associated Diagnoses: Non-small cell cancer of right lung ([address]); Hypokalemia      lidocaine-prilocaine (EMLA) cream APPLY TO PORT 30 TO 60 MINUTES PRIOR TO USE  Qty: 30 g, Refills: 3    Associated Diagnoses: Non-small cell cancer of right lung (HCC)      LORazepam (ATIVAN) 1 mg tablet 1/2 TABLET TWICE A DAY AS NEEDED FOR ANXIETY ORALLY 30 DAYS  Refills: 0      Multiple Vitamin (MULTIVITAMINS PO) every 24 hours      ondansetron (ZOFRAN) 8 mg tablet Take 1 tablet (8 mg total) by mouth every 8 (eight) hours as needed for nausea or vomiting  Qty: 30 tablet, Refills: 0    Associated Diagnoses: Non-small cell cancer of right lung (HCC)      Probiotic Product (PROBIOTIC DAILY PO) 1 daily      prochlorperazine (COMPAZINE) 10 mg tablet Take 1 tablet (10 mg total) by mouth every 6 (six) hours as needed for nausea  Qty: 30 tablet, Refills: 3    Associated Diagnoses: Non-small cell cancer of right lung (HCC)      Sennosides (Senna) 8.6 MG CAPS Take 2 capsules by mouth as needed (constipation).  Indications: Constipation      sertraline (ZOLOFT) 100 mg tablet 2 daily      telmisartan (MICARDIS) 80 MG tablet Take 1 tablet (80 mg total) by mouth daily  Qty: 90 tablet, Refills: 1    Comments: D/c telmisartan/hctz  Associated Diagnoses: Essential hypertension      !! verapamil (CALAN-SR) 120 mg CR tablet Take 1 tablet (120 mg total) by mouth daily at bedtime 1 hs  Qty: 90 tablet, Refills: 3    Associated Diagnoses: Essential hypertension      !! verapamil (CALAN-SR) 240 mg CR tablet Take 1 tablet (240 mg total) by mouth daily at bedtime 1 hs  Qty: 90 tablet, Refills: 3    Comments: Cancel rx for verapamil 360 if not available! Associated Diagnoses: Essential hypertension       !! - Potential duplicate medications found. Please discuss with provider. No discharge procedures on file.     PDMP Review         Value Time User    PDMP Reviewed  Yes 6/6/2023 12:56 PM Kimberly Hubbard PA-C            ED Provider  Electronically Signed by             Wilhemena Bloch, PA-C  09/28/23 2812

## 2023-09-29 ENCOUNTER — HOME CARE VISIT (OUTPATIENT)
Dept: HOME HEALTH SERVICES | Facility: HOME HEALTHCARE | Age: 63
End: 2023-09-29
Payer: COMMERCIAL

## 2023-09-29 VITALS
BODY MASS INDEX: 30.99 KG/M2 | DIASTOLIC BLOOD PRESSURE: 65 MMHG | HEART RATE: 83 BPM | WEIGHT: 186 LBS | HEIGHT: 65 IN | TEMPERATURE: 97 F | RESPIRATION RATE: 16 BRPM | OXYGEN SATURATION: 94 % | SYSTOLIC BLOOD PRESSURE: 124 MMHG

## 2023-09-29 PROBLEM — R65.10 SIRS (SYSTEMIC INFLAMMATORY RESPONSE SYNDROME) (HCC): Status: RESOLVED | Noted: 2023-09-28 | Resolved: 2023-09-29

## 2023-09-29 PROBLEM — R65.10 SIRS (SYSTEMIC INFLAMMATORY RESPONSE SYNDROME) (HCC): Status: ACTIVE | Noted: 2023-09-28

## 2023-09-29 PROBLEM — R05.9 COUGH: Status: ACTIVE | Noted: 2023-09-28

## 2023-09-29 LAB
ANION GAP SERPL CALCULATED.3IONS-SCNC: 10 MMOL/L
ATRIAL RATE: 108 BPM
ATRIAL RATE: 98 BPM
BASOPHILS # BLD AUTO: 0.01 THOUSANDS/ÂΜL (ref 0–0.1)
BASOPHILS NFR BLD AUTO: 0 % (ref 0–1)
BUN SERPL-MCNC: 8 MG/DL (ref 5–25)
CALCIUM SERPL-MCNC: 9 MG/DL (ref 8.4–10.2)
CHLORIDE SERPL-SCNC: 100 MMOL/L (ref 96–108)
CO2 SERPL-SCNC: 23 MMOL/L (ref 21–32)
CREAT SERPL-MCNC: 0.53 MG/DL (ref 0.6–1.3)
EOSINOPHIL # BLD AUTO: 0.02 THOUSAND/ÂΜL (ref 0–0.61)
EOSINOPHIL NFR BLD AUTO: 0 % (ref 0–6)
ERYTHROCYTE [DISTWIDTH] IN BLOOD BY AUTOMATED COUNT: 14.1 % (ref 11.6–15.1)
GFR SERPL CREATININE-BSD FRML MDRD: 99 ML/MIN/1.73SQ M
GLUCOSE SERPL-MCNC: 96 MG/DL (ref 65–140)
HCT VFR BLD AUTO: 27.4 % (ref 34.8–46.1)
HGB BLD-MCNC: 9 G/DL (ref 11.5–15.4)
IMM GRANULOCYTES # BLD AUTO: 0.04 THOUSAND/UL (ref 0–0.2)
IMM GRANULOCYTES NFR BLD AUTO: 1 % (ref 0–2)
L PNEUMO1 AG UR QL IA.RAPID: NEGATIVE
LACTATE SERPL-SCNC: 0.7 MMOL/L (ref 0.5–2)
LYMPHOCYTES # BLD AUTO: 0.46 THOUSANDS/ÂΜL (ref 0.6–4.47)
LYMPHOCYTES NFR BLD AUTO: 7 % (ref 14–44)
MCH RBC QN AUTO: 32.5 PG (ref 26.8–34.3)
MCHC RBC AUTO-ENTMCNC: 32.8 G/DL (ref 31.4–37.4)
MCV RBC AUTO: 99 FL (ref 82–98)
MONOCYTES # BLD AUTO: 1.13 THOUSAND/ÂΜL (ref 0.17–1.22)
MONOCYTES NFR BLD AUTO: 16 % (ref 4–12)
NEUTROPHILS # BLD AUTO: 5.34 THOUSANDS/ÂΜL (ref 1.85–7.62)
NEUTS SEG NFR BLD AUTO: 76 % (ref 43–75)
NRBC BLD AUTO-RTO: 0 /100 WBCS
P AXIS: 47 DEGREES
P AXIS: 56 DEGREES
PLATELET # BLD AUTO: 343 THOUSANDS/UL (ref 149–390)
PLATELET # BLD AUTO: 346 THOUSANDS/UL (ref 149–390)
PMV BLD AUTO: 8.6 FL (ref 8.9–12.7)
PMV BLD AUTO: 8.7 FL (ref 8.9–12.7)
POTASSIUM SERPL-SCNC: 3.7 MMOL/L (ref 3.5–5.3)
PR INTERVAL: 182 MS
PR INTERVAL: 196 MS
PROCALCITONIN SERPL-MCNC: 0.11 NG/ML
QRS AXIS: 18 DEGREES
QRS AXIS: 86 DEGREES
QRSD INTERVAL: 124 MS
QRSD INTERVAL: 80 MS
QT INTERVAL: 340 MS
QT INTERVAL: 352 MS
QTC INTERVAL: 449 MS
QTC INTERVAL: 455 MS
RBC # BLD AUTO: 2.77 MILLION/UL (ref 3.81–5.12)
S PNEUM AG UR QL: NEGATIVE
SODIUM SERPL-SCNC: 133 MMOL/L (ref 135–147)
T WAVE AXIS: 48 DEGREES
T WAVE AXIS: 58 DEGREES
VENTRICULAR RATE: 108 BPM
VENTRICULAR RATE: 98 BPM
WBC # BLD AUTO: 7 THOUSAND/UL (ref 4.31–10.16)

## 2023-09-29 PROCEDURE — 80048 BASIC METABOLIC PNL TOTAL CA: CPT | Performed by: INTERNAL MEDICINE

## 2023-09-29 PROCEDURE — 87449 NOS EACH ORGANISM AG IA: CPT | Performed by: INTERNAL MEDICINE

## 2023-09-29 PROCEDURE — 85025 COMPLETE CBC W/AUTO DIFF WBC: CPT | Performed by: INTERNAL MEDICINE

## 2023-09-29 PROCEDURE — 93010 ELECTROCARDIOGRAM REPORT: CPT | Performed by: INTERNAL MEDICINE

## 2023-09-29 PROCEDURE — 84145 PROCALCITONIN (PCT): CPT | Performed by: INTERNAL MEDICINE

## 2023-09-29 PROCEDURE — 99236 HOSP IP/OBS SAME DATE HI 85: CPT | Performed by: INTERNAL MEDICINE

## 2023-09-29 PROCEDURE — 99223 1ST HOSP IP/OBS HIGH 75: CPT | Performed by: INTERNAL MEDICINE

## 2023-09-29 RX ORDER — DOCUSATE SODIUM 100 MG/1
100 CAPSULE, LIQUID FILLED ORAL 2 TIMES DAILY
Status: DISCONTINUED | OUTPATIENT
Start: 2023-09-29 | End: 2023-09-29 | Stop reason: HOSPADM

## 2023-09-29 RX ADMIN — LORAZEPAM 0.5 MG: 0.5 TABLET ORAL at 00:14

## 2023-09-29 RX ADMIN — MULTIPLE VITAMINS W/ MINERALS TAB 1 TABLET: TAB ORAL at 08:34

## 2023-09-29 RX ADMIN — FUROSEMIDE 20 MG: 20 TABLET ORAL at 08:34

## 2023-09-29 RX ADMIN — SERTRALINE 200 MG: 100 TABLET, FILM COATED ORAL at 08:34

## 2023-09-29 RX ADMIN — DOCUSATE SODIUM 100 MG: 100 CAPSULE, LIQUID FILLED ORAL at 11:43

## 2023-09-29 RX ADMIN — HEPARIN SODIUM 5000 UNITS: 5000 INJECTION, SOLUTION INTRAVENOUS; SUBCUTANEOUS at 13:10

## 2023-09-29 RX ADMIN — HEPARIN SODIUM 5000 UNITS: 5000 INJECTION, SOLUTION INTRAVENOUS; SUBCUTANEOUS at 05:36

## 2023-09-29 RX ADMIN — LOSARTAN POTASSIUM 100 MG: 50 TABLET, FILM COATED ORAL at 08:34

## 2023-09-29 RX ADMIN — BUPROPION HYDROCHLORIDE 300 MG: 300 TABLET, FILM COATED, EXTENDED RELEASE ORAL at 08:33

## 2023-09-29 RX ADMIN — OXYCODONE HYDROCHLORIDE AND ACETAMINOPHEN 1000 MG: 500 TABLET ORAL at 08:33

## 2023-09-29 RX ADMIN — CEFTRIAXONE 1000 MG: 1 INJECTION, SOLUTION INTRAVENOUS at 00:14

## 2023-09-29 RX ADMIN — Medication 1000 UNITS: at 08:34

## 2023-09-29 RX ADMIN — ATORVASTATIN CALCIUM 20 MG: 20 TABLET, FILM COATED ORAL at 00:14

## 2023-09-29 RX ADMIN — VERAPAMIL HYDROCHLORIDE 360 MG: 180 TABLET ORAL at 08:46

## 2023-09-29 RX ADMIN — FOLIC ACID 1 MG: 1 TABLET ORAL at 08:33

## 2023-09-29 RX ADMIN — POTASSIUM CHLORIDE 20 MEQ: 1500 TABLET, EXTENDED RELEASE ORAL at 08:33

## 2023-09-29 RX ADMIN — FLUTICASONE FUROATE AND VILANTEROL TRIFENATATE 1 PUFF: 200; 25 POWDER RESPIRATORY (INHALATION) at 08:36

## 2023-09-29 NOTE — PLAN OF CARE

## 2023-09-29 NOTE — PLAN OF CARE
Problem: Potential for Falls  Goal: Patient will remain free of falls  Description: INTERVENTIONS:  - Educate patient/family on patient safety including physical limitations  - Instruct patient to call for assistance with activity   - Consult OT/PT to assist with strengthening/mobility   - Keep Call bell within reach  - Keep bed low and locked with side rails adjusted as appropriate  - Keep care items and personal belongings within reach  - Initiate and maintain comfort rounds  - Make Fall Risk Sign visible to staff  - Offer Toileting every X Hours, in advance of need  - Initiate/Maintain Xalarm  - Obtain necessary fall risk management equipment: X  - Apply yellow socks and bracelet for high fall risk patients  - Consider moving patient to room near nurses station  Outcome: Progressing     Problem: PAIN - ADULT  Goal: Verbalizes/displays adequate comfort level or baseline comfort level  Description: Interventions:  - Encourage patient to monitor pain and request assistance  - Assess pain using appropriate pain scale  - Administer analgesics based on type and severity of pain and evaluate response  - Implement non-pharmacological measures as appropriate and evaluate response  - Consider cultural and social influences on pain and pain management  - Notify physician/advanced practitioner if interventions unsuccessful or patient reports new pain  Outcome: Progressing     Problem: INFECTION - ADULT  Goal: Absence or prevention of progression during hospitalization  Description: INTERVENTIONS:  - Assess and monitor for signs and symptoms of infection  - Monitor lab/diagnostic results  - Monitor all insertion sites, i.e. indwelling lines, tubes, and drains  - Monitor endotracheal if appropriate and nasal secretions for changes in amount and color  - [place] appropriate cooling/warming therapies per order  - Administer medications as ordered  - Instruct and encourage patient and family to use good hand hygiene technique  - Identify and instruct in appropriate isolation precautions for identified infection/condition  Outcome: Progressing  Goal: Absence of fever/infection during neutropenic period  Description: INTERVENTIONS:  - Monitor WBC    Outcome: Progressing     Problem: SAFETY ADULT  Goal: Patient will remain free of falls  Description: INTERVENTIONS:  - Educate patient/family on patient safety including physical limitations  - Instruct patient to call for assistance with activity   - Consult OT/PT to assist with strengthening/mobility   - Keep Call bell within reach  - Keep bed low and locked with side rails adjusted as appropriate  - Keep care items and personal belongings within reach  - Initiate and maintain comfort rounds  - Make Fall Risk Sign visible to staff  - Offer Toileting every X Hours, in advance of need  - Initiate/Maintain Xalarm  - Obtain necessary fall risk management equipment: X  - Apply yellow socks and bracelet for high fall risk patients  - Consider moving patient to room near nurses station  Outcome: Progressing  Goal: Maintain or return to baseline ADL function  Description: INTERVENTIONS:  -  Assess patient's ability to carry out ADLs; assess patient's baseline for ADL function and identify physical deficits which impact ability to perform ADLs (bathing, care of mouth/teeth, toileting, grooming, dressing, etc.)  - Assess/evaluate cause of self-care deficits   - Assess range of motion  - Assess patient's mobility; develop plan if impaired  - Assess patient's need for assistive devices and provide as appropriate  - Encourage maximum independence but intervene and supervise when necessary  - Involve family in performance of ADLs  - Assess for home care needs following discharge   - Consider OT consult to assist with ADL evaluation and planning for discharge  - Provide patient education as appropriate  Outcome: Progressing  Goal: Maintains/Returns to pre admission functional level  Description: INTERVENTIONS:  - Perform BMAT or MOVE assessment daily.   - Set and communicate daily mobility goal to care team and patient/family/caregiver. - Collaborate with rehabilitation services on mobility goals if consulted  - Perform Range of Motion X times a day. - Reposition patient every XXX hours. - Dangle patient X times a day  - Stand patient X times a day  - Ambulate patient X times a day  - Out of bed to chair X times a day   - Out of bed for meals X times a day  - Out of bed for toileting  - Record patient progress and toleration of activity level   Outcome: Progressing     Problem: DISCHARGE PLANNING  Goal: Discharge to home or other facility with appropriate resources  Description: INTERVENTIONS:  - Identify barriers to discharge w/patient and caregiver  - Arrange for needed discharge resources and transportation as appropriate  - Identify discharge learning needs (meds, wound care, etc.)  - Arrange for interpretive services to assist at discharge as needed  - Refer to Case Management Department for coordinating discharge planning if the patient needs post-hospital services based on physician/advanced practitioner order or complex needs related to functional status, cognitive ability, or social support system  Outcome: Progressing     Problem: Knowledge Deficit  Goal: Patient/family/caregiver demonstrates understanding of disease process, treatment plan, medications, and discharge instructions  Description: Complete learning assessment and assess knowledge base.   Interventions:  - Provide teaching at level of understanding  - Provide teaching via preferred learning methods  Outcome: Progressing

## 2023-09-29 NOTE — ASSESSMENT & PLAN NOTE
· Na 131 today   · Has been running 132-133 on recent labs   · Reports good appetite despite cancer and chemotherapy   · Continue to monitor.

## 2023-09-29 NOTE — CONSULTS
Consult Note - Pulmonary and [address] 70 y.o. female MRN: [id number]  Unit/Bed#: [facility] Encounter: [id number]    Consult requested location: Unit/Bed#: [facility]  Physician Requesting Consult: Christine Prado MD   Reason for Consult: Progressive shortness of breath      HPI:    68-year-old female with past medical history of COPD, stage IV non-small cell cancer of the lung, hypertension and obesity presents for shortness of breath that has been progressive over the last 3 weeks. She states that she has had occasional chills but no documented fever. She has an occasional dry cough that has been worsening over this time but denies any sputum production. She denies any fever. She has been actively receiving chemotherapy. She denies any nausea vomiting or diarrhea. ROS:  Twelve point review of systems was negative except for what is otherwise mentioned.       Past Medical Hx  Past Medical History:   Diagnosis Date    Cancer ([address])     COPD (chronic obstructive pulmonary disease) ([address])     Emphysema of lung ([address])     Hyperlipidemia     Hypertension     Lung cancer ([address]) 06/26/2019    right lower lobe removed    Lung nodule     Pleural effusion            Past Surgical Hx  Past Surgical History:   Procedure Laterality Date    APPENDECTOMY      BREAST BIOPSY Right     benign    BRONCHOSCOPY  06/2019    COLON SURGERY      HYSTERECTOMY      age 48    IR PLEURAL EFFUSION LONG-TERM CATHETER PLACEMENT  04/25/2023    IR PORT PLACEMENT  04/18/2023    IR THORACENTESIS  03/24/2023    IR THORACENTESIS  04/12/2023    IR THORACENTESIS  04/18/2023    LUNG BIOPSY  06/2019    LUNG LOBECTOMY      OOPHORECTOMY Bilateral     age 48           Family Hx  Family History   Problem Relation Age of Onset    Colon cancer Mother     Hypertension Mother     Hyperlipidemia Mother     Hearing loss Mother     Depression Mother     COPD Mother     Cancer Mother         Lung    Arthritis Mother     Lung cancer Mother     Drug abuse Brother     Diabetes Maternal Grandmother     Cancer Maternal Grandmother         Colon    Colon cancer Maternal Grandmother     Cancer Maternal Aunt     Colon cancer Maternal Aunt     Colon cancer Maternal Aunt     Cancer Maternal Aunt         Colon           Occupational History:   No known previous inhalation injuries       Medications    Current Facility-Administered Medications:     albuterol (PROVENTIL HFA,VENTOLIN HFA) inhaler 2 puff, 2 puff, Inhalation, Q6H PRN, Ortiz Duvall PA-C    albuterol inhalation solution 2.5 mg, 2.5 mg, Nebulization, Q6H PRN, Ortiz Duvall PA-C    ascorbic acid (VITAMIN C) tablet 1,000 mg, 1,000 mg, Oral, Daily, Ronel Menezes PA-C, 1,000 mg at 09/29/23 7023    atorvastatin (LIPITOR) tablet 20 mg, 20 mg, Oral, Daily With Dinner, Ortiz Duvall PA-C, 20 mg at 09/29/23 0014    buPROPion (WELLBUTRIN XL) 24 hr tablet 300 mg, 300 mg, Oral, Daily, Ronel Menezes PA-C, 300 mg at 09/29/23 2036    cefTRIAXone (ROCEPHIN) IVPB (premix in dextrose) 1,000 mg 50 mL, 1,000 mg, Intravenous, Q24H, Ronel Menezes PA-C    cholecalciferol (VITAMIN D3) tablet 1,000 Units, 1,000 Units, Oral, Daily, Ronel Menezes PA-C, 1,000 Units at 09/29/23 0834    diphenhydrAMINE (BENADRYL) tablet 25 mg, 25 mg, Oral, HS PRN, Ronel Menezes PA-C    docusate sodium (COLACE) capsule 100 mg, 100 mg, Oral, BID, Ronny Thomas MD    fluticasone-vilanterol 200-25 mcg/actuation 1 puff, 1 puff, Inhalation, Daily, Ronel Menezes PA-C, 1 puff at 41/70/10 9893    folic acid (FOLVITE) tablet 1 mg, 1 mg, Oral, Daily, Ronel Menezes PA-C, 1 mg at 09/29/23 0833    furosemide (LASIX) tablet 20 mg, 20 mg, Oral, QAM, Ronel Menezes PA-C, 20 mg at 09/29/23 0834    heparin (porcine) subcutaneous injection 5,000 Units, 5,000 Units, Subcutaneous, Q8H [address], 5,000 Units at 09/29/23 0536 **AND** [COMPLETED] Platelet count, , , Once, Ronel Menezes PA-C    iohexol injection SOLN 50 mL, 50 mL, Intravenous, Once in imaging, Ortiz Duvall PA-C    LORazepam (ATIVAN) tablet 0.5 mg, 0.5 mg, Oral, BID PRN, Jocelyn Labrum, PA-C, 0.5 mg at 23 0014    losartan (COZAAR) tablet 100 mg, 100 mg, Oral, Daily, Ronel Hantz, PA-C, 100 mg at 23 0834    multivitamin-minerals (CENTRUM) tablet 1 tablet, 1 tablet, Oral, Daily, Jocelyn Labrum, PA-C, 1 tablet at 23 0834    potassium chloride (K-DUR,KLOR-CON) CR tablet 20 mEq, 20 mEq, Oral, Daily, Ronel Hantz, PA-C, 20 mEq at 23 1188    senna (SENOKOT) tablet 17.2 mg, 2 tablet, Oral, Daily PRN, Jocelyn Labrum, PA-C    sertraline (ZOLOFT) tablet 200 mg, 200 mg, Oral, Daily, Ronel Hantz, PA-C, 200 mg at 23 0834    verapamil (CALAN-SR) CR tablet 360 mg, 360 mg, Oral, Daily, Ronel Hantz, PA-C, 360 mg at 23 0846       Social History:   Social History     Socioeconomic History    Marital status: /Civil Union     Spouse name: Not on file    Number of children: Not on file    Years of education: Not on file    Highest education level: Not on file   Occupational History    Not on file   Tobacco Use    Smoking status: Former     Packs/day: 1.50     Years: 50.00     Total pack years: 75.00     Types: Cigarettes     Start date: 5     Quit date: 3/15/2023     Years since quittin.5    Smokeless tobacco: Never   Vaping Use    Vaping Use: Never used   Substance and Sexual Activity    Alcohol use: Not Currently     Comment: Rarely drink, socially only    Drug use: No    Sexual activity: Not Currently     Partners: Male     Birth control/protection: Post-menopausal, Female Sterilization   Other Topics Concern    Not on file   Social History Narrative    Lives with      Social Determinants of Health     Financial Resource Strain: Not on file   Food Insecurity: No Food Insecurity (2023)    Hunger Vital Sign     Worried About Running Out of Food in the Last Year: Never true     Ran Out of Food in the Last Year: Never true   Transportation Needs: No Transportation Needs (2023)    PRAPARE - Transportation Lack of Transportation (Medical): No     Lack of Transportation (Non-Medical): No   Physical Activity: Not on file   Stress: Not on file   Social Connections: Not on file   Intimate Partner Violence: Not on file   Housing Stability: Low Risk  (4/24/2023)    Housing Stability Vital Sign     Unable to Pay for Housing in the Last Year: No     Number of Places Lived in the Last Year: 1     Unstable Housing in the Last Year: No           Vitals: Blood pressure 138/63, pulse 95, temperature (!) 96.8 °F (36 °C), temperature source Temporal, resp. rate 18, height 5' 5" (1.651 m), weight 84.4 kg (186 lb), SpO2 96 %. , Body mass index is 30.95 kg/m². No acute distress  S1-S2  Diminished breath sounds diffusely  Soft, nontender, obese  + Mild clubbing  Awake and alert    Labs: I personally viewed and interpreted but not limited to the following laboratory studies:  Procalcitonin negative x2    Imaging:  I personally viewed and interpreted the following imaging studies:  CT chest 9/28/2023 shows right-sided partially loculated pleural effusion with pleural catheter in place. There is also a dense right lower lobe consolidation. Scattered loose consolidations with new groundglass opacification in the left upper lobe. There is scattered centrilobular predominant emphysema that is prominent in both apices. Assessment:  CT chest abnormality  COPD  Stage IV lung cancer  Malignant pleural effusion status post indwelling pleural catheter placement    Plan:  Recommend discontinuing antibiotics  New CT chest abnormality (left upper lobe groundglass opacification) is of unclear etiology. This is unlikely to be bacterial pneumonia given patient's lack of symptoms negative procalcitonin x2. Continue to monitor clinically. I suspect that abnormality is either due to lymphangitic spread of cancer or viral pneumonia.   Continue to drain indwelling pleural catheter 3 times weekly  Pulmonary medicine will continue to follow again on Monday, 10-23. Please call for any specific questions or concerns prior to that time. Plan discussed with primary team      Mynor Villalta M.D.   [facility]

## 2023-09-29 NOTE — PROGRESS NOTES
-- Patient: Lorri Dixon  -- MRN: [id number]  -- Aidin Request ID: [id number]  -- Level of care reserved: Bennett Feliz  -- [facility]  [facility] [phone]  -- Clinical needs requested:  -- Geography searched: [phone]  -- Start of Service:  -- Request sent: 2:00pm EDT on 9/29/2023 by Nini Ha  -- Partner reserved: 2:11pm EDT on 9/29/2023 by Nini Ha  -- Choice list shared:

## 2023-09-29 NOTE — ASSESSMENT & PLAN NOTE
Presents due to worsening SOB for the past 3 weeks associated with dry cough. denies known fevers. Known stage IV non-small cell lung cancer. Receiving chemotherapy  CTA chest  Masslike consolidation along the posterior right upper lobe extending to the right hilum reflecting known underlying neoplasm is unchanged compared to 9/5/2023 and increased in size compared to 4/23/2023. Superimposed pneumonia, atelectasis, or lymphangitic spread of tumor not excluded. Several new left upper lobe nodules measuring up to 7 mm are concerning for tumoral spread. New groundglass airspace disease at the left upper lobe/lingula concerning for pneumonia  Small loculated right pleural effusion status post tunneled pleural catheter. Increasing retroperitoneal adenopathy. This finding should be followed on subsequent imaging studies to exclude narinder spread of disease  Started on IV ceftriaxone. Patient's dry cough unlikely to be secondary to pneumonia. Procalcitonin negative twice, WBC within normal limits. Suspect progression of disease versus viral.  Pulmonology consulted. Follow-up with pulmonology as outpatient. Patient's oxygen saturation within normal limits at room air. Reported she has a pulse oximeter at home and the oxygen is also normal.  Will be discharged and follow-up with her pulmonologist and also with oncology.

## 2023-09-29 NOTE — ED NOTES
Pt resting SpO2 93% on RA. Pt ambulated through ED, SpO2 91-95% on RA.  Pt denies feeling out of breath     Lottie Landa RN  09/28/23 9967

## 2023-09-29 NOTE — ASSESSMENT & PLAN NOTE
· Presents due to worsening SOB for the past 3 weeks associated with cough and chills. Denies known fevers. · Known stage IV non-small cell lung cancer. Receiving chemotherapy  · Aseptic catheter in place  · CTA chest  · Masslike consolidation along the posterior right upper lobe extending to the right hilum reflecting known underlying neoplasm is unchanged compared to 9/5/2023 and increased in size compared to 4/23/2023. Superimposed pneumonia, atelectasis, or lymphangitic spread of tumor not excluded. · Several new left upper lobe nodules measuring up to 7 mm are concerning for tumoral spread. · New groundglass airspace disease at the left upper lobe/lingula concerning for pneumonia  · Small loculated right pleural effusion status post tunneled pleural catheter. · Increasing retroperitoneal adenopathy. This finding should be followed on subsequent imaging studies to exclude narinder spread of disease  · Meeting SIRS criteria with tachycardia and tachypnea.    · Lactic and procalcitonin pending  · Started on IV ceftriaxone, continue  · Blood cultures and pneumonia labs pending  · Consult pulmonology

## 2023-09-29 NOTE — DISCHARGE SUMMARY
[facility]  Discharge- Sindy Jurado 1960, 70 y.o. female MRN: [id number]  Unit/Bed#: -Kieran Encounter: [id number]  Primary Care Provider: Jaelyn Solis MD   Date and time admitted to hospital: 9/28/2023  5:35 PM    Hyponatremia  Assessment & Plan  Na 132 today -chronic  Monitor with PCP    Non-small cell cancer of right lung [facility])  Assessment & Plan  Follows with Dr. Emeterio Muro   Dx with recurrence of Lung cancer in March 2023   Cancer staged 6/9/23: Stage IV   Hx of RLL resection due to lung cancer a couple years ago   Chemotherapy since April 2023   States chemo every 3 weeks. Next chemo scheduled for October 5  Takes dexamethasone day prior, day of and day after chemotherapy  Asept catheter placed in April 2023. Had been getting it drained 2x a week, recently increased to 3x weekly   Palliative care patient -Per palliative care note patient is a DNR/DNI. However, patient stating she wants to be a full code    Essential hypertension  Assessment & Plan  Home regimen: Verapamil, telmisartan and Lasix  Continue home meds    COPD (chronic obstructive pulmonary disease) ([address])  Assessment & Plan  Does not appear in COPD exacerbation at this time  Continue home inhalers/nebulizers    * Cough  Assessment & Plan  Presents due to worsening SOB for the past 3 weeks associated with dry cough. denies known fevers. Known stage IV non-small cell lung cancer. Receiving chemotherapy  CTA chest  Masslike consolidation along the posterior right upper lobe extending to the right hilum reflecting known underlying neoplasm is unchanged compared to 9/5/2023 and increased in size compared to 4/23/2023. Superimposed pneumonia, atelectasis, or lymphangitic spread of tumor not excluded. Several new left upper lobe nodules measuring up to 7 mm are concerning for tumoral spread.   New groundglass airspace disease at the left upper lobe/lingula concerning for pneumonia  Small loculated right pleural effusion status post tunneled pleural catheter. Increasing retroperitoneal adenopathy. This finding should be followed on subsequent imaging studies to exclude narinder spread of disease  Started on IV ceftriaxone. Patient's dry cough unlikely to be secondary to pneumonia. Procalcitonin negative twice, WBC within normal limits. Suspect progression of disease versus viral.  Pulmonology consulted. Follow-up with pulmonology as outpatient. Patient's oxygen saturation within normal limits at room air. Reported she has a pulse oximeter at home and the oxygen is also normal.  Will be discharged and follow-up with her pulmonologist and also with oncology. SIRS (systemic inflammatory response syndrome) (HCC)-resolved as of 9/29/2023  Assessment & Plan  SIRS: Tachycardia and tachypnea  Likely noninfectious          Medical Problems       Resolved Problems  Date Reviewed: 9/28/2023            Resolved    SIRS (systemic inflammatory response syndrome) ([address]) 9/29/2023     Resolved by  Tavia Parry MD        Discharging Physician / Practitioner: Tavia Parry MD  PCP: Juanis Cavanaugh MD  Admission Date:   Admission Orders (From admission, onward)       Ordered        09/28/23 2215  [address]  Once                          Discharge Date: 09/29/23    Consultations During Hospital Stay:  Pulmonology    Procedures Performed:   None    Significant Findings / Test Results:   PE Study with CT Abdomen and Pelvis with contrast  Result Date: 9/28/2023  Impression: Masslike consolidation along the posterior right upper lobe extending to the right hilum reflecting known underlying neoplasm is unchanged compared to 9/5/2023 and increased in size compared to 4/23/2023. Superimposed pneumonia, atelectasis, or lymphangitic spread of tumor not excluded. Several new left upper lobe nodules measuring up to 7 mm are concerning for tumoral spread.  New groundglass airspace disease at the left upper lobe/lingula concerning for pneumonia Small loculated right pleural effusion status post tunneled pleural catheter. Increasing retroperitoneal adenopathy. This finding should be followed on subsequent imaging studies to exclude narinder spread of disease       Incidental Findings:    NONE      Test Results Pending at Discharge (will require follow up):   none     Outpatient Tests Requested:  None     Complications:  none     Reason for Admission: sob, cough    Hospital Course:   Steph Ramírez is a 70 y.o. female patient with medical history of COPD, hypertension, non-small cell cancer, obesity who originally presented to the hospital on 9/28/2023 due to dry cough for 3 weeks and worsening shortness of breath. Patient denies chills, fevers, productive cough. Has a pulse oximeter at home and per patient her saturations are normal.  CT chest performed in the emergency department showed blood increased spread of cancer and may be pneumonia. Was started on ceftriaxone however procalcitonin was negative x2, WBC negative. Pulmonology consulted, reviewed imaging, concluded that patient likely has spread of her 2 more versus a viral infection, but not bacterial.  Patient saturating fine on room air, will be discharged home. We will follow-up with her primary care physician also has a VNA for pleural effusions. The patient, initially admitted to the hospital as inpatient, was discharged earlier than expected given the following: Pneumonia was ruled out, patient work-up in the hospital was done. .    Please see above list of diagnoses and related plan for additional information. Condition at Discharge: good    Discharge Day Visit / Exam:   Subjective: Says she feels slightly better since she was admitted. Walked with the nurse, oxygen saturation did not drop below 90.   Vitals: Blood Pressure: 124/65 (09/29/23 1532)  Pulse: 83 (09/29/23 1532)  Temperature: (!) 97 °F (36.1 °C) (09/29/23 1532)  Temp Source: Temporal (09/29/23 0711)  Respirations: 16 (09/29/23 1532)  Height: 5' 5" (165.1 cm) (09/28/23 1728)  Weight - Scale: 84.4 kg (186 lb) (09/28/23 1728)  SpO2: 94 % (09/29/23 1532)  Exam:   Physical Exam  Vitals and nursing note reviewed. Constitutional:       General: She is not in acute distress. Appearance: She is not diaphoretic. HENT:      Head: Normocephalic. Eyes:      General: No scleral icterus. Right eye: No discharge. Left eye: No discharge. Cardiovascular:      Rate and Rhythm: Normal rate and regular rhythm. Heart sounds: No murmur heard. Pulmonary:      Effort: Pulmonary effort is normal. No respiratory distress. Breath sounds: Normal breath sounds. No wheezing, rhonchi or rales. Abdominal:      General: There is no distension. Palpations: Abdomen is soft. Tenderness: There is no abdominal tenderness. There is no guarding or rebound. Musculoskeletal:      Cervical back: Normal range of motion. Right lower leg: No edema. Left lower leg: No edema. Skin:     General: Skin is warm. Neurological:      Mental Status: She is alert and oriented to person, place, and time. Psychiatric:         Mood and Affect: Mood normal.         Behavior: Behavior normal.         Thought Content: Thought content normal.         Judgment: Judgment normal.          Discussion with Family: Updated  () at bedside. Discharge instructions/Information to patient and family:   See after visit summary for information provided to patient and family. Provisions for Follow-Up Care:  See after visit summary for information related to follow-up care and any pertinent home health orders. Disposition:   Home    Planned Readmission: No     Discharge Statement:  I spent 30 minutes discharging the patient. This time was spent on the day of discharge. I had direct contact with the patient on the day of discharge.  Greater than 50% of the total time was spent examining patient, answering all patient questions, arranging and discussing plan of care with patient as well as directly providing post-discharge instructions. Additional time then spent on discharge activities. Discharge Medications:  See after visit summary for reconciled discharge medications provided to patient and/or family.       **Please Note: This note may have been constructed using a voice recognition system**

## 2023-09-29 NOTE — ASSESSMENT & PLAN NOTE
Follows with Dr. Bruna Barba   Dx with recurrence of Lung cancer in March 2023   Cancer staged 6/9/23: Stage IV   Hx of RLL resection due to lung cancer a couple years ago   Chemotherapy since April 2023   States chemo every 3 weeks. Next chemo scheduled for October 5  Takes dexamethasone day prior, day of and day after chemotherapy  Asept catheter placed in April 2023. Had been getting it drained 2x a week, recently increased to 3x weekly   Palliative care patient -Per palliative care note patient is a DNR/DNI.   However, patient stating she wants to be a full code

## 2023-09-29 NOTE — H&P
[address]  H&P  Name: Louann Gandhi 70 y.o. female I MRN: [id number]  Unit/Bed#: -01 I Date of Admission: 9/28/2023   Date of Service: 9/28/2023 I Hospital Day: 0      Assessment/Plan   * Pneumonia  Assessment & Plan  Presents due to worsening SOB for the past 3 weeks associated with cough and chills. Denies known fevers. Known stage IV non-small cell lung cancer. Receiving chemotherapy  Aseptic catheter in place  CTA chest  Masslike consolidation along the posterior right upper lobe extending to the right hilum reflecting known underlying neoplasm is unchanged compared to 9/5/2023 and increased in size compared to 4/23/2023. Superimposed pneumonia, atelectasis, or lymphangitic spread of tumor not excluded. Several new left upper lobe nodules measuring up to 7 mm are concerning for tumoral spread. New groundglass airspace disease at the left upper lobe/lingula concerning for pneumonia  Small loculated right pleural effusion status post tunneled pleural catheter. Increasing retroperitoneal adenopathy. This finding should be followed on subsequent imaging studies to exclude narinder spread of disease  Meeting SIRS criteria with tachycardia and tachypnea. Lactic and procalcitonin pending  Started on IV ceftriaxone, continue  Blood cultures and pneumonia labs pending  Consult pulmonology    Sepsis [facility])  Assessment & Plan  SIRS: Tachycardia and tachypnea  Lactic and procalcitonin pending  SOI: PNA noted on CT chest   Abx: IV ceftriaxone   BC and pneumonia labs pending     Non-small cell cancer of right lung [facility])  Assessment & Plan  Follows with Dr. Dee Dee Batres   Dx with recurrence of Lung cancer in March 2023   Cancer staged 6/9/23: Stage IV   Hx of RLL resection due to lung cancer a couple years ago   Chemotherapy since April 2023   States chemo every 3 weeks.   Next chemo scheduled for October 5  Takes dexamethasone day prior, day of and day after chemotherapy  Asept catheter placed in April 2023. Had been getting it drained 2x a week, recently increased to 3x weekly   Palliative care patient -Per palliative care note patient is a DNR/DNI. However, patient stating she wants to be a full code    Hyponatremia  Assessment & Plan  Na 131 today   Has been running 132-133 on recent labs   Reports good appetite despite cancer and chemotherapy   Continue to monitor. Essential hypertension  Assessment & Plan  Home regimen: Verapamil, telmisartan and Lasix  Normotensive in the ED. Continue to monitor    COPD (chronic obstructive pulmonary disease) (HCC)  Assessment & Plan  Does not appear in COPD exacerbation at this time  Continue home inhalers/nebulizers         VTE Pharmacologic Prophylaxis: VTE Score: 3 Moderate Risk (Score 3-4) - Pharmacological DVT Prophylaxis Ordered: heparin. Code Status: Level 1 - Full Code   Discussion with family: Patient declined call to . Anticipated Length of Stay: Patient will be admitted on an inpatient basis with an anticipated length of stay of greater than 2 midnights secondary to pneumonia, sepsis. Total Time Spent on Date of Encounter in care of patient: 55 mins. This time was spent on one or more of the following: performing physical exam; counseling and coordination of care; obtaining or reviewing history; documenting in the medical record; reviewing/ordering tests, medications or procedures; communicating with other healthcare professionals and discussing with patient's family/caregivers. Chief Complaint: Shortness of breath     History of Present Illness:  Lino Jasmine is a 70 y.o. female with a PMH of COPD, hypertension, non-small cell cancer, obesity who presents from home due to worsening shortness of breath over the past 3 weeks associated with a dry cough and chills. Denies known fevers. Has been receiving chemotherapy every 3 weeks.   Per patient she is fatigued after receiving chemo but otherwise has been doing well.  Reports good appetite. CTA chest performed in the ER showing concerning signs for pneumonia infection and possible increased spread of her cancer. Met SIRS criteria due to tachycardia and tachypnea. Patient admitted for IV antibiotics. Review of Systems:  Review of Systems   Constitutional:  Positive for chills. Negative for fatigue and fever. HENT:  Negative for sore throat. Respiratory:  Positive for cough and shortness of breath. Negative for chest tightness. Cardiovascular:  Negative for chest pain. Gastrointestinal:  Negative for abdominal distention, abdominal pain, diarrhea, nausea and vomiting. Genitourinary:  Negative for difficulty urinating. Musculoskeletal:  Negative for arthralgias. Neurological:  Negative for weakness and headaches. Psychiatric/Behavioral:  Negative for agitation and behavioral problems. All other systems reviewed and are negative. Past Medical and Surgical History:   Past Medical History:   Diagnosis Date    Cancer ([address])     COPD (chronic obstructive pulmonary disease) ([address])     Emphysema of lung ([address])     Hyperlipidemia     Hypertension     Lung cancer ([address]) 06/26/2019    right lower lobe removed    Lung nodule     Pleural effusion        Past Surgical History:   Procedure Laterality Date    APPENDECTOMY      BREAST BIOPSY Right     benign    BRONCHOSCOPY  06/2019    COLON SURGERY      HYSTERECTOMY      age 48    IR PLEURAL EFFUSION LONG-TERM CATHETER PLACEMENT  04/25/2023    IR PORT PLACEMENT  04/18/2023    IR THORACENTESIS  03/24/2023    IR THORACENTESIS  04/12/2023    IR THORACENTESIS  04/18/2023    LUNG BIOPSY  06/2019    LUNG LOBECTOMY      OOPHORECTOMY Bilateral     age 48       Meds/Allergies:  Prior to Admission medications    Medication Sig Start Date End Date Taking?  Authorizing Provider   albuterol (2.5 mg/3 mL) 0.083 % nebulizer solution Take 3 mL (2.5 mg total) by nebulization every 6 (six) hours as needed for wheezing or shortness of breath 5/2/23   Cody Nation, DO   albuterol (PROVENTIL HFA,VENTOLIN HFA) 90 mcg/act inhaler Inhale 2 puffs every 4 (four) hours 8/6/19   Historical Provider, MD   Ascorbic Acid (vitamin C) 1000 MG tablet 1 daily    Historical Provider, MD   atorvastatin (LIPITOR) 20 mg tablet Take 20 mg by mouth daily. Historical Provider, MD   Biotin 13984 MCG TABS 1 daily    Historical Provider, MD   buPROPion (WELLBUTRIN XL) 300 mg 24 hr tablet TAKE 1 TABLET BY MOUTH EVERY DAY IN THE MORNING FOR 90 DAYS 5/27/23   Historical Provider, MD   Cholecalciferol (Vitamin D3) 25 MCG (1000 UT) CAPS 1 capsule every 24 hours    Historical Provider, MD   dexamethasone (DECADRON) 4 mg tablet TAKE 1 TABLET (4 MG TOTAL) BY MOUTH 2 (TWO) TIMES A DAY WITH MEALS TAKE 1 TABLET BY MOUTH THE DAY BEFORE, THE DAY OF, AND THE DAY AFTER CHEMOTHERAPY. 8/23/23   Alisha Duran DO   dexamethasone sodium phosphate 0.1 % ophthalmic solution Administer 1 drop to both eyes every 3 (three) hours as needed (eye irritation) 8/24/23   Alisha Duran DO   diphenhydrAMINE (BENADRYL) 50 MG tablet Take 1 tablet (50 mg total) by mouth every 6 (six) hours as needed for itching (Rash) 6/16/20   Cecilia Lau, DO   diphenhydrAMINE-APAP, sleep, (TYLENOL PM EXTRA STRENGTH PO) Take 325 mg by mouth daily at bedtime    Historical Provider, MD   EPINEPHrine (EPIPEN) 0.3 mg/0.3 mL SOAJ Inject 0.3 mL (0.3 mg total) into a muscle once for 1 dose  Patient taking differently: Inject 0.3 mg into a muscle once PRN 10/11/21 9/18/23  Guille Lindsay,    famotidine (PEPCID) 20 mg tablet Take 20 mg by mouth daily. Indications: Heartburn    Historical Provider, MD   Fluticasone-Salmeterol (Advair Diskus) 500-50 mcg/dose inhaler Inhale 1 puff 2 (two) times a day Rinse mouth after use.  9/8/23 12/7/23  CAT Perdue   folic acid ( Folic Acid) 1 mg tablet Take 1 tablet (1 mg total) by mouth daily 4/26/23   Alisha Duran DO   furosemide (LASIX) 20 mg tablet Take 1 tablet (20 mg total) by mouth every morning 8/22/23 2/18/24  Cindy Barr MD   Klor-Con M20 20 MEQ tablet TAKE 1 TABLET BY MOUTH EVERY DAY 9/8/23   CAT Austin   lidocaine-prilocaine (EMLA) cream APPLY TO PORT 30 TO 60 MINUTES PRIOR TO USE 9/7/23   CAT Austin   LORazepam (ATIVAN) 1 mg tablet 1/2 TABLET TWICE A DAY AS NEEDED FOR ANXIETY ORALLY 30 DAYS 8/28/19   Historical Provider, MD   Multiple Vitamin (MULTIVITAMINS PO) every 24 hours    Historical Provider, MD   ondansetron (ZOFRAN) 8 mg tablet Take 1 tablet (8 mg total) by mouth every 8 (eight) hours as needed for nausea or vomiting  Patient taking differently: Take 8 mg by mouth every 8 (eight) hours as needed for nausea or vomiting PRN 4/25/23 9/18/23  Bozena Arita PA-C   Probiotic Product (PROBIOTIC DAILY PO) 1 daily    Historical Provider, MD   prochlorperazine (COMPAZINE) 10 mg tablet Take 1 tablet (10 mg total) by mouth every 6 (six) hours as needed for nausea 4/7/23   Bella Manzanares DO   Sennosides (Senna) 8.6 MG CAPS Take 2 capsules by mouth as needed (constipation). Indications: Constipation 5/1/23   Historical Provider, MD   sertraline (ZOLOFT) 100 mg tablet 2 daily    Historical Provider, MD   telmisartan (MICARDIS) 80 MG tablet Take 1 tablet (80 mg total) by mouth daily 8/22/23 2/18/24  Cindy Barr MD   verapamil (CALAN-SR) 120 mg CR tablet Take 1 tablet (120 mg total) by mouth daily at bedtime 1 hs 6/8/23   Cindy Barr MD   verapamil (CALAN-SR) 240 mg CR tablet Take 1 tablet (240 mg total) by mouth daily at bedtime 1 hs 6/8/23   Cindy Barr MD     I have reviewed home medications with patient personally. Allergies:    Allergies   Allergen Reactions    Amoxicillin Hives    Vilazodone Hcl Nausea Only and Dizziness     (Viibryd)    Wasp Venom Swelling    Zithromax [Azithromycin] Hives       Social History:  Marital Status: /Civil Union   Occupation: Unknown  Patient Pre-hospital Living Situation: Home  Patient Pre-hospital Level of Mobility: walks  Patient Pre-hospital Diet Restrictions: Regular  Substance Use History:   Social History     Substance and Sexual Activity   Alcohol Use Not Currently    Comment: Rarely drink, socially only     Social History     Tobacco Use   Smoking Status Former    Packs/day: 1.50    Years: 50.00    Total pack years: 75.00    Types: Cigarettes    Start date: 5    Quit date: 3/15/2023    Years since quittin.5   Smokeless Tobacco Never     Social History     Substance and Sexual Activity   Drug Use No       Family History:  Family History   Problem Relation Age of Onset    Colon cancer Mother     Hypertension Mother     Hyperlipidemia Mother     Hearing loss Mother     Depression Mother     COPD Mother     Cancer Mother         Lung    Arthritis Mother     Lung cancer Mother     Drug abuse Brother     Diabetes Maternal Grandmother     Cancer Maternal Grandmother         Colon    Colon cancer Maternal Grandmother     Cancer Maternal Aunt     Colon cancer Maternal Aunt     Colon cancer Maternal Aunt     Cancer Maternal Aunt         Colon       Physical Exam:     Vitals:   Blood Pressure: 141/74 (23)  Pulse: 96 (23)  Temperature: (!) 97 °F (36.1 °C) (23)  Temp Source: Temporal (23)  Respirations: 18 (23 2100)  Height: 5' 5" (165.1 cm) (23)  Weight - Scale: 84.4 kg (186 lb) (23)  SpO2: 95 % (23)    Physical Exam  Vitals and nursing note reviewed. Constitutional:       Appearance: Normal appearance. She is obese. HENT:      Head: Normocephalic. Eyes:      Extraocular Movements: Extraocular movements intact. Pupils: Pupils are equal, round, and reactive to light. Cardiovascular:      Rate and Rhythm: Normal rate and regular rhythm. Heart sounds: No murmur heard. Pulmonary:      Effort: Pulmonary effort is normal. No respiratory distress.       Breath sounds: Normal breath sounds. No wheezing. Abdominal:      General: Bowel sounds are normal. There is no distension. Tenderness: There is no abdominal tenderness. There is no guarding. Musculoskeletal:         General: Normal range of motion. Cervical back: Normal range of motion. Right lower leg: No edema. Left lower leg: No edema. Skin:     General: Skin is warm. Neurological:      General: No focal deficit present. Mental Status: She is alert and oriented to person, place, and time. Psychiatric:         Mood and Affect: Mood normal.         Behavior: Behavior normal.         Thought Content:  Thought content normal.          Additional Data:     Lab Results:  Results from last 7 days   Lab Units 09/28/23  1807   WBC Thousand/uL 8.47   HEMOGLOBIN g/dL 9.3*   HEMATOCRIT % 28.7*   PLATELETS Thousands/uL 374   NEUTROS PCT % 75   LYMPHS PCT % 7*   MONOS PCT % 17*   EOS PCT % 0     Results from last 7 days   Lab Units 09/28/23  1807   SODIUM mmol/L 131*   POTASSIUM mmol/L 3.7   CHLORIDE mmol/L 96   CO2 mmol/L 25   BUN mg/dL 12   CREATININE mg/dL 0.58*   ANION GAP mmol/L 10   CALCIUM mg/dL 9.4   ALBUMIN g/dL 3.5   TOTAL BILIRUBIN mg/dL 0.24   ALK PHOS U/L 119*   ALT U/L 36   AST U/L 30   GLUCOSE RANDOM mg/dL 108                 Results from last 7 days   Lab Units 09/28/23  1811   PROCALCITONIN ng/ml 0.10       Lines/Drains:  Invasive Devices       Central Venous Catheter Line  Duration             Port A Cath 04/18/23 Right Subclavian 163 days              Peripheral Intravenous Line  Duration             Peripheral IV 09/28/23 Left Antecubital <1 day              Drain  Duration             Pleural Effusion Long-Term Catheter 15.5 Fr. 156 days                    Central Line:  Goal for removal: N/A - Chronic PICC           Imaging: Reviewed radiology reports from this admission including: chest CT scan and abdominal/pelvic CT  PE Study with CT Abdomen and Pelvis with contrast   Final Result by Jigna Tarango Jenifer Childs MD (09/28 2119)      Masslike consolidation along the posterior right upper lobe extending to the right hilum reflecting known underlying neoplasm is unchanged compared to 9/5/2023 and increased in size compared to 4/23/2023. Superimposed pneumonia, atelectasis, or    lymphangitic spread of tumor not excluded. Several new left upper lobe nodules measuring up to 7 mm are concerning for tumoral spread. New groundglass airspace disease at the left upper lobe/lingula concerning for pneumonia      Small loculated right pleural effusion status post tunneled pleural catheter. Increasing retroperitoneal adenopathy. This finding should be followed on subsequent imaging studies to exclude narinder spread of disease                  Workstation performed: [id number]             EKG and Other Studies Reviewed on Admission:   EKG: Sinus Tachycardia. . ** Please Note: This note has been constructed using a voice recognition system.  **

## 2023-09-29 NOTE — CASE MANAGEMENT
Case Management Assessment & Discharge Planning Note    Patient name Concetta Lawrence  Location /-14 MRN [id number]  : 1960 Date 2023       Current Admission Date: 2023  Current Admission Diagnosis:Pneumonia   Patient Active Problem List    Diagnosis Date Noted    Pneumonia 2023    Sepsis ([address]) 2023    Hyponatremia 2023    Palliative care patient 2023    Malignant pleural effusion 2023    Shortness of breath 2023    CINV (chemotherapy-induced nausea and vomiting) 2023    Non-small cell cancer of right lung ([address]) 2023    Closed fracture of multiple ribs of left side 2022    Cigarette nicotine dependence in remission 2022    COPD (chronic obstructive pulmonary disease) ([address]) 2022    Hypercholesterolemia 2022    Essential hypertension 2022    Impingement syndrome of left shoulder 2021      LOS (days): 1  Geometric Mean LOS (GMLOS) (days): 5.00  Days to GMLOS:4.3     OBJECTIVE:    Risk of Unplanned Readmission Score: 21.98         Current admission status: Inpatient       Preferred Pharmacy:   [facility]/pharmacy [facility][id number] [address]  Phone: [phone] Fax: [phone]    [place] [address]  Phone: [phone] Fax: [phone]    Primary Care Provider: Roverto Hutton MD    Primary Insurance: BLUE CROSS  Secondary Insurance: MEDICARE    ASSESSMENT:  [id number] [facility] - Spouse   Primary Phone: [phone] (Mobile)  Home Phone: [phone]                           Readmission Root Cause  30 Day Readmission: No    Patient Information  Admitted from[de-identified] Home  Mental Status: Alert  During Assessment patient was accompanied by: Spouse  Assessment information provided by[de-identified] Patient, Spouse  Primary Caregiver: Self  Support Systems: Spouse/significant other  [place]  Residence: [address] do you live in?: [address] [facility] entry access options. Select all that apply.: Stairs  Number of steps to enter home. : 1  Do the steps have railings?: No  Type of Current Residence: [address] [place] home  Upon entering residence, is there a bedroom on the main floor (no further steps)?: No  A bedroom is located on the following floor levels of residence (select all that apply):: 2nd Floor  Upon entering residence, is there a bathroom on the main floor (no further steps)?: Yes  Number of steps to 2nd floor from main floor: One Flight  In the last 12 months, was there a time when you were not able to pay the mortgage or rent on time?: No  In the last 12 months, how many places have you lived?: 1  In the last 12 months, was there a time when you did not have a steady place to sleep or slept in a shelter (including now)?: No  Living Arrangements: Lives w/ Spouse/significant other  Is patient a ?: No    Activities of Daily Living Prior to Admission  Functional Status: Independent  Completes ADLs independently?: Yes  Ambulates independently?: Yes  Does patient use assisted devices?: No  Does patient currently own DME?: Yes  What DME does the patient currently own?: Favian Dupree  Does patient have a history of Outpatient Therapy (PT/OT)?: No  Does the patient have a history of Short-Term Rehab?: No  Does patient have a history of HHC?: Yes  Does patient currently have [address] [facility]?: Yes    Current Home Health Care  Type of Current Home Care Services: Nurse visit  Current Home Health Agency[de-identified] [address] [place] [address] Provider[de-identified] PCP    Patient Information Continued  Income Source: Unemployed  Does patient have prescription coverage?: Yes  Within the past 12 months, you worried that your food would run out before you got the money to buy more.: Never true  Within the past 12 months, the food you bought just didn't last and you didn't have money to get more.: Never true  Does patient receive dialysis treatments?: No  Does patient have a history of substance abuse?: No  Does patient have a history of Mental Health Diagnosis?: No         Means of Transportation  Means of Transport to Appts[de-identified] Family transport  In the past 12 months, has lack of transportation kept you from medical appointments or from getting medications?: No  In the past 12 months, has lack of transportation kept you from meetings, work, or from getting things needed for daily living?: No        DISCHARGE DETAILS:    Discharge planning discussed with[de-identified] patient and her spouse, Beck Mosquera        CM contacted family/caregiver?: Yes                  [address]         Is the patient interested in [facility] at discharge?: Yes  [facility] requested[de-identified] [facility] Name[de-identified] Slade Bradley NUVIA  Jie External Referral Reason (only applicable if external HHA name selected): Patient has established relationship with provider  [address] Provider[de-identified] PCP  Home Health Services Needed[de-identified] Other (comment) (Aspect catheter)  Homebound Criteria Met[de-identified] Requires the Assistance of Another Person for Safe Ambulation or to Leave the Home  Supporting Clincal Findings[de-identified] Limited Endurance          Met with patient with her spouse present to review the role of CM and discuss any needs patient may have prior to discharge. Patient resides with her spouse in a 2 story home, using both gfloors. She reports being independent of ADL's and having a RWat home but does not use it. She goes to chemo  every 3 weeks, her spouse transports. She is current for SN for indwelling pleural catheter care, referral sent to [facility] via [facility], they can continue to see patient. [facility] reserved on [facility], and their contact information added to patient's AVS.  Her spouse will transport her home.

## 2023-09-29 NOTE — ASSESSMENT & PLAN NOTE
· SIRS: Tachycardia and tachypnea  · Lactic and procalcitonin pending  · SOI: PNA noted on CT chest   · Abx: IV ceftriaxone   · BC and pneumonia labs pending

## 2023-09-29 NOTE — ASSESSMENT & PLAN NOTE
Follows with Dr. Emeterio Elliott with recurrence of Lung cancer in March 2023   Cancer staged 6/9/23: Stage IV   Hx of RLL resection due to lung cancer a couple years ago   Chemotherapy since April 2023   States chemo every 3 weeks. Next chemo scheduled for October 5  Takes dexamethasone day prior, day of and day after chemotherapy  Asept catheter placed in April 2023. Had been getting it drained 2x a week, recently increased to 3x weekly   Palliative care patient -Per palliative care note patient is a DNR/DNI.   However, patient stating she wants to be a full code

## 2023-09-29 NOTE — UTILIZATION REVIEW
Initial Clinical Review    Admission: Date/Time/Statement:   Admission Orders (From admission, onward)     Ordered        09/28/23 1945  [address]  Once                      Orders Placed This Encounter   Procedures   • INPATIENT ADMISSION     Standing Status:   Standing     Number of Occurrences:   1     Order Specific Question:   Level of Care     Answer:   Med Surg [16]     Order Specific Question:   Estimated length of stay     Answer:   More than 2 Midnights     Order Specific Question:   Certification     Answer:   I certify that inpatient services are medically necessary for this patient for a duration of greater than two midnights. See H&P and MD Progress Notes for additional information about the patient's course of treatment. ED Arrival Information     Expected   -    Arrival   9/28/2023 17:21    Acuity   Urgent            Means of arrival   Walk-In    Escorted by   Family Member    Service   Hospitalist    Admission type   Emergency            Arrival complaint   Chest pain, SOB, abdominal pain           Chief Complaint   Patient presents with   • Shortness of Breath     Patient presents to the ED with c/o SOB, states lung CA patient, states had CT three weeks ago for same but has not improved        Initial Presentation: 70 y.o. female from home to ED admitted inpatient due to Pneumonia/Sepsis. PMH of COPD, hypertension, non-small cell cancer, stage IV on chemo. Presented due to  Shortness of breath starting 3 weeks prior to arrival and abdominal pain, worse with BM,  starting 1 week prior to arrival.  + cough, feels hot and cold. intermittent nausea. On exam decreased breath sounds. asept catheter. Slight increased work or breathing with conversation. Na 131. Wbc 8.47. H&H 9.3/28. 7. Ct showed unchanged neoplasm in right upper lobe since 9/5/23, increased from 4/23/23, superimposed pneumonia.   New upper lobe nodules concerning for tumoral spread, new groundglass opacity left Upper lobe and lingula concerning for pneumonia. Loculated pleural effusion, pleural cathter. Increasing retroperitoneal adenopathy. Plan is  Start ceftriaxone. Follow cultures. Consult Pulmonary. Goals of care discussed and wants to be full code. Date: 9/29/23    Day 2: occasional chills. Dry cough. Shortness of breath. On exam:  Diminished breath sounds diffusely. Mild clubbing. Procalcitonin 0.11. On ceftriaxone. 9/29/23 per Pulmonary - Patient with ct abnormality, COPD, Stage IV lung cancer, Malignant pleural effusion status post indwelling pleural catheter placement. Doubt new ct abnormality is bacterial pneumonia as Procalcitonin negative x 2. Suspect abnormality is due to lymphangitic spread of cancer or viral pneumonia. Continue to drain indwelling pleural catheter 3 times weekly. Would stop antibiotics.      ED Triage Vitals   Temperature Pulse Respirations Blood Pressure SpO2   09/28/23 1729 09/28/23 1728 09/28/23 1728 09/28/23 1728 09/28/23 1728   98.7 °F (37.1 °C) 95 18 141/67 93 %      Temp Source Heart Rate Source Patient Position - Orthostatic VS BP Location FiO2 (%)   09/28/23 1729 09/28/23 1728 09/28/23 1728 09/28/23 1728 --   Temporal Monitor Sitting Left arm       Pain Score       09/28/23 1728       No Pain          Wt Readings from Last 1 Encounters:   09/28/23 84.4 kg (186 lb)     Additional Vital Signs:   09/29/23 0900 -- -- -- -- -- 96 % None (Room air) --   09/29/23 07:33:25 96.8 °F (36 °C) Abnormal  95 -- 138/63 88 91 % None (Room air) Lying   09/28/23 22:49:45 97 °F (36.1 °C) Abnormal  96 -- 141/74 96 95 % -- --   09/28/23 2100 -- 95 18 130/60 86 90 % -- --   09/28/23 2030 -- 93 14 131/60 86 94 % -- --   09/28/23 2000 -- 93 22 146/68 98 96 %       Pertinent Labs/Diagnostic Test Results:   PE Study with CT Abdomen and Pelvis with contrast   Final Result by Delano Victor MD (09/28 2119)      Masslike consolidation along the posterior right upper lobe extending to the right hilum reflecting known underlying neoplasm is unchanged compared to 9/5/2023 and increased in size compared to 4/23/2023. Superimposed pneumonia, atelectasis, or    lymphangitic spread of tumor not excluded. Several new left upper lobe nodules measuring up to 7 mm are concerning for tumoral spread. New groundglass airspace disease at the left upper lobe/lingula concerning for pneumonia      Small loculated right pleural effusion status post tunneled pleural catheter. Increasing retroperitoneal adenopathy.  This finding should be followed on subsequent imaging studies to exclude narinder spread of disease                  Workstation performed: [id number]             9/28/23 ecg Interpretation: normal     Rate:     ECG rate:  98     ECG rate assessment: normal     Rhythm:     Rhythm: sinus rhythm     Ectopy:     Ectopy: none     ST segments:     ST segments:  Normal    Results from last 7 days   Lab Units 09/28/23  1807   SARS-COV-2  Negative     Results from last 7 days   Lab Units 09/29/23 0535 09/28/23 2358 09/28/23  1807   WBC Thousand/uL 7.00  --  8.47   HEMOGLOBIN g/dL 9.0*  --  9.3*   HEMATOCRIT % 27.4*  --  28.7*   PLATELETS Thousands/uL 346 343 374   NEUTROS ABS Thousands/µL 5.34  --  6.39     Results from last 7 days   Lab Units 09/29/23 0535 09/28/23  1807   SODIUM mmol/L 133* 131*   POTASSIUM mmol/L 3.7 3.7   CHLORIDE mmol/L 100 96   CO2 mmol/L 23 25   ANION GAP mmol/L 10 10   BUN mg/dL 8 12   CREATININE mg/dL 0.53* 0.58*   EGFR ml/min/1.73sq m 99 97   CALCIUM mg/dL 9.0 9.4     Results from last 7 days   Lab Units 09/28/23  1807   AST U/L 30   ALT U/L 36   ALK PHOS U/L 119*   TOTAL PROTEIN g/dL 6.7   ALBUMIN g/dL 3.5   TOTAL BILIRUBIN mg/dL 0.24     Results from last 7 days   Lab Units 09/29/23 0535 09/28/23  1807   GLUCOSE RANDOM mg/dL 96 108     Results from last 7 days   Lab Units 09/28/23  1807   HS TNI 0HR ng/L 4     Results from last 7 days   Lab Units 09/29/23 0535 09/28/23  1811   PROCALCITONIN ng/ml 0.11 0.10     Results from last 7 days   Lab Units 09/28/23  2358   LACTIC ACID mmol/L 0.7     Results from last 7 days   Lab Units 09/28/23  1807   BNP pg/mL 26     Results from last 7 days   Lab Units 09/28/23  1811   LIPASE u/L 10*     Results from last 7 days   Lab Units 09/28/23  1812   CLARITY UA  Clear   COLOR UA  Light Yellow   SPEC GRAV UA  <1.005*   PH UA  6.0   GLUCOSE UA mg/dl Negative   KETONES UA mg/dl Negative   BLOOD UA  Negative   PROTEIN UA mg/dl Negative   NITRITE UA  Negative   BILIRUBIN UA  Negative   UROBILINOGEN UA (BE) mg/dl <2.0   LEUKOCYTES UA  Negative     Results from last 7 days   Lab Units 09/29/23  0020 09/28/23  1807   STREP PNEUMONIAE ANTIGEN, URINE  Negative  --    LEGIONELLA URINARY ANTIGEN  Negative  --    INFLUENZA A PCR   --  Negative   INFLUENZA B PCR   --  Negative   RSV PCR   --  Negative     Results from last 7 days   Lab Units 09/28/23  2358   BLOOD CULTURE  Received in Microbiology Lab. Culture in Progress. Received in Microbiology Lab. Culture in Progress.        ED Treatment:   Medication Administration from 09/28/2023 1720 to 09/28/2023 2245       Date/Time Order Dose Route Action Action by Comments   None      Past Medical History:   Diagnosis Date   • Cancer ([address])    • COPD (chronic obstructive pulmonary disease) ([address])    • Emphysema of lung ([address])    • Hyperlipidemia    • Hypertension    • Lung cancer ([address]) 06/26/2019    right lower lobe removed   • Lung nodule    • Pleural effusion      Present on Admission:  • Non-small cell cancer of right lung (HCC)  • Essential hypertension  • Hyponatremia  • COPD (chronic obstructive pulmonary disease) (HCC)      Admitting Diagnosis: Pneumonia [J18.9]  SOB (shortness of breath) [R06.02]  Abdominal pain [R10.9]  Malignant pleural effusion [J91.0]  Non-small cell cancer of right lung (HCC) [C34.91]  Age/Sex: 70 y.o. female  Admission Orders:  09/28/23 2215 inpatient   Scheduled Medications:  vitamin C, 1,000 mg, Oral, Daily  atorvastatin, 20 mg, Oral, Daily With Dinner  buPROPion, 300 mg, Oral, Daily  cefTRIAXone, 1,000 mg, Intravenous, Q24H  cholecalciferol, 1,000 Units, Oral, Daily  docusate sodium, 100 mg, Oral, BID  fluticasone-vilanterol, 1 puff, Inhalation, Daily  folic acid, 1 mg, Oral, Daily  furosemide, 20 mg, Oral, QAM  heparin (porcine), 5,000 Units, Subcutaneous, Q8H ULISES  losartan, 100 mg, Oral, Daily  multivitamin-minerals, 1 tablet, Oral, Daily  potassium chloride, 20 mEq, Oral, Daily  sertraline, 200 mg, Oral, Daily  verapamil, 360 mg, Oral, Daily    Continuous IV Infusions: none      PRN Meds:  albuterol, 2 puff, Inhalation, Q6H PRN  albuterol, 2.5 mg, Nebulization, Q6H PRN  diphenhydrAMINE, 25 mg, Oral, HS PRN  iohexol, 50 mL, Intravenous, Once in imaging  LORazepam, 0.5 mg, Oral, BID PRNx 1 9/29  senna, 2 tablet, Oral, Daily PRN    Elevate head of bed to 30 degrees. Incentive spirometry  Pulse oximetry with ambulation. IP CONSULT TO PULMONOLOGY      Network Utilization Review Department  ATTENTION: Please call with any questions or concerns to [phone] and carefully listen to the prompts so that you are directed to the right person. All voicemails are confidential.  Keila Ellison all requests for admission clinical reviews, approved or denied determinations and any other requests to dedicated fax number below belonging to the campus where the patient is receiving treatment.  List of dedicated fax numbers for the Facilities:  [place] DENIALS (Administrative/Medical Necessity) [phone] 230[address] [facility] (Maternity/NICU/Pediatrics) [phone]   [address] [phone]   [facility] [phone]   [address] [place] [address] Drive 207 [facility] 170 [facility] [address] [address] [facility] 1010 [address] 1300 [facility] [place] [address] [phone]

## 2023-09-30 ENCOUNTER — HOME CARE VISIT (OUTPATIENT)
Dept: HOME HEALTH SERVICES | Facility: HOME HEALTHCARE | Age: 63
End: 2023-09-30
Payer: COMMERCIAL

## 2023-09-30 VITALS
DIASTOLIC BLOOD PRESSURE: 72 MMHG | OXYGEN SATURATION: 94 % | SYSTOLIC BLOOD PRESSURE: 136 MMHG | RESPIRATION RATE: 20 BRPM | TEMPERATURE: 97 F | HEART RATE: 105 BPM

## 2023-09-30 PROCEDURE — G0299 HHS/HOSPICE OF RN EA 15 MIN: HCPCS

## 2023-09-30 NOTE — UTILIZATION REVIEW
NOTIFICATION OF INPATIENT ADMISSION   AUTHORIZATION REQUEST   SERVICING FACILITY:   [address] [place] [address]  [address] [facility],Third Floor [address]  Tax ID: [id number]  NPI: [id number] ATTENDING PROVIDER:  Attending Name and NPI#: Robert Jovel Md [[id number]]  Address: [address] [facility],Third [facility] [address]  Phone: [phone]   ADMISSION INFORMATION:  Place of Service: [address] [place] [address] Service Code: 21  Inpatient Admission Date/Time: 9/28/23 10:16 PM  Discharge Date/Time: 9/29/2023  4:35 PM  Admitting Diagnosis Code/Description:  Pneumonia [J18.9]  SOB (shortness of breath) [R06.02]  Abdominal pain [R10.9]  Malignant pleural effusion [J91.0]  Non-small cell cancer of right lung ([address]) [C34.91]     UTILIZATION REVIEW CONTACT:  Srinivas Schaeffer Utilization   Network Utilization Review Department  Phone: [phone]  Fax [phone]  Email: [email or web address]  Contact for approvals/pending authorizations, clinical reviews, and discharge. PHYSICIAN ADVISORY SERVICES:  Medical Necessity Denial & [id number]-[id number] Review  Phone: [phone]  Fax: [phone]  Email: [email or web address]

## 2023-09-30 NOTE — Clinical Note
Yes 3x. average approx 325-350 cc each time. Sat's output was little more than usual at 450 cc. This is the first time I have seen her in several months. She was not in any acute distress but I did notice that she was more dyspneic with exertion than I have observed in the past. She notified me that she had been seen at the ER end of last week for increased SOB and AQUINO. AVS indicated COPD--patient told me she was dx'd with viral pna. Just figured I'd mention that just in case you were unaware that she was seen. Please lmk if you have any other questions or concerns. Have a great week.     ----- Message -----  From: Fili Moraes DO  Sent: 10/2/2023   7:49 AM EDT  To: Celeste Garrido RN      Thanks Brittany Shaver  Is she up to 3 times? And is about 350-400 each time? [facility] [place] [facility]  ----- Message -----  From: Celeste Garrido RN  Sent: 9/30/2023   3:58 PM EDT  To: Fili Moraes DO    Hi Dr. Paco Cohen you are well! Total asept cath drainage for week of 9/24-9/30 = 1125 ml.     Thank you,  Brittany Shaver

## 2023-09-30 NOTE — CASE COMMUNICATION
Austen Price you are well! Total asept cath drainage for week of 9/24-9/30 = 1125 ml.     Thank you,  Janes August

## 2023-10-01 LAB
BACTERIA BLD CULT: NORMAL
BACTERIA BLD CULT: NORMAL

## 2023-10-02 ENCOUNTER — HOME CARE VISIT (OUTPATIENT)
Dept: HOME HEALTH SERVICES | Facility: HOME HEALTHCARE | Age: 63
End: 2023-10-02
Payer: COMMERCIAL

## 2023-10-02 ENCOUNTER — HOSPITAL ENCOUNTER (OUTPATIENT)
Dept: INFUSION CENTER | Facility: HOSPITAL | Age: 63
Discharge: HOME | End: 2023-10-02
Payer: COMMERCIAL

## 2023-10-02 ENCOUNTER — TRANSITIONAL CARE MANAGEMENT (OUTPATIENT)
Dept: FAMILY MEDICINE CLINIC | Facility: HOSPITAL | Age: 63
End: 2023-10-02

## 2023-10-02 DIAGNOSIS — C34.91 NON-SMALL CELL CANCER OF RIGHT LUNG (HCC): Primary | ICD-10-CM

## 2023-10-02 LAB
ALBUMIN SERPL BCP-MCNC: 3.2 G/DL (ref 3.5–5)
ALP SERPL-CCNC: 115 U/L (ref 34–104)
ALT SERPL W P-5'-P-CCNC: 26 U/L (ref 7–52)
ANION GAP SERPL CALCULATED.3IONS-SCNC: 8 MMOL/L
AST SERPL W P-5'-P-CCNC: 25 U/L (ref 13–39)
BASOPHILS # BLD AUTO: 0.01 THOUSANDS/ÂΜL (ref 0–0.1)
BASOPHILS NFR BLD AUTO: 0 % (ref 0–1)
BILIRUB SERPL-MCNC: 0.34 MG/DL (ref 0.2–1)
BUN SERPL-MCNC: 7 MG/DL (ref 5–25)
CALCIUM ALBUM COR SERPL-MCNC: 9.8 MG/DL (ref 8.3–10.1)
CALCIUM SERPL-MCNC: 9.2 MG/DL (ref 8.4–10.2)
CHLORIDE SERPL-SCNC: 99 MMOL/L (ref 96–108)
CO2 SERPL-SCNC: 25 MMOL/L (ref 21–32)
CREAT SERPL-MCNC: 0.58 MG/DL (ref 0.6–1.3)
EOSINOPHIL # BLD AUTO: 0.02 THOUSAND/ÂΜL (ref 0–0.61)
EOSINOPHIL NFR BLD AUTO: 0 % (ref 0–6)
ERYTHROCYTE [DISTWIDTH] IN BLOOD BY AUTOMATED COUNT: 14.2 % (ref 11.6–15.1)
GFR SERPL CREATININE-BSD FRML MDRD: 97 ML/MIN/1.73SQ M
GLUCOSE SERPL-MCNC: 112 MG/DL (ref 65–140)
HCT VFR BLD AUTO: 29.3 % (ref 34.8–46.1)
HGB BLD-MCNC: 9.3 G/DL (ref 11.5–15.4)
IMM GRANULOCYTES # BLD AUTO: 0.05 THOUSAND/UL (ref 0–0.2)
IMM GRANULOCYTES NFR BLD AUTO: 1 % (ref 0–2)
LYMPHOCYTES # BLD AUTO: 0.54 THOUSANDS/ÂΜL (ref 0.6–4.47)
LYMPHOCYTES NFR BLD AUTO: 7 % (ref 14–44)
MCH RBC QN AUTO: 31.5 PG (ref 26.8–34.3)
MCHC RBC AUTO-ENTMCNC: 31.7 G/DL (ref 31.4–37.4)
MCV RBC AUTO: 99 FL (ref 82–98)
MONOCYTES # BLD AUTO: 1.02 THOUSAND/ÂΜL (ref 0.17–1.22)
MONOCYTES NFR BLD AUTO: 12 % (ref 4–12)
NEUTROPHILS # BLD AUTO: 6.68 THOUSANDS/ÂΜL (ref 1.85–7.62)
NEUTS SEG NFR BLD AUTO: 80 % (ref 43–75)
NRBC BLD AUTO-RTO: 0 /100 WBCS
PLATELET # BLD AUTO: 513 THOUSANDS/UL (ref 149–390)
PMV BLD AUTO: 8.5 FL (ref 8.9–12.7)
POTASSIUM SERPL-SCNC: 4 MMOL/L (ref 3.5–5.3)
PROT SERPL-MCNC: 6.6 G/DL (ref 6.4–8.4)
RBC # BLD AUTO: 2.95 MILLION/UL (ref 3.81–5.12)
SODIUM SERPL-SCNC: 132 MMOL/L (ref 135–147)
T3FREE SERPL-MCNC: 3.03 PG/ML (ref 2.5–3.9)
TSH SERPL DL<=0.05 MIU/L-ACNC: 2.59 UIU/ML (ref 0.45–4.5)
WBC # BLD AUTO: 8.32 THOUSAND/UL (ref 4.31–10.16)

## 2023-10-02 PROCEDURE — 84443 ASSAY THYROID STIM HORMONE: CPT | Performed by: INTERNAL MEDICINE

## 2023-10-02 PROCEDURE — 85025 COMPLETE CBC W/AUTO DIFF WBC: CPT | Performed by: INTERNAL MEDICINE

## 2023-10-02 PROCEDURE — 80053 COMPREHEN METABOLIC PANEL: CPT | Performed by: INTERNAL MEDICINE

## 2023-10-02 PROCEDURE — 84481 FREE ASSAY (FT-3): CPT | Performed by: INTERNAL MEDICINE

## 2023-10-02 NOTE — PROGRESS NOTES
Patient labwork completed via [email or web address]. Port accessed and de-accessed without complication. Patient declined AVS at this time and confirmed next appointment. Patient discharged in stable condition.

## 2023-10-02 NOTE — UTILIZATION REVIEW
NOTIFICATION OF ADMISSION DISCHARGE   This is a Notification [facility] Discharge from [facility]. Please be advised that this patient has been discharge from our facility. Below you will find the admission and discharge date and time including the patient’s disposition. UTILIZATION REVIEW CONTACT:  Milton Paul  Utilization   Network Utilization Review Department  Phone: [phone] carefully listen to the prompts. All voicemails are confidential.  Email: [email or web address]     ADMISSION INFORMATION  PRESENTATION DATE: 9/28/2023  5:35 PM  OBERVATION ADMISSION DATE:   INPATIENT ADMISSION DATE: 9/28/23 10:16 PM   DISCHARGE DATE: 9/29/2023  4:35 PM   DISPOSITION:Home/Self Care    IMPORTANT INFORMATION:  Send all requests for admission clinical reviews, approved or denied determinations and any other requests to dedicated fax number below belonging to the campus where the patient is receiving treatment.  List of dedicated fax numbers:  [place] DENIALS (Administrative/Medical Necessity) [phone] 2303 [facility] (Maternity/NICU/Pediatrics) [phone]   [facility] [phone]   [facility] [phone] 1636 [facility] [phone]   [address] [place] [address] [phone]   [facility] [phone]   [facility] 608 [facility] [phone]   [address] [place] [address] [phone] 3441 [facility] [phone] 2720 [facility] 3000 [facility] [phone] same name as above

## 2023-10-03 NOTE — PROGRESS NOTES
HEMATOLOGY / [facility] FOLLOW UP NOTE    Primary Care Provider: Erika Diaz MD  Referring Provider:    MRN: [id number]  : 1960    Reason for Encounter: follow up [address] [facility]     Oncology History Overview Note   2019 - Stage IA adenocarcinoma of the right lung s/p RLLectomy    2022 - fall after tripping on a dog gate - CT showed pulmonary nodules that required 3 month followup    3/2023 - CT showed right pleural effusion with enlarging lung lesion, she was otherwise symptomatic, thoracentesis cell block showed adenocarcinoma c/w her prior lung primary    PET - no disease outside the chest    2023 - carbo/pem/pembro    - - cycle 2, pemetrexed dose reduced by 20% and carbo dose reduced to AUC 4 due to nausea and fatigue    2023 - removed Poly as of cycle 5 due to good response and increasing fatigue    2023 - add back Poly for cycle 6 due to increasing output from right pleural catheter    2023 - remove carboplatin for cycle 7 because no impact was made on output from pleural catheter     Non-small cell cancer of right lung (HCC)   2023 Initial Diagnosis    Non-small cell cancer of right lung ([address])     2023 -  Chemotherapy    cyanocobalamin, 1,000 mcg, Intramuscular, Once, 5 of 10 cycles  Administration: 1,000 mcg (2023), 1,000 mcg (2023), 1,000 mcg (8/3/2023), 1,000 mcg (2023), 1,000 mcg (2023)  alteplase (CATHFLO), 2 mg, Intracatheter, Every 1 Minute as needed, 8 of 13 cycles  fosaprepitant (EMEND) IVPB, 150 mg, Intravenous, Once, 5 of 5 cycles  Administration: 150 mg (2023), 150 mg (2023), 150 mg (2023), 150 mg (2023), 150 mg (8/3/2023)  CARBOplatin (PARAPLATIN) IVPB (GOG AUC DOSING), 553 mg, Intravenous, Once, 5 of 5 cycles  Administration: 553 mg (2023), 442.4 mg (2023), 442.4 mg (2023), 442.4 mg (2023), 438 mg (8/3/2023)  pemetrexed (ALIMTA) chemo infusion, 945 mg, Intravenous, Once, 8 of 13 cycles  Dose modification: 400 mg/m2 (original dose 500 mg/m2, Cycle 3, Reason: Nausea/Vomiting, Comment: 20% dose reduction due to nausea)  Administration: 900 mg (4/20/2023), 756 mg (6/1/2023), 756 mg (6/22/2023), 756 mg (8/3/2023), 756 mg (5/11/2023), 756 mg (7/13/2023), 756 mg (8/24/2023), 756 mg (9/14/2023)  pembrolizumab (KEYTRUDA) IVPB, 200 mg, Intravenous, Once, 8 of 13 cycles  Administration: 200 mg (4/20/2023), 200 mg (6/1/2023), 200 mg (6/22/2023), 200 mg (8/3/2023), 200 mg (5/11/2023), 200 mg (7/13/2023), 200 mg (8/24/2023), 200 mg (9/14/2023)     6/9/2023 -  Cancer Staged    Staging form: Lung, AJCC 8th Edition  - Clinical: Stage ALESHA (cT1c, cN2, cM1a) - Signed by Della Vergara DO on 6/9/2023           Interval History:     Patient presents for follow up of her metastatic non-small cell lung cancer. She was diagnosed with the metastatic stage in March 2023, after which she was recommended systemic treatment with combination of chemo and immunotherapy. Regimen adjustments have been made due to side effects/toxicities. Carboplatin was discontinued after cycle 5 due to significant fatigue. Patient was admitted in the hospital last week due to progressive dyspnea on exertion and cough x past 3 weeks. Even minimal exertion has been making the patient very short of breath. She has her pleural catheter in place; [facility]NA home nurse has been coming to assist with fluid drainage about 3x  per week (used to be 2x a week). Infectious workup was negative, but imaging was concerning for possible progression of the disease with new left lung nodules and retroperitoneal lymphadenopathy. Review of Systems   Constitutional:  Negative for fever and unexpected weight change. HENT:   Negative for mouth sores, nosebleeds, sore throat, trouble swallowing and voice change. Respiratory:  Positive for cough (dry) and shortness of breath (with exertion, progressive x 3 weeks). Negative for hemoptysis and wheezing.     Cardiovascular: Negative for chest pain, leg swelling and palpitations. Gastrointestinal:  Negative for abdominal distention, abdominal pain, blood in stool, constipation, diarrhea and nausea. Genitourinary:  Negative for difficulty urinating, dyspareunia, dysuria, vaginal bleeding and vaginal discharge. Musculoskeletal:  Negative for arthralgias, back pain, flank pain and gait problem. Skin:  Negative for itching, rash and wound. Neurological:  Negative for dizziness, extremity weakness, gait problem, headaches, numbness, seizures and speech difficulty. Hematological:  Negative for adenopathy. Does not bruise/bleed easily. Psychiatric/Behavioral:  Negative for confusion, decreased concentration and depression. The patient is not nervous/anxious. ECOG PS: 0    PROBLEM LIST:  Patient Active Problem List   Diagnosis    Impingement syndrome of left shoulder    Closed fracture of multiple ribs of left side    Cigarette nicotine dependence in remission    COPD (chronic obstructive pulmonary disease) (HCC)    Hypercholesterolemia    Essential hypertension    Non-small cell cancer of right lung (HCC)    Malignant pleural effusion    Shortness of breath    CINV (chemotherapy-induced nausea and vomiting)    Palliative care patient    Hyponatremia    Cough       Assessment / Plan:     Metastatic right lung adenocarcinoma with malignant right sided pleural effusion  Concern for pneumonitis    Ms. Rukhsana Laguerre is a 67-year-old female with metastatic right lung adenocarcinoma. She was initially diagnosed with stage IA right lung adenocarcinoma in May 2019, status post right lower lobe lobectomy. Patient was found to have right-sided pleural effusion in March 2023, cytology from thoracentesis was positive for metastatic adenocarcinoma. Therefore, patient was started on systemic therapy with carboplatin/pemetrexed, and pembrolizumab in April 2023.  Regimen was adjusted due to side effects and carboplatin was removed completely after cycle 5 due to significant fatigue. Patient's imaging from 9/6/2023 was suggestive of stable disease, however patient was noticing progressively worsening shortness of breath on exertion in addition to a dry cough over past 3-4 weeks, for which she sought hospital evaluation on 9/28. Infectious work-up was negative (blood cultures have been negative x5 days, normal lactic acid level, strep pneumo and Legionella antigens were negative and procalcitonin was negative x2). CT CAP was done, it was negative for PE, but did raise concern for several new left upper lobe nodules measuring up to 7 mm, and increasing retroperitoneal adenopathy, raising concern for narinder spread of disease. Patient continues to have significant shortness of breath on exertion. Differentials include viral pneumonia, pneumonitis secondary to immunotherapy,  lymphangitic spread of cancer, and possible disease progression. With concern for possible pneumonitis, we will hold any treatment for now and give a trial of steroids to see if it would help with relieving pt's current symptoms. If symptoms are 2/2 pneumonitis, then we expect some gradual improvement of her respiratory symptoms. Steroids are prescribed as follows: Prednisone 40 mg twice daily x1 week, followed by prednisone 30 mg twice daily x1 week, prednisone 20 mg twice daily x1 week, followed by prednisone 10 mg twice daily x1 week, then stop. Patient is also scheduled to get a PET scan on 10/18/2023; we will be able to evaluate the retroperitoneal lymphadenopathy further on the PET scan to see if the lymph nodes are FDG avid and suggestive of progression of disease. If PET scan is also suggestive of progression of disease and if patient's symptoms are not improving on steroids, then we will need to discuss about next line of treatment (such as combination of ramucirumab and docetaxel). CARIS testing did not reveal any targetable mutations and PD-L1 TPS was only 2%.  Patient was advised to follow-up in office in a few days following the PET scan. Past Medical History:   has a past medical history of Cancer ([address]), COPD (chronic obstructive pulmonary disease) ([address]), Emphysema of lung ([address]), Hyperlipidemia, Hypertension, Lung cancer ([address]) (06/26/2019), Lung nodule, and Pleural effusion. PAST SURGICAL HISTORY:   has a past surgical history that includes Appendectomy; Colon surgery; Lung lobectomy; Hysterectomy; Oophorectomy (Bilateral); Breast biopsy (Right); IR thoracentesis (03/24/2023); IR thoracentesis (04/12/2023); IR thoracentesis (04/18/2023); IR port placement (04/18/2023); IR pleural effusion long-term catheter placement (04/25/2023); Bronchoscopy (06/2019); and Lung biopsy (06/2019). CURRENT MEDICATIONS  Current Outpatient Medications   Medication Sig Dispense Refill    albuterol (2.5 mg/3 mL) 0.083 % nebulizer solution Take 3 mL (2.5 mg total) by nebulization every 6 (six) hours as needed for wheezing or shortness of breath 270 mL 4    albuterol (PROVENTIL HFA,VENTOLIN HFA) 90 mcg/act inhaler Inhale 2 puffs every 4 (four) hours  3    Ascorbic Acid (vitamin C) 1000 MG tablet 1 daily      atorvastatin (LIPITOR) 20 mg tablet Take 20 mg by mouth daily. Biotin 29880 MCG TABS 1 daily      buPROPion (WELLBUTRIN XL) 300 mg 24 hr tablet TAKE 1 TABLET BY MOUTH EVERY DAY IN THE MORNING FOR 90 DAYS      Cholecalciferol (Vitamin D3) 25 MCG (1000 UT) CAPS 1 capsule every 24 hours      dexamethasone (DECADRON) 4 mg tablet TAKE 1 TABLET (4 MG TOTAL) BY MOUTH 2 (TWO) TIMES A DAY WITH MEALS TAKE 1 TABLET BY MOUTH THE DAY BEFORE, THE DAY OF, AND THE DAY AFTER CHEMOTHERAPY.  6 tablet 6    dexamethasone sodium phosphate 0.1 % ophthalmic solution Administer 1 drop to both eyes every 3 (three) hours as needed (eye irritation) 5 mL 5    diphenhydrAMINE (BENADRYL) 50 MG tablet Take 1 tablet (50 mg total) by mouth every 6 (six) hours as needed for itching (Rash) 30 tablet 0 diphenhydrAMINE-APAP, sleep, (TYLENOL PM EXTRA STRENGTH PO) Take 325 mg by mouth daily at bedtime      famotidine (PEPCID) 20 mg tablet Take 20 mg by mouth daily      Fluticasone-Salmeterol (Advair Diskus) 500-50 mcg/dose inhaler Inhale 1 puff 2 (two) times a day Rinse mouth after use.  817 blister 3    folic acid (KP Folic Acid) 1 mg tablet Take 1 tablet (1 mg total) by mouth daily 90 tablet 1    furosemide (LASIX) 20 mg tablet Take 1 tablet (20 mg total) by mouth every morning 90 tablet 1    Klor-Con M20 20 MEQ tablet TAKE 1 TABLET BY MOUTH EVERY DAY 30 tablet 1    lidocaine-prilocaine (EMLA) cream APPLY TO PORT 30 TO 60 MINUTES PRIOR TO USE 30 g 3    LORazepam (ATIVAN) 1 mg tablet 1/2 TABLET TWICE A DAY AS NEEDED FOR ANXIETY ORALLY 30 DAYS  0    Multiple Vitamin (MULTIVITAMINS PO) every 24 hours      predniSONE 10 mg tablet Take 4 tablets (40 mg total) by mouth 2 (two) times a day with meals 300 tablet 0    Probiotic Product (PROBIOTIC DAILY PO) 1 daily      Sennosides (Senna) 8.6 MG CAPS Take 2 capsules by mouth as needed (constipation)      sertraline (ZOLOFT) 100 mg tablet 2 daily      telmisartan (MICARDIS) 80 MG tablet Take 1 tablet (80 mg total) by mouth daily 90 tablet 1    verapamil (CALAN-SR) 120 mg CR tablet Take 1 tablet (120 mg total) by mouth daily at bedtime 1 hs (Patient taking differently: Take 120 mg by mouth in the morning) 90 tablet 3    verapamil (CALAN-SR) 240 mg CR tablet Take 1 tablet (240 mg total) by mouth daily at bedtime 1 hs (Patient taking differently: Take 240 mg by mouth in the morning) 90 tablet 3    EPINEPHrine (EPIPEN) 0.3 mg/0.3 mL SOAJ Inject 0.3 mL (0.3 mg total) into a muscle once for 1 dose (Patient not taking: Reported on 10/2/2023) 0.6 mL 0    ondansetron (ZOFRAN) 8 mg tablet Take 1 tablet (8 mg total) by mouth every 8 (eight) hours as needed for nausea or vomiting (Patient taking differently: Take 8 mg by mouth every 8 (eight) hours as needed for nausea or vomiting PRN) 30 tablet 0    prochlorperazine (COMPAZINE) 10 mg tablet Take 1 tablet (10 mg total) by mouth every 6 (six) hours as needed for nausea (Patient not taking: Reported on 9/28/2023) 30 tablet 3     No current facility-administered medications for this visit. Facility-Administered Medications Ordered in Other Visits   Medication Dose Route Frequency Provider Last Rate Last Admin    alteplase (CATHFLO) injection 2 mg  2 mg Intracatheter Q1MIN PRN Mariano Jiang, DO         [unfilled]    SOCIAL HISTORY:   reports that she quit smoking about 6 months ago. Her smoking use included cigarettes. She started smoking about 53 years ago. She has a 75.00 pack-year smoking history. She has never used smokeless tobacco. She reports that she does not currently use alcohol. She reports that she does not use drugs. FAMILY HISTORY:  family history includes Arthritis in her mother; COPD in her mother; Cancer in her maternal aunt, maternal aunt, maternal grandmother, and mother; Colon cancer in her maternal aunt, maternal aunt, maternal grandmother, and mother; Depression in her mother; Diabetes in her maternal grandmother; Drug abuse in her brother; Hearing loss in her mother; Hyperlipidemia in her mother; Hypertension in her mother; Lung cancer in her mother. ALLERGIES:  is allergic to amoxicillin, vilazodone hcl, wasp venom, and zithromax [azithromycin]. Physical Exam:  Vital Signs:   Visit Vitals  /68 (BP Location: Left arm, Patient Position: Sitting, Cuff Size: Standard)   Pulse 102   Temp 97.5 °F (36.4 °C) (Temporal)   Resp 16   Ht 5' 5" (1.651 m)   Wt 84.5 kg (186 lb 3.2 oz)   SpO2 94%   BMI 30.99 kg/m²   OB Status Hysterectomy   Smoking Status Former   BSA 1.92 m²     Body mass index is 30.99 kg/m². Body surface area is 1.92 meters squared. Physical Exam  Constitutional:       General: She is not in acute distress. Appearance: Normal appearance. She is not toxic-appearing.    HENT:      Head: Normocephalic and atraumatic. Mouth/Throat:      Mouth: Mucous membranes are moist.      Pharynx: No oropharyngeal exudate or posterior oropharyngeal erythema. Eyes:      General: No scleral icterus. Extraocular Movements: Extraocular movements intact. Conjunctiva/sclera: Conjunctivae normal.      Pupils: Pupils are equal, round, and reactive to light. Cardiovascular:      Rate and Rhythm: Normal rate and regular rhythm. Heart sounds: No murmur heard. No gallop. Pulmonary:      Effort: No respiratory distress. Breath sounds: No stridor. Rales (mild crackles in right lung base) present. No wheezing or rhonchi. Abdominal:      General: Bowel sounds are normal. There is no distension. Palpations: Abdomen is soft. There is no mass. Tenderness: There is no abdominal tenderness. Musculoskeletal:         General: No swelling, tenderness, deformity or signs of injury. Cervical back: No rigidity. Right lower leg: No edema. Left lower leg: No edema. Lymphadenopathy:      Cervical: No cervical adenopathy. Skin:     Capillary Refill: Capillary refill takes less than 2 seconds. Coloration: Skin is not jaundiced or pale. Findings: No bruising or erythema. Neurological:      General: No focal deficit present. Mental Status: She is oriented to person, place, and time. Motor: No weakness.        Labs:  Lab Results   Component Value Date    WBC 8.32 10/02/2023    HGB 9.3 (L) 10/02/2023    HCT 29.3 (L) 10/02/2023    MCV 99 (H) 10/02/2023     (H) 10/02/2023     Lab Results   Component Value Date    SODIUM 132 (L) 10/02/2023    K 4.0 10/02/2023    CL 99 10/02/2023    CO2 25 10/02/2023    AGAP 8 10/02/2023    BUN 7 10/02/2023    CREATININE 0.58 (L) 10/02/2023    GLUC 112 10/02/2023    GLUF 105 (H) 09/11/2023    CALCIUM 9.2 10/02/2023    AST 25 10/02/2023    ALT 26 10/02/2023    ALKPHOS 115 (H) 10/02/2023    TP 6.6 10/02/2023    TBILI 0.34 10/02/2023    EGFR 97 10/02/2023

## 2023-10-04 ENCOUNTER — HOME CARE VISIT (OUTPATIENT)
Dept: HOME HEALTH SERVICES | Facility: HOME HEALTHCARE | Age: 63
End: 2023-10-04
Payer: COMMERCIAL

## 2023-10-04 ENCOUNTER — OFFICE VISIT (OUTPATIENT)
Age: 63
End: 2023-10-04
Payer: COMMERCIAL

## 2023-10-04 VITALS
SYSTOLIC BLOOD PRESSURE: 150 MMHG | DIASTOLIC BLOOD PRESSURE: 82 MMHG | HEART RATE: 94 BPM | RESPIRATION RATE: 22 BRPM | TEMPERATURE: 97.6 F | OXYGEN SATURATION: 93 %

## 2023-10-04 VITALS
TEMPERATURE: 97.5 F | BODY MASS INDEX: 31.02 KG/M2 | DIASTOLIC BLOOD PRESSURE: 68 MMHG | WEIGHT: 186.2 LBS | SYSTOLIC BLOOD PRESSURE: 130 MMHG | RESPIRATION RATE: 16 BRPM | HEART RATE: 102 BPM | OXYGEN SATURATION: 94 % | HEIGHT: 65 IN

## 2023-10-04 DIAGNOSIS — J91.0 MALIGNANT PLEURAL EFFUSION: ICD-10-CM

## 2023-10-04 DIAGNOSIS — C34.91 NON-SMALL CELL CANCER OF RIGHT LUNG (HCC): Primary | ICD-10-CM

## 2023-10-04 DIAGNOSIS — J98.4 PNEUMONITIS: ICD-10-CM

## 2023-10-04 LAB
BACTERIA BLD CULT: NORMAL
BACTERIA BLD CULT: NORMAL

## 2023-10-04 PROCEDURE — G0299 HHS/HOSPICE OF RN EA 15 MIN: HCPCS

## 2023-10-04 PROCEDURE — 99215 OFFICE O/P EST HI 40 MIN: CPT | Performed by: INTERNAL MEDICINE

## 2023-10-04 RX ORDER — PREDNISONE 10 MG/1
40 TABLET ORAL 2 TIMES DAILY WITH MEALS
Qty: 300 TABLET | Refills: 0 | Status: SHIPPED | OUTPATIENT
Start: 2023-10-04

## 2023-10-04 NOTE — PATIENT INSTRUCTIONS
Take prednisone 10 mg tablet    4 pills twice a day for 1 week then  3 pills twice a day for 1 week then  2 pills twice a day for 1 week then  1 pills twice a day for 1 week then stop

## 2023-10-05 ENCOUNTER — HOSPITAL ENCOUNTER (OUTPATIENT)
Dept: INFUSION CENTER | Facility: HOSPITAL | Age: 63
Discharge: HOME | End: 2023-10-05
Attending: INTERNAL MEDICINE

## 2023-10-06 ENCOUNTER — OFFICE VISIT (OUTPATIENT)
Dept: FAMILY MEDICINE CLINIC | Facility: HOSPITAL | Age: 63
End: 2023-10-06
Payer: COMMERCIAL

## 2023-10-06 ENCOUNTER — HOME CARE VISIT (OUTPATIENT)
Dept: HOME HEALTH SERVICES | Facility: HOME HEALTHCARE | Age: 63
End: 2023-10-06
Payer: COMMERCIAL

## 2023-10-06 VITALS
DIASTOLIC BLOOD PRESSURE: 70 MMHG | SYSTOLIC BLOOD PRESSURE: 140 MMHG | RESPIRATION RATE: 16 BRPM | HEIGHT: 65 IN | WEIGHT: 184 LBS | BODY MASS INDEX: 30.66 KG/M2 | HEART RATE: 92 BPM | OXYGEN SATURATION: 92 %

## 2023-10-06 VITALS
SYSTOLIC BLOOD PRESSURE: 152 MMHG | TEMPERATURE: 97.3 F | RESPIRATION RATE: 22 BRPM | DIASTOLIC BLOOD PRESSURE: 84 MMHG | HEART RATE: 92 BPM

## 2023-10-06 DIAGNOSIS — F41.1 GENERALIZED ANXIETY DISORDER: ICD-10-CM

## 2023-10-06 DIAGNOSIS — I10 ESSENTIAL HYPERTENSION: ICD-10-CM

## 2023-10-06 DIAGNOSIS — E87.1 HYPONATREMIA: ICD-10-CM

## 2023-10-06 DIAGNOSIS — C34.91 NON-SMALL CELL CANCER OF RIGHT LUNG (HCC): Primary | ICD-10-CM

## 2023-10-06 DIAGNOSIS — J44.9 CHRONIC OBSTRUCTIVE PULMONARY DISEASE, UNSPECIFIED COPD TYPE (HCC): ICD-10-CM

## 2023-10-06 PROBLEM — R06.02 SHORTNESS OF BREATH: Status: RESOLVED | Noted: 2023-04-23 | Resolved: 2023-10-06

## 2023-10-06 PROBLEM — R05.9 COUGH: Status: RESOLVED | Noted: 2023-09-28 | Resolved: 2023-10-06

## 2023-10-06 PROCEDURE — 99496 TRANSJ CARE MGMT HIGH F2F 7D: CPT | Performed by: INTERNAL MEDICINE

## 2023-10-06 PROCEDURE — G0299 HHS/HOSPICE OF RN EA 15 MIN: HCPCS

## 2023-10-06 RX ORDER — LORAZEPAM 0.5 MG/1
0.5 TABLET ORAL DAILY PRN
Qty: 30 TABLET | Refills: 0 | Status: SHIPPED | OUTPATIENT
Start: 2023-10-06

## 2023-10-06 NOTE — ASSESSMENT & PLAN NOTE
His hypertension. Blood pressure is acceptable today. Continue telmisartan, verapamil and furosemide.     Her potassium and renal function were normal this month    My plan is to increase furosemide if she develops fluid retention during the prednisone taper

## 2023-10-06 NOTE — ASSESSMENT & PLAN NOTE
She has chronic and stable hyponatremia going as far back as 2020. She is euvolemic today.   I asked her to call if she develops fluid retention during the prednisone taper

## 2023-10-06 NOTE — ASSESSMENT & PLAN NOTE
Chronic COPD is controlled today. I heard no expiratory wheezing.   Continue Advair and as needed albuterol

## 2023-10-06 NOTE — ASSESSMENT & PLAN NOTE
Patient's cough and shortness of breath are improving. She will complete prednisone taper for possible pneumonitis. She will have a PET scan done.   Continued drainage of the right-sided malignant pleural effusion via the indwelling catheter 3 times a week

## 2023-10-06 NOTE — UTILIZATION REVIEW
NOTIFICATION OF ADMISSION DISCHARGE   This is a Notification of Discharge from [address] [place] [address]. Please be advised that this patient has been discharge from our facility. Below you will find the admission and discharge date and time including the patient’s disposition. UTILIZATION REVIEW CONTACT:  Ayaan Nazario  Utilization   Network Utilization Review Department  Phone: [phone] carefully listen to the prompts. All voicemails are confidential.  Email: [email or web address]     ADMISSION INFORMATION  PRESENTATION DATE: 9/28/2023  5:35 PM  OBERVATION ADMISSION DATE:   INPATIENT ADMISSION DATE: 9/28/23 10:16 PM   DISCHARGE DATE: 9/29/2023  4:35 PM   DISPOSITION:Home/Self Care    IMPORTANT INFORMATION:  Send all requests for admission clinical reviews, approved or denied determinations and any other requests to dedicated fax number below belonging to the campus where the patient is receiving treatment.  List of dedicated fax numbers:  [place] DENIALS (Administrative/Medical Necessity) [phone] [facility] (Maternity/NICU/Pediatrics) [phone]   [facility] [phone]   [facility] [phone] [address] [phone]   [address] [place] [address] [phone]   [facility] [phone]   [address] [place] [address] [phone]   [address] [place] [address] [phone]   [facility] [phone] [address] [facility] [address] [facility] [phone]

## 2023-10-06 NOTE — PROGRESS NOTES
Assessment/Plan:     Non-small cell cancer of right lung (HCC)  Patient's cough and shortness of breath are improving. She will complete prednisone taper for possible pneumonitis. She will have a PET scan done. Continued drainage of the right-sided malignant pleural effusion via the indwelling catheter 3 times a week      COPD (chronic obstructive pulmonary disease) (HCC)  Chronic COPD is controlled today. I heard no expiratory wheezing. Continue Advair and as needed albuterol    Essential hypertension  His hypertension. Blood pressure is acceptable today. Continue telmisartan, verapamil and furosemide. Her potassium and renal function were normal this month    My plan is to increase furosemide if she develops fluid retention during the prednisone taper    Generalized anxiety disorder  Patient gets anxious at times. Since she will be on a prolonged prednisone taper I prescribed lorazepam in case her anxiety gets worse. I reviewed [place] PDMP. Her last lorazepam prescription was filled in 2022    Hyponatremia  She has chronic and stable hyponatremia going as far back as 2020. She is euvolemic today. I asked her to call if she develops fluid retention during the prednisone taper     Diagnoses and all orders for this visit:    Non-small cell cancer of right lung (HCC)    Chronic obstructive pulmonary disease, unspecified COPD type ([address])    Essential hypertension    Generalized anxiety disorder  -     LORazepam (Ativan) 0.5 mg tablet; Take 1 tablet (0.5 mg total) by mouth daily as needed for anxiety    Hyponatremia         Subjective:     Patient ID: Eugenio Ponce is a 70 y.o. female. Patient was discharged on September 29 after hospital stay for evaluation of cough and shortness of breath. She has stage IV non-small cell lung cancer and COPD. CAT scan suggested possible pneumonitis versus spread of the cancer. Procalcitonin was negative x2, bacterial infection was unlikely.   Viral pneumonia was a possibility. She had no volume overload suggestive of CHF. She saw oncology this week who started her on prednisone taper. She has taken place on for 2 days so far. She feels that her cough and shortness of breath with exertion such as walking up to the second floor in her house are improving since she has been taking the prednisone. Denies hemoptysis, pleurisy, chest pain, fevers, lower extremity swelling, abdominal pain, GERD. She is slightly more anxious. Visiting nurses drain her right-sided indwelling pleural catheter. They drained 300 cc today. She presents today with her  for [facility] management        Review of Systems   Constitutional:  Negative for fever. HENT:  Negative for hearing loss. Eyes:  Negative for visual disturbance. Respiratory:  Positive for cough and shortness of breath. Cardiovascular:  Negative for chest pain, palpitations and leg swelling. Gastrointestinal:  Negative for abdominal pain, blood in stool, constipation and diarrhea. Endocrine: Negative for polydipsia and polyphagia. Genitourinary:  Negative for dysuria and hematuria. Musculoskeletal:  Negative for arthralgias and myalgias. Skin:  Negative for rash. Neurological:  Negative for headaches. Psychiatric/Behavioral:  Negative for dysphoric mood. The patient is nervous/anxious. All other systems reviewed and are negative. Objective:     Physical Exam  Constitutional:       General: She is not in acute distress. Appearance: She is not ill-appearing or toxic-appearing. HENT:      Head: Normocephalic. Mouth/Throat:      Mouth: Mucous membranes are moist.   Eyes:      Conjunctiva/sclera: Conjunctivae normal.   Cardiovascular:      Rate and Rhythm: Normal rate and regular rhythm. Heart sounds: No murmur heard. Pulmonary:      Effort: Pulmonary effort is normal. No respiratory distress.       Breath sounds: Rhonchi (I heard scant rhonchi on the right side and breath sounds were decreased) present. No wheezing or rales. Abdominal:      General: Bowel sounds are normal.      Palpations: Abdomen is soft. Musculoskeletal:         General: No tenderness. Cervical back: Neck supple. Skin:     General: Skin is warm and dry. Comments: She had no lower extremity edema   Neurological:      Mental Status: She is alert and oriented to person, place, and time. Cranial Nerves: No cranial nerve deficit. Motor: No weakness. Gait: Gait normal.   Psychiatric:         Mood and Affect: Mood normal.         Thought Content: Thought content normal.           Vitals:    10/06/23 1252 10/06/23 1311   BP: 150/72 140/70   Pulse: 94 92   Resp: 16 16   SpO2: 92%    Weight: 83.5 kg (184 lb)    Height: 5' 5" (1.651 m)        Transitional Care Management Review:  Nathaneil Denver is a 70 y.o. female here for TCM follow up. During the TCM phone call patient stated:    TCM Call     Date and time call was made  10/2/2023  1:14 PM    Patient was hospitialized at  [address] [place] [address]    Date of Admission  09/28/23    Date of discharge  09/29/23    Diagnosis  ADMITTING DX--PNEUMONIA, D/C DX ACTIVE PROBLEMS. Disposition  Home    Were the patients medications reviewed and updated  Yes      TCM Call     Should patient be enrolled in anticoag monitoring? No    Scheduled for follow up? Yes    I have advised the patient to call PCP with any new or worsening symptoms  [address] [address] [address] or [employer] other    Comments  SPOKE TO PT.  SHE IS DOING GOOD. MEDS REVIEWED, CHART CURRENT. TO BE DISCUSSED AT --SENNA, PROCHLORPERAZINE AND FAMOTIDINE! SENT TO FRONT TO SCHED TCM FOR THIS WEEK.  Kehinde Saba MD

## 2023-10-09 ENCOUNTER — HOME CARE VISIT (OUTPATIENT)
Dept: HOME HEALTH SERVICES | Facility: HOME HEALTHCARE | Age: 63
End: 2023-10-09
Payer: COMMERCIAL

## 2023-10-09 VITALS
SYSTOLIC BLOOD PRESSURE: 130 MMHG | DIASTOLIC BLOOD PRESSURE: 70 MMHG | HEART RATE: 92 BPM | RESPIRATION RATE: 16 BRPM | OXYGEN SATURATION: 96 % | TEMPERATURE: 98.7 F

## 2023-10-09 PROCEDURE — G0300 HHS/HOSPICE OF LPN EA 15 MIN: HCPCS

## 2023-10-11 ENCOUNTER — HOME CARE VISIT (OUTPATIENT)
Dept: HOME HEALTH SERVICES | Facility: HOME HEALTHCARE | Age: 63
End: 2023-10-11
Payer: COMMERCIAL

## 2023-10-11 VITALS
TEMPERATURE: 97.7 F | OXYGEN SATURATION: 95 % | SYSTOLIC BLOOD PRESSURE: 140 MMHG | RESPIRATION RATE: 20 BRPM | DIASTOLIC BLOOD PRESSURE: 80 MMHG | HEART RATE: 92 BPM

## 2023-10-11 PROCEDURE — G0299 HHS/HOSPICE OF RN EA 15 MIN: HCPCS

## 2023-10-13 ENCOUNTER — HOME CARE VISIT (OUTPATIENT)
Dept: HOME HEALTH SERVICES | Facility: HOME HEALTHCARE | Age: 63
End: 2023-10-13
Payer: COMMERCIAL

## 2023-10-13 VITALS
SYSTOLIC BLOOD PRESSURE: 130 MMHG | DIASTOLIC BLOOD PRESSURE: 70 MMHG | HEART RATE: 96 BPM | TEMPERATURE: 98.2 F | OXYGEN SATURATION: 96 % | RESPIRATION RATE: 16 BRPM

## 2023-10-13 PROCEDURE — G0300 HHS/HOSPICE OF LPN EA 15 MIN: HCPCS

## 2023-10-16 ENCOUNTER — HOME CARE VISIT (OUTPATIENT)
Dept: HOME HEALTH SERVICES | Facility: HOME HEALTHCARE | Age: 63
End: 2023-10-16
Payer: COMMERCIAL

## 2023-10-16 VITALS
OXYGEN SATURATION: 95 % | SYSTOLIC BLOOD PRESSURE: 170 MMHG | RESPIRATION RATE: 20 BRPM | DIASTOLIC BLOOD PRESSURE: 84 MMHG | HEART RATE: 86 BPM

## 2023-10-16 DIAGNOSIS — J06.9 VIRAL UPPER RESPIRATORY TRACT INFECTION: Primary | ICD-10-CM

## 2023-10-16 PROCEDURE — G0299 HHS/HOSPICE OF RN EA 15 MIN: HCPCS

## 2023-10-16 RX ORDER — LEVOFLOXACIN 500 MG/1
500 TABLET, FILM COATED ORAL EVERY 24 HOURS
Qty: 10 TABLET | Refills: 0 | Status: SHIPPED | OUTPATIENT
Start: 2023-10-16 | End: 2023-10-26

## 2023-10-17 ENCOUNTER — TELEPHONE (OUTPATIENT)
Dept: HEMATOLOGY ONCOLOGY | Facility: CLINIC | Age: 63
End: 2023-10-17

## 2023-10-17 NOTE — TELEPHONE ENCOUNTER
Spoke with patient to let her know her PET scan has been rescheduled d/t insurance authorization and will now be on 11/2 and her follow up with Dr. Benitez Narayanan has been moved to 11/13.  Pt verbalized understanding and is in agreement with plan

## 2023-10-18 ENCOUNTER — HOME CARE VISIT (OUTPATIENT)
Dept: HOME HEALTH SERVICES | Facility: HOME HEALTHCARE | Age: 63
End: 2023-10-18
Payer: COMMERCIAL

## 2023-10-18 VITALS
RESPIRATION RATE: 17 BRPM | SYSTOLIC BLOOD PRESSURE: 145 MMHG | HEART RATE: 90 BPM | OXYGEN SATURATION: 97 % | DIASTOLIC BLOOD PRESSURE: 70 MMHG

## 2023-10-18 PROCEDURE — G0299 HHS/HOSPICE OF RN EA 15 MIN: HCPCS

## 2023-10-20 ENCOUNTER — HOME CARE VISIT (OUTPATIENT)
Dept: HOME HEALTH SERVICES | Facility: HOME HEALTHCARE | Age: 63
End: 2023-10-20
Payer: COMMERCIAL

## 2023-10-20 VITALS
RESPIRATION RATE: 16 BRPM | TEMPERATURE: 97.1 F | HEART RATE: 93 BPM | SYSTOLIC BLOOD PRESSURE: 130 MMHG | OXYGEN SATURATION: 95 % | DIASTOLIC BLOOD PRESSURE: 70 MMHG

## 2023-10-20 PROCEDURE — G0299 HHS/HOSPICE OF RN EA 15 MIN: HCPCS

## 2023-10-23 ENCOUNTER — HOSPITAL ENCOUNTER (OUTPATIENT)
Dept: INFUSION CENTER | Facility: HOSPITAL | Age: 63
Discharge: HOME | End: 2023-10-23
Payer: COMMERCIAL

## 2023-10-23 ENCOUNTER — HOME CARE VISIT (OUTPATIENT)
Dept: HOME HEALTH SERVICES | Facility: HOME HEALTHCARE | Age: 63
End: 2023-10-23
Payer: COMMERCIAL

## 2023-10-23 ENCOUNTER — TELEPHONE (OUTPATIENT)
Dept: HEMATOLOGY ONCOLOGY | Facility: CLINIC | Age: 63
End: 2023-10-23

## 2023-10-23 VITALS
SYSTOLIC BLOOD PRESSURE: 120 MMHG | HEART RATE: 110 BPM | DIASTOLIC BLOOD PRESSURE: 70 MMHG | TEMPERATURE: 96.7 F | RESPIRATION RATE: 18 BRPM | OXYGEN SATURATION: 92 %

## 2023-10-23 DIAGNOSIS — C34.91 NON-SMALL CELL CANCER OF RIGHT LUNG (HCC): Primary | ICD-10-CM

## 2023-10-23 LAB
ALBUMIN SERPL BCP-MCNC: 3.5 G/DL (ref 3.5–5)
ALP SERPL-CCNC: 73 U/L (ref 34–104)
ALT SERPL W P-5'-P-CCNC: 24 U/L (ref 7–52)
ANION GAP SERPL CALCULATED.3IONS-SCNC: 11 MMOL/L
AST SERPL W P-5'-P-CCNC: 18 U/L (ref 13–39)
BASOPHILS # BLD AUTO: 0.01 THOUSANDS/ÂΜL (ref 0–0.1)
BASOPHILS NFR BLD AUTO: 0 % (ref 0–1)
BILIRUB SERPL-MCNC: 0.47 MG/DL (ref 0.2–1)
BUN SERPL-MCNC: 12 MG/DL (ref 5–25)
CALCIUM SERPL-MCNC: 9.2 MG/DL (ref 8.4–10.2)
CHLORIDE SERPL-SCNC: 97 MMOL/L (ref 96–108)
CO2 SERPL-SCNC: 25 MMOL/L (ref 21–32)
CREAT SERPL-MCNC: 0.65 MG/DL (ref 0.6–1.3)
EOSINOPHIL # BLD AUTO: 0.02 THOUSAND/ÂΜL (ref 0–0.61)
EOSINOPHIL NFR BLD AUTO: 0 % (ref 0–6)
ERYTHROCYTE [DISTWIDTH] IN BLOOD BY AUTOMATED COUNT: 14.9 % (ref 11.6–15.1)
GFR SERPL CREATININE-BSD FRML MDRD: 93 ML/MIN/1.73SQ M
GLUCOSE SERPL-MCNC: 80 MG/DL (ref 65–140)
HCT VFR BLD AUTO: 34.3 % (ref 34.8–46.1)
HGB BLD-MCNC: 11 G/DL (ref 11.5–15.4)
IMM GRANULOCYTES # BLD AUTO: 0.21 THOUSAND/UL (ref 0–0.2)
IMM GRANULOCYTES NFR BLD AUTO: 1 % (ref 0–2)
LYMPHOCYTES # BLD AUTO: 0.61 THOUSANDS/ÂΜL (ref 0.6–4.47)
LYMPHOCYTES NFR BLD AUTO: 4 % (ref 14–44)
MCH RBC QN AUTO: 31 PG (ref 26.8–34.3)
MCHC RBC AUTO-ENTMCNC: 32.1 G/DL (ref 31.4–37.4)
MCV RBC AUTO: 97 FL (ref 82–98)
MONOCYTES # BLD AUTO: 1.06 THOUSAND/ÂΜL (ref 0.17–1.22)
MONOCYTES NFR BLD AUTO: 7 % (ref 4–12)
NEUTROPHILS # BLD AUTO: 14.42 THOUSANDS/ÂΜL (ref 1.85–7.62)
NEUTS SEG NFR BLD AUTO: 88 % (ref 43–75)
NRBC BLD AUTO-RTO: 0 /100 WBCS
PLATELET # BLD AUTO: 315 THOUSANDS/UL (ref 149–390)
PMV BLD AUTO: 8.7 FL (ref 8.9–12.7)
POTASSIUM SERPL-SCNC: 3.6 MMOL/L (ref 3.5–5.3)
PROT SERPL-MCNC: 6.6 G/DL (ref 6.4–8.4)
RBC # BLD AUTO: 3.55 MILLION/UL (ref 3.81–5.12)
SODIUM SERPL-SCNC: 133 MMOL/L (ref 135–147)
T3FREE SERPL-MCNC: 2.36 PG/ML (ref 2.5–3.9)
TSH SERPL DL<=0.05 MIU/L-ACNC: 1.56 UIU/ML (ref 0.45–4.5)
WBC # BLD AUTO: 16.33 THOUSAND/UL (ref 4.31–10.16)

## 2023-10-23 PROCEDURE — 85025 COMPLETE CBC W/AUTO DIFF WBC: CPT | Performed by: INTERNAL MEDICINE

## 2023-10-23 PROCEDURE — G0299 HHS/HOSPICE OF RN EA 15 MIN: HCPCS

## 2023-10-23 PROCEDURE — 84481 FREE ASSAY (FT-3): CPT | Performed by: INTERNAL MEDICINE

## 2023-10-23 PROCEDURE — 84443 ASSAY THYROID STIM HORMONE: CPT | Performed by: INTERNAL MEDICINE

## 2023-10-23 PROCEDURE — 80053 COMPREHEN METABOLIC PANEL: CPT | Performed by: INTERNAL MEDICINE

## 2023-10-23 NOTE — TELEPHONE ENCOUNTER
Spoke with patient who sent [employer] message stating she has been having abd pain and increased WBC. Pt stated she has been having centralized lower abdominal pain associated with constipation. She reports she started taking sennakot 2 days ago. She said LBM this AM but very small and formed. I informed her to start taking miralax daily and she can also try prune juice which will have a reater effect than stool softener. Pt denies pain, burning, itching with urination but says it "takes a little bit to start a stream". Pt also reports worsening SOB than usual with intermittent small amt white sputum with cough. Pt continues on Levaquin for 3 more days and has pulm f/u tomorrow. Pt denies any fevers, rash, HA, dizziness, or lightheadedness. I informed pt her high WBC can be attributed to her taking prednisone. She states she overall does not feel well. I let her know I will speak with Dr. Aparna Silva and let her know about treatment on Thursday. Pt verbalized understanding and is in agreement with plan    After speaking with Dr. Aparna Silva, I let pt know we will hold treatment on Thursday. Pt stated she will call us after pulmonary appt tomorrow with updates. Pt also said she will let us know if abd pain improves after BM with miralax or it it worsens.  Pt verbalized understanding and knows to call back with ay questions or concerns

## 2023-10-23 NOTE — PROGRESS NOTES
Pt here for port labs; labs obtained without difficulty; pt aware of next appt; left unit ambulatory with steady gait.

## 2023-10-24 ENCOUNTER — OFFICE VISIT (OUTPATIENT)
Age: 63
End: 2023-10-24
Payer: COMMERCIAL

## 2023-10-24 VITALS
SYSTOLIC BLOOD PRESSURE: 146 MMHG | DIASTOLIC BLOOD PRESSURE: 64 MMHG | WEIGHT: 182 LBS | BODY MASS INDEX: 30.32 KG/M2 | OXYGEN SATURATION: 95 % | HEIGHT: 65 IN | HEART RATE: 122 BPM

## 2023-10-24 DIAGNOSIS — J44.9 CHRONIC OBSTRUCTIVE PULMONARY DISEASE, UNSPECIFIED COPD TYPE (HCC): Primary | ICD-10-CM

## 2023-10-24 DIAGNOSIS — J43.9 PULMONARY EMPHYSEMA, UNSPECIFIED EMPHYSEMA TYPE (HCC): ICD-10-CM

## 2023-10-24 DIAGNOSIS — J96.11 CHRONIC RESPIRATORY FAILURE WITH HYPOXIA (HCC): ICD-10-CM

## 2023-10-24 DIAGNOSIS — J91.0 MALIGNANT PLEURAL EFFUSION: ICD-10-CM

## 2023-10-24 DIAGNOSIS — R06.09 DYSPNEA ON EXERTION: ICD-10-CM

## 2023-10-24 LAB

## 2023-10-24 PROCEDURE — 94618 PULMONARY STRESS TESTING: CPT | Performed by: INTERNAL MEDICINE

## 2023-10-24 PROCEDURE — 99215 OFFICE O/P EST HI 40 MIN: CPT | Performed by: INTERNAL MEDICINE

## 2023-10-24 RX ORDER — FLUTICASONE FUROATE, UMECLIDINIUM BROMIDE AND VILANTEROL TRIFENATATE 200; 62.5; 25 UG/1; UG/1; UG/1
1 POWDER RESPIRATORY (INHALATION) DAILY
Qty: 60 BLISTER | Refills: 0 | Status: SHIPPED | COMMUNITY
Start: 2023-10-24 | End: 2023-11-23

## 2023-10-24 NOTE — ASSESSMENT & PLAN NOTE
We spoke today that her worsening shortness of breath may be related to underlying COPD/emphysema. I have given her samples of Trelegy to try for the next month. If she has improvement in her symptoms, we may need to use an all nebulized regimen as her insurance does not cover Trelegy. I have also asked her to get PFTs to look at severity of obstruction. She will continue to remain as active as possible.

## 2023-10-24 NOTE — ASSESSMENT & PLAN NOTE
She continues to have drainage from her aseptic pleural catheter 3 times weekly. She is averaging 200-300 cc on drainage. We will continue to drain 3 times weekly for the next 2 weeks. She has a PET scan scheduled on 11/2/2023. I would like her chest to be as dry as possible prior to that PET scan.

## 2023-10-24 NOTE — PROGRESS NOTES
Assessment:    Chronic respiratory failure with hypoxia (HCC)  The patient has had significant increase in shortness of breath over the past 4 to 6 weeks. Upon evaluation today, she becomes hypoxic with minimal exertion requiring 2 L nasal cannula. I have ordered supplemental oxygen with portability. I instructed her to use this with exertion, she does not need it at rest.    Malignant pleural effusion  She continues to have drainage from her aseptic pleural catheter 3 times weekly. She is averaging 200-300 cc on drainage. We will continue to drain 3 times weekly for the next 2 weeks. She has a PET scan scheduled on 11/2/2023. I would like her chest to be as dry as possible prior to that PET scan. COPD (chronic obstructive pulmonary disease) ([facility])  We spoke today that her worsening shortness of breath may be related to underlying COPD/emphysema. I have given her samples of Trelegy to try for the next month. If she has improvement in her symptoms, we may need to use an all nebulized regimen as her insurance does not cover Trelegy. I have also asked her to get PFTs to look at severity of obstruction. She will continue to remain as active as possible. Dyspnea on exertion  Over the past 2 months she has had a significant increase in her dyspnea. This is likely multifactorial (malignancy, pleural effusion, emphysema/COPD, hypoxia). CT scan did not show pulmonary embolism last month. There is concern for progression of right lower lobe consolidation despite drainage of malignant effusion. There is also some new groundglass opacities in the left upper lobe. She is currently under the treatment of prednisone with taper over the next week. Plan for PET scan next week to determine next steps. I have ordered PFTs as per above. Plan:    Diagnoses and all orders for this visit:    Chronic obstructive pulmonary disease, unspecified COPD type ([address])  -     Pulse Oximetry with exercise;  Future  - Complete PFT with post bronchodilator; Future  -     fluticasone-umeclidinium-vilanterol (Trelegy Ellipta) 200-62.5-25 mcg/actuation AEPB inhaler; Inhale 1 puff daily Rinse mouth after use. -     Home Oxygen with Portability  -     POCT Oxygen Titration    Chronic respiratory failure with hypoxia (HCC)  -     Home Oxygen with Portability  -     POCT Oxygen Titration    Malignant pleural effusion    Pulmonary emphysema, unspecified emphysema type (HCC)    Dyspnea on exertion  -     POCT Oxygen Titration        Follow-up: 2 months      HPI:  Increased dyspnea over the past couple of months. She has noticed that even minimal exertion around the house has led to shortness of breath. Prednisone hasn't helped  She was diagnosed with a sinus infection - on levaquin - finishes tomorrow - cough better     She has been draining her aseptic pleural catheter 3 times weekly. Average 200 to 300 cc of drainage. She does not have any significant chest pain with drainage. The discomfort has significantly improved. Review of Systems   Constitutional:  Negative for appetite change and fever. HENT:  Positive for postnasal drip and rhinorrhea. Negative for ear pain, sneezing, sore throat and trouble swallowing. Respiratory:  Positive for cough and shortness of breath. Cardiovascular:  Negative for chest pain and leg swelling. Musculoskeletal:  Negative for myalgias. Neurological:  Negative for headaches. All other systems reviewed and are negative.       The following portions of the patient's history were reviewed and updated as appropriate: allergies, current medications, past family history, past medical history, past social history, past surgical history, and problem list.    VITALS:  Vitals:    10/24/23 1308 10/24/23 1312   BP: 146/64    BP Location: Left arm    Patient Position: Sitting    Cuff Size: Large    Pulse: (!) 122    SpO2: 91% 95%   Weight: 82.6 kg (182 lb)    Height: 5' 5" (1.651 m) Physical Exam  General:  Patient is awake, alert, non-toxic and in no acute respiratory distress  Neck: No JVD  CV:  Regular, +S1 and S2, No murmurs, gallops or rubs appreciated  Lungs: Clear to auscultation bilateral without wheeze, rales or rhonchi  Right chest wall aseptic catheter  Abdomen: Soft, +BS, Non-tender, non-distended  Extremities: No clubbing, cyanosis or edema  Neuro: No focal deficits        Diagnostic Testin Minute Walk:  The patient's starting pulse ox was 92% on room air with a heart rate of 115 and Lissy dyspnea score of 2/10. They walked a total of 166 m in 6 minutes stopping once to place oxygen when sats decreased to 87% at 2 minutes. At completion the pulse ox was 91% on 2 L with a heart rate of 123. Impressions:  Exertional hypoxia resolved with supplemental oxygen at 2 L nasal cannula        CBC:  Lab Results   Component Value Date    WBC 16.33 (H) 10/23/2023    HGB 11.0 (L) 10/23/2023    HCT 34.3 (L) 10/23/2023    MCV 97 10/23/2023     10/23/2023         BMP:   Lab Results   Component Value Date    K 3.6 10/23/2023    CL 97 10/23/2023    CO2 25 10/23/2023    BUN 12 10/23/2023    CREATININE 0.65 10/23/2023    GLUCOSE 95 2019    GLUF 105 (H) 2023    CALCIUM 9.2 10/23/2023    CORRECTEDCA 9.8 10/02/2023    AST 18 10/23/2023    ALT 24 10/23/2023    ALKPHOS 73 10/23/2023    EGFR 93 10/23/2023         Imaging studies:  I have personally reviewed pertinent reports. and I have personally reviewed pertinent films in PACS  CT Chest   IMPRESSION:     Masslike consolidation along the posterior right upper lobe extending to the right hilum reflecting known underlying neoplasm is unchanged compared to 2023 and increased in size compared to 2023. Superimposed pneumonia, atelectasis, or   lymphangitic spread of tumor not excluded. Several new left upper lobe nodules measuring up to 7 mm are concerning for tumoral spread.      New groundglass airspace disease at the left upper lobe/lingula concerning for pneumonia     Small loculated right pleural effusion status post tunneled pleural catheter. Increasing retroperitoneal adenopathy.  This finding should be followed on subsequent imaging studies to exclude narinder spread of disease         Eldonna Solid, DO  Answers submitted by the patient for this visit:  Pulmonology Questionnaire (Submitted on 10/17/2023)  Chief Complaint: Primary symptoms  Chronicity: chronic  Do you have shortness of breath that occurs with effort or exertion?: Yes  Do you have ear congestion?: No  Do you have heartburn?: No  Do you have fatigue?: Yes  Do you have nasal congestion?: Yes  Do you have shortness of breath when lying flat?: No  Do you have shortness of breath when you wake up?: No  Do you have sweats?: No  Have you experienced weight loss?: No  Which of the following makes your symptoms worse?: climbing stairs, minimal activity

## 2023-10-24 NOTE — ASSESSMENT & PLAN NOTE
The patient has had significant increase in shortness of breath over the past 4 to 6 weeks. Upon evaluation today, she becomes hypoxic with minimal exertion requiring 2 L nasal cannula. I have ordered supplemental oxygen with portability.   I instructed her to use this with exertion, she does not need it at rest.

## 2023-10-24 NOTE — ASSESSMENT & PLAN NOTE
Over the past 2 months she has had a significant increase in her dyspnea. This is likely multifactorial (malignancy, pleural effusion, emphysema/COPD, hypoxia). CT scan did not show pulmonary embolism last month. There is concern for progression of right lower lobe consolidation despite drainage of malignant effusion. There is also some new groundglass opacities in the left upper lobe. She is currently under the treatment of prednisone with taper over the next week. Plan for PET scan next week to determine next steps. I have ordered PFTs as per above.

## 2023-10-25 ENCOUNTER — HOME CARE VISIT (OUTPATIENT)
Dept: HOME HEALTH SERVICES | Facility: HOME HEALTHCARE | Age: 63
End: 2023-10-25
Payer: COMMERCIAL

## 2023-10-25 VITALS
OXYGEN SATURATION: 92 % | TEMPERATURE: 96.8 F | HEART RATE: 102 BPM | RESPIRATION RATE: 22 BRPM | SYSTOLIC BLOOD PRESSURE: 160 MMHG | DIASTOLIC BLOOD PRESSURE: 80 MMHG

## 2023-10-25 PROCEDURE — 400014 VN F/U

## 2023-10-25 PROCEDURE — G0299 HHS/HOSPICE OF RN EA 15 MIN: HCPCS

## 2023-10-26 ENCOUNTER — HOSPITAL ENCOUNTER (OUTPATIENT)
Dept: INFUSION CENTER | Facility: HOSPITAL | Age: 63
End: 2023-10-26
Attending: INTERNAL MEDICINE

## 2023-10-27 ENCOUNTER — HOME CARE VISIT (OUTPATIENT)
Dept: HOME HEALTH SERVICES | Facility: HOME HEALTHCARE | Age: 63
End: 2023-10-27
Payer: COMMERCIAL

## 2023-10-27 ENCOUNTER — APPOINTMENT (EMERGENCY)
Dept: CT IMAGING | Facility: HOSPITAL | Age: 63
DRG: 375 | End: 2023-10-27
Payer: COMMERCIAL

## 2023-10-27 ENCOUNTER — APPOINTMENT (EMERGENCY)
Dept: RADIOLOGY | Facility: HOSPITAL | Age: 63
DRG: 375 | End: 2023-10-27
Payer: COMMERCIAL

## 2023-10-27 ENCOUNTER — HOSPITAL ENCOUNTER (INPATIENT)
Facility: HOSPITAL | Age: 63
LOS: 2 days | Discharge: HOME HEALTH | DRG: 375 | End: 2023-10-29
Attending: EMERGENCY MEDICINE | Admitting: HOSPITALIST
Payer: COMMERCIAL

## 2023-10-27 DIAGNOSIS — R65.20 SEVERE SEPSIS (HCC): ICD-10-CM

## 2023-10-27 DIAGNOSIS — E87.1 HYPONATREMIA: ICD-10-CM

## 2023-10-27 DIAGNOSIS — R10.9 ABDOMINAL PAIN: Primary | ICD-10-CM

## 2023-10-27 DIAGNOSIS — Z51.5 PALLIATIVE CARE PATIENT: ICD-10-CM

## 2023-10-27 DIAGNOSIS — R06.02 SHORTNESS OF BREATH: ICD-10-CM

## 2023-10-27 DIAGNOSIS — I10 ESSENTIAL HYPERTENSION: ICD-10-CM

## 2023-10-27 DIAGNOSIS — A41.9 SEVERE SEPSIS (HCC): ICD-10-CM

## 2023-10-27 LAB
2HR DELTA HS TROPONIN: -1 NG/L
4HR DELTA HS TROPONIN: 0 NG/L
ALBUMIN SERPL BCP-MCNC: 3.6 G/DL (ref 3.5–5)
ALP SERPL-CCNC: 84 U/L (ref 34–104)
ALT SERPL W P-5'-P-CCNC: 41 U/L (ref 7–52)
AMORPH URATE CRY URNS QL MICRO: ABNORMAL
ANION GAP SERPL CALCULATED.3IONS-SCNC: 11 MMOL/L
AST SERPL W P-5'-P-CCNC: 23 U/L (ref 13–39)
ATRIAL RATE: 122 BPM
BACTERIA UR QL AUTO: ABNORMAL /HPF
BASOPHILS # BLD AUTO: 0.03 THOUSANDS/ÂΜL (ref 0–0.1)
BASOPHILS NFR BLD AUTO: 0 % (ref 0–1)
BILIRUB SERPL-MCNC: 0.47 MG/DL (ref 0.2–1)
BILIRUB UR QL STRIP: NEGATIVE
BUN SERPL-MCNC: 14 MG/DL (ref 5–25)
CALCIUM SERPL-MCNC: 9.3 MG/DL (ref 8.4–10.2)
CARDIAC TROPONIN I PNL SERPL HS: 6 NG/L
CARDIAC TROPONIN I PNL SERPL HS: 7 NG/L
CARDIAC TROPONIN I PNL SERPL HS: 7 NG/L
CHLORIDE SERPL-SCNC: 97 MMOL/L (ref 96–108)
CLARITY UR: ABNORMAL
CO2 SERPL-SCNC: 23 MMOL/L (ref 21–32)
COLOR UR: YELLOW
CREAT SERPL-MCNC: 0.74 MG/DL (ref 0.6–1.3)
EOSINOPHIL # BLD AUTO: 0.01 THOUSAND/ÂΜL (ref 0–0.61)
EOSINOPHIL NFR BLD AUTO: 0 % (ref 0–6)
ERYTHROCYTE [DISTWIDTH] IN BLOOD BY AUTOMATED COUNT: 15 % (ref 11.6–15.1)
FLUAV RNA RESP QL NAA+PROBE: NEGATIVE
FLUBV RNA RESP QL NAA+PROBE: NEGATIVE
GFR SERPL CREATININE-BSD FRML MDRD: 85 ML/MIN/1.73SQ M
GLUCOSE SERPL-MCNC: 135 MG/DL (ref 65–140)
GLUCOSE UR STRIP-MCNC: NEGATIVE MG/DL
HCT VFR BLD AUTO: 36.3 % (ref 34.8–46.1)
HGB BLD-MCNC: 11.4 G/DL (ref 11.5–15.4)
HGB UR QL STRIP.AUTO: NEGATIVE
IMM GRANULOCYTES # BLD AUTO: 0.22 THOUSAND/UL (ref 0–0.2)
IMM GRANULOCYTES NFR BLD AUTO: 2 % (ref 0–2)
KETONES UR STRIP-MCNC: NEGATIVE MG/DL
LACTATE SERPL-SCNC: 0.8 MMOL/L (ref 0.5–2)
LACTATE SERPL-SCNC: 2.3 MMOL/L (ref 0.5–2)
LEUKOCYTE ESTERASE UR QL STRIP: ABNORMAL
LIPASE SERPL-CCNC: <6 U/L (ref 11–82)
LYMPHOCYTES # BLD AUTO: 0.38 THOUSANDS/ÂΜL (ref 0.6–4.47)
LYMPHOCYTES NFR BLD AUTO: 3 % (ref 14–44)
MCH RBC QN AUTO: 30.2 PG (ref 26.8–34.3)
MCHC RBC AUTO-ENTMCNC: 31.4 G/DL (ref 31.4–37.4)
MCV RBC AUTO: 96 FL (ref 82–98)
MONOCYTES # BLD AUTO: 0.7 THOUSAND/ÂΜL (ref 0.17–1.22)
MONOCYTES NFR BLD AUTO: 5 % (ref 4–12)
MUCOUS THREADS UR QL AUTO: ABNORMAL
NEUTROPHILS # BLD AUTO: 12.84 THOUSANDS/ÂΜL (ref 1.85–7.62)
NEUTS SEG NFR BLD AUTO: 90 % (ref 43–75)
NITRITE UR QL STRIP: NEGATIVE
NON-SQ EPI CELLS URNS QL MICRO: ABNORMAL /HPF
NRBC BLD AUTO-RTO: 0 /100 WBCS
P AXIS: 41 DEGREES
PH UR STRIP.AUTO: 7 [PH]
PLATELET # BLD AUTO: 339 THOUSANDS/UL (ref 149–390)
PMV BLD AUTO: 8.6 FL (ref 8.9–12.7)
POTASSIUM SERPL-SCNC: 3.7 MMOL/L (ref 3.5–5.3)
PR INTERVAL: 144 MS
PROCALCITONIN SERPL-MCNC: 0.1 NG/ML
PROT SERPL-MCNC: 6.9 G/DL (ref 6.4–8.4)
PROT UR STRIP-MCNC: NEGATIVE MG/DL
QRS AXIS: -19 DEGREES
QRSD INTERVAL: 84 MS
QT INTERVAL: 306 MS
QTC INTERVAL: 436 MS
RBC # BLD AUTO: 3.77 MILLION/UL (ref 3.81–5.12)
RBC #/AREA URNS AUTO: ABNORMAL /HPF
RSV RNA RESP QL NAA+PROBE: NEGATIVE
SARS-COV-2 RNA RESP QL NAA+PROBE: NEGATIVE
SODIUM SERPL-SCNC: 131 MMOL/L (ref 135–147)
SP GR UR STRIP.AUTO: <1.005 (ref 1–1.03)
T WAVE AXIS: 63 DEGREES
UROBILINOGEN UR STRIP-ACNC: <2 MG/DL
VENTRICULAR RATE: 122 BPM
WBC # BLD AUTO: 14.18 THOUSAND/UL (ref 4.31–10.16)
WBC #/AREA URNS AUTO: ABNORMAL /HPF

## 2023-10-27 PROCEDURE — G0299 HHS/HOSPICE OF RN EA 15 MIN: HCPCS

## 2023-10-27 PROCEDURE — 85025 COMPLETE CBC W/AUTO DIFF WBC: CPT | Performed by: EMERGENCY MEDICINE

## 2023-10-27 PROCEDURE — 71046 X-RAY EXAM CHEST 2 VIEWS: CPT

## 2023-10-27 PROCEDURE — 99285 EMERGENCY DEPT VISIT HI MDM: CPT | Performed by: PHYSICIAN ASSISTANT

## 2023-10-27 PROCEDURE — 81001 URINALYSIS AUTO W/SCOPE: CPT | Performed by: PHYSICIAN ASSISTANT

## 2023-10-27 PROCEDURE — 93010 ELECTROCARDIOGRAM REPORT: CPT | Performed by: INTERNAL MEDICINE

## 2023-10-27 PROCEDURE — 71260 CT THORAX DX C+: CPT

## 2023-10-27 PROCEDURE — 83605 ASSAY OF LACTIC ACID: CPT | Performed by: PHYSICIAN ASSISTANT

## 2023-10-27 PROCEDURE — G1004 CDSM NDSC: HCPCS

## 2023-10-27 PROCEDURE — 74177 CT ABD & PELVIS W/CONTRAST: CPT

## 2023-10-27 PROCEDURE — 83690 ASSAY OF LIPASE: CPT | Performed by: PHYSICIAN ASSISTANT

## 2023-10-27 PROCEDURE — 80053 COMPREHEN METABOLIC PANEL: CPT | Performed by: EMERGENCY MEDICINE

## 2023-10-27 PROCEDURE — 99223 1ST HOSP IP/OBS HIGH 75: CPT | Performed by: HOSPITALIST

## 2023-10-27 PROCEDURE — 84145 PROCALCITONIN (PCT): CPT | Performed by: PHYSICIAN ASSISTANT

## 2023-10-27 PROCEDURE — 36415 COLL VENOUS BLD VENIPUNCTURE: CPT

## 2023-10-27 PROCEDURE — 99285 EMERGENCY DEPT VISIT HI MDM: CPT

## 2023-10-27 PROCEDURE — 87040 BLOOD CULTURE FOR BACTERIA: CPT | Performed by: PHYSICIAN ASSISTANT

## 2023-10-27 PROCEDURE — 84484 ASSAY OF TROPONIN QUANT: CPT | Performed by: EMERGENCY MEDICINE

## 2023-10-27 PROCEDURE — 96361 HYDRATE IV INFUSION ADD-ON: CPT

## 2023-10-27 PROCEDURE — 0241U HB NFCT DS VIR RESP RNA 4 TRGT: CPT | Performed by: PHYSICIAN ASSISTANT

## 2023-10-27 PROCEDURE — 93005 ELECTROCARDIOGRAM TRACING: CPT

## 2023-10-27 PROCEDURE — 96365 THER/PROPH/DIAG IV INF INIT: CPT

## 2023-10-27 RX ORDER — BUPROPION HYDROCHLORIDE 300 MG/1
300 TABLET ORAL DAILY
Status: DISCONTINUED | OUTPATIENT
Start: 2023-10-28 | End: 2023-10-29 | Stop reason: HOSPADM

## 2023-10-27 RX ORDER — ALBUTEROL SULFATE 2.5 MG/3ML
2.5 SOLUTION RESPIRATORY (INHALATION) EVERY 6 HOURS PRN
Status: DISCONTINUED | OUTPATIENT
Start: 2023-10-27 | End: 2023-10-29 | Stop reason: HOSPADM

## 2023-10-27 RX ORDER — FLUTICASONE FUROATE AND VILANTEROL 200; 25 UG/1; UG/1
1 POWDER RESPIRATORY (INHALATION) DAILY
Status: DISCONTINUED | OUTPATIENT
Start: 2023-10-28 | End: 2023-10-29 | Stop reason: HOSPADM

## 2023-10-27 RX ORDER — POLYETHYLENE GLYCOL 3350 17 G/17G
17 POWDER, FOR SOLUTION ORAL DAILY
Status: DISCONTINUED | OUTPATIENT
Start: 2023-10-27 | End: 2023-10-29 | Stop reason: HOSPADM

## 2023-10-27 RX ORDER — PREDNISONE 10 MG/1
10 TABLET ORAL 2 TIMES DAILY WITH MEALS
Status: DISCONTINUED | OUTPATIENT
Start: 2023-10-28 | End: 2023-10-29 | Stop reason: HOSPADM

## 2023-10-27 RX ORDER — SERTRALINE HYDROCHLORIDE 100 MG/1
200 TABLET, FILM COATED ORAL DAILY
Status: DISCONTINUED | OUTPATIENT
Start: 2023-10-28 | End: 2023-10-29 | Stop reason: HOSPADM

## 2023-10-27 RX ORDER — ONDANSETRON 2 MG/ML
4 INJECTION INTRAMUSCULAR; INTRAVENOUS EVERY 6 HOURS PRN
Status: DISCONTINUED | OUTPATIENT
Start: 2023-10-27 | End: 2023-10-29 | Stop reason: HOSPADM

## 2023-10-27 RX ORDER — SIMETHICONE 80 MG
80 TABLET,CHEWABLE ORAL
Status: DISCONTINUED | OUTPATIENT
Start: 2023-10-27 | End: 2023-10-29 | Stop reason: HOSPADM

## 2023-10-27 RX ORDER — LOSARTAN POTASSIUM 50 MG/1
100 TABLET ORAL DAILY
Status: DISCONTINUED | OUTPATIENT
Start: 2023-10-28 | End: 2023-10-29 | Stop reason: HOSPADM

## 2023-10-27 RX ORDER — ATORVASTATIN CALCIUM 20 MG/1
20 TABLET, FILM COATED ORAL
Status: DISCONTINUED | OUTPATIENT
Start: 2023-10-27 | End: 2023-10-29 | Stop reason: HOSPADM

## 2023-10-27 RX ORDER — ACETAMINOPHEN 325 MG/1
650 TABLET ORAL EVERY 6 HOURS PRN
Status: DISCONTINUED | OUTPATIENT
Start: 2023-10-27 | End: 2023-10-29 | Stop reason: HOSPADM

## 2023-10-27 RX ORDER — ENOXAPARIN SODIUM 100 MG/ML
40 INJECTION SUBCUTANEOUS DAILY
Status: DISCONTINUED | OUTPATIENT
Start: 2023-10-28 | End: 2023-10-29 | Stop reason: HOSPADM

## 2023-10-27 RX ORDER — CEFEPIME HYDROCHLORIDE 2 G/50ML
2000 INJECTION, SOLUTION INTRAVENOUS ONCE
Status: COMPLETED | OUTPATIENT
Start: 2023-10-27 | End: 2023-10-27

## 2023-10-27 RX ORDER — FUROSEMIDE 20 MG/1
20 TABLET ORAL EVERY MORNING
Status: DISCONTINUED | OUTPATIENT
Start: 2023-10-28 | End: 2023-10-29 | Stop reason: HOSPADM

## 2023-10-27 RX ORDER — LORAZEPAM 0.5 MG/1
0.5 TABLET ORAL DAILY PRN
Status: DISCONTINUED | OUTPATIENT
Start: 2023-10-27 | End: 2023-10-29 | Stop reason: HOSPADM

## 2023-10-27 RX ORDER — MAGNESIUM HYDROXIDE/ALUMINUM HYDROXICE/SIMETHICONE 120; 1200; 1200 MG/30ML; MG/30ML; MG/30ML
30 SUSPENSION ORAL EVERY 6 HOURS PRN
Status: DISCONTINUED | OUTPATIENT
Start: 2023-10-27 | End: 2023-10-29 | Stop reason: HOSPADM

## 2023-10-27 RX ADMIN — SIMETHICONE 80 MG: 80 TABLET, CHEWABLE ORAL at 21:08

## 2023-10-27 RX ADMIN — ATORVASTATIN CALCIUM 20 MG: 20 TABLET, FILM COATED ORAL at 19:33

## 2023-10-27 RX ADMIN — VERAPAMIL HYDROCHLORIDE 120 MG: 120 TABLET, FILM COATED, EXTENDED RELEASE ORAL at 21:09

## 2023-10-27 RX ADMIN — IOHEXOL 100 ML: 350 INJECTION, SOLUTION INTRAVENOUS at 13:19

## 2023-10-27 RX ADMIN — SODIUM CHLORIDE 1000 ML: 0.9 INJECTION, SOLUTION INTRAVENOUS at 13:07

## 2023-10-27 RX ADMIN — VERAPAMIL HYDROCHLORIDE 240 MG: 120 TABLET, FILM COATED, EXTENDED RELEASE ORAL at 21:08

## 2023-10-27 RX ADMIN — CEFEPIME HYDROCHLORIDE 2000 MG: 2 INJECTION, SOLUTION INTRAVENOUS at 13:58

## 2023-10-27 RX ADMIN — SIMETHICONE 80 MG: 80 TABLET, CHEWABLE ORAL at 17:21

## 2023-10-27 RX ADMIN — SODIUM CHLORIDE 500 ML: 0.9 INJECTION, SOLUTION INTRAVENOUS at 14:46

## 2023-10-27 RX ADMIN — POLYETHYLENE GLYCOL 3350 17 G: 17 POWDER, FOR SOLUTION ORAL at 17:21

## 2023-10-27 RX ADMIN — Medication 2.5 MG: at 17:21

## 2023-10-27 NOTE — SEPSIS NOTE
Sepsis Note   Lorri Dixon 70 y.o. female MRN: [id number]  Unit/Bed#: ED 11 Encounter: [id number]       Initial Sepsis Screening       Row Name 10/27/23 1352                Is the patient's history suggestive of a new or worsening infection? Yes (Proceed)  -CD        Suspected source of infection acute abdominal infection  -CD        Indicate SIRS criteria Tachycardia > 90 bpm;Leukocytosis (WBC > 67597 IJL) OR Leukopenia (WBC <4000 IJL) OR Bandemia (WBC >10% bands)  -CD        Are two or more of the above signs & symptoms of infection both present and new to the patient? Yes (Proceed)  -CD        Assess for evidence of organ dysfunction: Are any of the below criteria present within 6 hours of suspected infection and SIRS criteria that are NOT considered to be chronic conditions? Lactate > 2.0  -CD        Date of presentation of severe sepsis 10/27/23  -CD        Time of presentation of severe sepsis 1352  -CD        Sepsis Note: Click "NEXT" below (NOT "close") to generate sepsis note based on above information. YES (proceed by clicking "NEXT")  -CD                  User Key  (r) = Recorded By, (t) = Taken By, (c) = Cosigned By      [address] [place] [address] Name Provider Type    CD Sheila Real PA-C Physician Assistant                        There is no height or weight on file to calculate BMI.   Wt Readings from Last 1 Encounters:   10/24/23 82.6 kg (182 lb)        Ideal body weight: 57 kg (125 lb 10.6 oz)  Adjusted ideal body weight: 67.2 kg (148 lb 3.2 oz)

## 2023-10-27 NOTE — H&P
[facility]  H&P  Name: Waldo Suarez 70 y.o. female I MRN: [id number]  Unit/Bed#: ED 6 I Date of Admission: 10/27/2023   Date of Service: 10/27/2023 I Hospital Day: 0      Assessment/Plan   * Abdominal pain  Assessment & Plan  Presents with 3 weeks of lower abdominal pain, which has worsened in severity. Pain in lower central abdomen, particularly with valsalva such as coughing, straining on the toilet. Reports early satiety and mild constipation. Denies nausea, vomiting, diarrhea. WBC 14 - completing a course of solumedrol for a pneumonitis  Procalcitonin negative  CT C/A/P - No signs of infection. Slight progression of lung tumors. Development of extensive omental carcinomatosis. Received IV cefepime in ED  Hold off on further abx. Low suspicion for infection given no source identified on imaging, negative procalcitonin, downtrending WBC on steroid taper, and recent completion of 10 day course of Levaquin yesterday. Afebrile. Suspect her pain is secondary to her cancer burden, particularly with development of omental carcinomatosis. Continue tylenol PRN for mild pain, oxycodone for moderate to severe pain. Miralax for mild constipation. Gas-X for gas pain. Palliative care consult for pain management. Chronic respiratory failure with hypoxia (HCC)  Assessment & Plan  Requires 2 L on exertion  Continue follow-up with Dr. Nixon Montero from pulmonology    Hyponatremia  Assessment & Plan  Chronic mild hyponatremia  Currently at baseline at 131    Palliative care patient  Assessment & Plan  Follows with Patricio Grider from palliative care  Will consult palliative to help with pain management    Malignant pleural effusion  Assessment & Plan  With aseptic catheter in place, drain 3x weekly. Approximately 200cc drainage as of lately.  Last drained today prior to arrival.  Continue with drainage 3x weekly    Non-small cell cancer of right lung [facility])  Assessment & Plan  Has been off of immunotherapy x 2 months due to illnesses. Most recently with sinusitis, completed 10 day course of levaquin yesterday. CT imaging with slightly progression of lung tumors, and interval development of extensive omental carcinomatosis  Consult oncology. Appreciate recs. Essential hypertension  Assessment & Plan  Home regimen: Micardis 80 mg, verapamil 360 mg daily, Lasix 20 mg daily    COPD (chronic obstructive pulmonary disease) ([address])  Assessment & Plan  Currently finishing up a course of Solu-Medrol  No acute exacerbation  Continue Trelegy and albuterol           VTE Pharmacologic Prophylaxis: VTE Score: 6 High Risk (Score >/= 5) - Pharmacological DVT Prophylaxis Ordered: enoxaparin (Lovenox). Sequential Compression Devices Ordered. Code Status: Prior level 1 full code  Discussion with family: Updated  () at bedside. Anticipated Length of Stay: Patient will be admitted on an inpatient basis with an anticipated length of stay of greater than 2 midnights secondary to abdominal pain with worsening tumor burden. Total Time Spent on Date of Encounter in care of patient: 70 mins. This time was spent on one or more of the following: performing physical exam; counseling and coordination of care; obtaining or reviewing history; documenting in the medical record; reviewing/ordering tests, medications or procedures; communicating with other healthcare professionals and discussing with patient's family/caregivers. Chief Complaint: Abdominal pain    History of Present Illness:  Lino Jasmine is a 70 y.o. female with a PMH of COPD on 2 L with exertion, lung cancer following with Dr. Sidra Lyons s/p aseptic pleural catheter for malignant effusion, HTN, HLD who presents with worsening abdominal pain. Patient reports abdominal pain x3-month, which has worsened over the last 3 weeks.   She has mild pain at rest, however developed significant pain with any increase in abdominal pressure including coughing, going to the bathroom, certain movements. She recently completed a course of Levaquin for a sinusitis, with full resolution of her sinusitis symptoms. Underwent CT imaging which demonstrated slight progression of lung tumors, development of extensive omental carcinomatosis. Of note, has been off immunotherapy x2 months due to various illnesses. Her WBC is 14, finishing up a course of Solu-Medrol for pneumonitis. She will be admitted for pain control as well as urgent evaluation by oncology/palliative care. She did receive 1 dose of cefepime in the ED. Review of Systems:  Review of Systems   Constitutional:  Negative for appetite change, chills, fatigue and fever. HENT:  Negative for ear pain, sore throat and trouble swallowing. Eyes:  Negative for visual disturbance. Respiratory:  Negative for cough, chest tightness, shortness of breath and wheezing. Cardiovascular:  Negative for chest pain, palpitations and leg swelling. Gastrointestinal:  Positive for abdominal pain. Negative for abdominal distention, diarrhea, nausea and vomiting. Endocrine: Negative. Genitourinary:  Negative for dysuria, flank pain and hematuria. Musculoskeletal:  Negative for arthralgias, gait problem and myalgias. Skin:  Negative for pallor. Allergic/Immunologic: Negative for immunocompromised state. Neurological:  Negative for dizziness, syncope, light-headedness, numbness and headaches.        Past Medical and Surgical History:   Past Medical History:   Diagnosis Date    Cancer [facility])     COPD (chronic obstructive pulmonary disease) ([address])     Emphysema of lung ([address])     Hyperlipidemia     Hypertension     Lung cancer ([address]) 06/26/2019    right lower lobe removed    Lung nodule     Pleural effusion        Past Surgical History:   Procedure Laterality Date    APPENDECTOMY      BREAST BIOPSY Right     benign    BRONCHOSCOPY  06/2019    COLON SURGERY      HYSTERECTOMY      age 48    IR PLEURAL EFFUSION LONG-TERM CATHETER PLACEMENT  04/25/2023    IR PORT PLACEMENT  04/18/2023    IR THORACENTESIS  03/24/2023    IR THORACENTESIS  04/12/2023    IR THORACENTESIS  04/18/2023    LUNG BIOPSY  06/2019    LUNG LOBECTOMY      OOPHORECTOMY Bilateral     age 48       Meds/Allergies:  Prior to Admission medications    Medication Sig Start Date End Date Taking? Authorizing Provider   albuterol (2.5 mg/3 mL) 0.083 % nebulizer solution Take 3 mL (2.5 mg total) by nebulization every 6 (six) hours as needed for wheezing or shortness of breath 5/2/23   Arik Mccray,    albuterol (PROVENTIL HFA,VENTOLIN HFA) 90 mcg/act inhaler Inhale 2 puffs every 4 (four) hours 8/6/19   Historical Provider, MD   Ascorbic Acid (vitamin C) 1000 MG tablet 1 daily    Historical Provider, MD   atorvastatin (LIPITOR) 20 mg tablet Take 20 mg by mouth daily. Historical Provider, MD   Biotin 14841 MCG TABS 1 daily    Historical Provider, MD   buPROPion (WELLBUTRIN XL) 300 mg 24 hr tablet TAKE 1 TABLET BY MOUTH EVERY DAY IN THE MORNING FOR 90 DAYS 5/27/23   Historical Provider, MD   Cholecalciferol (Vitamin D3) 25 MCG (1000 UT) CAPS 1 capsule every 24 hours    Historical Provider, MD   dexamethasone (DECADRON) 4 mg tablet TAKE 1 TABLET (4 MG TOTAL) BY MOUTH 2 (TWO) TIMES A DAY WITH MEALS TAKE 1 TABLET BY MOUTH THE DAY BEFORE, THE DAY OF, AND THE DAY AFTER CHEMOTHERAPY.  8/23/23   Vicky Ramirez DO   dexamethasone sodium phosphate 0.1 % ophthalmic solution Administer 1 drop to both eyes every 3 (three) hours as needed (eye irritation) 8/24/23   Vicky Ramirez DO   diphenhydrAMINE (BENADRYL) 50 MG tablet Take 1 tablet (50 mg total) by mouth every 6 (six) hours as needed for itching (Rash)  Patient not taking: Reported on 10/24/2023 6/16/20   Arnav Wells DO   diphenhydrAMINE-APAP, sleep, (TYLENOL PM EXTRA STRENGTH PO) Take 325 mg by mouth daily at bedtime    Historical Provider, MD   EPINEPHrine (EPIPEN) 0.3 mg/0.3 mL SOAJ Inject 0.3 mL (0.3 mg total) into a muscle once for 1 dose  Patient not taking: Reported on 10/2/2023 10/11/21 9/18/23  Angela Lindsay,    famotidine (PEPCID) 20 mg tablet Take 20 mg by mouth daily    Historical Provider, MD   fluticasone-umeclidinium-vilanterol (Trelegy Ellipta) 200-62.5-25 mcg/actuation AEPB inhaler Inhale 1 puff daily Rinse mouth after use. 10/24/23 11/23/23  Sergey Simmons DO   folic acid (FOLVITE) 1 mg tablet TAKE 1 TABLET BY MOUTH EVERY DAY 10/15/23   Arslan Ayala DO   furosemide (LASIX) 20 mg tablet Take 1 tablet (20 mg total) by mouth every morning 8/22/23 2/18/24  Alfredo Epps MD   Klor-Con M20 20 MEQ tablet TAKE 1 TABLET BY MOUTH EVERY DAY 9/8/23   CAT Austin   levofloxacin (LEVAQUIN) 500 mg tablet Take 1 tablet (500 mg total) by mouth every 24 hours for 10 days 10/16/23 10/26/23  Arslan Ayala DO   lidocaine-prilocaine (EMLA) cream APPLY TO PORT 30 TO 60 MINUTES PRIOR TO USE 9/7/23   CAT Austin   LORazepam (Ativan) 0.5 mg tablet Take 1 tablet (0.5 mg total) by mouth daily as needed for anxiety 10/6/23   Alfredo Epps MD   Multiple Vitamin (MULTIVITAMINS PO) every 24 hours    Historical Provider, MD   ondansetron (ZOFRAN) 8 mg tablet Take 1 tablet (8 mg total) by mouth every 8 (eight) hours as needed for nausea or vomiting  Patient taking differently: Take 8 mg by mouth every 8 (eight) hours as needed for nausea or vomiting PRN 4/25/23 10/6/23  Leonila Arita PA-C   oxygen gas Inhale 2 L/min continuous.  Indications: Hypoxia 10/25/23   Historical Provider, MD   predniSONE 10 mg tablet Take 4 tablets (40 mg total) by mouth 2 (two) times a day with meals 10/4/23   Arslan Ayala DO   Probiotic Product (PROBIOTIC DAILY PO) 1 daily    Historical Provider, MD   prochlorperazine (COMPAZINE) 10 mg tablet Take 1 tablet (10 mg total) by mouth every 6 (six) hours as needed for nausea  Patient not taking: Reported on 9/28/2023 4/7/23   DO Radha Del Castillo (Yunier) 8.6 MG CAPS Take 2 capsules by mouth as needed (constipation) 23   Historical Provider, MD   sertraline (ZOLOFT) 100 mg tablet 2 daily    Historical Provider, MD   telmisartan (MICARDIS) 80 MG tablet Take 1 tablet (80 mg total) by mouth daily 23  Jessie Ruiz MD   verapamil (CALAN-SR) 120 mg CR tablet Take 1 tablet (120 mg total) by mouth daily at bedtime 1 hs  Patient taking differently: Take 120 mg by mouth in the morning 23   Jessie Ruiz MD   verapamil (CALAN-SR) 240 mg CR tablet Take 1 tablet (240 mg total) by mouth daily at bedtime 1 hs  Patient taking differently: Take 240 mg by mouth in the morning 23   Jessie Ruiz MD     I have reviewed home medications with patient personally. Allergies:    Allergies   Allergen Reactions    Amoxicillin Hives    Vilazodone Hcl Nausea Only and Dizziness     (Viibryd)    Wasp Venom Swelling    Zithromax [Azithromycin] Hives       Social History:  Marital Status: /Civil Union   Occupation: Noncontributory  Patient Pre-hospital Living Situation: Home  Patient Pre-hospital Level of Mobility: walks  Patient Pre-hospital Diet Restrictions: None  Substance Use History:   Social History     Substance and Sexual Activity   Alcohol Use Not Currently    Comment: Rarely drink, socially only     Social History     Tobacco Use   Smoking Status Former    Packs/day: 1.50    Years: 50.00    Total pack years: 75.00    Types: Cigarettes    Start date:     Quit date: 3/15/2023    Years since quittin.6   Smokeless Tobacco Never     Social History     Substance and Sexual Activity   Drug Use No       Family History:  Family History   Problem Relation Age of Onset    Colon cancer Mother     Hypertension Mother     Hyperlipidemia Mother     Hearing loss Mother     Depression Mother     COPD Mother     Cancer Mother         Lung    Arthritis Mother     Lung cancer Mother     Drug abuse Brother     Diabetes Maternal Grandmother     Cancer Maternal Grandmother Colon    Colon cancer Maternal Grandmother     Cancer Maternal Aunt     Colon cancer Maternal Aunt     Colon cancer Maternal Aunt     Cancer Maternal Aunt         Colon       Physical Exam:     Vitals:   Blood Pressure: 147/67 (10/27/23 1530)  Pulse: 105 (10/27/23 1530)  Temperature: 98 °F (36.7 °C) (10/27/23 1231)  Temp Source: Temporal (10/27/23 1231)  Respirations: 18 (10/27/23 1231)  SpO2: 93 % (10/27/23 1530)    Physical Exam  Vitals and nursing note reviewed. Constitutional:       Appearance: Normal appearance. HENT:      Head: Normocephalic and atraumatic. Mouth/Throat:      Mouth: Mucous membranes are moist.      Pharynx: Oropharynx is clear. No oropharyngeal exudate. Eyes:      Extraocular Movements: Extraocular movements intact. Cardiovascular:      Rate and Rhythm: Normal rate and regular rhythm. Pulses: Normal pulses. Heart sounds: Normal heart sounds. No murmur heard. No friction rub. No gallop. Pulmonary:      Effort: Pulmonary effort is normal. No respiratory distress. Breath sounds: Normal breath sounds. No stridor. No wheezing or rales. Abdominal:      General: Bowel sounds are normal. There is no distension. Tenderness: There is abdominal tenderness (central lower abdomen). Musculoskeletal:      Right lower leg: No edema. Left lower leg: No edema. Skin:     General: Skin is warm and dry. Neurological:      General: No focal deficit present. Mental Status: She is alert and oriented to person, place, and time.           Additional Data:     Lab Results:  Results from last 7 days   Lab Units 10/27/23  1233   WBC Thousand/uL 14.18*   HEMOGLOBIN g/dL 11.4*   HEMATOCRIT % 36.3   PLATELETS Thousands/uL 339   NEUTROS PCT % 90*   LYMPHS PCT % 3*   MONOS PCT % 5   EOS PCT % 0     Results from last 7 days   Lab Units 10/27/23  1233   SODIUM mmol/L 131*   POTASSIUM mmol/L 3.7   CHLORIDE mmol/L 97   CO2 mmol/L 23   BUN mg/dL 14   CREATININE mg/dL 0. 74   ANION GAP mmol/L 11   CALCIUM mg/dL 9.3   ALBUMIN g/dL 3.6   TOTAL BILIRUBIN mg/dL 0.47   ALK PHOS U/L 84   ALT U/L 41   AST U/L 23   GLUCOSE RANDOM mg/dL 135                 Results from last 7 days   Lab Units 10/27/23  1556 10/27/23  1302   LACTIC ACID mmol/L 0.8 2.3*   PROCALCITONIN ng/ml  --  0.10       Lines/Drains:  Invasive Devices       Central Venous Catheter Line  Duration             Port A Cath 04/18/23 Right Subclavian 192 days              Peripheral Intravenous Line  Duration             Peripheral IV 10/27/23 Left;Proximal;Ventral (anterior) Forearm <1 day              Drain  Duration             Pleural Effusion Long-Term Catheter 15.5 Fr. 185 days                    Central Line:  Goal for removal: N/A - Chronic PICC           Imaging: Reviewed radiology reports from this admission including: chest CT scan and abdominal/pelvic CT  CT chest abdomen pelvis w contrast   Final Result by Rodolfo Gomes MD (10/27 1401)      Slight progression of right hilar and infrahilar tumor mass. Pulmonary masses along the mediastinal pleura in the right upper chest and small nodules in the left upper lobe also suspicious for metastatic tumor, also slightly progressed from 1 month earlier. Slight progression of pleural-based tumor along the right hemidiaphragm and in the medial right costophrenic angle since previous examination. Interval development of extensive omental carcinomatosis representing marked progression of disease from 1 month earlier. Tiny peritoneal nodules along the undersurface of the right hemidiaphragm, not detectable on previous exam.      Decrease in the size of the loculated right pleural effusion since the prior exam.      This examination was marked "immediate notification" in Epic in order to begin the standard process by which the radiology reading room liaison alerts the referring practitioner.                Workstation performed: [id number]         XR chest 2 views    (Results Pending)       EKG and Other Studies Reviewed on Admission:   EKG: No EKG obtained. ** Please Note: This note has been constructed using a voice recognition system.  **

## 2023-10-27 NOTE — ASSESSMENT & PLAN NOTE
Follows with Bob Saldivar from palliative care  Will consult palliative to help with pain management

## 2023-10-27 NOTE — ASSESSMENT & PLAN NOTE
Currently finishing up a course of Solu-Medrol  No acute exacerbation  Continue Trelegy and albuterol

## 2023-10-27 NOTE — SEPSIS NOTE
Sepsis Note   Shaheen Leong 70 y.o. female MRN: [id number]  Unit/Bed#: ED 11 Encounter: [id number]       Initial Sepsis Screening       Row Name 10/27/23 1352                Is the patient's history suggestive of a new or worsening infection? Yes (Proceed)  -CD        Suspected source of infection acute abdominal infection  -CD        Indicate SIRS criteria Tachycardia > 90 bpm;Leukocytosis (WBC > 38481 IJL) OR Leukopenia (WBC <4000 IJL) OR Bandemia (WBC >10% bands)  -CD        Are two or more of the above signs & symptoms of infection both present and new to the patient? Yes (Proceed)  -CD        Assess for evidence of organ dysfunction: Are any of the below criteria present within 6 hours of suspected infection and SIRS criteria that are NOT considered to be chronic conditions? Lactate > 2.0  -CD        Date of presentation of severe sepsis 10/27/23  -        Time of presentation of severe sepsis 1352  -CD        Sepsis Note: Click "NEXT" below (NOT "close") to generate sepsis note based on above information. YES (proceed by clicking "NEXT")  -CD                  User Key  (r) = Recorded By, (t) = Taken By, (c) = Cosigned By      [address] [place] [address] Name Provider Type    CAM Maldonado PA-C Physician Assistant                    [address] (last 720 hours)       Sepsis Reassess       Row Name 10/27/23 1502                   Repeat Volume Status and Tissue Perfusion Assessment Performed    Date of Reassessment: 10/27/23  -        Time of Reassessment: 6505  -CD        Sepsis Reassessment Note: Click "NEXT" below (NOT "close") to generate sepsis reassessment note.  YES (proceed by clicking "NEXT")  -CD        Repeat Volume Status and Tissue Perfusion Assessment Performed --                  User Key  (r) = Recorded By, (t) = Taken By, (c) = Cosigned By      [address] [place] [address] Name Provider Type    CAM Maldonado PA-C Physician Assistant                    There is no height or weight on file to calculate BMI.   Wt Readings from Last 1 Encounters:   10/24/23 82.6 kg (182 lb)        Ideal body weight: 57 kg (125 lb 10.6 oz)  Adjusted ideal body weight: 67.2 kg (148 lb 3.2 oz)

## 2023-10-27 NOTE — ED PROVIDER NOTES
History  Chief Complaint   Patient presents with    Abdominal Pain     Pt reports abdominal pain that started 3 weeks ago. States that she thought it was constipation, but states it is only getting worse. Denies nausea and vomiting. Reports that the pain is making her sob      Patient is a 69 y/o F with h/o COPD, lung cancer, HTN that presents to the ED with abdominal pain that has been worsening over the past 3 weeks. She states the pain is constant and in lower abdomen. No nausea or vomiting. No fevers, chills. She has been constipated. She took senokot and Miralax and had a small BM yesterday. No blood in stool. She states the pain is making her SOB. She does have a pleural catheter in for chronic effusions and states she just had it drained yesterday. No radiation of pain. No urinary symptoms. Abdominal Pain  Associated symptoms: constipation and shortness of breath    Associated symptoms: no chest pain, no chills, no cough, no diarrhea, no dysuria, no fever, no nausea and no vomiting        Prior to Admission Medications   Prescriptions Last Dose Informant Patient Reported? Taking?    Ascorbic Acid (vitamin C) 1000 MG tablet  Self Yes No   Si daily   Biotin 14647 MCG TABS  Self Yes No   Si daily   Cholecalciferol (Vitamin D3) 25 MCG (1000 UT) CAPS  Self Yes No   Si capsule every 24 hours   EPINEPHrine (EPIPEN) 0.3 mg/0.3 mL SOAJ  Self No No   Sig: Inject 0.3 mL (0.3 mg total) into a muscle once for 1 dose   Patient not taking: Reported on 10/2/2023   Klor-Con M20 20 MEQ tablet   No No   Sig: TAKE 1 TABLET BY MOUTH EVERY DAY   LORazepam (Ativan) 0.5 mg tablet   No No   Sig: Take 1 tablet (0.5 mg total) by mouth daily as needed for anxiety   Multiple Vitamin (MULTIVITAMINS PO)  Self Yes No   Sig: every 24 hours   Probiotic Product (PROBIOTIC DAILY PO)  Self Yes No   Si daily   Sennosides (Senna) 8.6 MG CAPS  Self Yes No   Sig: Take 2 capsules by mouth as needed (constipation) albuterol (2.5 mg/3 mL) 0.083 % nebulizer solution  Self No No   Sig: Take 3 mL (2.5 mg total) by nebulization every 6 (six) hours as needed for wheezing or shortness of breath   albuterol (PROVENTIL HFA,VENTOLIN HFA) 90 mcg/act inhaler  Self Yes No   Sig: Inhale 2 puffs every 4 (four) hours   atorvastatin (LIPITOR) 20 mg tablet  Self Yes No   Sig: Take 20 mg by mouth daily. buPROPion (WELLBUTRIN XL) 300 mg 24 hr tablet  Self Yes No   Sig: TAKE 1 TABLET BY MOUTH EVERY DAY IN THE MORNING FOR 90 DAYS   dexamethasone (DECADRON) 4 mg tablet  Self No No   Sig: TAKE 1 TABLET (4 MG TOTAL) BY MOUTH 2 (TWO) TIMES A DAY WITH MEALS TAKE 1 TABLET BY MOUTH THE DAY BEFORE, THE DAY OF, AND THE DAY AFTER CHEMOTHERAPY. dexamethasone sodium phosphate 0.1 % ophthalmic solution  Self No No   Sig: Administer 1 drop to both eyes every 3 (three) hours as needed (eye irritation)   diphenhydrAMINE (BENADRYL) 50 MG tablet  Self No No   Sig: Take 1 tablet (50 mg total) by mouth every 6 (six) hours as needed for itching (Rash)   Patient not taking: Reported on 10/24/2023   diphenhydrAMINE-APAP, sleep, (TYLENOL PM EXTRA STRENGTH PO)  Self Yes No   Sig: Take 325 mg by mouth daily at bedtime   famotidine (PEPCID) 20 mg tablet  Self Yes No   Sig: Take 20 mg by mouth daily   fluticasone-umeclidinium-vilanterol (Trelegy Ellipta) 200-62.5-25 mcg/actuation AEPB inhaler   No No   Sig: Inhale 1 puff daily Rinse mouth after use.    folic acid (FOLVITE) 1 mg tablet   No No   Sig: TAKE 1 TABLET BY MOUTH EVERY DAY   furosemide (LASIX) 20 mg tablet  Self No No   Sig: Take 1 tablet (20 mg total) by mouth every morning   levofloxacin (LEVAQUIN) 500 mg tablet   No No   Sig: Take 1 tablet (500 mg total) by mouth every 24 hours for 10 days   lidocaine-prilocaine (EMLA) cream   No No   Sig: APPLY TO PORT 30 TO 60 MINUTES PRIOR TO USE   ondansetron (ZOFRAN) 8 mg tablet  Self No No   Sig: Take 1 tablet (8 mg total) by mouth every 8 (eight) hours as needed for nausea or vomiting   Patient taking differently: Take 8 mg by mouth every 8 (eight) hours as needed for nausea or vomiting PRN   oxygen gas   Yes No   Sig: Inhale 2 L/min continuous.  Indications: Hypoxia   predniSONE 10 mg tablet   No No   Sig: Take 4 tablets (40 mg total) by mouth 2 (two) times a day with meals   prochlorperazine (COMPAZINE) 10 mg tablet  Self No No   Sig: Take 1 tablet (10 mg total) by mouth every 6 (six) hours as needed for nausea   Patient not taking: Reported on 2023   sertraline (ZOLOFT) 100 mg tablet  Self Yes No   Si daily   telmisartan (MICARDIS) 80 MG tablet  Self No No   Sig: Take 1 tablet (80 mg total) by mouth daily   verapamil (CALAN-SR) 120 mg CR tablet  Self No No   Sig: Take 1 tablet (120 mg total) by mouth daily at bedtime 1 hs   Patient taking differently: Take 120 mg by mouth in the morning   verapamil (CALAN-SR) 240 mg CR tablet  Self No No   Sig: Take 1 tablet (240 mg total) by mouth daily at bedtime 1 hs   Patient taking differently: Take 240 mg by mouth in the morning      Facility-Administered Medications: None       Past Medical History:   Diagnosis Date    Cancer ([address])     COPD (chronic obstructive pulmonary disease) ([address])     Emphysema of lung ([address])     Hyperlipidemia     Hypertension     Lung cancer ([address]) 2019    right lower lobe removed    Lung nodule     Pleural effusion        Past Surgical History:   Procedure Laterality Date    APPENDECTOMY      BREAST BIOPSY Right     benign    BRONCHOSCOPY  2019    COLON SURGERY      HYSTERECTOMY      age 48    IR PLEURAL EFFUSION LONG-TERM CATHETER PLACEMENT  2023    IR PORT PLACEMENT  2023    IR THORACENTESIS  2023    IR THORACENTESIS  2023    IR THORACENTESIS  2023    LUNG BIOPSY  2019    LUNG LOBECTOMY      OOPHORECTOMY Bilateral     age 48       Family History   Problem Relation Age of Onset    Colon cancer Mother     Hypertension Mother     Hyperlipidemia Mother     Hearing loss Mother     Depression Mother     COPD Mother     Cancer Mother         Lung    Arthritis Mother     Lung cancer Mother     Drug abuse Brother     Diabetes Maternal Grandmother     Cancer Maternal Grandmother         Colon    Colon cancer Maternal Grandmother     Cancer Maternal Aunt     Colon cancer Maternal Aunt     Colon cancer Maternal Aunt     Cancer Maternal Aunt         Colon     I have reviewed and agree with the history as documented. E-Cigarette/Vaping    E-Cigarette Use Never User      E-Cigarette/Vaping Substances    Nicotine No     THC No     CBD No     Flavoring No     Other No     Unknown No      Social History     Tobacco Use    Smoking status: Former     Packs/day: 1.50     Years: 50.00     Total pack years: 75.00     Types: Cigarettes     Start date: 5     Quit date: 3/15/2023     Years since quittin.6    Smokeless tobacco: Never   Vaping Use    Vaping Use: Never used   Substance Use Topics    Alcohol use: Not Currently     Comment: Rarely drink, socially only    Drug use: No       Review of Systems   Constitutional:  Negative for chills and fever. HENT: Negative. Respiratory:  Positive for shortness of breath. Negative for cough. Cardiovascular:  Negative for chest pain and leg swelling. Gastrointestinal:  Positive for abdominal pain and constipation. Negative for diarrhea, nausea and vomiting. Genitourinary:  Negative for dysuria. Skin:  Negative for color change and rash. Neurological:  Negative for dizziness, weakness, light-headedness and numbness. Psychiatric/Behavioral:  Negative for confusion. All other systems reviewed and are negative. Physical Exam  Physical Exam  Vitals and nursing note reviewed. Constitutional:       General: She is not in acute distress. Appearance: Normal appearance. She is well-developed, well-groomed and overweight. She is not ill-appearing or diaphoretic. HENT:      Head: Normocephalic and atraumatic.       Right Ear: External ear normal.      Left Ear: External ear normal.      Nose: Nose normal.      Mouth/Throat:      Mouth: Mucous membranes are moist.   Eyes:      Conjunctiva/sclera: Conjunctivae normal.   Cardiovascular:      Rate and Rhythm: Regular rhythm. Tachycardia present. Heart sounds: Normal heart sounds. Pulmonary:      Effort: Pulmonary effort is normal.      Breath sounds: Normal breath sounds. No wheezing, rhonchi or rales. Abdominal:      General: Abdomen is flat. Bowel sounds are normal.      Tenderness: There is generalized abdominal tenderness. There is guarding. There is no right CVA tenderness, left CVA tenderness or rebound. Musculoskeletal:         General: Normal range of motion. Cervical back: Normal range of motion. Right lower leg: No edema. Left lower leg: No edema. Skin:     General: Skin is warm and dry. Coloration: Skin is not jaundiced or pale. Findings: No rash. Neurological:      General: No focal deficit present. Mental Status: She is alert and oriented to person, place, and time. Motor: No weakness. Psychiatric:         Mood and Affect: Mood normal.         Behavior: Behavior is cooperative.          Vital Signs  ED Triage Vitals   Temperature Pulse Respirations Blood Pressure SpO2   10/27/23 1231 10/27/23 1233 10/27/23 1231 10/27/23 1231 10/27/23 1231   98 °F (36.7 °C) (!) 123 18 129/71 94 %      Temp Source Heart Rate Source Patient Position - Orthostatic VS BP Location FiO2 (%)   10/27/23 1231 10/27/23 1233 10/27/23 1231 10/27/23 1231 --   Temporal Monitor Sitting Left arm       Pain Score       --                  Vitals:    10/27/23 1231 10/27/23 1233 10/27/23 1416   BP: 129/71  129/60   Pulse:  (!) 123 104   Patient Position - Orthostatic VS: Sitting           Visual Acuity      ED Medications  Medications   sodium chloride 0.9 % bolus 500 mL (500 mL Intravenous New Bag 10/27/23 1446)   sodium chloride 0.9 % bolus 1,000 mL (0 mL Intravenous Stopped 10/27/23 1428)   iohexol (OMNIPAQUE) 350 MG/ML injection (MULTI-DOSE) 100 mL (100 mL Intravenous Given 10/27/23 1319)   cefepime (MAXIPIME) IVPB (premix in dextrose) 2,000 mg 50 mL (0 mg Intravenous Stopped 10/27/23 1447)       Diagnostic Studies  Results Reviewed       Procedure Component Value Units Date/Time    FLU/RSV/COVID - if FLU/RSV clinically relevant [[id number]]     Lab Status: No result Specimen: Nares from Nose     Urine Microscopic [[id number]]  (Abnormal) Collected: 10/27/23 1410    Lab Status: Final result Specimen: Urine, Clean Catch Updated: 10/27/23 1445     RBC, UA None Seen /hpf      WBC, UA 1-2 /hpf      Epithelial Cells Moderate /hpf      Bacteria, UA None Seen /hpf      MUCUS THREADS None Seen     Amorphous Crystals, UA Occasional    HS Troponin I 4hr [[id number]]     Lab Status: No result Specimen: Blood     UA w Reflex to Microscopic w Reflex to Culture [[id number]]  (Abnormal) Collected: 10/27/23 1410    Lab Status: Final result Specimen: Urine, Clean Catch Updated: 10/27/23 1422     Color, UA Yellow     Clarity, UA Slightly Cloudy     Specific Gravity, UA <1.005     pH, UA 7.0     Leukocytes, UA Trace     Nitrite, UA Negative     Protein, UA Negative mg/dl      Glucose, UA Negative mg/dl      Ketones, UA Negative mg/dl      Urobilinogen, UA <2.0 mg/dl      Bilirubin, UA Negative     Occult Blood, UA Negative    Lipase [[id number]]  (Abnormal) Collected: 10/27/23 1233    Lab Status: Final result Specimen: Blood from Arm, Right Updated: 10/27/23 1343     Lipase <6 u/L     Lactic acid, plasma (w/reflex if result > 2.0) [[id number]]  (Abnormal) Collected: 10/27/23 1302    Lab Status: Final result Specimen: Blood from Arm, Left Updated: 10/27/23 1342     LACTIC ACID 2.3 mmol/L     Narrative:      Result may be elevated if tourniquet was used during collection.     Lactic acid 2 Hours [[id number]]     Lab Status: No result Specimen: Blood     Procalcitonin [[id number]]  (Normal) Collected: 10/27/23 1302    Lab Status: Final result Specimen: Blood from Arm, Left Updated: 10/27/23 1340     Procalcitonin 0.10 ng/ml     Blood culture #1 [[id number]] Collected: 10/27/23 1302    Lab Status: In process Specimen: Blood from Arm, Left Updated: 10/27/23 1307    Blood culture #2 [[id number]] Collected: 10/27/23 1302    Lab Status:  In process Specimen: Blood from Hand, Left Updated: 10/27/23 1307    HS Troponin I 2hr [[id number]]     Lab Status: No result Specimen: Blood     HS Troponin 0hr (reflex protocol) [[id number]]  (Normal) Collected: 10/27/23 1233    Lab Status: Final result Specimen: Blood from Arm, Right Updated: 10/27/23 1301     hs TnI 0hr 7 ng/L     Comprehensive metabolic panel [[id number]]  (Abnormal) Collected: 10/27/23 1233    Lab Status: Final result Specimen: Blood from Arm, Right Updated: 10/27/23 1253     Sodium 131 mmol/L      Potassium 3.7 mmol/L      Chloride 97 mmol/L      CO2 23 mmol/L      ANION GAP 11 mmol/L      BUN 14 mg/dL      Creatinine 0.74 mg/dL      Glucose 135 mg/dL      Calcium 9.3 mg/dL      AST 23 U/L      ALT 41 U/L      Alkaline Phosphatase 84 U/L      Total Protein 6.9 g/dL      Albumin 3.6 g/dL      Total Bilirubin 0.47 mg/dL      eGFR 85 ml/min/1.73sq m     Narrative:      [facility] guidelines for Chronic Kidney Disease (CKD):     Stage 1 with normal or high GFR (GFR > 90 mL/min/1.73 square meters)    Stage 2 Mild CKD (GFR = 60-89 mL/min/1.73 square meters)    Stage 3A Moderate CKD (GFR = 45-59 mL/min/1.73 square meters)    Stage 3B Moderate CKD (GFR = 30-44 mL/min/1.73 square meters)    Stage 4 Severe CKD (GFR = 15-29 mL/min/1.73 square meters)    Stage 5 End Stage CKD (GFR <15 mL/min/1.73 square meters)  Note: GFR calculation is accurate only with a steady state creatinine    CBC and differential [[id number]]  (Abnormal) Collected: 10/27/23 1233    Lab Status: Final result Specimen: Blood from Arm, Right Updated: 10/27/23 1249     WBC 14.18 Thousand/uL      RBC 3.77 Million/uL      Hemoglobin 11.4 g/dL      Hematocrit 36.3 %      MCV 96 fL      MCH 30.2 pg      MCHC 31.4 g/dL      RDW 15.0 %      MPV 8.6 fL      Platelets 259 Thousands/uL      nRBC 0 /100 WBCs      Neutrophils Relative 90 %      Immat GRANS % 2 %      Lymphocytes Relative 3 %      Monocytes Relative 5 %      Eosinophils Relative 0 %      Basophils Relative 0 %      Neutrophils Absolute 12.84 Thousands/µL      Immature Grans Absolute 0.22 Thousand/uL      Lymphocytes Absolute 0.38 Thousands/µL      Monocytes Absolute 0.70 Thousand/µL      Eosinophils Absolute 0.01 Thousand/µL      Basophils Absolute 0.03 Thousands/µL     Narrative: This is an appended report. These results have been appended to a previously verified report. CT chest abdomen pelvis w contrast   Final Result by Luisa Cramer MD (10/27 1409)      Slight progression of right hilar and infrahilar tumor mass. Pulmonary masses along the mediastinal pleura in the right upper chest and small nodules in the left upper lobe also suspicious for metastatic tumor, also slightly progressed from 1 month earlier. Slight progression of pleural-based tumor along the right hemidiaphragm and in the medial right costophrenic angle since previous examination. Interval development of extensive omental carcinomatosis representing marked progression of disease from 1 month earlier. Tiny peritoneal nodules along the undersurface of the right hemidiaphragm, not detectable on previous exam.      Decrease in the size of the loculated right pleural effusion since the prior exam.      This examination was marked "immediate notification" in Epic in order to begin the standard process by which the radiology reading room liaison alerts the referring practitioner.                Workstation performed: [id number]         XR chest 2 views    (Results Pending)              Procedures  ECG 12 Lead Documentation Only    Date/Time: 10/27/2023 12:38 PM    Performed by: Earl Lau PA-C  Authorized by: Earl Lau PA-C    Indications / Diagnosis:  SOB  ECG reviewed by me, the ED Provider: yes    Patient location:  ED  Rate:     ECG rate:  122  Rhythm:     Rhythm: sinus tachycardia    Conduction:     Conduction: normal    ST segments:     ST segments:  Normal  T waves:     T waves: normal             ED Course  ED Course as of 10/27/23 1503   Fri Oct 27, 2023   1400 Sepsis alert called   1454 SLIM paged for admission. HEART Risk Score      Flowsheet Row Most Recent Value   Heart Score Risk Calculator    History 0 Filed at: 10/27/2023 1502   ECG 0 Filed at: 10/27/2023 1502   Age 2 Filed at: 10/27/2023 1502   Risk Factors 1 Filed at: 10/27/2023 1502   Troponin 0 Filed at: 10/27/2023 1502   HEART Score 3 Filed at: 10/27/2023 1502                       Initial Sepsis Screening       Row Name 10/27/23 1352                Is the patient's history suggestive of a new or worsening infection? Yes (Proceed)  -CD        Suspected source of infection acute abdominal infection  -CD        Indicate SIRS criteria Tachycardia > 90 bpm;Leukocytosis (WBC > 87866 IJL) OR Leukopenia (WBC <4000 IJL) OR Bandemia (WBC >10% bands)  -CD        Are two or more of the above signs & symptoms of infection both present and new to the patient? Yes (Proceed)  -CD        Assess for evidence of organ dysfunction: Are any of the below criteria present within 6 hours of suspected infection and SIRS criteria that are NOT considered to be chronic conditions? Lactate > 2.0  -CD        Date of presentation of severe sepsis 10/27/23  -CD        Time of presentation of severe sepsis 1352  -CD        Sepsis Note: Click "NEXT" below (NOT "close") to generate sepsis note based on above information.  YES (proceed by clicking "NEXT")  -CD                  User Key  (r) = Recorded By, (t) = Taken By, (c) = Cosigned By      [address] [place] [address] Name Provider Type    CD Gillian Peterson PA-C Physician Assistant                  Default Flowsheet Data (last 720 hours)       Sepsis Reassess       Row Name 10/27/23 1502                   Repeat Volume Status and Tissue Perfusion Assessment Performed    Date of Reassessment: 10/27/23  -        Time of Reassessment: 9983  -        Sepsis Reassessment Note: Click "NEXT" below (NOT "close") to generate sepsis reassessment note. YES (proceed by clicking "NEXT")  -        Repeat Volume Status and Tissue Perfusion Assessment Performed --                  User Key  (r) = Recorded By, (t) = Taken By, (c) = Cosigned By      [address] [facility] Name Provider Type    CD Gillian Peterson PA-C Physician Assistant                  SBIRT 20yo+      Flowsheet Row Most Recent Value   Initial Alcohol Screen: US AUDIT-C     1. How often do you have a drink containing alcohol? 0 Filed at: 10/27/2023 1232   2. How many drinks containing alcohol do you have on a typical day you are drinking? 0 Filed at: 10/27/2023 1232   3a. Male UNDER 65: How often do you have five or more drinks on one occasion? 0 Filed at: 10/27/2023 1232   3b. FEMALE Any Age, or MALE 65+: How often do you have 4 or more drinks on one occassion? 0 Filed at: 10/27/2023 1232   Audit-C Score 0 Filed at: 10/27/2023 1232   [place]: How many times in the past year have you. .. Used an illegal drug or used a prescription medication for non-medical reasons? Never Filed at: 10/27/2023 1232                      Medical Decision Making  Patient with abdominal pain, SOB, will order labs, CT scan to r/o pneumonia, colitis, diverticulitis, obstruction. Patient meets sepsis criteria, will admit for further evaluation and IV abx. Amount and/or Complexity of Data Reviewed  Labs: ordered. Radiology: ordered. ECG/medicine tests: ordered and independent interpretation performed. Risk  Prescription drug management. Decision regarding hospitalization. Disposition  Final diagnoses:   Abdominal pain   Shortness of breath   Severe sepsis ([address])   Hyponatremia     Time reflects when diagnosis was documented in both MDM as applicable and the Disposition within this note       Time User Action Codes Description Comment    10/27/2023  2:56 PM Katja Goody Add [R10.9] Abdominal pain     10/27/2023  2:57 PM Katja Goody Add [R06.02] Shortness of breath     10/27/2023  2:57 PM Katja Goody Add [A41.9,  R65.20] Severe sepsis ([address])     10/27/2023  2:57 PM Katja Goody Add [E87.1] Hyponatremia           ED Disposition       ED Disposition   Admit    Condition   Stable    Date/Time   Fri Oct 27, 2023 8372    Comment   Case was discussed with Dr. Alyce Denney and the patient's admission status was agreed to be Admission Status: inpatient status to the service of Dr. Alyce Denney . Follow-up Information    None         Patient's Medications   Discharge Prescriptions    No medications on file       No discharge procedures on file.     PDMP Review         Value Time User    PDMP Reviewed  Yes 10/6/2023  1:12 PM Kelly Whitehead MD            ED Provider  Electronically Signed by             Chuy Marcos PA-C  10/27/23 0343

## 2023-10-27 NOTE — ASSESSMENT & PLAN NOTE
Has been off of immunotherapy x 2 months due to illnesses. Most recently with sinusitis, completed 10 day course of levaquin yesterday. CT imaging with slightly progression of lung tumors, and interval development of extensive omental carcinomatosis  Consult oncology. Appreciate recs.

## 2023-10-27 NOTE — ASSESSMENT & PLAN NOTE
With aseptic catheter in place, drain 3x weekly. Approximately 200cc drainage as of lately.  Last drained today prior to arrival.  Continue with drainage 3x weekly

## 2023-10-27 NOTE — ASSESSMENT & PLAN NOTE
Presents with 3 weeks of lower abdominal pain, which has worsened in severity. Pain in lower central abdomen, particularly with valsalva such as coughing, straining on the toilet. Reports early satiety and mild constipation. Denies nausea, vomiting, diarrhea. WBC 14 - completing a course of solumedrol for a pneumonitis  Procalcitonin negative  CT C/A/P - No signs of infection. Slight progression of lung tumors. Development of extensive omental carcinomatosis. Received IV cefepime in ED  Hold off on further abx. Low suspicion for infection given no source identified on imaging, negative procalcitonin, downtrending WBC on steroid taper, and recent completion of 10 day course of Levaquin yesterday. Afebrile. Suspect her pain is secondary to her cancer burden, particularly with development of omental carcinomatosis. Continue tylenol PRN for mild pain, oxycodone for moderate to severe pain. Miralax for mild constipation. Gas-X for gas pain. Palliative care consult for pain management.

## 2023-10-28 ENCOUNTER — HOME CARE VISIT (OUTPATIENT)
Dept: HOME HEALTH SERVICES | Facility: HOME HEALTHCARE | Age: 63
End: 2023-10-28
Payer: COMMERCIAL

## 2023-10-28 LAB
ALBUMIN SERPL BCP-MCNC: 2.9 G/DL (ref 3.5–5)
ALP SERPL-CCNC: 68 U/L (ref 34–104)
ALT SERPL W P-5'-P-CCNC: 27 U/L (ref 7–52)
ANION GAP SERPL CALCULATED.3IONS-SCNC: 8 MMOL/L
AST SERPL W P-5'-P-CCNC: 15 U/L (ref 13–39)
BASOPHILS # BLD AUTO: 0.01 THOUSANDS/ÂΜL (ref 0–0.1)
BASOPHILS NFR BLD AUTO: 0 % (ref 0–1)
BILIRUB SERPL-MCNC: 0.42 MG/DL (ref 0.2–1)
BUN SERPL-MCNC: 9 MG/DL (ref 5–25)
CALCIUM ALBUM COR SERPL-MCNC: 9.3 MG/DL (ref 8.3–10.1)
CALCIUM SERPL-MCNC: 8.4 MG/DL (ref 8.4–10.2)
CHLORIDE SERPL-SCNC: 101 MMOL/L (ref 96–108)
CO2 SERPL-SCNC: 23 MMOL/L (ref 21–32)
CREAT SERPL-MCNC: 0.59 MG/DL (ref 0.6–1.3)
EOSINOPHIL # BLD AUTO: 0.03 THOUSAND/ÂΜL (ref 0–0.61)
EOSINOPHIL NFR BLD AUTO: 0 % (ref 0–6)
ERYTHROCYTE [DISTWIDTH] IN BLOOD BY AUTOMATED COUNT: 15.2 % (ref 11.6–15.1)
GFR SERPL CREATININE-BSD FRML MDRD: 96 ML/MIN/1.73SQ M
GLUCOSE SERPL-MCNC: 82 MG/DL (ref 65–140)
HCT VFR BLD AUTO: 30 % (ref 34.8–46.1)
HGB BLD-MCNC: 9.5 G/DL (ref 11.5–15.4)
IMM GRANULOCYTES # BLD AUTO: 0.16 THOUSAND/UL (ref 0–0.2)
IMM GRANULOCYTES NFR BLD AUTO: 2 % (ref 0–2)
LYMPHOCYTES # BLD AUTO: 0.41 THOUSANDS/ÂΜL (ref 0.6–4.47)
LYMPHOCYTES NFR BLD AUTO: 4 % (ref 14–44)
MAGNESIUM SERPL-MCNC: 1.7 MG/DL (ref 1.9–2.7)
MCH RBC QN AUTO: 30.4 PG (ref 26.8–34.3)
MCHC RBC AUTO-ENTMCNC: 31.7 G/DL (ref 31.4–37.4)
MCV RBC AUTO: 96 FL (ref 82–98)
MONOCYTES # BLD AUTO: 0.77 THOUSAND/ÂΜL (ref 0.17–1.22)
MONOCYTES NFR BLD AUTO: 8 % (ref 4–12)
NEUTROPHILS # BLD AUTO: 8.34 THOUSANDS/ÂΜL (ref 1.85–7.62)
NEUTS SEG NFR BLD AUTO: 86 % (ref 43–75)
NRBC BLD AUTO-RTO: 0 /100 WBCS
PLATELET # BLD AUTO: 298 THOUSANDS/UL (ref 149–390)
PMV BLD AUTO: 8.7 FL (ref 8.9–12.7)
POTASSIUM SERPL-SCNC: 3.6 MMOL/L (ref 3.5–5.3)
PROT SERPL-MCNC: 5.6 G/DL (ref 6.4–8.4)
RBC # BLD AUTO: 3.13 MILLION/UL (ref 3.81–5.12)
SODIUM SERPL-SCNC: 132 MMOL/L (ref 135–147)
WBC # BLD AUTO: 9.72 THOUSAND/UL (ref 4.31–10.16)

## 2023-10-28 PROCEDURE — 83735 ASSAY OF MAGNESIUM: CPT | Performed by: PHYSICIAN ASSISTANT

## 2023-10-28 PROCEDURE — 99233 SBSQ HOSP IP/OBS HIGH 50: CPT | Performed by: PHYSICIAN ASSISTANT

## 2023-10-28 PROCEDURE — 80053 COMPREHEN METABOLIC PANEL: CPT | Performed by: PHYSICIAN ASSISTANT

## 2023-10-28 PROCEDURE — 85025 COMPLETE CBC W/AUTO DIFF WBC: CPT | Performed by: PHYSICIAN ASSISTANT

## 2023-10-28 RX ORDER — MAGNESIUM SULFATE 1 G/100ML
1 INJECTION INTRAVENOUS ONCE
Status: COMPLETED | OUTPATIENT
Start: 2023-10-28 | End: 2023-10-28

## 2023-10-28 RX ADMIN — POLYETHYLENE GLYCOL 3350 17 G: 17 POWDER, FOR SOLUTION ORAL at 08:12

## 2023-10-28 RX ADMIN — FUROSEMIDE 20 MG: 20 TABLET ORAL at 08:12

## 2023-10-28 RX ADMIN — PREDNISONE 10 MG: 10 TABLET ORAL at 15:58

## 2023-10-28 RX ADMIN — VERAPAMIL HYDROCHLORIDE 240 MG: 120 TABLET, FILM COATED, EXTENDED RELEASE ORAL at 21:04

## 2023-10-28 RX ADMIN — MAGNESIUM SULFATE IN DEXTROSE 1 G: 10 INJECTION, SOLUTION INTRAVENOUS at 08:12

## 2023-10-28 RX ADMIN — LOSARTAN POTASSIUM 100 MG: 50 TABLET, FILM COATED ORAL at 08:12

## 2023-10-28 RX ADMIN — BUPROPION HYDROCHLORIDE 300 MG: 300 TABLET, FILM COATED, EXTENDED RELEASE ORAL at 08:12

## 2023-10-28 RX ADMIN — PREDNISONE 10 MG: 10 TABLET ORAL at 08:12

## 2023-10-28 RX ADMIN — ENOXAPARIN SODIUM 40 MG: 100 INJECTION SUBCUTANEOUS at 08:12

## 2023-10-28 RX ADMIN — VERAPAMIL HYDROCHLORIDE 120 MG: 120 TABLET, FILM COATED, EXTENDED RELEASE ORAL at 21:04

## 2023-10-28 RX ADMIN — Medication 2.5 MG: at 08:12

## 2023-10-28 RX ADMIN — SIMETHICONE 80 MG: 80 TABLET, CHEWABLE ORAL at 15:58

## 2023-10-28 RX ADMIN — Medication 2.5 MG: at 01:20

## 2023-10-28 RX ADMIN — SERTRALINE 200 MG: 100 TABLET, FILM COATED ORAL at 09:09

## 2023-10-28 RX ADMIN — ATORVASTATIN CALCIUM 20 MG: 20 TABLET, FILM COATED ORAL at 15:58

## 2023-10-28 RX ADMIN — SIMETHICONE 80 MG: 80 TABLET, CHEWABLE ORAL at 21:03

## 2023-10-28 RX ADMIN — SIMETHICONE 80 MG: 80 TABLET, CHEWABLE ORAL at 08:12

## 2023-10-28 RX ADMIN — UMECLIDINIUM 1 PUFF: 62.5 AEROSOL, POWDER ORAL at 11:32

## 2023-10-28 RX ADMIN — FLUTICASONE FUROATE AND VILANTEROL TRIFENATATE 1 PUFF: 200; 25 POWDER RESPIRATORY (INHALATION) at 11:32

## 2023-10-28 RX ADMIN — Medication 2.5 MG: at 21:04

## 2023-10-28 NOTE — PLAN OF CARE
Problem: PAIN - ADULT  Goal: Verbalizes/displays adequate comfort level or baseline comfort level  Description: Interventions:  - Encourage patient to monitor pain and request assistance  - Assess pain using appropriate pain scale  - Administer analgesics based on type and severity of pain and evaluate response  - Implement non-pharmacological measures as appropriate and evaluate response  - Consider cultural and social influences on pain and pain management  - Notify physician/advanced practitioner if interventions unsuccessful or patient reports new pain  Outcome: Progressing     Problem: INFECTION - ADULT  Goal: Absence or prevention of progression during hospitalization  Description: INTERVENTIONS:  - Assess and monitor for signs and symptoms of infection  - Monitor lab/diagnostic results  - Monitor all insertion sites, i.e. indwelling lines, tubes, and drains  - Monitor endotracheal if appropriate and nasal secretions for changes in amount and color  - [place] appropriate cooling/warming therapies per order  - Administer medications as ordered  - Instruct and encourage patient and family to use good hand hygiene technique  - Identify and instruct in appropriate isolation precautions for identified infection/condition  Outcome: Progressing  Goal: Absence of fever/infection during neutropenic period  Description: INTERVENTIONS:  - Monitor WBC    Outcome: Progressing     Problem: SAFETY ADULT  Goal: Patient will remain free of falls  Description: INTERVENTIONS:  - Educate patient/family on patient safety including physical limitations  - Instruct patient to call for assistance with activity   - Consult OT/PT to assist with strengthening/mobility   - Keep Call bell within reach  - Keep bed low and locked with side rails adjusted as appropriate  - Keep care items and personal belongings within reach  - Initiate and maintain comfort rounds  - Make Fall Risk Sign visible to staff  - Offer Toileting every 2 Hours, in advance of need  - Initiate/Maintain bed alarm  - Obtain necessary fall risk management equipment:   - Apply yellow socks and bracelet for high fall risk patients  - Consider moving patient to room near nurses station  Outcome: Progressing  Goal: Maintain or return to baseline ADL function  Description: INTERVENTIONS:  -  Assess patient's ability to carry out ADLs; assess patient's baseline for ADL function and identify physical deficits which impact ability to perform ADLs (bathing, care of mouth/teeth, toileting, grooming, dressing, etc.)  - Assess/evaluate cause of self-care deficits   - Assess range of motion  - Assess patient's mobility; develop plan if impaired  - Assess patient's need for assistive devices and provide as appropriate  - Encourage maximum independence but intervene and supervise when necessary  - Involve family in performance of ADLs  - Assess for home care needs following discharge   - Consider OT consult to assist with ADL evaluation and planning for discharge  - Provide patient education as appropriate  Outcome: Progressing  Goal: Maintains/Returns to pre admission functional level  Description: INTERVENTIONS:  - Perform BMAT or MOVE assessment daily.   - Set and communicate daily mobility goal to care team and patient/family/caregiver. - Collaborate with rehabilitation services on mobility goals if consulted  - Perform Range of Motion 3 times a day. - Reposition patient every 2 hours.   - Dangle patient 2 times a day  - Stand patient 2 times a day  - Ambulate patient 2 times a day  - Out of bed to chair 2 times a day   - Out of bed for meals 2 times a day  - Out of bed for toileting  - Record patient progress and toleration of activity level   Outcome: Progressing

## 2023-10-28 NOTE — ASSESSMENT & PLAN NOTE
Has been off of immunotherapy x 2 months due to illnesses. Most recently with sinusitis, completed 10 day course of levaquin yesterday.   CT imaging with slightly progression of lung tumors, and interval development of extensive omental carcinomatosis

## 2023-10-28 NOTE — PROGRESS NOTES
[facility]  Progress Note  Name: Karson Jones  MRN: [id number]  Unit/Bed#: -01 I Date of Admission: 10/27/2023   Date of Service: 10/28/2023 I Hospital Day: 1    Assessment/Plan   * Abdominal pain  Assessment & Plan  Presents with 3 weeks of lower abdominal pain, which has worsened in severity. Pain in lower central abdomen, particularly with valsalva such as coughing, straining on the toilet. Reports early satiety and mild constipation. Denies nausea, vomiting, diarrhea. WBC 14 - completing a course of solumedrol for a pneumonitis  Procalcitonin negative  CT C/A/P - No signs of infection. Slight progression of lung tumors. Development of extensive omental carcinomatosis. Received IV cefepime in ED  Await [address] [place] [address] off on further abx. Low suspicion for infection given no source identified on imaging, negative procalcitonin, downtrending WBC on steroid taper, and recent completion of 10 day course of Levaquin yesterday. Afebrile. Suspect her pain is secondary to her cancer burden, particularly with development of omental carcinomatosis. Continue tylenol PRN for mild pain, oxycodone for moderate to severe pain. Miralax for mild constipation. Gas-X for gas pain. Chronic respiratory failure with hypoxia (HCC)  Assessment & Plan  Requires 2 L on exertion  Continue follow-up with Dr. Johnny Ontiveros from pulmonology    Hyponatremia  Assessment & Plan  Chronic mild hyponatremia  Currently at baseline at 131    Palliative care patient  Assessment & Plan  Follows with Renard White from palliative care  Fu as OP    Malignant pleural effusion  Assessment & Plan  With aseptic catheter in place, drain 3x weekly. Approximately 200cc drainage as of lately. Last drained today prior to arrival.  Continue with drainage 3x weekly    Non-small cell cancer of right lung [facility])  Assessment & Plan  Has been off of immunotherapy x 2 months due to illnesses.  Most recently with sinusitis, completed 10 day course of levaquin yesterday. CT imaging with slightly progression of lung tumors, and interval development of extensive omental carcinomatosis    Essential hypertension  Assessment & Plan  Home regimen: Micardis 80 mg, verapamil 360 mg daily, Lasix 20 mg daily    COPD (chronic obstructive pulmonary disease) ([address])  Assessment & Plan  Currently finishing up a course of Solu-Medrol  No acute exacerbation  Continue Trelegy and albuterol             VTE Pharmacologic Prophylaxis: VTE Score: 6 High Risk (Score >/= 5) - Pharmacological DVT Prophylaxis Ordered: enoxaparin (Lovenox). Sequential Compression Devices Ordered. Patient Centered Rounds: I performed bedside rounds with nursing staff today. Discussions with Specialists or Other Care Team Provider: none    Education and Discussions with Family / Patient:  patient alone. Total Time Spent on Date of Encounter in care of patient: 55 mins. This time was spent on one or more of the following: performing physical exam; counseling and coordination of care; obtaining or reviewing history; documenting in the medical record; reviewing/ordering tests, medications or procedures; communicating with other healthcare professionals and discussing with patient's family/caregivers. Current Length of Stay: 1 day(s)  Current Patient Status: Inpatient   Certification Statement: The patient will continue to require additional inpatient hospital stay due to await [facility]  Discharge Plan:     Code Status: Level 1 - Full Code    Subjective:   Her abd pain is managed on current regimen. Today ambulated without severe pain    Took po breakfast without severe abd pain, nausea or vomiting. Made a large soft BM today with improved abd pain.  Non-bloody    Objective:     Vitals:   Temp (24hrs), Av.3 °F (36.3 °C), Min:97 °F (36.1 °C), Max:97.5 °F (36.4 °C)    Temp:  [97 °F (36.1 °C)-97.5 °F (36.4 °C)] 97 °F (36.1 °C)  HR:  [] 93  Resp:  [14-22] 22  BP: (113-148)/(60-78) 113/60  SpO2:  [92 %-95 %] 95 %  Body mass index is 29.95 kg/m². Input and Output Summary (last 24 hours): Intake/Output Summary (Last 24 hours) at 10/28/2023 1347  Last data filed at 10/28/2023 1258  Gross per 24 hour   Intake 2150 ml   Output --   Net 2150 ml       Physical Exam:   Physical Exam  Vitals and nursing note reviewed. Constitutional:       General: She is not in acute distress. HENT:      Head: Normocephalic and atraumatic. Nose:      Comments: Wearing baseline O2 via Nasal cannula     Mouth/Throat:      Mouth: Mucous membranes are moist.   Eyes:      Conjunctiva/sclera: Conjunctivae normal.   Cardiovascular:      Rate and Rhythm: Normal rate and regular rhythm. Pulses: Normal pulses. Pulmonary:      Effort: Pulmonary effort is normal.      Breath sounds: Normal breath sounds. Abdominal:      General: Abdomen is flat. Musculoskeletal:         General: Normal range of motion. Cervical back: Normal range of motion. Skin:     General: Skin is warm and dry. Neurological:      General: No focal deficit present. Mental Status: She is alert.    Psychiatric:         Behavior: Behavior normal.          Additional Data:     Labs:  Results from last 7 days   Lab Units 10/28/23  0455   WBC Thousand/uL 9.72   HEMOGLOBIN g/dL 9.5*   HEMATOCRIT % 30.0*   PLATELETS Thousands/uL 298   NEUTROS PCT % 86*   LYMPHS PCT % 4*   MONOS PCT % 8   EOS PCT % 0     Results from last 7 days   Lab Units 10/28/23  0455   SODIUM mmol/L 132*   POTASSIUM mmol/L 3.6   CHLORIDE mmol/L 101   CO2 mmol/L 23   BUN mg/dL 9   CREATININE mg/dL 0.59*   ANION GAP mmol/L 8   CALCIUM mg/dL 8.4   ALBUMIN g/dL 2.9*   TOTAL BILIRUBIN mg/dL 0.42   ALK PHOS U/L 68   ALT U/L 27   AST U/L 15   GLUCOSE RANDOM mg/dL 82                 Results from last 7 days   Lab Units 10/27/23  1556 10/27/23  1302   LACTIC ACID mmol/L 0.8 2.3*   PROCALCITONIN ng/ml  --  0.10       Lines/Drains:  Invasive Devices       Central Venous Catheter Line  Duration             Port A Cath 04/18/23 Right Subclavian 193 days              Peripheral Intravenous Line  Duration             Peripheral IV 10/27/23 Left;Proximal;Ventral (anterior) Forearm 1 day              Drain  Duration             Pleural Effusion Long-Term Catheter 15.5 Fr. 185 days                    Central Line:  Goal for removal:  for cancer medications             Imaging: No pertinent imaging reviewed. Recent Cultures (last 7 days):   Results from last 7 days   Lab Units 10/27/23  1302   BLOOD CULTURE  Received in Microbiology Lab. Culture in Progress. Received in Microbiology Lab. Culture in Progress.        Last 24 Hours Medication List:   Current Facility-Administered Medications   Medication Dose Route Frequency Provider Last Rate    acetaminophen  650 mg Oral Q6H PRN Soren Marking, PA-C      albuterol  2.5 mg Nebulization Q6H PRN Alisia Jairo Muñiz, PA-C      aluminum-magnesium hydroxide-simethicone  30 mL Oral Q6H PRN Soren Marking, PA-C      atorvastatin  20 mg Oral Daily With [employer], PA-C      buPROPion  300 mg Oral Daily Alisia Muñiz, PA-C      enoxaparin  40 mg Subcutaneous Daily Alisia Connolly, PA-C      fluticasone-vilanterol  1 puff Inhalation Daily Teodora Solano      furosemide  20 mg Oral QAM Alisia Connolly, PA-C      LORazepam  0.5 mg Oral Daily PRN Alisia Muñiz, PA-C      losartan  100 mg Oral Daily Alisia Connolly, PA-C      ondansetron  4 mg Intravenous Q6H PRN Soren Marking, PA-C      oxyCODONE  2.5 mg Oral Q6H PRN Alisia Muñiz, PA-C      polyethylene glycol  17 g Oral Daily Alisia Muñiz, PA-C      predniSONE  10 mg Oral BID With Meals Soren Marking, PA-C      sertraline  200 mg Oral Daily Alisia Muñiz, PA-C      simethicone  80 mg Oral 4x Daily (with meals and at bedtime) Soren Marking, PA-C      umeclidinium  1 puff Inhalation Daily Soren Marking, PA-C verapamil  120 mg Oral HS Matteo Muñiz PA-C      verapamil  240 mg Oral HS Sera Marion PA-C          Today, Patient Was Seen By: Lacey Cantor PA-C    **Please Note: This note may have been constructed using a voice recognition system. **

## 2023-10-28 NOTE — PLAN OF CARE
Problem: PAIN - ADULT  Goal: Verbalizes/displays adequate comfort level or baseline comfort level  Description: Interventions:  - Encourage patient to monitor pain and request assistance  - Assess pain using appropriate pain scale  - Administer analgesics based on type and severity of pain and evaluate response  - Implement non-pharmacological measures as appropriate and evaluate response  - Consider cultural and social influences on pain and pain management  - Notify physician/advanced practitioner if interventions unsuccessful or patient reports new pain  Outcome: Progressing     Problem: INFECTION - ADULT  Goal: Absence or prevention of progression during hospitalization  Description: INTERVENTIONS:  - Assess and monitor for signs and symptoms of infection  - Monitor lab/diagnostic results  - Monitor all insertion sites, i.e. indwelling lines, tubes, and drains  - Monitor endotracheal if appropriate and nasal secretions for changes in amount and color  - [place] appropriate cooling/warming therapies per order  - Administer medications as ordered  - Instruct and encourage patient and family to use good hand hygiene technique  - Identify and instruct in appropriate isolation precautions for identified infection/condition  Outcome: Progressing  Goal: Absence of fever/infection during neutropenic period  Description: INTERVENTIONS:  - Monitor WBC    Outcome: Progressing     Problem: SAFETY ADULT  Goal: Patient will remain free of falls  Description: INTERVENTIONS:  - Educate patient/family on patient safety including physical limitations  - Instruct patient to call for assistance with activity   - Consult OT/PT to assist with strengthening/mobility   - Keep Call bell within reach  - Keep bed low and locked with side rails adjusted as appropriate  - Keep care items and personal belongings within reach  - Initiate and maintain comfort rounds  - Make Fall Risk Sign visible to staff  - Offer Toileting every 2 Hours, in advance of need  - Initiate/Maintain bed alarm  - Obtain necessary fall risk management equipment:   - Apply yellow socks and bracelet for high fall risk patients  - Consider moving patient to room near nurses station  Outcome: Progressing  Goal: Maintain or return to baseline ADL function  Description: INTERVENTIONS:  -  Assess patient's ability to carry out ADLs; assess patient's baseline for ADL function and identify physical deficits which impact ability to perform ADLs (bathing, care of mouth/teeth, toileting, grooming, dressing, etc.)  - Assess/evaluate cause of self-care deficits   - Assess range of motion  - Assess patient's mobility; develop plan if impaired  - Assess patient's need for assistive devices and provide as appropriate  - Encourage maximum independence but intervene and supervise when necessary  - Involve family in performance of ADLs  - Assess for home care needs following discharge   - Consider OT consult to assist with ADL evaluation and planning for discharge  - Provide patient education as appropriate  Outcome: Progressing  Goal: Maintains/Returns to pre admission functional level  Description: INTERVENTIONS:  - Perform BMAT or MOVE assessment daily.   - Set and communicate daily mobility goal to care team and patient/family/caregiver. - Collaborate with rehabilitation services on mobility goals if consulted  - Perform Range of Motion 3 times a day. - Reposition patient every 2 hours.   - Dangle patient 2 times a day  - Stand patient 2 times a day  - Ambulate patient 2 times a day  - Out of bed to chair 2 times a day   - Out of bed for meals 2 times a day  - Out of bed for toileting  - Record patient progress and toleration of activity level   Outcome: Progressing     Problem: DISCHARGE PLANNING  Goal: Discharge to home or other facility with appropriate resources  Description: INTERVENTIONS:  - Identify barriers to discharge w/patient and caregiver  - Arrange for needed discharge resources and transportation as appropriate  - Identify discharge learning needs (meds, wound care, etc.)  - Arrange for interpretive services to assist at discharge as needed  - Refer to Case Management Department for coordinating discharge planning if the patient needs post-hospital services based on physician/advanced practitioner order or complex needs related to functional status, cognitive ability, or social support system  Outcome: Progressing     Problem: Knowledge Deficit  Goal: Patient/family/caregiver demonstrates understanding of disease process, treatment plan, medications, and discharge instructions  Description: Complete learning assessment and assess knowledge base.   Interventions:  - Provide teaching at level of understanding  - Provide teaching via preferred learning methods  Outcome: Progressing

## 2023-10-28 NOTE — ASSESSMENT & PLAN NOTE
Presents with 3 weeks of lower abdominal pain, which has worsened in severity. Pain in lower central abdomen, particularly with valsalva such as coughing, straining on the toilet. Reports early satiety and mild constipation. Denies nausea, vomiting, diarrhea. WBC 14 - completing a course of solumedrol for a pneumonitis  Procalcitonin negative  CT C/A/P - No signs of infection. Slight progression of lung tumors. Development of extensive omental carcinomatosis. Received IV cefepime in ED  Await [address] [place] [address] off on further abx. Low suspicion for infection given no source identified on imaging, negative procalcitonin, downtrending WBC on steroid taper, and recent completion of 10 day course of Levaquin yesterday. Afebrile. Suspect her pain is secondary to her cancer burden, particularly with development of omental carcinomatosis. Continue tylenol PRN for mild pain, oxycodone for moderate to severe pain. Miralax for mild constipation. Gas-X for gas pain.

## 2023-10-29 VITALS
HEART RATE: 88 BPM | DIASTOLIC BLOOD PRESSURE: 56 MMHG | HEIGHT: 65 IN | TEMPERATURE: 96.8 F | BODY MASS INDEX: 29.99 KG/M2 | OXYGEN SATURATION: 95 % | SYSTOLIC BLOOD PRESSURE: 132 MMHG | RESPIRATION RATE: 22 BRPM | WEIGHT: 180 LBS

## 2023-10-29 PROCEDURE — 99239 HOSP IP/OBS DSCHRG MGMT >30: CPT | Performed by: PHYSICIAN ASSISTANT

## 2023-10-29 RX ORDER — OXYCODONE HYDROCHLORIDE 5 MG/1
5 TABLET ORAL EVERY 4 HOURS PRN
Qty: 20 TABLET | Refills: 0 | Status: SHIPPED | OUTPATIENT
Start: 2023-10-29 | End: 2023-11-08

## 2023-10-29 RX ADMIN — SIMETHICONE 80 MG: 80 TABLET, CHEWABLE ORAL at 12:11

## 2023-10-29 RX ADMIN — SIMETHICONE 80 MG: 80 TABLET, CHEWABLE ORAL at 08:25

## 2023-10-29 RX ADMIN — FUROSEMIDE 20 MG: 20 TABLET ORAL at 08:26

## 2023-10-29 RX ADMIN — POLYETHYLENE GLYCOL 3350 17 G: 17 POWDER, FOR SOLUTION ORAL at 08:25

## 2023-10-29 RX ADMIN — SERTRALINE 200 MG: 100 TABLET, FILM COATED ORAL at 08:26

## 2023-10-29 RX ADMIN — BUPROPION HYDROCHLORIDE 300 MG: 300 TABLET, FILM COATED, EXTENDED RELEASE ORAL at 08:26

## 2023-10-29 RX ADMIN — FLUTICASONE FUROATE AND VILANTEROL TRIFENATATE 1 PUFF: 200; 25 POWDER RESPIRATORY (INHALATION) at 08:24

## 2023-10-29 RX ADMIN — UMECLIDINIUM 1 PUFF: 62.5 AEROSOL, POWDER ORAL at 08:24

## 2023-10-29 RX ADMIN — ENOXAPARIN SODIUM 40 MG: 100 INJECTION SUBCUTANEOUS at 08:25

## 2023-10-29 RX ADMIN — PREDNISONE 10 MG: 10 TABLET ORAL at 08:26

## 2023-10-29 RX ADMIN — LOSARTAN POTASSIUM 100 MG: 50 TABLET, FILM COATED ORAL at 08:26

## 2023-10-29 NOTE — DISCHARGE SUMMARY
[address] [facility]  Discharge- Jovana Denver 1960, 70 y.o. female MRN: [id number]  Unit/Bed#: -Kieran Encounter: [id number]  Primary Care Provider: Sincere Han MD   Date and time admitted to hospital: 10/27/2023 12:35 PM    * Abdominal pain  Assessment & Plan  Presents with 3 weeks of lower abdominal pain, which has worsened in severity. Pain in lower central abdomen, particularly with valsalva such as coughing, straining on the toilet. Reports early satiety and mild constipation. Denies nausea, vomiting, diarrhea. WBC 14 - completing a course of solumedrol for a pneumonitis  Procalcitonin negative  CT C/A/P - No signs of infection. Slight progression of lung tumors. Development of extensive omental carcinomatosis. Received IV cefepime in ED  Await Pending sale to [facility] off on further abx. Low suspicion for infection given no source identified on imaging, negative procalcitonin, downtrending WBC on steroid taper, and recent completion of 10 day course of Levaquin yesterday. Afebrile. Suspect her pain is secondary to her cancer burden, particularly with development of omental carcinomatosis. Continue tylenol PRN for mild pain, oxycodone for moderate to severe pain. Miralax for mild constipation. Gas-X for gas pain. Discharge on oxycodone 5mg tablets, instructed to take 2.5mg for moderate pain, 5.0mg for severe pain. Outpt follow up with Dr Jadyn Paulino on 11/1/23    Chronic respiratory failure with hypoxia [facility])  Assessment & Plan  Requires 2 L on exertion  Continue follow-up with Dr. Esmer Murrieta from pulmonology    Hyponatremia  Assessment & Plan  Chronic mild hyponatremia  Currently at baseline at 131    Palliative care patient  Assessment & Plan  Follows with Ceasar Mitchell from palliative care  Fu as OP    Malignant pleural effusion  Assessment & Plan  With aseptic catheter in place, drain 3x weekly. Approximately 200cc drainage as of lately.  Last drained today prior to arrival.  Continue with drainage 3x weekly    Non-small cell cancer of right lung [facility])  Assessment & Plan  Has been off of immunotherapy x 2 months due to illnesses. Most recently with sinusitis, completed 10 day course of levaquin yesterday. CT imaging with slightly progression of lung tumors, and interval development of extensive omental carcinomatosis    Essential hypertension  Assessment & Plan  Home regimen: Micardis 80 mg, verapamil 360 mg daily, Lasix 20 mg daily    COPD (chronic obstructive pulmonary disease) ([address])  Assessment & Plan  Currently finishing up a course of Solu-Medrol  No acute exacerbation  Continue Trelegy and albuterol        Medical Problems       Resolved Problems  Date Reviewed: 10/29/2023   None       Discharging Physician / Practitioner: Tavares Ayon PA-C  PCP: Zelalem Rowan MD  Admission Date:   Admission Orders (From admission, onward)       Ordered        10/27/23 [address] [place] [address]  Once                          Discharge Date: 10/29/23    Consultations During Hospital Stay:  None  Discussed with oncology    Procedures Performed:   None    Significant Findings / Test Results:   XR chest 2 views    Result Date: 10/28/2023  Impression: Persistent airspace disease at the right lung base. Right pleural catheter. Workstation performed: [id number]     CT chest abdomen pelvis w contrast    Result Date: 10/27/2023  Impression: Slight progression of right hilar and infrahilar tumor mass. Pulmonary masses along the mediastinal pleura in the right upper chest and small nodules in the left upper lobe also suspicious for metastatic tumor, also slightly progressed from 1 month earlier. Slight progression of pleural-based tumor along the right hemidiaphragm and in the medial right costophrenic angle since previous examination. Interval development of extensive omental carcinomatosis representing marked progression of disease from 1 month earlier.  Tiny peritoneal nodules along the undersurface of the right hemidiaphragm, not detectable on previous exam. Decrease in the size of the loculated right pleural effusion since the prior exam. This examination was marked "immediate notification" in Epic in order to begin the standard process by which the radiology reading room liaison alerts the referring practitioner. Workstation performed: [id number]        Incidental Findings:   None      Test Results Pending at Discharge (will require follow up): None     Outpatient Tests Requested:  None    Complications: None    Reason for Admission: Abdominal pain    Hospital Course:   Yessica Amos is a 70 y.o. female patient with PMH COPD on 2 L with exertion, lung cancer following with Dr. Tereso Sloan s/p aseptic pleural catheter for malignant effusion, HTN, HLD who originally presented to the hospital on 10/27/2023 due to worsening abdominal pain over the last 3 weeks. Underwent CT imaging which demonstrated development of extensive omental carcinomatosis which was deemed to be the source of her pain. She did have some mild constipation for which she took MiraLAX and was able to have multiple bowel movements with some improvement in her pain. This was discussed with oncology who was able to set her up with a quick follow-up appointment for 11/1/2023. Being discharged with oxycodone 5 mg PRN. Please see above list of diagnoses and related plan for additional information. Condition at Discharge: guarded    Discharge Day Visit / Exam:   Subjective: Patient reports significant improvement in pain. Tolerating oxycodone well. Vitals: Blood Pressure: 132/56 (10/29/23 0800)  Pulse: 88 (10/29/23 0800)  Temperature: (!) 96.8 °F (36 °C) (10/29/23 0800)  Temp Source: Temporal (10/28/23 0704)  Respirations: 22 (10/28/23 0704)  Height: 5' 5" (165.1 cm) (10/27/23 2100)  Weight - Scale: 81.6 kg (180 lb) (10/27/23 2100)  SpO2: 95 % (10/29/23 0800)  Exam:   Physical Exam  Vitals and nursing note reviewed.    Constitutional: Appearance: Normal appearance. HENT:      Head: Normocephalic and atraumatic. Mouth/Throat:      Mouth: Mucous membranes are moist.      Pharynx: Oropharynx is clear. No oropharyngeal exudate. Eyes:      Extraocular Movements: Extraocular movements intact. Cardiovascular:      Rate and Rhythm: Normal rate and regular rhythm. Pulses: Normal pulses. Heart sounds: Normal heart sounds. No murmur heard. No friction rub. No gallop. Pulmonary:      Effort: Pulmonary effort is normal. No respiratory distress. Breath sounds: Normal breath sounds. No stridor. No wheezing or rales. Abdominal:      General: Abdomen is flat. Bowel sounds are normal. There is no distension. Palpations: Abdomen is soft. Tenderness: There is no abdominal tenderness. Musculoskeletal:      Right lower leg: No edema. Left lower leg: No edema. Skin:     General: Skin is warm and dry. Neurological:      General: No focal deficit present. Mental Status: She is alert and oriented to person, place, and time. Discussion with Family: Updated  () at bedside. Discharge instructions/Information to patient and family:   See after visit summary for information provided to patient and family. Provisions for Follow-Up Care:  See after visit summary for information related to follow-up care and any pertinent home health orders. Disposition:   Home    Planned Readmission: None     Discharge Statement:  I spent 67 minutes discharging the patient. This time was spent on the day of discharge. I had direct contact with the patient on the day of discharge. Greater than 50% of the total time was spent examining patient, answering all patient questions, arranging and discussing plan of care with patient as well as directly providing post-discharge instructions. Additional time then spent on discharge activities.     Discharge Medications:  See after visit summary for reconciled discharge medications provided to patient and/or family.       **Please Note: This note may have been constructed using a voice recognition system**

## 2023-10-29 NOTE — ASSESSMENT & PLAN NOTE
Presents with 3 weeks of lower abdominal pain, which has worsened in severity. Pain in lower central abdomen, particularly with valsalva such as coughing, straining on the toilet. Reports early satiety and mild constipation. Denies nausea, vomiting, diarrhea. WBC 14 - completing a course of solumedrol for a pneumonitis  Procalcitonin negative  CT C/A/P - No signs of infection. Slight progression of lung tumors. Development of extensive omental carcinomatosis. Received IV cefepime in ED  Await [address] [place] [address] off on further abx. Low suspicion for infection given no source identified on imaging, negative procalcitonin, downtrending WBC on steroid taper, and recent completion of 10 day course of Levaquin yesterday. Afebrile. Suspect her pain is secondary to her cancer burden, particularly with development of omental carcinomatosis. Continue tylenol PRN for mild pain, oxycodone for moderate to severe pain. Miralax for mild constipation. Gas-X for gas pain. Discharge on oxycodone 5mg tablets, instructed to take 2.5mg for moderate pain, 5.0mg for severe pain.   Outpt follow up with Dr Jadyn Paulino on 11/1/23

## 2023-10-29 NOTE — PLAN OF CARE
Problem: PAIN - ADULT  Goal: Verbalizes/displays adequate comfort level or baseline comfort level  Description: Interventions:  - Encourage patient to monitor pain and request assistance  - Assess pain using appropriate pain scale  - Administer analgesics based on type and severity of pain and evaluate response  - Implement non-pharmacological measures as appropriate and evaluate response  - Consider cultural and social influences on pain and pain management  - Notify physician/advanced practitioner if interventions unsuccessful or patient reports new pain  Outcome: Progressing     Problem: INFECTION - ADULT  Goal: Absence or prevention of progression during hospitalization  Description: INTERVENTIONS:  - Assess and monitor for signs and symptoms of infection  - Monitor lab/diagnostic results  - Monitor all insertion sites, i.e. indwelling lines, tubes, and drains  - Monitor endotracheal if appropriate and nasal secretions for changes in amount and color  - Campbell appropriate cooling/warming therapies per order  - Administer medications as ordered  - Instruct and encourage patient and family to use good hand hygiene technique  - Identify and instruct in appropriate isolation precautions for identified infection/condition  Outcome: Progressing     Problem: SAFETY ADULT  Goal: Patient will remain free of falls  Description: INTERVENTIONS:  - Educate patient/family on patient safety including physical limitations  - Instruct patient to call for assistance with activity   - Consult OT/PT to assist with strengthening/mobility   - Keep Call bell within reach  - Keep bed low and locked with side rails adjusted as appropriate  - Keep care items and personal belongings within reach  - Initiate and maintain comfort rounds  - Make Fall Risk Sign visible to staff  - Offer Toileting every 2 Hours, in advance of need  - Initiate/Maintain bed alarm  - Obtain necessary fall risk management equipment:   - Apply yellow socks and bracelet for high fall risk patients  - Consider moving patient to room near nurses station  Outcome: Progressing  Goal: Maintain or return to baseline ADL function  Description: INTERVENTIONS:  -  Assess patient's ability to carry out ADLs; assess patient's baseline for ADL function and identify physical deficits which impact ability to perform ADLs (bathing, care of mouth/teeth, toileting, grooming, dressing, etc.)  - Assess/evaluate cause of self-care deficits   - Assess range of motion  - Assess patient's mobility; develop plan if impaired  - Assess patient's need for assistive devices and provide as appropriate  - Encourage maximum independence but intervene and supervise when necessary  - Involve family in performance of ADLs  - Assess for home care needs following discharge   - Consider OT consult to assist with ADL evaluation and planning for discharge  - Provide patient education as appropriate  Outcome: Progressing  Goal: Maintains/Returns to pre admission functional level  Description: INTERVENTIONS:  - Perform BMAT or MOVE assessment daily.   - Set and communicate daily mobility goal to care team and patient/family/caregiver. - Collaborate with rehabilitation services on mobility goals if consulted  - Perform Range of Motion 3 times a day. - Reposition patient every 2 hours.   - Dangle patient 2 times a day  - Stand patient 2 times a day  - Ambulate patient 2 times a day  - Out of bed to chair 2 times a day   - Out of bed for meals 2 times a day  - Out of bed for toileting  - Record patient progress and toleration of activity level   Outcome: Progressing

## 2023-10-29 NOTE — CASE MANAGEMENT
Case Management Discharge Planning Note    Patient name Louellen Gaucher  Location /-98 MRN [id number]  : 1960 Date 10/29/2023       Current Admission Date: 10/27/2023  Current Admission Diagnosis:Abdominal pain   Patient Active Problem List    Diagnosis Date Noted    Abdominal pain 10/27/2023    Chronic respiratory failure with hypoxia (HCC) 10/24/2023    Generalized anxiety disorder 10/06/2023    Hyponatremia 2023    Palliative care patient 2023    Malignant pleural effusion 2023    Dyspnea on exertion 2023    CINV (chemotherapy-induced nausea and vomiting) 2023    Non-small cell cancer of right lung ([address]) 2023    Closed fracture of multiple ribs of left side 2022    Cigarette nicotine dependence in remission 2022    COPD (chronic obstructive pulmonary disease) ([address]) 2022    Hypercholesterolemia 2022    Essential hypertension 2022    Impingement syndrome of left shoulder 2021      LOS (days): 2  Geometric Mean LOS (GMLOS) (days):   Days to GMLOS:     OBJECTIVE:  Risk of Unplanned Readmission Score: 24.71         Current admission status: Inpatient   Preferred Pharmacy:   [facility]/pharmacy #[id number] [address]  Phone: [phone] Fax: [phone]    [address]  Phone: [phone] Fax: [phone]    Primary Care Provider: Stephenie Carreon MD    Primary Insurance: BLUE CROSS  Secondary Insurance: MEDICARE    DISCHARGE DETAILS:  [facility]         Is the patient interested in [address] at discharge?: Yes  [facility] requested[de-identified] [facility] Name[de-identified] Maria A Provider[de-identified] PCP  Home Health Services Needed[de-identified] Urinary Incontinence Catheter Management, Other (comment) (drainage of plueral catheter)  Homebound Criteria Met[de-identified] Requires the Assistance of Another Person for Safe Ambulation or to Leave the Home, Uses an Assist Device (i.e. cane, walker, etc)  Supporting Clincal Findings[de-identified] Fatigues Easliy in [employer]    DME Referral Provided  Referral made for DME?: No      Treatment Team Recommendation: Home with [address] [facility]  Discharge Destination Plan[de-identified] Home with [address] [facility] at Discharge : Family       Additional Comments: Cm sent referral via Aidin for pt to resume RN services with [facility].

## 2023-10-29 NOTE — UTILIZATION REVIEW
Initial Clinical Review    Admission: Date/Time/Statement:   Admission Orders (From admission, onward)       Ordered        10/27/23 1458  INPATIENT ADMISSION  Once                          Orders Placed This Encounter   Procedures    INPATIENT ADMISSION     Standing Status:   Standing     Number of Occurrences:   1     Order Specific Question:   Level of Care     Answer:   Med Surg [16]     Order Specific Question:   Estimated length of stay     Answer:   More than 2 Midnights     Order Specific Question:   Certification     Answer:   I certify that inpatient services are medically necessary for this patient for a duration of greater than two midnights. See H&P and MD Progress Notes for additional information about the patient's course of treatment. ED Arrival Information       Expected   -    Arrival   10/27/2023 12:20    Acuity   Emergent              Means of arrival   Walk-In    Escorted by   Spouse    Service   Hospitalist    Admission type   Emergency              Arrival complaint   abdominal pain, sob             Chief Complaint   Patient presents with    Abdominal Pain     Pt reports abdominal pain that started 3 weeks ago. States that she thought it was constipation, but states it is only getting worse. Denies nausea and vomiting. Reports that the pain is making her sob        Initial Presentation: 70 y.o. female to ED via walk-in from home   Present to ED with worsening abdominal pain. She has mild pain at rest, however developed significant pain with any increase in abdominal pressure including coughing, going to the bathroom, certain movements. She recently completed a course of Levaquin for a sinusitis (finished yesterday), with full resolution of her sinusitis symptoms. Of note, has been off immunotherapy x2 months due to various illnesses. Just finished course of Solu-Medrol for pneumonitis. She did receive 1 dose of cefepime in the ED.    PMHX COPD on 2 L with exertion, lung cancer s/p aseptic pleural catheter for malignant effusion, HTN, HLD    Admitted to [address] [facility] with DX: Abdominal pain   on exam: Tachy; abdominal tenderness (central lower abdomen). Pain 5/10; WBC 14   CT progression of lung tumors, development of extensive omental carcinomatosis. PLAN: monitor labs; pain / nausea control; consult palliative care; consult pain management; f/u blood cx      Date: 10/28/23   Day 2  Her abd pain is managed on current regimen. Today ambulated without severe pain. Took po breakfast without severe abd pain, nausea or vomiting. Made a large soft BM today with improved abd pain.  Non-bloody  Plan: monitor labs; pain / nausea control; consult palliative care; consult pain management; f/u blood cx      ED Triage Vitals   Temperature Pulse Respirations Blood Pressure SpO2   10/27/23 1231 10/27/23 1233 10/27/23 1231 10/27/23 1231 10/27/23 1231   98 °F (36.7 °C) (!) 123 18 129/71 94 %      Temp Source Heart Rate Source Patient Position - Orthostatic VS BP Location FiO2 (%)   10/27/23 1231 10/27/23 1233 10/27/23 1231 10/27/23 1231 --   Temporal Monitor Sitting Left arm       Pain Score       10/27/23 1936       5          Wt Readings from Last 1 Encounters:   10/27/23 81.6 kg (180 lb)     Additional Vital Signs:   Date/Time Temp Pulse Resp BP MAP (mmHg) SpO2 Calculated FIO2 (%) - Nasal Cannula Nasal Cannula O2 Flow Rate (L/min) O2 Device Patient Position - Orthostatic VS   10/29/23 08:00:25 96.8 °F (36 °C) Abnormal  88 -- 132/56 81 95 % -- -- -- --   10/28/23 2109 -- -- -- -- -- -- 28 2 L/min Nasal cannula --   10/28/23 21:03:36 -- 97 -- 145/69 94 95 % -- -- -- --   10/28/23 18:12:09 97 °F (36.1 °C) Abnormal  108 Abnormal  -- 119/65 83 95 % -- -- -- --   10/28/23 0800 -- -- -- -- -- -- 32 3 L/min Nasal cannula --   10/28/23 07:04:05 97 °F (36.1 °C) Abnormal  93 22 113/60 78 95 % 32 3 L/min Nasal cannula Lying   10/27/23 2058 -- -- -- -- -- -- -- -- Nasal cannula --   10/27/23 18:57:08 97.5 °F (36.4 °C) 103 14 148/78 101 94 % -- -- -- --   10/27/23 1610 -- -- -- -- -- -- -- -- Nasal cannula --   10/27/23 1530 -- 105 -- 147/67 96 93 % 28 2 L/min Nasal cannula Sitting   10/27/23 1416 -- 104 -- 129/60 87 92 % -- -- -- --   10/27/23 1233 -- 123 Abnormal  -- -- -- -- -- -- -- --   10/27/23 1231 98 °F (36.7 °C) -- 18 129/71 -- 94 % 28 2 L/min Nasal cannula Sitting       EKG: Sinus tachycardia  Inferior infarct , age undetermined  Anterolateral infarct , age undetermined  Abnormal ECG    Pertinent Labs/Diagnostic Test Results:   CT chest abdomen pelvis w contrast   Final Result by David Do MD (10/27 1401)      Slight progression of right hilar and infrahilar tumor mass. Pulmonary masses along the mediastinal pleura in the right upper chest and small nodules in the left upper lobe also suspicious for metastatic tumor, also slightly progressed from 1 month earlier. Slight progression of pleural-based tumor along the right hemidiaphragm and in the medial right costophrenic angle since previous examination. Interval development of extensive omental carcinomatosis representing marked progression of disease from 1 month earlier. Tiny peritoneal nodules along the undersurface of the right hemidiaphragm, not detectable on previous exam.      Decrease in the size of the loculated right pleural effusion since the prior exam.      This examination was marked "immediate notification" in Epic in order to begin the standard process by which the radiology reading room liaison alerts the referring practitioner. Workstation performed: [id number]         XR chest 2 views   Final Result by Conchita Pal MD (10/28 0823)      Persistent airspace disease at the right lung base. Right pleural catheter.                Workstation performed: [id number]           Results from last 7 days   Lab Units 10/27/23  1557   SARS-COV-2  Negative     Results from last 7 days   Lab Units 10/28/23  0455 10/27/23  1233 10/23/23  033 WBC Thousand/uL 9.72 14.18* 16.33*   HEMOGLOBIN g/dL 9.5* 11.4* 11.0*   HEMATOCRIT % 30.0* 36.3 34.3*   PLATELETS Thousands/uL 298 339 315   NEUTROS ABS Thousands/µL 8.34* 12.84* 14.42*        Results from last 7 days   Lab Units 10/28/23  0455 10/27/23  1233 10/23/23  0951   SODIUM mmol/L 132* 131* 133*   POTASSIUM mmol/L 3.6 3.7 3.6   CHLORIDE mmol/L 101 97 97   CO2 mmol/L 23 23 25   ANION GAP mmol/L 8 11 11   BUN mg/dL 9 14 12   CREATININE mg/dL 0.59* 0.74 0.65   EGFR ml/min/1.73sq m 96 85 93   CALCIUM mg/dL 8.4 9.3 9.2   MAGNESIUM mg/dL 1.7*  --   --      Results from last 7 days   Lab Units 10/28/23  0455 10/27/23  1233 10/23/23  0951   AST U/L 15 23 18   ALT U/L 27 41 24   ALK PHOS U/L 68 84 73   TOTAL PROTEIN g/dL 5.6* 6.9 6.6   ALBUMIN g/dL 2.9* 3.6 3.5   TOTAL BILIRUBIN mg/dL 0.42 0.47 0.47        Results from last 7 days   Lab Units 10/28/23  0455 10/27/23  1233 10/23/23  0951   GLUCOSE RANDOM mg/dL 82 135 80        Results from last 7 days   Lab Units 10/27/23  1726 10/27/23  1556 10/27/23  1233   HS TNI 0HR ng/L  --   --  7   HS TNI 2HR ng/L  --  6  --    HSTNI D2 ng/L  --  -1  --    HS TNI 4HR ng/L 7  --   --    HSTNI D4 ng/L 0  --   --         Results from last 7 days   Lab Units 10/23/23  0951   TSH 3RD GENERATON uIU/mL 1.564     Results from last 7 days   Lab Units 10/27/23  1302   PROCALCITONIN ng/ml 0.10     Results from last 7 days   Lab Units 10/27/23  1556 10/27/23  1302   LACTIC ACID mmol/L 0.8 2.3*        Results from last 7 days   Lab Units 10/27/23  1233   LIPASE u/L <6*        Results from last 7 days   Lab Units 10/27/23  1410   CLARITY UA  Slightly Cloudy   COLOR UA  Yellow   SPEC GRAV UA  <1.005*   PH UA  7.0   GLUCOSE UA mg/dl Negative   KETONES UA mg/dl Negative   BLOOD UA  Negative   PROTEIN UA mg/dl Negative   NITRITE UA  Negative   BILIRUBIN UA  Negative   UROBILINOGEN UA (BE) mg/dl <2.0   LEUKOCYTES UA  Trace*   WBC UA /hpf 1-2   RBC UA /hpf None Seen   BACTERIA UA /hpf None Seen EPITHELIAL CELLS WET PREP /hpf Moderate*   MUCUS THREADS  None Seen     Results from last 7 days   Lab Units 10/27/23  1557   INFLUENZA A PCR  Negative   INFLUENZA B PCR  Negative   RSV PCR  Negative        Results from last 7 days   Lab Units 10/27/23  1302   BLOOD CULTURE  No Growth at 24 hrs. No Growth at 24 hrs.           ED Treatment:   Medication Administration from 10/27/2023 1220 to 10/27/2023 1852         Date/Time Order Dose Route Action     10/27/2023 1307 EDT sodium chloride 0.9 % bolus 1,000 mL 1,000 mL Intravenous New Bag     10/27/2023 1319 EDT iohexol (OMNIPAQUE) 350 MG/ML injection (MULTI-DOSE) 100 mL 100 mL Intravenous Given     10/27/2023 1358 EDT cefepime (MAXIPIME) IVPB (premix in dextrose) 2,000 mg 50 mL 2,000 mg Intravenous New Bag     10/27/2023 1446 EDT sodium chloride 0.9 % bolus 500 mL 500 mL Intravenous New Bag     10/27/2023 1721 EDT oxyCODONE (ROXICODONE) split tablet 2.5 mg 2.5 mg Oral Given     10/27/2023 1721 EDT simethicone (MYLICON) chewable tablet 80 mg 80 mg Oral Given     10/27/2023 1721 EDT polyethylene glycol (MIRALAX) packet 17 g 17 g Oral Given            Present on Admission:   Chronic respiratory failure with hypoxia (HCC)   COPD (chronic obstructive pulmonary disease) (HCC)   Essential hypertension   Hyponatremia   Malignant pleural effusion   Non-small cell cancer of right lung (HCC)      Admitting Diagnosis: Shortness of breath [R06.02]  Hyponatremia [E87.1]  Abdominal pain [R10.9]  Severe sepsis ([address]) [A41.9, R65.20]    Age/Sex: 70 y.o. female    Admission Orders: SCDs; regular diet    Scheduled Medications:  atorvastatin, 20 mg, Oral, Daily With Dinner  buPROPion, 300 mg, Oral, Daily  enoxaparin, 40 mg, Subcutaneous, Daily  fluticasone-vilanterol, 1 puff, Inhalation, Daily  furosemide, 20 mg, Oral, QAM  losartan, 100 mg, Oral, Daily  polyethylene glycol, 17 g, Oral, Daily  predniSONE, 10 mg, Oral, BID With Meals  sertraline, 200 mg, Oral, Daily  simethicone, 80 mg, Oral, 4x Daily (with meals and at bedtime)  umeclidinium, 1 puff, Inhalation, Daily  verapamil, 120 mg, Oral, HS  verapamil, 240 mg, Oral, HS      Continuous IV Infusions: none     PRN Meds:  acetaminophen, 650 mg, Oral, Q6H PRN  albuterol, 2.5 mg, Nebulization, Q6H PRN  aluminum-magnesium hydroxide-simethicone, 30 mL, Oral, Q6H PRN  LORazepam, 0.5 mg, Oral, Daily PRN  ondansetron, 4 mg, Intravenous, Q6H PRN  oxyCODONE, 2.5 mg, Oral, Q6H PRN  (10/28 rec'd x3)            Network Utilization Review Department  ATTENTION: Please call with any questions or concerns to [phone] and carefully listen to the prompts so that you are directed to the right person. All voicemails are confidential.   For Discharge needs, contact Care Management DC Support Team at [phone] opt. 2  Send all requests for admission clinical reviews, approved or denied determinations and any other requests to dedicated fax number below belonging to the campus where the patient is receiving treatment.  List of dedicated fax numbers for the Facilities:  [place] DENIALS (Administrative/Medical Necessity) [phone]   DISCHARGE SUPPORT TEAM (NETWORK) [address] (Maternity/NICU/Pediatrics) [phone]   [address] [facility] [phone]   [address] [facility] [address] [address] [facility] [address] [facility] [address]  [phone]

## 2023-10-29 NOTE — DISCHARGE INSTR - AVS FIRST PAGE
Follow up with Dr. Benitez Narayanan on Wednesday 11/1/23. Your appointment is at 1:40PM. They should call you to confirm, but if they do not call you on Monday, please give them a call to confirm.

## 2023-10-29 NOTE — PLAN OF CARE
Problem: PAIN - ADULT  Goal: Verbalizes/displays adequate comfort level or baseline comfort level  Description: Interventions:  - Encourage patient to monitor pain and request assistance  - Assess pain using appropriate pain scale  - Administer analgesics based on type and severity of pain and evaluate response  - Implement non-pharmacological measures as appropriate and evaluate response  - Consider cultural and social influences on pain and pain management  - Notify physician/advanced practitioner if interventions unsuccessful or patient reports new pain  Outcome: Progressing     Problem: INFECTION - ADULT  Goal: Absence or prevention of progression during hospitalization  Description: INTERVENTIONS:  - Assess and monitor for signs and symptoms of infection  - Monitor lab/diagnostic results  - Monitor all insertion sites, i.e. indwelling lines, tubes, and drains  - Monitor endotracheal if appropriate and nasal secretions for changes in amount and color  - Gilbert appropriate cooling/warming therapies per order  - Administer medications as ordered  - Instruct and encourage patient and family to use good hand hygiene technique  - Identify and instruct in appropriate isolation precautions for identified infection/condition  Outcome: Progressing  Goal: Absence of fever/infection during neutropenic period  Description: INTERVENTIONS:  - Monitor WBC    Outcome: Progressing     Problem: DISCHARGE PLANNING  Goal: Discharge to home or other facility with appropriate resources  Description: INTERVENTIONS:  - Identify barriers to discharge w/patient and caregiver  - Arrange for needed discharge resources and transportation as appropriate  - Identify discharge learning needs (meds, wound care, etc.)  - Arrange for interpretive services to assist at discharge as needed  - Refer to Case Management Department for coordinating discharge planning if the patient needs post-hospital services based on physician/advanced practitioner order or complex needs related to functional status, cognitive ability, or social support system  Outcome: Progressing

## 2023-10-29 NOTE — UTILIZATION REVIEW
NOTIFICATION OF INPATIENT ADMISSION   AUTHORIZATION REQUEST   SERVICING FACILITY:   [address] [place] [address]  [address] [facility]Third [address] [address]  Tax ID: [id number]  NPI: [id number] ATTENDING PROVIDER:  Attending Name and NPI#: Sabrina Tran [[id number]]  Address: [address] [facility]Third [address] [address]  Phone: [phone]   ADMISSION INFORMATION:  Place of Service: [address] [place] [address]  Place of Service Code: 21  Inpatient Admission Date/Time: 10/27/23  2:58 PM  Discharge Date/Time: No discharge date for patient encounter. Admitting Diagnosis Code/Description:  Shortness of breath [R06.02]  Hyponatremia [E87.1]  Abdominal pain [R10.9]  Severe sepsis ([address]) [A41.9, R65.20]     UTILIZATION REVIEW CONTACT:  Glenn Tiwari Utilization   Network Utilization Review Department  Phone: [phone]  Fax [phone]  Email: [email or web address]  Contact for approvals/pending authorizations, clinical reviews, and discharge. PHYSICIAN ADVISORY SERVICES:  Medical Necessity Denial & Xirc-zs-Rlaj Review  Phone: [phone]  Fax: [phone]  Email: [email or web address]     DISCHARGE SUPPORT TEAM:  For Patients Discharge Needs & Updates  Phone: [phone] opt. 2 Fax: [phone]  Email: [email or web address]

## 2023-10-30 ENCOUNTER — HOME CARE VISIT (OUTPATIENT)
Dept: HOME HEALTH SERVICES | Facility: HOME HEALTHCARE | Age: 63
End: 2023-10-30
Payer: COMMERCIAL

## 2023-10-30 ENCOUNTER — PATIENT MESSAGE (OUTPATIENT)
Age: 63
End: 2023-10-30

## 2023-10-30 ENCOUNTER — TRANSITIONAL CARE MANAGEMENT (OUTPATIENT)
Dept: FAMILY MEDICINE CLINIC | Facility: HOSPITAL | Age: 63
End: 2023-10-30

## 2023-10-30 VITALS
TEMPERATURE: 97.5 F | DIASTOLIC BLOOD PRESSURE: 80 MMHG | SYSTOLIC BLOOD PRESSURE: 158 MMHG | OXYGEN SATURATION: 91 % | HEART RATE: 112 BPM | RESPIRATION RATE: 18 BRPM

## 2023-10-30 PROCEDURE — G0299 HHS/HOSPICE OF RN EA 15 MIN: HCPCS

## 2023-10-30 NOTE — UTILIZATION REVIEW
NOTIFICATION OF ADMISSION DISCHARGE   This is a Notification of Discharge from [facility]. Please be advised that this patient has been discharge from our facility. Below you will find the admission and discharge date and time including the patient’s disposition. UTILIZATION REVIEW CONTACT:  Sarthak Niño  Utilization   Network Utilization Review Department  Phone: [phone] carefully listen to the prompts. All voicemails are confidential.  Email: [email or web address]     ADMISSION INFORMATION  PRESENTATION DATE: 10/27/2023 12:35 PM  OBERVATION ADMISSION DATE:   INPATIENT ADMISSION DATE: 10/27/23  2:58 PM   DISCHARGE DATE: 10/29/2023  2:05 PM   DISPOSITION:Home/Self Care    Network Utilization Review Department  ATTENTION: Please call with any questions or concerns to [phone] and carefully listen to the prompts so that you are directed to the right person. All voicemails are confidential.   For Discharge needs, contact Care Management DC Support Team at [phone] opt. 2  Send all requests for admission clinical reviews, approved or denied determinations and any other requests to dedicated fax number below belonging to the campus where the patient is receiving treatment.  List of dedicated fax numbers for the Facilities:  [place] DENIALS (Administrative/Medical Necessity) [phone]   DISCHARGE SUPPORT TEAM (Network) [phone] [address] [facility] (Maternity/NICU/Pediatrics) [phone]   [address] [facility] [address] [phone] [facility] [phone]   [address] [place] [address]  [facility] [phone]

## 2023-11-01 ENCOUNTER — TELEPHONE (OUTPATIENT)
Age: 63
End: 2023-11-01

## 2023-11-01 ENCOUNTER — HOME CARE VISIT (OUTPATIENT)
Dept: HOME HEALTH SERVICES | Facility: HOME HEALTHCARE | Age: 63
End: 2023-11-01
Payer: COMMERCIAL

## 2023-11-01 ENCOUNTER — OFFICE VISIT (OUTPATIENT)
Age: 63
End: 2023-11-01
Payer: COMMERCIAL

## 2023-11-01 VITALS
DIASTOLIC BLOOD PRESSURE: 70 MMHG | RESPIRATION RATE: 18 BRPM | OXYGEN SATURATION: 94 % | SYSTOLIC BLOOD PRESSURE: 140 MMHG | HEART RATE: 98 BPM | TEMPERATURE: 96.9 F

## 2023-11-01 VITALS
RESPIRATION RATE: 18 BRPM | BODY MASS INDEX: 30.49 KG/M2 | TEMPERATURE: 98.2 F | HEIGHT: 65 IN | WEIGHT: 183 LBS | HEART RATE: 109 BPM | SYSTOLIC BLOOD PRESSURE: 118 MMHG | OXYGEN SATURATION: 96 % | DIASTOLIC BLOOD PRESSURE: 70 MMHG

## 2023-11-01 DIAGNOSIS — C34.91 NON-SMALL CELL CANCER OF RIGHT LUNG (HCC): Primary | ICD-10-CM

## 2023-11-01 LAB
BACTERIA BLD CULT: NORMAL
BACTERIA BLD CULT: NORMAL

## 2023-11-01 PROCEDURE — 99215 OFFICE O/P EST HI 40 MIN: CPT | Performed by: INTERNAL MEDICINE

## 2023-11-01 PROCEDURE — G0299 HHS/HOSPICE OF RN EA 15 MIN: HCPCS

## 2023-11-01 RX ORDER — SODIUM CHLORIDE 9 MG/ML
20 INJECTION, SOLUTION INTRAVENOUS ONCE
OUTPATIENT
Start: 2023-11-09

## 2023-11-01 RX ORDER — DEXAMETHASONE 4 MG/1
8 TABLET ORAL 2 TIMES DAILY WITH MEALS
Qty: 72 TABLET | Refills: 0 | Status: SHIPPED | OUTPATIENT
Start: 2023-11-01

## 2023-11-01 NOTE — TELEPHONE ENCOUNTER
Please start new treatment plan asap. Please schedule f/u with Dr Sharan Kaur prior to new cycle 2. Thanks!

## 2023-11-02 ENCOUNTER — TELEPHONE (OUTPATIENT)
Age: 63
End: 2023-11-02

## 2023-11-02 ENCOUNTER — HOSPITAL ENCOUNTER (OUTPATIENT)
Dept: NUCLEAR MEDICINE | Facility: HOSPITAL | Age: 63
End: 2023-11-02
Attending: INTERNAL MEDICINE
Payer: COMMERCIAL

## 2023-11-02 DIAGNOSIS — C34.91 NON-SMALL CELL CANCER OF RIGHT LUNG (HCC): ICD-10-CM

## 2023-11-02 LAB — GLUCOSE SERPL-MCNC: 87 MG/DL (ref 65–140)

## 2023-11-02 PROCEDURE — A9552 F18 FDG: HCPCS

## 2023-11-02 PROCEDURE — G1004 CDSM NDSC: HCPCS

## 2023-11-02 PROCEDURE — 82948 REAGENT STRIP/BLOOD GLUCOSE: CPT

## 2023-11-02 PROCEDURE — 78815 PET IMAGE W/CT SKULL-THIGH: CPT

## 2023-11-02 NOTE — TELEPHONE ENCOUNTER
Spoke with patient to let her know her follow up with Dr. Dread Jeffery has been scheduled for 11/29. Pt confirmed date and time.

## 2023-11-03 ENCOUNTER — HOME CARE VISIT (OUTPATIENT)
Dept: HOME HEALTH SERVICES | Facility: HOME HEALTHCARE | Age: 63
End: 2023-11-03
Payer: COMMERCIAL

## 2023-11-03 VITALS
TEMPERATURE: 97.8 F | SYSTOLIC BLOOD PRESSURE: 130 MMHG | OXYGEN SATURATION: 93 % | DIASTOLIC BLOOD PRESSURE: 80 MMHG | RESPIRATION RATE: 22 BRPM | HEART RATE: 114 BPM

## 2023-11-03 PROCEDURE — G0299 HHS/HOSPICE OF RN EA 15 MIN: HCPCS

## 2023-11-04 NOTE — PROGRESS NOTES
HEMATOLOGY / [facility] FOLLOW UP NOTE    Primary Care Provider: Erika Diaz MD  Referring Provider:    MRN: [id number]  : 1960    Reason for Encounter: follow up metastatic adenocarcinoma of the lung       Oncology History Overview Note   2019 - Stage IA adenocarcinoma of the right lung s/p RLLectomy    2022 - fall after tripping on a dog gate - CT showed pulmonary nodules that required 3 month followup    3/2023 - CT showed right pleural effusion with enlarging lung lesion, she was otherwise symptomatic, thoracentesis cell block showed adenocarcinoma c/w her prior lung primary    PET - no disease outside the chest    Caris - no targetable mutations    2023 - carbo/pem/pembro    - - cycle 2, pemetrexed dose reduced by 20% and carbo dose reduced to AUC 4 due to nausea and fatigue    2023 - removed Poly as of cycle 5 due to good response and increasing fatigue    2023 - add back Poly for cycle 6 due to increasing output from right pleural catheter    2023 - remove carboplatin for cycle 7 because no impact was made on output from pleural catheter    10/2023 - disease progression in the omentum.   Plan treatment change to docetaxel and ramucirumab     Non-small cell cancer of right lung ([address])   2023 Initial Diagnosis    Non-small cell cancer of right lung ([address])     2023 - 2023 Chemotherapy    cyanocobalamin, 1,000 mcg, Intramuscular, Once, 5 of 10 cycles  Administration: 1,000 mcg (2023), 1,000 mcg (2023), 1,000 mcg (8/3/2023), 1,000 mcg (2023), 1,000 mcg (2023)  alteplase (CATHFLO), 2 mg, Intracatheter, Every 1 Minute as needed, 8 of 13 cycles  fosaprepitant (EMEND) IVPB, 150 mg, Intravenous, Once, 5 of 5 cycles  Administration: 150 mg (2023), 150 mg (2023), 150 mg (2023), 150 mg (2023), 150 mg (8/3/2023)  CARBOplatin (PARAPLATIN) IVPB ([facility] AUC DOSING), 553 mg, Intravenous, Once, 5 of 5 cycles  Administration: 553 mg (4/20/2023), 442.4 mg (6/1/2023), 442.4 mg (6/22/2023), 442.4 mg (5/11/2023), 438 mg (8/3/2023)  pemetrexed (ALIMTA) chemo infusion, 945 mg, Intravenous, Once, 8 of 13 cycles  Dose modification: 400 mg/m2 (original dose 500 mg/m2, Cycle 3, Reason: Nausea/Vomiting, Comment: 20% dose reduction due to nausea)  Administration: 900 mg (4/20/2023), 756 mg (6/1/2023), 756 mg (6/22/2023), 756 mg (8/3/2023), 756 mg (5/11/2023), 756 mg (7/13/2023), 756 mg (8/24/2023), 756 mg (9/14/2023)  pembrolizumab (KEYTRUDA) IVPB, 200 mg, Intravenous, Once, 8 of 13 cycles  Administration: 200 mg (4/20/2023), 200 mg (6/1/2023), 200 mg (6/22/2023), 200 mg (8/3/2023), 200 mg (5/11/2023), 200 mg (7/13/2023), 200 mg (8/24/2023), 200 mg (9/14/2023)     6/9/2023 -  Cancer Staged    Staging form: Lung, AJCC 8th Edition  - Clinical: Stage ALESHA (cT1c, cN2, cM1a) - Signed by Chhaya Conley DO on 6/9/2023 11/9/2023 -  Chemotherapy    alteplase (CATHFLO), 2 mg, Intracatheter, Every 1 Minute as needed, 0 of 6 cycles  ramucirumab (CYRAMZA) IVPB, 10 mg/kg = 830 mg, Intravenous, Once, 0 of 6 cycles  DOCEtaxel (TAXOTERE) chemo infusion, 75 mg/m2 = 142.6 mg, Intravenous, Once, 0 of 6 cycles         Interval History: Patient presents for follow up of her metastatic adenocarcinoma of the lung. 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 There was also question of pneumonitis on the scan as well. Her last dose of pemetrexed and pembrolizumab was on September 14. She has lost 6 pounds. She is not able to eat large quantities of food. Her abdominal pain is better with a bowel movement. She denies any falls. No nausea or vomiting. Today is the last day of the steroid taper she was given in the hospital.  She was also sent home on home oxygen and finds this helpful.          REVIEW OF SYSTEMS:  Please note that a 14-point review of systems was performed to include Constitutional, HEENT, Respiratory, CVS, GI, , Musculoskeletal, Integumentary, Neurologic, Rheumatologic, Endocrinologic, Psychiatric, Lymphatic, and Hematologic/Oncologic systems were reviewed and are negative unless otherwise stated in HPI. Positive and negative findings pertinent to this evaluation are incorporated into the history of present illness. ECOG PS: 2  1  PROBLEM LIST:  Patient Active Problem List   Diagnosis    Impingement syndrome of left shoulder    Closed fracture of multiple ribs of left side    Cigarette nicotine dependence in remission    COPD (chronic obstructive pulmonary disease) (HCC)    Hypercholesterolemia    Essential hypertension    Non-small cell cancer of right lung (HCC)    Malignant pleural effusion    Dyspnea on exertion    CINV (chemotherapy-induced nausea and vomiting)    Palliative care patient    Hyponatremia    Generalized anxiety disorder    Chronic respiratory failure with hypoxia (HCC)    Abdominal pain       Assessment / Plan: 78-year-old female with stage IV adenocarcinoma of the lung now with progression with new site of disease in the omentum. She still is able to eat and her performance status is still okay. She was worried I was going to tell her that she had to go on hospice but she is still doing well enough that I can continue to offer her active treatment. I have recommended docetaxel and ramucirumab. Risks and benefits of treatment were discussed and consent was signed. She understands that this is with palliative intent. We will need to monitor her CBC CMP and urinalysis prior to starting treatment. I have referred her to palliative care as well for assistance with any of the abdominal pain she is having from her omental metastasis. We had a PET scan scheduled this week and she is going to get that as well. It can sometimes be hard to follow omental disease so having the PET scan may be helpful in the future. I will plan on seeing her prior to cycle 2 of treatment. She knows to call me in the interim with any questions or concerns. I spent 40 minutes on chart review, face to face counseling time, coordination of care and documentation. Past Medical History:   has a past medical history of Cancer ([address]), COPD (chronic obstructive pulmonary disease) ([address]), Emphysema of lung ([address]), Hyperlipidemia, Hypertension, Lung cancer ([address]) (06/26/2019), Lung nodule, and Pleural effusion. PAST SURGICAL HISTORY:   has a past surgical history that includes Appendectomy; Colon surgery; Lung lobectomy; Hysterectomy; Oophorectomy (Bilateral); Breast biopsy (Right); IR thoracentesis (03/24/2023); IR thoracentesis (04/12/2023); IR thoracentesis (04/18/2023); IR port placement (04/18/2023); IR pleural effusion long-term catheter placement (04/25/2023); Bronchoscopy (06/2019); and Lung biopsy (06/2019). CURRENT MEDICATIONS  Current Outpatient Medications   Medication Sig Dispense Refill    albuterol (2.5 mg/3 mL) 0.083 % nebulizer solution Take 3 mL (2.5 mg total) by nebulization every 6 (six) hours as needed for wheezing or shortness of breath 270 mL 4    albuterol (PROVENTIL HFA,VENTOLIN HFA) 90 mcg/act inhaler Inhale 2 puffs every 4 (four) hours  3    Ascorbic Acid (vitamin C) 1000 MG tablet 1 daily      atorvastatin (LIPITOR) 20 mg tablet Take 20 mg by mouth daily.       Biotin 50090 MCG TABS 1 daily      buPROPion (WELLBUTRIN XL) 300 mg 24 hr tablet TAKE 1 TABLET BY MOUTH EVERY DAY IN THE MORNING FOR 90 DAYS      Cholecalciferol (Vitamin D3) 25 MCG (1000 UT) CAPS 1 capsule every 24 hours      dexamethasone sodium phosphate 0.1 % ophthalmic solution Administer 1 drop to both eyes every 3 (three) hours as needed (eye irritation) 5 mL 5    diphenhydrAMINE (BENADRYL) 50 MG tablet Take 1 tablet (50 mg total) by mouth every 6 (six) hours as needed for itching (Rash) 30 tablet 0    diphenhydrAMINE-APAP, sleep, (TYLENOL PM EXTRA STRENGTH PO) Take 325 mg by mouth daily at bedtime famotidine (PEPCID) 20 mg tablet Take 20 mg by mouth daily      fluticasone-umeclidinium-vilanterol (Trelegy Ellipta) 200-62.5-25 mcg/actuation AEPB inhaler Inhale 1 puff daily Rinse mouth after use. 60 blister 0    folic acid (FOLVITE) 1 mg tablet TAKE 1 TABLET BY MOUTH EVERY DAY 90 tablet 1    furosemide (LASIX) 20 mg tablet Take 1 tablet (20 mg total) by mouth every morning 90 tablet 1    Klor-Con M20 20 MEQ tablet TAKE 1 TABLET BY MOUTH EVERY DAY 30 tablet 1    lidocaine-prilocaine (EMLA) cream APPLY TO PORT 30 TO 60 MINUTES PRIOR TO USE 30 g 3    LORazepam (Ativan) 0.5 mg tablet Take 1 tablet (0.5 mg total) by mouth daily as needed for anxiety 30 tablet 0    Multiple Vitamin (MULTIVITAMINS PO) every 24 hours      oxyCODONE (Roxicodone) 5 immediate release tablet Take 1 tablet (5 mg total) by mouth every 4 (four) hours as needed for moderate pain for up to 10 days Max Daily Amount: 30 mg 20 tablet 0    oxygen gas Inhale 2 L/min continuous. Indications: Hypoxia      polyethylene glycol (GLYCOLAX) 17 GM/SCOOP powder Take 17 g by mouth daily. Indications: Constipation      Probiotic Product (PROBIOTIC DAILY PO) 1 daily      Sennosides (Senna) 8.6 MG CAPS Take 2 capsules by mouth as needed (constipation)      sertraline (ZOLOFT) 100 mg tablet 2 daily      telmisartan (MICARDIS) 80 MG tablet Take 1 tablet (80 mg total) by mouth daily 90 tablet 1    verapamil (CALAN-SR) 120 mg CR tablet Take 1 tablet (120 mg total) by mouth daily at bedtime 1 hs 90 tablet 3    verapamil (CALAN-SR) 240 mg CR tablet Take 1 tablet (240 mg total) by mouth daily at bedtime 1 hs 90 tablet 3    dexamethasone (DECADRON) 4 mg tablet Take 2 tablets (8 mg total) by mouth 2 (two) times a day with meals for 3 days Take 2 tabs by mouth twice daily the day before, the day of, and the day after chemo.  72 tablet 0    EPINEPHrine (EPIPEN) 0.3 mg/0.3 mL SOAJ Inject 0.3 mL (0.3 mg total) into a muscle once for 1 dose (Patient not taking: Reported on 10/2/2023) 0.6 mL 0    ondansetron (ZOFRAN) 8 mg tablet Take 1 tablet (8 mg total) by mouth every 8 (eight) hours as needed for nausea or vomiting (Patient not taking: Reported on 10/30/2023) 30 tablet 0    prochlorperazine (COMPAZINE) 10 mg tablet Take 1 tablet (10 mg total) by mouth every 6 (six) hours as needed for nausea (Patient not taking: Reported on 9/28/2023) 30 tablet 3     No current facility-administered medications for this visit. [unfilled]    SOCIAL HISTORY:   reports that she quit smoking about 7 months ago. Her smoking use included cigarettes. She started smoking about 53 years ago. She has a 75.00 pack-year smoking history. She has never used smokeless tobacco. She reports that she does not currently use alcohol. She reports that she does not use drugs. FAMILY HISTORY:  family history includes Arthritis in her mother; COPD in her mother; Cancer in her maternal aunt, maternal aunt, maternal grandmother, and mother; Colon cancer in her maternal aunt, maternal aunt, maternal grandmother, and mother; Depression in her mother; Diabetes in her maternal grandmother; Drug abuse in her brother; Hearing loss in her mother; Hyperlipidemia in her mother; Hypertension in her mother; Lung cancer in her mother. ALLERGIES:  is allergic to amoxicillin, vilazodone hcl, wasp venom, and zithromax [azithromycin]. Physical Exam:  Vital Signs:   Visit Vitals  /70 (BP Location: Left arm, Patient Position: Sitting, Cuff Size: Standard)   Pulse (!) 109   Temp 98.2 °F (36.8 °C) (Temporal)   Resp 18   Ht 5' 5" (1.651 m)   Wt 83 kg (183 lb)   SpO2 96%   BMI 30.45 kg/m²   OB Status Hysterectomy   Smoking Status Former   BSA 1.9 m²     Body mass index is 30.45 kg/m². Body surface area is 1.9 meters squared.     GEN: Alert, awake oriented x3, in no acute distress  HEENT- No pallor, icterus, cyanosis, no oral mucosal lesions,   LAD - no palpable cervical, clavicle, axillary, inguinal LAD  Heart- normal S1 S2, regular rate and rhythm, No murmur, rubs.    Lungs- clear breathing sound bilateral.   Abdomen- soft, Non tender, bowel sounds present  Extremities- No cyanosis, clubbing, edema  Neuro- No focal neurological deficit    Labs:  Lab Results   Component Value Date    WBC 9.72 10/28/2023    HGB 9.5 (L) 10/28/2023    HCT 30.0 (L) 10/28/2023    MCV 96 10/28/2023     10/28/2023     Lab Results   Component Value Date    SODIUM 132 (L) 10/28/2023    K 3.6 10/28/2023     10/28/2023    CO2 23 10/28/2023    AGAP 8 10/28/2023    BUN 9 10/28/2023    CREATININE 0.59 (L) 10/28/2023    GLUC 82 10/28/2023    GLUF 105 (H) 09/11/2023    CALCIUM 8.4 10/28/2023    AST 15 10/28/2023    ALT 27 10/28/2023    ALKPHOS 68 10/28/2023    TP 5.6 (L) 10/28/2023    TBILI 0.42 10/28/2023    EGFR 96 10/28/2023

## 2023-11-06 ENCOUNTER — OFFICE VISIT (OUTPATIENT)
Dept: FAMILY MEDICINE CLINIC | Facility: HOSPITAL | Age: 63
End: 2023-11-06
Payer: COMMERCIAL

## 2023-11-06 ENCOUNTER — HOME CARE VISIT (OUTPATIENT)
Dept: HOME HEALTH SERVICES | Facility: HOME HEALTHCARE | Age: 63
End: 2023-11-06
Payer: COMMERCIAL

## 2023-11-06 ENCOUNTER — HOSPITAL ENCOUNTER (OUTPATIENT)
Dept: INFUSION CENTER | Facility: HOSPITAL | Age: 63
Discharge: HOME | End: 2023-11-06
Payer: COMMERCIAL

## 2023-11-06 VITALS
OXYGEN SATURATION: 90 % | RESPIRATION RATE: 18 BRPM | SYSTOLIC BLOOD PRESSURE: 130 MMHG | DIASTOLIC BLOOD PRESSURE: 62 MMHG | TEMPERATURE: 97.1 F | HEART RATE: 110 BPM

## 2023-11-06 VITALS
OXYGEN SATURATION: 93 % | SYSTOLIC BLOOD PRESSURE: 118 MMHG | DIASTOLIC BLOOD PRESSURE: 60 MMHG | BODY MASS INDEX: 30.32 KG/M2 | HEIGHT: 65 IN | HEART RATE: 109 BPM | RESPIRATION RATE: 16 BRPM | WEIGHT: 182 LBS

## 2023-11-06 DIAGNOSIS — E87.1 HYPONATREMIA: ICD-10-CM

## 2023-11-06 DIAGNOSIS — J44.9 CHRONIC OBSTRUCTIVE PULMONARY DISEASE, UNSPECIFIED COPD TYPE (HCC): ICD-10-CM

## 2023-11-06 DIAGNOSIS — J96.11 CHRONIC RESPIRATORY FAILURE WITH HYPOXIA (HCC): ICD-10-CM

## 2023-11-06 DIAGNOSIS — C34.91 NON-SMALL CELL CANCER OF RIGHT LUNG (HCC): Primary | ICD-10-CM

## 2023-11-06 DIAGNOSIS — I10 ESSENTIAL HYPERTENSION: ICD-10-CM

## 2023-11-06 LAB
ALBUMIN SERPL BCP-MCNC: 3.1 G/DL (ref 3.5–5)
ALP SERPL-CCNC: 94 U/L (ref 34–104)
ALT SERPL W P-5'-P-CCNC: 24 U/L (ref 7–52)
ANION GAP SERPL CALCULATED.3IONS-SCNC: 9 MMOL/L
AST SERPL W P-5'-P-CCNC: 24 U/L (ref 13–39)
BASOPHILS # BLD AUTO: 0.02 THOUSANDS/ÂΜL (ref 0–0.1)
BASOPHILS NFR BLD AUTO: 0 % (ref 0–1)
BILIRUB SERPL-MCNC: 0.26 MG/DL (ref 0.2–1)
BILIRUB UR QL STRIP: NEGATIVE
BUN SERPL-MCNC: 10 MG/DL (ref 5–25)
CALCIUM ALBUM COR SERPL-MCNC: 9.4 MG/DL (ref 8.3–10.1)
CALCIUM SERPL-MCNC: 8.7 MG/DL (ref 8.4–10.2)
CHLORIDE SERPL-SCNC: 97 MMOL/L (ref 96–108)
CLARITY UR: CLEAR
CO2 SERPL-SCNC: 25 MMOL/L (ref 21–32)
COLOR UR: YELLOW
CREAT SERPL-MCNC: 0.4 MG/DL (ref 0.6–1.3)
EOSINOPHIL # BLD AUTO: 0.02 THOUSAND/ÂΜL (ref 0–0.61)
EOSINOPHIL NFR BLD AUTO: 0 % (ref 0–6)
ERYTHROCYTE [DISTWIDTH] IN BLOOD BY AUTOMATED COUNT: 14.6 % (ref 11.6–15.1)
GFR SERPL CREATININE-BSD FRML MDRD: 109 ML/MIN/1.73SQ M
GLUCOSE SERPL-MCNC: 107 MG/DL (ref 65–140)
GLUCOSE UR STRIP-MCNC: NEGATIVE MG/DL
HCT VFR BLD AUTO: 31 % (ref 34.8–46.1)
HGB BLD-MCNC: 9.7 G/DL (ref 11.5–15.4)
HGB UR QL STRIP.AUTO: NEGATIVE
IMM GRANULOCYTES # BLD AUTO: 0.1 THOUSAND/UL (ref 0–0.2)
IMM GRANULOCYTES NFR BLD AUTO: 1 % (ref 0–2)
KETONES UR STRIP-MCNC: NEGATIVE MG/DL
LEUKOCYTE ESTERASE UR QL STRIP: NEGATIVE
LYMPHOCYTES # BLD AUTO: 0.43 THOUSANDS/ÂΜL (ref 0.6–4.47)
LYMPHOCYTES NFR BLD AUTO: 6 % (ref 14–44)
MCH RBC QN AUTO: 29.4 PG (ref 26.8–34.3)
MCHC RBC AUTO-ENTMCNC: 31.3 G/DL (ref 31.4–37.4)
MCV RBC AUTO: 94 FL (ref 82–98)
MONOCYTES # BLD AUTO: 0.82 THOUSAND/ÂΜL (ref 0.17–1.22)
MONOCYTES NFR BLD AUTO: 12 % (ref 4–12)
NEUTROPHILS # BLD AUTO: 5.74 THOUSANDS/ÂΜL (ref 1.85–7.62)
NEUTS SEG NFR BLD AUTO: 81 % (ref 43–75)
NITRITE UR QL STRIP: NEGATIVE
NRBC BLD AUTO-RTO: 0 /100 WBCS
PH UR STRIP.AUTO: 6.5 [PH]
PLATELET # BLD AUTO: 383 THOUSANDS/UL (ref 149–390)
PMV BLD AUTO: 8.7 FL (ref 8.9–12.7)
POTASSIUM SERPL-SCNC: 3.8 MMOL/L (ref 3.5–5.3)
PROT SERPL-MCNC: 6.2 G/DL (ref 6.4–8.4)
PROT UR STRIP-MCNC: NEGATIVE MG/DL
RBC # BLD AUTO: 3.3 MILLION/UL (ref 3.81–5.12)
SODIUM SERPL-SCNC: 131 MMOL/L (ref 135–147)
SP GR UR STRIP.AUTO: 1.01 (ref 1–1.03)
UROBILINOGEN UR STRIP-ACNC: <2 MG/DL
WBC # BLD AUTO: 7.13 THOUSAND/UL (ref 4.31–10.16)

## 2023-11-06 PROCEDURE — 99495 TRANSJ CARE MGMT MOD F2F 14D: CPT | Performed by: INTERNAL MEDICINE

## 2023-11-06 PROCEDURE — G0299 HHS/HOSPICE OF RN EA 15 MIN: HCPCS

## 2023-11-06 PROCEDURE — 80053 COMPREHEN METABOLIC PANEL: CPT | Performed by: INTERNAL MEDICINE

## 2023-11-06 PROCEDURE — 85025 COMPLETE CBC W/AUTO DIFF WBC: CPT | Performed by: INTERNAL MEDICINE

## 2023-11-06 PROCEDURE — 81003 URINALYSIS AUTO W/O SCOPE: CPT | Performed by: INTERNAL MEDICINE

## 2023-11-06 RX ORDER — TELMISARTAN AND HYDROCHLORTHIAZIDE 80; 25 MG/1; MG/1
TABLET ORAL
COMMUNITY
Start: 2023-10-16 | End: 2023-11-06 | Stop reason: ALTCHOICE

## 2023-11-06 NOTE — ASSESSMENT & PLAN NOTE
She has chronic hypoxic respiratory failure due to copd and lung cancer  Feels less dyspneic on oxygen   O2 sat was 93% today  Continue oxygen via NC

## 2023-11-06 NOTE — ASSESSMENT & PLAN NOTE
Pt's abdominal pain is mild  Continue prn oxycodone and prn miralax for constipation  She will start new chemo    We discussed advanced care planning: pt has the [facility] advanced care planning document at home and will fill it out.

## 2023-11-06 NOTE — PROGRESS NOTES
Assessment/Plan:     Non-small cell cancer of right lung (HCC)  Pt's abdominal pain is mild  Continue prn oxycodone and prn miralax for constipation  She will start new chemo    We discussed advanced care planning: pt has the [facility] advanced care planning document at home and will fill it out. COPD (chronic obstructive pulmonary disease) (HCC)  COPD is at baseline   Lung are clear today   Continue trelegy and prn albuterol    Essential hypertension  HTN is controlled  Continue micardis, verapamil, lasix  Potassium was normal on 10/28    Hyponatremia  Pt has chronic hyponatremia  Na was 132 on 10/28 at baseline  She doesn't take hctz  Continue lasix    Chronic respiratory failure with hypoxia ([address])  She has chronic hypoxic respiratory failure due to copd and lung cancer  Feels less dyspneic on oxygen   O2 sat was 93% today  Continue oxygen via NC         Diagnoses and all orders for this visit:    Non-small cell cancer of right lung (HCC)    Chronic obstructive pulmonary disease, unspecified COPD type ([address])    Essential hypertension    Hyponatremia    Chronic respiratory failure with hypoxia ([address])    Other orders  -     Discontinue: telmisartan-hydrochlorothiazide (MICARDIS HCT) 80-25 MG per tablet         Subjective:     Patient ID: Louann Gandhi is a 70 y.o. female. Pt presents with  for jesus after hospital discharge for abdominal pain due to peritoneal and omental spread of lung cancer. She was discharged on oxycodone that alleviates the pain but she still has some diffuse abdominal pain. She's able to drink and eat some. Denies nausea or vomiting. Uses miralax prn for constipation. Review of Systems   Constitutional:  Negative for fever. HENT:  Negative for hearing loss. Eyes:  Negative for visual disturbance. Respiratory:  Positive for shortness of breath (on exertion at baseline). Negative for cough. Cardiovascular:  Negative for chest pain and palpitations. Gastrointestinal:  Positive for abdominal pain and constipation. Negative for blood in stool and nausea. Endocrine: Negative for polydipsia and polyphagia. Genitourinary:  Negative for dysuria and hematuria. Musculoskeletal:  Negative for arthralgias and myalgias. Skin:  Negative for rash. Neurological:  Negative for headaches. Psychiatric/Behavioral:  Negative for dysphoric mood. All other systems reviewed and are negative. Objective:     Physical Exam  HENT:      Head: Normocephalic. Mouth/Throat:      Mouth: Mucous membranes are moist.   Eyes:      Conjunctiva/sclera: Conjunctivae normal.   Cardiovascular:      Rate and Rhythm: Regular rhythm. Tachycardia present. Heart sounds: No murmur heard. Pulmonary:      Effort: No respiratory distress. Breath sounds: No wheezing or rales. Abdominal:      General: Bowel sounds are normal.      Palpations: Abdomen is soft. Tenderness: There is abdominal tenderness (diffuse abdominal tenderness is present). Musculoskeletal:         General: No tenderness. Cervical back: Neck supple. Skin:     General: Skin is warm and dry. Comments: She has no LE edema     Neurological:      Mental Status: She is alert and oriented to person, place, and time. Cranial Nerves: No cranial nerve deficit. Motor: No weakness. Gait: Gait normal.   Psychiatric:         Mood and Affect: Mood normal.         Thought Content: Thought content normal.           Vitals:    11/06/23 1309   BP: 118/60   Pulse: (!) 109   Resp: 16   SpO2: 93%   Weight: 82.6 kg (182 lb)   Height: 5' 5" (1.651 m)       Transitional Care Management Review:  Grover Ramirez is a 70 y.o. female here for TCM follow up.      During the TCM phone call patient stated:    TCM Call     Date and time call was made  10/30/2023 10:11 AM    Patient was hospitialized at  [facility]    Date of Admission  10/27/23    Date of discharge  10/29/23    Diagnosis abdominal pain    Disposition  Home    Were the patients medications reviewed and updated  Yes      TCM Call     Should patient be enrolled in anticoag monitoring? No    Scheduled for follow up? Yes    I have advised the patient to call PCP with any new or worsening symptoms  Joni Welch CMA [facility] primary care [address]    Living Arrangements  Spouse or Significiant other    Comments  spoke with patient, she is doing well since being home, has followup  with dr. Deanna Marcos, Med list reviewed. Call transferred to front for jesus appt.  Saleem Mota MD

## 2023-11-08 ENCOUNTER — HOME CARE VISIT (OUTPATIENT)
Dept: HOME HEALTH SERVICES | Facility: HOME HEALTHCARE | Age: 63
End: 2023-11-08
Payer: COMMERCIAL

## 2023-11-08 ENCOUNTER — PATIENT MESSAGE (OUTPATIENT)
Dept: HEMATOLOGY ONCOLOGY | Facility: CLINIC | Age: 63
End: 2023-11-08

## 2023-11-08 VITALS
OXYGEN SATURATION: 93 % | SYSTOLIC BLOOD PRESSURE: 138 MMHG | TEMPERATURE: 97.4 F | RESPIRATION RATE: 24 BRPM | DIASTOLIC BLOOD PRESSURE: 70 MMHG | HEART RATE: 108 BPM

## 2023-11-08 PROCEDURE — G0299 HHS/HOSPICE OF RN EA 15 MIN: HCPCS

## 2023-11-09 ENCOUNTER — HOSPITAL ENCOUNTER (OUTPATIENT)
Dept: INFUSION CENTER | Facility: HOSPITAL | Age: 63
Discharge: HOME | End: 2023-11-09
Attending: INTERNAL MEDICINE
Payer: COMMERCIAL

## 2023-11-09 VITALS
WEIGHT: 184.75 LBS | DIASTOLIC BLOOD PRESSURE: 69 MMHG | TEMPERATURE: 97.2 F | BODY MASS INDEX: 30.78 KG/M2 | OXYGEN SATURATION: 95 % | RESPIRATION RATE: 18 BRPM | HEIGHT: 65 IN | HEART RATE: 85 BPM | SYSTOLIC BLOOD PRESSURE: 136 MMHG

## 2023-11-09 DIAGNOSIS — C34.91 NON-SMALL CELL CANCER OF RIGHT LUNG (HCC): Primary | ICD-10-CM

## 2023-11-09 PROCEDURE — 96367 TX/PROPH/DG ADDL SEQ IV INF: CPT

## 2023-11-09 PROCEDURE — 96413 CHEMO IV INFUSION 1 HR: CPT

## 2023-11-09 PROCEDURE — 96417 CHEMO IV INFUS EACH ADDL SEQ: CPT

## 2023-11-09 RX ORDER — SODIUM CHLORIDE 9 MG/ML
20 INJECTION, SOLUTION INTRAVENOUS ONCE
Status: COMPLETED | OUTPATIENT
Start: 2023-11-09 | End: 2023-11-09

## 2023-11-09 RX ADMIN — ONDANSETRON 16 MG: 2 INJECTION INTRAMUSCULAR; INTRAVENOUS at 08:52

## 2023-11-09 RX ADMIN — SODIUM CHLORIDE 800 MG: 0.9 INJECTION, SOLUTION INTRAVENOUS at 09:29

## 2023-11-09 RX ADMIN — DOCETAXEL 142.6 MG: 20 INJECTION, SOLUTION, CONCENTRATE INTRAVENOUS at 10:39

## 2023-11-09 RX ADMIN — SODIUM CHLORIDE 20 ML/HR: 0.9 INJECTION, SOLUTION INTRAVENOUS at 08:26

## 2023-11-09 RX ADMIN — DIPHENHYDRAMINE HYDROCHLORIDE 50 MG: 50 INJECTION, SOLUTION INTRAMUSCULAR; INTRAVENOUS at 08:27

## 2023-11-09 NOTE — PROGRESS NOTES
Chemotherapy infusion completed with no adverse reactions. AVS provided, left unit ambulatory with steady gait.

## 2023-11-10 ENCOUNTER — HOME CARE VISIT (OUTPATIENT)
Dept: HOME HEALTH SERVICES | Facility: HOME HEALTHCARE | Age: 63
End: 2023-11-10
Payer: COMMERCIAL

## 2023-11-10 VITALS
TEMPERATURE: 97.3 F | HEART RATE: 102 BPM | SYSTOLIC BLOOD PRESSURE: 120 MMHG | RESPIRATION RATE: 24 BRPM | OXYGEN SATURATION: 93 % | DIASTOLIC BLOOD PRESSURE: 68 MMHG

## 2023-11-10 PROCEDURE — G0299 HHS/HOSPICE OF RN EA 15 MIN: HCPCS

## 2023-11-13 ENCOUNTER — HOME CARE VISIT (OUTPATIENT)
Dept: HOME HEALTH SERVICES | Facility: HOME HEALTHCARE | Age: 63
End: 2023-11-13
Payer: COMMERCIAL

## 2023-11-13 VITALS
OXYGEN SATURATION: 96 % | RESPIRATION RATE: 22 BRPM | TEMPERATURE: 97.8 F | SYSTOLIC BLOOD PRESSURE: 140 MMHG | HEART RATE: 110 BPM | DIASTOLIC BLOOD PRESSURE: 80 MMHG

## 2023-11-13 PROCEDURE — G0299 HHS/HOSPICE OF RN EA 15 MIN: HCPCS

## 2023-11-15 ENCOUNTER — HOME CARE VISIT (OUTPATIENT)
Dept: HOME HEALTH SERVICES | Facility: HOME HEALTHCARE | Age: 63
End: 2023-11-15
Payer: COMMERCIAL

## 2023-11-15 VITALS
DIASTOLIC BLOOD PRESSURE: 80 MMHG | HEART RATE: 110 BPM | SYSTOLIC BLOOD PRESSURE: 130 MMHG | TEMPERATURE: 98.2 F | RESPIRATION RATE: 22 BRPM | OXYGEN SATURATION: 95 %

## 2023-11-15 PROCEDURE — G0299 HHS/HOSPICE OF RN EA 15 MIN: HCPCS

## 2023-11-17 ENCOUNTER — HOME CARE VISIT (OUTPATIENT)
Dept: HOME HEALTH SERVICES | Facility: HOME HEALTHCARE | Age: 63
End: 2023-11-17
Payer: COMMERCIAL

## 2023-11-17 VITALS
DIASTOLIC BLOOD PRESSURE: 80 MMHG | RESPIRATION RATE: 18 BRPM | HEART RATE: 110 BPM | OXYGEN SATURATION: 94 % | SYSTOLIC BLOOD PRESSURE: 130 MMHG | TEMPERATURE: 98.5 F

## 2023-11-17 PROCEDURE — G0299 HHS/HOSPICE OF RN EA 15 MIN: HCPCS

## 2023-11-20 ENCOUNTER — HOME CARE VISIT (OUTPATIENT)
Dept: HOME HEALTH SERVICES | Facility: HOME HEALTHCARE | Age: 63
End: 2023-11-20
Payer: COMMERCIAL

## 2023-11-20 VITALS
OXYGEN SATURATION: 96 % | SYSTOLIC BLOOD PRESSURE: 160 MMHG | DIASTOLIC BLOOD PRESSURE: 80 MMHG | HEART RATE: 104 BPM | TEMPERATURE: 97.9 F | RESPIRATION RATE: 20 BRPM

## 2023-11-20 PROCEDURE — G0299 HHS/HOSPICE OF RN EA 15 MIN: HCPCS

## 2023-11-22 ENCOUNTER — HOME CARE VISIT (OUTPATIENT)
Dept: HOME HEALTH SERVICES | Facility: HOME HEALTHCARE | Age: 63
End: 2023-11-22
Payer: COMMERCIAL

## 2023-11-22 VITALS
OXYGEN SATURATION: 96 % | RESPIRATION RATE: 18 BRPM | HEART RATE: 95 BPM | DIASTOLIC BLOOD PRESSURE: 80 MMHG | TEMPERATURE: 98 F | SYSTOLIC BLOOD PRESSURE: 140 MMHG

## 2023-11-22 PROCEDURE — G0299 HHS/HOSPICE OF RN EA 15 MIN: HCPCS

## 2023-11-24 ENCOUNTER — HOME CARE VISIT (OUTPATIENT)
Dept: HOME HEALTH SERVICES | Facility: HOME HEALTHCARE | Age: 63
End: 2023-11-24
Payer: COMMERCIAL

## 2023-11-24 VITALS
RESPIRATION RATE: 20 BRPM | HEART RATE: 104 BPM | DIASTOLIC BLOOD PRESSURE: 80 MMHG | TEMPERATURE: 97.6 F | OXYGEN SATURATION: 93 % | SYSTOLIC BLOOD PRESSURE: 140 MMHG

## 2023-11-24 DIAGNOSIS — C34.91 NON-SMALL CELL CANCER OF RIGHT LUNG (HCC): ICD-10-CM

## 2023-11-24 DIAGNOSIS — E87.6 HYPOKALEMIA: ICD-10-CM

## 2023-11-24 PROCEDURE — G0299 HHS/HOSPICE OF RN EA 15 MIN: HCPCS

## 2023-11-24 RX ORDER — POTASSIUM CHLORIDE 1500 MG/1
20 TABLET, EXTENDED RELEASE ORAL DAILY
Qty: 30 TABLET | Refills: 1 | Status: SHIPPED | OUTPATIENT
Start: 2023-11-24

## 2023-11-27 ENCOUNTER — HOME CARE VISIT (OUTPATIENT)
Dept: HOME HEALTH SERVICES | Facility: HOME HEALTHCARE | Age: 63
End: 2023-11-27
Payer: COMMERCIAL

## 2023-11-27 ENCOUNTER — HOSPITAL ENCOUNTER (OUTPATIENT)
Dept: INFUSION CENTER | Facility: HOSPITAL | Age: 63
Discharge: HOME | End: 2023-11-27
Payer: COMMERCIAL

## 2023-11-27 VITALS
SYSTOLIC BLOOD PRESSURE: 160 MMHG | DIASTOLIC BLOOD PRESSURE: 80 MMHG | TEMPERATURE: 97.6 F | HEART RATE: 100 BPM | RESPIRATION RATE: 22 BRPM | OXYGEN SATURATION: 94 %

## 2023-11-27 DIAGNOSIS — C34.91 NON-SMALL CELL CANCER OF RIGHT LUNG (HCC): Primary | ICD-10-CM

## 2023-11-27 LAB
ALBUMIN SERPL BCP-MCNC: 3.2 G/DL (ref 3.5–5)
ALP SERPL-CCNC: 110 U/L (ref 34–104)
ALT SERPL W P-5'-P-CCNC: 16 U/L (ref 7–52)
ANION GAP SERPL CALCULATED.3IONS-SCNC: 10 MMOL/L
AST SERPL W P-5'-P-CCNC: 21 U/L (ref 13–39)
BASOPHILS # BLD AUTO: 0.03 THOUSANDS/ÂΜL (ref 0–0.1)
BASOPHILS NFR BLD AUTO: 0 % (ref 0–1)
BILIRUB SERPL-MCNC: 0.21 MG/DL (ref 0.2–1)
BUN SERPL-MCNC: 9 MG/DL (ref 5–25)
CALCIUM ALBUM COR SERPL-MCNC: 9.7 MG/DL (ref 8.3–10.1)
CALCIUM SERPL-MCNC: 9.1 MG/DL (ref 8.4–10.2)
CHLORIDE SERPL-SCNC: 98 MMOL/L (ref 96–108)
CO2 SERPL-SCNC: 26 MMOL/L (ref 21–32)
CREAT SERPL-MCNC: 0.42 MG/DL (ref 0.6–1.3)
EOSINOPHIL # BLD AUTO: 0 THOUSAND/ÂΜL (ref 0–0.61)
EOSINOPHIL NFR BLD AUTO: 0 % (ref 0–6)
ERYTHROCYTE [DISTWIDTH] IN BLOOD BY AUTOMATED COUNT: 15.4 % (ref 11.6–15.1)
GFR SERPL CREATININE-BSD FRML MDRD: 107 ML/MIN/1.73SQ M
GLUCOSE SERPL-MCNC: 121 MG/DL (ref 65–140)
HCT VFR BLD AUTO: 34.2 % (ref 34.8–46.1)
HGB BLD-MCNC: 10.6 G/DL (ref 11.5–15.4)
IMM GRANULOCYTES # BLD AUTO: 0.12 THOUSAND/UL (ref 0–0.2)
IMM GRANULOCYTES NFR BLD AUTO: 2 % (ref 0–2)
LYMPHOCYTES # BLD AUTO: 1.08 THOUSANDS/ÂΜL (ref 0.6–4.47)
LYMPHOCYTES NFR BLD AUTO: 14 % (ref 14–44)
MCH RBC QN AUTO: 28.1 PG (ref 26.8–34.3)
MCHC RBC AUTO-ENTMCNC: 31 G/DL (ref 31.4–37.4)
MCV RBC AUTO: 91 FL (ref 82–98)
MONOCYTES # BLD AUTO: 0.95 THOUSAND/ÂΜL (ref 0.17–1.22)
MONOCYTES NFR BLD AUTO: 13 % (ref 4–12)
NEUTROPHILS # BLD AUTO: 5.43 THOUSANDS/ÂΜL (ref 1.85–7.62)
NEUTS SEG NFR BLD AUTO: 71 % (ref 43–75)
NRBC BLD AUTO-RTO: 0 /100 WBCS
PLATELET # BLD AUTO: 535 THOUSANDS/UL (ref 149–390)
PMV BLD AUTO: 8.7 FL (ref 8.9–12.7)
POTASSIUM SERPL-SCNC: 3.9 MMOL/L (ref 3.5–5.3)
PROT SERPL-MCNC: 6.4 G/DL (ref 6.4–8.4)
RBC # BLD AUTO: 3.77 MILLION/UL (ref 3.81–5.12)
SODIUM SERPL-SCNC: 134 MMOL/L (ref 135–147)
WBC # BLD AUTO: 7.61 THOUSAND/UL (ref 4.31–10.16)

## 2023-11-27 PROCEDURE — G0299 HHS/HOSPICE OF RN EA 15 MIN: HCPCS

## 2023-11-27 PROCEDURE — 85025 COMPLETE CBC W/AUTO DIFF WBC: CPT | Performed by: INTERNAL MEDICINE

## 2023-11-27 PROCEDURE — 80053 COMPREHEN METABOLIC PANEL: CPT | Performed by: INTERNAL MEDICINE

## 2023-11-27 NOTE — PROGRESS NOTES
Pt's port accessed using sterile technique. Labs drawn without difficulty. Port de-accessed. Gauze and Band-aid applied. Pt ambulated with a steady gait off unit.  Declined AVS

## 2023-11-28 RX ORDER — SODIUM CHLORIDE 9 MG/ML
20 INJECTION, SOLUTION INTRAVENOUS ONCE
Status: CANCELLED | OUTPATIENT
Start: 2023-11-30

## 2023-11-29 ENCOUNTER — HOME CARE VISIT (OUTPATIENT)
Dept: HOME HEALTH SERVICES | Facility: HOME HEALTHCARE | Age: 63
End: 2023-11-29
Payer: COMMERCIAL

## 2023-11-29 ENCOUNTER — OFFICE VISIT (OUTPATIENT)
Age: 63
End: 2023-11-29
Payer: COMMERCIAL

## 2023-11-29 VITALS
BODY MASS INDEX: 30.82 KG/M2 | WEIGHT: 185 LBS | OXYGEN SATURATION: 97 % | HEIGHT: 65 IN | HEART RATE: 102 BPM | TEMPERATURE: 97.5 F | RESPIRATION RATE: 16 BRPM | DIASTOLIC BLOOD PRESSURE: 95 MMHG | SYSTOLIC BLOOD PRESSURE: 145 MMHG

## 2023-11-29 DIAGNOSIS — C34.91 NON-SMALL CELL CANCER OF RIGHT LUNG (HCC): Primary | ICD-10-CM

## 2023-11-29 PROCEDURE — 99213 OFFICE O/P EST LOW 20 MIN: CPT | Performed by: INTERNAL MEDICINE

## 2023-11-29 RX ORDER — SODIUM CHLORIDE 9 MG/ML
20 INJECTION, SOLUTION INTRAVENOUS ONCE
OUTPATIENT
Start: 2023-12-21

## 2023-11-30 ENCOUNTER — HOSPITAL ENCOUNTER (OUTPATIENT)
Dept: INFUSION CENTER | Facility: HOSPITAL | Age: 63
Discharge: HOME | End: 2023-11-30
Attending: INTERNAL MEDICINE
Payer: COMMERCIAL

## 2023-11-30 VITALS
RESPIRATION RATE: 16 BRPM | OXYGEN SATURATION: 95 % | TEMPERATURE: 98 F | BODY MASS INDEX: 30.78 KG/M2 | HEART RATE: 86 BPM | WEIGHT: 184.75 LBS | DIASTOLIC BLOOD PRESSURE: 82 MMHG | HEIGHT: 65 IN | SYSTOLIC BLOOD PRESSURE: 166 MMHG

## 2023-11-30 DIAGNOSIS — C34.91 NON-SMALL CELL CANCER OF RIGHT LUNG (HCC): Primary | ICD-10-CM

## 2023-11-30 PROCEDURE — 96367 TX/PROPH/DG ADDL SEQ IV INF: CPT

## 2023-11-30 PROCEDURE — 96413 CHEMO IV INFUSION 1 HR: CPT

## 2023-11-30 PROCEDURE — 96417 CHEMO IV INFUS EACH ADDL SEQ: CPT

## 2023-11-30 RX ORDER — SODIUM CHLORIDE 9 MG/ML
20 INJECTION, SOLUTION INTRAVENOUS ONCE
Status: COMPLETED | OUTPATIENT
Start: 2023-11-30 | End: 2023-11-30

## 2023-11-30 RX ADMIN — DOCETAXEL 142.6 MG: 20 INJECTION, SOLUTION, CONCENTRATE INTRAVENOUS at 11:48

## 2023-11-30 RX ADMIN — DIPHENHYDRAMINE HYDROCHLORIDE 50 MG: 50 INJECTION, SOLUTION INTRAMUSCULAR; INTRAVENOUS at 09:25

## 2023-11-30 RX ADMIN — SODIUM CHLORIDE 20 ML/HR: 0.9 INJECTION, SOLUTION INTRAVENOUS at 08:59

## 2023-11-30 RX ADMIN — SODIUM CHLORIDE 800 MG: 0.9 INJECTION, SOLUTION INTRAVENOUS at 10:39

## 2023-11-30 RX ADMIN — ONDANSETRON 16 MG: 2 INJECTION INTRAMUSCULAR; INTRAVENOUS at 09:00

## 2023-11-30 NOTE — PROGRESS NOTES
Pt tolerated treatment with no adv reactions; BP recheck 166/82; Anup Corcoran RN/ Dr. Angeli Pickens aware; no med changes at this time; pt made aware; confirmed next appt with pt who left unit ambulatory with steady gait.

## 2023-11-30 NOTE — PROGRESS NOTES
HEMATOLOGY / [facility] FOLLOW UP NOTE    Primary Care Provider: Lanny Martin MD  Referring Provider:    MRN: [id number]  : 1960    Reason for Encounter: follow up metastatic lung cancer       Oncology History Overview Note   2019 - Stage IA adenocarcinoma of the right lung s/p RLLectomy    2022 - fall after tripping on a dog gate - CT showed pulmonary nodules that required 3 month followup    3/2023 - CT showed right pleural effusion with enlarging lung lesion, she was otherwise symptomatic, thoracentesis cell block showed adenocarcinoma c/w her prior lung primary    PET - no disease outside the chest    Caris - no targetable mutations    2023 - carbo/pem/pembro    - - cycle 2, pemetrexed dose reduced by 20% and carbo dose reduced to AUC 4 due to nausea and fatigue    2023 - removed Poly as of cycle 5 due to good response and increasing fatigue    2023 - add back Poly for cycle 6 due to increasing output from right pleural catheter    2023 - remove carboplatin for cycle 7 because no impact was made on output from pleural catheter    10/2023 - disease progression in the omentum.   Plan treatment change to docetaxel and ramucirumab    2023 - start taxotere/ramucirumab     Non-small cell cancer of right lung ([address])   2023 Initial Diagnosis    Non-small cell cancer of right lung ([address])     2023 - 2023 Chemotherapy    cyanocobalamin, 1,000 mcg, Intramuscular, Once, 5 of 10 cycles  Administration: 1,000 mcg (2023), 1,000 mcg (2023), 1,000 mcg (8/3/2023), 1,000 mcg (2023), 1,000 mcg (2023)  alteplase (CATHFLO), 2 mg, Intracatheter, Every 1 Minute as needed, 8 of 13 cycles  fosaprepitant (EMEND) IVPB, 150 mg, Intravenous, Once, 5 of 5 cycles  Administration: 150 mg (2023), 150 mg (2023), 150 mg (2023), 150 mg (2023), 150 mg (8/3/2023)  CARBOplatin (PARAPLATIN) IVPB ([facility] AUC DOSING), 553 mg, Intravenous, Once, 5 of 5 cycles  Administration: 553 mg (4/20/2023), 442.4 mg (6/1/2023), 442.4 mg (6/22/2023), 442.4 mg (5/11/2023), 438 mg (8/3/2023)  pemetrexed (ALIMTA) chemo infusion, 945 mg, Intravenous, Once, 8 of 13 cycles  Dose modification: 400 mg/m2 (original dose 500 mg/m2, Cycle 3, Reason: Nausea/Vomiting, Comment: 20% dose reduction due to nausea)  Administration: 900 mg (4/20/2023), 756 mg (6/1/2023), 756 mg (6/22/2023), 756 mg (8/3/2023), 756 mg (5/11/2023), 756 mg (7/13/2023), 756 mg (8/24/2023), 756 mg (9/14/2023)  pembrolizumab (KEYTRUDA) IVPB, 200 mg, Intravenous, Once, 8 of 13 cycles  Administration: 200 mg (4/20/2023), 200 mg (6/1/2023), 200 mg (6/22/2023), 200 mg (8/3/2023), 200 mg (5/11/2023), 200 mg (7/13/2023), 200 mg (8/24/2023), 200 mg (9/14/2023)     6/9/2023 -  Cancer Staged    Staging form: Lung, AJCC 8th Edition  - Clinical: Stage ALESHA (cT1c, cN2, cM1a) - Signed by Manasa Murphy DO on 6/9/2023 11/9/2023 -  Chemotherapy    alteplase (CATHFLO), 2 mg, Intracatheter, Every 1 Minute as needed, 2 of 6 cycles  ramucirumab (CYRAMZA) IVPB, 830 mg, Intravenous, Once, 2 of 6 cycles  Administration: 800 mg (11/9/2023), 800 mg (11/30/2023)  DOCEtaxel (TAXOTERE) chemo infusion, 75 mg/m2 = 142.6 mg, Intravenous, Once, 2 of 6 cycles  Administration: 142.6 mg (11/9/2023), 142.6 mg (11/30/2023)         Interval History: Patient presents for follow up of her stage IV lung cancer. She started Taxotere and ramucirumab on November 9. Urinalysis prior to starting was normal with no proteinuria. She had fatigue for 2 days after her treatment. She has an ongoing dry cough that is not new. She is on home oxygen around-the-clock now at 2 L. She is cleared to turn it up to 3 L if she needs it. She denies any fevers or infections. No nausea or vomiting. She she is eating well and has gained 2 pounds since last visit. She has some abdominal distention and bloated feeling but that seems to be getting better over time. REVIEW OF SYSTEMS:  Please note that a 14-point review of systems was performed to include Constitutional, HEENT, Respiratory, CVS, GI, , Musculoskeletal, Integumentary, Neurologic, Rheumatologic, Endocrinologic, Psychiatric, Lymphatic, and Hematologic/Oncologic systems were reviewed and are negative unless otherwise stated in HPI. Positive and negative findings pertinent to this evaluation are incorporated into the history of present illness. ECOG PS: 2    PROBLEM LIST:  Patient Active Problem List   Diagnosis    Impingement syndrome of left shoulder    Closed fracture of multiple ribs of left side    Cigarette nicotine dependence in remission    COPD (chronic obstructive pulmonary disease) (HCC)    Hypercholesterolemia    Essential hypertension    Non-small cell cancer of right lung (HCC)    Malignant pleural effusion    Dyspnea on exertion    CINV (chemotherapy-induced nausea and vomiting)    Palliative care patient    Hyponatremia    Generalized anxiety disorder    Chronic respiratory failure with hypoxia (HCC)    Abdominal pain       Assessment / Plan: 41-year-old female with stage IV adenocarcinoma of the lung with recent progression in the omentum. She is tolerating Taxotere and ramucirumab well. We will proceed without any dose reductions. It is encouraging that she is maintaining her weight and her abdomen feels less bloated. She is eating well. She will follow-up in 3 weeks prior to cycle 3 and we will plan on imaging prior to cycle 4 of treatment. She will be following with palliative care as well. She knows to call in the interim with any questions or concerns. I spent 20 minutes on chart review, face to face counseling time, coordination of care and documentation.     Past Medical History:   has a past medical history of Cancer ([address]), COPD (chronic obstructive pulmonary disease) ([address]), Emphysema of lung ([address]), Hyperlipidemia, Hypertension, Lung cancer ([address]) (06/26/2019), Lung nodule, and Pleural effusion. PAST SURGICAL HISTORY:   has a past surgical history that includes Appendectomy; Colon surgery; Lung lobectomy; Hysterectomy; Oophorectomy (Bilateral); Breast biopsy (Right); IR thoracentesis (03/24/2023); IR thoracentesis (04/12/2023); IR thoracentesis (04/18/2023); IR port placement (04/18/2023); IR pleural effusion long-term catheter placement (04/25/2023); Bronchoscopy (06/2019); and Lung biopsy (06/2019). CURRENT MEDICATIONS  Current Outpatient Medications   Medication Sig Dispense Refill    albuterol (2.5 mg/3 mL) 0.083 % nebulizer solution Take 3 mL (2.5 mg total) by nebulization every 6 (six) hours as needed for wheezing or shortness of breath 270 mL 4    albuterol (PROVENTIL HFA,VENTOLIN HFA) 90 mcg/act inhaler Inhale 2 puffs every 4 (four) hours  3    Ascorbic Acid (vitamin C) 1000 MG tablet 1 daily      atorvastatin (LIPITOR) 20 mg tablet Take 20 mg by mouth daily. Biotin 63468 MCG TABS 1 daily      buPROPion (WELLBUTRIN XL) 300 mg 24 hr tablet TAKE 1 TABLET BY MOUTH EVERY DAY IN THE MORNING FOR 90 DAYS      Cholecalciferol (Vitamin D3) 25 MCG (1000 UT) CAPS 1 capsule every 24 hours      dexamethasone (DECADRON) 4 mg tablet Take 2 tablets (8 mg total) by mouth 2 (two) times a day with meals for 3 days Take 2 tabs by mouth twice daily the day before, the day of, and the day after chemo.  72 tablet 0    diphenhydrAMINE (BENADRYL) 50 MG tablet Take 1 tablet (50 mg total) by mouth every 6 (six) hours as needed for itching (Rash) 30 tablet 0    diphenhydrAMINE-APAP, sleep, (TYLENOL PM EXTRA STRENGTH PO) Take 325 mg by mouth daily at bedtime      famotidine (PEPCID) 20 mg tablet Take 20 mg by mouth daily      folic acid (FOLVITE) 1 mg tablet TAKE 1 TABLET BY MOUTH EVERY DAY 90 tablet 1    furosemide (LASIX) 20 mg tablet Take 1 tablet (20 mg total) by mouth every morning 90 tablet 1    Klor-Con M20 20 MEQ tablet TAKE 1 TABLET BY MOUTH EVERY DAY 30 tablet 1    LORazepam (Ativan) 0.5 mg tablet Take 1 tablet (0.5 mg total) by mouth daily as needed for anxiety 30 tablet 0    Multiple Vitamin (MULTIVITAMINS PO) every 24 hours      polyethylene glycol (GLYCOLAX) 17 GM/SCOOP powder Take 17 g by mouth daily. Indications: Constipation      Probiotic Product (PROBIOTIC DAILY PO) 1 daily      Sennosides (Senna) 8.6 MG CAPS Take 2 capsules by mouth as needed (constipation)      sertraline (ZOLOFT) 100 mg tablet 2 daily      telmisartan (MICARDIS) 80 MG tablet Take 1 tablet (80 mg total) by mouth daily 90 tablet 1    verapamil (CALAN-SR) 120 mg CR tablet Take 1 tablet (120 mg total) by mouth daily at bedtime 1 hs 90 tablet 3    verapamil (CALAN-SR) 240 mg CR tablet Take 1 tablet (240 mg total) by mouth daily at bedtime 1 hs 90 tablet 3    dexamethasone sodium phosphate 0.1 % ophthalmic solution Administer 1 drop to both eyes every 3 (three) hours as needed (eye irritation) 5 mL 5    EPINEPHrine (EPIPEN) 0.3 mg/0.3 mL SOAJ Inject 0.3 mL (0.3 mg total) into a muscle once for 1 dose (Patient not taking: Reported on 10/2/2023) 0.6 mL 0    fluticasone-umeclidinium-vilanterol (Trelegy Ellipta) 200-62.5-25 mcg/actuation AEPB inhaler Inhale 1 puff daily Rinse mouth after use. 60 blister 0    lidocaine-prilocaine (EMLA) cream APPLY TO PORT 30 TO 60 MINUTES PRIOR TO USE 30 g 3    ondansetron (ZOFRAN) 8 mg tablet Take 1 tablet (8 mg total) by mouth every 8 (eight) hours as needed for nausea or vomiting (Patient not taking: Reported on 10/30/2023) 30 tablet 0    oxygen gas Inhale 2 L/min continuous. May increase to 3L PRN for dyspnea and sats under 89%  Indications: Hypoxia      prochlorperazine (COMPAZINE) 10 mg tablet Take 1 tablet (10 mg total) by mouth every 6 (six) hours as needed for nausea (Patient not taking: Reported on 11/29/2023) 30 tablet 3     No current facility-administered medications for this visit. [unfilled]    SOCIAL HISTORY:   reports that she quit smoking about 8 months ago.  Her smoking use included cigarettes. She started smoking about 53 years ago. She has a 75.00 pack-year smoking history. She has never used smokeless tobacco. She reports that she does not currently use alcohol. She reports that she does not use drugs. FAMILY HISTORY:  family history includes Arthritis in her mother; COPD in her mother; Cancer in her maternal aunt, maternal aunt, maternal grandmother, and mother; Colon cancer in her maternal aunt, maternal aunt, maternal grandmother, and mother; Depression in her mother; Diabetes in her maternal grandmother; Drug abuse in her brother; Hearing loss in her mother; Hyperlipidemia in her mother; Hypertension in her mother; Lung cancer in her mother. ALLERGIES:  is allergic to amoxicillin, vilazodone hcl, wasp venom, and zithromax [azithromycin]. Physical Exam:  Vital Signs:   Visit Vitals  /95 (BP Location: Left arm, Patient Position: Sitting, Cuff Size: Standard)   Pulse 102   Temp 97.5 °F (36.4 °C) (Temporal)   Resp 16   Ht 5' 5" (1.651 m)   Wt 83.9 kg (185 lb)   SpO2 97%   BMI 30.79 kg/m²   OB Status Hysterectomy   Smoking Status Former   BSA 1.91 m²     Body mass index is 30.79 kg/m². Body surface area is 1.91 meters squared. GEN: Alert, awake oriented x3, in no acute distress  HEENT- No pallor, icterus, cyanosis, no oral mucosal lesions,   LAD - no palpable cervical, clavicle, axillary, inguinal LAD  Heart- normal S1 S2, regular rate and rhythm, No murmur, rubs.    Lungs- clear breathing sound bilateral.   Abdomen- soft, Non tender, bowel sounds present  Extremities- No cyanosis, clubbing, edema  Neuro- No focal neurological deficit    Labs:  Lab Results   Component Value Date    WBC 7.61 11/27/2023    HGB 10.6 (L) 11/27/2023    HCT 34.2 (L) 11/27/2023    MCV 91 11/27/2023     (H) 11/27/2023     Lab Results   Component Value Date    SODIUM 134 (L) 11/27/2023    K 3.9 11/27/2023    CL 98 11/27/2023    CO2 26 11/27/2023    AGAP 10 11/27/2023 BUN 9 11/27/2023    CREATININE 0.42 (L) 11/27/2023    GLUC 121 11/27/2023    GLUF 105 (H) 09/11/2023    CALCIUM 9.1 11/27/2023    AST 21 11/27/2023    ALT 16 11/27/2023    ALKPHOS 110 (H) 11/27/2023    TP 6.4 11/27/2023    TBILI 0.21 11/27/2023    EGFR 107 11/27/2023

## 2023-11-30 NOTE — PROGRESS NOTES
Pt here for chemo; BP on arrival 154/84; recheck 148/86 manually; ok for Markos Roberson as per Mary Burciaga RN/ Dr. Ana Paula Chase.

## 2023-12-01 ENCOUNTER — HOME CARE VISIT (OUTPATIENT)
Dept: HOME HEALTH SERVICES | Facility: HOME HEALTHCARE | Age: 63
End: 2023-12-01
Payer: COMMERCIAL

## 2023-12-01 ENCOUNTER — TELEPHONE (OUTPATIENT)
Dept: HEMATOLOGY ONCOLOGY | Facility: CLINIC | Age: 63
End: 2023-12-01

## 2023-12-01 VITALS
DIASTOLIC BLOOD PRESSURE: 90 MMHG | OXYGEN SATURATION: 95 % | HEART RATE: 96 BPM | SYSTOLIC BLOOD PRESSURE: 160 MMHG | TEMPERATURE: 97.8 F | RESPIRATION RATE: 20 BRPM

## 2023-12-01 PROCEDURE — G0299 HHS/HOSPICE OF RN EA 15 MIN: HCPCS

## 2023-12-04 ENCOUNTER — HOME CARE VISIT (OUTPATIENT)
Dept: HOME HEALTH SERVICES | Facility: HOME HEALTHCARE | Age: 63
End: 2023-12-04
Payer: COMMERCIAL

## 2023-12-04 PROCEDURE — G0299 HHS/HOSPICE OF RN EA 15 MIN: HCPCS

## 2023-12-05 ENCOUNTER — OFFICE VISIT (OUTPATIENT)
Age: 63
End: 2023-12-05
Payer: COMMERCIAL

## 2023-12-05 VITALS
SYSTOLIC BLOOD PRESSURE: 140 MMHG | OXYGEN SATURATION: 94 % | HEART RATE: 116 BPM | RESPIRATION RATE: 20 BRPM | TEMPERATURE: 97.6 F | DIASTOLIC BLOOD PRESSURE: 72 MMHG

## 2023-12-05 VITALS
TEMPERATURE: 96.7 F | HEART RATE: 102 BPM | OXYGEN SATURATION: 95 % | WEIGHT: 185.2 LBS | BODY MASS INDEX: 30.86 KG/M2 | HEIGHT: 65 IN | SYSTOLIC BLOOD PRESSURE: 146 MMHG | DIASTOLIC BLOOD PRESSURE: 68 MMHG

## 2023-12-05 DIAGNOSIS — R06.09 DYSPNEA ON EXERTION: ICD-10-CM

## 2023-12-05 DIAGNOSIS — Z51.5 PALLIATIVE CARE PATIENT: ICD-10-CM

## 2023-12-05 DIAGNOSIS — R11.2 CINV (CHEMOTHERAPY-INDUCED NAUSEA AND VOMITING): ICD-10-CM

## 2023-12-05 DIAGNOSIS — C34.91 NON-SMALL CELL CANCER OF RIGHT LUNG (HCC): Primary | ICD-10-CM

## 2023-12-05 DIAGNOSIS — J44.9 CHRONIC OBSTRUCTIVE PULMONARY DISEASE, UNSPECIFIED COPD TYPE (HCC): ICD-10-CM

## 2023-12-05 DIAGNOSIS — F41.1 GENERALIZED ANXIETY DISORDER: ICD-10-CM

## 2023-12-05 DIAGNOSIS — T45.1X5A CINV (CHEMOTHERAPY-INDUCED NAUSEA AND VOMITING): ICD-10-CM

## 2023-12-05 DIAGNOSIS — R10.9 ABDOMINAL PAIN: ICD-10-CM

## 2023-12-05 DIAGNOSIS — J96.11 CHRONIC RESPIRATORY FAILURE WITH HYPOXIA (HCC): ICD-10-CM

## 2023-12-05 PROCEDURE — 99214 OFFICE O/P EST MOD 30 MIN: CPT | Performed by: PHYSICIAN ASSISTANT

## 2023-12-05 NOTE — PROGRESS NOTES
Outpatient Follow-Up - Palliative and [id number] [facility] [address] Emile 70 y.o. female [id number]    Assessment & Plan  1. Non-small cell cancer of right lung ([address])    2. Chronic obstructive pulmonary disease, unspecified COPD type ([address])    3. CINV (chemotherapy-induced nausea and vomiting)    4. Chronic respiratory failure with hypoxia (HCC)    5. Generalized anxiety disorder    6. Abdominal pain    7. Palliative care patient    8. Dyspnea on exertion      Plan:  Overall, Kathi has stable symptoms. Appetite and energy are limited for several days after treatment however they recover prior to next cycle. She is always fatigued but does have energy to do things she enjoys. Some abdominal cramping/discomfort at site of omental disease; pain responds well to occasional 2.5MG oxycodone. Welcome to call for refills. Adequate sleep somewhat limited by nocturia and oxygen tubing. Has used Ambien in the past.  Now uses 0.25 lorazepam occasionally. VNA coming to drain R pleural catheter 2x/week  ACP - Flori Anderson is DNR/DNI. Has LW/POA. Should request that she brings them in. RTC - 3 months     Medications adjusted this encounter:  Requested Prescriptions      No prescriptions requested or ordered in this encounter     No orders of the defined types were placed in this encounter. There are no discontinued medications. Sindy Jurado was seen today for symptoms and planning cares related to above illnesses. I have reviewed the patient's controlled substance dispensing history in the Prescription Drug Monitoring Program in compliance with the [facility] regulations before prescribing any controlled substances.   Filled  Written  ID  Drug  QTY  Days  Prescriber  RX #  Dispenser  Refill  Daily Dose*  Pymt Type      10/29/2023 10/29/2023 1 Oxycodone Hcl (Ir) 5 Mg Tablet 20.00 3 Ch Paulette [id number] Pen ([id number]) 0 50.00 MME Comm Ins [place]   10/06/2023 10/06/2023 1 Lorazepam 0.5 Mg Tablet 30.00 30 Cs Omer [id number] Pen (3761) 0 0.50 LME Comm Ins [place]   11/14/2022 11/14/2022 1 Oxycodone Hcl (Ir) 5 Mg Tablet 6.00 2 Ma Zba [id number] Pen ([id number]) 0 22.50 MME Comm Ins [place]       They are invited to continue to follow with us. If there are questions or concerns, please contact us through our clinic/answering service 24 hours a day, seven days a week. Kimber Gaspar PA-C  [facility]  [phone]    Visit Information    Accompanied By: presents with spouse/Mynor    Source of History: Self    History Limitations: None    History of Present Illness      Krish Phillip is a 70 y.o. female who presents in follow up of symptoms related to stage IV non-small cell lung cancer. Pertinent issues include: symptom management, pain, neoplasm related, breathlessness, depression or anxiety, fatigue, assessment of goals of care, disease process education and discussion of prognosis, advance care planning. Carmine Rubio is under the care of Dr. Jose Buitrago for stage IV non-small cell lung cancer being treated with Alimta, Keytruda, and carboplatin. For a brief period, the carboplatin was discontinued due to fatigue however Carmine Rubio has increased output from her pleural catheter for malignant effusion and carboplatin was readded. Further scans showed disease progression in the omentum and treatment was changed to Taxotere and ramucirumab. Carmine Rubio established care with palliative and supportive care in June 2023. At that time she had relatively stable symptoms. We discussed advance care planning and she reported that she had a living will/POA and was DNR/DNI. She wanted to review the documents before bringing them in. Today 12/5/2023, Carmine Rubio and her spouse Nancy Kendrick present for symptom follow-up. Carmine Rubio has certainly noticed an increase of symptoms since starting the new treatment however they come in cycles and she does have periods where she has better appetite.   She has some abdominal cramping/discomfort due to the omental metastasis which is worse with bigger meals and episodes of constipation. We discussed bowel regimen. We discussed pain regimen. She is sleeping well and overall keeping up with her nutritional needs by eating small meals. She endorses excellent support and she is coping very well at this time. I encouraged her to call us with any new or worsening symptoms as we are 24/7 service and are here to support her. For now we will anticipate follow-up in approximately 3 months. Social:   Carmine Rubio has a supportive spouse. They may consider doing therapy together to help with emotional processing. She also has a very supportive friend who helps her process. She is very close with her grandson Bernadette Pyle who is 8. He inspires her. Her other grandson, Sarah Felix,  is [place]     Past medical, surgical, social, and family histories are reviewed and pertinent updates are made. Review of Systems   Constitutional: Positive for decreased appetite (depending on treatment cycle). Negative for weight loss. Cardiovascular:  Negative for chest pain and dyspnea on exertion. Respiratory:  Positive for cough (chronic). Musculoskeletal:  Negative for arthritis, back pain and falls. Gastrointestinal:  Positive for bloating, abdominal pain and constipation (occasional). Negative for nausea and vomiting. Psychiatric/Behavioral:  Negative for altered mental status, depression, hallucinations, hypervigilance and memory loss. The patient does not have insomnia and is not nervous/anxious. Vital Signs    /68 (BP Location: Left arm, Patient Position: Sitting, Cuff Size: Standard)   Pulse 102   Temp (!) 96.7 °F (35.9 °C) (Temporal)   Ht 5' 5" (1.651 m)   Wt 84 kg (185 lb 3.2 oz)   SpO2 95%   BMI 30.82 kg/m²      Physical Exam and Objective Data  Physical Exam  Vitals and nursing note reviewed. Constitutional:       General: She is not in acute distress. Appearance: She is well-developed. She is obese.  She is ill-appearing. Interventions: Nasal cannula in place. Comments: Extremely pleasant and fully conversational    HENT:      Head: Normocephalic and atraumatic. Eyes:      Conjunctiva/sclera: Conjunctivae normal.   Cardiovascular:      Rate and Rhythm: Normal rate. Pulmonary:      Effort: Pulmonary effort is normal. No respiratory distress. Musculoskeletal:         General: No swelling. Cervical back: Neck supple. Skin:     General: Skin is warm and dry. Capillary Refill: Capillary refill takes less than 2 seconds. Neurological:      Mental Status: She is alert and oriented to person, place, and time. Mental status is at baseline. Psychiatric:         Mood and Affect: Mood normal.         Behavior: Behavior normal.       Radiology and Laboratory:  I personally reviewed and interpreted the following results: CBC, CMP    35 minutes was spent face to face with [address] [place] [address] with greater than 50% of the time spent in counseling or coordination of care including discussions of etiology of diagnosis, pathogenesis of diagnosis, follow up requirements, risk factors and risk reduction of disease, patient and family counseling/involvement in care and compliance with treatment regimen. reviewed chart, reviewed lab values, reviewed imaging, provided medical updates, discussed palliative care and symptom management, discussed goals of care, discussed code status, discussed advanced directives, assessed POA/HCA, provided supportive listening, provided anticipatory guidance, provided psychosocial and emotional support, assessed competency and decision-making and facilitated interdisciplinary communication. All of the patient's or agent's questions were answered during this discussion.

## 2023-12-08 ENCOUNTER — HOME CARE VISIT (OUTPATIENT)
Dept: HOME HEALTH SERVICES | Facility: HOME HEALTHCARE | Age: 63
End: 2023-12-08
Payer: COMMERCIAL

## 2023-12-08 VITALS
DIASTOLIC BLOOD PRESSURE: 86 MMHG | TEMPERATURE: 97.8 F | HEART RATE: 106 BPM | OXYGEN SATURATION: 95 % | RESPIRATION RATE: 20 BRPM | SYSTOLIC BLOOD PRESSURE: 140 MMHG

## 2023-12-08 PROCEDURE — G0299 HHS/HOSPICE OF RN EA 15 MIN: HCPCS

## 2023-12-08 NOTE — PROGRESS NOTES
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

## 2023-12-11 ENCOUNTER — HOME CARE VISIT (OUTPATIENT)
Dept: HOME HEALTH SERVICES | Facility: HOME HEALTHCARE | Age: 63
End: 2023-12-11
Payer: COMMERCIAL

## 2023-12-11 VITALS
HEART RATE: 102 BPM | SYSTOLIC BLOOD PRESSURE: 148 MMHG | RESPIRATION RATE: 18 BRPM | OXYGEN SATURATION: 97 % | DIASTOLIC BLOOD PRESSURE: 100 MMHG | TEMPERATURE: 97.7 F

## 2023-12-11 PROCEDURE — G0299 HHS/HOSPICE OF RN EA 15 MIN: HCPCS

## 2023-12-15 ENCOUNTER — APPOINTMENT (EMERGENCY)
Dept: CT IMAGING | Facility: HOSPITAL | Age: 63
End: 2023-12-15
Payer: COMMERCIAL

## 2023-12-15 ENCOUNTER — HOSPITAL ENCOUNTER (INPATIENT)
Facility: HOSPITAL | Age: 63
LOS: 3 days | Discharge: HOME | End: 2023-12-18
Attending: EMERGENCY MEDICINE | Admitting: INTERNAL MEDICINE
Payer: COMMERCIAL

## 2023-12-15 ENCOUNTER — APPOINTMENT (OUTPATIENT)
Dept: RADIOLOGY | Facility: HOSPITAL | Age: 63
End: 2023-12-15
Payer: COMMERCIAL

## 2023-12-15 ENCOUNTER — HOME CARE VISIT (OUTPATIENT)
Dept: HOME HEALTH SERVICES | Facility: HOME HEALTHCARE | Age: 63
End: 2023-12-15
Payer: COMMERCIAL

## 2023-12-15 DIAGNOSIS — C34.90 METASTATIC LUNG CANCER (METASTASIS FROM LUNG TO OTHER SITE) (HCC): ICD-10-CM

## 2023-12-15 DIAGNOSIS — R06.02 SOB (SHORTNESS OF BREATH): ICD-10-CM

## 2023-12-15 DIAGNOSIS — J90 PLEURAL EFFUSION: Primary | ICD-10-CM

## 2023-12-15 DIAGNOSIS — I10 ESSENTIAL HYPERTENSION: ICD-10-CM

## 2023-12-15 LAB
2HR DELTA HS TROPONIN: 0 NG/L
4HR DELTA HS TROPONIN: 1 NG/L
ALBUMIN SERPL BCP-MCNC: 3.2 G/DL (ref 3.5–5)
ALP SERPL-CCNC: 98 U/L (ref 34–104)
ALT SERPL W P-5'-P-CCNC: 12 U/L (ref 7–52)
AMMONIA PLAS-SCNC: 27 UMOL/L (ref 18–72)
ANION GAP SERPL CALCULATED.3IONS-SCNC: 9 MMOL/L
AST SERPL W P-5'-P-CCNC: 18 U/L (ref 13–39)
BASOPHILS # BLD AUTO: 0.02 THOUSANDS/ÂΜL (ref 0–0.1)
BASOPHILS NFR BLD AUTO: 1 % (ref 0–1)
BILIRUB SERPL-MCNC: 0.2 MG/DL (ref 0.2–1)
BILIRUB UR QL STRIP: NEGATIVE
BNP SERPL-MCNC: 50 PG/ML (ref 0–100)
BUN SERPL-MCNC: 9 MG/DL (ref 5–25)
CALCIUM ALBUM COR SERPL-MCNC: 9.8 MG/DL (ref 8.3–10.1)
CALCIUM SERPL-MCNC: 9.2 MG/DL (ref 8.4–10.2)
CARDIAC TROPONIN I PNL SERPL HS: 4 NG/L
CARDIAC TROPONIN I PNL SERPL HS: 4 NG/L
CARDIAC TROPONIN I PNL SERPL HS: 5 NG/L
CHLORIDE SERPL-SCNC: 99 MMOL/L (ref 96–108)
CLARITY UR: CLEAR
CO2 SERPL-SCNC: 24 MMOL/L (ref 21–32)
COLOR UR: YELLOW
CREAT SERPL-MCNC: 0.46 MG/DL (ref 0.6–1.3)
EOSINOPHIL # BLD AUTO: 0 THOUSAND/ÂΜL (ref 0–0.61)
EOSINOPHIL NFR BLD AUTO: 0 % (ref 0–6)
ERYTHROCYTE [DISTWIDTH] IN BLOOD BY AUTOMATED COUNT: 16.2 % (ref 11.6–15.1)
FLUAV RNA RESP QL NAA+PROBE: NEGATIVE
FLUBV RNA RESP QL NAA+PROBE: NEGATIVE
GFR SERPL CREATININE-BSD FRML MDRD: 104 ML/MIN/1.73SQ M
GLUCOSE SERPL-MCNC: 106 MG/DL (ref 65–140)
GLUCOSE UR STRIP-MCNC: NEGATIVE MG/DL
HCT VFR BLD AUTO: 33.4 % (ref 34.8–46.1)
HGB BLD-MCNC: 10.4 G/DL (ref 11.5–15.4)
HGB UR QL STRIP.AUTO: NEGATIVE
IMM GRANULOCYTES # BLD AUTO: 0.02 THOUSAND/UL (ref 0–0.2)
IMM GRANULOCYTES NFR BLD AUTO: 1 % (ref 0–2)
KETONES UR STRIP-MCNC: NEGATIVE MG/DL
LACTATE SERPL-SCNC: 0.9 MMOL/L (ref 0.5–2)
LEUKOCYTE ESTERASE UR QL STRIP: NEGATIVE
LIPASE SERPL-CCNC: 6 U/L (ref 11–82)
LYMPHOCYTES # BLD AUTO: 0.91 THOUSANDS/ÂΜL (ref 0.6–4.47)
LYMPHOCYTES NFR BLD AUTO: 30 % (ref 14–44)
MAGNESIUM SERPL-MCNC: 1.7 MG/DL (ref 1.9–2.7)
MCH RBC QN AUTO: 27.9 PG (ref 26.8–34.3)
MCHC RBC AUTO-ENTMCNC: 31.1 G/DL (ref 31.4–37.4)
MCV RBC AUTO: 90 FL (ref 82–98)
MONOCYTES # BLD AUTO: 0.99 THOUSAND/ÂΜL (ref 0.17–1.22)
MONOCYTES NFR BLD AUTO: 33 % (ref 4–12)
NEUTROPHILS # BLD AUTO: 1.08 THOUSANDS/ÂΜL (ref 1.85–7.62)
NEUTS SEG NFR BLD AUTO: 35 % (ref 43–75)
NITRITE UR QL STRIP: NEGATIVE
NRBC BLD AUTO-RTO: 0 /100 WBCS
PH UR STRIP.AUTO: 7 [PH]
PHOSPHATE SERPL-MCNC: 3.1 MG/DL (ref 2.3–4.1)
PLATELET # BLD AUTO: 494 THOUSANDS/UL (ref 149–390)
PMV BLD AUTO: 8.8 FL (ref 8.9–12.7)
POTASSIUM SERPL-SCNC: 3.5 MMOL/L (ref 3.5–5.3)
PROT SERPL-MCNC: 6.3 G/DL (ref 6.4–8.4)
PROT UR STRIP-MCNC: NEGATIVE MG/DL
RBC # BLD AUTO: 3.73 MILLION/UL (ref 3.81–5.12)
RSV RNA RESP QL NAA+PROBE: NEGATIVE
SARS-COV-2 RNA RESP QL NAA+PROBE: NEGATIVE
SODIUM SERPL-SCNC: 132 MMOL/L (ref 135–147)
SP GR UR STRIP.AUTO: <1.005 (ref 1–1.03)
TSH SERPL DL<=0.05 MIU/L-ACNC: 2.64 UIU/ML (ref 0.45–4.5)
UROBILINOGEN UR STRIP-ACNC: <2 MG/DL
WBC # BLD AUTO: 3.02 THOUSAND/UL (ref 4.31–10.16)

## 2023-12-15 PROCEDURE — 84484 ASSAY OF TROPONIN QUANT: CPT | Performed by: EMERGENCY MEDICINE

## 2023-12-15 PROCEDURE — 71045 X-RAY EXAM CHEST 1 VIEW: CPT

## 2023-12-15 PROCEDURE — 83880 ASSAY OF NATRIURETIC PEPTIDE: CPT | Performed by: PHYSICIAN ASSISTANT

## 2023-12-15 PROCEDURE — 84443 ASSAY THYROID STIM HORMONE: CPT | Performed by: PHYSICIAN ASSISTANT

## 2023-12-15 PROCEDURE — G0299 HHS/HOSPICE OF RN EA 15 MIN: HCPCS

## 2023-12-15 PROCEDURE — 80053 COMPREHEN METABOLIC PANEL: CPT | Performed by: EMERGENCY MEDICINE

## 2023-12-15 PROCEDURE — 82140 ASSAY OF AMMONIA: CPT | Performed by: PHYSICIAN ASSISTANT

## 2023-12-15 PROCEDURE — 99223 1ST HOSP IP/OBS HIGH 75: CPT | Performed by: PHYSICIAN ASSISTANT

## 2023-12-15 PROCEDURE — 83615 LACTATE (LD) (LDH) ENZYME: CPT | Performed by: PHYSICIAN ASSISTANT

## 2023-12-15 PROCEDURE — 0241U HB NFCT DS VIR RESP RNA 4 TRGT: CPT | Performed by: PHYSICIAN ASSISTANT

## 2023-12-15 PROCEDURE — 96375 TX/PRO/DX INJ NEW DRUG ADDON: CPT

## 2023-12-15 PROCEDURE — 93005 ELECTROCARDIOGRAM TRACING: CPT

## 2023-12-15 PROCEDURE — 83605 ASSAY OF LACTIC ACID: CPT | Performed by: EMERGENCY MEDICINE

## 2023-12-15 PROCEDURE — 81003 URINALYSIS AUTO W/O SCOPE: CPT | Performed by: PHYSICIAN ASSISTANT

## 2023-12-15 PROCEDURE — 83690 ASSAY OF LIPASE: CPT | Performed by: PHYSICIAN ASSISTANT

## 2023-12-15 PROCEDURE — 99285 EMERGENCY DEPT VISIT HI MDM: CPT

## 2023-12-15 PROCEDURE — 36415 COLL VENOUS BLD VENIPUNCTURE: CPT

## 2023-12-15 PROCEDURE — 84100 ASSAY OF PHOSPHORUS: CPT | Performed by: PHYSICIAN ASSISTANT

## 2023-12-15 PROCEDURE — 96365 THER/PROPH/DIAG IV INF INIT: CPT

## 2023-12-15 PROCEDURE — 71275 CT ANGIOGRAPHY CHEST: CPT

## 2023-12-15 PROCEDURE — 83735 ASSAY OF MAGNESIUM: CPT | Performed by: PHYSICIAN ASSISTANT

## 2023-12-15 PROCEDURE — 99285 EMERGENCY DEPT VISIT HI MDM: CPT | Performed by: PHYSICIAN ASSISTANT

## 2023-12-15 PROCEDURE — 87040 BLOOD CULTURE FOR BACTERIA: CPT | Performed by: EMERGENCY MEDICINE

## 2023-12-15 PROCEDURE — 85025 COMPLETE CBC W/AUTO DIFF WBC: CPT | Performed by: EMERGENCY MEDICINE

## 2023-12-15 RX ORDER — ACETAMINOPHEN 325 MG/1
650 TABLET ORAL ONCE
Status: COMPLETED | OUTPATIENT
Start: 2023-12-15 | End: 2023-12-15

## 2023-12-15 RX ORDER — LORAZEPAM 2 MG/ML
1 INJECTION INTRAMUSCULAR ONCE
Status: COMPLETED | OUTPATIENT
Start: 2023-12-15 | End: 2023-12-15

## 2023-12-15 RX ORDER — HYDRALAZINE HYDROCHLORIDE 20 MG/ML
5 INJECTION INTRAMUSCULAR; INTRAVENOUS EVERY 6 HOURS PRN
Status: DISCONTINUED | OUTPATIENT
Start: 2023-12-15 | End: 2023-12-18 | Stop reason: HOSPADM

## 2023-12-15 RX ORDER — CEFEPIME HYDROCHLORIDE 2 G/50ML
2000 INJECTION, SOLUTION INTRAVENOUS ONCE
Status: COMPLETED | OUTPATIENT
Start: 2023-12-15 | End: 2023-12-15

## 2023-12-15 RX ADMIN — ACETAMINOPHEN 650 MG: 325 TABLET, FILM COATED ORAL at 20:54

## 2023-12-15 RX ADMIN — IOHEXOL 85 ML: 350 INJECTION, SOLUTION INTRAVENOUS at 20:07

## 2023-12-15 RX ADMIN — CEFEPIME HYDROCHLORIDE 2000 MG: 2 INJECTION, SOLUTION INTRAVENOUS at 20:54

## 2023-12-15 RX ADMIN — LORAZEPAM 1 MG: 2 INJECTION INTRAMUSCULAR; INTRAVENOUS at 21:23

## 2023-12-16 LAB
ANION GAP SERPL CALCULATED.3IONS-SCNC: 6 MMOL/L
ATRIAL RATE: 105 BPM
BASOPHILS # BLD AUTO: 0.02 THOUSANDS/ÂΜL (ref 0–0.1)
BASOPHILS NFR BLD AUTO: 1 % (ref 0–1)
BUN SERPL-MCNC: 6 MG/DL (ref 5–25)
CALCIUM SERPL-MCNC: 8.8 MG/DL (ref 8.4–10.2)
CHLORIDE SERPL-SCNC: 100 MMOL/L (ref 96–108)
CO2 SERPL-SCNC: 26 MMOL/L (ref 21–32)
CREAT SERPL-MCNC: 0.44 MG/DL (ref 0.6–1.3)
EOSINOPHIL # BLD AUTO: 0.01 THOUSAND/ÂΜL (ref 0–0.61)
EOSINOPHIL NFR BLD AUTO: 0 % (ref 0–6)
ERYTHROCYTE [DISTWIDTH] IN BLOOD BY AUTOMATED COUNT: 16.1 % (ref 11.6–15.1)
GFR SERPL CREATININE-BSD FRML MDRD: 106 ML/MIN/1.73SQ M
GLUCOSE SERPL-MCNC: 93 MG/DL (ref 65–140)
HCT VFR BLD AUTO: 31.9 % (ref 34.8–46.1)
HGB BLD-MCNC: 9.9 G/DL (ref 11.5–15.4)
IMM GRANULOCYTES # BLD AUTO: 0.03 THOUSAND/UL (ref 0–0.2)
IMM GRANULOCYTES NFR BLD AUTO: 1 % (ref 0–2)
INR PPP: 0.95 (ref 0.84–1.19)
LDH SERPL-CCNC: 303 U/L (ref 140–271)
LYMPHOCYTES # BLD AUTO: 0.64 THOUSANDS/ÂΜL (ref 0.6–4.47)
LYMPHOCYTES NFR BLD AUTO: 22 % (ref 14–44)
MCH RBC QN AUTO: 27.4 PG (ref 26.8–34.3)
MCHC RBC AUTO-ENTMCNC: 31 G/DL (ref 31.4–37.4)
MCV RBC AUTO: 88 FL (ref 82–98)
MONOCYTES # BLD AUTO: 0.89 THOUSAND/ÂΜL (ref 0.17–1.22)
MONOCYTES NFR BLD AUTO: 30 % (ref 4–12)
NEUTROPHILS # BLD AUTO: 1.35 THOUSANDS/ÂΜL (ref 1.85–7.62)
NEUTS SEG NFR BLD AUTO: 46 % (ref 43–75)
NRBC BLD AUTO-RTO: 0 /100 WBCS
P AXIS: 53 DEGREES
PLATELET # BLD AUTO: 442 THOUSANDS/UL (ref 149–390)
PMV BLD AUTO: 8.8 FL (ref 8.9–12.7)
POTASSIUM SERPL-SCNC: 3.7 MMOL/L (ref 3.5–5.3)
PR INTERVAL: 166 MS
PROTHROMBIN TIME: 13.1 SECONDS (ref 11.6–14.5)
QRS AXIS: 37 DEGREES
QRSD INTERVAL: 82 MS
QT INTERVAL: 352 MS
QTC INTERVAL: 465 MS
RBC # BLD AUTO: 3.61 MILLION/UL (ref 3.81–5.12)
SODIUM SERPL-SCNC: 132 MMOL/L (ref 135–147)
T WAVE AXIS: 59 DEGREES
VENTRICULAR RATE: 105 BPM
WBC # BLD AUTO: 2.94 THOUSAND/UL (ref 4.31–10.16)

## 2023-12-16 PROCEDURE — 99497 ADVNCD CARE PLAN 30 MIN: CPT | Performed by: PHYSICIAN ASSISTANT

## 2023-12-16 PROCEDURE — 85025 COMPLETE CBC W/AUTO DIFF WBC: CPT | Performed by: PHYSICIAN ASSISTANT

## 2023-12-16 PROCEDURE — 80048 BASIC METABOLIC PNL TOTAL CA: CPT | Performed by: PHYSICIAN ASSISTANT

## 2023-12-16 PROCEDURE — 85610 PROTHROMBIN TIME: CPT | Performed by: PHYSICIAN ASSISTANT

## 2023-12-16 PROCEDURE — 99233 SBSQ HOSP IP/OBS HIGH 50: CPT | Performed by: INTERNAL MEDICINE

## 2023-12-16 PROCEDURE — 87205 SMEAR GRAM STAIN: CPT | Performed by: PHYSICIAN ASSISTANT

## 2023-12-16 PROCEDURE — 99223 1ST HOSP IP/OBS HIGH 75: CPT | Performed by: INTERNAL MEDICINE

## 2023-12-16 RX ORDER — ATORVASTATIN CALCIUM 20 MG/1
20 TABLET, FILM COATED ORAL DAILY
Status: DISCONTINUED | OUTPATIENT
Start: 2023-12-16 | End: 2023-12-18 | Stop reason: HOSPADM

## 2023-12-16 RX ORDER — SERTRALINE HYDROCHLORIDE 100 MG/1
200 TABLET, FILM COATED ORAL DAILY
Status: DISCONTINUED | OUTPATIENT
Start: 2023-12-16 | End: 2023-12-18 | Stop reason: HOSPADM

## 2023-12-16 RX ORDER — POTASSIUM CHLORIDE 20 MEQ/1
20 TABLET, EXTENDED RELEASE ORAL DAILY
Status: DISCONTINUED | OUTPATIENT
Start: 2023-12-16 | End: 2023-12-18 | Stop reason: HOSPADM

## 2023-12-16 RX ORDER — FUROSEMIDE 20 MG/1
20 TABLET ORAL EVERY MORNING
Status: DISCONTINUED | OUTPATIENT
Start: 2023-12-16 | End: 2023-12-18 | Stop reason: HOSPADM

## 2023-12-16 RX ORDER — HEPARIN SODIUM 5000 [USP'U]/ML
5000 INJECTION, SOLUTION INTRAVENOUS; SUBCUTANEOUS EVERY 8 HOURS SCHEDULED
Status: DISCONTINUED | OUTPATIENT
Start: 2023-12-16 | End: 2023-12-18 | Stop reason: HOSPADM

## 2023-12-16 RX ORDER — DIPHENHYDRAMINE HCL 25 MG
50 TABLET ORAL EVERY 6 HOURS PRN
Status: DISCONTINUED | OUTPATIENT
Start: 2023-12-16 | End: 2023-12-18 | Stop reason: HOSPADM

## 2023-12-16 RX ORDER — LORAZEPAM 0.5 MG/1
0.5 TABLET ORAL EVERY 8 HOURS PRN
Status: DISCONTINUED | OUTPATIENT
Start: 2023-12-16 | End: 2023-12-18 | Stop reason: HOSPADM

## 2023-12-16 RX ORDER — FLUTICASONE FUROATE AND VILANTEROL 200; 25 UG/1; UG/1
1 POWDER RESPIRATORY (INHALATION) DAILY
Status: DISCONTINUED | OUTPATIENT
Start: 2023-12-16 | End: 2023-12-18 | Stop reason: HOSPADM

## 2023-12-16 RX ORDER — BUPROPION HYDROCHLORIDE 300 MG/1
300 TABLET ORAL DAILY
Status: DISCONTINUED | OUTPATIENT
Start: 2023-12-16 | End: 2023-12-18 | Stop reason: HOSPADM

## 2023-12-16 RX ORDER — ALBUTEROL SULFATE 2.5 MG/3ML
2.5 SOLUTION RESPIRATORY (INHALATION) EVERY 6 HOURS PRN
Status: DISCONTINUED | OUTPATIENT
Start: 2023-12-16 | End: 2023-12-18 | Stop reason: HOSPADM

## 2023-12-16 RX ORDER — POLYETHYLENE GLYCOL 3350 17 G/17G
17 POWDER, FOR SOLUTION ORAL DAILY PRN
Status: DISCONTINUED | OUTPATIENT
Start: 2023-12-16 | End: 2023-12-16 | Stop reason: SDUPTHER

## 2023-12-16 RX ORDER — LOSARTAN POTASSIUM 50 MG/1
100 TABLET ORAL DAILY
Status: DISCONTINUED | OUTPATIENT
Start: 2023-12-16 | End: 2023-12-18 | Stop reason: HOSPADM

## 2023-12-16 RX ORDER — ONDANSETRON 2 MG/ML
4 INJECTION INTRAMUSCULAR; INTRAVENOUS EVERY 6 HOURS PRN
Status: DISCONTINUED | OUTPATIENT
Start: 2023-12-16 | End: 2023-12-18 | Stop reason: HOSPADM

## 2023-12-16 RX ORDER — POLYETHYLENE GLYCOL 3350 17 G/17G
17 POWDER, FOR SOLUTION ORAL DAILY PRN
Status: DISCONTINUED | OUTPATIENT
Start: 2023-12-16 | End: 2023-12-18 | Stop reason: HOSPADM

## 2023-12-16 RX ADMIN — ATORVASTATIN CALCIUM 20 MG: 20 TABLET, FILM COATED ORAL at 21:04

## 2023-12-16 RX ADMIN — METOPROLOL TARTRATE 25 MG: 25 TABLET, FILM COATED ORAL at 23:21

## 2023-12-16 RX ADMIN — POLYETHYLENE GLYCOL 3350 17 G: 17 POWDER, FOR SOLUTION ORAL at 08:34

## 2023-12-16 RX ADMIN — FUROSEMIDE 20 MG: 20 TABLET ORAL at 08:34

## 2023-12-16 RX ADMIN — FLUTICASONE FUROATE AND VILANTEROL TRIFENATATE 1 PUFF: 200; 25 POWDER RESPIRATORY (INHALATION) at 11:09

## 2023-12-16 RX ADMIN — BUPROPION HYDROCHLORIDE 300 MG: 300 TABLET, FILM COATED, EXTENDED RELEASE ORAL at 08:34

## 2023-12-16 RX ADMIN — POTASSIUM CHLORIDE 20 MEQ: 1500 TABLET, EXTENDED RELEASE ORAL at 08:34

## 2023-12-16 RX ADMIN — SERTRALINE 200 MG: 100 TABLET, FILM COATED ORAL at 08:34

## 2023-12-16 RX ADMIN — METOPROLOL TARTRATE 25 MG: 25 TABLET, FILM COATED ORAL at 12:17

## 2023-12-16 RX ADMIN — UMECLIDINIUM 1 PUFF: 62.5 AEROSOL, POWDER ORAL at 16:10

## 2023-12-16 RX ADMIN — HEPARIN SODIUM 5000 UNITS: 5000 INJECTION INTRAVENOUS; SUBCUTANEOUS at 21:04

## 2023-12-16 RX ADMIN — HEPARIN SODIUM 5000 UNITS: 5000 INJECTION INTRAVENOUS; SUBCUTANEOUS at 08:34

## 2023-12-16 RX ADMIN — LOSARTAN POTASSIUM 100 MG: 50 TABLET, FILM COATED ORAL at 08:34

## 2023-12-16 RX ADMIN — LORAZEPAM 0.5 MG: 0.5 TABLET ORAL at 21:04

## 2023-12-16 RX ADMIN — VERAPAMIL HYDROCHLORIDE 360 MG: 180 TABLET ORAL at 21:04

## 2023-12-16 RX ADMIN — HEPARIN SODIUM 5000 UNITS: 5000 INJECTION INTRAVENOUS; SUBCUTANEOUS at 16:10

## 2023-12-16 NOTE — ASSESSMENT & PLAN NOTE
With right lung asept catheter in place, drain 3x weekly by VNA  Now with large LEFT pleural effusion, likely also malignant.  D/w IR, they will perform thoracentesis tomorrow morning.  Monitor resp status.

## 2023-12-16 NOTE — CONSULTS
"Consultation - Pulmonary Medicine   Kathi Ferris 63 y.o. female MRN: [id number]  Unit/Bed#: -01 Encounter: [id number]      Assessment/Plan:    Abnormal chest imaging with B/L pleural effusions   S/P indwelling pleural catheter on the right for known malignant effusion.   Can attempt to drain right pleural catheter today to assist with symptoms. D/W nursing.   New left pleural effusion can be drained with diagnostic and therapeutic thoracentesis by our team or IR pending course. She is agreeable to this.    Previously Stage Ia NSCLC, now stage IV   S/P lobectomy.   Follows with palliative care and oncology.    COPD of unknown severity without acute exacerbation   Maintains on Trelegy and albuterol as needed as an outpatient.   Continue Breo and add Incruse with albuterol as needed inpatient.    Chronic hypoxic respiratory failure, multifactorial due to above   Baseline oxygen requirement is 3L NC.   Titrate oxygen to maintain POX > or = 89%.     D/W primary team and nursing.    History of Present Illness   Physician Requesting Consult: Noman Stauffer MD  Reason for Consult / Principal Problem: Pleural effusion  Hx and PE limited by: None  Chief Complaint: \"My blood pressure was high.\"  HPI: Kathi Ferris is a 63 y.o.  female who presented to [facility] with complaints of mild shortness of breath and elevated blood pressure.  She was seen by her visiting nurse at home yesterday and was noted to be hypertensive.  She has an increased shortness of breath and has also noticed a mild dry cough above baseline.  She came to the ER for further evaluation.  She does have an indwelling pleural catheter on the right that has been drained regularly as previously prescribed for similar amounts of fluid in the mid 200 range.  Aside from the above, no other complaints.    Inpatient consult to Pulmonology  Consult performed by: CTA Ibarra  Consult ordered by: Noman Stauffer, " MD        Review of Systems   All other systems reviewed and are negative.  A full 12-point review of systems was completed and is negative except for those outlined in the HPI.    Historical Information   Past Medical History:   Diagnosis Date    Cancer (HCC)     COPD (chronic obstructive pulmonary disease) (HCC)     Emphysema of lung (HCC)     Hyperlipidemia     Hypertension     Lung cancer (HCC) 2019    right lower lobe removed    Lung nodule     Pleural effusion      Past Surgical History:   Procedure Laterality Date    APPENDECTOMY      BREAST BIOPSY Right     benign    BRONCHOSCOPY  2019    COLON SURGERY      HYSTERECTOMY      age 46    IR PLEURAL EFFUSION LONG-TERM CATHETER PLACEMENT  2023    IR PORT PLACEMENT  2023    IR THORACENTESIS  2023    IR THORACENTESIS  2023    IR THORACENTESIS  2023    LUNG BIOPSY  2019    LUNG LOBECTOMY      OOPHORECTOMY Bilateral     age 46     Social History   Social History     Substance and Sexual Activity   Alcohol Use Not Currently    Comment: Rarely drink, socially only     Social History     Substance and Sexual Activity   Drug Use No     Social History     Tobacco Use   Smoking Status Former    Current packs/day: 0.00    Average packs/day: 1.5 packs/day for 53.2 years (79.8 ttl pk-yrs)    Types: Cigarettes    Start date:     Quit date: 3/15/2023    Years since quittin.7   Smokeless Tobacco Never     E-Cigarette/Vaping    E-Cigarette Use Never User      E-Cigarette/Vaping Substances    Nicotine No     THC No     CBD No     Flavoring No     Other No     Unknown No      Family History:   Family History   Problem Relation Age of Onset    Colon cancer Mother     Hypertension Mother     Hyperlipidemia Mother     Hearing loss Mother     Depression Mother     COPD Mother     Cancer Mother         Lung    Arthritis Mother     Lung cancer Mother     Drug abuse Brother     Diabetes Maternal Grandmother     Cancer Maternal Grandmother          Colon    Colon cancer Maternal Grandmother     Cancer Maternal Aunt     Colon cancer Maternal Aunt     Colon cancer Maternal Aunt     Cancer Maternal Aunt         Colon       Meds/Allergies   all current active meds have been reviewed, pertinent pulmonary meds have been reviewed, current meds:   Current Facility-Administered Medications   Medication Dose Route Frequency    albuterol inhalation solution 2.5 mg  2.5 mg Nebulization Q6H PRN    atorvastatin (LIPITOR) tablet 20 mg  20 mg Oral Daily    buPROPion (WELLBUTRIN XL) 24 hr tablet 300 mg  300 mg Oral Daily    diphenhydrAMINE (BENADRYL) tablet 50 mg  50 mg Oral Q6H PRN    fluticasone-vilanterol 200-25 mcg/actuation 1 puff  1 puff Inhalation Daily    furosemide (LASIX) tablet 20 mg  20 mg Oral QAM    heparin (porcine) subcutaneous injection 5,000 Units  5,000 Units Subcutaneous Q8H ULISES    hydrALAZINE (APRESOLINE) injection 5 mg  5 mg Intravenous Q6H PRN    LORazepam (ATIVAN) tablet 0.5 mg  0.5 mg Oral Q8H PRN    losartan (COZAAR) tablet 100 mg  100 mg Oral Daily    metoprolol tartrate (LOPRESSOR) tablet 25 mg  25 mg Oral Q12H ULISES    ondansetron (ZOFRAN) injection 4 mg  4 mg Intravenous Q6H PRN    polyethylene glycol (MIRALAX) packet 17 g  17 g Oral Daily PRN    potassium chloride (K-DUR,KLOR-CON) CR tablet 20 mEq  20 mEq Oral Daily    sertraline (ZOLOFT) tablet 200 mg  200 mg Oral Daily    verapamil (CALAN-SR) CR tablet 360 mg  360 mg Oral HS   , and PTA meds:   Prior to Admission Medications   Prescriptions Last Dose Informant Patient Reported? Taking?   Ascorbic Acid (vitamin C) 1000 MG tablet 12/15/2023 Self Yes Yes   Si daily   Biotin 63650 MCG TABS 12/15/2023 Self Yes Yes   Si daily   Cholecalciferol (Vitamin D3) 25 MCG (1000 UT) CAPS 12/15/2023 Self Yes Yes   Si capsule every 24 hours   EPINEPHrine (EPIPEN) 0.3 mg/0.3 mL SOAJ  Self No No   Sig: Inject 0.3 mL (0.3 mg total) into a muscle once for 1 dose   Patient not taking: Reported on  10/2/2023   Klor-Con M20 20 MEQ tablet Past Week  No Yes   Sig: TAKE 1 TABLET BY MOUTH EVERY DAY   LORazepam (Ativan) 0.5 mg tablet 12/15/2023  No Yes   Sig: Take 1 tablet (0.5 mg total) by mouth daily as needed for anxiety   Multiple Vitamin (MULTIVITAMINS PO) 12/15/2023 Self Yes Yes   Sig: every 24 hours   Probiotic Product (PROBIOTIC DAILY PO) 12/15/2023 Self Yes Yes   Si daily   Sennosides (Senna) 8.6 MG CAPS  Self Yes No   Sig: Take 2 capsules by mouth as needed (constipation)   albuterol (2.5 mg/3 mL) 0.083 % nebulizer solution 12/15/2023 Self No Yes   Sig: Take 3 mL (2.5 mg total) by nebulization every 6 (six) hours as needed for wheezing or shortness of breath   albuterol (PROVENTIL HFA,VENTOLIN HFA) 90 mcg/act inhaler Past Week Self Yes Yes   Sig: Inhale 2 puffs every 4 (four) hours   atorvastatin (LIPITOR) 20 mg tablet 2023 Self Yes Yes   Sig: Take 20 mg by mouth daily.   buPROPion (WELLBUTRIN XL) 300 mg 24 hr tablet 12/15/2023 Self Yes Yes   Sig: TAKE 1 TABLET BY MOUTH EVERY DAY IN THE MORNING FOR 90 DAYS   dexamethasone (DECADRON) 4 mg tablet Past Month  No Yes   Sig: Take 2 tablets (8 mg total) by mouth 2 (two) times a day with meals for 3 days Take 2 tabs by mouth twice daily the day before, the day of, and the day after chemo.   dexamethasone sodium phosphate 0.1 % ophthalmic solution More than a month Self No No   Sig: Administer 1 drop to both eyes every 3 (three) hours as needed (eye irritation)   diphenhydrAMINE (BENADRYL) 50 MG tablet More than a month Self No No   Sig: Take 1 tablet (50 mg total) by mouth every 6 (six) hours as needed for itching (Rash)   diphenhydrAMINE-APAP, sleep, (TYLENOL PM EXTRA STRENGTH PO) 2023 Self Yes Yes   Sig: Take 325 mg by mouth daily at bedtime   famotidine (PEPCID) 20 mg tablet 12/15/2023 Self Yes Yes   Sig: Take 20 mg by mouth daily   fluticasone-umeclidinium-vilanterol (Trelegy Ellipta) 200-62.5-25 mcg/actuation AEPB inhaler   No No   Sig:  Inhale 1 puff daily Rinse mouth after use.   folic acid (FOLVITE) 1 mg tablet More than a month  No No   Sig: TAKE 1 TABLET BY MOUTH EVERY DAY   furosemide (LASIX) 20 mg tablet 12/15/2023 Self No Yes   Sig: Take 1 tablet (20 mg total) by mouth every morning   lidocaine-prilocaine (EMLA) cream Past Month  No Yes   Sig: APPLY TO PORT 30 TO 60 MINUTES PRIOR TO USE   ondansetron (ZOFRAN) 8 mg tablet  Self No No   Sig: Take 1 tablet (8 mg total) by mouth every 8 (eight) hours as needed for nausea or vomiting   Patient not taking: Reported on 10/30/2023   oxygen gas 12/15/2023  Yes Yes   Sig: Inhale 2 L/min continuous. May increase to 3L PRN for dyspnea and sats under 89%  Indications: Hypoxia   polyethylene glycol (GLYCOLAX) 17 GM/SCOOP powder 12/15/2023  Yes Yes   Sig: Take 17 g by mouth daily. Indications: Constipation   prochlorperazine (COMPAZINE) 10 mg tablet Not Taking Self No No   Sig: Take 1 tablet (10 mg total) by mouth every 6 (six) hours as needed for nausea   Patient not taking: Reported on 2023   sertraline (ZOLOFT) 100 mg tablet 2023 Self Yes Yes   Si daily   telmisartan (MICARDIS) 80 MG tablet 12/15/2023 Self No Yes   Sig: Take 1 tablet (80 mg total) by mouth daily   verapamil (CALAN-SR) 120 mg CR tablet 2023 Self No Yes   Sig: Take 1 tablet (120 mg total) by mouth daily at bedtime 1 hs   verapamil (CALAN-SR) 240 mg CR tablet 2023 Self No Yes   Sig: Take 1 tablet (240 mg total) by mouth daily at bedtime 1 hs      Facility-Administered Medications: None       Allergies   Allergen Reactions    Amoxicillin Hives    Vilazodone Hcl Nausea Only and Dizziness     (Viibryd)    Wasp Venom Swelling    Zithromax [Azithromycin] Hives       Objective   Vitals: Blood pressure 160/95, pulse 95, temperature 99 °F (37.2 °C), temperature source Oral, resp. rate 18, SpO2 94%.2.5L NC,There is no height or weight on file to calculate BMI.    Intake/Output Summary (Last 24 hours) at 2023  1223  Last data filed at 12/16/2023 0850  Gross per 24 hour   Intake 530 ml   Output --   Net 530 ml     Invasive Devices       Central Venous Catheter Line  Duration             Port A Cath 04/18/23 Right Subclavian 242 days              Peripheral Intravenous Line  Duration             Peripheral IV 12/15/23 Left Antecubital <1 day              Drain  Duration             Pleural Effusion Long-Term Catheter 15.5 Fr. 234 days                    Physical Exam  Vitals reviewed.   Constitutional:       General: She is not in acute distress.     Appearance: She is well-developed. She is obese. She is not toxic-appearing or diaphoretic.      Interventions: Nasal cannula in place.   HENT:      Head: Normocephalic and atraumatic.   Eyes:      General: No scleral icterus.     Extraocular Movements: Extraocular movements intact.   Neck:      Trachea: No tracheal deviation.   Cardiovascular:      Rate and Rhythm: Normal rate and regular rhythm.      Heart sounds: S1 normal and S2 normal. No murmur heard.     No friction rub. No gallop.   Pulmonary:      Effort: Pulmonary effort is normal. No tachypnea, accessory muscle usage or respiratory distress.      Breath sounds: No stridor. Examination of the right-lower field reveals decreased breath sounds. Examination of the left-lower field reveals decreased breath sounds. Decreased breath sounds present. No wheezing, rhonchi or rales.      Comments: Indwelling pleural catheter in place on the right.  Chest:      Chest wall: No tenderness.   Abdominal:      General: Bowel sounds are normal. There is no distension.      Palpations: Abdomen is soft.      Tenderness: There is no abdominal tenderness.   Musculoskeletal:      Cervical back: Neck supple.      Right lower leg: No edema.      Left lower leg: No edema.   Skin:     General: Skin is warm and dry.      Findings: No rash.   Neurological:      Mental Status: She is alert and oriented to person, place, and time.      GCS: GCS  "eye subscore is 4. GCS verbal subscore is 5. GCS motor subscore is 6.   Psychiatric:         Speech: Speech normal.         Behavior: Behavior normal. Behavior is cooperative.       Lab Results: CBC:   Lab Results   Component Value Date    WBC 2.94 (L) 12/16/2023    HGB 9.9 (L) 12/16/2023    HCT 31.9 (L) 12/16/2023    MCV 88 12/16/2023     (H) 12/16/2023    RBC 3.61 (L) 12/16/2023    MCH 27.4 12/16/2023    MCHC 31.0 (L) 12/16/2023    RDW 16.1 (H) 12/16/2023    MPV 8.8 (L) 12/16/2023    NRBC 0 12/16/2023   , CMP:   Lab Results   Component Value Date    SODIUM 132 (L) 12/16/2023    K 3.7 12/16/2023     12/16/2023    CO2 26 12/16/2023    BUN 6 12/16/2023    CREATININE 0.44 (L) 12/16/2023    CALCIUM 8.8 12/16/2023    AST 18 12/15/2023    ALT 12 12/15/2023    ALKPHOS 98 12/15/2023    EGFR 106 12/16/2023     Flu/COVID/RSV PCR: Negative    Culture Data: Blood cultures x 2 pending    BNP: 50    Imaging Studies: I have personally reviewed pertinent reports.   and I have personally reviewed pertinent films in PACS   CTA chest shows no PE, stable severe emphysema, RLL lobectomy with right lower lung field mass, pleural carcinomatosis with right pleural effusion and indwelling pleural catheter, as well as new large left pleural effusion and TERRENCE metastatic nodules.      Code Status: Level 3 - DNAR and DNI    Portions of the record may have been created with voice recognition software.  Occasional wrong word or \"sound a like\" substitutions may have occurred due to the inherent limitations of voice recognition software.  Read the chart carefully and recognize, using context, where substitutions have occurred.  "

## 2023-12-16 NOTE — ASSESSMENT & PLAN NOTE
Blood pressure is elevated in the ER, 182/92  Home regimen: Micardis 80 mg, verapamil 360 mg daily, Lasix 20 mg daily   Start lopressor- for HR and BP  Did not need Hydralazine PRN overnight

## 2023-12-16 NOTE — ASSESSMENT & PLAN NOTE
Does not appear to be in acute COPD exacerbation  Continue home inhaler regimen  Monitor respiratory status  Continue outpatient follow-up with pulmonary

## 2023-12-16 NOTE — ASSESSMENT & PLAN NOTE
Chronic and mild hyponatremia at 132 today  Secondary to lung cancer  Repeat BMP tomorrow  Continue Lasix  Avoid IV hydration

## 2023-12-16 NOTE — ASSESSMENT & PLAN NOTE
With right lung asept catheter in place, drain 3x weekly by [facility]  Now with large LEFT pleural effusion, likely also malignant.  No plan for thora by IR today  Consulted pulm- plan for thora bedside AM

## 2023-12-16 NOTE — PLAN OF CARE
Problem: PAIN - ADULT  Goal: Verbalizes/displays adequate comfort level or baseline comfort level  Description: Interventions:  - Encourage patient to monitor pain and request assistance  - Assess pain using appropriate pain scale  - Administer analgesics based on type and severity of pain and evaluate response  - Implement non-pharmacological measures as appropriate and evaluate response  - Consider cultural and social influences on pain and pain management  - Notify physician/advanced practitioner if interventions unsuccessful or patient reports new pain  Outcome: Progressing     Problem: INFECTION - ADULT  Goal: Absence or prevention of progression during hospitalization  Description: INTERVENTIONS:  - Assess and monitor for signs and symptoms of infection  - Monitor lab/diagnostic results  - Monitor all insertion sites, i.e. indwelling lines, tubes, and drains  - Monitor endotracheal if appropriate and nasal secretions for changes in amount and color  - [place] appropriate cooling/warming therapies per order  - Administer medications as ordered  - Instruct and encourage patient and family to use good hand hygiene technique  - Identify and instruct in appropriate isolation precautions for identified infection/condition  Outcome: Progressing  Goal: Absence of fever/infection during neutropenic period  Description: INTERVENTIONS:  - Monitor WBC    Outcome: Progressing     Problem: SAFETY ADULT  Goal: Patient will remain free of falls  Description: INTERVENTIONS:  - Educate patient/family on patient safety including physical limitations  - Instruct patient to call for assistance with activity   - Consult OT/PT to assist with strengthening/mobility   - Keep Call bell within reach  - Keep bed low and locked with side rails adjusted as appropriate  - Keep care items and personal belongings within reach  - Initiate and maintain comfort rounds  - Make Fall Risk Sign visible to staff  - Obtain necessary fall risk  management equipment: non-slip socks  - Apply yellow socks and bracelet for high fall risk patients  - Consider moving patient to room near nurses station  Outcome: Progressing  Goal: Maintain or return to baseline ADL function  Description: INTERVENTIONS:  -  Assess patient's ability to carry out ADLs; assess patient's baseline for ADL function and identify physical deficits which impact ability to perform ADLs (bathing, care of mouth/teeth, toileting, grooming, dressing, etc.)  - Assess/evaluate cause of self-care deficits   - Assess range of motion  - Assess patient's mobility; develop plan if impaired  - Assess patient's need for assistive devices and provide as appropriate  - Encourage maximum independence but intervene and supervise when necessary  - Involve family in performance of ADLs  - Assess for home care needs following discharge   - Consider OT consult to assist with ADL evaluation and planning for discharge  - Provide patient education as appropriate  Outcome: Progressing  Goal: Maintains/Returns to pre admission functional level  Description: INTERVENTIONS:  - Perform AM-PAC 6 Click Basic Mobility/ Daily Activity assessment daily.  - Set and communicate daily mobility goal to care team and patient/family/caregiver.   - Collaborate with rehabilitation services on mobility goals if consulted  - Perform Range of Motion 3 times a day.  - Encourage patient to reposition every 2 hours.  - Dangle patient 3 times a day  - Stand patient 3 times a day  - Ambulate patient 3 times a day  - Out of bed to chair 3 times a day for meals  - Out of bed for toileting  - Record patient progress and toleration of activity level   Outcome: Progressing     Problem: DISCHARGE PLANNING  Goal: Discharge to home or other facility with appropriate resources  Description: INTERVENTIONS:  - Identify barriers to discharge w/patient and caregiver  - Arrange for needed discharge resources and transportation as appropriate  -  Identify discharge learning needs (meds, wound care, etc.)  - Arrange for interpretive services to assist at discharge as needed  - Refer to Case Management Department for coordinating discharge planning if the patient needs post-hospital services based on physician/advanced practitioner order or complex needs related to functional status, cognitive ability, or social support system  Outcome: Progressing     Problem: Knowledge Deficit  Goal: Patient/family/caregiver demonstrates understanding of disease process, treatment plan, medications, and discharge instructions  Description: Complete learning assessment and assess knowledge base.  Interventions:  - Provide teaching at level of understanding  - Provide teaching via preferred learning methods  Outcome: Progressing     Problem: RESPIRATORY - ADULT  Goal: Achieves optimal ventilation and oxygenation  Description: INTERVENTIONS:  - Assess for changes in respiratory status  - Assess for changes in mentation and behavior  - Position to facilitate oxygenation and minimize respiratory effort  - Oxygen administered by appropriate delivery if ordered  - Initiate smoking cessation education as indicated  - Encourage broncho-pulmonary hygiene including cough, deep breathe, Incentive Spirometry  - Assess the need for suctioning and aspirate as needed  - Assess and instruct to report SOB or any respiratory difficulty  - Respiratory Therapy support as indicated  Outcome: Progressing

## 2023-12-16 NOTE — ASSESSMENT & PLAN NOTE
CT chest with stability of disease and new left pleural effusion which is likely malignant  Continue outpatient follow-up with pulmonary, oncology, palliative care

## 2023-12-16 NOTE — PLAN OF CARE
Problem: PAIN - ADULT  Goal: Verbalizes/displays adequate comfort level or baseline comfort level  Description: Interventions:  - Encourage patient to monitor pain and request assistance  - Assess pain using appropriate pain scale  - Administer analgesics based on type and severity of pain and evaluate response  - Implement non-pharmacological measures as appropriate and evaluate response  - Consider cultural and social influences on pain and pain management  - Notify physician/advanced practitioner if interventions unsuccessful or patient reports new pain  Outcome: Progressing     Problem: INFECTION - ADULT  Goal: Absence or prevention of progression during hospitalization  Description: INTERVENTIONS:  - Assess and monitor for signs and symptoms of infection  - Monitor lab/diagnostic results  - Monitor all insertion sites, i.e. indwelling lines, tubes, and drains  - Monitor endotracheal if appropriate and nasal secretions for changes in amount and color  - [place] appropriate cooling/warming therapies per order  - Administer medications as ordered  - Instruct and encourage patient and family to use good hand hygiene technique  - Identify and instruct in appropriate isolation precautions for identified infection/condition  Outcome: Progressing  Goal: Absence of fever/infection during neutropenic period  Description: INTERVENTIONS:  - Monitor WBC    Outcome: Progressing

## 2023-12-16 NOTE — ED PROVIDER NOTES
"History  Chief Complaint   Patient presents with    Shortness of Breath     Pt's  states \"My wife is a lung ca pt. Visiting nurse that drains her lungs said that she has high blood pressure. She is usually on 3 L/min.\" Pt reports being short of breath more than usual. Reports cough. Denies fevers.      Pt is a 64 yo F with PMH of COPD, chronic oxygen dependence (3 L nasal cannula), hypertension, hyperlipidemia, lung cancer (stage IV) currently treated with Ramucirumab and Docetaxel, right aseptic catheter in situ for recurrent malignant pleural effusion who presents for evaluation of worsening shortness of breath.  She describes the shortness of breath is moderate.  She usually can walk from her bathroom to the kitchen without issue, now she must rest.  Patient does endorse lower extremity swelling with the right worse than the left but denies pain or tenderness.  She specifically denies cough, hemoptysis, fever, chills, nausea, vomiting, neck pain, sore throat, wheezing, chest pain or exposure to viral illness      Shortness of Breath  Associated symptoms: no abdominal pain, no chest pain, no cough, no ear pain, no fever, no rash, no sore throat and no vomiting        Prior to Admission Medications   Prescriptions Last Dose Informant Patient Reported? Taking?   Ascorbic Acid (vitamin C) 1000 MG tablet 12/15/2023 Self Yes Yes   Si daily   Biotin 26556 MCG TABS 12/15/2023 Self Yes Yes   Si daily   Cholecalciferol (Vitamin D3) 25 MCG (1000 UT) CAPS 12/15/2023 Self Yes Yes   Si capsule every 24 hours   EPINEPHrine (EPIPEN) 0.3 mg/0.3 mL SOAJ  Self No No   Sig: Inject 0.3 mL (0.3 mg total) into a muscle once for 1 dose   Patient not taking: Reported on 10/2/2023   Klor-Con M20 20 MEQ tablet Past Week  No Yes   Sig: TAKE 1 TABLET BY MOUTH EVERY DAY   LORazepam (Ativan) 0.5 mg tablet 12/15/2023  No Yes   Sig: Take 1 tablet (0.5 mg total) by mouth daily as needed for anxiety   Multiple Vitamin " (MULTIVITAMINS PO) 12/15/2023 Self Yes Yes   Sig: every 24 hours   Probiotic Product (PROBIOTIC DAILY PO) 12/15/2023 Self Yes Yes   Si daily   Sennosides (Senna) 8.6 MG CAPS  Self Yes No   Sig: Take 2 capsules by mouth as needed (constipation)   albuterol (2.5 mg/3 mL) 0.083 % nebulizer solution 12/15/2023 Self No Yes   Sig: Take 3 mL (2.5 mg total) by nebulization every 6 (six) hours as needed for wheezing or shortness of breath   albuterol (PROVENTIL HFA,VENTOLIN HFA) 90 mcg/act inhaler Past Week Self Yes Yes   Sig: Inhale 2 puffs every 4 (four) hours   atorvastatin (LIPITOR) 20 mg tablet 2023 Self Yes Yes   Sig: Take 20 mg by mouth daily.   buPROPion (WELLBUTRIN XL) 300 mg 24 hr tablet 12/15/2023 Self Yes Yes   Sig: TAKE 1 TABLET BY MOUTH EVERY DAY IN THE MORNING FOR 90 DAYS   dexamethasone (DECADRON) 4 mg tablet Past Month  No Yes   Sig: Take 2 tablets (8 mg total) by mouth 2 (two) times a day with meals for 3 days Take 2 tabs by mouth twice daily the day before, the day of, and the day after chemo.   dexamethasone sodium phosphate 0.1 % ophthalmic solution More than a month Self No No   Sig: Administer 1 drop to both eyes every 3 (three) hours as needed (eye irritation)   diphenhydrAMINE (BENADRYL) 50 MG tablet More than a month Self No No   Sig: Take 1 tablet (50 mg total) by mouth every 6 (six) hours as needed for itching (Rash)   diphenhydrAMINE-APAP, sleep, (TYLENOL PM EXTRA STRENGTH PO) 2023 Self Yes Yes   Sig: Take 325 mg by mouth daily at bedtime   famotidine (PEPCID) 20 mg tablet 12/15/2023 Self Yes Yes   Sig: Take 20 mg by mouth daily   fluticasone-umeclidinium-vilanterol (Trelegy Ellipta) 200-62.5-25 mcg/actuation AEPB inhaler   No No   Sig: Inhale 1 puff daily Rinse mouth after use.   folic acid (FOLVITE) 1 mg tablet More than a month  No No   Sig: TAKE 1 TABLET BY MOUTH EVERY DAY   furosemide (LASIX) 20 mg tablet 12/15/2023 Self No Yes   Sig: Take 1 tablet (20 mg total) by mouth  every morning   lidocaine-prilocaine (EMLA) cream Past Month  No Yes   Sig: APPLY TO PORT 30 TO 60 MINUTES PRIOR TO USE   ondansetron (ZOFRAN) 8 mg tablet  Self No No   Sig: Take 1 tablet (8 mg total) by mouth every 8 (eight) hours as needed for nausea or vomiting   Patient not taking: Reported on 10/30/2023   oxygen gas 12/15/2023  Yes Yes   Sig: Inhale 2 L/min continuous. May increase to 3L PRN for dyspnea and sats under 89%  Indications: Hypoxia   polyethylene glycol (GLYCOLAX) 17 GM/SCOOP powder 12/15/2023  Yes Yes   Sig: Take 17 g by mouth daily. Indications: Constipation   prochlorperazine (COMPAZINE) 10 mg tablet Not Taking Self No No   Sig: Take 1 tablet (10 mg total) by mouth every 6 (six) hours as needed for nausea   Patient not taking: Reported on 2023   sertraline (ZOLOFT) 100 mg tablet 2023 Self Yes Yes   Si daily   telmisartan (MICARDIS) 80 MG tablet 12/15/2023 Self No Yes   Sig: Take 1 tablet (80 mg total) by mouth daily   verapamil (CALAN-SR) 120 mg CR tablet 2023 Self No Yes   Sig: Take 1 tablet (120 mg total) by mouth daily at bedtime 1 hs   verapamil (CALAN-SR) 240 mg CR tablet 2023 Self No Yes   Sig: Take 1 tablet (240 mg total) by mouth daily at bedtime 1 hs      Facility-Administered Medications: None       Past Medical History:   Diagnosis Date    Cancer (HCC)     COPD (chronic obstructive pulmonary disease) (HCC)     Emphysema of lung (HCC)     Hyperlipidemia     Hypertension     Lung cancer (HCC) 2019    right lower lobe removed    Lung nodule     Pleural effusion        Past Surgical History:   Procedure Laterality Date    APPENDECTOMY      BREAST BIOPSY Right     benign    BRONCHOSCOPY  2019    COLON SURGERY      HYSTERECTOMY      age 46    IR PLEURAL EFFUSION LONG-TERM CATHETER PLACEMENT  2023    IR PORT PLACEMENT  2023    IR THORACENTESIS  2023    IR THORACENTESIS  2023    IR THORACENTESIS  2023    LUNG BIOPSY  2019     LUNG LOBECTOMY      OOPHORECTOMY Bilateral     age 46       Family History   Problem Relation Age of Onset    Colon cancer Mother     Hypertension Mother     Hyperlipidemia Mother     Hearing loss Mother     Depression Mother     COPD Mother     Cancer Mother         Lung    Arthritis Mother     Lung cancer Mother     Drug abuse Brother     Diabetes Maternal Grandmother     Cancer Maternal Grandmother         Colon    Colon cancer Maternal Grandmother     Cancer Maternal Aunt     Colon cancer Maternal Aunt     Colon cancer Maternal Aunt     Cancer Maternal Aunt         Colon     I have reviewed and agree with the history as documented.    E-Cigarette/Vaping    E-Cigarette Use Never User      E-Cigarette/Vaping Substances    Nicotine No     THC No     CBD No     Flavoring No     Other No     Unknown No      Social History     Tobacco Use    Smoking status: Former     Current packs/day: 0.00     Average packs/day: 1.5 packs/day for 53.2 years (79.8 ttl pk-yrs)     Types: Cigarettes     Start date:      Quit date: 3/15/2023     Years since quittin.7    Smokeless tobacco: Never   Vaping Use    Vaping status: Never Used   Substance Use Topics    Alcohol use: Not Currently     Comment: Rarely drink, socially only    Drug use: No       Review of Systems   Constitutional:  Positive for activity change and fatigue. Negative for chills and fever.   HENT: Negative.  Negative for ear pain and sore throat.    Eyes: Negative.  Negative for pain and visual disturbance.   Respiratory:  Positive for shortness of breath. Negative for cough.    Cardiovascular:  Positive for leg swelling. Negative for chest pain and palpitations.   Gastrointestinal: Negative.  Negative for abdominal pain and vomiting.   Endocrine: Negative.    Genitourinary: Negative.  Negative for dysuria and hematuria.   Musculoskeletal: Negative.  Negative for arthralgias and back pain.   Skin:  Negative for color change and rash.   Allergic/Immunologic:  Negative.    Neurological: Negative.  Negative for seizures and syncope.   Hematological: Negative.    All other systems reviewed and are negative.      Physical Exam  Physical Exam  Vitals and nursing note reviewed.   Constitutional:       General: She is not in acute distress.     Appearance: She is well-developed. She is ill-appearing.   HENT:      Head: Normocephalic and atraumatic.      Mouth/Throat:      Mouth: Mucous membranes are moist.      Pharynx: Oropharynx is clear.   Eyes:      Extraocular Movements: Extraocular movements intact.      Conjunctiva/sclera: Conjunctivae normal.      Pupils: Pupils are equal, round, and reactive to light.   Neck:      Thyroid: No thyromegaly.      Vascular: No hepatojugular reflux or JVD.      Trachea: No tracheal deviation.   Cardiovascular:      Rate and Rhythm: Normal rate and regular rhythm.      Heart sounds: No murmur heard.  Pulmonary:      Effort: Pulmonary effort is normal. No respiratory distress.      Breath sounds: Examination of the right-lower field reveals rales. Decreased breath sounds, rhonchi and rales present.   Chest:      Chest wall: No mass, deformity or crepitus.   Abdominal:      General: Bowel sounds are normal.      Palpations: Abdomen is soft.      Tenderness: There is no abdominal tenderness.   Musculoskeletal:         General: No swelling. Normal range of motion.      Cervical back: Normal range of motion and neck supple.      Right lower leg: No tenderness. Edema present.      Left lower leg: No tenderness. Edema present.   Skin:     General: Skin is warm and dry.      Capillary Refill: Capillary refill takes less than 2 seconds.   Neurological:      General: No focal deficit present.      Mental Status: She is alert and oriented to person, place, and time.   Psychiatric:         Mood and Affect: Mood normal.         Behavior: Behavior normal.         Vital Signs  ED Triage Vitals   Temperature Pulse Respirations Blood Pressure SpO2   12/15/23  1721 12/15/23 1721 12/15/23 1721 12/15/23 1721 12/15/23 1721   98.9 °F (37.2 °C) (!) 108 (!) 26 (!) 193/93 97 %      Temp Source Heart Rate Source Patient Position - Orthostatic VS BP Location FiO2 (%)   12/15/23 1721 12/15/23 1721 12/15/23 1721 12/15/23 1721 --   Oral Monitor Sitting Left arm       Pain Score       12/15/23 1900       5           Vitals:    12/15/23 1815 12/15/23 1900 12/15/23 2100 12/15/23 2225   BP:  (!) 172/85 (!) 182/92 157/98   Pulse: 96 95 97 100   Patient Position - Orthostatic VS:             Visual Acuity  Visual Acuity      Flowsheet Row Most Recent Value   L Pupil Size (mm) 2   R Pupil Size (mm) 2            ED Medications  Medications   hydrALAZINE (APRESOLINE) injection 5 mg (has no administration in time range)   iohexol (OMNIPAQUE) 350 MG/ML injection (SINGLE-DOSE) 100 mL (85 mL Intravenous Given 12/15/23 2007)   cefepime (MAXIPIME) IVPB (premix in dextrose) 2,000 mg 50 mL (0 mg Intravenous Stopped 12/15/23 2205)   acetaminophen (TYLENOL) tablet 650 mg (650 mg Oral Given 12/15/23 2054)   LORazepam (ATIVAN) injection 1 mg (1 mg Intravenous Given 12/15/23 2123)       Diagnostic Studies  Results Reviewed       Procedure Component Value Units Date/Time    HS Troponin I 4hr [[id number]]  (Normal) Collected: 12/15/23 2124    Lab Status: Final result Specimen: Blood from Arm, Left Updated: 12/15/23 2154     hs TnI 4hr 5 ng/L      Delta 4hr hsTnI 1 ng/L     UA w Reflex to Microscopic w Reflex to Culture [[id number]]  (Abnormal) Collected: 12/15/23 2054    Lab Status: Final result Specimen: Urine, Clean Catch Updated: 12/15/23 2116     Color, UA Yellow     Clarity, UA Clear     Specific Gravity, UA <1.005     pH, UA 7.0     Leukocytes, UA Negative     Nitrite, UA Negative     Protein, UA Negative mg/dl      Glucose, UA Negative mg/dl      Ketones, UA Negative mg/dl      Urobilinogen, UA <2.0 mg/dl      Bilirubin, UA Negative     Occult Blood, UA Negative    TSH, 3rd generation with Free T4  reflex [[id number]]  (Normal) Collected: 12/15/23 1726    Lab Status: Final result Specimen: Blood from Arm, Right Updated: 12/15/23 2030     TSH 3RD GENERATON 2.644 uIU/mL     Phosphorus [[id number]]  (Normal) Collected: 12/15/23 1726    Lab Status: Final result Specimen: Blood from Arm, Right Updated: 12/15/23 2001     Phosphorus 3.1 mg/dL     Magnesium [[id number]]  (Abnormal) Collected: 12/15/23 1726    Lab Status: Final result Specimen: Blood from Arm, Right Updated: 12/15/23 2001     Magnesium 1.7 mg/dL     Lipase [[id number]]  (Abnormal) Collected: 12/15/23 1726    Lab Status: Final result Specimen: Blood from Arm, Right Updated: 12/15/23 2001     Lipase 6 u/L     B-Type Natriuretic Peptide(BNP) [[id number]]  (Normal) Collected: 12/15/23 1726    Lab Status: Final result Specimen: Blood from Arm, Right Updated: 12/15/23 1956     BNP 50 pg/mL     FLU/RSV/COVID - if FLU/RSV clinically relevant [[id number]]  (Normal) Collected: 12/15/23 1858    Lab Status: Final result Specimen: Nares from Nose Updated: 12/15/23 1954     SARS-CoV-2 Negative     INFLUENZA A PCR Negative     INFLUENZA B PCR Negative     RSV PCR Negative    Narrative:      FOR PEDIATRIC PATIENTS - copy/paste COVID Guidelines URL to browser: [email or web address]    SARS-CoV-2 assay is a Nucleic Acid Amplification assay intended for the  qualitative detection of nucleic acid from SARS-CoV-2 in nasopharyngeal  swabs. Results are for the presumptive identification of SARS-CoV-2 RNA.    Positive results are indicative of infection with SARS-CoV-2, the virus  causing COVID-19, but do not rule out bacterial infection or co-infection  with other viruses. Laboratories within the [place] and its  territories are required to report all positive results to the appropriate  public health authorities. Negative results do not preclude SARS-CoV-2  infection and should not be used as the sole basis for treatment  or other  patient management decisions. Negative results must be combined with  clinical observations, patient history, and epidemiological information.  This test has not been FDA cleared or approved.    This test has been authorized by FDA under an Emergency Use Authorization  (EUA). This test is only authorized for the duration of time the  declaration that circumstances exist justifying the authorization of the  emergency use of an in vitro diagnostic tests for detection of SARS-CoV-2  virus and/or diagnosis of COVID-19 infection under section 564(b)(1) of  the Act, 21 U.S.C. 360bbb-3(b)(1), unless the authorization is terminated  or revoked sooner. The test has been validated but independent review by FDA  and CLIA is pending.    Test performed using SOL ELIXIRSpert: This RT-PCR assay targets N2,  a region unique to SARS-CoV-2. A conserved region in the E-gene was chosen  for pan-Sarbecovirus detection which includes SARS-CoV-2.    According to CMS-2020-01-R, this platform meets the definition of high-throughput technology.    HS Troponin I 2hr [[id number]]  (Normal) Collected: 12/15/23 1858    Lab Status: Final result Specimen: Blood from Arm, Right Updated: 12/15/23 1927     hs TnI 2hr 4 ng/L      Delta 2hr hsTnI 0 ng/L     Ammonia [[id number]]  (Normal) Collected: 12/15/23 1858    Lab Status: Final result Specimen: Blood from Arm, Right Updated: 12/15/23 1918     Ammonia 27 umol/L     HS Troponin 0hr (reflex protocol) [[id number]]  (Normal) Collected: 12/15/23 1726    Lab Status: Final result Specimen: Blood from Arm, Right Updated: 12/15/23 1803     hs TnI 0hr 4 ng/L     Lactic acid, plasma (w/reflex if result > 2.0) [[id number]]  (Normal) Collected: 12/15/23 1726    Lab Status: Final result Specimen: Blood from Arm, Right Updated: 12/15/23 1756     LACTIC ACID 0.9 mmol/L     Narrative:      Result may be elevated if tourniquet was used during collection.    Comprehensive metabolic panel [[id number]]   (Abnormal) Collected: 12/15/23 1726    Lab Status: Final result Specimen: Blood from Arm, Right Updated: 12/15/23 1756     Sodium 132 mmol/L      Potassium 3.5 mmol/L      Chloride 99 mmol/L      CO2 24 mmol/L      ANION GAP 9 mmol/L      BUN 9 mg/dL      Creatinine 0.46 mg/dL      Glucose 106 mg/dL      Calcium 9.2 mg/dL      Corrected Calcium 9.8 mg/dL      AST 18 U/L      ALT 12 U/L      Alkaline Phosphatase 98 U/L      Total Protein 6.3 g/dL      Albumin 3.2 g/dL      Total Bilirubin 0.20 mg/dL      eGFR 104 ml/min/1.73sq m     Narrative:      National Kidney Disease Foundation guidelines for Chronic Kidney Disease (CKD):     Stage 1 with normal or high GFR (GFR > 90 mL/min/1.73 square meters)    Stage 2 Mild CKD (GFR = 60-89 mL/min/1.73 square meters)    Stage 3A Moderate CKD (GFR = 45-59 mL/min/1.73 square meters)    Stage 3B Moderate CKD (GFR = 30-44 mL/min/1.73 square meters)    Stage 4 Severe CKD (GFR = 15-29 mL/min/1.73 square meters)    Stage 5 End Stage CKD (GFR <15 mL/min/1.73 square meters)  Note: GFR calculation is accurate only with a steady state creatinine    CBC and differential [[id number]]  (Abnormal) Collected: 12/15/23 1726    Lab Status: Final result Specimen: Blood from Arm, Right Updated: 12/15/23 1735     WBC 3.02 Thousand/uL      RBC 3.73 Million/uL      Hemoglobin 10.4 g/dL      Hematocrit 33.4 %      MCV 90 fL      MCH 27.9 pg      MCHC 31.1 g/dL      RDW 16.2 %      MPV 8.8 fL      Platelets 494 Thousands/uL      nRBC 0 /100 WBCs      Neutrophils Relative 35 %      Immat GRANS % 1 %      Lymphocytes Relative 30 %      Monocytes Relative 33 %      Eosinophils Relative 0 %      Basophils Relative 1 %      Neutrophils Absolute 1.08 Thousands/µL      Immature Grans Absolute 0.02 Thousand/uL      Lymphocytes Absolute 0.91 Thousands/µL      Monocytes Absolute 0.99 Thousand/µL      Eosinophils Absolute 0.00 Thousand/µL      Basophils Absolute 0.02 Thousands/µL     Blood culture #2 [[id number]]  Collected: 12/15/23 1726    Lab Status: In process Specimen: Blood from Arm, Right Updated: 12/15/23 1730    Blood culture #1 [[id number]] Collected: 12/15/23 1726    Lab Status: In process Specimen: Blood from Arm, Right Updated: 12/15/23 1730                   CTA ED chest PE Study   Final Result by Chato Alva MD (12/15 2101)      No pulmonary embolism.      Stable severe emphysema with right lower lung field mass consistent with tumor.      Pleural carcinomatosis on the right with slightly enlarged right pleural effusion despite indwelling pleural catheter .      Stable metastatic nodules in the left upper lobe.      New large left pleural effusion.      Destructive pathological fracture through a lytic lesion in the posterior left fifth rib.                  Workstation performed: [id number]         XR chest 1 view portable    (Results Pending)   IR IN-Patient Thoracentesis    (Results Pending)              Procedures  Procedures         ED Course  ED Course as of 12/15/23 2328   Fri Dec 15, 2023   1831 Creatinine(!): 0.46   1937 WBC(!): 3.02   2010 BNP: 50                                   Wells' Criteria for PE      Flowsheet Row Most Recent Value   Wells' Criteria for PE    Clinical signs and symptoms of DVT 3 Filed at: 12/15/2023 1833   PE is primary diagnosis or equally likely 3 Filed at: 12/15/2023 1833   HR >100 1.5 Filed at: 12/15/2023 1833   Immobilization at least 3 days or Surgery in the previous 4 weeks 0 Filed at: 12/15/2023 1833   Previous, objectively diagnosed PE or DVT 0 Filed at: 12/15/2023 1833   Hemoptysis 0 Filed at: 12/15/2023 1833   Malignancy with treatment within 6 months or palliative 1 Filed at: 12/15/2023 1833   Wells' Criteria Total 8.5 Filed at: 12/15/2023 1833                  Medical Decision Making  Patient currently being treated for lung cancer with worsening shortness of breath  Imaging reveals new large pleural effusion on the left and incomplete drainage of pleural  effusion on the right despite a separate catheter  Patient to be admitted for IR evaluation and thoracentesis    Problems Addressed:  Metastatic lung cancer (metastasis from lung to other site) (HCC): acute illness or injury  Pleural effusion: acute illness or injury  SOB (shortness of breath): acute illness or injury    Amount and/or Complexity of Data Reviewed  Labs: ordered. Decision-making details documented in ED Course.  Radiology: ordered. Decision-making details documented in ED Course.    Risk  OTC drugs.  Prescription drug management.  Decision regarding hospitalization.             Disposition  Final diagnoses:   Pleural effusion   Metastatic lung cancer (metastasis from lung to other site) ([facility])   SOB (shortness of breath)     Time reflects when diagnosis was documented in both MDM as applicable and the Disposition within this note       Time User Action Codes Description Comment    12/15/2023  9:46 PM Pam Mendoza [J90] Pleural effusion     12/15/2023  9:46 PM Pam Mendoza [C34.90] Metastatic lung cancer (metastasis from lung to other site) ([facility])     12/15/2023  9:46 PM Pam Mendoza [R06.02] SOB (shortness of breath)           ED Disposition       ED Disposition   Admit    Condition   Stable    Date/Time   Fri Dec 15, 2023 1416    Comment   Case was discussed with KATY and the patient's admission status was agreed to be Admission Status: inpatient status to the service of Dr. Stauffer .               Follow-up Information    None         Current Discharge Medication List        CONTINUE these medications which have NOT CHANGED    Details   albuterol (2.5 mg/3 mL) 0.083 % nebulizer solution Take 3 mL (2.5 mg total) by nebulization every 6 (six) hours as needed for wheezing or shortness of breath  Qty: 270 mL, Refills: 4    Associated Diagnoses: Chronic obstructive pulmonary disease, unspecified COPD type ([facility])      albuterol (PROVENTIL HFA,VENTOLIN HFA) 90 mcg/act inhaler  Inhale 2 puffs every 4 (four) hours  Refills: 3    Comments: <!--EPICS-->Substitution to a formulary equivalent within the same pharmaceutical class is authorized.<!--EPICE-->      Ascorbic Acid (vitamin C) 1000 MG tablet 1 daily      atorvastatin (LIPITOR) 20 mg tablet Take 20 mg by mouth daily.      Biotin 73794 MCG TABS 1 daily      buPROPion (WELLBUTRIN XL) 300 mg 24 hr tablet TAKE 1 TABLET BY MOUTH EVERY DAY IN THE MORNING FOR 90 DAYS      Cholecalciferol (Vitamin D3) 25 MCG (1000 UT) CAPS 1 capsule every 24 hours      dexamethasone (DECADRON) 4 mg tablet Take 2 tablets (8 mg total) by mouth 2 (two) times a day with meals for 3 days Take 2 tabs by mouth twice daily the day before, the day of, and the day after chemo.  Qty: 72 tablet, Refills: 0    Associated Diagnoses: Non-small cell cancer of right lung (HCC)      diphenhydrAMINE-APAP, sleep, (TYLENOL PM EXTRA STRENGTH PO) Take 325 mg by mouth daily at bedtime      famotidine (PEPCID) 20 mg tablet Take 20 mg by mouth daily      furosemide (LASIX) 20 mg tablet Take 1 tablet (20 mg total) by mouth every morning  Qty: 90 tablet, Refills: 1    Associated Diagnoses: Essential hypertension      Klor-Con M20 20 MEQ tablet TAKE 1 TABLET BY MOUTH EVERY DAY  Qty: 30 tablet, Refills: 1    Associated Diagnoses: Non-small cell cancer of right lung (HCC); Hypokalemia      lidocaine-prilocaine (EMLA) cream APPLY TO PORT 30 TO 60 MINUTES PRIOR TO USE  Qty: 30 g, Refills: 3    Associated Diagnoses: Non-small cell cancer of right lung (HCC)      LORazepam (Ativan) 0.5 mg tablet Take 1 tablet (0.5 mg total) by mouth daily as needed for anxiety  Qty: 30 tablet, Refills: 0    Associated Diagnoses: Generalized anxiety disorder      Multiple Vitamin (MULTIVITAMINS PO) every 24 hours      oxygen gas Inhale 2 L/min continuous. May increase to 3L PRN for dyspnea and sats under 89%  Indications: Hypoxia      polyethylene glycol (GLYCOLAX) 17 GM/SCOOP powder Take 17 g by mouth daily.  Indications: Constipation      Probiotic Product (PROBIOTIC DAILY PO) 1 daily      sertraline (ZOLOFT) 100 mg tablet 2 daily      telmisartan (MICARDIS) 80 MG tablet Take 1 tablet (80 mg total) by mouth daily  Qty: 90 tablet, Refills: 1    Comments: D/c telmisartan/hctz  Associated Diagnoses: Essential hypertension      !! verapamil (CALAN-SR) 120 mg CR tablet Take 1 tablet (120 mg total) by mouth daily at bedtime 1 hs  Qty: 90 tablet, Refills: 3    Associated Diagnoses: Essential hypertension      !! verapamil (CALAN-SR) 240 mg CR tablet Take 1 tablet (240 mg total) by mouth daily at bedtime 1 hs  Qty: 90 tablet, Refills: 3    Comments: Cancel rx for verapamil 360 if not available!  Associated Diagnoses: Essential hypertension      dexamethasone sodium phosphate 0.1 % ophthalmic solution Administer 1 drop to both eyes every 3 (three) hours as needed (eye irritation)  Qty: 5 mL, Refills: 5    Associated Diagnoses: Other conjunctivitis of both eyes      diphenhydrAMINE (BENADRYL) 50 MG tablet Take 1 tablet (50 mg total) by mouth every 6 (six) hours as needed for itching (Rash)  Qty: 30 tablet, Refills: 0    Associated Diagnoses: Drug rash      EPINEPHrine (EPIPEN) 0.3 mg/0.3 mL SOAJ Inject 0.3 mL (0.3 mg total) into a muscle once for 1 dose  Qty: 0.6 mL, Refills: 0    Associated Diagnoses: Local reaction to bee sting, accidental or unintentional, initial encounter      fluticasone-umeclidinium-vilanterol (Trelegy Ellipta) 200-62.5-25 mcg/actuation AEPB inhaler Inhale 1 puff daily Rinse mouth after use.  Qty: 60 blister, Refills: 0    Associated Diagnoses: Chronic obstructive pulmonary disease, unspecified COPD type (HCC)      folic acid (FOLVITE) 1 mg tablet TAKE 1 TABLET BY MOUTH EVERY DAY  Qty: 90 tablet, Refills: 1    Associated Diagnoses: Non-small cell cancer of right lung (HCC)      ondansetron (ZOFRAN) 8 mg tablet Take 1 tablet (8 mg total) by mouth every 8 (eight) hours as needed for nausea or vomiting  Qty:  30 tablet, Refills: 0    Associated Diagnoses: Non-small cell cancer of right lung (HCC)      prochlorperazine (COMPAZINE) 10 mg tablet Take 1 tablet (10 mg total) by mouth every 6 (six) hours as needed for nausea  Qty: 30 tablet, Refills: 3    Associated Diagnoses: Non-small cell cancer of right lung (HCC)      Sennosides (Senna) 8.6 MG CAPS Take 2 capsules by mouth as needed (constipation)       !! - Potential duplicate medications found. Please discuss with provider.          No discharge procedures on file.    PDMP Review         Value Time User    PDMP Reviewed  Yes 12/5/2023  7:49 AM Marilyn Gaspar PA-C            ED Provider  Electronically Signed by             Pam Mendoza PA-C  12/15/23 0149

## 2023-12-16 NOTE — ACP (ADVANCE CARE PLANNING)
Advanced Care Planning Progress Note    Serious Illness Conversation    1. What is your understanding now of where you are with your illness?  Prognostic Understanding: appropriate understanding of prognosis     2. How much information about what is likely to be ahead with your illness would you like to have?     3. What did you (clinician) communicate to the patient?  Prognostic Communication: Uncertain - It can be difficult to predict what will happen with your illness. I hope you will continue to live well for a long time but I’m worried that you could get sick quickly, and I think it is important to prepare for that possibility.     4. If your health situation worsens, what are your most important goals?  Goals: have my medical decisions respected     5. What are the biggest fears and worries about the future and your health?  Fears/Worries: other symptoms     6. What abilities are so critical to your life that you cannot imagine living without them?  Unacceptable Function: being in pain or very uncomfortable     7. What gives you strength as you think about the future with your illness?     8. If you become sicker, how much are you willing to go through for the possibility of gaining more time?  Be in the hospital: Yes    Be in the ICU: Yes    Be on a ventilator: Yes    9. How much does your proxy and family know about your priorities and wishes?  Discussion Discussion: extensive discussion with family about goals and wishes     I’ve heard you say that fighting this is really important to you. Keeping that in mind, and what we know about your illness, I recommend that we continue aggressive medical treatment. This will help us make sure that your treatment plans reflect what’s important to you.     How does this plan sound to you? I will do everything I can to help you through this.  Patient verbalized understanding of the plan     I have spent 21 minutes speaking with my patient on advanced care planning today  or during this visit     Advanced directives  Five Wishes: Patient does not have Five Wishes- would not like information         Clay Campos PA-C

## 2023-12-16 NOTE — ASSESSMENT & PLAN NOTE
Blood pressure is elevated in the ER, 182/92  Home regimen: Micardis 80 mg, verapamil 360 mg daily, Lasix 20 mg daily   Monitor closely, will order hydralazine as needed

## 2023-12-16 NOTE — H&P
[facility]  H&P  Name: Kathi Ferris 63 y.o. female I MRN: [id number]  Unit/Bed#: -01 I Date of Admission: 12/15/2023   Date of Service: 12/15/2023  Hospital Day: 0      Assessment/Plan   * Malignant pleural effusion  Assessment & Plan  With right lung asept catheter in place, drain 3x weekly by VNA  Now with large LEFT pleural effusion, likely also malignant.  D/w IR, they will perform thoracentesis tomorrow morning.  Monitor resp status.    Chronic respiratory failure with hypoxia (HCC)  Assessment & Plan  Chronically on 2 to 3 L/min.  At baseline.  Monitor respiratory status closely    Generalized anxiety disorder  Assessment & Plan  Continue home Ativan, Wellbutrin, Zoloft    Hyponatremia  Assessment & Plan  Chronic and mild hyponatremia at 132 today  Secondary to lung cancer  Repeat BMP tomorrow  Continue Lasix  Avoid IV hydration    Non-small cell cancer of right lung (HCC)  Assessment & Plan  CT chest with stability of disease and new left pleural effusion which is likely malignant  Continue outpatient follow-up with pulmonary, oncology, palliative care    Essential hypertension  Assessment & Plan  Blood pressure is elevated in the ER, 182/92  Home regimen: Micardis 80 mg, verapamil 360 mg daily, Lasix 20 mg daily   Monitor closely, will order hydralazine as needed    COPD (chronic obstructive pulmonary disease) (HCC)  Assessment & Plan  Does not appear to be in acute COPD exacerbation  Continue home inhaler regimen  Monitor respiratory status  Continue outpatient follow-up with pulmonary         VTE Pharmacologic Prophylaxis: VTE Score: 5 High Risk (Score >/= 5) - Pharmacological DVT Prophylaxis Ordered: heparin. Sequential Compression Devices Ordered.  Code Status: dnr/dni      Anticipated Length of Stay: Patient will be admitted on an inpatient basis with an anticipated length of stay of greater than 2 midnights secondary to pleural effusion needing IR thoracentesis  and possible asept catheter placement.    Total Time for Visit, including Counseling / Coordination of Care: 75 minutes Greater than 50% of this total time spent on direct patient counseling and coordination of care.    Chief Complaint: short of breath    History of Present Illness:  Kathi Ferris is a 63 y.o. female with a PMH of metastatic lung CA, COPD, palliative care patient who presents with chief complaint of worsening shortness of breath as well as high blood pressure.  VNA was home for a asept catheter drained today.  Blood pressure was high and she was having shortness of breath.  Was sent into the ER and found to have a new left-sided pleural effusion which is large.  Case was discussed with IR, planning for IR thoracentesis tomorrow.  She is on her baseline 2 to 3 L of O2.    Review of Systems:  Review of Systems   Constitutional:  Negative for activity change, appetite change, chills, fatigue and fever.   HENT:  Negative for congestion, rhinorrhea, sinus pressure and sore throat.    Eyes:  Negative for photophobia, pain and visual disturbance.   Respiratory:  Positive for shortness of breath. Negative for cough and wheezing.    Cardiovascular:  Negative for chest pain, palpitations and leg swelling.   Gastrointestinal:  Negative for abdominal distention, abdominal pain, constipation, diarrhea, nausea and vomiting.   Endocrine: Negative for cold intolerance, heat intolerance, polydipsia and polyuria.   Genitourinary:  Negative for difficulty urinating, dysuria, flank pain, frequency and hematuria.   Musculoskeletal:  Negative for arthralgias, back pain and joint swelling.   Skin:  Negative for color change, pallor and rash.   Allergic/Immunologic: Negative.    Neurological:  Negative for dizziness, syncope, weakness, light-headedness and headaches.   Hematological: Negative.    Psychiatric/Behavioral: Negative.         Past Medical and Surgical History:   Past Medical History:   Diagnosis Date     Cancer (HCC)     COPD (chronic obstructive pulmonary disease) (HCC)     Emphysema of lung (HCC)     Hyperlipidemia     Hypertension     Lung cancer (HCC) 06/26/2019    right lower lobe removed    Lung nodule     Pleural effusion        Past Surgical History:   Procedure Laterality Date    APPENDECTOMY      BREAST BIOPSY Right     benign    BRONCHOSCOPY  06/2019    COLON SURGERY      HYSTERECTOMY      age 46    IR PLEURAL EFFUSION LONG-TERM CATHETER PLACEMENT  04/25/2023    IR PORT PLACEMENT  04/18/2023    IR THORACENTESIS  03/24/2023    IR THORACENTESIS  04/12/2023    IR THORACENTESIS  04/18/2023    LUNG BIOPSY  06/2019    LUNG LOBECTOMY      OOPHORECTOMY Bilateral     age 46       Meds/Allergies:  Prior to Admission medications    Medication Sig Start Date End Date Taking? Authorizing Provider   albuterol (2.5 mg/3 mL) 0.083 % nebulizer solution Take 3 mL (2.5 mg total) by nebulization every 6 (six) hours as needed for wheezing or shortness of breath 5/2/23   Shani Ricketts,    albuterol (PROVENTIL HFA,VENTOLIN HFA) 90 mcg/act inhaler Inhale 2 puffs every 4 (four) hours 8/6/19   Historical Provider, MD   Ascorbic Acid (vitamin C) 1000 MG tablet 1 daily    Historical Provider, MD   atorvastatin (LIPITOR) 20 mg tablet Take 20 mg by mouth daily.    Historical Provider, MD   Biotin 05674 MCG TABS 1 daily    Historical Provider, MD   buPROPion (WELLBUTRIN XL) 300 mg 24 hr tablet TAKE 1 TABLET BY MOUTH EVERY DAY IN THE MORNING FOR 90 DAYS 5/27/23   Historical Provider, MD   Cholecalciferol (Vitamin D3) 25 MCG (1000 UT) CAPS 1 capsule every 24 hours    Historical Provider, MD   dexamethasone (DECADRON) 4 mg tablet Take 2 tablets (8 mg total) by mouth 2 (two) times a day with meals for 3 days Take 2 tabs by mouth twice daily the day before, the day of, and the day after chemo. 11/1/23   Shani Phipps,    dexamethasone sodium phosphate 0.1 % ophthalmic solution Administer 1 drop to both eyes every 3 (three) hours as  needed (eye irritation) 8/24/23   Shani Phipps,    diphenhydrAMINE (BENADRYL) 50 MG tablet Take 1 tablet (50 mg total) by mouth every 6 (six) hours as needed for itching (Rash) 6/16/20   Tomas Ratliff, DO   diphenhydrAMINE-APAP, sleep, (TYLENOL PM EXTRA STRENGTH PO) Take 325 mg by mouth daily at bedtime    Historical Provider, MD   EPINEPHrine (EPIPEN) 0.3 mg/0.3 mL SOAJ Inject 0.3 mL (0.3 mg total) into a muscle once for 1 dose  Patient not taking: Reported on 10/2/2023 10/11/21 9/18/23  Tom Lindsay, DO   famotidine (PEPCID) 20 mg tablet Take 20 mg by mouth daily    Historical Provider, MD   fluticasone-umeclidinium-vilanterol (Trelegy Ellipta) 200-62.5-25 mcg/actuation AEPB inhaler Inhale 1 puff daily Rinse mouth after use. 10/24/23 11/23/23  Shani Ricketts,    folic acid (FOLVITE) 1 mg tablet TAKE 1 TABLET BY MOUTH EVERY DAY 10/15/23   Shani Phipps DO   furosemide (LASIX) 20 mg tablet Take 1 tablet (20 mg total) by mouth every morning 8/22/23 2/18/24  Keyon Miller MD   Klor-Con M20 20 MEQ tablet TAKE 1 TABLET BY MOUTH EVERY DAY 11/24/23   [facility] [place]   lidocaine-prilocaine (EMLA) cream APPLY TO PORT 30 TO 60 MINUTES PRIOR TO USE 9/7/23   [facility] [place]   LORazepam (Ativan) 0.5 mg tablet Take 1 tablet (0.5 mg total) by mouth daily as needed for anxiety 10/6/23   Keyon Miller MD   Multiple Vitamin (MULTIVITAMINS PO) every 24 hours    Historical Provider, MD   ondansetron (ZOFRAN) 8 mg tablet Take 1 tablet (8 mg total) by mouth every 8 (eight) hours as needed for nausea or vomiting  Patient not taking: Reported on 10/30/2023 4/25/23 10/6/23  Kala Arita PA-C   oxygen gas Inhale 2 L/min continuous. May increase to 3L PRN for dyspnea and sats under 89%  Indications: Hypoxia 10/25/23   Historical Provider, MD   polyethylene glycol (GLYCOLAX) 17 GM/SCOOP powder Take 17 g by mouth daily. Indications: Constipation 10/30/23   Historical Provider, MD   Probiotic Product  (PROBIOTIC DAILY PO) 1 daily    Historical Provider, MD   prochlorperazine (COMPAZINE) 10 mg tablet Take 1 tablet (10 mg total) by mouth every 6 (six) hours as needed for nausea  Patient not taking: Reported on 2023   Shani Phipps DO   Sennosides (Senna) 8.6 MG CAPS Take 2 capsules by mouth as needed (constipation) 23   Historical Provider, MD   sertraline (ZOLOFT) 100 mg tablet 2 daily    Historical Provider, MD   telmisartan (MICARDIS) 80 MG tablet Take 1 tablet (80 mg total) by mouth daily 23  Keyon Miller MD   verapamil (CALAN-SR) 120 mg CR tablet Take 1 tablet (120 mg total) by mouth daily at bedtime 1 hs 23   Keyon Miller MD   verapamil (CALAN-SR) 240 mg CR tablet Take 1 tablet (240 mg total) by mouth daily at bedtime 1 hs 23   Keyon Miller MD     I have reviewed home medications with patient personally.    Allergies:   Allergies   Allergen Reactions    Amoxicillin Hives    Vilazodone Hcl Nausea Only and Dizziness     (Viibryd)    Wasp Venom Swelling    Zithromax [Azithromycin] Hives       Social History:  Marital Status: /Civil Union   Occupation: retired  Patient Pre-hospital Living Situation: Home  Patient Pre-hospital Level of Mobility: walks  Patient Pre-hospital Diet Restrictions: none  Substance Use History:   Social History     Substance and Sexual Activity   Alcohol Use Not Currently    Comment: Rarely drink, socially only     Social History     Tobacco Use   Smoking Status Former    Current packs/day: 0.00    Average packs/day: 1.5 packs/day for 53.2 years (79.8 ttl pk-yrs)    Types: Cigarettes    Start date:     Quit date: 3/15/2023    Years since quittin.7   Smokeless Tobacco Never     Social History     Substance and Sexual Activity   Drug Use No       Family History:  Family History   Problem Relation Age of Onset    Colon cancer Mother     Hypertension Mother     Hyperlipidemia Mother     Hearing loss Mother     Depression Mother      COPD Mother     Cancer Mother         Lung    Arthritis Mother     Lung cancer Mother     Drug abuse Brother     Diabetes Maternal Grandmother     Cancer Maternal Grandmother         Colon    Colon cancer Maternal Grandmother     Cancer Maternal Aunt     Colon cancer Maternal Aunt     Colon cancer Maternal Aunt     Cancer Maternal Aunt         Colon       Physical Exam:     Vitals:   Blood Pressure: 157/98 (12/15/23 2225)  Pulse: 100 (12/15/23 2225)  Temperature: 98.3 °F (36.8 °C) (12/15/23 2225)  Temp Source: Oral (12/15/23 1721)  Respirations: 21 (12/15/23 2100)  SpO2: 96 % (12/15/23 2225)    Physical Exam  Vitals and nursing note reviewed.   Constitutional:       General: She is not in acute distress.     Appearance: Normal appearance.   HENT:      Head: Normocephalic and atraumatic.      Mouth/Throat:      Mouth: Mucous membranes are moist.   Eyes:      General: No scleral icterus.     Extraocular Movements: Extraocular movements intact.      Pupils: Pupils are equal, round, and reactive to light.   Cardiovascular:      Rate and Rhythm: Normal rate and regular rhythm.      Heart sounds: Normal heart sounds. No murmur heard.     No friction rub.   Pulmonary:      Effort: Pulmonary effort is normal.      Breath sounds: Examination of the right-middle field reveals decreased breath sounds. Examination of the left-middle field reveals decreased breath sounds. Examination of the right-lower field reveals decreased breath sounds. Examination of the left-lower field reveals decreased breath sounds. Decreased breath sounds present. No wheezing or rhonchi.   Abdominal:      General: Bowel sounds are normal. There is no distension.      Palpations: Abdomen is soft.      Tenderness: There is no abdominal tenderness. There is no guarding.   Musculoskeletal:         General: No swelling.      Cervical back: Neck supple.      Right lower leg: No edema.      Left lower leg: No edema.   Skin:     General: Skin is warm and dry.       Capillary Refill: Capillary refill takes less than 2 seconds.      Coloration: Skin is not jaundiced or pale.   Neurological:      General: No focal deficit present.      Mental Status: She is alert and oriented to person, place, and time. Mental status is at baseline.         Additional Data:     Lab Results:  Results from last 7 days   Lab Units 12/15/23  1726   WBC Thousand/uL 3.02*   HEMOGLOBIN g/dL 10.4*   HEMATOCRIT % 33.4*   PLATELETS Thousands/uL 494*   NEUTROS PCT % 35*   LYMPHS PCT % 30   MONOS PCT % 33*   EOS PCT % 0     Results from last 7 days   Lab Units 12/15/23  1726   SODIUM mmol/L 132*   POTASSIUM mmol/L 3.5   CHLORIDE mmol/L 99   CO2 mmol/L 24   BUN mg/dL 9   CREATININE mg/dL 0.46*   ANION GAP mmol/L 9   CALCIUM mg/dL 9.2   ALBUMIN g/dL 3.2*   TOTAL BILIRUBIN mg/dL 0.20   ALK PHOS U/L 98   ALT U/L 12   AST U/L 18   GLUCOSE RANDOM mg/dL 106                 Results from last 7 days   Lab Units 12/15/23  1726   LACTIC ACID mmol/L 0.9       Imaging: Reviewed radiology reports from this admission including: chest CT scan  CTA ED chest PE Study   Final Result by Chato Alva MD (12/15 2101)      No pulmonary embolism.      Stable severe emphysema with right lower lung field mass consistent with tumor.      Pleural carcinomatosis on the right with slightly enlarged right pleural effusion despite indwelling pleural catheter .      Stable metastatic nodules in the left upper lobe.      New large left pleural effusion.      Destructive pathological fracture through a lytic lesion in the posterior left fifth rib.                  Workstation performed: [id number]         XR chest 1 view portable    (Results Pending)   IR IN-Patient Thoracentesis    (Results Pending)       EKG and Other Studies Reviewed on Admission:   Prior pertinent studies and records reviewed in [facility]/Care Everywhere.    ** Please Note: This note has been constructed using a voice recognition system. **

## 2023-12-17 ENCOUNTER — APPOINTMENT (INPATIENT)
Dept: RADIOLOGY | Facility: HOSPITAL | Age: 63
End: 2023-12-17
Payer: COMMERCIAL

## 2023-12-17 ENCOUNTER — HOME CARE VISIT (OUTPATIENT)
Dept: HOME HEALTH SERVICES | Facility: HOME HEALTHCARE | Age: 63
End: 2023-12-17
Payer: COMMERCIAL

## 2023-12-17 LAB
ANION GAP SERPL CALCULATED.3IONS-SCNC: 8 MMOL/L
APPEARANCE FLD: CLEAR
BASOPHILS # BLD AUTO: 0.04 THOUSANDS/ÂΜL (ref 0–0.1)
BASOPHILS NFR BLD AUTO: 1 % (ref 0–1)
BUN SERPL-MCNC: 6 MG/DL (ref 5–25)
CALCIUM SERPL-MCNC: 9 MG/DL (ref 8.4–10.2)
CHLORIDE SERPL-SCNC: 99 MMOL/L (ref 96–108)
CO2 SERPL-SCNC: 25 MMOL/L (ref 21–32)
COLOR FLD: NORMAL
CREAT SERPL-MCNC: 0.44 MG/DL (ref 0.6–1.3)
EOSINOPHIL # BLD AUTO: 0.01 THOUSAND/ÂΜL (ref 0–0.61)
EOSINOPHIL NFR BLD AUTO: 0 % (ref 0–6)
ERYTHROCYTE [DISTWIDTH] IN BLOOD BY AUTOMATED COUNT: 16.2 % (ref 11.6–15.1)
GFR SERPL CREATININE-BSD FRML MDRD: 106 ML/MIN/1.73SQ M
GLUCOSE SERPL-MCNC: 101 MG/DL (ref 65–140)
HCT VFR BLD AUTO: 33.3 % (ref 34.8–46.1)
HCT VFR BLD AUTO: 33.5 % (ref 34.8–46.1)
HGB BLD-MCNC: 10.3 G/DL (ref 11.5–15.4)
HGB BLD-MCNC: 10.3 G/DL (ref 11.5–15.4)
HISTIOCYTES NFR FLD: 67 %
IMM GRANULOCYTES # BLD AUTO: 0.05 THOUSAND/UL (ref 0–0.2)
IMM GRANULOCYTES NFR BLD AUTO: 1 % (ref 0–2)
LDH FLD L TO P-CCNC: 197 U/L
LDH SERPL-CCNC: 159 U/L (ref 140–271)
LYMPHOCYTES # BLD AUTO: 0.69 THOUSANDS/ÂΜL (ref 0.6–4.47)
LYMPHOCYTES NFR BLD AUTO: 17 %
LYMPHOCYTES NFR BLD AUTO: 20 % (ref 14–44)
MCH RBC QN AUTO: 27.4 PG (ref 26.8–34.3)
MCHC RBC AUTO-ENTMCNC: 30.9 G/DL (ref 31.4–37.4)
MCV RBC AUTO: 89 FL (ref 82–98)
MONOCYTES # BLD AUTO: 1.03 THOUSAND/ÂΜL (ref 0.17–1.22)
MONOCYTES NFR BLD AUTO: 30 % (ref 4–12)
MONOCYTES NFR BLD AUTO: 8 %
NEUTROPHILS # BLD AUTO: 1.67 THOUSANDS/ÂΜL (ref 1.85–7.62)
NEUTS SEG NFR BLD AUTO: 48 % (ref 43–75)
NEUTS SEG NFR BLD AUTO: 8 %
NRBC BLD AUTO-RTO: 0 /100 WBCS
PH BODY FLUID: 7.8
PLATELET # BLD AUTO: 458 THOUSANDS/UL (ref 149–390)
PMV BLD AUTO: 9.4 FL (ref 8.9–12.7)
POTASSIUM SERPL-SCNC: 3.8 MMOL/L (ref 3.5–5.3)
PROT FLD-MCNC: 3 G/DL
PROT SERPL-MCNC: 6 G/DL (ref 6.4–8.4)
RBC # BLD AUTO: 3.76 MILLION/UL (ref 3.81–5.12)
SITE: NORMAL
SODIUM SERPL-SCNC: 132 MMOL/L (ref 135–147)
TOTAL CELLS COUNTED SPEC: 100
WBC # BLD AUTO: 3.49 THOUSAND/UL (ref 4.31–10.16)
WBC # FLD MANUAL: 919 /UL

## 2023-12-17 PROCEDURE — 85018 HEMOGLOBIN: CPT | Performed by: INTERNAL MEDICINE

## 2023-12-17 PROCEDURE — 83615 LACTATE (LD) (LDH) ENZYME: CPT | Performed by: INTERNAL MEDICINE

## 2023-12-17 PROCEDURE — 83615 LACTATE (LD) (LDH) ENZYME: CPT | Performed by: PHYSICIAN ASSISTANT

## 2023-12-17 PROCEDURE — 99232 SBSQ HOSP IP/OBS MODERATE 35: CPT | Performed by: INTERNAL MEDICINE

## 2023-12-17 PROCEDURE — 87070 CULTURE OTHR SPECIMN AEROBIC: CPT | Performed by: PHYSICIAN ASSISTANT

## 2023-12-17 PROCEDURE — 89051 BODY FLUID CELL COUNT: CPT | Performed by: INTERNAL MEDICINE

## 2023-12-17 PROCEDURE — 99232 SBSQ HOSP IP/OBS MODERATE 35: CPT | Performed by: PHYSICIAN ASSISTANT

## 2023-12-17 PROCEDURE — 85014 HEMATOCRIT: CPT | Performed by: INTERNAL MEDICINE

## 2023-12-17 PROCEDURE — 83986 ASSAY PH BODY FLUID NOS: CPT | Performed by: PHYSICIAN ASSISTANT

## 2023-12-17 PROCEDURE — 84155 ASSAY OF PROTEIN SERUM: CPT | Performed by: INTERNAL MEDICINE

## 2023-12-17 PROCEDURE — 84157 ASSAY OF PROTEIN OTHER: CPT | Performed by: PHYSICIAN ASSISTANT

## 2023-12-17 PROCEDURE — 85025 COMPLETE CBC W/AUTO DIFF WBC: CPT | Performed by: INTERNAL MEDICINE

## 2023-12-17 PROCEDURE — 71045 X-RAY EXAM CHEST 1 VIEW: CPT

## 2023-12-17 PROCEDURE — 88112 CYTOPATH CELL ENHANCE TECH: CPT | Performed by: SPECIALIST

## 2023-12-17 PROCEDURE — 88305 TISSUE EXAM BY PATHOLOGIST: CPT | Performed by: SPECIALIST

## 2023-12-17 PROCEDURE — 80048 BASIC METABOLIC PNL TOTAL CA: CPT | Performed by: INTERNAL MEDICINE

## 2023-12-17 RX ORDER — BENZONATATE 100 MG/1
100 CAPSULE ORAL 3 TIMES DAILY PRN
Status: DISCONTINUED | OUTPATIENT
Start: 2023-12-17 | End: 2023-12-18 | Stop reason: HOSPADM

## 2023-12-17 RX ORDER — ACETAMINOPHEN 325 MG/1
650 TABLET ORAL EVERY 6 HOURS PRN
Status: DISCONTINUED | OUTPATIENT
Start: 2023-12-17 | End: 2023-12-18 | Stop reason: HOSPADM

## 2023-12-17 RX ADMIN — BUPROPION HYDROCHLORIDE 300 MG: 300 TABLET, FILM COATED, EXTENDED RELEASE ORAL at 08:16

## 2023-12-17 RX ADMIN — HEPARIN SODIUM 5000 UNITS: 5000 INJECTION INTRAVENOUS; SUBCUTANEOUS at 14:54

## 2023-12-17 RX ADMIN — HEPARIN SODIUM 5000 UNITS: 5000 INJECTION INTRAVENOUS; SUBCUTANEOUS at 21:49

## 2023-12-17 RX ADMIN — ATORVASTATIN CALCIUM 20 MG: 20 TABLET, FILM COATED ORAL at 21:49

## 2023-12-17 RX ADMIN — BENZONATATE 100 MG: 100 CAPSULE ORAL at 06:31

## 2023-12-17 RX ADMIN — UMECLIDINIUM 1 PUFF: 62.5 AEROSOL, POWDER ORAL at 10:49

## 2023-12-17 RX ADMIN — METOPROLOL TARTRATE 25 MG: 25 TABLET, FILM COATED ORAL at 21:49

## 2023-12-17 RX ADMIN — LOSARTAN POTASSIUM 100 MG: 50 TABLET, FILM COATED ORAL at 08:16

## 2023-12-17 RX ADMIN — FUROSEMIDE 20 MG: 20 TABLET ORAL at 08:16

## 2023-12-17 RX ADMIN — BENZONATATE 100 MG: 100 CAPSULE ORAL at 21:49

## 2023-12-17 RX ADMIN — ACETAMINOPHEN 650 MG: 325 TABLET, FILM COATED ORAL at 15:14

## 2023-12-17 RX ADMIN — VERAPAMIL HYDROCHLORIDE 360 MG: 180 TABLET ORAL at 21:49

## 2023-12-17 RX ADMIN — FLUTICASONE FUROATE AND VILANTEROL TRIFENATATE 1 PUFF: 200; 25 POWDER RESPIRATORY (INHALATION) at 10:49

## 2023-12-17 RX ADMIN — METOPROLOL TARTRATE 25 MG: 25 TABLET, FILM COATED ORAL at 08:16

## 2023-12-17 RX ADMIN — SERTRALINE 200 MG: 100 TABLET, FILM COATED ORAL at 08:16

## 2023-12-17 RX ADMIN — ACETAMINOPHEN 650 MG: 325 TABLET, FILM COATED ORAL at 08:16

## 2023-12-17 RX ADMIN — HEPARIN SODIUM 5000 UNITS: 5000 INJECTION INTRAVENOUS; SUBCUTANEOUS at 06:21

## 2023-12-17 RX ADMIN — POTASSIUM CHLORIDE 20 MEQ: 1500 TABLET, EXTENDED RELEASE ORAL at 08:16

## 2023-12-17 NOTE — PLAN OF CARE
Problem: PAIN - ADULT  Goal: Verbalizes/displays adequate comfort level or baseline comfort level  Description: Interventions:  - Encourage patient to monitor pain and request assistance  - Assess pain using appropriate pain scale  - Administer analgesics based on type and severity of pain and evaluate response  - Implement non-pharmacological measures as appropriate and evaluate response  - Consider cultural and social influences on pain and pain management  - Notify physician/advanced practitioner if interventions unsuccessful or patient reports new pain  Outcome: Progressing     Problem: INFECTION - ADULT  Goal: Absence or prevention of progression during hospitalization  Description: INTERVENTIONS:  - Assess and monitor for signs and symptoms of infection  - Monitor lab/diagnostic results  - Monitor all insertion sites, i.e. indwelling lines, tubes, and drains  - Monitor endotracheal if appropriate and nasal secretions for changes in amount and color  - [place] appropriate cooling/warming therapies per order  - Administer medications as ordered  - Instruct and encourage patient and family to use good hand hygiene technique  - Identify and instruct in appropriate isolation precautions for identified infection/condition  Outcome: Progressing  Goal: Absence of fever/infection during neutropenic period  Description: INTERVENTIONS:  - Monitor WBC    Outcome: Progressing     Problem: SAFETY ADULT  Goal: Patient will remain free of falls  Description: INTERVENTIONS:  - Educate patient/family on patient safety including physical limitations  - Instruct patient to call for assistance with activity   - Consult OT/PT to assist with strengthening/mobility   - Keep Call bell within reach  - Keep bed low and locked with side rails adjusted as appropriate  - Keep care items and personal belongings within reach  - Initiate and maintain comfort rounds  - Make Fall Risk Sign visible to staff  - Obtain necessary fall risk  management equipment: non-slip socks  - Apply yellow socks and bracelet for high fall risk patients  - Consider moving patient to room near nurses station  Outcome: Progressing  Goal: Maintain or return to baseline ADL function  Description: INTERVENTIONS:  -  Assess patient's ability to carry out ADLs; assess patient's baseline for ADL function and identify physical deficits which impact ability to perform ADLs (bathing, care of mouth/teeth, toileting, grooming, dressing, etc.)  - Assess/evaluate cause of self-care deficits   - Assess range of motion  - Assess patient's mobility; develop plan if impaired  - Assess patient's need for assistive devices and provide as appropriate  - Encourage maximum independence but intervene and supervise when necessary  - Involve family in performance of ADLs  - Assess for home care needs following discharge   - Consider OT consult to assist with ADL evaluation and planning for discharge  - Provide patient education as appropriate  Outcome: Progressing  Goal: Maintains/Returns to pre admission functional level  Description: INTERVENTIONS:  - Perform AM-PAC 6 Click Basic Mobility/ Daily Activity assessment daily.  - Set and communicate daily mobility goal to care team and patient/family/caregiver.   - Collaborate with rehabilitation services on mobility goals if consulted  - Perform Range of Motion 3 times a day.  - Encourage patient to reposition every 2 hours.  - Dangle patient 3 times a day  - Stand patient 3 times a day  - Ambulate patient 3 times a day  - Out of bed to chair 3 times a day for meals  - Out of bed for toileting  - Record patient progress and toleration of activity level   Outcome: Progressing     Problem: DISCHARGE PLANNING  Goal: Discharge to home or other facility with appropriate resources  Description: INTERVENTIONS:  - Identify barriers to discharge w/patient and caregiver  - Arrange for needed discharge resources and transportation as appropriate  -  Identify discharge learning needs (meds, wound care, etc.)  - Arrange for interpretive services to assist at discharge as needed  - Refer to Case Management Department for coordinating discharge planning if the patient needs post-hospital services based on physician/advanced practitioner order or complex needs related to functional status, cognitive ability, or social support system  Outcome: Progressing     Problem: Knowledge Deficit  Goal: Patient/family/caregiver demonstrates understanding of disease process, treatment plan, medications, and discharge instructions  Description: Complete learning assessment and assess knowledge base.  Interventions:  - Provide teaching at level of understanding  - Provide teaching via preferred learning methods  Outcome: Progressing     Problem: RESPIRATORY - ADULT  Goal: Achieves optimal ventilation and oxygenation  Description: INTERVENTIONS:  - Assess for changes in respiratory status  - Assess for changes in mentation and behavior  - Position to facilitate oxygenation and minimize respiratory effort  - Oxygen administered by appropriate delivery if ordered  - Initiate smoking cessation education as indicated  - Encourage broncho-pulmonary hygiene including cough, deep breathe, Incentive Spirometry  - Assess the need for suctioning and aspirate as needed  - Assess and instruct to report SOB or any respiratory difficulty  - Respiratory Therapy support as indicated  Outcome: Progressing

## 2023-12-17 NOTE — PLAN OF CARE
Problem: PAIN - ADULT  Goal: Verbalizes/displays adequate comfort level or baseline comfort level  Description: Interventions:  - Encourage patient to monitor pain and request assistance  - Assess pain using appropriate pain scale  - Administer analgesics based on type and severity of pain and evaluate response  - Implement non-pharmacological measures as appropriate and evaluate response  - Consider cultural and social influences on pain and pain management  - Notify physician/advanced practitioner if interventions unsuccessful or patient reports new pain  Outcome: Progressing     Problem: INFECTION - ADULT  Goal: Absence or prevention of progression during hospitalization  Description: INTERVENTIONS:  - Assess and monitor for signs and symptoms of infection  - Monitor lab/diagnostic results  - Monitor all insertion sites, i.e. indwelling lines, tubes, and drains  - Monitor endotracheal if appropriate and nasal secretions for changes in amount and color  - [place] appropriate cooling/warming therapies per order  - Administer medications as ordered  - Instruct and encourage patient and family to use good hand hygiene technique  - Identify and instruct in appropriate isolation precautions for identified infection/condition  Outcome: Progressing  Goal: Absence of fever/infection during neutropenic period  Description: INTERVENTIONS:  - Monitor WBC    Outcome: Progressing

## 2023-12-17 NOTE — ASSESSMENT & PLAN NOTE
With right lung asept catheter in place, drain 3x weekly by VNA  Now with large left pleural effusion, likely also malignant.  Consulted pulm- plan for thora bedside today  Fluid studies to be obtained

## 2023-12-17 NOTE — ASSESSMENT & PLAN NOTE
Chronic and mild hyponatremia at 132 today  Secondary to lung cancer  Trend BMP while admitted  Continue Lasix  Avoid IV hydration

## 2023-12-17 NOTE — PROGRESS NOTES
[facility]  Progress Note  Name: Kathi Ferris I  MRN: [id number]  Unit/Bed#: -Kieran I Date of Admission: 12/15/2023   Date of Service: 12/17/2023 I Hospital Day: 2    Assessment/Plan   * Malignant pleural effusion  Assessment & Plan  With right lung asept catheter in place, drain 3x weekly by VNA  Now with large left pleural effusion, likely also malignant.  Consulted pulm- plan for thora bedside today  Fluid studies to be obtained    Chronic respiratory failure with hypoxia (HCC)  Assessment & Plan  Chronically on 2 to 3 L/min.  At baseline.  Monitor respiratory status closely    Generalized anxiety disorder  Assessment & Plan  Continue home Ativan, Wellbutrin, Zoloft    Hyponatremia  Assessment & Plan  Chronic and mild hyponatremia at 132 today  Secondary to lung cancer  Trend BMP while admitted  Continue Lasix  Avoid IV hydration    Non-small cell cancer of right lung (HCC)  Assessment & Plan  CT chest with stability of disease and new left pleural effusion which is likely malignant  Continue outpatient follow-up with pulmonary, oncology, palliative care    Essential hypertension  Assessment & Plan  Blood pressure is elevated in the ER, 182/92  Home regimen: Micardis 80 mg, verapamil 360 mg daily, Lasix 20 mg daily   Start lopressor- for HR and BP  Did not need Hydralazine PRN overnight    COPD (chronic obstructive pulmonary disease) (HCC)  Assessment & Plan  Does not appear to be in acute COPD exacerbation  Continue home inhaler regimen  Monitor respiratory status  Continue outpatient follow-up with pulmonary             VTE Pharmacologic Prophylaxis: VTE Score: 5 High Risk (Score >/= 5) - Pharmacological DVT Prophylaxis Ordered: heparin. Sequential Compression Devices Ordered.    Mobility:   Basic Mobility Inpatient Raw Score: 22  JH-HLM Goal: 7: Walk 25 feet or more  JH-HLM Achieved: 7: Walk 25 feet or more  IP  patient     Patient Centered Rounds: I performed bedside  rounds with nursing staff today.   Discussions with Specialists or Other Care Team Provider: Appreciate most recent Pulmonary note    Education and Discussions with Family / Patient: Patient declined call to .     Total Time Spent on Date of Encounter in care of patient: 35 mins. This time was spent on one or more of the following: performing physical exam; counseling and coordination of care; obtaining or reviewing history; documenting in the medical record; reviewing/ordering tests, medications or procedures; communicating with other healthcare professionals and discussing with patient's family/caregivers.    Current Length of Stay: 2 day(s)  Current Patient Status: Inpatient   Certification Statement: The patient will continue to require additional inpatient hospital stay due to Pending thoracentesis  Discharge Plan: Anticipate discharge in 24-48 hrs to home.    Code Status: Level 3 - DNAR and DNI    Subjective:   Patient reports ongoing fatigue, breathing is stable from yesterday. She is awaiting her thoracentesis so she can progress towards discharge and be home for the holidays.    Objective:     Vitals:   Temp (24hrs), Av.4 °F (36.9 °C), Min:98.2 °F (36.8 °C), Max:98.5 °F (36.9 °C)    Temp:  [98.2 °F (36.8 °C)-98.5 °F (36.9 °C)] 98.5 °F (36.9 °C)  HR:  [72-86] 72  Resp:  [16-20] 20  BP: (140-157)/(72-89) 140/72  SpO2:  [95 %-96 %] 95 %  There is no height or weight on file to calculate BMI.     Input and Output Summary (last 24 hours):     Intake/Output Summary (Last 24 hours) at 2023 1209  Last data filed at 2023 0744  Gross per 24 hour   Intake 960 ml   Output 150 ml   Net 810 ml       Physical Exam:   Physical Exam  Vitals and nursing note reviewed.   Constitutional:       General: She is not in acute distress.     Appearance: Normal appearance. She is well-developed. She is obese. She is ill-appearing.      Interventions: Nasal cannula in place.   HENT:      Head:  Normocephalic and atraumatic.   Eyes:      General: No scleral icterus.     Conjunctiva/sclera: Conjunctivae normal.   Cardiovascular:      Rate and Rhythm: Normal rate and regular rhythm.      Heart sounds: No murmur heard.  Pulmonary:      Effort: Pulmonary effort is normal.      Breath sounds: Rales present. No wheezing or rhonchi.   Abdominal:      General: There is no distension.      Palpations: Abdomen is soft.   Skin:     General: Skin is warm and dry.   Neurological:      General: No focal deficit present.      Mental Status: She is alert.   Psychiatric:         Mood and Affect: Mood normal.          Additional Data:     Labs:  Results from last 7 days   Lab Units 12/17/23  0434   WBC Thousand/uL 3.49*   HEMOGLOBIN g/dL 10.3*   HEMATOCRIT % 33.3*   PLATELETS Thousands/uL 458*   NEUTROS PCT % 48   LYMPHS PCT % 20   MONOS PCT % 30*   EOS PCT % 0     Results from last 7 days   Lab Units 12/17/23  0434 12/16/23  0810 12/15/23  1726   SODIUM mmol/L 132*   < > 132*   POTASSIUM mmol/L 3.8   < > 3.5   CHLORIDE mmol/L 99   < > 99   CO2 mmol/L 25   < > 24   BUN mg/dL 6   < > 9   CREATININE mg/dL 0.44*   < > 0.46*   ANION GAP mmol/L 8   < > 9   CALCIUM mg/dL 9.0   < > 9.2   ALBUMIN g/dL  --   --  3.2*   TOTAL BILIRUBIN mg/dL  --   --  0.20   ALK PHOS U/L  --   --  98   ALT U/L  --   --  12   AST U/L  --   --  18   GLUCOSE RANDOM mg/dL 101   < > 106    < > = values in this interval not displayed.     Results from last 7 days   Lab Units 12/16/23  0810   INR  0.95             Results from last 7 days   Lab Units 12/15/23  1726   LACTIC ACID mmol/L 0.9       Lines/Drains:  Invasive Devices       Central Venous Catheter Line  Duration             Port A Cath 04/18/23 Right Subclavian 243 days              Peripheral Intravenous Line  Duration             Peripheral IV 12/15/23 Left Antecubital 1 day              Drain  Duration             Pleural Effusion Long-Term Catheter 15.5 Fr. 235 days                    Central  Line:  Goal for removal: Port accessed. Will de-access as appropriate.             Imaging: Reviewed radiology reports from this admission including: chest CT scan    Recent Cultures (last 7 days):   Results from last 7 days   Lab Units 12/15/23  1726   BLOOD CULTURE  No Growth at 24 hrs.  No Growth at 24 hrs.       Last 24 Hours Medication List:   Current Facility-Administered Medications   Medication Dose Route Frequency Provider Last Rate    acetaminophen  650 mg Oral Q6H PRN Brenda CORONA PA-C      albuterol  2.5 mg Nebulization Q6H PRN Clay Campos PA-C      atorvastatin  20 mg Oral Daily Clay Campos PA-C      benzonatate  100 mg Oral TID PRN Clay Campos PA-C      buPROPion  300 mg Oral Daily Clay Campos PA-C      diphenhydrAMINE  50 mg Oral Q6H PRN Clay Campos PA-C      fluticasone-vilanterol  1 puff Inhalation Daily Clay Campos PA-C      furosemide  20 mg Oral QAM Clay Campos PA-C      heparin (porcine)  5,000 Units Subcutaneous Q8H ULISES Clay Campos PA-C      hydrALAZINE  5 mg Intravenous Q6H PRN Clay Campos PA-C      LORazepam  0.5 mg Oral Q8H PRN Clay Campos PA-C      losartan  100 mg Oral Daily Clay Campos PA-C      metoprolol tartrate  25 mg Oral Q12H [facility] Noman Stauffer MD      ondansetron  4 mg Intravenous Q6H PRN Clay Campos PA-C      polyethylene glycol  17 g Oral Daily PRN Clay Campos PA-C      potassium chloride  20 mEq Oral Daily Clay Campos PA-C      sertraline  200 mg Oral Daily Clay Campos PA-C      umeclidinium  1 puff Inhalation Daily CAT Ibarra      verapamil  360 mg Oral HS Clay Campos PA-C          Today, Patient Was Seen By: Brenda Edwards PA-C    **Please Note: This note may have been constructed using a voice recognition system.**

## 2023-12-17 NOTE — UTILIZATION REVIEW
"Initial Clinical Review    Admission: Date/Time/Statement:   Admission Orders (From admission, onward)       Ordered        12/15/23 2147  INPATIENT ADMISSION  Once                          Orders Placed This Encounter   Procedures    INPATIENT ADMISSION     Standing Status:   Standing     Number of Occurrences:   1     Order Specific Question:   Level of Care     Answer:   Med Surg [16]     Order Specific Question:   Estimated length of stay     Answer:   More than 2 Midnights     Order Specific Question:   Certification     Answer:   I certify that inpatient services are medically necessary for this patient for a duration of greater than two midnights. See H&P and MD Progress Notes for additional information about the patient's course of treatment.     ED Arrival Information       Expected   -    Arrival   12/15/2023 17:08    Acuity   Emergent              Means of arrival   Walk-In    Escorted by   Self    Service   Hospitalist    Admission type   Emergency              Arrival complaint   SOB, hypertension             Chief Complaint   Patient presents with    Shortness of Breath     Pt's  states \"My wife is a lung ca pt. Visiting nurse that drains her lungs said that she has high blood pressure. She is usually on 3 L/min.\" Pt reports being short of breath more than usual. Reports cough. Denies fevers.        Initial Presentation: 63 y.o. female , presented to the ED @ [facility], from home via walk in.    Admitted as Inpatient due to  Malignant Pleural Effusion.    PMH of metastatic lung CA, COPD, palliative care patient    Date: 12/15/2023   Presents with chief complaint of worsening shortness of breath as well as high blood pressure.  VNA was home for a asept catheter drained today.  Blood pressure was high and she was having shortness of breath.  Was sent into the ER and found to have a new left-sided pleural effusion which is large.  Case was discussed with IR, planning for IR thoracentesis " tomorrow.  She is on her baseline 2 to 3 L of O2. Telemetry.  Monitor Respiratory status.  O2 @ 2/3 LPM (Chronic).  Repeat BMP tomorrow.  Continue lasix.  I&O.  Daily weight.      Day 2: 12/16/2023  Telemetry.  O2 @ 2L via NC.  Monitor respiratory status closely.  Repeat BMP.  Continue Lasix.  Avoid IV hydration.      12/16/2023  Consult Pulmonary:  bnormal chest imaging with B/L pleural effusions              S/P indwelling pleural catheter on the right for known malignant effusion.              Can attempt to drain right pleural catheter today to assist with symptoms. D/W nursing.              New left pleural effusion can be drained with diagnostic and therapeutic thoracentesis by our team or IR pending course. She is agreeable to this.   Previously Stage Ia NSCLC, now stage IV              S/P lobectomy.              Follows with palliative care and oncology.   COPD of unknown severity without acute exacerbation              Maintains on Trelegy and albuterol as needed as an outpatient.              Continue Breo and add Incruse with albuterol as needed inpatient.   Chronic hypoxic respiratory failure, multifactorial due to above              Baseline oxygen requirement is 3L NC.              Titrate oxygen to maintain POX > or = 89%.     ED Triage Vitals   Temperature Pulse Respirations Blood Pressure SpO2   12/15/23 1721 12/15/23 1721 12/15/23 1721 12/15/23 1721 12/15/23 1721   98.9 °F (37.2 °C) (!) 108 (!) 26 (!) 193/93 97 %      Temp Source Heart Rate Source Patient Position - Orthostatic VS BP Location FiO2 (%)   12/15/23 1721 12/15/23 1721 12/15/23 1721 12/15/23 1721 --   Oral Monitor Sitting Left arm       Pain Score       12/15/23 1900       5          Wt Readings from Last 1 Encounters:   12/05/23 84 kg (185 lb 3.2 oz)     Additional Vital Signs:   12/16/23 23:21:36 -- 85 -- 157/89 112 95 % -- -- -- --   12/16/23 2321 -- 83 -- 157/89 -- -- -- -- -- --   12/16/23 20:09:11 98.5 °F (36.9 °C) 86 18 157/88  111 95 % -- -- -- --   12/16/23 16:09:45 98.2 °F (36.8 °C) 81 16 157/89 112 95 % -- -- -- --   12/16/23 07:12:47 99 °F (37.2 °C) 95 18 160/95 117 94 % 30 2.5 L/min Nasal cannula --   12/15/23 22:25:31 98.3 °F (36.8 °C) 100 -- 157/98 118 96 % -- -- -- --   12/15/23 2100 -- 97 21 182/92 Abnormal  129 96 % 28 2 L/min Nasal cannula --   12/15/23 1900 -- 95 17 172/85 Abnormal  121 96 % -- -- Nasal cannula --   12/15/23 1815 -- 96 -- -- -- 95 % -- -- -- --         Pertinent Labs/Diagnostic Test Results:   CTA ED chest PE Study   Final Result by Chato Alva MD (12/15 2101)   No pulmonary embolism.      Stable severe emphysema with right lower lung field mass consistent with tumor.      Pleural carcinomatosis on the right with slightly enlarged right pleural effusion despite indwelling pleural catheter .      Stable metastatic nodules in the left upper lobe.      New large left pleural effusion.      Destructive pathological fracture through a lytic lesion in the posterior left fifth rib.      XR chest 1 view portable   Final Result by Rajiv Bingham MD (12/16 0917)   Persistent mild interstitial pulmonary edema with elevated right hemidiaphragm and right basilar opacity concerning for possible infiltrate.      IR IN-Patient Thoracentesis    (Results Pending)   XR chest portable    (Results Pending)     Results from last 7 days   Lab Units 12/15/23  1858   SARS-COV-2  Negative     Results from last 7 days   Lab Units 12/17/23  1314 12/17/23  0434 12/16/23  0810 12/15/23  1726   WBC Thousand/uL  --  3.49* 2.94* 3.02*   HEMOGLOBIN g/dL 10.3* 10.3* 9.9* 10.4*   HEMATOCRIT % 33.5* 33.3* 31.9* 33.4*   PLATELETS Thousands/uL  --  458* 442* 494*   NEUTROS ABS Thousands/µL  --  1.67* 1.35* 1.08*     Results from last 7 days   Lab Units 12/17/23  0434 12/16/23  0810 12/15/23  1726   SODIUM mmol/L 132* 132* 132*   POTASSIUM mmol/L 3.8 3.7 3.5   CHLORIDE mmol/L 99 100 99   CO2 mmol/L 25 26 24   ANION GAP mmol/L 8 6 9   BUN mg/dL 6 6  9   CREATININE mg/dL 0.44* 0.44* 0.46*   EGFR ml/min/1.73sq m 106 106 104   CALCIUM mg/dL 9.0 8.8 9.2   MAGNESIUM mg/dL  --   --  1.7*   PHOSPHORUS mg/dL  --   --  3.1     Results from last 7 days   Lab Units 12/17/23  1357 12/15/23  1858 12/15/23  1726   AST U/L  --   --  18   ALT U/L  --   --  12   ALK PHOS U/L  --   --  98   TOTAL PROTEIN g/dL 6.0*  --  6.3*   ALBUMIN g/dL  --   --  3.2*   TOTAL BILIRUBIN mg/dL  --   --  0.20   AMMONIA umol/L  --  27  --      Results from last 7 days   Lab Units 12/17/23  0434 12/16/23  0810 12/15/23  1726   GLUCOSE RANDOM mg/dL 101 93 106     Results from last 7 days   Lab Units 12/15/23  2124 12/15/23  1858 12/15/23  1726   HS TNI 0HR ng/L  --   --  4   HS TNI 2HR ng/L  --  4  --    HSTNI D2 ng/L  --  0  --    HS TNI 4HR ng/L 5  --   --    HSTNI D4 ng/L 1  --   --      Results from last 7 days   Lab Units 12/16/23  0810   PROTIME seconds 13.1   INR  0.95     Results from last 7 days   Lab Units 12/15/23  1726   TSH 3RD GENERATON uIU/mL 2.644     Results from last 7 days   Lab Units 12/15/23  1726   LACTIC ACID mmol/L 0.9     Results from last 7 days   Lab Units 12/15/23  1726   BNP pg/mL 50     Results from last 7 days   Lab Units 12/15/23  1726   LIPASE u/L 6*     Results from last 7 days   Lab Units 12/15/23  2054   CLARITY UA  Clear   COLOR UA  Yellow   SPEC GRAV UA  <1.005*   PH UA  7.0   GLUCOSE UA mg/dl Negative   KETONES UA mg/dl Negative   BLOOD UA  Negative   PROTEIN UA mg/dl Negative   NITRITE UA  Negative   BILIRUBIN UA  Negative   UROBILINOGEN UA (BE) mg/dl <2.0   LEUKOCYTES UA  Negative     Results from last 7 days   Lab Units 12/15/23  1858   INFLUENZA A PCR  Negative   INFLUENZA B PCR  Negative   RSV PCR  Negative     Results from last 7 days   Lab Units 12/15/23  1726   BLOOD CULTURE  No Growth at 24 hrs.  No Growth at 24 hrs.     Results from last 7 days   Lab Units 12/17/23  1357   TOTAL COUNTED  100   WBC FLUID /ul 919     ED Treatment:   Medication  Administration from 12/15/2023 1707 to 12/15/2023 2220         Date/Time Order Dose Route Action     12/15/2023 2007 EST iohexol (OMNIPAQUE) 350 MG/ML injection (SINGLE-DOSE) 100 mL 85 mL Intravenous Given     12/15/2023 2054 EST cefepime (MAXIPIME) IVPB (premix in dextrose) 2,000 mg 50 mL 2,000 mg Intravenous New Bag     12/15/2023 2054 EST acetaminophen (TYLENOL) tablet 650 mg 650 mg Oral Given     12/15/2023 2123 EST LORazepam (ATIVAN) injection 1 mg 1 mg Intravenous Given          Past Medical History:   Diagnosis Date    Cancer (HCC)     COPD (chronic obstructive pulmonary disease) (HCC)     Emphysema of lung (HCC)     Hyperlipidemia     Hypertension     Lung cancer (HCC) 06/26/2019    right lower lobe removed    Lung nodule     Pleural effusion      Present on Admission:   Non-small cell cancer of right lung (HCC)   Malignant pleural effusion   Essential hypertension   COPD (chronic obstructive pulmonary disease) (HCC)   Chronic respiratory failure with hypoxia (HCC)   Hyponatremia   Generalized anxiety disorder      Admitting Diagnosis: SOB (shortness of breath) [R06.02]  Pleural effusion [J90]  Metastatic lung cancer (metastasis from lung to other site) (HCC) [C34.90]  Age/Sex: 63 y.o. female  Admission Orders:  Regular diet  Up & OOB as tolerated  IR - Thoracentesis  Oren SCDs    Scheduled Medications:  atorvastatin, 20 mg, Oral, Daily  buPROPion, 300 mg, Oral, Daily  fluticasone-vilanterol, 1 puff, Inhalation, Daily  furosemide, 20 mg, Oral, QAM  heparin (porcine), 5,000 Units, Subcutaneous, Q8H ULISES  losartan, 100 mg, Oral, Daily  metoprolol tartrate, 25 mg, Oral, Q12H ULISES  potassium chloride, 20 mEq, Oral, Daily  sertraline, 200 mg, Oral, Daily  umeclidinium, 1 puff, Inhalation, Daily  verapamil, 360 mg, Oral, HS      Continuous IV Infusions:     PRN Meds:  acetaminophen, 650 mg, Oral, Q6H PRN  albuterol, 2.5 mg, Nebulization, Q6H PRN  benzonatate, 100 mg, Oral, TID PRN  diphenhydrAMINE, 50 mg, Oral, Q6H  PRN  hydrALAZINE, 5 mg, Intravenous, Q6H PRN  LORazepam, 0.5 mg, Oral, Q8H PRN  ondansetron, 4 mg, Intravenous, Q6H PRN  polyethylene glycol, 17 g, Oral, Daily PRN        IP CONSULT TO PULMONOLOGY    Network Utilization Review Department  ATTENTION: Please call with any questions or concerns to [phone] and carefully listen to the prompts so that you are directed to the right person. All voicemails are confidential.   For Discharge needs, contact Care Management DC Support Team at [phone] opt. [phone]  Send all requests for admission clinical reviews, approved or denied determinations and any other requests to dedicated fax number below belonging to the campus where the patient is receiving treatment. List of dedicated fax numbers for the Facilities:  FACILITY NAME UR FAX NUMBER   ADMISSION DENIALS (Administrative/Medical Necessity) [phone]   DISCHARGE SUPPORT TEAM (NETWORK) [phone]   PARENT CHILD HEALTH (Maternity/NICU/Pediatrics) [phone]   [facility] [phone]   [facility] [phone]   [facility] [phone]   [facility] [phone]   [facility] [phone]   [facility] [phone]   [facility] [phone]   [facility] [phone]   [facility] [phone]   [facility] [phone]   [facility] [phone]

## 2023-12-17 NOTE — PROGRESS NOTES
"Progress Note - Pulmonary   Kathi Ferris 63 y.o. female MRN: [id number]  Unit/Bed#: -Kieran Encounter: [id number]    Assessment:    New left pleural effusion  Malignant RT pleural effusion  Stage IV NSCLC on the RT- s/p lobectomy  COPD w/o acute exacerbation  Chronic respiratory failure with hypoxia    Plan:    Bedside ultrasound performed which showed a small to moderate effusion  Bedside thorcentesis performed with 400cc serous fluid removed  Check pleural fluid LDH, pH, total protein, cell count with diff and cytology, glucose and culture  Initial stick with questionable arterial blood, will send out for hematocrit  Check H/H in 2 hours  Check Chest xray  Wean supp O2 as able    Chief Complaint:   \"I'm ok\"    Subjective:   Patient seen and examined bedside. No acute events overnight. Feels ok    Objective:     Vitals: Blood pressure 140/72, pulse 72, temperature 98.5 °F (36.9 °C), resp. rate 20, SpO2 95%.,There is no height or weight on file to calculate BMI.      Intake/Output Summary (Last 24 hours) at 12/17/2023 1209  Last data filed at 12/17/2023 0744  Gross per 24 hour   Intake 960 ml   Output 150 ml   Net 810 ml       Invasive Devices       Central Venous Catheter Line  Duration             Port A Cath 04/18/23 Right Subclavian 243 days              Peripheral Intravenous Line  Duration             Peripheral IV 12/15/23 Left Antecubital 1 day              Drain  Duration             Pleural Effusion Long-Term Catheter 15.5 Fr. 235 days                    Physical Exam: /72   Pulse 72   Temp 98.5 °F (36.9 °C)   Resp 20   SpO2 95%   Lungs:  no wheezing, diminished bases  Heart: regular rate and rhythm, S1, S2 normal, no murmur, click, rub or gallop  Abdomen: soft, non-tender; bowel sounds normal; no masses,  no organomegaly  Extremities: extremities normal, warm and well-perfused; no cyanosis, clubbing, or edema  Skin: Skin color, texture, turgor normal. No rashes or lesions     Labs: I have " personally reviewed pertinent lab results.  Imaging and other studies: I have personally reviewed pertinent reports.   and I have personally reviewed pertinent films in PACS

## 2023-12-18 ENCOUNTER — HOSPITAL ENCOUNTER (OUTPATIENT)
Dept: INFUSION CENTER | Facility: HOSPITAL | Age: 63
Discharge: HOME | End: 2023-12-18
Payer: COMMERCIAL

## 2023-12-18 VITALS
RESPIRATION RATE: 18 BRPM | SYSTOLIC BLOOD PRESSURE: 137 MMHG | HEART RATE: 75 BPM | DIASTOLIC BLOOD PRESSURE: 96 MMHG | TEMPERATURE: 98.4 F | OXYGEN SATURATION: 94 %

## 2023-12-18 VITALS
RESPIRATION RATE: 24 BRPM | DIASTOLIC BLOOD PRESSURE: 100 MMHG | OXYGEN SATURATION: 98 % | SYSTOLIC BLOOD PRESSURE: 180 MMHG | TEMPERATURE: 97.8 F | HEART RATE: 110 BPM

## 2023-12-18 DIAGNOSIS — C34.91 NON-SMALL CELL CANCER OF RIGHT LUNG (HCC): ICD-10-CM

## 2023-12-18 LAB
ALBUMIN SERPL BCP-MCNC: 2.8 G/DL (ref 3.5–5)
ALP SERPL-CCNC: 95 U/L (ref 34–104)
ALT SERPL W P-5'-P-CCNC: 11 U/L (ref 7–52)
ANION GAP SERPL CALCULATED.3IONS-SCNC: 5 MMOL/L
ANION GAP SERPL CALCULATED.3IONS-SCNC: 6 MMOL/L
AST SERPL W P-5'-P-CCNC: 18 U/L (ref 13–39)
BASOPHILS # BLD AUTO: 0.02 THOUSANDS/ÂΜL (ref 0–0.1)
BASOPHILS NFR BLD AUTO: 0 % (ref 0–1)
BILIRUB SERPL-MCNC: 0.17 MG/DL (ref 0.2–1)
BUN SERPL-MCNC: 5 MG/DL (ref 5–25)
BUN SERPL-MCNC: 6 MG/DL (ref 5–25)
CALCIUM ALBUM COR SERPL-MCNC: 9.8 MG/DL (ref 8.3–10.1)
CALCIUM SERPL-MCNC: 8.8 MG/DL (ref 8.4–10.2)
CALCIUM SERPL-MCNC: 8.9 MG/DL (ref 8.4–10.2)
CHLORIDE SERPL-SCNC: 102 MMOL/L (ref 96–108)
CHLORIDE SERPL-SCNC: 102 MMOL/L (ref 96–108)
CO2 SERPL-SCNC: 25 MMOL/L (ref 21–32)
CO2 SERPL-SCNC: 27 MMOL/L (ref 21–32)
CREAT SERPL-MCNC: 0.42 MG/DL (ref 0.6–1.3)
CREAT SERPL-MCNC: 0.43 MG/DL (ref 0.6–1.3)
EOSINOPHIL # BLD AUTO: 0 THOUSAND/ÂΜL (ref 0–0.61)
EOSINOPHIL NFR BLD AUTO: 0 % (ref 0–6)
ERYTHROCYTE [DISTWIDTH] IN BLOOD BY AUTOMATED COUNT: 16.1 % (ref 11.6–15.1)
GFR SERPL CREATININE-BSD FRML MDRD: 107 ML/MIN/1.73SQ M
GFR SERPL CREATININE-BSD FRML MDRD: 107 ML/MIN/1.73SQ M
GLUCOSE SERPL-MCNC: 95 MG/DL (ref 65–140)
GLUCOSE SERPL-MCNC: 97 MG/DL (ref 65–140)
HCT VFR BLD AUTO: 34.1 % (ref 34.8–46.1)
HGB BLD-MCNC: 10.6 G/DL (ref 11.5–15.4)
IMM GRANULOCYTES # BLD AUTO: 0.06 THOUSAND/UL (ref 0–0.2)
IMM GRANULOCYTES NFR BLD AUTO: 1 % (ref 0–2)
LYMPHOCYTES # BLD AUTO: 0.76 THOUSANDS/ÂΜL (ref 0.6–4.47)
LYMPHOCYTES NFR BLD AUTO: 16 % (ref 14–44)
MCH RBC QN AUTO: 27.5 PG (ref 26.8–34.3)
MCHC RBC AUTO-ENTMCNC: 31.1 G/DL (ref 31.4–37.4)
MCV RBC AUTO: 88 FL (ref 82–98)
MONOCYTES # BLD AUTO: 0.96 THOUSAND/ÂΜL (ref 0.17–1.22)
MONOCYTES NFR BLD AUTO: 20 % (ref 4–12)
NEUTROPHILS # BLD AUTO: 3 THOUSANDS/ÂΜL (ref 1.85–7.62)
NEUTS SEG NFR BLD AUTO: 63 % (ref 43–75)
NRBC BLD AUTO-RTO: 0 /100 WBCS
PLATELET # BLD AUTO: 478 THOUSANDS/UL (ref 149–390)
PMV BLD AUTO: 9.3 FL (ref 8.9–12.7)
POTASSIUM SERPL-SCNC: 4.1 MMOL/L (ref 3.5–5.3)
POTASSIUM SERPL-SCNC: 4.2 MMOL/L (ref 3.5–5.3)
PROT SERPL-MCNC: 5.9 G/DL (ref 6.4–8.4)
RBC # BLD AUTO: 3.86 MILLION/UL (ref 3.81–5.12)
SODIUM SERPL-SCNC: 133 MMOL/L (ref 135–147)
SODIUM SERPL-SCNC: 134 MMOL/L (ref 135–147)
WBC # BLD AUTO: 4.8 THOUSAND/UL (ref 4.31–10.16)

## 2023-12-18 PROCEDURE — 80048 BASIC METABOLIC PNL TOTAL CA: CPT | Performed by: PHYSICIAN ASSISTANT

## 2023-12-18 PROCEDURE — 99239 HOSP IP/OBS DSCHRG MGMT >30: CPT | Performed by: PHYSICIAN ASSISTANT

## 2023-12-18 PROCEDURE — 80053 COMPREHEN METABOLIC PANEL: CPT | Performed by: INTERNAL MEDICINE

## 2023-12-18 PROCEDURE — 85025 COMPLETE CBC W/AUTO DIFF WBC: CPT | Performed by: PHYSICIAN ASSISTANT

## 2023-12-18 RX ADMIN — POTASSIUM CHLORIDE 20 MEQ: 1500 TABLET, EXTENDED RELEASE ORAL at 09:26

## 2023-12-18 RX ADMIN — SERTRALINE 200 MG: 100 TABLET, FILM COATED ORAL at 09:26

## 2023-12-18 RX ADMIN — ATORVASTATIN CALCIUM 20 MG: 20 TABLET, FILM COATED ORAL at 09:31

## 2023-12-18 RX ADMIN — BUPROPION HYDROCHLORIDE 300 MG: 300 TABLET, FILM COATED, EXTENDED RELEASE ORAL at 09:31

## 2023-12-18 RX ADMIN — FLUTICASONE FUROATE AND VILANTEROL TRIFENATATE 1 PUFF: 200; 25 POWDER RESPIRATORY (INHALATION) at 09:34

## 2023-12-18 RX ADMIN — METOPROLOL TARTRATE 25 MG: 25 TABLET, FILM COATED ORAL at 09:31

## 2023-12-18 RX ADMIN — FUROSEMIDE 20 MG: 20 TABLET ORAL at 09:31

## 2023-12-18 RX ADMIN — UMECLIDINIUM 1 PUFF: 62.5 AEROSOL, POWDER ORAL at 09:34

## 2023-12-18 RX ADMIN — HEPARIN SODIUM 5000 UNITS: 5000 INJECTION INTRAVENOUS; SUBCUTANEOUS at 05:42

## 2023-12-18 RX ADMIN — LOSARTAN POTASSIUM 100 MG: 50 TABLET, FILM COATED ORAL at 09:31

## 2023-12-18 NOTE — PROGRESS NOTES
HEMATOLOGY / ONCOLOGY CLINIC FOLLOW UP NOTE    Primary Care Provider: Keyon Miller MD  Referring Provider:    MRN: [id number]  : 1960    Reason for Encounter: Follow-up for metastatic lung cancer       Oncology History Overview Note   2019 - Stage IA adenocarcinoma of the right lung s/p RLLectomy    2022 - fall after tripping on a dog gate - CT showed pulmonary nodules that required 3 month followup    3/2023 - CT showed right pleural effusion with enlarging lung lesion, she was otherwise symptomatic, thoracentesis cell block showed adenocarcinoma c/w her prior lung primary    PET - no disease outside the chest    Caris - no targetable mutations    2023 - carbo/pem/pembro    - - cycle 2, pemetrexed dose reduced by 20% and carbo dose reduced to AUC 4 due to nausea and fatigue    2023 - removed carbo as of cycle 5 due to good response and increasing fatigue    2023 - add back carbo for cycle 6 due to increasing output from right pleural catheter    2023 - remove carboplatin for cycle 7 because no impact was made on output from pleural catheter    10/2023 - disease progression in the omentum.  Plan treatment change to docetaxel and ramucirumab    2023 - start taxotere/ramucirumab     Non-small cell cancer of right lung (HCC)   2023 Initial Diagnosis    Non-small cell cancer of right lung (HCC)     2023 - 2023 Chemotherapy    cyanocobalamin, 1,000 mcg, Intramuscular, Once, 5 of 10 cycles  Administration: 1,000 mcg (2023), 1,000 mcg (2023), 1,000 mcg (8/3/2023), 1,000 mcg (2023), 1,000 mcg (2023)  alteplase (CATHFLO), 2 mg, Intracatheter, Every 1 Minute as needed, 8 of 13 cycles  fosaprepitant (EMEND) IVPB, 150 mg, Intravenous, Once, 5 of 5 cycles  Administration: 150 mg (2023), 150 mg (2023), 150 mg (2023), 150 mg (2023), 150 mg (8/3/2023)  CARBOplatin (PARAPLATIN) IVPB ([facility] AUC DOSING), 553 mg, Intravenous, Once, 5 of 5  cycles  Administration: 553 mg (4/20/2023), 442.4 mg (6/1/2023), 442.4 mg (6/22/2023), 442.4 mg (5/11/2023), 438 mg (8/3/2023)  pemetrexed (ALIMTA) chemo infusion, 945 mg, Intravenous, Once, 8 of 13 cycles  Dose modification: 400 mg/m2 (original dose 500 mg/m2, Cycle 3, Reason: Nausea/Vomiting, Comment: 20% dose reduction due to nausea)  Administration: 900 mg (4/20/2023), 756 mg (6/1/2023), 756 mg (6/22/2023), 756 mg (8/3/2023), 756 mg (5/11/2023), 756 mg (7/13/2023), 756 mg (8/24/2023), 756 mg (9/14/2023)  pembrolizumab (KEYTRUDA) IVPB, 200 mg, Intravenous, Once, 8 of 13 cycles  Administration: 200 mg (4/20/2023), 200 mg (6/1/2023), 200 mg (6/22/2023), 200 mg (8/3/2023), 200 mg (5/11/2023), 200 mg (7/13/2023), 200 mg (8/24/2023), 200 mg (9/14/2023)     6/9/2023 -  Cancer Staged    Staging form: Lung, AJCC 8th Edition  - Clinical: Stage ALESHA (cT1c, cN2, cM1a) - Signed by Shani Phipps DO on 6/9/2023 11/9/2023 -  Chemotherapy    alteplase (CATHFLO), 2 mg, Intracatheter, Every 1 Minute as needed, 2 of 6 cycles  ramucirumab (CYRAMZA) IVPB, 830 mg, Intravenous, Once, 2 of 6 cycles  Administration: 800 mg (11/9/2023), 800 mg (11/30/2023)  DOCEtaxel (TAXOTERE) chemo infusion, 75 mg/m2 = 142.6 mg, Intravenous, Once, 2 of 6 cycles  Administration: 142.6 mg (11/9/2023), 142.6 mg (11/30/2023)         Interval History: She returns for follow-up visit, she is currently on treatment with Taxotere and Cyramza, has completed 2 cycles.  Cycle 3 is scheduled for tomorrow.  She was recently hospitalized with worsening shortness of breath.  She was discharged on 12/18/2023.  CTA of chest PE study demonstrated a new left pleural effusion, she underwent thoracentesis for 400 cc.  Cytology is pending.  Her right sided pleural catheter continues to put out small amount of fluid.  She states she drains approximately 250 mL about twice a week  Continues to use supplemental O2    She reports she is feeling fairly well today.  She is  eager to proceed with treatment tomorrow.  Her blood counts remain in acceptable range.  No fevers/chills.    REVIEW OF SYSTEMS:  Please note that a 14-point review of systems was performed to include Constitutional, HEENT, Respiratory, CVS, GI, , Musculoskeletal, Integumentary, Neurologic, Rheumatologic, Endocrinologic, Psychiatric, Lymphatic, and Hematologic/Oncologic systems were reviewed and are negative unless otherwise stated in HPI. Positive and negative findings pertinent to this evaluation are incorporated into the history of present illness.      ECOG PS: 1    PROBLEM LIST:  Patient Active Problem List   Diagnosis    Impingement syndrome of left shoulder    Closed fracture of multiple ribs of left side    Cigarette nicotine dependence in remission    COPD (chronic obstructive pulmonary disease) (HCC)    Hypercholesterolemia    Essential hypertension    Non-small cell cancer of right lung (HCC)    Malignant pleural effusion    Dyspnea on exertion    CINV (chemotherapy-induced nausea and vomiting)    Palliative care patient    Hyponatremia    Generalized anxiety disorder    Chronic respiratory failure with hypoxia (HCC)       Assessment / Plan:  1. Non-small cell cancer of right lung (HCC)    2. Malignant pleural effusion      Patient is a 63 year old female with a history of stage IV lung cancer on treatment with Taxotere and ramucirumab.  She has findings of a new left pleural effusion status post thoracentesis on 12/17/2023.  Cytology is pending.  She is cleared to proceed with treatment as planned tomorrow without any dose reductions.  I am going to check an echocardiogram to ensure she does not have any evidence of heart failure resulting in new pleural effusion as well as a CT of CAP prior to her follow-up visit with Dr. Phipps next month.  Patient verbalized understanding and agreement.  She knows to call anytime with questions or concerning symptoms      I spent 35 minutes on chart review, face  to face counseling time, coordination of care and documentation.    Past Medical History:   has a past medical history of Cancer (HCC), COPD (chronic obstructive pulmonary disease) (HCC), Emphysema of lung (HCC), Hyperlipidemia, Hypertension, Lung cancer (HCC) (06/26/2019), Lung nodule, and Pleural effusion.    PAST SURGICAL HISTORY:   has a past surgical history that includes Appendectomy; Colon surgery; Lung lobectomy; Hysterectomy; Oophorectomy (Bilateral); Breast biopsy (Right); IR thoracentesis (03/24/2023); IR thoracentesis (04/12/2023); IR thoracentesis (04/18/2023); IR port placement (04/18/2023); IR pleural effusion long-term catheter placement (04/25/2023); Bronchoscopy (06/2019); and Lung biopsy (06/2019).    CURRENT MEDICATIONS  Current Outpatient Medications   Medication Sig Dispense Refill    albuterol (2.5 mg/3 mL) 0.083 % nebulizer solution Take 3 mL (2.5 mg total) by nebulization every 6 (six) hours as needed for wheezing or shortness of breath 270 mL 4    albuterol (PROVENTIL HFA,VENTOLIN HFA) 90 mcg/act inhaler Inhale 2 puffs every 4 (four) hours  3    Ascorbic Acid (vitamin C) 1000 MG tablet 1 daily      atorvastatin (LIPITOR) 20 mg tablet Take 20 mg by mouth daily.      Biotin 95842 MCG TABS 1 daily      buPROPion (WELLBUTRIN XL) 300 mg 24 hr tablet TAKE 1 TABLET BY MOUTH EVERY DAY IN THE MORNING FOR 90 DAYS      Cholecalciferol (Vitamin D3) 25 MCG (1000 UT) CAPS 1 capsule every 24 hours      dexamethasone (DECADRON) 4 mg tablet Take 2 tablets (8 mg total) by mouth 2 (two) times a day with meals for 3 days Take 2 tabs by mouth twice daily the day before, the day of, and the day after chemo. 72 tablet 0    dexamethasone sodium phosphate 0.1 % ophthalmic solution Administer 1 drop to both eyes every 3 (three) hours as needed (eye irritation) 5 mL 5    diphenhydrAMINE (BENADRYL) 50 MG tablet Take 1 tablet (50 mg total) by mouth every 6 (six) hours as needed for itching (Rash) 30 tablet 0     diphenhydrAMINE-APAP, sleep, (TYLENOL PM EXTRA STRENGTH PO) Take 325 mg by mouth daily at bedtime      famotidine (PEPCID) 20 mg tablet Take 20 mg by mouth daily      fluticasone-umeclidinium-vilanterol (Trelegy Ellipta) 200-62.5-25 mcg/actuation AEPB inhaler Inhale 1 puff daily Rinse mouth after use. 60 blister 0    furosemide (LASIX) 20 mg tablet Take 1 tablet (20 mg total) by mouth every morning 90 tablet 1    Klor-Con M20 20 MEQ tablet TAKE 1 TABLET BY MOUTH EVERY DAY 30 tablet 1    lidocaine-prilocaine (EMLA) cream APPLY TO PORT 30 TO 60 MINUTES PRIOR TO USE 30 g 3    LORazepam (Ativan) 0.5 mg tablet Take 1 tablet (0.5 mg total) by mouth daily as needed for anxiety 30 tablet 0    metoprolol tartrate (LOPRESSOR) 25 mg tablet Take 1 tablet (25 mg total) by mouth every 12 (twelve) hours 180 tablet 1    Multiple Vitamin (MULTIVITAMINS PO) every 24 hours      polyethylene glycol (GLYCOLAX) 17 GM/SCOOP powder Take 17 g by mouth daily. Indications: Constipation      Probiotic Product (PROBIOTIC DAILY PO) 1 daily      Sennosides (Senna) 8.6 MG CAPS Take 2 capsules by mouth as needed (constipation)      sertraline (ZOLOFT) 100 mg tablet 2 daily      telmisartan (MICARDIS) 80 MG tablet Take 1 tablet (80 mg total) by mouth daily 90 tablet 1    verapamil (CALAN-SR) 120 mg CR tablet Take 1 tablet (120 mg total) by mouth daily at bedtime 1 hs 90 tablet 3    verapamil (CALAN-SR) 240 mg CR tablet Take 1 tablet (240 mg total) by mouth daily at bedtime 1 hs 90 tablet 3    EPINEPHrine (EPIPEN) 0.3 mg/0.3 mL SOAJ Inject 0.3 mL (0.3 mg total) into a muscle once for 1 dose (Patient not taking: Reported on 10/2/2023) 0.6 mL 0    oxygen gas Inhale 2 L/min continuous. May increase to 3L PRN for dyspnea and sats under 89%  Indications: Hypoxia       No current facility-administered medications for this visit.     [unfilled]    SOCIAL HISTORY:   reports that she quit smoking about 9 months ago. Her smoking use included cigarettes. She  "started smoking about 54 years ago. She has a 79.8 pack-year smoking history. She has never used smokeless tobacco. She reports that she does not currently use alcohol. She reports that she does not use drugs.     FAMILY HISTORY:  family history includes Arthritis in her mother; COPD in her mother; Cancer in her maternal aunt, maternal aunt, maternal grandmother, and mother; Colon cancer in her maternal aunt, maternal aunt, maternal grandmother, and mother; Depression in her mother; Diabetes in her maternal grandmother; Drug abuse in her brother; Hearing loss in her mother; Hyperlipidemia in her mother; Hypertension in her mother; Lung cancer in her mother.     ALLERGIES:  is allergic to amoxicillin, vilazodone hcl, wasp venom, and zithromax [azithromycin].      Physical Exam:  Vital Signs:   Visit Vitals  /81 (BP Location: Right arm, Patient Position: Sitting, Cuff Size: Standard)   Pulse 75   Temp (!) 97.3 °F (36.3 °C) (Temporal)   Resp 16   Ht 5' 5\" (1.651 m)   Wt 82.6 kg (182 lb)   SpO2 95%   BMI 30.29 kg/m²   OB Status Hysterectomy   Smoking Status Former   BSA 1.9 m²     Body mass index is 30.29 kg/m².  Body surface area is 1.9 meters squared.    Physical Exam  Constitutional:       General: She is not in acute distress.     Appearance: She is ill-appearing (Chronically ill-appearing).   HENT:      Head: Normocephalic and atraumatic.      Mouth/Throat:      Pharynx: No oropharyngeal exudate.   Eyes:      General: No scleral icterus.  Cardiovascular:      Rate and Rhythm: Normal rate and regular rhythm.   Pulmonary:      Effort: Pulmonary effort is normal. No respiratory distress.      Breath sounds: No stridor. No wheezing.      Comments: Good air movement bilaterally.  O2 via nasal cannula  Abdominal:      General: Bowel sounds are normal.      Palpations: Abdomen is soft.   Musculoskeletal:         General: Normal range of motion.   Skin:     General: Skin is warm and dry.   Neurological:      General: " No focal deficit present.      Mental Status: She is alert and oriented to person, place, and time.   Psychiatric:         Mood and Affect: Mood normal.         Behavior: Behavior normal.         Labs:  Lab Results   Component Value Date    WBC 4.80 12/18/2023    HGB 10.6 (L) 12/18/2023    HCT 34.1 (L) 12/18/2023    MCV 88 12/18/2023     (H) 12/18/2023     Lab Results   Component Value Date    SODIUM 133 (L) 12/18/2023    K 4.2 12/18/2023     12/18/2023    CO2 25 12/18/2023    AGAP 6 12/18/2023    BUN 6 12/18/2023    CREATININE 0.42 (L) 12/18/2023    GLUC 95 12/18/2023    GLUF 105 (H) 09/11/2023    CALCIUM 8.8 12/18/2023    AST 18 12/18/2023    ALT 11 12/18/2023    ALKPHOS 95 12/18/2023    TP 5.9 (L) 12/18/2023    TBILI 0.17 (L) 12/18/2023    EGFR 107 12/18/2023

## 2023-12-18 NOTE — DISCHARGE SUMMARY
[facility]  Discharge- Kathi CHINCHILLA Cipolla 1960, 63 y.o. female MRN: [id number]  Unit/Bed#: [facility] Encounter: [id number]  Primary Care Provider: Keyon Miller MD   Date and time admitted to hospital: 12/15/2023  5:32 PM    * Malignant pleural effusion  Assessment & Plan  With right lung asept catheter in place, drain 3x weekly by VNA  Now with large left pleural effusion, likely also malignant.  Consulted pulm- thora performed at bedside 12/17  Fluid studies pending- follow up after discharge     Chronic respiratory failure with hypoxia (HCC)  Assessment & Plan  Chronically on 2 to 3 L/min.  At baseline.  Monitor respiratory status closely    Generalized anxiety disorder  Assessment & Plan  Continue home Ativan, Wellbutrin, Zoloft    Hyponatremia  Assessment & Plan  Chronic and mild hyponatremia at 133 today  Secondary to lung cancer  Trend BMP while admitted  Continue Lasix  Avoid IV hydration    Non-small cell cancer of right lung (HCC)  Assessment & Plan  CT chest with stability of disease and new left pleural effusion which is likely malignant  Continue outpatient follow-up with Pulmonary, Oncology, Palliative care    Essential hypertension  Assessment & Plan  Blood pressure is elevated in the ER, 182/92  Home regimen: Micardis 80 mg, verapamil 360 mg daily, Lasix 20 mg daily   Start lopressor- for HR and BP  Did not need Hydralazine PRN overnight    COPD (chronic obstructive pulmonary disease) (HCC)  Assessment & Plan  Does not appear to be in acute COPD exacerbation  Continue home inhaler regimen  Monitor respiratory status  Continue outpatient follow-up with Pulmonary      Medical Problems       Resolved Problems  Date Reviewed: 12/18/2023   None       Discharging Physician / Practitioner: Brenda Edwards PA-C  PCP: Keyon Miller MD  Admission Date:   Admission Orders (From admission, onward)       Ordered        12/15/23 6892  INPATIENT ADMISSION  Once                           Discharge Date: 12/18/23    Consultations During Hospital Stay:  Pulmonology    Procedures Performed:   Thoracentesis 12/17    Significant Findings / Test Results:   CXR 12/15: Persistent mild interstitial pulmonary edema with elevated right hemidiaphragm and right basilar opacity concerning for possible infiltrate.   CTA ED PE study 12/15: No pulmonary embolism. Stable severe emphysema with right lower lung field mass consistent with tumor. Pleural carcinomatosis on the right with slightly enlarged right pleural effusion despite indwelling pleural catheter . Stable metastatic nodules in the left upper lobe. New large left pleural effusion. Destructive pathological fracture through a lytic lesion in the posterior left fifth rib.  CXR 12/17: Right chest tube with small loculated right pleural effusion. No left effusion or pneumothorax. Opacity throughout both lungs of uncertain etiology.    Incidental Findings:   None     Test Results Pending at Discharge (will require follow up):   Fluid studies from Thoracentesis     Outpatient Tests Requested:  None    Complications:  None    Reason for Admission: pleural effusion    Hospital Course:   Kathi Ferris is a 63 y.o. female patient with a past medical history of metastatic lung cancer, COPD, palliative care patient who originally presented to the hospital on 12/15/2023 due to worsening shortness of breath and elevated blood pressures.  VNA was at home for aseptic catheter drainage appointment.  BP was noted to be elevated patient was having shortness of breath.  Patient was advised to come to the ER and found to have a new left-sided pleural effusion.  Pulmonology was consulted.  Thoracentesis was performed.  Patient follow-up on fluid studies outpatient.  Breathing has returned to baseline.  Metoprolol added this admission for elevated blood pressures.  Patient may return home on PTA medications.  Hemodynamically stable at time of discharge and appropriate for  outpatient follow-up.    Please see above list of diagnoses and related plan for additional information.     Condition at Discharge: stable    Discharge Day Visit / Exam:   Subjective: Patient feels her breathing is at baseline and is eager for discharge home.  Vitals: Blood Pressure: 137/96 (12/17/23 1954)  Pulse: 75 (12/18/23 0740)  Temperature: 98.4 °F (36.9 °C) (12/17/23 1954)  Temp Source: Oral (12/17/23 1954)  Respirations: 18 (12/18/23 0740)  SpO2: 94 % (12/18/23 0740)  Exam:   Physical Exam  Vitals and nursing note reviewed.   Constitutional:       General: She is not in acute distress.     Appearance: Normal appearance. She is well-developed.      Interventions: Nasal cannula in place.   HENT:      Head: Normocephalic and atraumatic.   Eyes:      General: No scleral icterus.     Conjunctiva/sclera: Conjunctivae normal.   Cardiovascular:      Rate and Rhythm: Normal rate and regular rhythm.      Heart sounds: No murmur heard.  Pulmonary:      Effort: Pulmonary effort is normal.      Breath sounds: Decreased air movement present. Rales present. No wheezing or rhonchi.   Abdominal:      General: There is no distension.      Palpations: Abdomen is soft.   Skin:     General: Skin is warm and dry.   Neurological:      General: No focal deficit present.      Mental Status: She is alert.   Psychiatric:         Mood and Affect: Mood normal.        Discussion with Family: Updated  () at bedside.    Discharge instructions/Information to patient and family:   See after visit summary for information provided to patient and family.      Provisions for Follow-Up Care:  See after visit summary for information related to follow-up care and any pertinent home health orders.      Mobility at time of Discharge:   Basic Mobility Inpatient Raw Score: 22  JH-HLM Goal: 7: Walk 25 feet or more  JH-HLM Achieved: 7: Walk 25 feet or more  HLM Goal achieved. Continue to encourage appropriate mobility.      Disposition:   Home with VNA Services (Reminder: Complete face to face encounter)    Planned Readmission: None     Discharge Statement:  I spent 65 minutes discharging the patient. This time was spent on the day of discharge. I had direct contact with the patient on the day of discharge. Greater than 50% of the total time was spent examining patient, answering all patient questions, arranging and discussing plan of care with patient as well as directly providing post-discharge instructions.  Additional time then spent on discharge activities.    Discharge Medications:  See after visit summary for reconciled discharge medications provided to patient and/or family.      **Please Note: This note may have been constructed using a voice recognition system**

## 2023-12-18 NOTE — ASSESSMENT & PLAN NOTE
With right lung asept catheter in place, drain 3x weekly by VNA  Now with large left pleural effusion, likely also malignant.  Consulted pulm- thora performed at bedside 12/17  Fluid studies pending- follow up after discharge

## 2023-12-18 NOTE — ASSESSMENT & PLAN NOTE
Chronic and mild hyponatremia at 133 today  Secondary to lung cancer  Trend BMP while admitted  Continue Lasix  Avoid IV hydration

## 2023-12-19 ENCOUNTER — TRANSITIONAL CARE MANAGEMENT (OUTPATIENT)
Dept: FAMILY MEDICINE CLINIC | Facility: HOSPITAL | Age: 63
End: 2023-12-19

## 2023-12-19 NOTE — UTILIZATION REVIEW
NOTIFICATION OF ADMISSION DISCHARGE   This is a Notification of Discharge from [facility]. Please be advised that this patient has been discharge from our facility. Below you will find the admission and discharge date and time including the patient’s disposition.   UTILIZATION REVIEW CONTACT:  Johanna Cline  Utilization   Network Utilization Review Department  Phone: [phone] carefully listen to the prompts. All voicemails are confidential.  Email: [email or web address]     ADMISSION INFORMATION  PRESENTATION DATE: 12/15/2023  5:32 PM  OBERVATION ADMISSION DATE:   INPATIENT ADMISSION DATE: 12/15/23  9:47 PM   DISCHARGE DATE: 12/18/2023  1:40 PM   DISPOSITION:Home/Self Care    Network Utilization Review Department  ATTENTION: Please call with any questions or concerns to [phone] and carefully listen to the prompts so that you are directed to the right person. All voicemails are confidential.   For Discharge needs, contact Care Management DC Support Team at [phone] opt. [phone]  Send all requests for admission clinical reviews, approved or denied determinations and any other requests to dedicated fax number below belonging to the campus where the patient is receiving treatment. List of dedicated fax numbers for the Facilities:  FACILITY NAME UR FAX NUMBER   ADMISSION DENIALS (Administrative/Medical Necessity) [phone]   DISCHARGE SUPPORT TEAM ([facility]) [phone]   PARENT CHILD HEALTH (Maternity/NICU/Pediatrics) [phone]   [facility] [phone]   [facility] [phone]   [facility] [phone]   [facility] [phone]   Psychiatric hospital [phone]   [facility] [phone]   [facility] [phone]   [facility] [phone]    [facility] [phone]   [facility] [phone]   [facility] [phone]

## 2023-12-20 ENCOUNTER — HOME CARE VISIT (OUTPATIENT)
Dept: HOME HEALTH SERVICES | Facility: HOME HEALTHCARE | Age: 63
End: 2023-12-20
Payer: COMMERCIAL

## 2023-12-20 ENCOUNTER — OFFICE VISIT (OUTPATIENT)
Age: 63
End: 2023-12-20
Payer: COMMERCIAL

## 2023-12-20 ENCOUNTER — TELEPHONE (OUTPATIENT)
Age: 63
End: 2023-12-20

## 2023-12-20 ENCOUNTER — OFFICE VISIT (OUTPATIENT)
Dept: FAMILY MEDICINE CLINIC | Facility: HOSPITAL | Age: 63
End: 2023-12-20
Payer: COMMERCIAL

## 2023-12-20 VITALS
BODY MASS INDEX: 30.49 KG/M2 | HEART RATE: 84 BPM | RESPIRATION RATE: 20 BRPM | HEIGHT: 65 IN | SYSTOLIC BLOOD PRESSURE: 120 MMHG | DIASTOLIC BLOOD PRESSURE: 70 MMHG | WEIGHT: 183 LBS | TEMPERATURE: 97.8 F | OXYGEN SATURATION: 96 %

## 2023-12-20 VITALS
HEIGHT: 65 IN | RESPIRATION RATE: 16 BRPM | OXYGEN SATURATION: 96 % | DIASTOLIC BLOOD PRESSURE: 78 MMHG | HEART RATE: 80 BPM | SYSTOLIC BLOOD PRESSURE: 132 MMHG | WEIGHT: 182 LBS | BODY MASS INDEX: 30.32 KG/M2

## 2023-12-20 VITALS
SYSTOLIC BLOOD PRESSURE: 150 MMHG | WEIGHT: 182 LBS | DIASTOLIC BLOOD PRESSURE: 81 MMHG | BODY MASS INDEX: 30.32 KG/M2 | OXYGEN SATURATION: 95 % | HEART RATE: 75 BPM | HEIGHT: 65 IN | RESPIRATION RATE: 16 BRPM | TEMPERATURE: 97.3 F

## 2023-12-20 DIAGNOSIS — J91.0 MALIGNANT PLEURAL EFFUSION: ICD-10-CM

## 2023-12-20 DIAGNOSIS — C34.91 NON-SMALL CELL CANCER OF RIGHT LUNG (HCC): Primary | ICD-10-CM

## 2023-12-20 DIAGNOSIS — J90 PLEURAL EFFUSION: ICD-10-CM

## 2023-12-20 DIAGNOSIS — I10 ESSENTIAL HYPERTENSION: ICD-10-CM

## 2023-12-20 DIAGNOSIS — J44.9 CHRONIC OBSTRUCTIVE PULMONARY DISEASE, UNSPECIFIED COPD TYPE (HCC): ICD-10-CM

## 2023-12-20 DIAGNOSIS — E87.1 HYPONATREMIA: ICD-10-CM

## 2023-12-20 DIAGNOSIS — C34.90 METASTATIC LUNG CANCER (METASTASIS FROM LUNG TO OTHER SITE) (HCC): ICD-10-CM

## 2023-12-20 DIAGNOSIS — J96.11 CHRONIC RESPIRATORY FAILURE WITH HYPOXIA (HCC): ICD-10-CM

## 2023-12-20 PROBLEM — R10.9 ABDOMINAL PAIN: Status: RESOLVED | Noted: 2023-10-27 | Resolved: 2023-12-20

## 2023-12-20 LAB
BACTERIA SPEC BFLD CULT: NO GROWTH
GRAM STN SPEC: NORMAL
GRAM STN SPEC: NORMAL

## 2023-12-20 PROCEDURE — 99214 OFFICE O/P EST MOD 30 MIN: CPT | Performed by: NURSE PRACTITIONER

## 2023-12-20 PROCEDURE — 88112 CYTOPATH CELL ENHANCE TECH: CPT | Performed by: SPECIALIST

## 2023-12-20 PROCEDURE — 99496 TRANSJ CARE MGMT HIGH F2F 7D: CPT | Performed by: INTERNAL MEDICINE

## 2023-12-20 PROCEDURE — G0299 HHS/HOSPICE OF RN EA 15 MIN: HCPCS

## 2023-12-20 PROCEDURE — 88305 TISSUE EXAM BY PATHOLOGIST: CPT | Performed by: SPECIALIST

## 2023-12-20 NOTE — ASSESSMENT & PLAN NOTE
Copd is stable  I heard no wheezing  Trelegy will not be covered next year  Pulmonary is looking into replacement

## 2023-12-20 NOTE — ASSESSMENT & PLAN NOTE
BP is better controlled since hospital discharge  Continue lopressor, verapamil, micardis and lasix

## 2023-12-20 NOTE — ASSESSMENT & PLAN NOTE
S/p left thoracentesis  Cytology is pending  She is on chemotherapy  VNA will drain right indwelling pleural catheter this week.

## 2023-12-20 NOTE — PROGRESS NOTES
Assessment/Plan:     Non-small cell cancer of right lung (HCC)  S/p left thoracentesis  Cytology is pending  She is on chemotherapy  VNA will drain right indwelling pleural catheter this week.      COPD (chronic obstructive pulmonary disease) (HCC)  Copd is stable  I heard no wheezing  Trelegy will not be covered next year  Pulmonary is looking into replacement    Essential hypertension  BP is better controlled since hospital discharge  Continue lopressor, verapamil, micardis and lasix    Hyponatremia  Chronic hyponatremia was at baseline on discharge  Continue lasix    Chronic respiratory failure with hypoxia (HCC)  Breathing is at baseline since hospital discharge  Continue oxygen via NC at 2-3 L/min continuous.       Diagnoses and all orders for this visit:    Non-small cell cancer of right lung (HCC)    Chronic obstructive pulmonary disease, unspecified COPD type (HCC)    Essential hypertension    Pleural effusion  -     metoprolol tartrate (LOPRESSOR) 25 mg tablet; Take 1 tablet (25 mg total) by mouth every 12 (twelve) hours    Metastatic lung cancer (metastasis from lung to other site) (HCC)  -     metoprolol tartrate (LOPRESSOR) 25 mg tablet; Take 1 tablet (25 mg total) by mouth every 12 (twelve) hours    Hyponatremia    Chronic respiratory failure with hypoxia (HCC)         Subjective:     Patient ID: Kathi Ferris is a 63 y.o. female.    Patient presents with her  for ANAMARIA management.  She was discharged on December 18.  She was admitted on December 15 because of worsening shortness of breath with exertion and hypertension.  She underwent paracentesis on the left side, she has an indwelling pleural catheter on the right side.  Cytology is not back yet on the pleural fluid    Patient reports improved breathing after thoracentesis.  She is chronically on oxygen via nasal cannula at 2 L/min but when she leaves her house it is 3 L/min.  Denies chest pain, cough, hemoptysis.  We reviewed CT of the chest  on admission and repeat chest x-ray images on December 17.    Her blood pressure at home runs in the 130s systolic since discharge from the hospital  Metoprolol 25mg twice daily was added to verapamil, myocarditis and furosemide.    Her potassium and renal function were normal on December 18.        Review of Systems   Constitutional:  Negative for fever.   HENT:  Negative for hearing loss.    Eyes:  Negative for visual disturbance.   Respiratory:  Negative for cough and shortness of breath.    Cardiovascular:  Negative for chest pain and palpitations.   Gastrointestinal:  Positive for constipation (She uses MiraLAX and Senokot). Negative for abdominal pain and blood in stool.   Endocrine: Negative for polydipsia and polyphagia.   Genitourinary:  Negative for dysuria and hematuria.   Musculoskeletal:  Negative for myalgias.   Skin:  Negative for rash.   Neurological:  Negative for headaches.   Psychiatric/Behavioral:  Negative for dysphoric mood.    All other systems reviewed and are negative.        Objective:     Physical Exam  HENT:      Head: Normocephalic.      Mouth/Throat:      Mouth: Mucous membranes are moist.   Eyes:      Conjunctiva/sclera: Conjunctivae normal.   Cardiovascular:      Rate and Rhythm: Normal rate and regular rhythm.      Heart sounds: No murmur heard.     No gallop.   Pulmonary:      Effort: No respiratory distress.      Breath sounds: No wheezing or rales.      Comments: She had dullness to percussion at the right base and decreased breath sounds.  The left base was normal.  Abdominal:      General: Bowel sounds are normal.      Palpations: Abdomen is soft.      Tenderness: There is no abdominal tenderness.   Musculoskeletal:      Cervical back: Neck supple.   Skin:     General: Skin is warm and dry.      Comments: She had trace lower leg edema which is chronic according to the patient   Neurological:      Mental Status: She is alert and oriented to person, place, and time.      Cranial  "Nerves: No cranial nerve deficit.      Motor: No weakness.   Psychiatric:         Mood and Affect: Mood normal.           Vitals:    12/20/23 1023 12/20/23 1039   BP: 140/70 132/78   BP Location:  Left arm   Patient Position:  Sitting   Pulse: 86 80   Resp: 16 16   SpO2: 96%    Weight: 82.6 kg (182 lb)    Height: 5' 5\" (1.651 m)        Transitional Care Management Review:  Kathi Ferris is a 63 y.o. female here for TCM follow up.     During the TCM phone call patient stated:    TCM Call     Date and time call was made  12/19/2023  2:30 PM    Patient was hospitialized at  [facility]    Date of Admission  12/15/23    Date of discharge  12/18/23    Diagnosis  MALIGNANT PLEURAL EFFUSION    Disposition  Home    Were the patients medications reviewed and updated  Yes    Current Symptoms  None      TCM Call     Should patient be enrolled in anticoag monitoring?  No    Scheduled for follow up?  Yes    I have advised the patient to call PCP with any new or worsening symptoms  Eran Reynolds C[facility] [address]    Living Arrangements  Spouse or Significiant other    Comments  I spoke with patient. states she is doing ok since being home. Med list reviewed. pt has ov for f/u 12/20 will do ANAMARIA. MAURICE Miller MD      "

## 2023-12-20 NOTE — ASSESSMENT & PLAN NOTE
Breathing is at baseline since hospital discharge  Continue oxygen via NC at 2-3 L/min continuous.

## 2023-12-20 NOTE — TELEPHONE ENCOUNTER
Spoke with patient to see how she is feeling after her short hospitalization. Patient states she is feeling much better and needed a thoracentesis there. Patient denies any fevers or any other symptoms of infection and will be at her office visit this afternoon.

## 2023-12-21 ENCOUNTER — HOSPITAL ENCOUNTER (OUTPATIENT)
Dept: INFUSION CENTER | Facility: HOSPITAL | Age: 63
Discharge: HOME | End: 2023-12-21
Attending: INTERNAL MEDICINE
Payer: COMMERCIAL

## 2023-12-21 VITALS
OXYGEN SATURATION: 95 % | RESPIRATION RATE: 16 BRPM | HEART RATE: 86 BPM | SYSTOLIC BLOOD PRESSURE: 138 MMHG | WEIGHT: 182.76 LBS | TEMPERATURE: 97.3 F | HEIGHT: 65 IN | DIASTOLIC BLOOD PRESSURE: 79 MMHG | BODY MASS INDEX: 30.45 KG/M2

## 2023-12-21 DIAGNOSIS — C34.91 NON-SMALL CELL CANCER OF RIGHT LUNG (HCC): Primary | ICD-10-CM

## 2023-12-21 LAB
BACTERIA BLD CULT: NORMAL
BACTERIA BLD CULT: NORMAL

## 2023-12-21 PROCEDURE — 96413 CHEMO IV INFUSION 1 HR: CPT

## 2023-12-21 PROCEDURE — 96417 CHEMO IV INFUS EACH ADDL SEQ: CPT

## 2023-12-21 PROCEDURE — 96367 TX/PROPH/DG ADDL SEQ IV INF: CPT

## 2023-12-21 RX ORDER — SODIUM CHLORIDE 9 MG/ML
20 INJECTION, SOLUTION INTRAVENOUS ONCE
Status: COMPLETED | OUTPATIENT
Start: 2023-12-21 | End: 2023-12-21

## 2023-12-21 RX ADMIN — SODIUM CHLORIDE 800 MG: 0.9 INJECTION, SOLUTION INTRAVENOUS at 09:48

## 2023-12-21 RX ADMIN — SODIUM CHLORIDE 20 ML/HR: 9 INJECTION, SOLUTION INTRAVENOUS at 08:50

## 2023-12-21 RX ADMIN — ONDANSETRON 16 MG: 2 INJECTION INTRAMUSCULAR; INTRAVENOUS at 08:50

## 2023-12-21 RX ADMIN — DIPHENHYDRAMINE HYDROCHLORIDE 50 MG: 50 INJECTION, SOLUTION INTRAMUSCULAR; INTRAVENOUS at 09:15

## 2023-12-21 RX ADMIN — DOCETAXEL 142.6 MG: 20 INJECTION, SOLUTION, CONCENTRATE INTRAVENOUS at 11:05

## 2023-12-21 NOTE — PROGRESS NOTES
Kathi Ferris  tolerated treatment well with no complications.      Kathi Ferris is aware of future appt on 1/8 at 11am.     AVS declined.

## 2023-12-22 ENCOUNTER — HOME CARE VISIT (OUTPATIENT)
Dept: HOME HEALTH SERVICES | Facility: HOME HEALTHCARE | Age: 63
End: 2023-12-22
Payer: COMMERCIAL

## 2023-12-22 VITALS
HEART RATE: 76 BPM | DIASTOLIC BLOOD PRESSURE: 68 MMHG | OXYGEN SATURATION: 95 % | RESPIRATION RATE: 18 BRPM | SYSTOLIC BLOOD PRESSURE: 160 MMHG | TEMPERATURE: 97.8 F

## 2023-12-22 PROCEDURE — G0299 HHS/HOSPICE OF RN EA 15 MIN: HCPCS

## 2023-12-25 ENCOUNTER — HOME CARE VISIT (OUTPATIENT)
Dept: HOME HEALTH SERVICES | Facility: HOME HEALTHCARE | Age: 63
End: 2023-12-25
Payer: COMMERCIAL

## 2023-12-25 VITALS
SYSTOLIC BLOOD PRESSURE: 110 MMHG | OXYGEN SATURATION: 95 % | HEART RATE: 84 BPM | DIASTOLIC BLOOD PRESSURE: 60 MMHG | TEMPERATURE: 97.1 F

## 2023-12-25 PROCEDURE — G0299 HHS/HOSPICE OF RN EA 15 MIN: HCPCS

## 2023-12-25 PROCEDURE — 400016 VN ROC

## 2023-12-29 ENCOUNTER — HOME CARE VISIT (OUTPATIENT)
Dept: HOME HEALTH SERVICES | Facility: HOME HEALTHCARE | Age: 63
End: 2023-12-29
Payer: COMMERCIAL

## 2023-12-29 VITALS
DIASTOLIC BLOOD PRESSURE: 82 MMHG | OXYGEN SATURATION: 97 % | SYSTOLIC BLOOD PRESSURE: 150 MMHG | HEART RATE: 68 BPM | RESPIRATION RATE: 20 BRPM | TEMPERATURE: 97.7 F

## 2023-12-29 PROCEDURE — G0299 HHS/HOSPICE OF RN EA 15 MIN: HCPCS

## 2024-01-01 ENCOUNTER — HOSPICE ADMISSION (OUTPATIENT)
Dept: HOME HOSPICE | Facility: HOSPICE | Age: 64
End: 2024-01-01
Payer: COMMERCIAL

## 2024-01-02 ENCOUNTER — HOME CARE VISIT (OUTPATIENT)
Dept: HOME HEALTH SERVICES | Facility: HOME HEALTHCARE | Age: 64
End: 2024-01-02
Payer: COMMERCIAL

## 2024-01-02 VITALS
DIASTOLIC BLOOD PRESSURE: 80 MMHG | SYSTOLIC BLOOD PRESSURE: 160 MMHG | HEART RATE: 72 BPM | TEMPERATURE: 97.7 F | RESPIRATION RATE: 20 BRPM | OXYGEN SATURATION: 94 %

## 2024-01-02 PROCEDURE — G0299 HHS/HOSPICE OF RN EA 15 MIN: HCPCS

## 2024-01-05 ENCOUNTER — HOME CARE VISIT (OUTPATIENT)
Dept: HOME HEALTH SERVICES | Facility: HOME HEALTHCARE | Age: 64
End: 2024-01-05
Payer: COMMERCIAL

## 2024-01-05 VITALS
HEART RATE: 92 BPM | TEMPERATURE: 97.1 F | DIASTOLIC BLOOD PRESSURE: 70 MMHG | SYSTOLIC BLOOD PRESSURE: 146 MMHG | RESPIRATION RATE: 20 BRPM | OXYGEN SATURATION: 96 %

## 2024-01-05 PROCEDURE — G0299 HHS/HOSPICE OF RN EA 15 MIN: HCPCS

## 2024-01-08 ENCOUNTER — HOSPITAL ENCOUNTER (OUTPATIENT)
Dept: INFUSION CENTER | Facility: HOSPITAL | Age: 64
Discharge: HOME | End: 2024-01-08
Payer: COMMERCIAL

## 2024-01-08 ENCOUNTER — HOME CARE VISIT (OUTPATIENT)
Dept: HOME HEALTH SERVICES | Facility: HOME HEALTHCARE | Age: 64
End: 2024-01-08
Payer: COMMERCIAL

## 2024-01-08 VITALS
TEMPERATURE: 97.6 F | SYSTOLIC BLOOD PRESSURE: 140 MMHG | RESPIRATION RATE: 18 BRPM | DIASTOLIC BLOOD PRESSURE: 84 MMHG | OXYGEN SATURATION: 95 % | HEART RATE: 72 BPM

## 2024-01-08 DIAGNOSIS — C34.91 NON-SMALL CELL CANCER OF RIGHT LUNG (HCC): Primary | ICD-10-CM

## 2024-01-08 LAB
ALBUMIN SERPL BCP-MCNC: 3.2 G/DL (ref 3.5–5)
ALP SERPL-CCNC: 89 U/L (ref 34–104)
ALT SERPL W P-5'-P-CCNC: 9 U/L (ref 7–52)
ANION GAP SERPL CALCULATED.3IONS-SCNC: 6 MMOL/L
AST SERPL W P-5'-P-CCNC: 14 U/L (ref 13–39)
BASOPHILS # BLD AUTO: 0.03 THOUSANDS/ÂΜL (ref 0–0.1)
BASOPHILS NFR BLD AUTO: 1 % (ref 0–1)
BILIRUB SERPL-MCNC: 0.25 MG/DL (ref 0.2–1)
BUN SERPL-MCNC: 10 MG/DL (ref 5–25)
CALCIUM ALBUM COR SERPL-MCNC: 9.6 MG/DL (ref 8.3–10.1)
CALCIUM SERPL-MCNC: 9 MG/DL (ref 8.4–10.2)
CHLORIDE SERPL-SCNC: 100 MMOL/L (ref 96–108)
CO2 SERPL-SCNC: 27 MMOL/L (ref 21–32)
CREAT SERPL-MCNC: 0.48 MG/DL (ref 0.6–1.3)
EOSINOPHIL # BLD AUTO: 0 THOUSAND/ÂΜL (ref 0–0.61)
EOSINOPHIL NFR BLD AUTO: 0 % (ref 0–6)
ERYTHROCYTE [DISTWIDTH] IN BLOOD BY AUTOMATED COUNT: 16.9 % (ref 11.6–15.1)
GFR SERPL CREATININE-BSD FRML MDRD: 103 ML/MIN/1.73SQ M
GLUCOSE SERPL-MCNC: 97 MG/DL (ref 65–140)
HCT VFR BLD AUTO: 33.1 % (ref 34.8–46.1)
HGB BLD-MCNC: 10.4 G/DL (ref 11.5–15.4)
IMM GRANULOCYTES # BLD AUTO: 0.06 THOUSAND/UL (ref 0–0.2)
IMM GRANULOCYTES NFR BLD AUTO: 1 % (ref 0–2)
LYMPHOCYTES # BLD AUTO: 1.1 THOUSANDS/ÂΜL (ref 0.6–4.47)
LYMPHOCYTES NFR BLD AUTO: 20 % (ref 14–44)
MCH RBC QN AUTO: 27.4 PG (ref 26.8–34.3)
MCHC RBC AUTO-ENTMCNC: 31.4 G/DL (ref 31.4–37.4)
MCV RBC AUTO: 87 FL (ref 82–98)
MONOCYTES # BLD AUTO: 0.83 THOUSAND/ÂΜL (ref 0.17–1.22)
MONOCYTES NFR BLD AUTO: 15 % (ref 4–12)
NEUTROPHILS # BLD AUTO: 3.39 THOUSANDS/ÂΜL (ref 1.85–7.62)
NEUTS SEG NFR BLD AUTO: 63 % (ref 43–75)
NRBC BLD AUTO-RTO: 0 /100 WBCS
PLATELET # BLD AUTO: 411 THOUSANDS/UL (ref 149–390)
PMV BLD AUTO: 9 FL (ref 8.9–12.7)
POTASSIUM SERPL-SCNC: 3.9 MMOL/L (ref 3.5–5.3)
PROT SERPL-MCNC: 6.1 G/DL (ref 6.4–8.4)
RBC # BLD AUTO: 3.8 MILLION/UL (ref 3.81–5.12)
SODIUM SERPL-SCNC: 133 MMOL/L (ref 135–147)
WBC # BLD AUTO: 5.41 THOUSAND/UL (ref 4.31–10.16)

## 2024-01-08 PROCEDURE — 85025 COMPLETE CBC W/AUTO DIFF WBC: CPT | Performed by: INTERNAL MEDICINE

## 2024-01-08 PROCEDURE — G0299 HHS/HOSPICE OF RN EA 15 MIN: HCPCS

## 2024-01-08 PROCEDURE — 80053 COMPREHEN METABOLIC PANEL: CPT | Performed by: INTERNAL MEDICINE

## 2024-01-08 NOTE — PROGRESS NOTES
Patient labwork completed via Port. Port accessed and de-accessed with complication. Trouble with blood return. After several maneuvers, blood return obtained and labs drawn.    Patient declined AVS at this time and confirmed next appointment. Patient discharged in stable condition.

## 2024-01-09 RX ORDER — SODIUM CHLORIDE 9 MG/ML
20 INJECTION, SOLUTION INTRAVENOUS ONCE
Status: CANCELLED | OUTPATIENT
Start: 2024-01-11

## 2024-01-11 ENCOUNTER — HOSPITAL ENCOUNTER (OUTPATIENT)
Dept: INFUSION CENTER | Facility: HOSPITAL | Age: 64
Discharge: HOME | End: 2024-01-11
Attending: INTERNAL MEDICINE
Payer: COMMERCIAL

## 2024-01-11 ENCOUNTER — PATIENT OUTREACH (OUTPATIENT)
Dept: HEMATOLOGY ONCOLOGY | Facility: CLINIC | Age: 64
End: 2024-01-11

## 2024-01-11 VITALS
BODY MASS INDEX: 30.3 KG/M2 | SYSTOLIC BLOOD PRESSURE: 120 MMHG | HEIGHT: 65 IN | RESPIRATION RATE: 18 BRPM | DIASTOLIC BLOOD PRESSURE: 80 MMHG | WEIGHT: 181.88 LBS | TEMPERATURE: 97.7 F | HEART RATE: 78 BPM | OXYGEN SATURATION: 94 %

## 2024-01-11 DIAGNOSIS — C34.91 NON-SMALL CELL CANCER OF RIGHT LUNG (HCC): Primary | ICD-10-CM

## 2024-01-11 PROCEDURE — 96417 CHEMO IV INFUS EACH ADDL SEQ: CPT

## 2024-01-11 PROCEDURE — 96367 TX/PROPH/DG ADDL SEQ IV INF: CPT

## 2024-01-11 PROCEDURE — 96413 CHEMO IV INFUSION 1 HR: CPT

## 2024-01-11 RX ORDER — SODIUM CHLORIDE 9 MG/ML
20 INJECTION, SOLUTION INTRAVENOUS ONCE
Status: COMPLETED | OUTPATIENT
Start: 2024-01-11 | End: 2024-01-11

## 2024-01-11 RX ADMIN — SODIUM CHLORIDE 800 MG: 0.9 INJECTION, SOLUTION INTRAVENOUS at 10:32

## 2024-01-11 RX ADMIN — ONDANSETRON 16 MG: 2 INJECTION INTRAMUSCULAR; INTRAVENOUS at 09:36

## 2024-01-11 RX ADMIN — DIPHENHYDRAMINE HYDROCHLORIDE 50 MG: 50 INJECTION, SOLUTION INTRAMUSCULAR; INTRAVENOUS at 10:02

## 2024-01-11 RX ADMIN — SODIUM CHLORIDE 20 ML/HR: 0.9 INJECTION, SOLUTION INTRAVENOUS at 09:35

## 2024-01-11 RX ADMIN — DOCETAXEL 142.6 MG: 20 INJECTION, SOLUTION, CONCENTRATE INTRAVENOUS at 11:36

## 2024-01-11 NOTE — PROGRESS NOTES
Kathi Ferris  tolerated treatment well with no complications.      Kathi Ferris is aware of future appt on 1/29/2024 at 10:00 AM.     AVS printed and given to Kathi Ferris:  No (Declined by Kathi Ferris)

## 2024-01-11 NOTE — PROGRESS NOTES
MSW met with the pt and her spouse in the infusion center this day.  The pt was open and receptive to chairside conversation. She shared that she is on a new treatment and with this, she is experiencing less side effects.  The pt states that she continues to have a great deal of lethargy but is doing well overall.  She then shared that her  was recently having testing done as they suspect he has esophageal cancer.  The pt's spouse explained how he did not want to go tot he Doctor, however his wife encouraged him and he has started having some testing done.  Spouse is not aware of a treatment plan and reports that the pt comes first.  He states that he is not worried for himself.  Time was spent discussing the fact that they both will be going through cancer and how this is impacting them.  They were laughing at this time and using humor was their coping mechanism.  The pt and her spouse have decided not to tell their children until they have more concrete information.  MSW offered active listening and emotional support as they begin to process this new diagnosis.  MSW will continue to follow.

## 2024-01-12 ENCOUNTER — HOME CARE VISIT (OUTPATIENT)
Dept: HOME HEALTH SERVICES | Facility: HOME HEALTHCARE | Age: 64
End: 2024-01-12
Payer: COMMERCIAL

## 2024-01-12 VITALS
DIASTOLIC BLOOD PRESSURE: 86 MMHG | RESPIRATION RATE: 20 BRPM | OXYGEN SATURATION: 98 % | SYSTOLIC BLOOD PRESSURE: 128 MMHG | HEART RATE: 92 BPM | TEMPERATURE: 97.8 F

## 2024-01-12 PROCEDURE — G0299 HHS/HOSPICE OF RN EA 15 MIN: HCPCS

## 2024-01-15 ENCOUNTER — HOME CARE VISIT (OUTPATIENT)
Dept: HOME HEALTH SERVICES | Facility: HOME HEALTHCARE | Age: 64
End: 2024-01-15
Payer: COMMERCIAL

## 2024-01-15 VITALS
TEMPERATURE: 96.9 F | DIASTOLIC BLOOD PRESSURE: 80 MMHG | OXYGEN SATURATION: 97 % | RESPIRATION RATE: 20 BRPM | HEART RATE: 100 BPM | SYSTOLIC BLOOD PRESSURE: 136 MMHG

## 2024-01-15 PROCEDURE — G0299 HHS/HOSPICE OF RN EA 15 MIN: HCPCS

## 2024-01-16 ENCOUNTER — TELEPHONE (OUTPATIENT)
Dept: CARDIAC REHAB | Facility: HOSPITAL | Age: 64
End: 2024-01-16

## 2024-01-17 ENCOUNTER — TELEPHONE (OUTPATIENT)
Dept: HEMATOLOGY ONCOLOGY | Facility: CLINIC | Age: 64
End: 2024-01-17

## 2024-01-17 NOTE — TELEPHONE ENCOUNTER
Spoke to pt reminding to have CT scheduled prior to appt. Pt voiced that she will attempt to have it done but if she is unable to, she will be giving a call back.

## 2024-01-18 ENCOUNTER — HOSPITAL ENCOUNTER (OUTPATIENT)
Dept: RADIOLOGY | Facility: HOSPITAL | Age: 64
Discharge: HOME | End: 2024-01-18
Payer: COMMERCIAL

## 2024-01-18 DIAGNOSIS — C34.91 NON-SMALL CELL CANCER OF RIGHT LUNG (HCC): ICD-10-CM

## 2024-01-18 PROCEDURE — 71260 CT THORAX DX C+: CPT

## 2024-01-18 PROCEDURE — G1004 CDSM NDSC: HCPCS

## 2024-01-18 PROCEDURE — 74177 CT ABD & PELVIS W/CONTRAST: CPT

## 2024-01-18 RX ADMIN — IOHEXOL 100 ML: 350 INJECTION, SOLUTION INTRAVENOUS at 11:54

## 2024-01-19 ENCOUNTER — TELEPHONE (OUTPATIENT)
Dept: HEMATOLOGY ONCOLOGY | Facility: CLINIC | Age: 64
End: 2024-01-19

## 2024-01-19 ENCOUNTER — HOME CARE VISIT (OUTPATIENT)
Dept: HOME HEALTH SERVICES | Facility: HOME HEALTHCARE | Age: 64
End: 2024-01-19
Payer: COMMERCIAL

## 2024-01-19 VITALS
RESPIRATION RATE: 18 BRPM | SYSTOLIC BLOOD PRESSURE: 124 MMHG | OXYGEN SATURATION: 95 % | DIASTOLIC BLOOD PRESSURE: 80 MMHG | HEART RATE: 72 BPM | TEMPERATURE: 97.6 F

## 2024-01-19 PROCEDURE — G0299 HHS/HOSPICE OF RN EA 15 MIN: HCPCS

## 2024-01-21 ENCOUNTER — APPOINTMENT (EMERGENCY)
Dept: CT IMAGING | Facility: HOSPITAL | Age: 64
DRG: 871 | End: 2024-01-21
Payer: COMMERCIAL

## 2024-01-21 ENCOUNTER — APPOINTMENT (INPATIENT)
Dept: RADIOLOGY | Facility: HOSPITAL | Age: 64
DRG: 871 | End: 2024-01-21
Payer: COMMERCIAL

## 2024-01-21 ENCOUNTER — HOSPITAL ENCOUNTER (INPATIENT)
Facility: HOSPITAL | Age: 64
LOS: 6 days | Discharge: HOME HEALTH | DRG: 871 | End: 2024-01-27
Attending: EMERGENCY MEDICINE | Admitting: INTERNAL MEDICINE
Payer: COMMERCIAL

## 2024-01-21 DIAGNOSIS — E83.42 HYPOMAGNESEMIA: ICD-10-CM

## 2024-01-21 DIAGNOSIS — D70.9 NEUTROPENIA (HCC): ICD-10-CM

## 2024-01-21 DIAGNOSIS — C34.91 NON-SMALL CELL CANCER OF RIGHT LUNG (HCC): ICD-10-CM

## 2024-01-21 DIAGNOSIS — J96.11 CHRONIC RESPIRATORY FAILURE WITH HYPOXIA (HCC): ICD-10-CM

## 2024-01-21 DIAGNOSIS — K56.609 SMALL BOWEL OBSTRUCTION (HCC): Primary | ICD-10-CM

## 2024-01-21 DIAGNOSIS — C34.90 METASTATIC NON-SMALL CELL LUNG CANCER (HCC): ICD-10-CM

## 2024-01-21 DIAGNOSIS — K56.609 SBO (SMALL BOWEL OBSTRUCTION) (HCC): ICD-10-CM

## 2024-01-21 DIAGNOSIS — E87.6 HYPOKALEMIA: ICD-10-CM

## 2024-01-21 DIAGNOSIS — J44.9 CHRONIC OBSTRUCTIVE PULMONARY DISEASE, UNSPECIFIED COPD TYPE (HCC): ICD-10-CM

## 2024-01-21 PROBLEM — A41.9 SEPSIS (HCC): Status: ACTIVE | Noted: 2024-01-21

## 2024-01-21 LAB
2HR DELTA HS TROPONIN: -5 NG/L
ALBUMIN SERPL BCP-MCNC: 3.7 G/DL (ref 3.5–5)
ALP SERPL-CCNC: 95 U/L (ref 34–104)
ALT SERPL W P-5'-P-CCNC: 15 U/L (ref 7–52)
ANION GAP SERPL CALCULATED.3IONS-SCNC: 16 MMOL/L
APTT PPP: 28 SECONDS (ref 23–37)
AST SERPL W P-5'-P-CCNC: 19 U/L (ref 13–39)
ATRIAL RATE: 123 BPM
BASOPHILS # BLD AUTO: 0.01 THOUSANDS/ÂΜL (ref 0–0.1)
BASOPHILS NFR BLD AUTO: 1 % (ref 0–1)
BILIRUB SERPL-MCNC: 0.41 MG/DL (ref 0.2–1)
BNP SERPL-MCNC: 45 PG/ML (ref 0–100)
BUN SERPL-MCNC: 21 MG/DL (ref 5–25)
CALCIUM SERPL-MCNC: 10 MG/DL (ref 8.4–10.2)
CARDIAC TROPONIN I PNL SERPL HS: 14 NG/L
CARDIAC TROPONIN I PNL SERPL HS: 19 NG/L
CHLORIDE SERPL-SCNC: 89 MMOL/L (ref 96–108)
CO2 SERPL-SCNC: 29 MMOL/L (ref 21–32)
CREAT SERPL-MCNC: 1.26 MG/DL (ref 0.6–1.3)
D DIMER PPP FEU-MCNC: 5.25 UG/ML FEU
EOSINOPHIL # BLD AUTO: 0.01 THOUSAND/ÂΜL (ref 0–0.61)
EOSINOPHIL NFR BLD AUTO: 1 % (ref 0–6)
ERYTHROCYTE [DISTWIDTH] IN BLOOD BY AUTOMATED COUNT: 17.1 % (ref 11.6–15.1)
FLUAV RNA RESP QL NAA+PROBE: NEGATIVE
FLUBV RNA RESP QL NAA+PROBE: NEGATIVE
GFR SERPL CREATININE-BSD FRML MDRD: 44 ML/MIN/1.73SQ M
GLUCOSE SERPL-MCNC: 117 MG/DL (ref 65–140)
HCT VFR BLD AUTO: 37.1 % (ref 34.8–46.1)
HGB BLD-MCNC: 11.7 G/DL (ref 11.5–15.4)
IMM GRANULOCYTES # BLD AUTO: 0.01 THOUSAND/UL (ref 0–0.2)
IMM GRANULOCYTES NFR BLD AUTO: 1 % (ref 0–2)
INR PPP: 0.94 (ref 0.84–1.19)
LACTATE SERPL-SCNC: 1.4 MMOL/L (ref 0.5–2)
LIPASE SERPL-CCNC: <6 U/L (ref 11–82)
LYMPHOCYTES # BLD AUTO: 0.67 THOUSANDS/ÂΜL (ref 0.6–4.47)
LYMPHOCYTES NFR BLD AUTO: 41 % (ref 14–44)
MAGNESIUM SERPL-MCNC: 1.8 MG/DL (ref 1.9–2.7)
MCH RBC QN AUTO: 26.9 PG (ref 26.8–34.3)
MCHC RBC AUTO-ENTMCNC: 31.5 G/DL (ref 31.4–37.4)
MCV RBC AUTO: 85 FL (ref 82–98)
MONOCYTES # BLD AUTO: 0.5 THOUSAND/ÂΜL (ref 0.17–1.22)
MONOCYTES NFR BLD AUTO: 32 % (ref 4–12)
NEUTROPHILS # BLD AUTO: 0.38 THOUSANDS/ÂΜL (ref 1.85–7.62)
NEUTS SEG NFR BLD AUTO: 24 % (ref 43–75)
NRBC BLD AUTO-RTO: 0 /100 WBCS
P AXIS: 48 DEGREES
PLATELET # BLD AUTO: 497 THOUSANDS/UL (ref 149–390)
PMV BLD AUTO: 9.5 FL (ref 8.9–12.7)
POTASSIUM SERPL-SCNC: 3.4 MMOL/L (ref 3.5–5.3)
PR INTERVAL: 156 MS
PROCALCITONIN SERPL-MCNC: 0.08 NG/ML
PROT SERPL-MCNC: 7.5 G/DL (ref 6.4–8.4)
PROTHROMBIN TIME: 13 SECONDS (ref 11.6–14.5)
QRS AXIS: 32 DEGREES
QRSD INTERVAL: 78 MS
QT INTERVAL: 312 MS
QTC INTERVAL: 446 MS
RBC # BLD AUTO: 4.35 MILLION/UL (ref 3.81–5.12)
RSV RNA RESP QL NAA+PROBE: NEGATIVE
SARS-COV-2 RNA RESP QL NAA+PROBE: NEGATIVE
SODIUM SERPL-SCNC: 134 MMOL/L (ref 135–147)
T WAVE AXIS: 102 DEGREES
VENTRICULAR RATE: 123 BPM
WBC # BLD AUTO: 1.57 THOUSAND/UL (ref 4.31–10.16)

## 2024-01-21 PROCEDURE — 74177 CT ABD & PELVIS W/CONTRAST: CPT

## 2024-01-21 PROCEDURE — 99285 EMERGENCY DEPT VISIT HI MDM: CPT

## 2024-01-21 PROCEDURE — 80053 COMPREHEN METABOLIC PANEL: CPT

## 2024-01-21 PROCEDURE — 93005 ELECTROCARDIOGRAM TRACING: CPT

## 2024-01-21 PROCEDURE — 96376 TX/PRO/DX INJ SAME DRUG ADON: CPT

## 2024-01-21 PROCEDURE — 83735 ASSAY OF MAGNESIUM: CPT

## 2024-01-21 PROCEDURE — G1004 CDSM NDSC: HCPCS

## 2024-01-21 PROCEDURE — 96368 THER/DIAG CONCURRENT INF: CPT

## 2024-01-21 PROCEDURE — 87040 BLOOD CULTURE FOR BACTERIA: CPT

## 2024-01-21 PROCEDURE — 83690 ASSAY OF LIPASE: CPT

## 2024-01-21 PROCEDURE — 36415 COLL VENOUS BLD VENIPUNCTURE: CPT

## 2024-01-21 PROCEDURE — 85379 FIBRIN DEGRADATION QUANT: CPT

## 2024-01-21 PROCEDURE — 71045 X-RAY EXAM CHEST 1 VIEW: CPT

## 2024-01-21 PROCEDURE — 96375 TX/PRO/DX INJ NEW DRUG ADDON: CPT

## 2024-01-21 PROCEDURE — 99446 NTRPROF PH1/NTRNET/EHR 5-10: CPT | Performed by: INTERNAL MEDICINE

## 2024-01-21 PROCEDURE — 94664 DEMO&/EVAL PT USE INHALER: CPT

## 2024-01-21 PROCEDURE — 96365 THER/PROPH/DIAG IV INF INIT: CPT

## 2024-01-21 PROCEDURE — 0241U HB NFCT DS VIR RESP RNA 4 TRGT: CPT

## 2024-01-21 PROCEDURE — 85025 COMPLETE CBC W/AUTO DIFF WBC: CPT

## 2024-01-21 PROCEDURE — 84145 PROCALCITONIN (PCT): CPT

## 2024-01-21 PROCEDURE — 71275 CT ANGIOGRAPHY CHEST: CPT

## 2024-01-21 PROCEDURE — 96361 HYDRATE IV INFUSION ADD-ON: CPT

## 2024-01-21 PROCEDURE — 83880 ASSAY OF NATRIURETIC PEPTIDE: CPT

## 2024-01-21 PROCEDURE — 96367 TX/PROPH/DG ADDL SEQ IV INF: CPT

## 2024-01-21 PROCEDURE — 99223 1ST HOSP IP/OBS HIGH 75: CPT | Performed by: INTERNAL MEDICINE

## 2024-01-21 PROCEDURE — 96366 THER/PROPH/DIAG IV INF ADDON: CPT

## 2024-01-21 PROCEDURE — 94760 N-INVAS EAR/PLS OXIMETRY 1: CPT

## 2024-01-21 PROCEDURE — 85730 THROMBOPLASTIN TIME PARTIAL: CPT

## 2024-01-21 PROCEDURE — 83605 ASSAY OF LACTIC ACID: CPT

## 2024-01-21 PROCEDURE — 85610 PROTHROMBIN TIME: CPT

## 2024-01-21 PROCEDURE — 84484 ASSAY OF TROPONIN QUANT: CPT

## 2024-01-21 PROCEDURE — 87081 CULTURE SCREEN ONLY: CPT | Performed by: INTERNAL MEDICINE

## 2024-01-21 RX ORDER — MAGNESIUM SULFATE HEPTAHYDRATE 40 MG/ML
2 INJECTION, SOLUTION INTRAVENOUS ONCE
Status: COMPLETED | OUTPATIENT
Start: 2024-01-21 | End: 2024-01-21

## 2024-01-21 RX ORDER — POTASSIUM CHLORIDE 14.9 MG/ML
20 INJECTION INTRAVENOUS ONCE
Status: COMPLETED | OUTPATIENT
Start: 2024-01-21 | End: 2024-01-21

## 2024-01-21 RX ORDER — ALBUTEROL SULFATE 90 UG/1
2 AEROSOL, METERED RESPIRATORY (INHALATION) EVERY 4 HOURS
Status: DISCONTINUED | OUTPATIENT
Start: 2024-01-21 | End: 2024-01-22

## 2024-01-21 RX ORDER — ALBUTEROL SULFATE 2.5 MG/3ML
2.5 SOLUTION RESPIRATORY (INHALATION) EVERY 6 HOURS PRN
Status: DISCONTINUED | OUTPATIENT
Start: 2024-01-21 | End: 2024-01-27 | Stop reason: HOSPADM

## 2024-01-21 RX ORDER — ONDANSETRON 2 MG/ML
4 INJECTION INTRAMUSCULAR; INTRAVENOUS ONCE
Status: COMPLETED | OUTPATIENT
Start: 2024-01-21 | End: 2024-01-21

## 2024-01-21 RX ORDER — CEFEPIME HYDROCHLORIDE 2 G/50ML
2000 INJECTION, SOLUTION INTRAVENOUS EVERY 12 HOURS
Status: DISCONTINUED | OUTPATIENT
Start: 2024-01-21 | End: 2024-01-22

## 2024-01-21 RX ORDER — ENOXAPARIN SODIUM 100 MG/ML
40 INJECTION SUBCUTANEOUS DAILY
Status: DISCONTINUED | OUTPATIENT
Start: 2024-01-21 | End: 2024-01-27 | Stop reason: HOSPADM

## 2024-01-21 RX ORDER — FLUTICASONE FUROATE AND VILANTEROL 200; 25 UG/1; UG/1
1 POWDER RESPIRATORY (INHALATION) DAILY
Status: DISCONTINUED | OUTPATIENT
Start: 2024-01-22 | End: 2024-01-27 | Stop reason: HOSPADM

## 2024-01-21 RX ORDER — CEFEPIME HYDROCHLORIDE 2 G/50ML
2000 INJECTION, SOLUTION INTRAVENOUS ONCE
Status: COMPLETED | OUTPATIENT
Start: 2024-01-21 | End: 2024-01-21

## 2024-01-21 RX ORDER — SODIUM CHLORIDE 9 MG/ML
100 INJECTION, SOLUTION INTRAVENOUS CONTINUOUS
Status: DISCONTINUED | OUTPATIENT
Start: 2024-01-21 | End: 2024-01-23

## 2024-01-21 RX ORDER — ONDANSETRON 2 MG/ML
4 INJECTION INTRAMUSCULAR; INTRAVENOUS EVERY 6 HOURS PRN
Status: DISCONTINUED | OUTPATIENT
Start: 2024-01-21 | End: 2024-01-27 | Stop reason: HOSPADM

## 2024-01-21 RX ADMIN — ALBUTEROL SULFATE 2 PUFF: 90 AEROSOL, METERED RESPIRATORY (INHALATION) at 21:56

## 2024-01-21 RX ADMIN — IOHEXOL 100 ML: 350 INJECTION, SOLUTION INTRAVENOUS at 11:00

## 2024-01-21 RX ADMIN — TOPICAL ANESTHETIC 2 SPRAY: 200 SPRAY DENTAL; PERIODONTAL at 12:06

## 2024-01-21 RX ADMIN — ONDANSETRON 4 MG: 2 INJECTION INTRAMUSCULAR; INTRAVENOUS at 10:36

## 2024-01-21 RX ADMIN — VANCOMYCIN HYDROCHLORIDE 1250 MG: 5 INJECTION, POWDER, LYOPHILIZED, FOR SOLUTION INTRAVENOUS at 20:56

## 2024-01-21 RX ADMIN — CEFEPIME HYDROCHLORIDE 2000 MG: 2 INJECTION, SOLUTION INTRAVENOUS at 09:35

## 2024-01-21 RX ADMIN — MAGNESIUM SULFATE HEPTAHYDRATE 2 G: 2 INJECTION, SOLUTION INTRAVENOUS at 10:17

## 2024-01-21 RX ADMIN — SODIUM CHLORIDE 100 ML/HR: 0.9 INJECTION, SOLUTION INTRAVENOUS at 14:56

## 2024-01-21 RX ADMIN — ONDANSETRON 4 MG: 2 INJECTION INTRAMUSCULAR; INTRAVENOUS at 08:48

## 2024-01-21 RX ADMIN — CEFEPIME HYDROCHLORIDE 2000 MG: 2 INJECTION, SOLUTION INTRAVENOUS at 20:09

## 2024-01-21 RX ADMIN — VANCOMYCIN HYDROCHLORIDE 1750 MG: 1 INJECTION, POWDER, LYOPHILIZED, FOR SOLUTION INTRAVENOUS at 10:17

## 2024-01-21 RX ADMIN — SODIUM CHLORIDE 1000 ML: 0.9 INJECTION, SOLUTION INTRAVENOUS at 08:50

## 2024-01-21 RX ADMIN — POTASSIUM CHLORIDE 20 MEQ: 14.9 INJECTION, SOLUTION INTRAVENOUS at 14:49

## 2024-01-21 RX ADMIN — ALBUTEROL SULFATE 2 PUFF: 90 AEROSOL, METERED RESPIRATORY (INHALATION) at 15:04

## 2024-01-21 RX ADMIN — FILGRASTIM-AAFI 300 MCG: 300 INJECTION, SOLUTION SUBCUTANEOUS at 15:04

## 2024-01-21 RX ADMIN — ENOXAPARIN SODIUM 40 MG: 100 INJECTION SUBCUTANEOUS at 14:56

## 2024-01-21 NOTE — SEPSIS NOTE
Sepsis Note   Kathi Ferris 63 y.o. female MRN: [id number]  Unit/Bed#: -01 Encounter: [id number]       Initial Sepsis Screening       Row Name 01/21/24 1205                Is the patient's history suggestive of a new or worsening infection? Yes (Proceed)  -LM        Suspected source of infection suspect infection, source unknown  -LM        Indicate SIRS criteria Leukocytosis (WBC > 78522 IJL) OR Leukopenia (WBC <4000 IJL) OR Bandemia (WBC >10% bands);Tachycardia > 90 bpm  -LM        Are two or more of the above signs & symptoms of infection both present and new to the patient? Yes (Proceed)  -LM        Assess for evidence of organ dysfunction: Are any of the below criteria present within 6 hours of suspected infection and SIRS criteria that are NOT considered to be chronic conditions? --                  User Key  (r) = Recorded By, (t) = Taken By, (c) = Cosigned By      Initials Name Provider Type    LM Melissa Brennan PA-C Physician Assistant                        Body mass index is 28.32 kg/m².  Wt Readings from Last 1 Encounters:   01/21/24 77.2 kg (170 lb 3.2 oz)     IBW (Ideal Body Weight): 57 kg    Ideal body weight: 57 kg (125 lb 10.6 oz)  Adjusted ideal body weight: 65.1 kg (143 lb 7.6 oz)

## 2024-01-21 NOTE — CONSULTS
Consultation - General Surgery   Kathi Ferris 63 y.o. female MRN: [id number]  Unit/Bed#: ED 04 Encounter: [id number]    ASSESSMENT:  60-year-old female with SBO    PE Study with CT Abdomen and Pelvis with contrast  Result Date: 1/21/2024  Impression: 1.  Acute small bowel obstruction with point of transition in the right lower quadrant, likely on the basis of serosal implants versus adhesions. 2.  Stable appearance of right infrahilar lung cancer with findings of lymphangitic carcinomatosis. Stable left lung groundglass and solid nodules likely representing metastases. 3.  Small bilateral pleural effusions with right chest tube in place. 4.  Moderate volume of abdominopelvic ascites with similar findings of peritoneal carcinomatosis. * I personally telephoned this result to ANGEL WEATHERS on 1/21/2024 11:27 AM. Workstation performed: [id number]      Peritoneal carcinomatosis    Stage IV non-small cell lung cancer  -Currently undergoing chemotherapy    Emphysema    Malignant pleural effusion    COPD    Hyperlipidemia    Hypertension    Ascites    PLAN:  -No indications for acute surgical intervention at this time.  Due to patient's history of cancer along with carcinomatosis and currently undergoing chemotherapy patient is a very poor surgical candidate.  -Continue conservative management with n.p.o., IVF, and NGT for decompression  -NGT to low intermittent wall suction  -Analgesics and antiemetics as needed  -Continue to closely monitor bowel function  -Serial abdominal exams  -Will discuss case with attending for further recommendations    _______________________________________________________________  Physician Requesting Consult: Johanna Atkinson MD    Additional consultants: None    Reason for Consult / Principal Problem: Small bowel obstruction    HPI: Kathi Ferris is a 63 y.o. year old female with past medical history of stage IV non-small cell lung cancer on chemotherapy, COPD, malignant  pleural effusions, peritoneal carcinomatosis, hypertension, hyperlipidemia, sigmoid resection, who presents to the emergency department with complaints of nausea, vomiting, and abdominal pain and distention for several days.  Patient reports that 3 days ago she underwent CT with IV and p.o. contrast to evaluate lung cancer.  Patient reports eating chicken nuggets from [email or web address] after she underwent a CT on 1/18/2024.  On 1/19/2024 patient developed the symptoms, and states that she continued to pass gas.  Patient continued to experience bloating, nausea, and vomiting, though she states that the abdominal pain had improved, and almost resolved by the time she arrived to the emergency department.  In the emergency department the patient was found to be leukopenic, have an LEE, and imaging identified a small bowel obstruction with a transition point likely in the right lower quadrant.  General surgery was consulted for evaluation and assisting with small bowel obstruction management.    Historical Information   Past Medical History:   Diagnosis Date    Cancer (HCC)     COPD (chronic obstructive pulmonary disease) (HCC)     Emphysema of lung (HCC)     Hyperlipidemia     Hypertension     Lung cancer (HCC) 06/26/2019    right lower lobe removed    Lung nodule     Pleural effusion      Past Surgical History:   Procedure Laterality Date    APPENDECTOMY      BREAST BIOPSY Right     benign    BRONCHOSCOPY  06/2019    COLON SURGERY      HYSTERECTOMY      age 46    IR PLEURAL EFFUSION LONG-TERM CATHETER PLACEMENT  04/25/2023    IR PORT PLACEMENT  04/18/2023    IR THORACENTESIS  03/24/2023    IR THORACENTESIS  04/12/2023    IR THORACENTESIS  04/18/2023    LUNG BIOPSY  06/2019    LUNG LOBECTOMY      OOPHORECTOMY Bilateral     age 46     Social History   Social History     Substance and Sexual Activity   Alcohol Use Not Currently    Comment: Rarely drink, socially only     Social History     Substance and Sexual Activity   Drug  Use No     Social History     Tobacco Use   Smoking Status Former    Current packs/day: 0.00    Average packs/day: 1.5 packs/day for 53.2 years (79.8 ttl pk-yrs)    Types: Cigarettes    Start date:     Quit date: 3/15/2023    Years since quittin.8   Smokeless Tobacco Never     Family History: non-contributory}    Meds/Allergies   Home meds:   Prior to Admission medications    Medication Sig Start Date End Date Taking? Authorizing Provider   albuterol (2.5 mg/3 mL) 0.083 % nebulizer solution Take 3 mL (2.5 mg total) by nebulization every 6 (six) hours as needed for wheezing or shortness of breath 23   Shani Ricketts, DO   albuterol (PROVENTIL HFA,VENTOLIN HFA) 90 mcg/act inhaler Inhale 2 puffs every 4 (four) hours 19   Historical Provider, MD   Ascorbic Acid (vitamin C) 1000 MG tablet 1 daily    Historical Provider, MD   atorvastatin (LIPITOR) 20 mg tablet Take 20 mg by mouth daily.    Historical Provider, MD   Biotin 97739 MCG TABS 1 daily    Historical Provider, MD   buPROPion (WELLBUTRIN XL) 300 mg 24 hr tablet TAKE 1 TABLET BY MOUTH EVERY DAY IN THE MORNING FOR 90 DAYS 23   Historical Provider, MD   Cholecalciferol (Vitamin D3) 25 MCG (1000 UT) CAPS 1 capsule every 24 hours    Historical Provider, MD   dexamethasone (DECADRON) 4 mg tablet Take 2 tablets (8 mg total) by mouth 2 (two) times a day with meals for 3 days Take 2 tabs by mouth twice daily the day before, the day of, and the day after chemo. 23   Shani Phipps DO   dexamethasone sodium phosphate 0.1 % ophthalmic solution Administer 1 drop to both eyes every 3 (three) hours as needed (eye irritation) 23   Shani Phipps DO   diphenhydrAMINE (BENADRYL) 50 MG tablet Take 1 tablet (50 mg total) by mouth every 6 (six) hours as needed for itching (Rash) 20   Tomas Ratliff, DO   diphenhydrAMINE-APAP, sleep, (TYLENOL PM EXTRA STRENGTH PO) Take 325 mg by mouth daily at bedtime    Historical Provider, MD   EPINEPHrine  (EPIPEN) 0.3 mg/0.3 mL SOAJ Inject 0.3 mL (0.3 mg total) into a muscle once for 1 dose  Patient not taking: Reported on 10/2/2023 10/11/21 9/18/23  Tom Lindsay,    famotidine (PEPCID) 20 mg tablet Take 20 mg by mouth daily    Historical Provider, MD   Fluticasone-Salmeterol (Advair) 500-50 mcg/dose inhaler Inhale 1 puff 2 (two) times a day. pt states insurance will not  breo or trelegy  Indications: Asthma 12/22/23   Historical Provider, MD   fluticasone-umeclidinium-vilanterol (Trelegy Ellipta) 200-62.5-25 mcg/actuation AEPB inhaler Inhale 1 puff daily Rinse mouth after use. 10/24/23 12/20/23  Shani Ricketts,    furosemide (LASIX) 20 mg tablet Take 1 tablet (20 mg total) by mouth every morning 8/22/23 2/18/24  Keyon Miller MD   Klor-Con M20 20 MEQ tablet TAKE 1 TABLET BY MOUTH EVERY DAY 11/24/23   [facility] [place]   lidocaine-prilocaine (EMLA) cream APPLY TO PORT 30 TO 60 MINUTES PRIOR TO USE 9/7/23   [facility] [place]   LORazepam (Ativan) 0.5 mg tablet Take 1 tablet (0.5 mg total) by mouth daily as needed for anxiety 10/6/23   Keyon Miller MD   metoprolol tartrate (LOPRESSOR) 25 mg tablet Take 1 tablet (25 mg total) by mouth every 12 (twelve) hours 12/20/23 6/17/24  Keyon Miller MD   Multiple Vitamin (MULTIVITAMINS PO) every 24 hours    Historical Provider, MD   oxygen gas Inhale 2 L/min continuous. May increase to 3L PRN for dyspnea and sats under 89%  Indications: Hypoxia 10/25/23   Historical Provider, MD   polyethylene glycol (GLYCOLAX) 17 GM/SCOOP powder Take 17 g by mouth daily. Indications: Constipation 10/30/23   Historical Provider, MD   Probiotic Product (PROBIOTIC DAILY PO) 1 daily    Historical Provider, MD   Sennosides (Senna) 8.6 MG CAPS Take 2 capsules by mouth as needed (constipation) 5/1/23   Historical Provider, MD   sertraline (ZOLOFT) 100 mg tablet 2 daily    Historical Provider, MD   telmisartan (MICARDIS) 80 MG tablet Take 1 tablet (80 mg total) by mouth daily 8/22/23  2/18/24  Keyon Miller MD   verapamil (CALAN-SR) 120 mg CR tablet Take 1 tablet (120 mg total) by mouth daily at bedtime 1 hs 6/8/23   Keyon Miller MD   verapamil (CALAN-SR) 240 mg CR tablet Take 1 tablet (240 mg total) by mouth daily at bedtime 1 hs 6/8/23   Keyon Miller MD     Scheduled Meds:  Continuous Infusions:No current facility-administered medications for this encounter.    PRN Meds:      ALLERGIES:   Allergies   Allergen Reactions    Amoxicillin Hives    Vilazodone Hcl Nausea Only and Dizziness     (Viibryd)    Wasp Venom Swelling    Zithromax [Azithromycin] Hives       Review of Systems:  General: negative  Cardiovascular: no chest pain or dyspnea on exertion  Respiratory: positive for - cough and shortness of breath  Gastrointestinal: Nausea, vomiting, abdominal distention  Genitourinary: no dysuria, trouble voiding, or hematuria  Musculoskeletal: negative  Neurological: no TIA or stroke symptoms  Hematological and Lymphatic: negative  Dermatological : negative  Psychological: negative  Ophthalmic: negative  ENT: negative      Objective   Vitals:  Blood pressure 132/60, pulse 104, temperature 97.5 °F (36.4 °C), temperature source Temporal, resp. rate 19, SpO2 98%.  There is no height or weight on file to calculate BMI.    SpO2: SpO2: 98 %    I/Os:  I/O         01/19 0701  01/20 0700 01/20 0701  01/21 0700 01/21 0701 01/22 0700    IV Piggyback   1050    Total Intake   1050    Net   +1050                   Invasive Lines/Tubes:  Invasive Devices       Central Venous Catheter Line  Duration             Port A Cath 04/18/23 Right Subclavian 278 days              Peripheral Intravenous Line  Duration             Peripheral IV 01/21/24 Right Antecubital <1 day              Drain  Duration             Pleural Effusion Long-Term Catheter 15.5 Fr. 270 days    NG/OG/Enteral Tube Nasogastric 18 Fr Right nare <1 day                    Physical Exam  General appearance: alert and cooperative  Lungs: diminished  "breath sounds  Chest wall: no tenderness  Heart:: S1, S2 normal and tachycardic  Abdomen:  Soft, slightly distended, nontender to palpation, active bowel sounds present, healed scars from prior surgeries noted  Genitalia: deferred  Rectal: deferred  Extremities: extremities normal, atraumatic, no cyanosis or edema  Skin: Skin color, texture, turgor normal. No rashes or lesions  Neurologic: Grossly normal      Lab Results and Cultures:   CBC:   Results from last 7 days   Lab Units 01/21/24  0844   WBC Thousand/uL 1.57*   HEMOGLOBIN g/dL 11.7   HEMATOCRIT % 37.1   PLATELETS Thousands/uL 497*     BMP/CMP:  Results from last 7 days   Lab Units 01/21/24  0844   POTASSIUM mmol/L 3.4*   CHLORIDE mmol/L 89*   CO2 mmol/L 29   BUN mg/dL 21   CREATININE mg/dL 1.26   CALCIUM mg/dL 10.0     Coags:   Results from last 7 days   Lab Units 01/21/24  0844   INR  0.94   PTT seconds 28     Lipid panel:     HgbA1c: No results found for: \"HGBA1C\"    Urinalysis:   Lab Results   Component Value Date    COLORU Yellow 12/15/2023    CLARITYU Clear 12/15/2023    SPECGRAV <1.005 (L) 12/15/2023    PHUR 7.0 12/15/2023    PHUR 6.5 03/26/2016    LEUKOCYTESUR Negative 12/15/2023    NITRITE Negative 12/15/2023    GLUCOSEU Negative 12/15/2023    KETONESU Negative 12/15/2023    BILIRUBINUR Negative 12/15/2023    BLOODU Negative 12/15/2023   ,   Urine Culture: No results found for: \"URINECX\"  Wound Culure: No results found for: \"WOUNDCULT\"  Blood Culture:   Lab Results   Component Value Date    BLOODCX No Growth After 5 Days. 12/15/2023    BLOODCX No Growth After 5 Days. 12/15/2023       Imaging Studies: I have personally reviewed pertinent reports.   and I have personally reviewed pertinent films in PACS  EKG, Pathology, and Other Studies: I have personally reviewed pertinent reports.   and I have personally reviewed pertinent films in PACS  VTE Prophylaxis: Sequential compression device (Venodyne)      Code Status: Prior  Advance Directive and Living " Will:      Power of : Yes  POLST:      Counseling / Coordination of Care  None       Prateek Kern PA-C  1/21/2024

## 2024-01-21 NOTE — TELEMEDICINE
e-Consult ([facility])   Kathi Ferris 63 y.o. female MRN: [id number]  Unit/Bed#: -Kieran Encounter: [id number]      Reason for Consult / Principal Problem: Chemotherapy-induced neutropenia.  Consults  01/21/24  I reviewed the patient's records and noticed that patient has stage IV non-small cell lung cancer with malignant pleural effusion and peritoneal carcinomatosis and patient has been on Taxotere plus Cyramza and last treatment was on 1/11/2024.  There is question of small bowel obstruction.  Patient has been seen by general surgery and there is no immediate plan for surgical intervention.  Patient is afebrile at present.  She is on antibiotics.  ANC is 380.  Patient did not have Neulasta postchemotherapy.  She is a candidate for Granix.    ASSESSMENT:  Chemotherapy-induced neutropenia  Stage IV non-small cell lung cancer      RECOMMENDATIONS:  Granix 300 mcg subcutaneously daily until ANC 1500.  To monitor patient's condition and blood counts especially ANC.  Outpatient follow-up with Dr. Phipps.  I think this patient will be a candidate for Neulasta postchemotherapy from now on.  I plan to communicate with Dr. Phipps.  I communicated with patient's primary physician who order Granix  Time spent: 10 minutes.    Sean Hoskins MD

## 2024-01-21 NOTE — ASSESSMENT & PLAN NOTE
Patient noted to have neutropenia on admission  Will start on filgrastim x 3 doses  Trend CBC daily  Oncology consult  Continue on IV antibiotics

## 2024-01-21 NOTE — PROGRESS NOTES
Kathi Ferris is a 63 y.o. female who is currently ordered Vancomycin IV with management by the Pharmacy Consult service.  Relevant clinical data and objective / subjective history reviewed.  Vancomycin Assessment:  Indication and Goal AUC/Trough: Pneumonia (goal -600, trough >10), -600, trough >10  Clinical Status: stable  Micro:     Renal Function:  SCr: 1.26 mg/dL  CrCl: 45 mL/min  Renal replacement: Not on dialysis  Days of Therapy: 1  Current Dose: Initial Treatment:Vancomycin 1750mg IV (25mg/kg of ABW 67.2kg x1 ED 1000) followed by Vancomycin 1250mg IV Q24H starting at 2100 1/21 based on projected levels  Vancomycin Plan:  New Dosing: As per initial dosing above.  Estimated AUC: 492 mcg*hr/mL  Estimated Trough: 14.9 mcg/mL  Next Level: Random Level AM labs 1/23/24  Renal Function Monitoring: Daily BMP and UOP  Pharmacy will continue to follow closely for s/sx of nephrotoxicity, infusion reactions and appropriateness of therapy.  BMP and CBC will be ordered per protocol. Patient also prescribed cefepime, which will be monitored for any necessary renal dosing adjustments. We will continue to follow the patient’s culture results and clinical progress daily.    Mann Maldonado, Pharmacist

## 2024-01-21 NOTE — H&P
"[facility]  H&P  Name: Kathi Ferris 63 y.o. female I MRN: [id number]  Unit/Bed#: -01 I Date of Admission: 1/21/2024   Date of Service: 1/21/2024 I Hospital Day: 0      Assessment/Plan   * SBO (small bowel obstruction) ([facility])  Assessment & Plan  Patient presented with nausea and vomiting  CT chest abdomen pelvis showed-\"  Acute small bowel obstruction with point of transition in the right lower quadrant, likely on the basis of serosal implants versus adhesions.  2.  Stable appearance of right infrahilar lung cancer with findings of lymphangitic carcinomatosis. Stable left lung groundglass and solid nodules likely representing metastases.  3.  Small bilateral pleural effusions with right chest tube in place.  Pathologic left posterior fifth rib fracture with surrounding callus. Right second rib metastasis is better appreciated on the PET/CT   4.  Moderate volume of abdominopelvic ascites with similar findings of peritoneal carcinomatosis.\"  Surgical consult appreciated  NG tube placed in ER  N.p.o. and IV fluids  Pain management as needed  Potassium and magnesium supplementation  Monitor and replace electrolytes as needed  Continue management as per surgical recommendation    Sepsis ([facility])  Assessment & Plan  Patient admitted with sepsis with leukopenia and tachycardia  Source-pneumonia versus abdominal  ER gave cefepime and vancomycin  Continue on cefepime and vancomycin  Continue IV fluids  Lactate is 1.4  Procalcitonin is normal  Trend procalcitonin  Follow-up culture results  Will obtain UA  Trend WBC and fever curve  Patient noted to have leukopenia.  Will order filgrastim x 1 dose and oncology consult      Neutropenia ([facility])  Assessment & Plan  Patient noted to have neutropenia on admission  Will start on filgrastim x 3 doses  Trend CBC daily  Oncology consult  Continue on IV antibiotics    Chronic respiratory failure with hypoxia ([facility])  Assessment & Plan  Patient has chronic " respiratory failure with hypoxia and is on 2.5 to 3 L of supplemental oxygen at home   Continue oxygen supplementation  Monitor respiratory status    Generalized anxiety disorder  Assessment & Plan  Home medications Ativan, Wellbutrin, Zoloft   Patient is NPO.  Restart when able    Malignant pleural effusion  Assessment & Plan  With right lung asept catheter in place, drain 2x weekly by VNA  Monitor resp status    Non-small cell cancer of right lung (HCC)  Assessment & Plan  Patient has history of stage IV metastatic lung cancer on treatment with Taxotere and ramucirumab.  She has findings of a new left pleural effusion status post thoracentesis on 12/17/2023.    Last chemotherapy on January 11, 2024  Follows up with Dr. Phipps as outpatient  Pathologic left posterior fifth rib fracture and right second rib mets   Pain management  Continue outpatient chemotherapy      Essential hypertension  Assessment & Plan  Home regimen: Micardis 80 mg, verapamil 360 mg daily, Lasix 20 mg daily, Lopressor  Start on Lopressor 2.5 mg IV every 6 hours  Hydralazine as needed  Restart home medications when able    COPD (chronic obstructive pulmonary disease) (HCC)  Assessment & Plan  Not in acute exacerbation  Continue inhalers        Chief Complaint   Patient presents with    Vomiting     Pt to er with reports of vomiting since Thursday. Had to drink contrast dye for a cat scan, and has been sick since. Denies abdominal pain        HPI:  Kathi Ferris is a 63 y.o. female with past medical history of stage IV metastatic lung cancer, hypertension, malignant pleural effusion with ASAP catheter on right side, COPD with chronic respiratory failure on supplemental oxygen presented to emergency department with nausea, vomiting and epigastric tenderness since Thursday.  Patient is undergoing chemotherapy with Dr. Phipps and her last chemotherapy was on January 11.  As per  patient ate at [employer] on Thursday and started  noticing some nausea, vomiting.  Her symptoms continue to get worse over the last couple of days.  She had multiple episodes of nausea, vomiting and had associated epigastric abdominal pain.  Patient denies any diarrhea.  Patient has no associated fever, chills, chest pain, shortness of breath, rectal bleeding, lightheadedness, dizziness or any other complaints.  Patient has an Asept catheter which she is drained twice a week.  In ER CAT scan showed small bowel obstruction and surgery was consulted.  NG tube was placed and patient was admitted under medical service    Historical Information   Past Medical History:   Diagnosis Date    Cancer (HCC)     COPD (chronic obstructive pulmonary disease) (HCC)     Emphysema of lung (HCC)     Hyperlipidemia     Hypertension     Lung cancer (HCC) 2019    right lower lobe removed    Lung nodule     Pleural effusion      Past Surgical History:   Procedure Laterality Date    APPENDECTOMY      BREAST BIOPSY Right     benign    BRONCHOSCOPY  2019    COLON SURGERY      HYSTERECTOMY      age 46    IR PLEURAL EFFUSION LONG-TERM CATHETER PLACEMENT  2023    IR PORT PLACEMENT  2023    IR THORACENTESIS  2023    IR THORACENTESIS  2023    IR THORACENTESIS  2023    LUNG BIOPSY  2019    LUNG LOBECTOMY      OOPHORECTOMY Bilateral     age 46     Social History   Social History     Substance and Sexual Activity   Alcohol Use Not Currently    Comment: Rarely drink, socially only     Social History     Substance and Sexual Activity   Drug Use No     Social History     Tobacco Use   Smoking Status Former    Current packs/day: 0.00    Average packs/day: 1.5 packs/day for 53.2 years (79.8 ttl pk-yrs)    Types: Cigarettes    Start date:     Quit date: 3/15/2023    Years since quittin.8   Smokeless Tobacco Never     Family History   Problem Relation Age of Onset    Colon cancer Mother     Hypertension Mother     Hyperlipidemia Mother     Hearing loss  Mother     Depression Mother     COPD Mother     Cancer Mother         Lung    Arthritis Mother     Lung cancer Mother     Drug abuse Brother     Diabetes Maternal Grandmother     Cancer Maternal Grandmother         Colon    Colon cancer Maternal Grandmother     Cancer Maternal Aunt     Colon cancer Maternal Aunt     Colon cancer Maternal Aunt     Cancer Maternal Aunt         Colon       Meds/Allergies   Allergies   Allergen Reactions    Amoxicillin Hives    Vilazodone Hcl Nausea Only and Dizziness     (Viibryd)    Wasp Venom Swelling    Zithromax [Azithromycin] Hives       Meds:    Current Facility-Administered Medications:     albuterol (PROVENTIL HFA,VENTOLIN HFA) inhaler 2 puff, 2 puff, Inhalation, Q4H, Toan Yu MD    albuterol inhalation solution 2.5 mg, 2.5 mg, Nebulization, Q6H PRN, Toan Yu MD    cefepime (MAXIPIME) IVPB (premix in dextrose) 2,000 mg 50 mL, 2,000 mg, Intravenous, Q12H, Toan Yu MD    enoxaparin (LOVENOX) subcutaneous injection 40 mg, 40 mg, Subcutaneous, Daily, Toan Yu MD    Filgrastim-aafi (NIVESTYM) subcutaneous injection 300 mcg, 300 mcg, Subcutaneous, Daily, Toan Yu MD    [START ON 1/22/2024] fluticasone-vilanterol 200-25 mcg/actuation 1 puff, 1 puff, Inhalation, Daily, Toan Yu MD    ondansetron (ZOFRAN) injection 4 mg, 4 mg, Intravenous, Q6H PRN, Toan Yu MD    potassium chloride 20 mEq IVPB (premix), 20 mEq, Intravenous, Once, Toan Yu MD    sodium chloride 0.9 % infusion, 100 mL/hr, Intravenous, Continuous, Toan Yu MD    vancomycin (VANCOCIN) 1,250 mg in sodium chloride 0.9 % 250 mL IVPB, 1,250 mg, Intravenous, Q24H, Toan Yu MD    Medications Prior to Admission   Medication    Ascorbic Acid (vitamin C) 1000 MG tablet    atorvastatin (LIPITOR) 20 mg tablet    Biotin 50578 MCG TABS    buPROPion (WELLBUTRIN XL) 300 mg 24 hr tablet    Cholecalciferol (Vitamin D3) 25 MCG (1000 UT) CAPS     "dexamethasone (DECADRON) 4 mg tablet    diphenhydrAMINE-APAP, sleep, (TYLENOL PM EXTRA STRENGTH PO)    famotidine (PEPCID) 20 mg tablet    Fluticasone-Salmeterol (Advair) 500-50 mcg/dose inhaler    furosemide (LASIX) 20 mg tablet    Klor-Con M20 20 MEQ tablet    LORazepam (Ativan) 0.5 mg tablet    metoprolol tartrate (LOPRESSOR) 25 mg tablet    Multiple Vitamin (MULTIVITAMINS PO)    oxygen gas    polyethylene glycol (GLYCOLAX) 17 GM/SCOOP powder    Probiotic Product (PROBIOTIC DAILY PO)    Sennosides (Senna) 8.6 MG CAPS    sertraline (ZOLOFT) 100 mg tablet    telmisartan (MICARDIS) 80 MG tablet    verapamil (CALAN-SR) 120 mg CR tablet    verapamil (CALAN-SR) 240 mg CR tablet    albuterol (2.5 mg/3 mL) 0.083 % nebulizer solution    albuterol (PROVENTIL HFA,VENTOLIN HFA) 90 mcg/act inhaler    dexamethasone sodium phosphate 0.1 % ophthalmic solution    diphenhydrAMINE (BENADRYL) 50 MG tablet    EPINEPHrine (EPIPEN) 0.3 mg/0.3 mL SOAJ    fluticasone-umeclidinium-vilanterol (Trelegy Ellipta) 200-62.5-25 mcg/actuation AEPB inhaler    lidocaine-prilocaine (EMLA) cream         Review of Systems   Constitutional:  Positive for activity change and fatigue.   HENT: Negative.     Eyes: Negative.    Respiratory: Negative.     Cardiovascular: Negative.    Gastrointestinal:  Positive for abdominal distention, abdominal pain, nausea and vomiting.   Endocrine: Negative.    Genitourinary: Negative.    Musculoskeletal: Negative.    Skin: Negative.    Allergic/Immunologic: Negative.    Neurological: Negative.    Hematological: Negative.    Psychiatric/Behavioral: Negative.         Current Vitals:   Blood Pressure: 131/72 (01/21/24 1330)  Pulse: (!) 110 (01/21/24 1330)  Temperature: (!) 97.3 °F (36.3 °C) (01/21/24 1354)  Temp Source: Oral (01/21/24 1354)  Respirations: 19 (01/21/24 1200)  Height: 5' 5\" (165.1 cm) (01/21/24 1354)  Weight - Scale: 77.2 kg (170 lb 3.2 oz) (01/21/24 1354)  SpO2: 97 % (01/21/24 1330)  SPO2 RA Rest  "     Flowsheet Row ED to Hosp-Admission (Current) from 1/21/2024 in [facility] Med [facility]   SpO2 97 %   SpO2 Activity At Rest   O2 Device Nasal cannula   O2 Flow Rate --            Intake/Output Summary (Last 24 hours) at 1/21/2024 1425  Last data filed at 1/21/2024 1206  Gross per 24 hour   Intake 1050 ml   Output --   Net 1050 ml     Body mass index is 28.32 kg/m².     Physical Exam  Vitals and nursing note reviewed.   Constitutional:       General: She is not in acute distress.     Appearance: She is well-developed.   HENT:      Head: Normocephalic and atraumatic.      Nose: Nose normal.   Eyes:      General: No scleral icterus.     Conjunctiva/sclera: Conjunctivae normal.      Pupils: Pupils are equal, round, and reactive to light.   Neck:      Thyroid: No thyromegaly.      Vascular: No JVD.      Trachea: No tracheal deviation.   Cardiovascular:      Rate and Rhythm: Normal rate and regular rhythm.      Heart sounds: Normal heart sounds.   Pulmonary:      Effort: Pulmonary effort is normal. No respiratory distress.      Breath sounds: Normal breath sounds. No wheezing or rales.   Chest:      Chest wall: No tenderness.   Abdominal:      General: Bowel sounds are normal. There is distension.      Palpations: Abdomen is soft. There is no mass.      Tenderness: There is abdominal tenderness. There is no guarding or rebound.   Musculoskeletal:         General: No tenderness or deformity. Normal range of motion.      Cervical back: Normal range of motion and neck supple.   Skin:     General: Skin is warm.      Coloration: Skin is not pale.      Findings: No erythema or rash.   Neurological:      General: No focal deficit present.      Mental Status: She is alert and oriented to person, place, and time. Mental status is at baseline.      Cranial Nerves: No cranial nerve deficit.      Coordination: Coordination normal.   Psychiatric:         Behavior: Behavior normal.         Judgment: Judgment  "normal.         Lab Results:   CBC:   Lab Results   Component Value Date    WBC 1.57 (LL) 01/21/2024    HGB 11.7 01/21/2024    HCT 37.1 01/21/2024    MCV 85 01/21/2024     (H) 01/21/2024    RBC 4.35 01/21/2024    MCH 26.9 01/21/2024    MCHC 31.5 01/21/2024    RDW 17.1 (H) 01/21/2024    MPV 9.5 01/21/2024    NRBC 0 01/21/2024     CMP:  Lab Results   Component Value Date    CL 89 (L) 01/21/2024    CO2 29 01/21/2024    CO2 23 08/19/2019    BUN 21 01/21/2024    CREATININE 1.26 01/21/2024    GLUCOSE 95 08/19/2019    CALCIUM 10.0 01/21/2024    AST 19 01/21/2024    ALT 15 01/21/2024    ALKPHOS 95 01/21/2024    EGFR 44 01/21/2024    EGFR 105 08/19/2019     Lab Results   Component Value Date    TROPONINI <0.02 06/15/2020     Coagulation:   Lab Results   Component Value Date    INR 0.94 01/21/2024    Urinalysis:  Lab Results   Component Value Date    COLORU Yellow 12/15/2023    CLARITYU Clear 12/15/2023    SPECGRAV <1.005 (L) 12/15/2023    PHUR 7.0 12/15/2023    PHUR 6.5 03/26/2016    LEUKOCYTESUR Negative 12/15/2023    NITRITE Negative 12/15/2023    GLUCOSEU Negative 12/15/2023    KETONESU Negative 12/15/2023    BILIRUBINUR Negative 12/15/2023    BLOODU Negative 12/15/2023      Amylase: No results found for: \"AMYLASE\"  Lipase:   Lab Results   Component Value Date    LIPASE <6 (L) 01/21/2024        Imaging: PE Study with CT Abdomen and Pelvis with contrast    Result Date: 1/21/2024  Narrative: CT PULMONARY ANGIOGRAM OF THE CHEST AND CT ABDOMEN AND PELVIS WITH INTRAVENOUS CONTRAST INDICATION: elevated dimer, vomiting. History of metastatic non-small cell lung cancer. COMPARISON: CT chest/abdomen/pelvis 1/18/2024. PET/CT 11/2/2023. TECHNIQUE: CT examination of the chest, abdomen and pelvis was performed. Thin section CT angiographic technique was used in the chest in order to evaluate for pulmonary embolus and coronal 3D MIP postprocessing was performed on the acquisition scanner. Multiplanar 2D reformatted images were " created from the source data. This examination, like all CT scans performed in the [facility], was performed utilizing techniques to minimize radiation dose exposure, including the use of iterative reconstruction and automated exposure control. Radiation dose length product (DLP) for this visit: 935.89 mGy-cm IV Contrast: 100 mL of iohexol (OMNIPAQUE) Enteric Contrast: Enteric contrast was not administered. FINDINGS: CHEST PULMONARY ARTERIAL TREE: No pulmonary embolus is seen. Note suboptimal contrast bolus timing. LUNGS: Postsurgical changes of right lower lobectomy. Moderate to severe centrilobular and paraseptal emphysema. Redemonstrated ill-defined right infrahilar tumor measuring 6.4 x 4.3 cm (3/148), similar to prior study. Surrounding groundglass opacities and interlobular septal thickening within the right lung similar compared to CT 12/15/2023 and increased from 10/27/2023, suggestive of lymphangitic carcinomatosis. Multifocal solid and groundglass nodularity throughout the left lung, similar to prior study and likely metastatic in etiology. For example, a 12 mm left upper lobe nodule (5/58), unchanged. PLEURA: Right chest tube terminating in the right apex. Stable small bilateral pleural effusions, left greater than right. No pneumothorax. Stable pleural nodularity in keeping with metastases. HEART/AORTA: Heart is unremarkable for patient's age. No thoracic aortic aneurysm. Coronary artery calcifications. Right chest port with catheter tip terminating in the upper right atrium. MEDIASTINUM AND SUMIT: Unremarkable. CHEST WALL AND LOWER NECK: Unremarkable. ABDOMEN LIVER/BILIARY TREE: Unremarkable. GALLBLADDER: No calcified gallstones. No pericholecystic inflammatory change. Pericholecystic fluid is favored to be on the basis of ascites. SPLEEN: Unremarkable. PANCREAS: Unremarkable. ADRENAL GLANDS: Unremarkable. KIDNEYS/URETERS: Unremarkable. No hydronephrosis. STOMACH AND BOWEL: Diffuse  dilation of small bowel with abrupt point of transition in the right lower quadrant (4/112), in keeping with small bowel obstruction likely on the basis of serosal implants versus adhesions. Colonic diverticulosis. APPENDIX: No findings to suggest appendicitis. ABDOMINOPELVIC CAVITY: Small to moderate volume of abdominopelvic ascites, likely malignant. Similar peritoneal/omental nodularity in keeping with peritoneal carcinomatosis. No pneumoperitoneum. VESSELS: Atherosclerosis without abdominal aortic aneurysm. PELVIS REPRODUCTIVE ORGANS: Hysterectomy. No adnexal mass. URINARY BLADDER: Unremarkable. ABDOMINAL WALL/INGUINAL REGIONS: Small fat-containing umbilical hernia. BONES: Pathologic left posterior fifth rib fracture with surrounding callus. Right second rib metastasis is better appreciated on the PET/CT. Grade 2 anterolisthesis of L5 on S1 with bilateral pars defects, unchanged.     Impression: 1.  Acute small bowel obstruction with point of transition in the right lower quadrant, likely on the basis of serosal implants versus adhesions. 2.  Stable appearance of right infrahilar lung cancer with findings of lymphangitic carcinomatosis. Stable left lung groundglass and solid nodules likely representing metastases. 3.  Small bilateral pleural effusions with right chest tube in place. 4.  Moderate volume of abdominopelvic ascites with similar findings of peritoneal carcinomatosis. * I personally telephoned this result to ANGEL WEATHERS on 1/21/2024 11:27 AM. Workstation performed: [id number]     CT chest abdomen pelvis w contrast    Result Date: 1/19/2024  Narrative: CT CHEST, ABDOMEN AND PELVIS WITH IV CONTRAST INDICATION: C34.91: Malignant neoplasm of unspecified part of right bronchus or lung. COMPARISON: CTA chest PE study 12/15/2023. PET/CT 11/2/2023. CT chest abdomen pelvis 10/27/2023. TECHNIQUE: CT examination of the chest, abdomen and pelvis was performed. Multiplanar 2D reformatted images were created  from the source data. This examination, like all CT scans performed in the [facility], was performed utilizing techniques to minimize radiation dose exposure, including the use of iterative reconstruction and automated exposure control. Radiation dose length product (DLP) for this visit: 1047.73 mGy-cm IV Contrast: 100 mL of iohexol (OMNIPAQUE) Enteric Contrast: FINDINGS: CHEST LUNGS: Ill-defined right infrahilar tumor now measuring approximately 6.6 x 4.4 cm unchanged when remeasured with similar technique on the 12/15/2023 study. Unchanged bilateral groundglass opacities most prominent through the right lower lobe. Pulmonary emphysema. Enlarged 13 mm left upper lobe nodule measured 9 mm on the 12/15/2023 study. Other subcentimeter left upper lobe nodules are unchanged. PLEURA: Unchanged right pleural effusion with diffuse right pleural thickening. A pleural drainage catheter is in place. Decreased size of a small left pleural effusion. HEART/GREAT VESSELS: Borderline heart size. Coronary artery calcifications. Thin pericardial effusion measuring a maximum of 6 mm. No thoracic aortic aneurysm. MEDIASTINUM AND SUMIT: No lymphadenopathy. The FDG avid nodes seen on the PET/CT are not enlarged. CHEST WALL AND LOWER NECK: Right-sided chest port. ABDOMEN LIVER/BILIARY TREE: Unremarkable. GALLBLADDER: No calcified gallstones. No pericholecystic inflammatory change. SPLEEN: Unremarkable. PANCREAS: Unremarkable. ADRENAL GLANDS: Unremarkable. KIDNEYS/URETERS: Unremarkable. No hydronephrosis. STOMACH AND BOWEL: Discrete serosal implants are not identified. Colonic diverticulosis without diverticulitis. APPENDIX: No findings to suggest appendicitis. ABDOMINOPELVIC CAVITY: Small quantity of abdominal and pelvic ascites. No pneumoperitoneum. The 13 mm left retroperitoneal node seen on the PET/CT is now subcentimeter in size. No residual retroperitoneal adenopathy. Persistent but improved omental caking  "correlating to FDG avid disease. Discrete peritoneal nodules are not apparent. VESSELS: Unremarkable for patient's age. PELVIS REPRODUCTIVE ORGANS: Hysterectomy. URINARY BLADDER: Unremarkable. ABDOMINAL WALL/INGUINAL REGIONS: Unremarkable. BONES: Pathologic left posterior fifth rib fracture with surrounding callus. Right second rib metastasis is better appreciated on the PET/CT. Bilateral L5 pars defects with unchanged grade 2 anterolisthesis of L5 on S1.     Impression: Unchanged ill-defined 6.6 cm right infrahilar tumor. Enlarged 13 mm left upper lobe nodule previously measured 9 mm. Other subcentimeter left upper lobe nodules are unchanged. FDG-avid mediastinal and retroperitoneal nodes are now all subcentimeter in size. Small right pleural effusion with diffuse right-sided pleural thickening. Diffuse omental caking less apparent than prior studies. Healing left posterior fifth rib pathologic fracture. Workstation performed: [id number]     EKG, Pathology, and Other Studies: I have personally reviewed the results.  VTE Pharmacologic Prophylaxis: Enoxaparin (Lovenox)  VTE Mechanical Prophylaxis: sequential compression device    Code Status: Level 3 - DNAR and DNI    Anticipated Length of Stay:  Patient will be admitted on an Inpatient basis with an anticipated length of stay of greater than 2 midnights.     Counseling / Coordination of Care  Total floor / unit time spent today 75 minutes.  Greater than 50% of total time was spent with the patient and / or family counseling and / or coordination of care.     \"This note has been constructed using a voice recognition system\"      Toan Yu MD  1/21/2024, 2:25 PM            "

## 2024-01-21 NOTE — ASSESSMENT & PLAN NOTE
Patient has history of stage IV metastatic lung cancer on treatment with Taxotere and ramucirumab.  She has findings of a new left pleural effusion status post thoracentesis on 12/17/2023.    Last chemotherapy on January 11, 2024  Follows up with Dr. Phipps as outpatient  Pathologic left posterior fifth rib fracture and right second rib mets   Pain management  Continue outpatient chemotherapy

## 2024-01-21 NOTE — ASSESSMENT & PLAN NOTE
Home regimen: Micardis 80 mg, verapamil 360 mg daily, Lasix 20 mg daily, Lopressor  Start on Lopressor 2.5 mg IV every 6 hours  Hydralazine as needed  Restart home medications when able

## 2024-01-21 NOTE — PLAN OF CARE
Problem: Potential for Falls  Goal: Patient will remain free of falls  Description: INTERVENTIONS:  - Educate patient/family on patient safety including physical limitations  - Instruct patient to call for assistance with activity   - Consult OT/PT to assist with strengthening/mobility   - Keep Call bell within reach  - Keep bed low and locked with side rails adjusted as appropriate  - Keep care items and personal belongings within reach  - Initiate and maintain comfort rounds  - Make Fall Risk Sign visible to staff  - Offer Toileting every x Hours, in advance of need  - Initiate/Maintain xalarm  - Obtain necessary fall risk management equipment: x  - Apply yellow socks and bracelet for high fall risk patients  - Consider moving patient to room near nurses station  Outcome: Progressing     Problem: PAIN - ADULT  Goal: Verbalizes/displays adequate comfort level or baseline comfort level  Description: Interventions:  - Encourage patient to monitor pain and request assistance  - Assess pain using appropriate pain scale  - Administer analgesics based on type and severity of pain and evaluate response  - Implement non-pharmacological measures as appropriate and evaluate response  - Consider cultural and social influences on pain and pain management  - Notify physician/advanced practitioner if interventions unsuccessful or patient reports new pain  Outcome: Progressing     Problem: INFECTION - ADULT  Goal: Absence or prevention of progression during hospitalization  Description: INTERVENTIONS:  - Assess and monitor for signs and symptoms of infection  - Monitor lab/diagnostic results  - Monitor all insertion sites, i.e. indwelling lines, tubes, and drains  - Monitor endotracheal if appropriate and nasal secretions for changes in amount and color  - [place] appropriate cooling/warming therapies per order  - Administer medications as ordered  - Instruct and encourage patient and family to use good hand hygiene  technique  - Identify and instruct in appropriate isolation precautions for identified infection/condition  Outcome: Progressing  Goal: Absence of fever/infection during neutropenic period  Description: INTERVENTIONS:  - Monitor WBC    Outcome: Progressing     Problem: SAFETY ADULT  Goal: Patient will remain free of falls  Description: INTERVENTIONS:  - Educate patient/family on patient safety including physical limitations  - Instruct patient to call for assistance with activity   - Consult OT/PT to assist with strengthening/mobility   - Keep Call bell within reach  - Keep bed low and locked with side rails adjusted as appropriate  - Keep care items and personal belongings within reach  - Initiate and maintain comfort rounds  - Make Fall Risk Sign visible to staff  - Offer Toileting every x Hours, in advance of need  - Initiate/Maintain xalarm  - Obtain necessary fall risk management equipment: x  - Apply yellow socks and bracelet for high fall risk patients  - Consider moving patient to room near nurses station  Outcome: Progressing  Goal: Maintain or return to baseline ADL function  Description: INTERVENTIONS:  -  Assess patient's ability to carry out ADLs; assess patient's baseline for ADL function and identify physical deficits which impact ability to perform ADLs (bathing, care of mouth/teeth, toileting, grooming, dressing, etc.)  - Assess/evaluate cause of self-care deficits   - Assess range of motion  - Assess patient's mobility; develop plan if impaired  - Assess patient's need for assistive devices and provide as appropriate  - Encourage maximum independence but intervene and supervise when necessary  - Involve family in performance of ADLs  - Assess for home care needs following discharge   - Consider OT consult to assist with ADL evaluation and planning for discharge  - Provide patient education as appropriate  Outcome: Progressing  Goal: Maintains/Returns to pre admission functional level  Description:  INTERVENTIONS:  - Perform AM-PAC 6 Click Basic Mobility/ Daily Activity assessment daily.  - Set and communicate daily mobility goal to care team and patient/family/caregiver.   - Collaborate with rehabilitation services on mobility goals if consulted  - Perform Range of Motion x times a day.  - Reposition patient every x hours.  - Dangle patient x times a day  - Stand patient x times a day  - Ambulate patient x times a day  - Out of bed to chair x times a day   - Out of bed for meals x times a day  - Out of bed for toileting  - Record patient progress and toleration of activity level   Outcome: Progressing     Problem: DISCHARGE PLANNING  Goal: Discharge to home or other facility with appropriate resources  Description: INTERVENTIONS:  - Identify barriers to discharge w/patient and caregiver  - Arrange for needed discharge resources and transportation as appropriate  - Identify discharge learning needs (meds, wound care, etc.)  - Arrange for interpretive services to assist at discharge as needed  - Refer to Case Management Department for coordinating discharge planning if the patient needs post-hospital services based on physician/advanced practitioner order or complex needs related to functional status, cognitive ability, or social support system  Outcome: Progressing     Problem: Knowledge Deficit  Goal: Patient/family/caregiver demonstrates understanding of disease process, treatment plan, medications, and discharge instructions  Description: Complete learning assessment and assess knowledge base.  Interventions:  - Provide teaching at level of understanding  - Provide teaching via preferred learning methods  Outcome: Progressing     Problem: RESPIRATORY - ADULT  Goal: Achieves optimal ventilation and oxygenation  Description: INTERVENTIONS:  - Assess for changes in respiratory status  - Assess for changes in mentation and behavior  - Position to facilitate oxygenation and minimize respiratory effort  - Oxygen  administered by appropriate delivery if ordered  - Initiate smoking cessation education as indicated  - Encourage broncho-pulmonary hygiene including cough, deep breathe, Incentive Spirometry  - Assess the need for suctioning and aspirate as needed  - Assess and instruct to report SOB or any respiratory difficulty  - Respiratory Therapy support as indicated  Outcome: Progressing     Problem: GASTROINTESTINAL - ADULT  Goal: Minimal or absence of nausea and/or vomiting  Description: INTERVENTIONS:  - Administer IV fluids if ordered to ensure adequate hydration  - Maintain NPO status until nausea and vomiting are resolved  - Nasogastric tube if ordered  - Administer ordered antiemetic medications as needed  - Provide nonpharmacologic comfort measures as appropriate  - Advance diet as tolerated, if ordered  - Consider nutrition services referral to assist patient with adequate nutrition and appropriate food choices  Outcome: Progressing  Goal: Maintains or returns to baseline bowel function  Description: INTERVENTIONS:  - Assess bowel function  - Encourage oral fluids to ensure adequate hydration  - Administer IV fluids if ordered to ensure adequate hydration  - Administer ordered medications as needed  - Encourage mobilization and activity  - Consider nutritional services referral to assist patient with adequate nutrition and appropriate food choices  Outcome: Progressing  Goal: Maintains adequate nutritional intake  Description: INTERVENTIONS:  - Monitor percentage of each meal consumed  - Identify factors contributing to decreased intake, treat as appropriate  - Assist with meals as needed  - Monitor I&O, weight, and lab values if indicated  - Obtain nutrition services referral as needed  Outcome: Progressing  Goal: Establish and maintain optimal ostomy function  Description: INTERVENTIONS:  - Assess bowel function  - Encourage oral fluids to ensure adequate hydration  - Administer IV fluids if ordered to ensure  adequate hydration   - Administer ordered medications as needed  - Encourage mobilization and activity  - Nutrition services referral to assist patient with appropriate food choices  - Assess stoma site  - Consider wound care consult   Outcome: Progressing  Goal: Oral mucous membranes remain intact  Description: INTERVENTIONS  - Assess oral mucosa and hygiene practices  - Implement preventative oral hygiene regimen  - Implement oral medicated treatments as ordered  - Initiate Nutrition services referral as needed  Outcome: Progressing     Problem: METABOLIC, FLUID AND ELECTROLYTES - ADULT  Goal: Electrolytes maintained within normal limits  Description: INTERVENTIONS:  - Monitor labs and assess patient for signs and symptoms of electrolyte imbalances  - Administer electrolyte replacement as ordered  - Monitor response to electrolyte replacements, including repeat lab results as appropriate  - Instruct patient on fluid and nutrition as appropriate  Outcome: Progressing  Goal: Fluid balance maintained  Description: INTERVENTIONS:  - Monitor labs   - Monitor I/O and WT  - Instruct patient on fluid and nutrition as appropriate  - Assess for signs & symptoms of volume excess or deficit  Outcome: Progressing  Goal: Glucose maintained within target range  Description: INTERVENTIONS:  - Monitor Blood Glucose as ordered  - Assess for signs and symptoms of hyperglycemia and hypoglycemia  - Administer ordered medications to maintain glucose within target range  - Assess nutritional intake and initiate nutrition service referral as needed  Outcome: Progressing     Problem: Nutrition/Hydration-ADULT  Goal: Nutrient/Hydration intake appropriate for improving, restoring or maintaining nutritional needs  Description: Monitor and assess patient's nutrition/hydration status for malnutrition. Collaborate with interdisciplinary team and initiate plan and interventions as ordered.  Monitor patient's weight and dietary intake as ordered  or per policy. Utilize nutrition screening tool and intervene as necessary. Determine patient's food preferences and provide high-protein, high-caloric foods as appropriate.     INTERVENTIONS:  - Monitor oral intake, urinary output, labs, and treatment plans  - Assess nutrition and hydration status and recommend course of action  - Evaluate amount of meals eaten  - Assist patient with eating if necessary   - Allow adequate time for meals  - Recommend/ encourage appropriate diets, oral nutritional supplements, and vitamin/mineral supplements  - Order, calculate, and assess calorie counts as needed  - Recommend, monitor, and adjust tube feedings and TPN/PPN based on assessed needs  - Assess need for intravenous fluids  - Provide specific nutrition/hydration education as appropriate  - Include patient/family/caregiver in decisions related to nutrition  Outcome: Progressing

## 2024-01-21 NOTE — ASSESSMENT & PLAN NOTE
Patient has chronic respiratory failure with hypoxia and is on 2.5 to 3 L of supplemental oxygen at home   Continue oxygen supplementation  Monitor respiratory status

## 2024-01-21 NOTE — ASSESSMENT & PLAN NOTE
Patient admitted with sepsis with leukopenia and tachycardia  Source-pneumonia versus abdominal  ER gave cefepime and vancomycin  Continue on cefepime and vancomycin  Continue IV fluids  Lactate is 1.4  Procalcitonin is normal  Trend procalcitonin  Follow-up culture results  Will obtain UA  Trend WBC and fever curve  Patient noted to have leukopenia.  Will order filgrastim x 1 dose and oncology consult

## 2024-01-21 NOTE — ED PROVIDER NOTES
History  Chief Complaint   Patient presents with    Vomiting     Pt to er with reports of vomiting since Thursday. Had to drink contrast dye for a cat scan, and has been sick since. 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 Patient reports she had to get a CT done on 24 with PO and IV contrast and has not been able to keep any food or drink down since. She states that she has not had a bowel movement since then. Says her abdomen feels hard/distended. Decreased appetite. Having epigastric pain with a burning sensation. States her vomiting is a yellow/green color. Urine has been decreased. She denies any fevers, chills, chest pain, increased shortness of breath, blood in urine, blood in stools, lightheadedness, dizziness, numbness, tingling. Patient had her catheter drained two days ago. Past abdominal surgical history includes hysterectomy/oopherectomy, appendectomy, colon surgery.       History provided by:  Patient   used: No        Prior to Admission Medications   Prescriptions Last Dose Informant Patient Reported? Taking?   Ascorbic Acid (vitamin C) 1000 MG tablet Past Week Self Yes Yes   Si daily   Biotin 50022 MCG TABS Past Week Self Yes Yes   Si daily   Cholecalciferol (Vitamin D3) 25 MCG (1000 UT) CAPS Past Week Self Yes Yes   Si capsule every 24 hours   EPINEPHrine (EPIPEN) 0.3 mg/0.3 mL SOAJ  Self No No   Sig: Inject 0.3 mL (0.3 mg total) into a muscle once for 1 dose   Patient not taking: Reported on 10/2/2023   Fluticasone-Salmeterol (Advair) 500-50 mcg/dose inhaler Past Week  Yes Yes   Sig: Inhale 1 puff 2 (two) times a day. pt states insurance will not  breo or trelegy  Indications: Asthma   Klor-Con M20 20 MEQ tablet Past  Month  No Yes   Sig: TAKE 1 TABLET BY MOUTH EVERY DAY   LORazepam (Ativan) 0.5 mg tablet Past Week  No Yes   Sig: Take 1 tablet (0.5 mg total) by mouth daily as needed for anxiety   Multiple Vitamin (MULTIVITAMINS PO) Past Week Self Yes Yes   Sig: every 24 hours   Probiotic Product (PROBIOTIC DAILY PO) Past Week Self Yes Yes   Si daily   Sennosides (Senna) 8.6 MG CAPS Past Week Self Yes Yes   Sig: Take 2 capsules by mouth as needed (constipation)   albuterol (2.5 mg/3 mL) 0.083 % nebulizer solution More than a month Self No No   Sig: Take 3 mL (2.5 mg total) by nebulization every 6 (six) hours as needed for wheezing or shortness of breath   albuterol (PROVENTIL HFA,VENTOLIN HFA) 90 mcg/act inhaler More than a month Self Yes No   Sig: Inhale 2 puffs every 4 (four) hours   atorvastatin (LIPITOR) 20 mg tablet Past Week Self Yes Yes   Sig: Take 20 mg by mouth daily.   buPROPion (WELLBUTRIN XL) 300 mg 24 hr tablet Past Week Self Yes Yes   Sig: TAKE 1 TABLET BY MOUTH EVERY DAY IN THE MORNING FOR 90 DAYS   dexamethasone (DECADRON) 4 mg tablet Past Month  No Yes   Sig: Take 2 tablets (8 mg total) by mouth 2 (two) times a day with meals for 3 days Take 2 tabs by mouth twice daily the day before, the day of, and the day after chemo.   dexamethasone sodium phosphate 0.1 % ophthalmic solution Not Taking Self No No   Sig: Administer 1 drop to both eyes every 3 (three) hours as needed (eye irritation)   Patient not taking: Reported on 2024   diphenhydrAMINE (BENADRYL) 50 MG tablet More than a month Self No No   Sig: Take 1 tablet (50 mg total) by mouth every 6 (six) hours as needed for itching (Rash)   diphenhydrAMINE-APAP, sleep, (TYLENOL PM EXTRA STRENGTH PO) Past Week Self Yes Yes   Sig: Take 325 mg by mouth daily at bedtime   famotidine (PEPCID) 20 mg tablet Past Week Self Yes Yes   Sig: Take 20 mg by mouth daily   fluticasone-umeclidinium-vilanterol (Trelegy Ellipta) 200-62.5-25 mcg/actuation AEPB inhaler   No No    Sig: Inhale 1 puff daily Rinse mouth after use.   furosemide (LASIX) 20 mg tablet Past Week Self No Yes   Sig: Take 1 tablet (20 mg total) by mouth every morning   lidocaine-prilocaine (EMLA) cream More than a month  No No   Sig: APPLY TO PORT 30 TO 60 MINUTES PRIOR TO USE   metoprolol tartrate (LOPRESSOR) 25 mg tablet Past Week  No Yes   Sig: Take 1 tablet (25 mg total) by mouth every 12 (twelve) hours   oxygen gas 2024  Yes Yes   Sig: Inhale 2 L/min continuous. May increase to 3L PRN for dyspnea and sats under 89%  Indications: Hypoxia   polyethylene glycol (GLYCOLAX) 17 GM/SCOOP powder Past Month  Yes Yes   Sig: Take 17 g by mouth daily. Indications: Constipation   sertraline (ZOLOFT) 100 mg tablet Past Week Self Yes Yes   Si daily   telmisartan (MICARDIS) 80 MG tablet Past Week Self No Yes   Sig: Take 1 tablet (80 mg total) by mouth daily   verapamil (CALAN-SR) 120 mg CR tablet Past Week Self No Yes   Sig: Take 1 tablet (120 mg total) by mouth daily at bedtime 1 hs   verapamil (CALAN-SR) 240 mg CR tablet Past Week Self No Yes   Sig: Take 1 tablet (240 mg total) by mouth daily at bedtime 1 hs      Facility-Administered Medications: None       Past Medical History:   Diagnosis Date    Cancer (HCC)     COPD (chronic obstructive pulmonary disease) (HCC)     Emphysema of lung (HCC)     Hyperlipidemia     Hypertension     Lung cancer (HCC) 2019    right lower lobe removed    Lung nodule     Pleural effusion        Past Surgical History:   Procedure Laterality Date    APPENDECTOMY      BREAST BIOPSY Right     benign    BRONCHOSCOPY  2019    COLON SURGERY      HYSTERECTOMY      age 46    IR PLEURAL EFFUSION LONG-TERM CATHETER PLACEMENT  2023    IR PORT PLACEMENT  2023    IR THORACENTESIS  2023    IR THORACENTESIS  2023    IR THORACENTESIS  2023    LUNG BIOPSY  2019    LUNG LOBECTOMY      OOPHORECTOMY Bilateral     age 46       Family History   Problem Relation Age of  Onset    Colon cancer Mother     Hypertension Mother     Hyperlipidemia Mother     Hearing loss Mother     Depression Mother     COPD Mother     Cancer Mother         Lung    Arthritis Mother     Lung cancer Mother     Drug abuse Brother     Diabetes Maternal Grandmother     Cancer Maternal Grandmother         Colon    Colon cancer Maternal Grandmother     Cancer Maternal Aunt     Colon cancer Maternal Aunt     Colon cancer Maternal Aunt     Cancer Maternal Aunt         Colon     I have reviewed and agree with the history as documented.    E-Cigarette/Vaping    E-Cigarette Use Never User      E-Cigarette/Vaping Substances    Nicotine No     THC No     CBD No     Flavoring No     Other No     Unknown No      Social History     Tobacco Use    Smoking status: Former     Current packs/day: 0.00     Average packs/day: 1.5 packs/day for 53.2 years (79.8 ttl pk-yrs)     Types: Cigarettes     Start date:      Quit date: 3/15/2023     Years since quittin.8    Smokeless tobacco: Never   Vaping Use    Vaping status: Never Used   Substance Use Topics    Alcohol use: Not Currently     Comment: Rarely drink, socially only    Drug use: No       Review of Systems   Constitutional:  Positive for appetite change. Negative for chills and fever.   Respiratory:  Negative for shortness of breath.    Cardiovascular:  Negative for chest pain.   Gastrointestinal:  Positive for abdominal distention, abdominal pain, constipation, nausea and vomiting. Negative for blood in stool and diarrhea.   Genitourinary:  Positive for decreased urine volume. Negative for difficulty urinating, dysuria, frequency and urgency.   Musculoskeletal:  Negative for back pain.   Neurological:  Negative for dizziness, light-headedness and headaches.       Physical Exam  Physical Exam  Vitals and nursing note reviewed.   Constitutional:       General: She is awake.      Appearance: Normal appearance. She is well-developed. She is ill-appearing (chronically).    HENT:      Head: Normocephalic and atraumatic.      Right Ear: External ear normal.      Left Ear: External ear normal.      Nose: Nose normal.   Eyes:      General: No scleral icterus.     Extraocular Movements: Extraocular movements intact.   Cardiovascular:      Rate and Rhythm: Normal rate and regular rhythm.      Heart sounds: Normal heart sounds, S1 normal and S2 normal. No murmur heard.     No gallop.   Pulmonary:      Effort: Pulmonary effort is normal.      Breath sounds: Normal breath sounds. No decreased breath sounds, wheezing, rhonchi or rales.      Comments: On 2 L NC  Abdominal:      General: Abdomen is protuberant. Bowel sounds are normal. There is distension.      Tenderness: There is generalized abdominal tenderness. There is no guarding or rebound.   Musculoskeletal:         General: Normal range of motion.      Cervical back: Normal range of motion.   Skin:     General: Skin is warm and dry.   Neurological:      General: No focal deficit present.      Mental Status: She is alert.   Psychiatric:         Attention and Perception: Attention and perception normal.         Mood and Affect: Mood normal.         Behavior: Behavior normal. Behavior is cooperative.         Vital Signs  ED Triage Vitals   Temperature Pulse Respirations Blood Pressure SpO2   01/21/24 0829 01/21/24 0831 01/21/24 0831 01/21/24 0831 01/21/24 0831   97.5 °F (36.4 °C) (!) 127 18 130/66 95 %      Temp Source Heart Rate Source Patient Position - Orthostatic VS BP Location FiO2 (%)   01/21/24 0829 01/21/24 0831 01/21/24 0900 01/21/24 0900 --   Temporal Monitor Lying Right arm       Pain Score       01/21/24 1354       No Pain           Vitals:    01/21/24 1030 01/21/24 1115 01/21/24 1200 01/21/24 1330   BP: 133/66 145/65 132/60 131/72   Pulse: 98 104 104 (!) 110   Patient Position - Orthostatic VS: Lying Sitting Sitting          Visual Acuity      ED Medications  Medications   fluticasone-vilanterol 200-25 mcg/actuation 1 puff  (has no administration in time range)   albuterol (PROVENTIL HFA,VENTOLIN HFA) inhaler 2 puff (2 puffs Inhalation Given 1/21/24 1504)   albuterol inhalation solution 2.5 mg (has no administration in time range)   sodium chloride 0.9 % infusion (100 mL/hr Intravenous New Bag 1/21/24 1456)   ondansetron (ZOFRAN) injection 4 mg (has no administration in time range)   enoxaparin (LOVENOX) subcutaneous injection 40 mg (40 mg Subcutaneous Given 1/21/24 1456)   cefepime (MAXIPIME) IVPB (premix in dextrose) 2,000 mg 50 mL (has no administration in time range)   potassium chloride 20 mEq IVPB (premix) (20 mEq Intravenous New Bag 1/21/24 1449)   vancomycin (VANCOCIN) 1,250 mg in sodium chloride 0.9 % 250 mL IVPB (has no administration in time range)   Filgrastim-aafi (NIVESTYM) subcutaneous injection 300 mcg (300 mcg Subcutaneous Given 1/21/24 1504)   sodium chloride 0.9 % bolus 1,000 mL (0 mL Intravenous Stopped 1/21/24 0930)   ondansetron (ZOFRAN) injection 4 mg (4 mg Intravenous Given 1/21/24 0848)   cefepime (MAXIPIME) IVPB (premix in dextrose) 2,000 mg 50 mL (0 mg Intravenous Stopped 1/21/24 1005)   magnesium sulfate 2 g/50 mL IVPB (premix) 2 g (0 g Intravenous Stopped 1/21/24 1206)   vancomycin (VANCOCIN) 1,750 mg in sodium chloride 0.9 % 500 mL IVPB (1,750 mg Intravenous New Bag 1/21/24 1017)   ondansetron (ZOFRAN) injection 4 mg (4 mg Intravenous Given 1/21/24 1036)   iohexol (OMNIPAQUE) 350 MG/ML injection (SINGLE-DOSE) 100 mL (100 mL Intravenous Given 1/21/24 1100)   benzocaine (HURRICAINE) 20 % mucosal spray 2 spray (2 sprays Mucosal Given 1/21/24 1206)       Diagnostic Studies  Results Reviewed       Procedure Component Value Units Date/Time    HS Troponin I 2hr [[id number]]  (Normal) Collected: 01/21/24 1120    Lab Status: Final result Specimen: Blood from Arm, Right Updated: 01/21/24 1146     hs TnI 2hr 14 ng/L      Delta 2hr hsTnI -5 ng/L     HS Troponin I 4hr [[id number]]     Lab Status: No result Specimen:  Blood     FLU/RSV/COVID - if FLU/RSV clinically relevant [[id number]]  (Normal) Collected: 01/21/24 0844    Lab Status: Final result Specimen: Nares from Nose Updated: 01/21/24 0930     SARS-CoV-2 Negative     INFLUENZA A PCR Negative     INFLUENZA B PCR Negative     RSV PCR Negative    Narrative:      FOR PEDIATRIC PATIENTS - copy/paste COVID Guidelines URL to browser: [email or web address]    SARS-CoV-2 assay is a Nucleic Acid Amplification assay intended for the  qualitative detection of nucleic acid from SARS-CoV-2 in nasopharyngeal  swabs. Results are for the presumptive identification of SARS-CoV-2 RNA.    Positive results are indicative of infection with SARS-CoV-2, the virus  causing COVID-19, but do not rule out bacterial infection or co-infection  with other viruses. Laboratories within the [place] and its  territories are required to report all positive results to the appropriate  public health authorities. Negative results do not preclude SARS-CoV-2  infection and should not be used as the sole basis for treatment or other  patient management decisions. Negative results must be combined with  clinical observations, patient history, and epidemiological information.  This test has not been FDA cleared or approved.    This test has been authorized by FDA under an Emergency Use Authorization  (EUA). This test is only authorized for the duration of time the  declaration that circumstances exist justifying the authorization of the  emergency use of an in vitro diagnostic tests for detection of SARS-CoV-2  virus and/or diagnosis of COVID-19 infection under section 564(b)(1) of  the Act, 21 U.S.C. 360bbb-3(b)(1), unless the authorization is terminated  or revoked sooner. The test has been validated but independent review by FDA  and CLIA is pending.    Test performed using [employer]: This RT-PCR assay targets N2,  a region unique to SARS-CoV-2. A  conserved region in the E-gene was chosen  for pan-Sarbecovirus detection which includes SARS-CoV-2.    According to CMS-2020-01-R, this platform meets the definition of high-throughput technology.    D-Dimer [[id number]]  (Abnormal) Collected: 01/21/24 0844    Lab Status: Final result Specimen: Blood from Arm, Left Updated: 01/21/24 0928     D-Dimer, Quant 5.25 ug/ml FEU     Narrative:      In the evaluation for possible pulmonary embolism, in the appropriate (Well's Score of 4 or less) patient, the age adjusted d-dimer cutoff for this patient can be calculated as:    Age x 0.01 (in ug/mL) for Age-adjusted D-dimer exclusion threshold for a patient over 50 years.    Procalcitonin [[id number]]  (Normal) Collected: 01/21/24 0844    Lab Status: Final result Specimen: Blood from Arm, Left Updated: 01/21/24 0921     Procalcitonin 0.08 ng/ml     Protime-INR [[id number]]  (Normal) Collected: 01/21/24 0844    Lab Status: Final result Specimen: Blood from Arm, Left Updated: 01/21/24 0917     Protime 13.0 seconds      INR 0.94    APTT [[id number]]  (Normal) Collected: 01/21/24 0844    Lab Status: Final result Specimen: Blood from Arm, Left Updated: 01/21/24 0917     PTT 28 seconds     HS Troponin 0hr (reflex protocol) [[id number]]  (Normal) Collected: 01/21/24 0844    Lab Status: Final result Specimen: Blood from Arm, Left Updated: 01/21/24 0916     hs TnI 0hr 19 ng/L     B-Type Natriuretic Peptide(BNP) [[id number]]  (Normal) Collected: 01/21/24 0844    Lab Status: Final result Specimen: Blood from Arm, Left Updated: 01/21/24 0915     BNP 45 pg/mL     Comprehensive metabolic panel [[id number]]  (Abnormal) Collected: 01/21/24 0844    Lab Status: Final result Specimen: Blood from Arm, Left Updated: 01/21/24 0913     Sodium 134 mmol/L      Potassium 3.4 mmol/L      Chloride 89 mmol/L      CO2 29 mmol/L      ANION GAP 16 mmol/L      BUN 21 mg/dL      Creatinine 1.26 mg/dL      Glucose 117 mg/dL      Calcium 10.0 mg/dL      AST 19 U/L       ALT 15 U/L      Alkaline Phosphatase 95 U/L      Total Protein 7.5 g/dL      Albumin 3.7 g/dL      Total Bilirubin 0.41 mg/dL      eGFR 44 ml/min/1.73sq m     Narrative:      National Kidney Disease Foundation guidelines for Chronic Kidney Disease (CKD):     Stage 1 with normal or high GFR (GFR > 90 mL/min/1.73 square meters)    Stage 2 Mild CKD (GFR = 60-89 mL/min/1.73 square meters)    Stage 3A Moderate CKD (GFR = 45-59 mL/min/1.73 square meters)    Stage 3B Moderate CKD (GFR = 30-44 mL/min/1.73 square meters)    Stage 4 Severe CKD (GFR = 15-29 mL/min/1.73 square meters)    Stage 5 End Stage CKD (GFR <15 mL/min/1.73 square meters)  Note: GFR calculation is accurate only with a steady state creatinine    Lipase [[id number]]  (Abnormal) Collected: 01/21/24 0844    Lab Status: Final result Specimen: Blood from Arm, Left Updated: 01/21/24 0913     Lipase <6 u/L     Magnesium [[id number]]  (Abnormal) Collected: 01/21/24 0844    Lab Status: Final result Specimen: Blood from Arm, Left Updated: 01/21/24 0913     Magnesium 1.8 mg/dL     Lactic acid [[id number]]  (Normal) Collected: 01/21/24 0844    Lab Status: Final result Specimen: Blood from Arm, Left Updated: 01/21/24 0911     LACTIC ACID 1.4 mmol/L     Narrative:      Result may be elevated if tourniquet was used during collection.    CBC and differential [[id number]]  (Abnormal) Collected: 01/21/24 0844    Lab Status: Final result Specimen: Blood from Arm, Left Updated: 01/21/24 0908     WBC 1.57 Thousand/uL      RBC 4.35 Million/uL      Hemoglobin 11.7 g/dL      Hematocrit 37.1 %      MCV 85 fL      MCH 26.9 pg      MCHC 31.5 g/dL      RDW 17.1 %      MPV 9.5 fL      Platelets 497 Thousands/uL      nRBC 0 /100 WBCs      Neutrophils Relative 24 %      Immat GRANS % 1 %      Lymphocytes Relative 41 %      Monocytes Relative 32 %      Eosinophils Relative 1 %      Basophils Relative 1 %      Neutrophils Absolute 0.38 Thousands/µL      Immature Grans Absolute 0.01  Thousand/uL      Lymphocytes Absolute 0.67 Thousands/µL      Monocytes Absolute 0.50 Thousand/µL      Eosinophils Absolute 0.01 Thousand/µL      Basophils Absolute 0.01 Thousands/µL     Narrative:      This is an appended report.  These results have been appended to a previously verified report.    Blood culture #1 [[id number]] Collected: 01/21/24 0844    Lab Status: In process Specimen: Blood from Arm, Left Updated: 01/21/24 0852    Blood culture #2 [[id number]] Collected: 01/21/24 0844    Lab Status: In process Specimen: Blood from Hand, Left Updated: 01/21/24 0852    UA w Reflex to Microscopic w Reflex to Culture [[id number]]     Lab Status: No result Specimen: Urine                    PE Study with CT Abdomen and Pelvis with contrast   Final Result by Mike Villareal MD (01/21 7490)      1.  Acute small bowel obstruction with point of transition in the right lower quadrant, likely on the basis of serosal implants versus adhesions.   2.  Stable appearance of right infrahilar lung cancer with findings of lymphangitic carcinomatosis. Stable left lung groundglass and solid nodules likely representing metastases.   3.  Small bilateral pleural effusions with right chest tube in place.   4.  Moderate volume of abdominopelvic ascites with similar findings of peritoneal carcinomatosis.                  * I personally telephoned this result to MELISSA BRENNAN on 1/21/2024 11:27 AM.         Workstation performed: [id number]         XR chest 1 view portable    (Results Pending)              Procedures  ECG 12 Lead Documentation Only    Date/Time: 1/21/2024 8:34 AM    Performed by: Melissa Brennan PA-C  Authorized by: Melissa Brennan PA-C    Indications / Diagnosis:  Vomiting  Patient location:  ED  Previous ECG:     Previous ECG:  Compared to current    Comparison ECG info:  Sinus tachycardia with artifact  Interpretation:     Interpretation: non-specific    Rate:     ECG rate assessment: tachycardic    Rhythm:      Rhythm: sinus tachycardia             ED Course  ED Course as of 01/21/24 1623   Sun Jan 21, 2024   0907 WBC(!!): 1.57   0907 Platelet Count(!): 497   0914 Creatinine: 1.26  .48 13 days ago, LEE   1127 Radiologist states patient has high grade SBO   1135 TT surgery   1159 Surgery will come down to evaluate. TT SLIM                            Initial Sepsis Screening       Row Name 01/21/24 1205                Is the patient's history suggestive of a new or worsening infection? Yes (Proceed)  -LM        Suspected source of infection suspect infection, source unknown  -LM        Indicate SIRS criteria Leukocytosis (WBC > 33158 IJL) OR Leukopenia (WBC <4000 IJL) OR Bandemia (WBC >10% bands);Tachycardia > 90 bpm  -LM        Are two or more of the above signs & symptoms of infection both present and new to the patient? Yes (Proceed)  -LM        Assess for evidence of organ dysfunction: Are any of the below criteria present within 6 hours of suspected infection and SIRS criteria that are NOT considered to be chronic conditions? --                  User Key  (r) = Recorded By, (t) = Taken By, (c) = Cosigned By      Initials Name Provider Type    LM Melissa Brennan PA-C Physician Assistant                    SBIRT 22yo+      Flowsheet Row Most Recent Value   Initial Alcohol Screen: US AUDIT-C     1. How often do you have a drink containing alcohol? 0 Filed at: 01/21/2024 0830   2. How many drinks containing alcohol do you have on a typical day you are drinking?  0 Filed at: 01/21/2024 0830   3a. Male UNDER 65: How often do you have five or more drinks on one occasion? 0 Filed at: 01/21/2024 0830   3b. FEMALE Any Age, or MALE 65+: How often do you have 4 or more drinks on one occassion? 0 Filed at: 01/21/2024 0830   Audit-C Score 0 Filed at: 01/21/2024 0830   BEVERLEY: How many times in the past year have you...    Used an illegal drug or used a prescription medication for non-medical reasons? Never Filed at: 01/21/2024  0830                      Medical Decision Making  64 y/o female here for nausea/vomiting, fatigue, epigastric pain  Differential diagnosis including but not limited to: gastroenteritis, viral syndrome, SBO, LBO, dehydration, LEE, electrolyte disturbance, ACS, PE, pneumonia  Assessment: small bowel obstruction, neutropenia, hypomagnesemia complicated by chronic conditions COPD, chronic resp failure with hypoxia, metastatic non small cell lung cancer   Plan:  patient meeting sepsis criteria with neutropenia and tachycardia, neutropenic precautions ordered as well as broad spec abx cefepime and vanc. CT showing SBO, surgery consult placed recommend NGT, not a surgical candidate at this time. Discussed benefits of hospitalization with patient she agreed with this plan. Discussed pertinent H&P, labs and imaging with Dr Yu and Dr Atkinson, patient admitted to the internal medicine service at this time.     Amount and/or Complexity of Data Reviewed  External Data Reviewed: notes.     Details: Home care visit from 1/19/24 reviewed for drainage of asept catheter    Labs: ordered. Decision-making details documented in ED Course.  Radiology: ordered.    Risk  OTC drugs.  Prescription drug management.  Decision regarding hospitalization.             Disposition  Final diagnoses:   Small bowel obstruction (HCC)   Metastatic non-small cell lung cancer (HCC)   Chronic obstructive pulmonary disease, unspecified COPD type (HCC)   Chronic respiratory failure with hypoxia (HCC)   Hypomagnesemia   Neutropenia (HCC)     Time reflects when diagnosis was documented in both MDM as applicable and the Disposition within this note       Time User Action Codes Description Comment    1/21/2024 11:58 AM Melissa Brennan [K56.609] Small bowel obstruction (HCC)     1/21/2024 12:01 PM Melissa Brennan [C34.90] Metastatic non-small cell lung cancer (HCC)     1/21/2024 12:01 PM Melissa Brennan [J44.9] Chronic obstructive pulmonary  disease, unspecified COPD type ([facility])     1/21/2024 12:01 PM Melissa Brennan [J96.11] Chronic respiratory failure with hypoxia ([facility])     1/21/2024 12:01 PM Melissa Brennan [E83.42] Hypomagnesemia     1/21/2024 12:01 PM Melissa Brennan [D70.9] Neutropenia ([facility])           ED Disposition       ED Disposition   Admit    Condition   Stable    Date/Time   Sun Jan 21, 2024 1201    Comment   Case was discussed with Dr Atkinson and the patient's admission status was agreed to be Admission Status: observation status to the service of Dr. Atkinson .               Follow-up Information    None         Current Discharge Medication List        CONTINUE these medications which have NOT CHANGED    Details   Ascorbic Acid (vitamin C) 1000 MG tablet 1 daily      atorvastatin (LIPITOR) 20 mg tablet Take 20 mg by mouth daily.      Biotin 09563 MCG TABS 1 daily      buPROPion (WELLBUTRIN XL) 300 mg 24 hr tablet TAKE 1 TABLET BY MOUTH EVERY DAY IN THE MORNING FOR 90 DAYS      Cholecalciferol (Vitamin D3) 25 MCG (1000 UT) CAPS 1 capsule every 24 hours      dexamethasone (DECADRON) 4 mg tablet Take 2 tablets (8 mg total) by mouth 2 (two) times a day with meals for 3 days Take 2 tabs by mouth twice daily the day before, the day of, and the day after chemo.  Qty: 72 tablet, Refills: 0    Associated Diagnoses: Non-small cell cancer of right lung ([facility])      diphenhydrAMINE-APAP, sleep, (TYLENOL PM EXTRA STRENGTH PO) Take 325 mg by mouth daily at bedtime      famotidine (PEPCID) 20 mg tablet Take 20 mg by mouth daily      Fluticasone-Salmeterol (Advair) 500-50 mcg/dose inhaler Inhale 1 puff 2 (two) times a day. pt states insurance will not  breo or trelegy  Indications: Asthma      furosemide (LASIX) 20 mg tablet Take 1 tablet (20 mg total) by mouth every morning  Qty: 90 tablet, Refills: 1    Associated Diagnoses: Essential hypertension      Klor-Con M20 20 MEQ tablet TAKE 1 TABLET BY MOUTH EVERY DAY  Qty: 30 tablet,  Refills: 1    Associated Diagnoses: Non-small cell cancer of right lung (HCC); Hypokalemia      LORazepam (Ativan) 0.5 mg tablet Take 1 tablet (0.5 mg total) by mouth daily as needed for anxiety  Qty: 30 tablet, Refills: 0    Associated Diagnoses: Generalized anxiety disorder      metoprolol tartrate (LOPRESSOR) 25 mg tablet Take 1 tablet (25 mg total) by mouth every 12 (twelve) hours  Qty: 180 tablet, Refills: 1    Associated Diagnoses: Pleural effusion; Metastatic lung cancer (metastasis from lung to other site) (HCC)      Multiple Vitamin (MULTIVITAMINS PO) every 24 hours      oxygen gas Inhale 2 L/min continuous. May increase to 3L PRN for dyspnea and sats under 89%  Indications: Hypoxia      polyethylene glycol (GLYCOLAX) 17 GM/SCOOP powder Take 17 g by mouth daily. Indications: Constipation      Probiotic Product (PROBIOTIC DAILY PO) 1 daily      Sennosides (Senna) 8.6 MG CAPS Take 2 capsules by mouth as needed (constipation)      sertraline (ZOLOFT) 100 mg tablet 2 daily      telmisartan (MICARDIS) 80 MG tablet Take 1 tablet (80 mg total) by mouth daily  Qty: 90 tablet, Refills: 1    Comments: D/c telmisartan/hctz  Associated Diagnoses: Essential hypertension      !! verapamil (CALAN-SR) 120 mg CR tablet Take 1 tablet (120 mg total) by mouth daily at bedtime 1 hs  Qty: 90 tablet, Refills: 3    Associated Diagnoses: Essential hypertension      !! verapamil (CALAN-SR) 240 mg CR tablet Take 1 tablet (240 mg total) by mouth daily at bedtime 1 hs  Qty: 90 tablet, Refills: 3    Comments: Cancel rx for verapamil 360 if not available!  Associated Diagnoses: Essential hypertension      albuterol (2.5 mg/3 mL) 0.083 % nebulizer solution Take 3 mL (2.5 mg total) by nebulization every 6 (six) hours as needed for wheezing or shortness of breath  Qty: 270 mL, Refills: 4    Associated Diagnoses: Chronic obstructive pulmonary disease, unspecified COPD type (HCC)      albuterol (PROVENTIL HFA,VENTOLIN HFA) 90 mcg/act inhaler  Inhale 2 puffs every 4 (four) hours  Refills: 3    Comments: <!--EPICS-->Substitution to a formulary equivalent within the same pharmaceutical class is authorized.<!--EPICE-->      dexamethasone sodium phosphate 0.1 % ophthalmic solution Administer 1 drop to both eyes every 3 (three) hours as needed (eye irritation)  Qty: 5 mL, Refills: 5    Associated Diagnoses: Other conjunctivitis of both eyes      diphenhydrAMINE (BENADRYL) 50 MG tablet Take 1 tablet (50 mg total) by mouth every 6 (six) hours as needed for itching (Rash)  Qty: 30 tablet, Refills: 0    Associated Diagnoses: Drug rash      EPINEPHrine (EPIPEN) 0.3 mg/0.3 mL SOAJ Inject 0.3 mL (0.3 mg total) into a muscle once for 1 dose  Qty: 0.6 mL, Refills: 0    Associated Diagnoses: Local reaction to bee sting, accidental or unintentional, initial encounter      fluticasone-umeclidinium-vilanterol (Trelegy Ellipta) 200-62.5-25 mcg/actuation AEPB inhaler Inhale 1 puff daily Rinse mouth after use.  Qty: 60 blister, Refills: 0    Associated Diagnoses: Chronic obstructive pulmonary disease, unspecified COPD type (HCC)      lidocaine-prilocaine (EMLA) cream APPLY TO PORT 30 TO 60 MINUTES PRIOR TO USE  Qty: 30 g, Refills: 3    Associated Diagnoses: Non-small cell cancer of right lung (HCC)       !! - Potential duplicate medications found. Please discuss with provider.          No discharge procedures on file.    PDMP Review         Value Time User    PDMP Reviewed  Yes 1/21/2024  1:54 PM Toan Yu MD            ED Provider  Electronically Signed by             Melissa Brennan PA-C  01/21/24 1227

## 2024-01-22 ENCOUNTER — HOME CARE VISIT (OUTPATIENT)
Dept: HOME HEALTH SERVICES | Facility: HOME HEALTHCARE | Age: 64
End: 2024-01-22
Payer: COMMERCIAL

## 2024-01-22 ENCOUNTER — TELEPHONE (OUTPATIENT)
Age: 64
End: 2024-01-22

## 2024-01-22 LAB
ALBUMIN SERPL BCP-MCNC: 3.1 G/DL (ref 3.5–5)
ALP SERPL-CCNC: 76 U/L (ref 34–104)
ALT SERPL W P-5'-P-CCNC: 11 U/L (ref 7–52)
ANION GAP SERPL CALCULATED.3IONS-SCNC: 14 MMOL/L
AST SERPL W P-5'-P-CCNC: 13 U/L (ref 13–39)
BASOPHILS # BLD AUTO: 0.01 THOUSANDS/ÂΜL (ref 0–0.1)
BASOPHILS NFR BLD AUTO: 0 % (ref 0–1)
BILIRUB SERPL-MCNC: 0.31 MG/DL (ref 0.2–1)
BUN SERPL-MCNC: 20 MG/DL (ref 5–25)
CALCIUM ALBUM COR SERPL-MCNC: 9.2 MG/DL (ref 8.3–10.1)
CALCIUM SERPL-MCNC: 8.5 MG/DL (ref 8.4–10.2)
CHLORIDE SERPL-SCNC: 97 MMOL/L (ref 96–108)
CO2 SERPL-SCNC: 26 MMOL/L (ref 21–32)
CREAT SERPL-MCNC: 0.8 MG/DL (ref 0.6–1.3)
EOSINOPHIL # BLD AUTO: 0.01 THOUSAND/ÂΜL (ref 0–0.61)
EOSINOPHIL NFR BLD AUTO: 0 % (ref 0–6)
ERYTHROCYTE [DISTWIDTH] IN BLOOD BY AUTOMATED COUNT: 17 % (ref 11.6–15.1)
GFR SERPL CREATININE-BSD FRML MDRD: 77 ML/MIN/1.73SQ M
GLUCOSE SERPL-MCNC: 89 MG/DL (ref 65–140)
HCT VFR BLD AUTO: 32.2 % (ref 34.8–46.1)
HGB BLD-MCNC: 10 G/DL (ref 11.5–15.4)
IMM GRANULOCYTES # BLD AUTO: 0.02 THOUSAND/UL (ref 0–0.2)
IMM GRANULOCYTES NFR BLD AUTO: 1 % (ref 0–2)
INR PPP: 1.05 (ref 0.84–1.19)
LYMPHOCYTES # BLD AUTO: 0.64 THOUSANDS/ÂΜL (ref 0.6–4.47)
LYMPHOCYTES NFR BLD AUTO: 28 % (ref 14–44)
MAGNESIUM SERPL-MCNC: 1.9 MG/DL (ref 1.9–2.7)
MCH RBC QN AUTO: 26.6 PG (ref 26.8–34.3)
MCHC RBC AUTO-ENTMCNC: 31.1 G/DL (ref 31.4–37.4)
MCV RBC AUTO: 86 FL (ref 82–98)
MONOCYTES # BLD AUTO: 1.11 THOUSAND/ÂΜL (ref 0.17–1.22)
MONOCYTES NFR BLD AUTO: 50 % (ref 4–12)
NEUTROPHILS # BLD AUTO: 0.47 THOUSANDS/ÂΜL (ref 1.85–7.62)
NEUTS SEG NFR BLD AUTO: 21 % (ref 43–75)
NRBC BLD AUTO-RTO: 0 /100 WBCS
PHOSPHATE SERPL-MCNC: 2.9 MG/DL (ref 2.3–4.1)
PLATELET # BLD AUTO: 406 THOUSANDS/UL (ref 149–390)
PMV BLD AUTO: 9 FL (ref 8.9–12.7)
POTASSIUM SERPL-SCNC: 3.3 MMOL/L (ref 3.5–5.3)
PROCALCITONIN SERPL-MCNC: 0.11 NG/ML
PROT SERPL-MCNC: 6.2 G/DL (ref 6.4–8.4)
PROTHROMBIN TIME: 14.1 SECONDS (ref 11.6–14.5)
RBC # BLD AUTO: 3.76 MILLION/UL (ref 3.81–5.12)
SODIUM SERPL-SCNC: 137 MMOL/L (ref 135–147)
WBC # BLD AUTO: 2.26 THOUSAND/UL (ref 4.31–10.16)

## 2024-01-22 PROCEDURE — 83735 ASSAY OF MAGNESIUM: CPT | Performed by: INTERNAL MEDICINE

## 2024-01-22 PROCEDURE — 85025 COMPLETE CBC W/AUTO DIFF WBC: CPT | Performed by: INTERNAL MEDICINE

## 2024-01-22 PROCEDURE — 84100 ASSAY OF PHOSPHORUS: CPT | Performed by: INTERNAL MEDICINE

## 2024-01-22 PROCEDURE — NC001 PR NO CHARGE: Performed by: INTERNAL MEDICINE

## 2024-01-22 PROCEDURE — C9113 INJ PANTOPRAZOLE SODIUM, VIA: HCPCS | Performed by: PHYSICIAN ASSISTANT

## 2024-01-22 PROCEDURE — 99233 SBSQ HOSP IP/OBS HIGH 50: CPT | Performed by: INTERNAL MEDICINE

## 2024-01-22 PROCEDURE — 85610 PROTHROMBIN TIME: CPT | Performed by: INTERNAL MEDICINE

## 2024-01-22 PROCEDURE — 80053 COMPREHEN METABOLIC PANEL: CPT | Performed by: INTERNAL MEDICINE

## 2024-01-22 PROCEDURE — 84145 PROCALCITONIN (PCT): CPT | Performed by: INTERNAL MEDICINE

## 2024-01-22 PROCEDURE — 99232 SBSQ HOSP IP/OBS MODERATE 35: CPT | Performed by: PHYSICIAN ASSISTANT

## 2024-01-22 RX ORDER — PANTOPRAZOLE SODIUM 40 MG/10ML
40 INJECTION, POWDER, LYOPHILIZED, FOR SOLUTION INTRAVENOUS
Status: DISCONTINUED | OUTPATIENT
Start: 2024-01-22 | End: 2024-01-27 | Stop reason: HOSPADM

## 2024-01-22 RX ORDER — LORAZEPAM 2 MG/ML
0.5 INJECTION INTRAMUSCULAR EVERY 8 HOURS PRN
Status: DISCONTINUED | OUTPATIENT
Start: 2024-01-22 | End: 2024-01-24 | Stop reason: ALTCHOICE

## 2024-01-22 RX ORDER — ALBUTEROL SULFATE 90 UG/1
2 AEROSOL, METERED RESPIRATORY (INHALATION) EVERY 6 HOURS PRN
Status: DISCONTINUED | OUTPATIENT
Start: 2024-01-22 | End: 2024-01-27 | Stop reason: HOSPADM

## 2024-01-22 RX ORDER — CEFEPIME HYDROCHLORIDE 2 G/50ML
2000 INJECTION, SOLUTION INTRAVENOUS EVERY 8 HOURS
Status: DISCONTINUED | OUTPATIENT
Start: 2024-01-22 | End: 2024-01-25

## 2024-01-22 RX ORDER — POTASSIUM CHLORIDE 14.9 MG/ML
20 INJECTION INTRAVENOUS ONCE
Status: COMPLETED | OUTPATIENT
Start: 2024-01-22 | End: 2024-01-22

## 2024-01-22 RX ORDER — METOPROLOL TARTRATE 1 MG/ML
2.5 INJECTION, SOLUTION INTRAVENOUS 3 TIMES DAILY PRN
Status: DISCONTINUED | OUTPATIENT
Start: 2024-01-22 | End: 2024-01-24

## 2024-01-22 RX ADMIN — LORAZEPAM 0.5 MG: 2 INJECTION INTRAMUSCULAR; INTRAVENOUS at 22:50

## 2024-01-22 RX ADMIN — ENOXAPARIN SODIUM 40 MG: 100 INJECTION SUBCUTANEOUS at 09:27

## 2024-01-22 RX ADMIN — FILGRASTIM-AAFI 300 MCG: 300 INJECTION, SOLUTION SUBCUTANEOUS at 10:34

## 2024-01-22 RX ADMIN — CEFEPIME HYDROCHLORIDE 2000 MG: 2 INJECTION, SOLUTION INTRAVENOUS at 09:27

## 2024-01-22 RX ADMIN — SODIUM CHLORIDE 100 ML/HR: 0.9 INJECTION, SOLUTION INTRAVENOUS at 01:53

## 2024-01-22 RX ADMIN — PANTOPRAZOLE SODIUM 40 MG: 40 INJECTION, POWDER, FOR SOLUTION INTRAVENOUS at 14:31

## 2024-01-22 RX ADMIN — FLUTICASONE FUROATE AND VILANTEROL TRIFENATATE 1 PUFF: 200; 25 POWDER RESPIRATORY (INHALATION) at 09:28

## 2024-01-22 RX ADMIN — VANCOMYCIN HYDROCHLORIDE 1500 MG: 5 INJECTION, POWDER, LYOPHILIZED, FOR SOLUTION INTRAVENOUS at 21:39

## 2024-01-22 RX ADMIN — POTASSIUM CHLORIDE 20 MEQ: 14.9 INJECTION, SOLUTION INTRAVENOUS at 09:27

## 2024-01-22 RX ADMIN — CEFEPIME HYDROCHLORIDE 2000 MG: 2 INJECTION, SOLUTION INTRAVENOUS at 17:55

## 2024-01-22 RX ADMIN — PHENOL 1 SPRAY: 1.5 LIQUID ORAL at 17:53

## 2024-01-22 RX ADMIN — SODIUM CHLORIDE 100 ML/HR: 0.9 INJECTION, SOLUTION INTRAVENOUS at 12:23

## 2024-01-22 NOTE — CASE MANAGEMENT
Case Management Assessment & Discharge Planning Note    Patient name Kathi Ferris  Location /-01 MRN [id number]  : 1960 Date 2024       Current Admission Date: 2024  Current Admission Diagnosis:SBO (small bowel obstruction) ([facility])   Patient Active Problem List    Diagnosis Date Noted    SBO (small bowel obstruction) ([facility]) 2024    Sepsis ([facility]) 2024    Neutropenia ([facility]) 2024    Chronic respiratory failure with hypoxia ([facility]) 10/24/2023    Generalized anxiety disorder 10/06/2023    Hyponatremia 2023    Palliative care patient 2023    Malignant pleural effusion 2023    Dyspnea on exertion 2023    CINV (chemotherapy-induced nausea and vomiting) 2023    Non-small cell cancer of right lung ([facility]) 2023    Closed fracture of multiple ribs of left side 2022    Cigarette nicotine dependence in remission 2022    COPD (chronic obstructive pulmonary disease) ([facility]) 2022    Hypercholesterolemia 2022    Essential hypertension 2022    Impingement syndrome of left shoulder 2021      LOS (days): 1  Geometric Mean LOS (GMLOS) (days):   Days to GMLOS:     OBJECTIVE:    Risk of Unplanned Readmission Score: 27.66         Current admission status: Inpatient       Preferred Pharmacy:   [id number] [place] - [address]  [address]  [place] [address]  Phone: [phone] Fax: [phone]    [facility] [place] [facility] [address]  [address]  [place] [address]  Phone: [phone] Fax: [phone]    Primary Care Provider: Keyon Miller MD    Primary Insurance: BLUE CROSS  Secondary Insurance: MEDICARE    ASSESSMENT:  Active Health Care Proxies       Mynor Ferris Health Care Agent - Spouse   Primary Phone: [phone] (Mobile)  Home Phone: [phone]                 Advance Directives  Does patient have a Health Care  POA?: Yes  Does patient have Advance Directives?: Yes  Advance Directives: Living will, Power of  for health care  Primary Contact: Mynor Ferris: : [phone]; [phone]    Readmission Root Cause  30 Day Readmission: No    Patient Information  Admitted from:: Home  Mental Status: Alert  During Assessment patient was accompanied by: Not accompanied during assessment  Assessment information provided by:: Patient  Primary Caregiver: Self  Support Systems: Self, Spouse/significant other, Family members, Children, Friends/neighbors  County of Residence: [place]  What city do you live in?: [place]  Home entry access options. Select all that apply.: Stairs  Number of steps to enter home.: 1  Do the steps have railings?: No  Type of Current Residence: 2 story home  Upon entering residence, is there a bedroom on the main floor (no further steps)?: No  A bedroom is located on the following floor levels of residence (select all that apply):: 2nd Floor  Upon entering residence, is there a bathroom on the main floor (no further steps)?: Yes  Number of steps to 2nd floor from main floor: One Flight (has stair glide)  Living Arrangements: Lives w/ Spouse/significant other  Is patient a ?: No    Activities of Daily Living Prior to Admission  Functional Status: Independent  Completes ADLs independently?: Yes  Ambulates independently?: Yes  Does patient use assisted devices?: Yes  Assisted Devices (DME) used: Nebulizer  DME Company Name (respiratory supplies): [employer]  Does patient currently own DME?: Yes  What DME does the patient currently own?: Nebulizer  Does patient have a history of Outpatient Therapy (PT/OT)?: No  Does the patient have a history of Short-Term Rehab?: No  Does patient have a history of HHC?: No  Does patient currently have HHC?: Yes ([facility])    Current Home Health Care  Type of Current Home Care Services: Nurse visit  Current Home Health Agency:: [facility]  Current  Home Health Follow-Up Provider:: PCP    Patient Information Continued  Income Source: Pension/senior living  Does patient have prescription coverage?: Yes  Does patient receive dialysis treatments?: No  Does patient have a history of substance abuse?: No  Does patient have a history of Mental Health Diagnosis?: No    Means of Transportation  Means of Transport to Appts:: Family transport      Housing Stability: Low Risk  (1/22/2024)    Housing Stability Vital Sign     Unable to Pay for Housing in the Last Year: No     Number of Places Lived in the Last Year: 1     Unstable Housing in the Last Year: No   Food Insecurity: No Food Insecurity (1/22/2024)    Hunger Vital Sign     Worried About Running Out of Food in the Last Year: Never true     Ran Out of Food in the Last Year: Never true   Transportation Needs: No Transportation Needs (1/22/2024)    PRAPARE - Transportation     Lack of Transportation (Medical): No     Lack of Transportation (Non-Medical): No   Utilities: Not At Risk (1/22/2024)    C Utilities     Threatened with loss of utilities: No       DISCHARGE DETAILS:    Requested Home Health Care         Is the patient interested in HHC at discharge?: Yes  Home Health Discipline requested:: Nursing, Physical Therapy, Occupational Therapy  Home Health Agency Name:: [facility]  Home Health Follow-Up Provider:: PCP  Home Health Services Needed:: Evaluate Functional Status and Safety, Gait/ADL Training, Strengthening/Theraputic Exercises to Improve Function  Homebound Criteria Met:: Requires the Assistance of Another Person for Safe Ambulation or to Leave the Home  Supporting Clincal Findings:: Limited Endurance    DME Referral Provided  Referral made for DME?: Yes  DME referral completed for the following items:: Shower Chair    Additional Comments: Met with Pt. Pt presents AA&Ox3. Discussed role of case management. Pt reports she lives with  in 2sh, 1 madeleine, no railing. Stair glide to 2nd floor, Powder  room on 1st. Has [facility] for ASEPT catheters. Pt requesting home PT/OT. Reports she wears oxygen through [employer] and has nebulizer and walker. Pt is requesting shower chair and is agreeable to use [employer]. CM informed Pt that shower chair will be shipped to home and Pt agreeable. Denies hx of SNF/MH/D&A.  drives, grocery shopping. Pt's POA is her  and has living will. [facility]. Pt reports her family and neighbors are supportive. Referral sent [employer] via [employer] for shower chair. Referral sent to [facility] via [employer]. CM to follow.

## 2024-01-22 NOTE — TELEPHONE ENCOUNTER
Spoke with patient's . Patient is currently admitted and will not make her appt today. I informed them that I will reschedule once we figure out discharge instructions. Patient's  verbalized understanding.

## 2024-01-22 NOTE — PLAN OF CARE
Problem: Potential for Falls  Goal: Patient will remain free of falls  Description: INTERVENTIONS:  - Educate patient/family on patient safety including physical limitations  - Instruct patient to call for assistance with activity   - Consult OT/PT to assist with strengthening/mobility   - Keep Call bell within reach  - Keep bed low and locked with side rails adjusted as appropriate  - Keep care items and personal belongings within reach  - Initiate and maintain comfort rounds  - Make Fall Risk Sign visible to staff  - Offer Toileting every 2 Hours, in advance of need  - Initiate/Maintain 2alarm  - Obtain necessary fall risk management equipment: 2  - Apply yellow socks and bracelet for high fall risk patients  - Consider moving patient to room near nurses station  Outcome: Progressing     Problem: PAIN - ADULT  Goal: Verbalizes/displays adequate comfort level or baseline comfort level  Description: Interventions:  - Encourage patient to monitor pain and request assistance  - Assess pain using appropriate pain scale  - Administer analgesics based on type and severity of pain and evaluate response  - Implement non-pharmacological measures as appropriate and evaluate response  - Consider cultural and social influences on pain and pain management  - Notify physician/advanced practitioner if interventions unsuccessful or patient reports new pain  Outcome: Progressing

## 2024-01-22 NOTE — UTILIZATION REVIEW
Initial Clinical Review    Admission: Date/Time/Statement:   Admission Orders (From admission, onward)       Ordered        01/21/24 1207  INPATIENT ADMISSION  Once                          Orders Placed This Encounter   Procedures    INPATIENT ADMISSION     Standing Status:   Standing     Number of Occurrences:   1     Order Specific Question:   Level of Care     Answer:   Med Surg [16]     Order Specific Question:   Estimated length of stay     Answer:   More than 2 Midnights     Order Specific Question:   Certification     Answer:   I certify that inpatient services are medically necessary for this patient for a duration of greater than two midnights. See H&P and MD Progress Notes for additional information about the patient's course of treatment.     ED Arrival Information       Expected   -    Arrival   1/21/2024 08:18    Acuity   Emergent              Means of arrival   Wheelchair    Escorted by   Spouse    Service   Hospitalist    Admission type   Emergency              Arrival complaint   Vomiting             Chief Complaint   Patient presents with    Vomiting     Pt to er with reports of vomiting since Thursday. Had to drink contrast dye for a cat scan, and has been sick since. Denies abdominal pain       Initial Presentation: 63 y.o. female to the ED from home with complaints of vomiting for 4 days.  Admitted to inpatient for small bowel obstruction, sepsis. 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 Had CT with po and IV contrast 1/18/24 and since then has been vomiting, unable to tolerate po.  Urine output has been decreasing.  H/O hysterectomy/oopherectomy, appendectomy, colon surgery . Arrives appearing ill, abdominal distension, generalized abdominal tenderness. In the ED, given IV mag, started on IV abx, IV zofran, IV fluids.   CT a/p shows: Acute small bowel obstruction with point of transition in  the right lower quadrant, likely on the basis of serosal implants versus adhesions.   Gen/surg consult: No indications for acute surgical intervention at this time. Due to h/o cancer along with carcinomatosis, undergoing chemo, she is a very poor surgical candidate.  Place NGtube, keep npo. Serial abdominal exams. Started on IV fluids.      Telemed oncology:  Chemo induced neutropenia. Stage IV nonsmall cell lung ca. Start granix SQ daily.  Good candidate for Neulesta from now on.   Date: 1/22   Day 2:  Start filgrastim.  Continue IV abx.  Trend procal. Follow up cultures.  Check UA. Continue oxygen supplementation 2.5-3 L.  Keep NPO with Iv fluids, Ngtube output increased.  Nausea resolved.     ED Triage Vitals   Temperature Pulse Respirations Blood Pressure SpO2   01/21/24 0829 01/21/24 0831 01/21/24 0831 01/21/24 0831 01/21/24 0831   97.5 °F (36.4 °C) (!) 127 18 130/66 95 %      Temp Source Heart Rate Source Patient Position - Orthostatic VS BP Location FiO2 (%)   01/21/24 0829 01/21/24 0831 01/21/24 0900 01/21/24 0900 --   Temporal Monitor Lying Right arm       Pain Score       01/21/24 1354       No Pain          Wt Readings from Last 1 Encounters:   01/22/24 77.4 kg (170 lb 9.6 oz)     Additional Vital Signs: Vital Signs (last 2 days)    Date/Time Temp Pulse Resp BP MAP (mmHg) SpO2 Calculated FIO2 (%) - Nasal Cannula Nasal Cannula O2 Flow Rate (L/min) O2 Device Patient Position - Orthostatic VS   01/22/24 07:18:35 98.2 °F (36.8 °C) 97 20 140/71 94 96 % -- -- -- --   01/21/24 19:37:12 97.6 °F (36.4 °C) 99 14 131/72 92 97 % 32 3 L/min Nasal cannula --   01/21/24 1753 -- -- -- -- -- -- 32 3 L/min Nasal cannula --   01/21/24 1354 97.3 °F (36.3 °C) Abnormal  -- -- -- -- -- -- -- Nasal cannula --   01/21/24 13:30:35 -- 110 Abnormal  20 131/72 92 97 % -- -- -- --   01/21/24 1200 -- 104 19 132/60 86 98 % 32 3 L/min Nasal cannula Sitting   01/21/24 1115 -- 104 18 145/65 94 99 % 32 3 L/min Nasal cannula Sitting    01/21/24 1030 -- 98 19 133/66 92 96 % -- -- Nasal cannula Lying   01/21/24 1000 -- 96 17 121/60 83 98 % -- -- Nasal cannula Lying   01/21/24 0930 -- 94 19 124/68 90 98 % -- -- Nasal cannula Lying   01/21/24 0900 -- 94 19 130/63 90 98 % 32 3 L/min Nasal cannula Lying   01/21/24 0856 -- 109 Abnormal  -- -- -- -- -- -- -- --   01/21/24 0854 -- -- -- -- -- -- -- -- Nasal cannula --   01/21/24 0841 -- 118 Abnormal  -- 130/66 90 97 % -- -- -- --   01/21/24 0832 -- -- -- -- -- 98 % 32 3 L/min Nasal cannula --   01/21/24 0831 -- 127 Abnormal  18 130/66 -- 95 % 32 3 L/min  Nasal cannula --   Nasal Cannula O2 Flow Rate (L/min): chronic at 01/21/24 0831   01/21/24 0829 97.5 °F (36.4 °C) -- -- -- -- -- -- -- -- --     Pertinent Labs/Diagnostic Test Results:   1/21 EKG:         Narrative & Impression    Sinus tachycardia  Anterior infarct (cited on or before 27-OCT-2023)  ST & T wave abnormality, consider lateral ischemia  Abnormal ECG  When compared with ECG of 15-DEC-2023 17:23,  Questionable change in initial forces of Lateral leads  T wave inversion now evident in Lateral leads           XR chest 1 view portable   Final Result by Christofer Hogue MD (01/22 0862)      Nasogastric tube tip within the stomach.      Persistent small right pleural effusion with chest tube in place.      Persistent right infrahilar opacity is better appreciated on same day CTA.               Resident: RAMONA CHAWLA I, the attending radiologist, have reviewed the images and agree with the final report above.      Workstation performed: [id number]         PE Study with CT Abdomen and Pelvis with contrast   Final Result by Mike Villareal MD (01/21 3130)      1.  Acute small bowel obstruction with point of transition in the right lower quadrant, likely on the basis of serosal implants versus adhesions.   2.  Stable appearance of right infrahilar lung cancer with findings of lymphangitic carcinomatosis. Stable left lung groundglass and  solid nodules likely representing metastases.   3.  Small bilateral pleural effusions with right chest tube in place.   4.  Moderate volume of abdominopelvic ascites with similar findings of peritoneal carcinomatosis.                  * I personally telephoned this result to ANGEL WEATHERS on 1/21/2024 11:27 AM.         Workstation performed: [id number]           Results from last 7 days   Lab Units 01/21/24  0844   SARS-COV-2  Negative     Results from last 7 days   Lab Units 01/22/24  0540 01/21/24  0844   WBC Thousand/uL 2.26* 1.57*   HEMOGLOBIN g/dL 10.0* 11.7   HEMATOCRIT % 32.2* 37.1   PLATELETS Thousands/uL 406* 497*   NEUTROS ABS Thousands/µL 0.47* 0.38*         Results from last 7 days   Lab Units 01/22/24  0540 01/21/24  0844   SODIUM mmol/L 137 134*   POTASSIUM mmol/L 3.3* 3.4*   CHLORIDE mmol/L 97 89*   CO2 mmol/L 26 29   ANION GAP mmol/L 14 16   BUN mg/dL 20 21   CREATININE mg/dL 0.80 1.26   EGFR ml/min/1.73sq m 77 44   CALCIUM mg/dL 8.5 10.0   MAGNESIUM mg/dL 1.9 1.8*   PHOSPHORUS mg/dL 2.9  --      Results from last 7 days   Lab Units 01/22/24  0540 01/21/24  0844   AST U/L 13 19   ALT U/L 11 15   ALK PHOS U/L 76 95   TOTAL PROTEIN g/dL 6.2* 7.5   ALBUMIN g/dL 3.1* 3.7   TOTAL BILIRUBIN mg/dL 0.31 0.41         Results from last 7 days   Lab Units 01/22/24  0540 01/21/24  0844   GLUCOSE RANDOM mg/dL 89 117           Results from last 7 days   Lab Units 01/21/24  1120 01/21/24  0844   HS TNI 0HR ng/L  --  19   HS TNI 2HR ng/L 14  --    HSTNI D2 ng/L -5  --      Results from last 7 days   Lab Units 01/21/24  0844   D-DIMER QUANTITATIVE ug/ml FEU 5.25*     Results from last 7 days   Lab Units 01/22/24  0540 01/21/24  0844   PROTIME seconds 14.1 13.0   INR  1.05 0.94   PTT seconds  --  28         Results from last 7 days   Lab Units 01/22/24  0540 01/21/24  0844   PROCALCITONIN ng/ml 0.11 0.08     Results from last 7 days   Lab Units 01/21/24  0844   LACTIC ACID mmol/L 1.4       Results from last 7  days   Lab Units 01/21/24  0844   BNP pg/mL 45     Results from last 7 days   Lab Units 01/21/24  0844   LIPASE u/L <6*         Results from last 7 days   Lab Units 01/21/24  0844   INFLUENZA A PCR  Negative   INFLUENZA B PCR  Negative   RSV PCR  Negative       Results from last 7 days   Lab Units 01/21/24  0844   BLOOD CULTURE  Received in Microbiology Lab. Culture in Progress.  Received in Microbiology Lab. Culture in Progress.       ED Treatment:   Medication Administration from 01/21/2024 0817 to 01/21/2024 1327         Date/Time Order Dose Route Action Comments     01/21/2024 0850 EST sodium chloride 0.9 % bolus 1,000 mL 1,000 mL Intravenous New Bag --     01/21/2024 0848 EST ondansetron (ZOFRAN) injection 4 mg 4 mg Intravenous Given --     01/21/2024 0935 EST cefepime (MAXIPIME) IVPB (premix in dextrose) 2,000 mg 50 mL 2,000 mg Intravenous New Bag --     01/21/2024 1017 EST magnesium sulfate 2 g/50 mL IVPB (premix) 2 g 2 g Intravenous New Bag --     01/21/2024 1017 EST vancomycin (VANCOCIN) 1,750 mg in sodium chloride 0.9 % 500 mL IVPB 1,750 mg Intravenous New Bag --     01/21/2024 1036 EST ondansetron (ZOFRAN) injection 4 mg 4 mg Intravenous Given --     01/21/2024 1206 EST benzocaine (HURRICAINE) 20 % mucosal spray 2 spray 2 spray Mucosal Given --          Past Medical History:   Diagnosis Date    Cancer (HCC)     COPD (chronic obstructive pulmonary disease) (HCC)     Emphysema of lung (HCC)     Hyperlipidemia     Hypertension     Lung cancer (HCC) 06/26/2019    right lower lobe removed    Lung nodule     Pleural effusion        Admitting Diagnosis: Hypomagnesemia [E83.42]  Vomiting [R11.10]  Small bowel obstruction (HCC) [K56.609]  Chronic respiratory failure with hypoxia (HCC) [J96.11]  Neutropenia (HCC) [D70.9]  Chronic obstructive pulmonary disease, unspecified COPD type (HCC) [J44.9]  Metastatic non-small cell lung cancer (HCC) [C34.90]  Age/Sex: 63 y.o. female  Admission Orders:  Up and  OOB  I/O  SCDs    Scheduled Medications:  cefepime, 2,000 mg, Intravenous, Q12H  enoxaparin, 40 mg, Subcutaneous, Daily  Filgrastim-aafi, 300 mcg, Subcutaneous, Daily  fluticasone-vilanterol, 1 puff, Inhalation, Daily  pantoprazole, 40 mg, Intravenous, Q24H ULISES  vancomycin, 1,500 mg, Intravenous, Q24H      Continuous IV Infusions:  sodium chloride, 100 mL/hr, Intravenous, Continuous      PRN Meds:  albuterol, 2 puff, Inhalation, Q6H PRN  albuterol, 2.5 mg, Nebulization, Q6H PRN  metoprolol, 2.5 mg, Intravenous, TID PRN  ondansetron, 4 mg, Intravenous, Q6H PRN  phenol, 1 spray, Mouth/Throat, Q2H PRN        IP CONSULT TO ACUTE CARE SURGERY  IP CONSULT TO CASE MANAGEMENT  IP CONSULT TO ONCOLOGY  IP CONSULT TO PHARMACY    Network Utilization Review Department  ATTENTION: Please call with any questions or concerns to [phone] and carefully listen to the prompts so that you are directed to the right person. All voicemails are confidential.   For Discharge needs, contact Care Management DC Support Team at [phone] opt. [phone]  Send all requests for admission clinical reviews, approved or denied determinations and any other requests to dedicated fax number below belonging to the campus where the patient is receiving treatment. List of dedicated fax numbers for the Facilities:  FACILITY NAME UR FAX NUMBER   ADMISSION DENIALS (Administrative/Medical Necessity) [phone]   DISCHARGE SUPPORT TEAM (NETWORK) [phone]   PARENT CHILD HEALTH (Maternity/NICU/Pediatrics) [phone]   [facility] [phone]   [facility] [phone]   [facility] [phone]   [facility] [phone]   [facility] [phone]   [facility] [phone]   [facility] [phone]   [facility] [phone]   [facility]  [facility] [phone]   [facility] [phone]   [facility] [phone]

## 2024-01-22 NOTE — CASE MANAGEMENT
Case Management Assessment & Discharge Planning Note    Patient name Kathi Ferris  Location /-01 MRN [id number]  : 1960 Date 2024       Current Admission Date: 2024  Current Admission Diagnosis:SBO (small bowel obstruction) ([facility])   Patient Active Problem List    Diagnosis Date Noted    SBO (small bowel obstruction) ([facility]) 2024    Sepsis ([facility]) 2024    Neutropenia ([facility]) 2024    Chronic respiratory failure with hypoxia ([facility]) 10/24/2023    Generalized anxiety disorder 10/06/2023    Hyponatremia 2023    Palliative care patient 2023    Malignant pleural effusion 2023    Dyspnea on exertion 2023    CINV (chemotherapy-induced nausea and vomiting) 2023    Non-small cell cancer of right lung ([facility]) 2023    Closed fracture of multiple ribs of left side 2022    Cigarette nicotine dependence in remission 2022    COPD (chronic obstructive pulmonary disease) ([facility]) 2022    Hypercholesterolemia 2022    Essential hypertension 2022    Impingement syndrome of left shoulder 2021      LOS (days): 1  Geometric Mean LOS (GMLOS) (days):   Days to GMLOS:     OBJECTIVE:    Risk of Unplanned Readmission Score: 27.66         Current admission status: Inpatient       Preferred Pharmacy:   [id number] [place] - [address]  [address]  [place] [address]  Phone: [phone] Fax: [phone]    [facility] [place] - [address]  [address]  [place] [address]  Phone: [phone] Fax: [phone]    Primary Care Provider: Keyon Miller MD    Primary Insurance: BLUE CROSS  Secondary Insurance: MEDICARE    ASSESSMENT:  Active Health Care Proxies       Mynor Ferris Health Care Agent - Spouse   Primary Phone: [phone] (Mobile)  Home Phone: [phone]                 Advance Directives  Does patient have a Health Care  POA?: Yes  Does patient have Advance Directives?: Yes  Advance Directives: Living will, Power of  for health care  Primary Contact: Mynor Ferris: : [phone]; [phone]         Readmission Root Cause  30 Day Readmission: No    Patient Information  Admitted from:: Home  Mental Status: Alert  During Assessment patient was accompanied by: Not accompanied during assessment  Assessment information provided by:: Patient  Primary Caregiver: Self  Support Systems: Self, Spouse/significant other, Family members, Children, Friends/neighbors  County of Residence: [place]  What city do you live in?: [place]  Home entry access options. Select all that apply.: Stairs  Number of steps to enter home.: 1  Do the steps have railings?: No  Type of Current Residence: 2 story home  Upon entering residence, is there a bedroom on the main floor (no further steps)?: No  A bedroom is located on the following floor levels of residence (select all that apply):: 2nd Floor  Upon entering residence, is there a bathroom on the main floor (no further steps)?: Yes  Number of steps to 2nd floor from main floor: One Flight (has stair glide)  Living Arrangements: Lives w/ Spouse/significant other  Is patient a ?: No    Activities of Daily Living Prior to Admission  Functional Status: Independent  Completes ADLs independently?: Yes  Ambulates independently?: Yes  Does patient use assisted devices?: Yes  Assisted Devices (DME) used: Nebulizer  DME Company Name (respiratory supplies): [employer]  Does patient currently own DME?: Yes  What DME does the patient currently own?: Nebulizer  Does patient have a history of Outpatient Therapy (PT/OT)?: No  Does the patient have a history of Short-Term Rehab?: No  Does patient have a history of HHC?: No  Does patient currently have HHC?: Yes ([facility])    Current Home Health Care  Type of Current Home Care Services: Nurse visit  Current Home Health Agency:: [facility]  Current Home Health Follow-Up Provider:: PCP    Patient Information Continued  Income Source: Pension/residential  Does patient have prescription coverage?: Yes  Does patient receive dialysis treatments?: No  Does patient have a history of substance abuse?: No  Does patient have a history of Mental Health Diagnosis?: No    Means of Transportation  Means of Transport to Appts:: Family transport      Housing Stability: Low Risk  (1/22/2024)    Housing Stability Vital Sign     Unable to Pay for Housing in the Last Year: No     Number of Places Lived in the Last Year: 1     Unstable Housing in the Last Year: No   Food Insecurity: No Food Insecurity (1/22/2024)    Hunger Vital Sign     Worried About Running Out of Food in the Last Year: Never true     Ran Out of Food in the Last Year: Never true   Transportation Needs: No Transportation Needs (1/22/2024)    PRAPARE - Transportation     Lack of Transportation (Medical): No     Lack of Transportation (Non-Medical): No   Utilities: Not At Risk (1/22/2024)    AHC Utilities     Threatened with loss of utilities: No       DISCHARGE DETAILS:    Requested Home Health Care         Is the patient interested in HHC at discharge?: Yes  Home Health Discipline requested:: Nursing, Physical Therapy, Occupational Therapy  [facility] Health Agency Name:: [facility]  Home Health Follow-Up Provider:: PCP  Home Health Services Needed:: Evaluate Functional Status and Safety, Gait/ADL Training, Strengthening/Theraputic Exercises to Improve Function  Homebound Criteria Met:: Requires the Assistance of Another Person for Safe Ambulation or to Leave the Home  Supporting Clincal Findings:: Limited Endurance    DME Referral Provided  Referral made for DME?: Yes  DME referral completed for the following items:: Shower Chair    Additional Comments: Met with Pt. Pt presents AA&Ox3. Discussed role of case management. Pt reports she lives with  in 2sh, 1 madeleine, no railing. Stair glide to 2nd  floor, Powder room on 1st. Has [facility] for ASEPT catheters. Pt requesting home PT/OT. Reports she wears oxygen through [employer] and has nebulizer and walker. Pt is requesting shower chair and is agreeable to use [employer]. CM informed Pt that shower chair will be shipped to home and Pt agreeable. Denies hx of SNF/MH/D&A.  drives, grocery shopping. Pt's POA is her  and has living will. Pt reports her family and neighbors are supportive. Referral sent [employer] via [place] for shower chair. Referral sent to [facility] via [employer]. CM to follow.

## 2024-01-22 NOTE — PROGRESS NOTES
"Progress Note - General Surgery   Kathi Ferris 63 y.o. female MRN: [id number]  Unit/Bed#: -[facility] Encounter: [id number]    Assessment/Plan:  60-year-old female with Stage IV NSCLC admitted with SBO  -Pain improving, continues with NG tube output, no flatus  Peritoneal carcinomatosis  Neutropenia on current chemotherapy  COPD/Empysema  HTN  HLD    Plan:  Still with significant NG tube output, no flatus, continue conservative management with NG tube decompression  Monitor NG tube output, subtract ice chips given from total output  If NG tube output decreases will plan for contrast study in a.m.  Poor surgical candidate with stage IV lung cancer, carcinomatosis, current chemotherapy and neutropenia  Pain control as needed  Follow bowel function  Follow abdominal exam    Subjective/Objective   Chief Complaint: Dry throat    Subjective: patient denies abdominal pain.  States she is feeling better.  Still not passing flatus.  No acute events.    Objective:     Blood pressure 144/84, pulse (!) 113, temperature (!) 97.3 °F (36.3 °C), resp. rate 19, height 5' 5\" (1.651 m), weight 77.4 kg (170 lb 9.6 oz), SpO2 96%.,Body mass index is 28.39 kg/m².      Intake/Output Summary (Last 24 hours) at 1/22/2024 1807  Last data filed at 1/22/2024 1100  Gross per 24 hour   Intake 60 ml   Output 2725 ml   Net -2665 ml       Invasive Devices       Central Venous Catheter Line  Duration             Port A Cath 04/18/23 Right Subclavian 279 days              Peripheral Intravenous Line  Duration             Peripheral IV 01/21/24 Right Antecubital 1 day              Drain  Duration             Pleural Effusion Long-Term Catheter 15.5 Fr. 272 days    NG/OG/Enteral Tube Nasogastric 18 Fr Right nare 1 day                    Physical Exam:   General appearance: alert and oriented, in no acute distress  Head: Normocephalic, without obvious abnormality, atraumatic, sclerae anicteric, mucous membranes moist  Neck: no JVD and supple, " symmetrical, trachea midline  Lungs: clear to auscultation, no wheezes or rales  Heart:   Regular rate and rhythm, S1-S2 normal, no murmur  Abdomen:   Flat, soft, nontender, hypoactive BS  Extremities:   No edema, redness or tenderness in the calves or thighs  Skin: Warm, dry  Nursing notes and vital signs reviewed      Lab, Imaging and other studies:I have personally reviewed pertinent lab results.  , CBC:   Lab Results   Component Value Date    WBC 2.26 (L) 01/22/2024    HGB 10.0 (L) 01/22/2024    HCT 32.2 (L) 01/22/2024    MCV 86 01/22/2024     (H) 01/22/2024    RBC 3.76 (L) 01/22/2024    MCH 26.6 (L) 01/22/2024    MCHC 31.1 (L) 01/22/2024    RDW 17.0 (H) 01/22/2024    MPV 9.0 01/22/2024    NRBC 0 01/22/2024   , CMP:   Lab Results   Component Value Date    SODIUM 137 01/22/2024    K 3.3 (L) 01/22/2024    CL 97 01/22/2024    CO2 26 01/22/2024    BUN 20 01/22/2024    CREATININE 0.80 01/22/2024    CALCIUM 8.5 01/22/2024    AST 13 01/22/2024    ALT 11 01/22/2024    ALKPHOS 76 01/22/2024    EGFR 77 01/22/2024     VTE Pharmacologic Prophylaxis: Heparin  VTE Mechanical Prophylaxis: sequential compression device    Brittany Astl-Ranulfo

## 2024-01-22 NOTE — PLAN OF CARE
Problem: Potential for Falls  Goal: Patient will remain free of falls  Description: INTERVENTIONS:  - Educate patient/family on patient safety including physical limitations  - Instruct patient to call for assistance with activity   - Consult OT/PT to assist with strengthening/mobility   - Keep Call bell within reach  - Keep bed low and locked with side rails adjusted as appropriate  - Keep care items and personal belongings within reach  - Initiate and maintain comfort rounds  - Make Fall Risk Sign visible to staff  - Offer Toileting every 2 Hours, in advance of need  - Initiate/Maintain bed alarm  - Obtain necessary fall risk management equipment:   - Apply yellow socks and bracelet for high fall risk patients  - Consider moving patient to room near nurses station  Outcome: Progressing     Problem: PAIN - ADULT  Goal: Verbalizes/displays adequate comfort level or baseline comfort level  Description: Interventions:  - Encourage patient to monitor pain and request assistance  - Assess pain using appropriate pain scale  - Administer analgesics based on type and severity of pain and evaluate response  - Implement non-pharmacological measures as appropriate and evaluate response  - Consider cultural and social influences on pain and pain management  - Notify physician/advanced practitioner if interventions unsuccessful or patient reports new pain  Outcome: Progressing     Problem: INFECTION - ADULT  Goal: Absence or prevention of progression during hospitalization  Description: INTERVENTIONS:  - Assess and monitor for signs and symptoms of infection  - Monitor lab/diagnostic results  - Monitor all insertion sites, i.e. indwelling lines, tubes, and drains  - Monitor endotracheal if appropriate and nasal secretions for changes in amount and color  - [place] appropriate cooling/warming therapies per order  - Administer medications as ordered  - Instruct and encourage patient and family to use good hand hygiene  technique  - Identify and instruct in appropriate isolation precautions for identified infection/condition  Outcome: Progressing  Goal: Absence of fever/infection during neutropenic period  Description: INTERVENTIONS:  - Monitor WBC    Outcome: Progressing     Problem: SAFETY ADULT  Goal: Patient will remain free of falls  Description: INTERVENTIONS:  - Educate patient/family on patient safety including physical limitations  - Instruct patient to call for assistance with activity   - Consult OT/PT to assist with strengthening/mobility   - Keep Call bell within reach  - Keep bed low and locked with side rails adjusted as appropriate  - Keep care items and personal belongings within reach  - Initiate and maintain comfort rounds  - Make Fall Risk Sign visible to staff  - Offer Toileting every 2 Hours, in advance of need  - Initiate/Maintain bed alarm  - Obtain necessary fall risk management equipment:   - Apply yellow socks and bracelet for high fall risk patients  - Consider moving patient to room near nurses station  Outcome: Progressing  Goal: Maintain or return to baseline ADL function  Description: INTERVENTIONS:  -  Assess patient's ability to carry out ADLs; assess patient's baseline for ADL function and identify physical deficits which impact ability to perform ADLs (bathing, care of mouth/teeth, toileting, grooming, dressing, etc.)  - Assess/evaluate cause of self-care deficits   - Assess range of motion  - Assess patient's mobility; develop plan if impaired  - Assess patient's need for assistive devices and provide as appropriate  - Encourage maximum independence but intervene and supervise when necessary  - Involve family in performance of ADLs  - Assess for home care needs following discharge   - Consider OT consult to assist with ADL evaluation and planning for discharge  - Provide patient education as appropriate  Outcome: Progressing  Goal: Maintains/Returns to pre admission functional  level  Description: INTERVENTIONS:  - Perform AM-PAC 6 Click Basic Mobility/ Daily Activity assessment daily.  - Set and communicate daily mobility goal to care team and patient/family/caregiver.   - Collaborate with rehabilitation services on mobility goals if consulted  - Perform Range of Motion 2 times a day.  - Reposition patient every 2 hours.  - Dangle patient 2 times a day  - Stand patient 2 times a day  - Ambulate patient 2 times a day  - Out of bed to chair 2 times a day   - Out of bed for meals 2 times a day  - Out of bed for toileting  - Record patient progress and toleration of activity level   Outcome: Progressing     Problem: DISCHARGE PLANNING  Goal: Discharge to home or other facility with appropriate resources  Description: INTERVENTIONS:  - Identify barriers to discharge w/patient and caregiver  - Arrange for needed discharge resources and transportation as appropriate  - Identify discharge learning needs (meds, wound care, etc.)  - Arrange for interpretive services to assist at discharge as needed  - Refer to Case Management Department for coordinating discharge planning if the patient needs post-hospital services based on physician/advanced practitioner order or complex needs related to functional status, cognitive ability, or social support system  Outcome: Progressing     Problem: Knowledge Deficit  Goal: Patient/family/caregiver demonstrates understanding of disease process, treatment plan, medications, and discharge instructions  Description: Complete learning assessment and assess knowledge base.  Interventions:  - Provide teaching at level of understanding  - Provide teaching via preferred learning methods  Outcome: Progressing     Problem: RESPIRATORY - ADULT  Goal: Achieves optimal ventilation and oxygenation  Description: INTERVENTIONS:  - Assess for changes in respiratory status  - Assess for changes in mentation and behavior  - Position to facilitate oxygenation and minimize  respiratory effort  - Oxygen administered by appropriate delivery if ordered  - Initiate smoking cessation education as indicated  - Encourage broncho-pulmonary hygiene including cough, deep breathe, Incentive Spirometry  - Assess the need for suctioning and aspirate as needed  - Assess and instruct to report SOB or any respiratory difficulty  - Respiratory Therapy support as indicated  Outcome: Progressing     Problem: GASTROINTESTINAL - ADULT  Goal: Minimal or absence of nausea and/or vomiting  Description: INTERVENTIONS:  - Administer IV fluids if ordered to ensure adequate hydration  - Maintain NPO status until nausea and vomiting are resolved  - Nasogastric tube if ordered  - Administer ordered antiemetic medications as needed  - Provide nonpharmacologic comfort measures as appropriate  - Advance diet as tolerated, if ordered  - Consider nutrition services referral to assist patient with adequate nutrition and appropriate food choices  Outcome: Progressing  Goal: Maintains or returns to baseline bowel function  Description: INTERVENTIONS:  - Assess bowel function  - Encourage oral fluids to ensure adequate hydration  - Administer IV fluids if ordered to ensure adequate hydration  - Administer ordered medications as needed  - Encourage mobilization and activity  - Consider nutritional services referral to assist patient with adequate nutrition and appropriate food choices  Outcome: Progressing  Goal: Maintains adequate nutritional intake  Description: INTERVENTIONS:  - Monitor percentage of each meal consumed  - Identify factors contributing to decreased intake, treat as appropriate  - Assist with meals as needed  - Monitor I&O, weight, and lab values if indicated  - Obtain nutrition services referral as needed  Outcome: Progressing  Goal: Establish and maintain optimal ostomy function  Description: INTERVENTIONS:  - Assess bowel function  - Encourage oral fluids to ensure adequate hydration  - Administer IV  fluids if ordered to ensure adequate hydration   - Administer ordered medications as needed  - Encourage mobilization and activity  - Nutrition services referral to assist patient with appropriate food choices  - Assess stoma site  - Consider wound care consult   Outcome: Progressing  Goal: Oral mucous membranes remain intact  Description: INTERVENTIONS  - Assess oral mucosa and hygiene practices  - Implement preventative oral hygiene regimen  - Implement oral medicated treatments as ordered  - Initiate Nutrition services referral as needed  Outcome: Progressing     Problem: METABOLIC, FLUID AND ELECTROLYTES - ADULT  Goal: Electrolytes maintained within normal limits  Description: INTERVENTIONS:  - Monitor labs and assess patient for signs and symptoms of electrolyte imbalances  - Administer electrolyte replacement as ordered  - Monitor response to electrolyte replacements, including repeat lab results as appropriate  - Instruct patient on fluid and nutrition as appropriate  Outcome: Progressing  Goal: Fluid balance maintained  Description: INTERVENTIONS:  - Monitor labs   - Monitor I/O and WT  - Instruct patient on fluid and nutrition as appropriate  - Assess for signs & symptoms of volume excess or deficit  Outcome: Progressing  Goal: Glucose maintained within target range  Description: INTERVENTIONS:  - Monitor Blood Glucose as ordered  - Assess for signs and symptoms of hyperglycemia and hypoglycemia  - Administer ordered medications to maintain glucose within target range  - Assess nutritional intake and initiate nutrition service referral as needed  Outcome: Progressing     Problem: Nutrition/Hydration-ADULT  Goal: Nutrient/Hydration intake appropriate for improving, restoring or maintaining nutritional needs  Description: Monitor and assess patient's nutrition/hydration status for malnutrition. Collaborate with interdisciplinary team and initiate plan and interventions as ordered.  Monitor patient's weight  and dietary intake as ordered or per policy. Utilize nutrition screening tool and intervene as necessary. Determine patient's food preferences and provide high-protein, high-caloric foods as appropriate.     INTERVENTIONS:  - Monitor oral intake, urinary output, labs, and treatment plans  - Assess nutrition and hydration status and recommend course of action  - Evaluate amount of meals eaten  - Assist patient with eating if necessary   - Allow adequate time for meals  - Recommend/ encourage appropriate diets, oral nutritional supplements, and vitamin/mineral supplements  - Order, calculate, and assess calorie counts as needed  - Recommend, monitor, and adjust tube feedings and TPN/PPN based on assessed needs  - Assess need for intravenous fluids  - Provide specific nutrition/hydration education as appropriate  - Include patient/family/caregiver in decisions related to nutrition  Outcome: Progressing

## 2024-01-22 NOTE — PROGRESS NOTES
Kathi Ferris is a 63 y.o. female who is currently ordered Vancomycin IV with management by the Pharmacy Consult service.  Relevant clinical data and objective / subjective history reviewed.  Vancomycin Assessment:  Indication and Goal AUC/Trough: Pneumonia (goal -600, trough >10), -600, trough >10  Clinical Status: stable  Micro:     Renal Function:  SCr: 0.80 mg/dL  CrCl: 72 mL/min  Renal replacement: Not on dialysis  Days of Therapy: 2  Current Dose: 1250 mg q24h  Vancomycin Plan:  New Dosin mg q24h  Estimated AUC: 448 mcg*hr/mL  Estimated Trough: 11.9 mcg/mL  Next Level: @0600 am labs on 24  Renal Function Monitoring: Daily BMP and UOP  Pharmacy will continue to follow closely for s/sx of nephrotoxicity, infusion reactions and appropriateness of therapy.  BMP and CBC will be ordered per protocol. We will continue to follow the patient’s culture results and clinical progress daily.    Mary Sykes, Pharmacist

## 2024-01-22 NOTE — PROGRESS NOTES
Oncology Progress Note  Kathi Ferirs 63 y.o. female MRN: [id number]  Unit/Bed#: -01 Encounter: [id number]      Oncology History Overview Note   5/2019 - Stage IA adenocarcinoma of the right lung s/p RLLectomy    11/2022 - fall after tripping on a dog gate - CT showed pulmonary nodules that required 3 month followup    3/2023 - CT showed right pleural effusion with enlarging lung lesion, she was otherwise symptomatic, thoracentesis cell block showed adenocarcinoma c/w her prior lung primary    PET - no disease outside the chest    Caris - no targetable mutations    4/20/2023 - carbo/pem/pembro    5/11/2-23 - cycle 2, pemetrexed dose reduced by 20% and carbo dose reduced to AUC 4 due to nausea and fatigue    7/2023 - removed carbo as of cycle 5 due to good response and increasing fatigue    8/2023 - add back carbo for cycle 6 due to increasing output from right pleural catheter    8/24/2023 - remove carboplatin for cycle 7 because no impact was made on output from pleural catheter    10/2023 - disease progression in the omentum.  Plan treatment change to docetaxel and ramucirumab    11/9/2023 - start taxotere/ramucirumab     Non-small cell cancer of right lung (HCC)   4/7/2023 Initial Diagnosis    Non-small cell cancer of right lung (HCC)     4/20/2023 - 9/14/2023 Chemotherapy    cyanocobalamin, 1,000 mcg, Intramuscular, Once, 5 of 10 cycles  Administration: 1,000 mcg (4/13/2023), 1,000 mcg (6/1/2023), 1,000 mcg (8/3/2023), 1,000 mcg (8/24/2023), 1,000 mcg (9/14/2023)  alteplase (CATHFLO), 2 mg, Intracatheter, Every 1 Minute as needed, 8 of 13 cycles  fosaprepitant (EMEND) IVPB, 150 mg, Intravenous, Once, 5 of 5 cycles  Administration: 150 mg (4/20/2023), 150 mg (6/1/2023), 150 mg (6/22/2023), 150 mg (5/11/2023), 150 mg (8/3/2023)  CARBOplatin (PARAPLATIN) IVPB ([facility] AUC DOSING), 553 mg, Intravenous, Once, 5 of 5 cycles  Administration: 553 mg (4/20/2023), 442.4 mg (6/1/2023), 442.4 mg (6/22/2023), 442.4 mg  "(5/11/2023), 438 mg (8/3/2023)  pemetrexed (ALIMTA) chemo infusion, 945 mg, Intravenous, Once, 8 of 13 cycles  Dose modification: 400 mg/m2 (original dose 500 mg/m2, Cycle 3, Reason: Nausea/Vomiting, Comment: 20% dose reduction due to nausea)  Administration: 900 mg (4/20/2023), 756 mg (6/1/2023), 756 mg (6/22/2023), 756 mg (8/3/2023), 756 mg (5/11/2023), 756 mg (7/13/2023), 756 mg (8/24/2023), 756 mg (9/14/2023)  pembrolizumab (KEYTRUDA) IVPB, 200 mg, Intravenous, Once, 8 of 13 cycles  Administration: 200 mg (4/20/2023), 200 mg (6/1/2023), 200 mg (6/22/2023), 200 mg (8/3/2023), 200 mg (5/11/2023), 200 mg (7/13/2023), 200 mg (8/24/2023), 200 mg (9/14/2023)     6/9/2023 -  Cancer Staged    Staging form: Lung, AJCC 8th Edition  - Clinical: Stage ALESHA (cT1c, cN2, cM1a) - Signed by Shani Phipps DO on 6/9/2023 11/9/2023 -  Chemotherapy    alteplase (CATHFLO), 2 mg, Intracatheter, Every 1 Minute as needed, 4 of 6 cycles  ramucirumab (CYRAMZA) IVPB, 830 mg, Intravenous, Once, 4 of 6 cycles  Administration: 800 mg (11/9/2023), 800 mg (11/30/2023), 800 mg (12/21/2023), 800 mg (1/11/2024)  DOCEtaxel (TAXOTERE) chemo infusion, 75 mg/m2 = 142.6 mg, Intravenous, Once, 4 of 6 cycles  Administration: 142.6 mg (11/9/2023), 142.6 mg (11/30/2023), 142.6 mg (12/21/2023), 142.6 mg (1/11/2024)         Subjective: \"feeling better with NGT in place\"    Objective: NG tube putting out copious amounts of bile tinged fluid.  Abdominal pain improved.  Not passing gas, very hypoactive bowel sounds  Day 2 of Granix, ANC 0.47.  Afebrile    /71   Pulse 97   Temp 98.2 °F (36.8 °C)   Resp 20   Ht 5' 5\" (1.651 m)   Wt 77.4 kg (170 lb 9.6 oz)   SpO2 96%   BMI 28.39 kg/m²   General Appearance:    Alert, oriented        Eyes:    PERRL   Ears:    Normal external ear canals, both ears   Nose:   Nares normal, septum midline.  NG tube in place   Throat:   Mucosa moist. Pharynx without injection.    Neck:   Supple       Lungs:     Clear " to auscultation bilaterally   Chest Wall:    No tenderness or deformity    Heart:    Regular rate and rhythm       Abdomen:     Soft, non-tender, no bowel sounds, no organomegaly           Extremities:   Extremities no cyanosis or edema       Skin:   no rash or icterus.    Lymph nodes:   Cervical, supraclavicular, and axillary nodes normal   Neurologic:   CNII-XII intact, normal strength, sensation and reflexes     throughout        Recent Results (from the past 48 hour(s))   ECG 12 lead    Collection Time: 01/21/24  8:34 AM   Result Value Ref Range    Ventricular Rate 123 BPM    Atrial Rate 123 BPM    DE Interval 156 ms    QRSD Interval 78 ms    QT Interval 312 ms    QTC Interval 446 ms    P Axis 48 degrees    QRS Axis 32 degrees    T Wave Axis 102 degrees   CBC and differential    Collection Time: 01/21/24  8:44 AM   Result Value Ref Range    WBC 1.57 (LL) 4.31 - 10.16 Thousand/uL    RBC 4.35 3.81 - 5.12 Million/uL    Hemoglobin 11.7 11.5 - 15.4 g/dL    Hematocrit 37.1 34.8 - 46.1 %    MCV 85 82 - 98 fL    MCH 26.9 26.8 - 34.3 pg    MCHC 31.5 31.4 - 37.4 g/dL    RDW 17.1 (H) 11.6 - 15.1 %    MPV 9.5 8.9 - 12.7 fL    Platelets 497 (H) 149 - 390 Thousands/uL    nRBC 0 /100 WBCs    Neutrophils Relative 24 (L) 43 - 75 %    Immat GRANS % 1 0 - 2 %    Lymphocytes Relative 41 14 - 44 %    Monocytes Relative 32 (H) 4 - 12 %    Eosinophils Relative 1 0 - 6 %    Basophils Relative 1 0 - 1 %    Neutrophils Absolute 0.38 (L) 1.85 - 7.62 Thousands/µL    Immature Grans Absolute 0.01 0.00 - 0.20 Thousand/uL    Lymphocytes Absolute 0.67 0.60 - 4.47 Thousands/µL    Monocytes Absolute 0.50 0.17 - 1.22 Thousand/µL    Eosinophils Absolute 0.01 0.00 - 0.61 Thousand/µL    Basophils Absolute 0.01 0.00 - 0.10 Thousands/µL   Comprehensive metabolic panel    Collection Time: 01/21/24  8:44 AM   Result Value Ref Range    Sodium 134 (L) 135 - 147 mmol/L    Potassium 3.4 (L) 3.5 - 5.3 mmol/L    Chloride 89 (L) 96 - 108 mmol/L    CO2 29 21 -  "32 mmol/L    ANION GAP 16 mmol/L    BUN 21 5 - 25 mg/dL    Creatinine 1.26 0.60 - 1.30 mg/dL    Glucose 117 65 - 140 mg/dL    Calcium 10.0 8.4 - 10.2 mg/dL    AST 19 13 - 39 U/L    ALT 15 7 - 52 U/L    Alkaline Phosphatase 95 34 - 104 U/L    Total Protein 7.5 6.4 - 8.4 g/dL    Albumin 3.7 3.5 - 5.0 g/dL    Total Bilirubin 0.41 0.20 - 1.00 mg/dL    eGFR 44 ml/min/1.73sq m   Lactic acid    Collection Time: 01/21/24  8:44 AM   Result Value Ref Range    LACTIC ACID 1.4 0.5 - 2.0 mmol/L   Procalcitonin    Collection Time: 01/21/24  8:44 AM   Result Value Ref Range    Procalcitonin 0.08 <=0.25 ng/ml   Protime-INR    Collection Time: 01/21/24  8:44 AM   Result Value Ref Range    Protime 13.0 11.6 - 14.5 seconds    INR 0.94 0.84 - 1.19   APTT    Collection Time: 01/21/24  8:44 AM   Result Value Ref Range    PTT 28 23 - 37 seconds   Blood culture #1    Collection Time: 01/21/24  8:44 AM    Specimen: Arm, Left; Blood   Result Value Ref Range    Blood Culture Received in Microbiology Lab. Culture in Progress.    Blood culture #2    Collection Time: 01/21/24  8:44 AM    Specimen: Hand, Left; Blood   Result Value Ref Range    Blood Culture Received in Microbiology Lab. Culture in Progress.    FLU/RSV/COVID - if FLU/RSV clinically relevant    Collection Time: 01/21/24  8:44 AM    Specimen: Nose; Nares   Result Value Ref Range    SARS-CoV-2 Negative Negative    INFLUENZA A PCR Negative Negative    INFLUENZA B PCR Negative Negative    RSV PCR Negative Negative   Lipase    Collection Time: 01/21/24  8:44 AM   Result Value Ref Range    Lipase <6 (L) 11 - 82 u/L   Magnesium    Collection Time: 01/21/24  8:44 AM   Result Value Ref Range    Magnesium 1.8 (L) 1.9 - 2.7 mg/dL   HS Troponin 0hr (reflex protocol)    Collection Time: 01/21/24  8:44 AM   Result Value Ref Range    hs TnI 0hr 19 \"Refer to ACS Flowchart\"- see link ng/L   D-Dimer    Collection Time: 01/21/24  8:44 AM   Result Value Ref Range    D-Dimer, Quant 5.25 (H) <0.50 ug/ml " "AHMETU   B-Type Natriuretic Peptide(BNP)    Collection Time: 01/21/24  8:44 AM   Result Value Ref Range    BNP 45 0 - 100 pg/mL   HS Troponin I 2hr    Collection Time: 01/21/24 11:20 AM   Result Value Ref Range    hs TnI 2hr 14 \"Refer to ACS Flowchart\"- see link ng/L    Delta 2hr hsTnI -5 <20 ng/L   Procalcitonin, Next Day AM Collection    Collection Time: 01/22/24  5:40 AM   Result Value Ref Range    Procalcitonin 0.11 <=0.25 ng/ml   Comprehensive metabolic panel    Collection Time: 01/22/24  5:40 AM   Result Value Ref Range    Sodium 137 135 - 147 mmol/L    Potassium 3.3 (L) 3.5 - 5.3 mmol/L    Chloride 97 96 - 108 mmol/L    CO2 26 21 - 32 mmol/L    ANION GAP 14 mmol/L    BUN 20 5 - 25 mg/dL    Creatinine 0.80 0.60 - 1.30 mg/dL    Glucose 89 65 - 140 mg/dL    Calcium 8.5 8.4 - 10.2 mg/dL    Corrected Calcium 9.2 8.3 - 10.1 mg/dL    AST 13 13 - 39 U/L    ALT 11 7 - 52 U/L    Alkaline Phosphatase 76 34 - 104 U/L    Total Protein 6.2 (L) 6.4 - 8.4 g/dL    Albumin 3.1 (L) 3.5 - 5.0 g/dL    Total Bilirubin 0.31 0.20 - 1.00 mg/dL    eGFR 77 ml/min/1.73sq m   Magnesium    Collection Time: 01/22/24  5:40 AM   Result Value Ref Range    Magnesium 1.9 1.9 - 2.7 mg/dL   Phosphorus    Collection Time: 01/22/24  5:40 AM   Result Value Ref Range    Phosphorus 2.9 2.3 - 4.1 mg/dL   CBC and differential    Collection Time: 01/22/24  5:40 AM   Result Value Ref Range    WBC 2.26 (L) 4.31 - 10.16 Thousand/uL    RBC 3.76 (L) 3.81 - 5.12 Million/uL    Hemoglobin 10.0 (L) 11.5 - 15.4 g/dL    Hematocrit 32.2 (L) 34.8 - 46.1 %    MCV 86 82 - 98 fL    MCH 26.6 (L) 26.8 - 34.3 pg    MCHC 31.1 (L) 31.4 - 37.4 g/dL    RDW 17.0 (H) 11.6 - 15.1 %    MPV 9.0 8.9 - 12.7 fL    Platelets 406 (H) 149 - 390 Thousands/uL    nRBC 0 /100 WBCs    Neutrophils Relative 21 (L) 43 - 75 %    Immat GRANS % 1 0 - 2 %    Lymphocytes Relative 28 14 - 44 %    Monocytes Relative 50 (H) 4 - 12 %    Eosinophils Relative 0 0 - 6 %    Basophils Relative 0 0 - 1 %    " Neutrophils Absolute 0.47 (L) 1.85 - 7.62 Thousands/µL    Immature Grans Absolute 0.02 0.00 - 0.20 Thousand/uL    Lymphocytes Absolute 0.64 0.60 - 4.47 Thousands/µL    Monocytes Absolute 1.11 0.17 - 1.22 Thousand/µL    Eosinophils Absolute 0.01 0.00 - 0.61 Thousand/µL    Basophils Absolute 0.01 0.00 - 0.10 Thousands/µL   Protime-INR    Collection Time: 01/22/24  5:40 AM   Result Value Ref Range    Protime 14.1 11.6 - 14.5 seconds    INR 1.05 0.84 - 1.19         XR chest 1 view portable    Result Date: 1/22/2024  Narrative: CHEST INDICATION:   post NG tube insertion. COMPARISON: CTA chest, abdomen, pelvis 1/21/2024; chest radiograph 12/17/2023 EXAM PERFORMED/VIEWS:  XR CHEST PORTABLE Images:  1 FINDINGS: Patient is rotated to the right. Right chest wall port with tip overlying the superior cavoatrial junction. Nasogastric tube courses below the level of diaphragm with tip projecting over the left upper quadrant. Right-sided chest tube. Cardiomediastinal silhouette appears unremarkable. Persistent small right-sided pleural effusion. No left-sided effusion. No pneumothorax. Persistent right infrahilar opacity, in keeping with patient's known lung cancer diagnosis. Osseous structures appear within normal limits for patient age.     Impression: Nasogastric tube tip within the stomach. Persistent small right pleural effusion with chest tube in place. Persistent right infrahilar opacity is better appreciated on same day CTA. Resident: RAMONA CHAWLA I, the attending radiologist, have reviewed the images and agree with the final report above. Workstation performed: [id number]     PE Study with CT Abdomen and Pelvis with contrast    Result Date: 1/21/2024  Narrative: CT PULMONARY ANGIOGRAM OF THE CHEST AND CT ABDOMEN AND PELVIS WITH INTRAVENOUS CONTRAST INDICATION: elevated dimer, vomiting. History of metastatic non-small cell lung cancer. COMPARISON: CT chest/abdomen/pelvis 1/18/2024. PET/CT 11/2/2023. TECHNIQUE: CT  examination of the chest, abdomen and pelvis was performed. Thin section CT angiographic technique was used in the chest in order to evaluate for pulmonary embolus and coronal 3D MIP postprocessing was performed on the acquisition scanner. Multiplanar 2D reformatted images were created from the source data. This examination, like all CT scans performed in the [facility], was performed utilizing techniques to minimize radiation dose exposure, including the use of iterative reconstruction and automated exposure control. Radiation dose length product (DLP) for this visit: 935.89 mGy-cm IV Contrast: 100 mL of iohexol (OMNIPAQUE) Enteric Contrast: Enteric contrast was not administered. FINDINGS: CHEST PULMONARY ARTERIAL TREE: No pulmonary embolus is seen. Note suboptimal contrast bolus timing. LUNGS: Postsurgical changes of right lower lobectomy. Moderate to severe centrilobular and paraseptal emphysema. Redemonstrated ill-defined right infrahilar tumor measuring 6.4 x 4.3 cm (3/148), similar to prior study. Surrounding groundglass opacities and interlobular septal thickening within the right lung similar compared to CT 12/15/2023 and increased from 10/27/2023, suggestive of lymphangitic carcinomatosis. Multifocal solid and groundglass nodularity throughout the left lung, similar to prior study and likely metastatic in etiology. For example, a 12 mm left upper lobe nodule (5/58), unchanged. PLEURA: Right chest tube terminating in the right apex. Stable small bilateral pleural effusions, left greater than right. No pneumothorax. Stable pleural nodularity in keeping with metastases. HEART/AORTA: Heart is unremarkable for patient's age. No thoracic aortic aneurysm. Coronary artery calcifications. Right chest port with catheter tip terminating in the upper right atrium. MEDIASTINUM AND SUMIT: Unremarkable. CHEST WALL AND LOWER NECK: Unremarkable. ABDOMEN LIVER/BILIARY TREE: Unremarkable. GALLBLADDER: No  calcified gallstones. No pericholecystic inflammatory change. Pericholecystic fluid is favored to be on the basis of ascites. SPLEEN: Unremarkable. PANCREAS: Unremarkable. ADRENAL GLANDS: Unremarkable. KIDNEYS/URETERS: Unremarkable. No hydronephrosis. STOMACH AND BOWEL: Diffuse dilation of small bowel with abrupt point of transition in the right lower quadrant (4/112), in keeping with small bowel obstruction likely on the basis of serosal implants versus adhesions. Colonic diverticulosis. APPENDIX: No findings to suggest appendicitis. ABDOMINOPELVIC CAVITY: Small to moderate volume of abdominopelvic ascites, likely malignant. Similar peritoneal/omental nodularity in keeping with peritoneal carcinomatosis. No pneumoperitoneum. VESSELS: Atherosclerosis without abdominal aortic aneurysm. PELVIS REPRODUCTIVE ORGANS: Hysterectomy. No adnexal mass. URINARY BLADDER: Unremarkable. ABDOMINAL WALL/INGUINAL REGIONS: Small fat-containing umbilical hernia. BONES: Pathologic left posterior fifth rib fracture with surrounding callus. Right second rib metastasis is better appreciated on the PET/CT. Grade 2 anterolisthesis of L5 on S1 with bilateral pars defects, unchanged.     Impression: 1.  Acute small bowel obstruction with point of transition in the right lower quadrant, likely on the basis of serosal implants versus adhesions. 2.  Stable appearance of right infrahilar lung cancer with findings of lymphangitic carcinomatosis. Stable left lung groundglass and solid nodules likely representing metastases. 3.  Small bilateral pleural effusions with right chest tube in place. 4.  Moderate volume of abdominopelvic ascites with similar findings of peritoneal carcinomatosis. * I personally telephoned this result to ANGEL WEATHERS on 1/21/2024 11:27 AM. Workstation performed: [id number]     CT chest abdomen pelvis w contrast    Result Date: 1/19/2024  Narrative: CT CHEST, ABDOMEN AND PELVIS WITH IV CONTRAST INDICATION: C34.91:  Malignant neoplasm of unspecified part of right bronchus or lung. COMPARISON: CTA chest PE study 12/15/2023. PET/CT 11/2/2023. CT chest abdomen pelvis 10/27/2023. TECHNIQUE: CT examination of the chest, abdomen and pelvis was performed. Multiplanar 2D reformatted images were created from the source data. This examination, like all CT scans performed in the [facility], was performed utilizing techniques to minimize radiation dose exposure, including the use of iterative reconstruction and automated exposure control. Radiation dose length product (DLP) for this visit: 1047.73 mGy-cm IV Contrast: 100 mL of iohexol (OMNIPAQUE) Enteric Contrast: FINDINGS: CHEST LUNGS: Ill-defined right infrahilar tumor now measuring approximately 6.6 x 4.4 cm unchanged when remeasured with similar technique on the 12/15/2023 study. Unchanged bilateral groundglass opacities most prominent through the right lower lobe. Pulmonary emphysema. Enlarged 13 mm left upper lobe nodule measured 9 mm on the 12/15/2023 study. Other subcentimeter left upper lobe nodules are unchanged. PLEURA: Unchanged right pleural effusion with diffuse right pleural thickening. A pleural drainage catheter is in place. Decreased size of a small left pleural effusion. HEART/GREAT VESSELS: Borderline heart size. Coronary artery calcifications. Thin pericardial effusion measuring a maximum of 6 mm. No thoracic aortic aneurysm. MEDIASTINUM AND SUMIT: No lymphadenopathy. The FDG avid nodes seen on the PET/CT are not enlarged. CHEST WALL AND LOWER NECK: Right-sided chest port. ABDOMEN LIVER/BILIARY TREE: Unremarkable. GALLBLADDER: No calcified gallstones. No pericholecystic inflammatory change. SPLEEN: Unremarkable. PANCREAS: Unremarkable. ADRENAL GLANDS: Unremarkable. KIDNEYS/URETERS: Unremarkable. No hydronephrosis. STOMACH AND BOWEL: Discrete serosal implants are not identified. Colonic diverticulosis without diverticulitis. APPENDIX: No findings to  suggest appendicitis. ABDOMINOPELVIC CAVITY: Small quantity of abdominal and pelvic ascites. No pneumoperitoneum. The 13 mm left retroperitoneal node seen on the PET/CT is now subcentimeter in size. No residual retroperitoneal adenopathy. Persistent but improved omental caking correlating to FDG avid disease. Discrete peritoneal nodules are not apparent. VESSELS: Unremarkable for patient's age. PELVIS REPRODUCTIVE ORGANS: Hysterectomy. URINARY BLADDER: Unremarkable. ABDOMINAL WALL/INGUINAL REGIONS: Unremarkable. BONES: Pathologic left posterior fifth rib fracture with surrounding callus. Right second rib metastasis is better appreciated on the PET/CT. Bilateral L5 pars defects with unchanged grade 2 anterolisthesis of L5 on S1.     Impression: Unchanged ill-defined 6.6 cm right infrahilar tumor. Enlarged 13 mm left upper lobe nodule previously measured 9 mm. Other subcentimeter left upper lobe nodules are unchanged. FDG-avid mediastinal and retroperitoneal nodes are now all subcentimeter in size. Small right pleural effusion with diffuse right-sided pleural thickening. Diffuse omental caking less apparent than prior studies. Healing left posterior fifth rib pathologic fracture. Workstation performed: [id number]         Assessment and Plan : Patient is a 63 year old female with a history of stage IV lung cancer with peritoneal carcinomatosis, malignant pleural effusion on systemic treatment with Taxotere and ramucirumab.    Her disease is overall stable based on recent imaging.  She presented to ED yesterday with symptoms of epigastric pain, nausea/vomiting, bloating and abdominal distention.  She is found to have an acute small bowel obstruction that is being managed conservatively.  Appreciate surgical recommendations.  Patient is not a candidate for any acute surgical intervention at this time.  She was last treated with chemotherapy regimen on 1/11/2024.  She was found to be neutropenic with an ANC of 380 on  admission.  She has chemotherapy induced neutropenia.  She is afebrile and receiving IV antibiotics per primary team.  She has been started on growth factor support with daily Granix until ANC is 1500    Will add Neulasta to her treatment regimen    Please continue to closely monitor CBC with differential daily      I spent 30 minutes in chart review, face to face counseling, coordination of care and documentation.

## 2024-01-22 NOTE — PROGRESS NOTES
[facility]  Progress Note  Name: Kathi Ferris I  MRN: [id number]  Unit/Bed#: -01 I Date of Admission: 1/21/2024   Date of Service: 1/22/2024 I Hospital Day: 1    Assessment/Plan   Neutropenia (HCC)  Assessment & Plan  Patient noted to have neutropenia on admission  Will start on filgrastim x 3 doses  Trend CBC daily  Oncology consult  Continue on IV antibiotics    Sepsis (HCC)  Assessment & Plan  Patient admitted with sepsis with leukopenia and tachycardia  Source-pneumonia versus abdominal  ER gave cefepime and vancomycin  Continue on cefepime and vancomycin  Continue IV fluids  Lactate is 1.4  Procalcitonin is normal  Trend procalcitonin  Follow-up culture results  Will obtain UA  Trend WBC and fever curve  Patient noted to have leukopenia.  Continue Granix      Chronic respiratory failure with hypoxia (HCC)  Assessment & Plan  Patient has chronic respiratory failure with hypoxia and is on 2.5 to 3 L of supplemental oxygen at home   Continue oxygen supplementation  Monitor respiratory status    Generalized anxiety disorder  Assessment & Plan  Home medications Ativan, Wellbutrin, Zoloft   Patient is NPO.  Restart when able    Malignant pleural effusion  Assessment & Plan  With right lung asept catheter in place, drain 2x weekly by VNA  Monitor resp status    Non-small cell cancer of right lung (HCC)  Assessment & Plan  Patient has history of stage IV metastatic lung cancer on treatment with Taxotere and ramucirumab.  She has findings of a new left pleural effusion status post thoracentesis on 12/17/2023.    Last chemotherapy on January 11, 2024  Follows up with Dr. Phipps as outpatient  Pathologic left posterior fifth rib fracture and right second rib mets   Pain management  Continue outpatient chemotherapy      Essential hypertension  Assessment & Plan  Home regimen: Micardis 80 mg, verapamil 360 mg daily, Lasix 20 mg daily, Lopressor  Start on Lopressor 2.5 mg IV every 6  "hours  Hydralazine as needed  Restart home medications when able    COPD (chronic obstructive pulmonary disease) ([facility])  Assessment & Plan  Not in acute exacerbation  Continue inhalers    * SBO (small bowel obstruction) ([facility])  Assessment & Plan  Patient presented with nausea and vomiting  CT chest abdomen pelvis showed-\"  Acute small bowel obstruction with point of transition in the right lower quadrant, likely on the basis of serosal implants versus adhesions.  2.  Stable appearance of right infrahilar lung cancer with findings of lymphangitic carcinomatosis. Stable left lung groundglass and solid nodules likely representing metastases.  3.  Small bilateral pleural effusions with right chest tube in place.  Pathologic left posterior fifth rib fracture with surrounding callus. Right second rib metastasis is better appreciated on the PET/CT   4.  Moderate volume of abdominopelvic ascites with similar findings of peritoneal carcinomatosis.\"  Surgical consult appreciated  NG tube placed in ER  N.p.o. and IV fluids  Pain management as needed  Potassium and magnesium supplementation  Monitor and replace electrolytes as needed  Continue management as per surgical recommendation               VTE Pharmacologic Prophylaxis: VTE Score: 8 High Risk (Score >/= 5) - Pharmacological DVT Prophylaxis Ordered: enoxaparin (Lovenox). Sequential Compression Devices Ordered.    Mobility:   Basic Mobility Inpatient Raw Score: 24  JH-HLM Goal: 8: Walk 250 feet or more  JH-HLM Achieved: 8: Walk 250 feet ot more  HLM Goal achieved. Continue to encourage appropriate mobility.    Patient Centered Rounds: I performed bedside rounds with nursing staff today.   Discussions with Specialists or Other Care Team Provider: Surgery, hematology oncology    Education and Discussions with Family / Patient: Updated  () at bedside.    Total Time Spent on Date of Encounter in care of patient: 50 mins. This time was spent on one or more " of the following: performing physical exam; counseling and coordination of care; obtaining or reviewing history; documenting in the medical record; reviewing/ordering tests, medications or procedures; communicating with other healthcare professionals and discussing with patient's family/caregivers.    Current Length of Stay: 1 day(s)  Current Patient Status: Inpatient   Certification Statement: The patient will continue to require additional inpatient hospital stay due to bowel obstruction  Discharge Plan:  Pending improvement    Code Status: Level 3 - DNAR and DNI    Subjective:   Complains abdominal pain is better, nausea resolved.  Increased NG tube output overnight    Objective:     Vitals:   Temp (24hrs), Av.9 °F (36.6 °C), Min:97.6 °F (36.4 °C), Max:98.2 °F (36.8 °C)    Temp:  [97.6 °F (36.4 °C)-98.2 °F (36.8 °C)] 98.2 °F (36.8 °C)  HR:  [97-99] 97  Resp:  [14-20] 20  BP: (131-140)/(71-72) 140/71  SpO2:  [96 %-97 %] 96 %  Body mass index is 28.39 kg/m².     Input and Output Summary (last 24 hours):     Intake/Output Summary (Last 24 hours) at 2024 1526  Last data filed at 2024 1100  Gross per 24 hour   Intake 60 ml   Output 3350 ml   Net -3290 ml       Physical Exam:   Physical Exam  Vitals and nursing note reviewed.   Constitutional:       General: She is not in acute distress.     Appearance: She is well-developed.   HENT:      Head: Normocephalic and atraumatic.      Nose:      Comments: NG tube     Mouth/Throat:      Mouth: Mucous membranes are dry.      Pharynx: Oropharynx is clear.   Eyes:      Conjunctiva/sclera: Conjunctivae normal.   Cardiovascular:      Rate and Rhythm: Normal rate and regular rhythm.      Heart sounds: No murmur heard.  Pulmonary:      Effort: Pulmonary effort is normal. No respiratory distress.   Abdominal:      Palpations: Abdomen is soft.      Tenderness: There is no abdominal tenderness.   Musculoskeletal:         General: No swelling.      Cervical back: Neck  supple.   Skin:     General: Skin is warm and dry.      Capillary Refill: Capillary refill takes less than 2 seconds.   Neurological:      Mental Status: She is alert.   Psychiatric:         Mood and Affect: Mood normal.          Additional Data:     Labs:  Results from last 7 days   Lab Units 01/22/24  0540   WBC Thousand/uL 2.26*   HEMOGLOBIN g/dL 10.0*   HEMATOCRIT % 32.2*   PLATELETS Thousands/uL 406*   NEUTROS PCT % 21*   LYMPHS PCT % 28   MONOS PCT % 50*   EOS PCT % 0     Results from last 7 days   Lab Units 01/22/24  0540   SODIUM mmol/L 137   POTASSIUM mmol/L 3.3*   CHLORIDE mmol/L 97   CO2 mmol/L 26   BUN mg/dL 20   CREATININE mg/dL 0.80   ANION GAP mmol/L 14   CALCIUM mg/dL 8.5   ALBUMIN g/dL 3.1*   TOTAL BILIRUBIN mg/dL 0.31   ALK PHOS U/L 76   ALT U/L 11   AST U/L 13   GLUCOSE RANDOM mg/dL 89     Results from last 7 days   Lab Units 01/22/24  0540   INR  1.05             Results from last 7 days   Lab Units 01/22/24  0540 01/21/24  0844   LACTIC ACID mmol/L  --  1.4   PROCALCITONIN ng/ml 0.11 0.08       Lines/Drains:  Invasive Devices       Central Venous Catheter Line  Duration             Port A Cath 04/18/23 Right Subclavian 279 days              Peripheral Intravenous Line  Duration             Peripheral IV 01/21/24 Right Antecubital 1 day              Drain  Duration             Pleural Effusion Long-Term Catheter 15.5 Fr. 272 days    NG/OG/Enteral Tube Nasogastric 18 Fr Right nare 1 day                    Central Line:  Goal for removal:  Chronic for chemo         Telemetry:  Telemetry Orders (From admission, onward)               24 Hour Telemetry Monitoring  Continuous x 24 Hours (Telem)        Expiring   Question:  Reason for 24 Hour Telemetry  Answer:  Arrhythmias requiring acute medical intervention / PPM or ICD malfunction                     Telemetry Reviewed: Normal Sinus Rhythm  Indication for Continued Telemetry Use: Arrthymias requiring medical therapy             Imaging: Reviewed  radiology reports from this admission including: chest xray and abdominal/pelvic CT    Recent Cultures (last 7 days):   Results from last 7 days   Lab Units 01/21/24  0844   BLOOD CULTURE  Received in Microbiology Lab. Culture in Progress.  Received in Microbiology Lab. Culture in Progress.       Last 24 Hours Medication List:   Current Facility-Administered Medications   Medication Dose Route Frequency Provider Last Rate    albuterol  2 puff Inhalation Q6H PRN Ronel Menezes PA-C      albuterol  2.5 mg Nebulization Q6H PRN Toan Yu MD      cefepime  2,000 mg Intravenous Q12H Toan Yu MD 2,000 mg (01/22/24 0927)    enoxaparin  40 mg Subcutaneous Daily Toan Yu MD      Filgrastim-aafi  300 mcg Subcutaneous Daily Toan Yu MD      fluticasone-vilanterol  1 puff Inhalation Daily Toan Yu MD      metoprolol  2.5 mg Intravenous TID PRN Noman Stauffer MD      ondansetron  4 mg Intravenous Q6H PRN Toan Yu MD      pantoprazole  40 mg Intravenous Q24H [facility] Edel Gamino PA-C      phenol  1 spray Mouth/Throat Q2H PRN Edel Gamino PA-C      sodium chloride  100 mL/hr Intravenous Continuous Toan Yu  mL/hr (01/22/24 1223)    vancomycin  1,500 mg Intravenous Q24H Noman Stauffer MD          Today, Patient Was Seen By: Noman Stauffer MD    **Please Note: This note may have been constructed using a voice recognition system.**

## 2024-01-22 NOTE — ASSESSMENT & PLAN NOTE
"Patient presented with nausea and vomiting  CT chest abdomen pelvis showed-\"  Acute small bowel obstruction with point of transition in the right lower quadrant, likely on the basis of serosal implants versus adhesions.  2.  Stable appearance of right infrahilar lung cancer with findings of lymphangitic carcinomatosis. Stable left lung groundglass and solid nodules likely representing metastases.  3.  Small bilateral pleural effusions with right chest tube in place.  Pathologic left posterior fifth rib fracture with surrounding callus. Right second rib metastasis is better appreciated on the PET/CT   4.  Moderate volume of abdominopelvic ascites with similar findings of peritoneal carcinomatosis.\"  Surgical consult appreciated  NG tube placed in ER  N.p.o. and IV fluids  Pain management as needed  Potassium and magnesium supplementation  Monitor and replace electrolytes as needed  Continue management as per surgical recommendation  "

## 2024-01-22 NOTE — ASSESSMENT & PLAN NOTE
Patient admitted with sepsis with leukopenia and tachycardia  Source-pneumonia versus abdominal  ER gave cefepime and vancomycin  Continue on cefepime and vancomycin  Continue IV fluids  Lactate is 1.4  Procalcitonin is normal  Trend procalcitonin  Follow-up culture results  Will obtain UA  Trend WBC and fever curve  Patient noted to have leukopenia.  Continue Granix

## 2024-01-23 ENCOUNTER — APPOINTMENT (INPATIENT)
Dept: RADIOLOGY | Facility: HOSPITAL | Age: 64
DRG: 871 | End: 2024-01-23
Payer: COMMERCIAL

## 2024-01-23 PROBLEM — D70.9 NEUTROPENIA (HCC): Status: RESOLVED | Noted: 2024-01-21 | Resolved: 2024-01-23

## 2024-01-23 LAB
ANION GAP SERPL CALCULATED.3IONS-SCNC: 12 MMOL/L
ANISOCYTOSIS BLD QL SMEAR: PRESENT
BASOPHILS # BLD MANUAL: 0 THOUSAND/UL (ref 0–0.1)
BASOPHILS NFR MAR MANUAL: 0 % (ref 0–1)
BUN SERPL-MCNC: 19 MG/DL (ref 5–25)
CALCIUM SERPL-MCNC: 8.6 MG/DL (ref 8.4–10.2)
CHLORIDE SERPL-SCNC: 102 MMOL/L (ref 96–108)
CO2 SERPL-SCNC: 26 MMOL/L (ref 21–32)
CREAT SERPL-MCNC: 0.67 MG/DL (ref 0.6–1.3)
EOSINOPHIL # BLD MANUAL: 0.05 THOUSAND/UL (ref 0–0.4)
EOSINOPHIL NFR BLD MANUAL: 1 % (ref 0–6)
ERYTHROCYTE [DISTWIDTH] IN BLOOD BY AUTOMATED COUNT: 17.2 % (ref 11.6–15.1)
GFR SERPL CREATININE-BSD FRML MDRD: 92 ML/MIN/1.73SQ M
GLUCOSE SERPL-MCNC: 77 MG/DL (ref 65–140)
HCT VFR BLD AUTO: 33.2 % (ref 34.8–46.1)
HGB BLD-MCNC: 10.2 G/DL (ref 11.5–15.4)
LYMPHOCYTES # BLD AUTO: 0.82 THOUSAND/UL (ref 0.6–4.47)
LYMPHOCYTES # BLD AUTO: 17 % (ref 14–44)
MCH RBC QN AUTO: 26.9 PG (ref 26.8–34.3)
MCHC RBC AUTO-ENTMCNC: 30.7 G/DL (ref 31.4–37.4)
MCV RBC AUTO: 88 FL (ref 82–98)
METAMYELOCYTES NFR BLD MANUAL: 1 % (ref 0–1)
MONOCYTES # BLD AUTO: 1.14 THOUSAND/UL (ref 0–1.22)
MONOCYTES NFR BLD: 25 % (ref 4–12)
MRSA NOSE QL CULT: NORMAL
MYELOCYTES NFR BLD MANUAL: 1 % (ref 0–1)
NEUTROPHILS # BLD MANUAL: 2.46 THOUSAND/UL (ref 1.85–7.62)
NEUTS SEG NFR BLD AUTO: 54 % (ref 43–75)
PLATELET # BLD AUTO: 419 THOUSANDS/UL (ref 149–390)
PLATELET BLD QL SMEAR: ABNORMAL
PMV BLD AUTO: 9.5 FL (ref 8.9–12.7)
POLYCHROMASIA BLD QL SMEAR: PRESENT
POTASSIUM SERPL-SCNC: 3.5 MMOL/L (ref 3.5–5.3)
RBC # BLD AUTO: 3.79 MILLION/UL (ref 3.81–5.12)
RBC MORPH BLD: PRESENT
SODIUM SERPL-SCNC: 140 MMOL/L (ref 135–147)
VANCOMYCIN SERPL-MCNC: 17.4 UG/ML (ref 10–20)
VARIANT LYMPHS # BLD AUTO: 1 %
WBC # BLD AUTO: 4.55 THOUSAND/UL (ref 4.31–10.16)

## 2024-01-23 PROCEDURE — 99233 SBSQ HOSP IP/OBS HIGH 50: CPT | Performed by: INTERNAL MEDICINE

## 2024-01-23 PROCEDURE — C9113 INJ PANTOPRAZOLE SODIUM, VIA: HCPCS | Performed by: PHYSICIAN ASSISTANT

## 2024-01-23 PROCEDURE — 99232 SBSQ HOSP IP/OBS MODERATE 35: CPT | Performed by: SURGERY

## 2024-01-23 PROCEDURE — 80048 BASIC METABOLIC PNL TOTAL CA: CPT | Performed by: INTERNAL MEDICINE

## 2024-01-23 PROCEDURE — 85027 COMPLETE CBC AUTOMATED: CPT | Performed by: INTERNAL MEDICINE

## 2024-01-23 PROCEDURE — 80202 ASSAY OF VANCOMYCIN: CPT | Performed by: INTERNAL MEDICINE

## 2024-01-23 PROCEDURE — 85007 BL SMEAR W/DIFF WBC COUNT: CPT | Performed by: INTERNAL MEDICINE

## 2024-01-23 PROCEDURE — 74022 RADEX COMPL AQT ABD SERIES: CPT

## 2024-01-23 RX ORDER — SODIUM CHLORIDE, SODIUM GLUCONATE, SODIUM ACETATE, POTASSIUM CHLORIDE, MAGNESIUM CHLORIDE, SODIUM PHOSPHATE, DIBASIC, AND POTASSIUM PHOSPHATE .53; .5; .37; .037; .03; .012; .00082 G/100ML; G/100ML; G/100ML; G/100ML; G/100ML; G/100ML; G/100ML
100 INJECTION, SOLUTION INTRAVENOUS CONTINUOUS
Status: DISPENSED | OUTPATIENT
Start: 2024-01-23 | End: 2024-01-23

## 2024-01-23 RX ORDER — VANCOMYCIN HYDROCHLORIDE 1 G/200ML
1000 INJECTION, SOLUTION INTRAVENOUS EVERY 12 HOURS
Status: DISCONTINUED | OUTPATIENT
Start: 2024-01-23 | End: 2024-01-24

## 2024-01-23 RX ADMIN — CEFEPIME HYDROCHLORIDE 2000 MG: 2 INJECTION, SOLUTION INTRAVENOUS at 08:36

## 2024-01-23 RX ADMIN — LORAZEPAM 0.5 MG: 2 INJECTION INTRAMUSCULAR; INTRAVENOUS at 22:17

## 2024-01-23 RX ADMIN — SODIUM CHLORIDE, SODIUM GLUCONATE, SODIUM ACETATE, POTASSIUM CHLORIDE, MAGNESIUM CHLORIDE, SODIUM PHOSPHATE, DIBASIC, AND POTASSIUM PHOSPHATE 100 ML/HR: .53; .5; .37; .037; .03; .012; .00082 INJECTION, SOLUTION INTRAVENOUS at 08:37

## 2024-01-23 RX ADMIN — SODIUM CHLORIDE 100 ML/HR: 0.9 INJECTION, SOLUTION INTRAVENOUS at 00:23

## 2024-01-23 RX ADMIN — ENOXAPARIN SODIUM 40 MG: 100 INJECTION SUBCUTANEOUS at 08:35

## 2024-01-23 RX ADMIN — FLUTICASONE FUROATE AND VILANTEROL TRIFENATATE 1 PUFF: 200; 25 POWDER RESPIRATORY (INHALATION) at 08:37

## 2024-01-23 RX ADMIN — FILGRASTIM-AAFI 300 MCG: 300 INJECTION, SOLUTION SUBCUTANEOUS at 08:37

## 2024-01-23 RX ADMIN — CEFEPIME HYDROCHLORIDE 2000 MG: 2 INJECTION, SOLUTION INTRAVENOUS at 17:01

## 2024-01-23 RX ADMIN — CEFEPIME HYDROCHLORIDE 2000 MG: 2 INJECTION, SOLUTION INTRAVENOUS at 00:24

## 2024-01-23 RX ADMIN — VANCOMYCIN HYDROCHLORIDE 1000 MG: 1 INJECTION, SOLUTION INTRAVENOUS at 20:41

## 2024-01-23 RX ADMIN — VANCOMYCIN HYDROCHLORIDE 1000 MG: 1 INJECTION, SOLUTION INTRAVENOUS at 08:36

## 2024-01-23 RX ADMIN — PANTOPRAZOLE SODIUM 40 MG: 40 INJECTION, POWDER, FOR SOLUTION INTRAVENOUS at 08:36

## 2024-01-23 NOTE — ASSESSMENT & PLAN NOTE
Patient admitted with sepsis with leukopenia and tachycardia  Source-pneumonia versus abdominal  ER gave cefepime and vancomycin  Continue on cefepime and vancomycin  Continue IV fluids  Lactate is 1.4  Procalcitonin is normal  Trend procalcitonin  Follow-up culture results  Will obtain UA  Trend WBC and fever curve  Leukopenia improved

## 2024-01-23 NOTE — ASSESSMENT & PLAN NOTE
"Patient presented with nausea and vomiting  CT chest abdomen pelvis showed-\"  Acute small bowel obstruction with point of transition in the right lower quadrant, likely on the basis of serosal implants versus adhesions.  2.  Stable appearance of right infrahilar lung cancer with findings of lymphangitic carcinomatosis. Stable left lung groundglass and solid nodules likely representing metastases.  3.  Small bilateral pleural effusions with right chest tube in place.  Pathologic left posterior fifth rib fracture with surrounding callus. Right second rib metastasis is better appreciated on the PET/CT   4.  Moderate volume of abdominopelvic ascites with similar findings of peritoneal carcinomatosis.\"  Surgical consult appreciated  NG tube placed in ER  N.p.o. and IV fluids  Pain management as needed  Potassium and magnesium supplementation  Monitor and replace electrolytes as needed  Continue management as per surgical recommendation  For reimaging today  "

## 2024-01-23 NOTE — PROGRESS NOTES
[facility]  Progress Note  Name: Kathi Ferris I  MRN: [id number]  Unit/Bed#: -01 I Date of Admission: 1/21/2024   Date of Service: 1/23/2024 I Hospital Day: 2    Assessment/Plan   Sepsis ([facility])  Assessment & Plan  Patient admitted with sepsis with leukopenia and tachycardia  Source-pneumonia versus abdominal  ER gave cefepime and vancomycin  Continue on cefepime and vancomycin  Continue IV fluids  Lactate is 1.4  Procalcitonin is normal  Trend procalcitonin  Follow-up culture results  Will obtain UA  Trend WBC and fever curve  Leukopenia improved      Chronic respiratory failure with hypoxia ([facility])  Assessment & Plan  Patient has chronic respiratory failure with hypoxia and is on 2.5 to 3 L of supplemental oxygen at home   Continue oxygen supplementation  Monitor respiratory status    Generalized anxiety disorder  Assessment & Plan  Home medications Ativan, Wellbutrin, Zoloft   Patient is NPO.  Restart when able    Malignant pleural effusion  Assessment & Plan  With right lung asept catheter in place, drain 2x weekly by VNA  Monitor resp status    Non-small cell cancer of right lung ([facility])  Assessment & Plan  Patient has history of stage IV metastatic lung cancer on treatment with Taxotere and ramucirumab.  She has findings of a new left pleural effusion status post thoracentesis on 12/17/2023.    Last chemotherapy on January 11, 2024  Follows up with Dr. Phipps as outpatient  Pathologic left posterior fifth rib fracture and right second rib mets   Pain management  Continue outpatient chemotherapy      Essential hypertension  Assessment & Plan  Home regimen: Micardis 80 mg, verapamil 360 mg daily, Lasix 20 mg daily, Lopressor  Start on Lopressor 2.5 mg IV every 6 hours  Hydralazine as needed  Restart home medications when able    COPD (chronic obstructive pulmonary disease) ([facility])  Assessment & Plan  Not in acute exacerbation  Continue inhalers    * SBO (small bowel obstruction)  "(HCC)  Assessment & Plan  Patient presented with nausea and vomiting  CT chest abdomen pelvis showed-\"  Acute small bowel obstruction with point of transition in the right lower quadrant, likely on the basis of serosal implants versus adhesions.  2.  Stable appearance of right infrahilar lung cancer with findings of lymphangitic carcinomatosis. Stable left lung groundglass and solid nodules likely representing metastases.  3.  Small bilateral pleural effusions with right chest tube in place.  Pathologic left posterior fifth rib fracture with surrounding callus. Right second rib metastasis is better appreciated on the PET/CT   4.  Moderate volume of abdominopelvic ascites with similar findings of peritoneal carcinomatosis.\"  Surgical consult appreciated  NG tube placed in ER  N.p.o. and IV fluids  Pain management as needed  Potassium and magnesium supplementation  Monitor and replace electrolytes as needed  Continue management as per surgical recommendation  For reimaging today    Neutropenia (HCC)-resolved as of 1/23/2024  Assessment & Plan  Patient noted to have neutropenia on admission  Will start on filgrastim x 3 doses  Trend CBC daily  Oncology consult  Continue on IV antibiotics               VTE Pharmacologic Prophylaxis: VTE Score: 8 High Risk (Score >/= 5) - Pharmacological DVT Prophylaxis Ordered: enoxaparin (Lovenox). Sequential Compression Devices Ordered.    Mobility:   Basic Mobility Inpatient Raw Score: 21  JH-HLM Goal: 6: Walk 10 steps or more  JH-HLM Achieved: 7: Walk 25 feet or more  HLM Goal NOT achieved. Continue with multidisciplinary rounding and encourage appropriate mobility to improve upon HLM goals.    Patient Centered Rounds: I performed bedside rounds with nursing staff today.   Discussions with Specialists or Other Care Team Provider: heme- onc, surgery    Education and Discussions with Family / Patient: Updated  () at bedside.    Total Time Spent on Date of " Encounter in care of patient: 55 mins. This time was spent on one or more of the following: performing physical exam; counseling and coordination of care; obtaining or reviewing history; documenting in the medical record; reviewing/ordering tests, medications or procedures; communicating with other healthcare professionals and discussing with patient's family/caregivers.    Current Length of Stay: 2 day(s)  Current Patient Status: Inpatient   Certification Statement: The patient will continue to require additional inpatient hospital stay due to bowel obs  Discharge Plan:  pending improivement    Code Status: Level 3 - DNAR and DNI    Subjective:   Stated improved abdominal pain, nausea.  No significant complaints.  Remains on baseline oxygen    Objective:     Vitals:   Temp (24hrs), Av.5 °F (36.4 °C), Min:97.1 °F (36.2 °C), Max:98.3 °F (36.8 °C)    Temp:  [97.1 °F (36.2 °C)-98.3 °F (36.8 °C)] 98.3 °F (36.8 °C)  HR:  [] 100  Resp:  [18-19] 18  BP: (144-156)/(80-84) 156/84  SpO2:  [95 %-96 %] 96 %  Body mass index is 28.76 kg/m².     Input and Output Summary (last 24 hours):     Intake/Output Summary (Last 24 hours) at 2024 1459  Last data filed at 2024 1110  Gross per 24 hour   Intake 1360 ml   Output 2450 ml   Net -1090 ml       Physical Exam:   Physical Exam  Vitals and nursing note reviewed.   Constitutional:       General: She is not in acute distress.     Appearance: She is well-developed.   HENT:      Head: Normocephalic and atraumatic.      Mouth/Throat:      Mouth: Mucous membranes are dry.      Pharynx: Oropharynx is clear.   Eyes:      Conjunctiva/sclera: Conjunctivae normal.   Cardiovascular:      Rate and Rhythm: Tachycardia present.      Heart sounds: No murmur heard.  Pulmonary:      Effort: Pulmonary effort is normal. No respiratory distress.   Abdominal:      General: There is distension.      Palpations: Abdomen is soft.      Tenderness: There is no abdominal tenderness.    Musculoskeletal:         General: No swelling.      Cervical back: Neck supple.   Skin:     General: Skin is warm and dry.      Capillary Refill: Capillary refill takes less than 2 seconds.   Neurological:      Mental Status: She is alert and oriented to person, place, and time.   Psychiatric:         Mood and Affect: Mood normal.          Additional Data:     Labs:  Results from last 7 days   Lab Units 01/23/24  0459 01/22/24  0540   WBC Thousand/uL 4.55 2.26*   HEMOGLOBIN g/dL 10.2* 10.0*   HEMATOCRIT % 33.2* 32.2*   PLATELETS Thousands/uL 419* 406*   NEUTROS PCT %  --  21*   LYMPHS PCT %  --  28   LYMPHO PCT % 17  --    MONOS PCT %  --  50*   MONO PCT % 25*  --    EOS PCT % 1 0     Results from last 7 days   Lab Units 01/23/24  0459 01/22/24  0540   SODIUM mmol/L 140 137   POTASSIUM mmol/L 3.5 3.3*   CHLORIDE mmol/L 102 97   CO2 mmol/L 26 26   BUN mg/dL 19 20   CREATININE mg/dL 0.67 0.80   ANION GAP mmol/L 12 14   CALCIUM mg/dL 8.6 8.5   ALBUMIN g/dL  --  3.1*   TOTAL BILIRUBIN mg/dL  --  0.31   ALK PHOS U/L  --  76   ALT U/L  --  11   AST U/L  --  13   GLUCOSE RANDOM mg/dL 77 89     Results from last 7 days   Lab Units 01/22/24  0540   INR  1.05             Results from last 7 days   Lab Units 01/22/24  0540 01/21/24  0844   LACTIC ACID mmol/L  --  1.4   PROCALCITONIN ng/ml 0.11 0.08       Lines/Drains:  Invasive Devices       Central Venous Catheter Line  Duration             Port A Cath 04/18/23 Right Subclavian 280 days              Peripheral Intravenous Line  Duration             Peripheral IV 01/21/24 Right Antecubital 2 days              Drain  Duration             Pleural Effusion Long-Term Catheter 15.5 Fr. 273 days    NG/OG/Enteral Tube Nasogastric 18 Fr Right nare 2 days                    Central Line:  Goal for removal:  chronic         Telemetry:  Telemetry Orders (From admission, onward)               24 Hour Telemetry Monitoring  Continuous x 24 Hours (Telem)        Question:  Reason for 24  Hour Telemetry  Answer:  Arrhythmias requiring acute medical intervention / PPM or ICD malfunction                     Telemetry Reviewed: Normal Sinus Rhythm  Indication for Continued Telemetry Use: No indication for continued use. Will discontinue.              Imaging: No pertinent imaging reviewed.    Recent Cultures (last 7 days):   Results from last 7 days   Lab Units 01/21/24  0844   BLOOD CULTURE  No Growth at 24 hrs.  No Growth at 24 hrs.       Last 24 Hours Medication List:   Current Facility-Administered Medications   Medication Dose Route Frequency Provider Last Rate    albuterol  2 puff Inhalation Q6H PRN Ronel Menezes PA-C      albuterol  2.5 mg Nebulization Q6H PRN Toan Yu MD      cefepime  2,000 mg Intravenous Q8H Noman Stauffer MD 2,000 mg (01/23/24 0836)    enoxaparin  40 mg Subcutaneous Daily Toan Yu MD      fluticasone-vilanterol  1 puff Inhalation Daily Toan Yu MD      LORazepam  0.5 mg Intravenous Q8H PRN Ronel Menezes PA-C      metoprolol  2.5 mg Intravenous TID PRN Noman Stauffer MD      multi-electrolyte  100 mL/hr Intravenous Continuous Noman Stauffer  mL/hr (01/23/24 0837)    ondansetron  4 mg Intravenous Q6H PRN Toan Yu MD      pantoprazole  40 mg Intravenous Q24H [facility] Edel Gamino PA-C      phenol  1 spray Mouth/Throat Q2H PRN Edel Gamino PA-C      vancomycin  1,000 mg Intravenous Q12H Noman Stauffer MD 1,000 mg (01/23/24 0836)        Today, Patient Was Seen By: Noman Stauffer MD    **Please Note: This note may have been constructed using a voice recognition system.**

## 2024-01-23 NOTE — QUICK NOTE
Updated nurse and patient, only 8oz ice chips per shift. Rechecked output of NGT. Clear output in tubing. Yellow/green output in canister. About 750cc 7a-7p today. Will continue to monitor output. Patient offers no new complaints.

## 2024-01-23 NOTE — PROGRESS NOTES
"Progress Note - General Surgery   Kathi Ferris 63 y.o. female MRN: [id number]  Unit/Bed#: -[facility] Encounter: [id number]    Assessment:  60-year-old female with Stage IV NSCLC admitted with SBO  -Pain improving, NGT output improving, did have small BM today  -Contrast given this AM with f/u obstruction series showing old barium in colon, some contrast from today remaining in stomach with dilated SB loops with air fluid levels  -aixa NG tube clamping  Peritoneal carcinomatosis  Neutropenia on current chemotherapy, improving  COPD/Empysema  HTN  HLD     Plan:  Continue NG tube clamping at this time, return to suction overnight  Repeat obstruction series in AM  Ice chips for mouth comfort only, limit to 8 oz per shift and subtract from NG tube output  If patient becomes nauseas, develops pain or distention return tube to LCWS  Patient is a poor surgical candidate with stage IV lung cancer, carcinomatosis, current chemotherapy and neutropenia, continue conservative management at this time  Pain control as needed  Follow bowel function  Follow abdominal exam      Subjective/Objective   Chief Complaint: I want some ginger ale    Subjective: patient is thirsty.  Some discomfort from NG tube.  Denies abdominal pain or distention.  Occasional flatus, did have a small BM today. No acute events    Objective:     Blood pressure 156/84, pulse 100, temperature 98.3 °F (36.8 °C), resp. rate 18, height 5' 5\" (1.651 m), weight 78.4 kg (172 lb 13.5 oz), SpO2 96%.,Body mass index is 28.76 kg/m².      Intake/Output Summary (Last 24 hours) at 1/23/2024 1548  Last data filed at 1/23/2024 1110  Gross per 24 hour   Intake 1360 ml   Output 2300 ml   Net -940 ml       Invasive Devices       Central Venous Catheter Line  Duration             Port A Cath 04/18/23 Right Subclavian 280 days              Peripheral Intravenous Line  Duration             Peripheral IV 01/21/24 Right Antecubital 2 days              Drain  Duration             " Pleural Effusion Long-Term Catheter 15.5 Fr. 273 days    NG/OG/Enteral Tube Nasogastric 18 Fr Right nare 2 days                    Physical Exam:   General appearance: alert and oriented, in no acute distress  Head: Normocephalic, without obvious abnormality, atraumatic, sclerae anicteric, mucous membranes dry  Neck: no JVD and supple, symmetrical, trachea midline  Lungs: clear to auscultation, no wheezes or rales  Heart:   Regular rate and rhythm, S1-S2 normal, no murmur  Abdomen:   Soft, NT, ND, hypoactive BS  Extremities:   No edema, redness or tenderness in the calves or thighs  Skin: Warm, dry; pale  Nursing notes and vital signs reviewed      Lab, Imaging and other studies:I have personally reviewed pertinent lab results.  , CBC:   Lab Results   Component Value Date    WBC 4.55 01/23/2024    HGB 10.2 (L) 01/23/2024    HCT 33.2 (L) 01/23/2024    MCV 88 01/23/2024     (H) 01/23/2024    RBC 3.79 (L) 01/23/2024    MCH 26.9 01/23/2024    MCHC 30.7 (L) 01/23/2024    RDW 17.2 (H) 01/23/2024    MPV 9.5 01/23/2024   , CMP:   Lab Results   Component Value Date    SODIUM 140 01/23/2024    K 3.5 01/23/2024     01/23/2024    CO2 26 01/23/2024    BUN 19 01/23/2024    CREATININE 0.67 01/23/2024    CALCIUM 8.6 01/23/2024    EGFR 92 01/23/2024     VTE Pharmacologic Prophylaxis: Lovenox  VTE Mechanical Prophylaxis: sequential compression device    Brittany Tavares

## 2024-01-23 NOTE — PLAN OF CARE
Problem: Potential for Falls  Goal: Patient will remain free of falls  Description: INTERVENTIONS:  - Educate patient/family on patient safety including physical limitations  - Instruct patient to call for assistance with activity   - Consult OT/PT to assist with strengthening/mobility   - Keep Call bell within reach  - Keep bed low and locked with side rails adjusted as appropriate  - Keep care items and personal belongings within reach  - Initiate and maintain comfort rounds  - Make Fall Risk Sign visible to staff  - Offer Toileting every 2 Hours, in advance of need  - Initiate/Maintain 2alarm  - Obtain necessary fall risk management equipment: 2  - Apply yellow socks and bracelet for high fall risk patients  - Consider moving patient to room near nurses station  Outcome: Progressing     Problem: PAIN - ADULT  Goal: Verbalizes/displays adequate comfort level or baseline comfort level  Description: Interventions:  - Encourage patient to monitor pain and request assistance  - Assess pain using appropriate pain scale  - Administer analgesics based on type and severity of pain and evaluate response  - Implement non-pharmacological measures as appropriate and evaluate response  - Consider cultural and social influences on pain and pain management  - Notify physician/advanced practitioner if interventions unsuccessful or patient reports new pain  Outcome: Progressing     Problem: INFECTION - ADULT  Goal: Absence or prevention of progression during hospitalization  Description: INTERVENTIONS:  - Assess and monitor for signs and symptoms of infection  - Monitor lab/diagnostic results  - Monitor all insertion sites, i.e. indwelling lines, tubes, and drains  - Monitor endotracheal if appropriate and nasal secretions for changes in amount and color  - [place] appropriate cooling/warming therapies per order  - Administer medications as ordered  - Instruct and encourage patient and family to use good hand hygiene  technique  - Identify and instruct in appropriate isolation precautions for identified infection/condition  Outcome: Progressing  Goal: Absence of fever/infection during neutropenic period  Description: INTERVENTIONS:  - Monitor WBC    Outcome: Progressing     Problem: SAFETY ADULT  Goal: Patient will remain free of falls  Description: INTERVENTIONS:  - Educate patient/family on patient safety including physical limitations  - Instruct patient to call for assistance with activity   - Consult OT/PT to assist with strengthening/mobility   - Keep Call bell within reach  - Keep bed low and locked with side rails adjusted as appropriate  - Keep care items and personal belongings within reach  - Initiate and maintain comfort rounds  - Make Fall Risk Sign visible to staff  - Offer Toileting every 2 Hours, in advance of need  - Initiate/Maintain 2alarm  - Obtain necessary fall risk management equipment: 2  - Apply yellow socks and bracelet for high fall risk patients  - Consider moving patient to room near nurses station  Outcome: Progressing  Goal: Maintain or return to baseline ADL function  Description: INTERVENTIONS:  - Educate patient/family on patient safety including physical limitations  - Instruct patient to call for assistance with activity   - Consult OT/PT to assist with strengthening/mobility   - Keep Call bell within reach  - Keep bed low and locked with side rails adjusted as appropriate  - Keep care items and personal belongings within reach  - Initiate and maintain comfort rounds  - Make Fall Risk Sign visible to staff  - Offer Toileting every 2 Hours, in advance of need  - Initiate/Maintain 2alarm  - Obtain necessary fall risk management equipment: 2  - Apply yellow socks and bracelet for high fall risk patients  - Consider moving patient to room near nurses station  Outcome: Progressing  Goal: Maintains/Returns to pre admission functional level  Description: INTERVENTIONS:  - Perform AM-PAC 6 Click Basic  Mobility/ Daily Activity assessment daily.  - Set and communicate daily mobility goal to care team and patient/family/caregiver.   - Collaborate with rehabilitation services on mobility goals if consulted  - Perform Range of Motion 3 times a day.  - Reposition patient every 2 hours.  - Dangle patient 3 times a day  - Stand patient 3 times a day  - Ambulate patient 3 times a day  - Out of bed to chair 3 times a day   - Out of bed for meals 3 times a day  - Out of bed for toileting  - Record patient progress and toleration of activity level   Outcome: Progressing     Problem: DISCHARGE PLANNING  Goal: Discharge to home or other facility with appropriate resources  Description: INTERVENTIONS:  - Identify barriers to discharge w/patient and caregiver  - Arrange for needed discharge resources and transportation as appropriate  - Identify discharge learning needs (meds, wound care, etc.)  - Arrange for interpretive services to assist at discharge as needed  - Refer to Case Management Department for coordinating discharge planning if the patient needs post-hospital services based on physician/advanced practitioner order or complex needs related to functional status, cognitive ability, or social support system  Outcome: Progressing     Problem: Knowledge Deficit  Goal: Patient/family/caregiver demonstrates understanding of disease process, treatment plan, medications, and discharge instructions  Description: Complete learning assessment and assess knowledge base.  Interventions:  - Provide teaching at level of understanding  - Provide teaching via preferred learning methods  Outcome: Progressing     Problem: RESPIRATORY - ADULT  Goal: Achieves optimal ventilation and oxygenation  Description: INTERVENTIONS:  - Assess for changes in respiratory status  - Assess for changes in mentation and behavior  - Position to facilitate oxygenation and minimize respiratory effort  - Oxygen administered by appropriate delivery if  ordered  - Initiate smoking cessation education as indicated  - Encourage broncho-pulmonary hygiene including cough, deep breathe, Incentive Spirometry  - Assess the need for suctioning and aspirate as needed  - Assess and instruct to report SOB or any respiratory difficulty  - Respiratory Therapy support as indicated  Outcome: Progressing     Problem: GASTROINTESTINAL - ADULT  Goal: Minimal or absence of nausea and/or vomiting  Description: INTERVENTIONS:  - Administer IV fluids if ordered to ensure adequate hydration  - Maintain NPO status until nausea and vomiting are resolved  - Nasogastric tube if ordered  - Administer ordered antiemetic medications as needed  - Provide nonpharmacologic comfort measures as appropriate  - Advance diet as tolerated, if ordered  - Consider nutrition services referral to assist patient with adequate nutrition and appropriate food choices  Outcome: Progressing  Goal: Maintains or returns to baseline bowel function  Description: INTERVENTIONS:  - Assess bowel function  - Encourage oral fluids to ensure adequate hydration  - Administer IV fluids if ordered to ensure adequate hydration  - Administer ordered medications as needed  - Encourage mobilization and activity  - Consider nutritional services referral to assist patient with adequate nutrition and appropriate food choices  Outcome: Progressing  Goal: Maintains adequate nutritional intake  Description: INTERVENTIONS:  - Monitor percentage of each meal consumed  - Identify factors contributing to decreased intake, treat as appropriate  - Assist with meals as needed  - Monitor I&O, weight, and lab values if indicated  - Obtain nutrition services referral as needed  Outcome: Progressing  Goal: Establish and maintain optimal ostomy function  Description: INTERVENTIONS:  - Assess bowel function  - Encourage oral fluids to ensure adequate hydration  - Administer IV fluids if ordered to ensure adequate hydration   - Administer ordered  medications as needed  - Encourage mobilization and activity  - Nutrition services referral to assist patient with appropriate food choices  - Assess stoma site  - Consider wound care consult   Outcome: Progressing  Goal: Oral mucous membranes remain intact  Description: INTERVENTIONS  - Assess oral mucosa and hygiene practices  - Implement preventative oral hygiene regimen  - Implement oral medicated treatments as ordered  - Initiate Nutrition services referral as needed  Outcome: Progressing     Problem: METABOLIC, FLUID AND ELECTROLYTES - ADULT  Goal: Electrolytes maintained within normal limits  Description: INTERVENTIONS:  - Monitor labs and assess patient for signs and symptoms of electrolyte imbalances  - Administer electrolyte replacement as ordered  - Monitor response to electrolyte replacements, including repeat lab results as appropriate  - Instruct patient on fluid and nutrition as appropriate  Outcome: Progressing  Goal: Fluid balance maintained  Description: INTERVENTIONS:  - Monitor labs   - Monitor I/O and WT  - Instruct patient on fluid and nutrition as appropriate  - Assess for signs & symptoms of volume excess or deficit  Outcome: Progressing  Goal: Glucose maintained within target range  Description: INTERVENTIONS:  - Monitor Blood Glucose as ordered  - Assess for signs and symptoms of hyperglycemia and hypoglycemia  - Administer ordered medications to maintain glucose within target range  - Assess nutritional intake and initiate nutrition service referral as needed  Outcome: Progressing     Problem: Nutrition/Hydration-ADULT  Goal: Nutrient/Hydration intake appropriate for improving, restoring or maintaining nutritional needs  Description: Monitor and assess patient's nutrition/hydration status for malnutrition. Collaborate with interdisciplinary team and initiate plan and interventions as ordered.  Monitor patient's weight and dietary intake as ordered or per policy. Utilize nutrition screening  tool and intervene as necessary. Determine patient's food preferences and provide high-protein, high-caloric foods as appropriate.     INTERVENTIONS:  - Monitor oral intake, urinary output, labs, and treatment plans  - Assess nutrition and hydration status and recommend course of action  - Evaluate amount of meals eaten  - Assist patient with eating if necessary   - Allow adequate time for meals  - Recommend/ encourage appropriate diets, oral nutritional supplements, and vitamin/mineral supplements  - Order, calculate, and assess calorie counts as needed  - Recommend, monitor, and adjust tube feedings and TPN/PPN based on assessed needs  - Assess need for intravenous fluids  - Provide specific nutrition/hydration education as appropriate  - Include patient/family/caregiver in decisions related to nutrition  Outcome: Progressing

## 2024-01-23 NOTE — PROGRESS NOTES
Kathi Ferris is a 63 y.o. female who is currently ordered Vancomycin IV with management by the Pharmacy Consult service.  Relevant clinical data and objective / subjective history reviewed.  Vancomycin Assessment:  Indication and Goal AUC/Trough: Pneumonia (goal -600, trough >10), -600, trough >10  Clinical Status: stable  Micro:     Renal Function:  SCr: 0.67 mg/dL  CrCl: 86.2 mL/min  Renal replacement: Not on dialysis  Days of Therapy: 3  Current Dose: 1500mg every 24 hours  Vancomycin Plan: random level resulted 17.4- extrapolated trough is <10. Thus, dose changed as below.  New Dosinmg every 12 hours  Estimated AUC: 442 mcg*hr/mL  Estimated Trough: 13.8 mcg/mL  Next Level: 24 at 0600  Renal Function Monitoring: Daily BMP and UOP  Pharmacy will continue to follow closely for s/sx of nephrotoxicity, infusion reactions and appropriateness of therapy.  BMP and CBC will be ordered per protocol. We will continue to follow the patient’s culture results and clinical progress daily. Also, cefepime will be adjusted if necessary.    Eric Awan, Pharmacist, PharmD, BCPS

## 2024-01-24 ENCOUNTER — APPOINTMENT (INPATIENT)
Dept: RADIOLOGY | Facility: HOSPITAL | Age: 64
DRG: 871 | End: 2024-01-24
Payer: COMMERCIAL

## 2024-01-24 LAB
ALBUMIN SERPL BCP-MCNC: 2.9 G/DL (ref 3.5–5)
ALP SERPL-CCNC: 86 U/L (ref 34–104)
ALT SERPL W P-5'-P-CCNC: 9 U/L (ref 7–52)
ANION GAP SERPL CALCULATED.3IONS-SCNC: 11 MMOL/L
ANISOCYTOSIS BLD QL SMEAR: PRESENT
AST SERPL W P-5'-P-CCNC: 13 U/L (ref 13–39)
BASOPHILS # BLD MANUAL: 0 THOUSAND/UL (ref 0–0.1)
BASOPHILS NFR MAR MANUAL: 0 % (ref 0–1)
BILIRUB SERPL-MCNC: 0.27 MG/DL (ref 0.2–1)
BUN SERPL-MCNC: 17 MG/DL (ref 5–25)
CALCIUM ALBUM COR SERPL-MCNC: 9.2 MG/DL (ref 8.3–10.1)
CALCIUM SERPL-MCNC: 8.3 MG/DL (ref 8.4–10.2)
CHLORIDE SERPL-SCNC: 99 MMOL/L (ref 96–108)
CO2 SERPL-SCNC: 26 MMOL/L (ref 21–32)
CREAT SERPL-MCNC: 0.62 MG/DL (ref 0.6–1.3)
EOSINOPHIL # BLD MANUAL: 0 THOUSAND/UL (ref 0–0.4)
EOSINOPHIL NFR BLD MANUAL: 0 % (ref 0–6)
ERYTHROCYTE [DISTWIDTH] IN BLOOD BY AUTOMATED COUNT: 17.2 % (ref 11.6–15.1)
GFR SERPL CREATININE-BSD FRML MDRD: 94 ML/MIN/1.73SQ M
GLUCOSE SERPL-MCNC: 84 MG/DL (ref 65–140)
HCT VFR BLD AUTO: 32.1 % (ref 34.8–46.1)
HGB BLD-MCNC: 10 G/DL (ref 11.5–15.4)
LG PLATELETS BLD QL SMEAR: PRESENT
LYMPHOCYTES # BLD AUTO: 2.36 THOUSAND/UL (ref 0.6–4.47)
LYMPHOCYTES # BLD AUTO: 9 % (ref 14–44)
MAGNESIUM SERPL-MCNC: 2 MG/DL (ref 1.9–2.7)
MCH RBC QN AUTO: 27.1 PG (ref 26.8–34.3)
MCHC RBC AUTO-ENTMCNC: 31.2 G/DL (ref 31.4–37.4)
MCV RBC AUTO: 87 FL (ref 82–98)
METAMYELOCYTES NFR BLD MANUAL: 1 % (ref 0–1)
MONOCYTES # BLD AUTO: 1.1 THOUSAND/UL (ref 0–1.22)
MONOCYTES NFR BLD: 7 % (ref 4–12)
MYELOCYTES NFR BLD MANUAL: 1 % (ref 0–1)
NEUTROPHILS # BLD MANUAL: 11.97 THOUSAND/UL (ref 1.85–7.62)
NEUTS BAND NFR BLD MANUAL: 3 % (ref 0–8)
NEUTS SEG NFR BLD AUTO: 73 % (ref 43–75)
PLATELET # BLD AUTO: 439 THOUSANDS/UL (ref 149–390)
PLATELET BLD QL SMEAR: ABNORMAL
PMV BLD AUTO: 9.3 FL (ref 8.9–12.7)
POLYCHROMASIA BLD QL SMEAR: PRESENT
POTASSIUM SERPL-SCNC: 3.1 MMOL/L (ref 3.5–5.3)
PROT SERPL-MCNC: 5.8 G/DL (ref 6.4–8.4)
RBC # BLD AUTO: 3.69 MILLION/UL (ref 3.81–5.12)
RBC MORPH BLD: PRESENT
SODIUM SERPL-SCNC: 136 MMOL/L (ref 135–147)
VARIANT LYMPHS # BLD AUTO: 6 %
WBC # BLD AUTO: 15.75 THOUSAND/UL (ref 4.31–10.16)

## 2024-01-24 PROCEDURE — 80053 COMPREHEN METABOLIC PANEL: CPT | Performed by: INTERNAL MEDICINE

## 2024-01-24 PROCEDURE — 99233 SBSQ HOSP IP/OBS HIGH 50: CPT | Performed by: INTERNAL MEDICINE

## 2024-01-24 PROCEDURE — 74022 RADEX COMPL AQT ABD SERIES: CPT

## 2024-01-24 PROCEDURE — 85007 BL SMEAR W/DIFF WBC COUNT: CPT | Performed by: INTERNAL MEDICINE

## 2024-01-24 PROCEDURE — 83735 ASSAY OF MAGNESIUM: CPT | Performed by: INTERNAL MEDICINE

## 2024-01-24 PROCEDURE — C9113 INJ PANTOPRAZOLE SODIUM, VIA: HCPCS | Performed by: PHYSICIAN ASSISTANT

## 2024-01-24 PROCEDURE — 85027 COMPLETE CBC AUTOMATED: CPT | Performed by: INTERNAL MEDICINE

## 2024-01-24 PROCEDURE — 99232 SBSQ HOSP IP/OBS MODERATE 35: CPT | Performed by: SURGERY

## 2024-01-24 RX ORDER — ATORVASTATIN CALCIUM 20 MG/1
20 TABLET, FILM COATED ORAL DAILY
Status: DISCONTINUED | OUTPATIENT
Start: 2024-01-24 | End: 2024-01-27 | Stop reason: HOSPADM

## 2024-01-24 RX ORDER — BUPROPION HYDROCHLORIDE 300 MG/1
300 TABLET ORAL DAILY
Status: DISCONTINUED | OUTPATIENT
Start: 2024-01-24 | End: 2024-01-27 | Stop reason: HOSPADM

## 2024-01-24 RX ORDER — LOSARTAN POTASSIUM 50 MG/1
100 TABLET ORAL DAILY
Status: DISCONTINUED | OUTPATIENT
Start: 2024-01-24 | End: 2024-01-27 | Stop reason: HOSPADM

## 2024-01-24 RX ORDER — LORAZEPAM 0.5 MG/1
0.5 TABLET ORAL DAILY PRN
Status: DISCONTINUED | OUTPATIENT
Start: 2024-01-24 | End: 2024-01-27 | Stop reason: HOSPADM

## 2024-01-24 RX ORDER — METRONIDAZOLE 500 MG/1
500 TABLET ORAL EVERY 8 HOURS SCHEDULED
Status: DISCONTINUED | OUTPATIENT
Start: 2024-01-24 | End: 2024-01-25

## 2024-01-24 RX ORDER — SERTRALINE HYDROCHLORIDE 100 MG/1
100 TABLET, FILM COATED ORAL DAILY
Status: DISCONTINUED | OUTPATIENT
Start: 2024-01-24 | End: 2024-01-27 | Stop reason: HOSPADM

## 2024-01-24 RX ADMIN — PANTOPRAZOLE SODIUM 40 MG: 40 INJECTION, POWDER, FOR SOLUTION INTRAVENOUS at 08:52

## 2024-01-24 RX ADMIN — CEFEPIME HYDROCHLORIDE 2000 MG: 2 INJECTION, SOLUTION INTRAVENOUS at 01:49

## 2024-01-24 RX ADMIN — METRONIDAZOLE 500 MG: 500 TABLET ORAL at 21:44

## 2024-01-24 RX ADMIN — LORAZEPAM 0.5 MG: 0.5 TABLET ORAL at 21:44

## 2024-01-24 RX ADMIN — CEFEPIME HYDROCHLORIDE 2000 MG: 2 INJECTION, SOLUTION INTRAVENOUS at 17:00

## 2024-01-24 RX ADMIN — VANCOMYCIN HYDROCHLORIDE 1000 MG: 1 INJECTION, SOLUTION INTRAVENOUS at 10:01

## 2024-01-24 RX ADMIN — SERTRALINE 100 MG: 100 TABLET, FILM COATED ORAL at 16:57

## 2024-01-24 RX ADMIN — CEFEPIME HYDROCHLORIDE 2000 MG: 2 INJECTION, SOLUTION INTRAVENOUS at 08:52

## 2024-01-24 RX ADMIN — VERAPAMIL HYDROCHLORIDE 120 MG: 120 TABLET, FILM COATED, EXTENDED RELEASE ORAL at 21:43

## 2024-01-24 RX ADMIN — METRONIDAZOLE 500 MG: 500 TABLET ORAL at 12:17

## 2024-01-24 RX ADMIN — ATORVASTATIN CALCIUM 20 MG: 20 TABLET, FILM COATED ORAL at 16:57

## 2024-01-24 RX ADMIN — ENOXAPARIN SODIUM 40 MG: 100 INJECTION SUBCUTANEOUS at 08:52

## 2024-01-24 RX ADMIN — FLUTICASONE FUROATE AND VILANTEROL TRIFENATATE 1 PUFF: 200; 25 POWDER RESPIRATORY (INHALATION) at 09:21

## 2024-01-24 RX ADMIN — METOPROLOL TARTRATE 25 MG: 25 TABLET, FILM COATED ORAL at 21:44

## 2024-01-24 RX ADMIN — LOSARTAN POTASSIUM 100 MG: 50 TABLET, FILM COATED ORAL at 16:57

## 2024-01-24 RX ADMIN — VERAPAMIL HYDROCHLORIDE 240 MG: 120 TABLET, FILM COATED, EXTENDED RELEASE ORAL at 21:43

## 2024-01-24 RX ADMIN — BUPROPION HYDROCHLORIDE 300 MG: 300 TABLET, FILM COATED, EXTENDED RELEASE ORAL at 17:05

## 2024-01-24 NOTE — PROGRESS NOTES
Kathi Ferris is a 63 y.o. female who is currently ordered Vancomycin IV with management by the Pharmacy Consult service.  Relevant clinical data and objective / subjective history reviewed.  Vancomycin Assessment:  Indication and Goal AUC/Trough: Pneumonia (goal -600, trough >10), -600, trough >10  Clinical Status: stable  Micro:     Renal Function:  SCr: 0.62 mg/dL  CrCl: 93.7 mL/min  Renal replacement: Not on dialysis  Days of Therapy: 4  Current Dose: 1000mg every 12 hours  Vancomycin Plan:  New Dosing: No change  Estimated AUC: 419 mcg*hr/mL  Estimated Trough: 12.8 mcg/mL  Next Level: 1/25/24 at 0600  Renal Function Monitoring: Daily BMP and UOP  Pharmacy will continue to follow closely for s/sx of nephrotoxicity, infusion reactions and appropriateness of therapy.  BMP and CBC will be ordered per protocol.     MRSA swab is negative, if no MRSA risk, recommend discontinuation of vancomycin. If source is from SBO, recommend adding anaerobic coverage if appropriate. We will continue to follow the patient’s culture results and clinical progress daily. Also, cefepime will be adjusted if appropriate.    Eric Awan, Pharmacist, PharmD, BCPS

## 2024-01-24 NOTE — PROGRESS NOTES
Vancomycin IV Pharmacy-to-Dose Consultation     Vancomycin has been discontinued.  Pharmacy will sign off.  Please contact or re-consult with questions.    Jazmine Mehta, Pharmacist

## 2024-01-24 NOTE — NURSING NOTE
RN hooked patient's NG tube back up to low intermittent wall suction as ordered by surgery. Within 5 minutes, 300 mL of green, thin fluid was in canister.

## 2024-01-24 NOTE — ASSESSMENT & PLAN NOTE
Patient admitted with sepsis with leukopenia and tachycardia  Source-pneumonia versus abdominal  ER gave cefepime and vancomycin  Continue on cefepime, add flgyl  Cx negative  F/u CBC, fever

## 2024-01-24 NOTE — PROGRESS NOTES
"[facility]  Progress Note  Name: Kathi Ferris I  MRN: [id number]  Unit/Bed#: -01 I Date of Admission: 1/21/2024   Date of Service: 1/24/2024 I Hospital Day: 3    Assessment/Plan   Sepsis (Formerly [facility])  Assessment & Plan  Patient admitted with sepsis with leukopenia and tachycardia  Source-pneumonia versus abdominal  ER gave cefepime and vancomycin  Continue on cefepime, add flgyl  Cx negative  F/u CBC, fever      Chronic respiratory failure with hypoxia (Formerly [facility])  Assessment & Plan  Patient has chronic respiratory failure with hypoxia and is on 2.5 to 3 L of supplemental oxygen at home   Continue oxygen supplementation  Monitor respiratory status    Generalized anxiety disorder  Assessment & Plan  Home medications Ativan, Wellbutrin, Zoloft   restart    Malignant pleural effusion  Assessment & Plan  With right lung asept catheter in place, drain 2x weekly by VNA  Monitor resp status    Non-small cell cancer of right lung (Formerly [facility])  Assessment & Plan  Patient has history of stage IV metastatic lung cancer on treatment with Taxotere and ramucirumab.  She has findings of a new left pleural effusion status post thoracentesis on 12/17/2023.    Last chemotherapy on January 11, 2024  Follows up with Dr. Phipps as outpatient  Pathologic left posterior fifth rib fracture and right second rib mets   Pain management  Continue outpatient chemotherapy      Essential hypertension  Assessment & Plan  Home regimen: Micardis 80 mg, verapamil 360 mg daily, Lasix 20 mg daily, Lopressor  Will retsart    COPD (chronic obstructive pulmonary disease) (Formerly [facility])  Assessment & Plan  Not in acute exacerbation  Continue inhalers    * SBO (small bowel obstruction) (Formerly [facility])  Assessment & Plan  Patient presented with nausea and vomiting  CT chest abdomen pelvis showed-\"  Acute small bowel obstruction with point of transition in the right lower quadrant, likely on the basis of serosal implants versus adhesions.  2.  Stable " "appearance of right infrahilar lung cancer with findings of lymphangitic carcinomatosis. Stable left lung groundglass and solid nodules likely representing metastases.  3.  Small bilateral pleural effusions with right chest tube in place.  Pathologic left posterior fifth rib fracture with surrounding callus. Right second rib metastasis is better appreciated on the PET/CT   4.  Moderate volume of abdominopelvic ascites with similar findings of peritoneal carcinomatosis.\"  Surgical consult appreciated  NG tube removed today  Clears started  Will start Flgyl for intrabdominal   DC vanco- Cx negative for 48 hours      Neutropenia (HCC)-resolved as of 1/23/2024  Assessment & Plan  Patient noted to have neutropenia on admission  Will start on filgrastim x 3 doses  Trend CBC daily  Oncology consult  Continue on IV antibiotics               VTE Pharmacologic Prophylaxis: VTE Score: 8 High Risk (Score >/= 5) - Pharmacological DVT Prophylaxis Ordered: enoxaparin (Lovenox). Sequential Compression Devices Ordered.    Mobility:   Basic Mobility Inpatient Raw Score: 21  JH-HLM Goal: 6: Walk 10 steps or more  JH-HLM Achieved: 6: Walk 10 steps or more  HLM Goal NOT achieved. Continue with multidisciplinary rounding and encourage appropriate mobility to improve upon HLM goals.    Patient Centered Rounds: I performed bedside rounds with nursing staff today.   Discussions with Specialists or Other Care Team Provider: heme- onc, surgery    Education and Discussions with Family / Patient: Updated  () at bedside.    Total Time Spent on Date of Encounter in care of patient: 55 mins. This time was spent on one or more of the following: performing physical exam; counseling and coordination of care; obtaining or reviewing history; documenting in the medical record; reviewing/ordering tests, medications or procedures; communicating with other healthcare professionals and discussing with patient's " family/caregivers.    Current Length of Stay: 3 day(s)  Current Patient Status: Inpatient   Certification Statement: The patient will continue to require additional inpatient hospital stay due to bowel obs  Discharge Plan:  pending improivement    Code Status: Level 3 - DNAR and DNI    Subjective:   Stated improved abdominal pain, nausea.  No significant complaints.  Remains on baseline oxygen    Objective:     Vitals:   Temp (24hrs), Av.3 °F (36.3 °C), Min:97.3 °F (36.3 °C), Max:97.3 °F (36.3 °C)    Temp:  [97.3 °F (36.3 °C)] 97.3 °F (36.3 °C)  HR:  [107] 107  Resp:  [18] 18  BP: (155)/(83) 155/83  SpO2:  [96 %] 96 %  Body mass index is 29.04 kg/m².     Input and Output Summary (last 24 hours):     Intake/Output Summary (Last 24 hours) at 2024 1526  Last data filed at 2024 0549  Gross per 24 hour   Intake 180 ml   Output 1250 ml   Net -1070 ml       Physical Exam:   Physical Exam  Vitals and nursing note reviewed.   Constitutional:       General: She is not in acute distress.     Appearance: She is well-developed.   HENT:      Head: Normocephalic and atraumatic.      Mouth/Throat:      Mouth: Mucous membranes are dry.      Pharynx: Oropharynx is clear.   Eyes:      Conjunctiva/sclera: Conjunctivae normal.   Cardiovascular:      Rate and Rhythm: Tachycardia present.      Heart sounds: No murmur heard.  Pulmonary:      Effort: Pulmonary effort is normal. No respiratory distress.   Abdominal:      General: There is distension.      Palpations: Abdomen is soft.      Tenderness: There is no abdominal tenderness.   Musculoskeletal:         General: No swelling.      Cervical back: Neck supple.   Skin:     General: Skin is warm and dry.      Capillary Refill: Capillary refill takes less than 2 seconds.   Neurological:      Mental Status: She is alert and oriented to person, place, and time.   Psychiatric:         Mood and Affect: Mood normal.          Additional Data:     Labs:  Results from last 7 days    Lab Units 01/24/24  0237 01/23/24  0459 01/22/24  0540   WBC Thousand/uL 15.75*   < > 2.26*   HEMOGLOBIN g/dL 10.0*   < > 10.0*   HEMATOCRIT % 32.1*   < > 32.2*   PLATELETS Thousands/uL 439*   < > 406*   BANDS PCT % 3  --   --    NEUTROS PCT %  --   --  21*   LYMPHS PCT %  --   --  28   LYMPHO PCT % 9*   < >  --    MONOS PCT %  --   --  50*   MONO PCT % 7   < >  --    EOS PCT % 0   < > 0    < > = values in this interval not displayed.     Results from last 7 days   Lab Units 01/24/24  0237   SODIUM mmol/L 136   POTASSIUM mmol/L 3.1*   CHLORIDE mmol/L 99   CO2 mmol/L 26   BUN mg/dL 17   CREATININE mg/dL 0.62   ANION GAP mmol/L 11   CALCIUM mg/dL 8.3*   ALBUMIN g/dL 2.9*   TOTAL BILIRUBIN mg/dL 0.27   ALK PHOS U/L 86   ALT U/L 9   AST U/L 13   GLUCOSE RANDOM mg/dL 84     Results from last 7 days   Lab Units 01/22/24  0540   INR  1.05             Results from last 7 days   Lab Units 01/22/24  0540 01/21/24  0844   LACTIC ACID mmol/L  --  1.4   PROCALCITONIN ng/ml 0.11 0.08       Lines/Drains:  Invasive Devices       Central Venous Catheter Line  Duration             Port A Cath 04/18/23 Right Subclavian 281 days              Peripheral Intravenous Line  Duration             Peripheral IV 01/24/24 Left Antecubital <1 day              Drain  Duration             Pleural Effusion Long-Term Catheter 15.5 Fr. 274 days                    Central Line:  Goal for removal:  chronic         Telemetry:  Telemetry Orders (From admission, onward)               24 Hour Telemetry Monitoring  Continuous x 24 Hours (Telem)        Question:  Reason for 24 Hour Telemetry  Answer:  Arrhythmias requiring acute medical intervention / PPM or ICD malfunction                     Telemetry Reviewed: Normal Sinus Rhythm  Indication for Continued Telemetry Use: No indication for continued use. Will discontinue.              Imaging: No pertinent imaging reviewed.    Recent Cultures (last 7 days):   Results from last 7 days   Lab Units  01/21/24  0844   BLOOD CULTURE  No Growth at 48 hrs.  No Growth at 48 hrs.       Last 24 Hours Medication List:   Current Facility-Administered Medications   Medication Dose Route Frequency Provider Last Rate    albuterol  2 puff Inhalation Q6H PRN Ronel Menezes PA-C      albuterol  2.5 mg Nebulization Q6H PRN Toan Yu MD      atorvastatin  20 mg Oral Daily Noman Stauffer MD      buPROPion  300 mg Oral Daily Noman Stauffer MD      cefepime  2,000 mg Intravenous Q8H Noman Stauffer MD 2,000 mg (01/24/24 0852)    enoxaparin  40 mg Subcutaneous Daily Toan Yu MD      fluticasone-vilanterol  1 puff Inhalation Daily Toan Yu MD      LORazepam  0.5 mg Intravenous Q8H PRN Ronel Menezes PA-C      LORazepam  0.5 mg Oral Daily PRN Noman Stauffer MD      losartan  100 mg Oral Daily Noman Stauffer MD      metoprolol tartrate  25 mg Oral Q12H ULISES Noman Stauffer MD      metroNIDAZOLE  500 mg Oral Q8H ULISES Noman Stauffer MD      ondansetron  4 mg Intravenous Q6H PRN Toan Yu MD      pantoprazole  40 mg Intravenous Q24H ULISES Edel Gamino PA-C      phenol  1 spray Mouth/Throat Q2H PRN Edel Gamino PA-C      sertraline  100 mg Oral Daily Noman Stauffer MD      verapamil  120 mg Oral HS Noman Stauffer MD      verapamil  240 mg Oral HS Noman Stauffer MD          Today, Patient Was Seen By: Noman Stauffer MD    **Please Note: This note may have been constructed using a voice recognition system.**

## 2024-01-24 NOTE — PLAN OF CARE
Problem: Potential for Falls  Goal: Patient will remain free of falls  Description: INTERVENTIONS:  - Educate patient/family on patient safety including physical limitations  - Instruct patient to call for assistance with activity   - Consult OT/PT to assist with strengthening/mobility   - Keep Call bell within reach  - Keep bed low and locked with side rails adjusted as appropriate  - Keep care items and personal belongings within reach  - Initiate and maintain comfort rounds  - Make Fall Risk Sign visible to staff  - Offer Toileting every 2 Hours, in advance of need  - Initiate/Maintain 2alarm  - Obtain necessary fall risk management equipment: 2  - Apply yellow socks and bracelet for high fall risk patients  - Consider moving patient to room near nurses station  Outcome: Progressing     Problem: PAIN - ADULT  Goal: Verbalizes/displays adequate comfort level or baseline comfort level  Description: Interventions:  - Encourage patient to monitor pain and request assistance  - Assess pain using appropriate pain scale  - Administer analgesics based on type and severity of pain and evaluate response  - Implement non-pharmacological measures as appropriate and evaluate response  - Consider cultural and social influences on pain and pain management  - Notify physician/advanced practitioner if interventions unsuccessful or patient reports new pain  Outcome: Progressing     Problem: INFECTION - ADULT  Goal: Absence or prevention of progression during hospitalization  Description: INTERVENTIONS:  - Assess and monitor for signs and symptoms of infection  - Monitor lab/diagnostic results  - Monitor all insertion sites, i.e. indwelling lines, tubes, and drains  - Monitor endotracheal if appropriate and nasal secretions for changes in amount and color  - [place] appropriate cooling/warming therapies per order  - Administer medications as ordered  - Instruct and encourage patient and family to use good hand hygiene  technique  - Identify and instruct in appropriate isolation precautions for identified infection/condition  Outcome: Progressing  Goal: Absence of fever/infection during neutropenic period  Description: INTERVENTIONS:  - Monitor WBC    Outcome: Progressing     Problem: SAFETY ADULT  Goal: Patient will remain free of falls  Description: INTERVENTIONS:  - Educate patient/family on patient safety including physical limitations  - Instruct patient to call for assistance with activity   - Consult OT/PT to assist with strengthening/mobility   - Keep Call bell within reach  - Keep bed low and locked with side rails adjusted as appropriate  - Keep care items and personal belongings within reach  - Initiate and maintain comfort rounds  - Make Fall Risk Sign visible to staff  - Offer Toileting every 2 Hours, in advance of need  - Initiate/Maintain 2alarm  - Obtain necessary fall risk management equipment: 2  - Apply yellow socks and bracelet for high fall risk patients  - Consider moving patient to room near nurses station  Outcome: Progressing  Goal: Maintain or return to baseline ADL function  Description: INTERVENTIONS:  - Educate patient/family on patient safety including physical limitations  - Instruct patient to call for assistance with activity   - Consult OT/PT to assist with strengthening/mobility   - Keep Call bell within reach  - Keep bed low and locked with side rails adjusted as appropriate  - Keep care items and personal belongings within reach  - Initiate and maintain comfort rounds  - Make Fall Risk Sign visible to staff  - Offer Toileting every 2 Hours, in advance of need  - Initiate/Maintain 2alarm  - Obtain necessary fall risk management equipment: 2  - Apply yellow socks and bracelet for high fall risk patients  - Consider moving patient to room near nurses station  Outcome: Progressing  Goal: Maintains/Returns to pre admission functional level  Description: INTERVENTIONS:  - Perform AM-PAC 6 Click Basic  Mobility/ Daily Activity assessment daily.  - Set and communicate daily mobility goal to care team and patient/family/caregiver.   - Collaborate with rehabilitation services on mobility goals if consulted  - Perform Range of Motion 3 times a day.  - Reposition patient every 2 hours.  - Dangle patient 3 times a day  - Stand patient 3 times a day  - Ambulate patient 3 times a day  - Out of bed to chair 3 times a day   - Out of bed for meals 3 times a day  - Out of bed for toileting  - Record patient progress and toleration of activity level   Outcome: Progressing     Problem: DISCHARGE PLANNING  Goal: Discharge to home or other facility with appropriate resources  Description: INTERVENTIONS:  - Identify barriers to discharge w/patient and caregiver  - Arrange for needed discharge resources and transportation as appropriate  - Identify discharge learning needs (meds, wound care, etc.)  - Arrange for interpretive services to assist at discharge as needed  - Refer to Case Management Department for coordinating discharge planning if the patient needs post-hospital services based on physician/advanced practitioner order or complex needs related to functional status, cognitive ability, or social support system  Outcome: Progressing     Problem: Knowledge Deficit  Goal: Patient/family/caregiver demonstrates understanding of disease process, treatment plan, medications, and discharge instructions  Description: Complete learning assessment and assess knowledge base.  Interventions:  - Provide teaching at level of understanding  - Provide teaching via preferred learning methods  Outcome: Progressing     Problem: RESPIRATORY - ADULT  Goal: Achieves optimal ventilation and oxygenation  Description: INTERVENTIONS:  - Assess for changes in respiratory status  - Assess for changes in mentation and behavior  - Position to facilitate oxygenation and minimize respiratory effort  - Oxygen administered by appropriate delivery if  ordered  - Initiate smoking cessation education as indicated  - Encourage broncho-pulmonary hygiene including cough, deep breathe, Incentive Spirometry  - Assess the need for suctioning and aspirate as needed  - Assess and instruct to report SOB or any respiratory difficulty  - Respiratory Therapy support as indicated  Outcome: Progressing     Problem: GASTROINTESTINAL - ADULT  Goal: Minimal or absence of nausea and/or vomiting  Description: INTERVENTIONS:  - Administer IV fluids if ordered to ensure adequate hydration  - Maintain NPO status until nausea and vomiting are resolved  - Nasogastric tube if ordered  - Administer ordered antiemetic medications as needed  - Provide nonpharmacologic comfort measures as appropriate  - Advance diet as tolerated, if ordered  - Consider nutrition services referral to assist patient with adequate nutrition and appropriate food choices  Outcome: Progressing  Goal: Maintains or returns to baseline bowel function  Description: INTERVENTIONS:  - Assess bowel function  - Encourage oral fluids to ensure adequate hydration  - Administer IV fluids if ordered to ensure adequate hydration  - Administer ordered medications as needed  - Encourage mobilization and activity  - Consider nutritional services referral to assist patient with adequate nutrition and appropriate food choices  Outcome: Progressing  Goal: Maintains adequate nutritional intake  Description: INTERVENTIONS:  - Monitor percentage of each meal consumed  - Identify factors contributing to decreased intake, treat as appropriate  - Assist with meals as needed  - Monitor I&O, weight, and lab values if indicated  - Obtain nutrition services referral as needed  Outcome: Progressing  Goal: Establish and maintain optimal ostomy function  Description: INTERVENTIONS:  - Assess bowel function  - Encourage oral fluids to ensure adequate hydration  - Administer IV fluids if ordered to ensure adequate hydration   - Administer ordered  medications as needed  - Encourage mobilization and activity  - Nutrition services referral to assist patient with appropriate food choices  - Assess stoma site  - Consider wound care consult   Outcome: Progressing  Goal: Oral mucous membranes remain intact  Description: INTERVENTIONS  - Assess oral mucosa and hygiene practices  - Implement preventative oral hygiene regimen  - Implement oral medicated treatments as ordered  - Initiate Nutrition services referral as needed  Outcome: Progressing     Problem: METABOLIC, FLUID AND ELECTROLYTES - ADULT  Goal: Electrolytes maintained within normal limits  Description: INTERVENTIONS:  - Monitor labs and assess patient for signs and symptoms of electrolyte imbalances  - Administer electrolyte replacement as ordered  - Monitor response to electrolyte replacements, including repeat lab results as appropriate  - Instruct patient on fluid and nutrition as appropriate  Outcome: Progressing  Goal: Fluid balance maintained  Description: INTERVENTIONS:  - Monitor labs   - Monitor I/O and WT  - Instruct patient on fluid and nutrition as appropriate  - Assess for signs & symptoms of volume excess or deficit  Outcome: Progressing  Goal: Glucose maintained within target range  Description: INTERVENTIONS:  - Monitor Blood Glucose as ordered  - Assess for signs and symptoms of hyperglycemia and hypoglycemia  - Administer ordered medications to maintain glucose within target range  - Assess nutritional intake and initiate nutrition service referral as needed  Outcome: Progressing     Problem: Nutrition/Hydration-ADULT  Goal: Nutrient/Hydration intake appropriate for improving, restoring or maintaining nutritional needs  Description: Monitor and assess patient's nutrition/hydration status for malnutrition. Collaborate with interdisciplinary team and initiate plan and interventions as ordered.  Monitor patient's weight and dietary intake as ordered or per policy. Utilize nutrition screening  tool and intervene as necessary. Determine patient's food preferences and provide high-protein, high-caloric foods as appropriate.     INTERVENTIONS:  - Monitor oral intake, urinary output, labs, and treatment plans  - Assess nutrition and hydration status and recommend course of action  - Evaluate amount of meals eaten  - Assist patient with eating if necessary   - Allow adequate time for meals  - Recommend/ encourage appropriate diets, oral nutritional supplements, and vitamin/mineral supplements  - Order, calculate, and assess calorie counts as needed  - Recommend, monitor, and adjust tube feedings and TPN/PPN based on assessed needs  - Assess need for intravenous fluids  - Provide specific nutrition/hydration education as appropriate  - Include patient/family/caregiver in decisions related to nutrition  Outcome: Progressing

## 2024-01-24 NOTE — ASSESSMENT & PLAN NOTE
"Patient presented with nausea and vomiting  CT chest abdomen pelvis showed-\"  Acute small bowel obstruction with point of transition in the right lower quadrant, likely on the basis of serosal implants versus adhesions.  2.  Stable appearance of right infrahilar lung cancer with findings of lymphangitic carcinomatosis. Stable left lung groundglass and solid nodules likely representing metastases.  3.  Small bilateral pleural effusions with right chest tube in place.  Pathologic left posterior fifth rib fracture with surrounding callus. Right second rib metastasis is better appreciated on the PET/CT   4.  Moderate volume of abdominopelvic ascites with similar findings of peritoneal carcinomatosis.\"  Surgical consult appreciated  NG tube removed today  Clears started  Will start Flgyl for intrabdominal   DC vanco- Cx negative for 48 hours    "

## 2024-01-24 NOTE — PROGRESS NOTES
"Progress Note - General Surgery   Kathi Ferris 63 y.o. female MRN: [id number]  Unit/Bed#: -01 Encounter: [id number]    Assessment/Plan:  Assessment:  60-year-old female with Stage IV NSCLC admitted with SBO  -Pain improving, tolerated NG tube clamping yesterday,did have small BM yesterday, is passing some flatus  -Repeat obstruction series shows contrast passing into colon, continues with small bowel distention  Peritoneal carcinomatosis  Neutropenia on current chemotherapy, improving  COPD/Empysema  HTN  HLD     Plan:  Discontinue NG tube  Will trial clear liquid diet with clear nutritional supplementation drinks  If patient unable to tolerate diet will need to discuss further options which may include alternative nutrition, patient would be a poor surgical candidate  Follow abdominal exam and bowel function  Continued replace electrolytes as needed  Encourage out of bed and ambulation  Medical management per primary service      Subjective/Objective   Chief Complaint: dry mouth and irritation from NG tube    Subjective: States mouth is dry and NG tube is irritating.  All right NG tube clamping during the day yesterday.  Had light mostly clear output overnight which is likely also related to ice chips and oral swabs.  Denies abdominal pain.  Passing some flatus.  Did have a bowel movement yesterday.    Objective:    Blood pressure 155/83, pulse (!) 107, temperature (!) 97.3 °F (36.3 °C), temperature source Oral, resp. rate 18, height 5' 5\" (1.651 m), weight 79.2 kg (174 lb 8 oz), SpO2 96%.,Body mass index is 29.04 kg/m².      Intake/Output Summary (Last 24 hours) at 1/24/2024 1323  Last data filed at 1/24/2024 0549  Gross per 24 hour   Intake 180 ml   Output 1250 ml   Net -1070 ml       Invasive Devices       Central Venous Catheter Line  Duration             Port A Cath 04/18/23 Right Subclavian 281 days              Peripheral Intravenous Line  Duration             Peripheral IV 01/24/24 Left Antecubital " <1 day              Drain  Duration             Pleural Effusion Long-Term Catheter 15.5 Fr. 274 days                    Physical Exam:   General appearance: alert and oriented, in no acute distress  Head: Normocephalic, without obvious abnormality, atraumatic, sclerae anicteric, mucous membranes moist  Neck: no JVD and supple, symmetrical, trachea midline  Lungs: clear to auscultation, no wheezes or rales  Heart:   Regular rate and rhythm, S1-S2 normal, no murmur  Abdomen:   Flat, soft, nontender, hypoactive bowel sounds  Extremities:   No edema, redness or tenderness in the calves or thighs  Skin: Warm, dry; pale  Nursing notes and vital signs reviewed      Lab, Imaging and other studies:I have personally reviewed pertinent lab results.  , CBC:   Lab Results   Component Value Date    WBC 15.75 (H) 01/24/2024    HGB 10.0 (L) 01/24/2024    HCT 32.1 (L) 01/24/2024    MCV 87 01/24/2024     (H) 01/24/2024    RBC 3.69 (L) 01/24/2024    MCH 27.1 01/24/2024    MCHC 31.2 (L) 01/24/2024    RDW 17.2 (H) 01/24/2024    MPV 9.3 01/24/2024   , CMP:   Lab Results   Component Value Date    SODIUM 136 01/24/2024    K 3.1 (L) 01/24/2024    CL 99 01/24/2024    CO2 26 01/24/2024    BUN 17 01/24/2024    CREATININE 0.62 01/24/2024    CALCIUM 8.3 (L) 01/24/2024    AST 13 01/24/2024    ALT 9 01/24/2024    ALKPHOS 86 01/24/2024    EGFR 94 01/24/2024     VTE Pharmacologic Prophylaxis: Heparin  VTE Mechanical Prophylaxis: sequential compression device    Brittany Tavares

## 2024-01-25 ENCOUNTER — APPOINTMENT (INPATIENT)
Dept: RADIOLOGY | Facility: HOSPITAL | Age: 64
DRG: 871 | End: 2024-01-25
Payer: COMMERCIAL

## 2024-01-25 LAB
ALBUMIN SERPL BCP-MCNC: 2.7 G/DL (ref 3.5–5)
ALP SERPL-CCNC: 97 U/L (ref 34–104)
ALT SERPL W P-5'-P-CCNC: 7 U/L (ref 7–52)
ANION GAP SERPL CALCULATED.3IONS-SCNC: 7 MMOL/L
ANION GAP SERPL CALCULATED.3IONS-SCNC: 8 MMOL/L
AST SERPL W P-5'-P-CCNC: 11 U/L (ref 13–39)
BILIRUB SERPL-MCNC: 0.21 MG/DL (ref 0.2–1)
BUN SERPL-MCNC: 14 MG/DL (ref 5–25)
BUN SERPL-MCNC: 15 MG/DL (ref 5–25)
CALCIUM ALBUM COR SERPL-MCNC: 9.1 MG/DL (ref 8.3–10.1)
CALCIUM SERPL-MCNC: 8.1 MG/DL (ref 8.4–10.2)
CALCIUM SERPL-MCNC: 8.8 MG/DL (ref 8.4–10.2)
CHLORIDE SERPL-SCNC: 99 MMOL/L (ref 96–108)
CHLORIDE SERPL-SCNC: 99 MMOL/L (ref 96–108)
CO2 SERPL-SCNC: 26 MMOL/L (ref 21–32)
CO2 SERPL-SCNC: 28 MMOL/L (ref 21–32)
CREAT SERPL-MCNC: 0.6 MG/DL (ref 0.6–1.3)
CREAT SERPL-MCNC: 0.63 MG/DL (ref 0.6–1.3)
DME PARACHUTE DELIVERY DATE ACTUAL: NORMAL
DME PARACHUTE DELIVERY DATE REQUESTED: NORMAL
DME PARACHUTE ITEM DESCRIPTION: NORMAL
DME PARACHUTE ORDER STATUS: NORMAL
DME PARACHUTE SUPPLIER NAME: NORMAL
DME PARACHUTE SUPPLIER PHONE: NORMAL
ERYTHROCYTE [DISTWIDTH] IN BLOOD BY AUTOMATED COUNT: 17.1 % (ref 11.6–15.1)
GFR SERPL CREATININE-BSD FRML MDRD: 94 ML/MIN/1.73SQ M
GFR SERPL CREATININE-BSD FRML MDRD: 95 ML/MIN/1.73SQ M
GLUCOSE SERPL-MCNC: 109 MG/DL (ref 65–140)
GLUCOSE SERPL-MCNC: 118 MG/DL (ref 65–140)
HCT VFR BLD AUTO: 31.2 % (ref 34.8–46.1)
HGB BLD-MCNC: 9.9 G/DL (ref 11.5–15.4)
MCH RBC QN AUTO: 26.8 PG (ref 26.8–34.3)
MCHC RBC AUTO-ENTMCNC: 31.7 G/DL (ref 31.4–37.4)
MCV RBC AUTO: 84 FL (ref 82–98)
PLATELET # BLD AUTO: 366 THOUSANDS/UL (ref 149–390)
PMV BLD AUTO: 8.7 FL (ref 8.9–12.7)
POTASSIUM SERPL-SCNC: 2.8 MMOL/L (ref 3.5–5.3)
POTASSIUM SERPL-SCNC: 3.2 MMOL/L (ref 3.5–5.3)
PROCALCITONIN SERPL-MCNC: 0.31 NG/ML
PROT SERPL-MCNC: 5.3 G/DL (ref 6.4–8.4)
RBC # BLD AUTO: 3.7 MILLION/UL (ref 3.81–5.12)
SODIUM SERPL-SCNC: 133 MMOL/L (ref 135–147)
SODIUM SERPL-SCNC: 134 MMOL/L (ref 135–147)
WBC # BLD AUTO: 17.9 THOUSAND/UL (ref 4.31–10.16)

## 2024-01-25 PROCEDURE — 71045 X-RAY EXAM CHEST 1 VIEW: CPT

## 2024-01-25 PROCEDURE — 99233 SBSQ HOSP IP/OBS HIGH 50: CPT | Performed by: INTERNAL MEDICINE

## 2024-01-25 PROCEDURE — 84145 PROCALCITONIN (PCT): CPT | Performed by: INTERNAL MEDICINE

## 2024-01-25 PROCEDURE — 99232 SBSQ HOSP IP/OBS MODERATE 35: CPT | Performed by: NURSE PRACTITIONER

## 2024-01-25 PROCEDURE — 99232 SBSQ HOSP IP/OBS MODERATE 35: CPT | Performed by: SURGERY

## 2024-01-25 PROCEDURE — 80048 BASIC METABOLIC PNL TOTAL CA: CPT | Performed by: INTERNAL MEDICINE

## 2024-01-25 PROCEDURE — 85027 COMPLETE CBC AUTOMATED: CPT | Performed by: INTERNAL MEDICINE

## 2024-01-25 PROCEDURE — 80053 COMPREHEN METABOLIC PANEL: CPT | Performed by: INTERNAL MEDICINE

## 2024-01-25 PROCEDURE — C9113 INJ PANTOPRAZOLE SODIUM, VIA: HCPCS | Performed by: PHYSICIAN ASSISTANT

## 2024-01-25 RX ORDER — POTASSIUM CHLORIDE 20 MEQ/1
40 TABLET, EXTENDED RELEASE ORAL 2 TIMES DAILY
Status: DISCONTINUED | OUTPATIENT
Start: 2024-01-25 | End: 2024-01-27

## 2024-01-25 RX ORDER — POTASSIUM CHLORIDE 14.9 MG/ML
20 INJECTION INTRAVENOUS ONCE
Status: COMPLETED | OUTPATIENT
Start: 2024-01-25 | End: 2024-01-25

## 2024-01-25 RX ORDER — POTASSIUM CHLORIDE 20 MEQ/1
40 TABLET, EXTENDED RELEASE ORAL ONCE
Status: COMPLETED | OUTPATIENT
Start: 2024-01-25 | End: 2024-01-25

## 2024-01-25 RX ADMIN — METOPROLOL TARTRATE 25 MG: 25 TABLET, FILM COATED ORAL at 09:22

## 2024-01-25 RX ADMIN — POTASSIUM CHLORIDE 20 MEQ: 14.9 INJECTION, SOLUTION INTRAVENOUS at 09:27

## 2024-01-25 RX ADMIN — METOPROLOL TARTRATE 25 MG: 25 TABLET, FILM COATED ORAL at 21:24

## 2024-01-25 RX ADMIN — CEFEPIME HYDROCHLORIDE 2000 MG: 2 INJECTION, SOLUTION INTRAVENOUS at 10:51

## 2024-01-25 RX ADMIN — PANTOPRAZOLE SODIUM 40 MG: 40 INJECTION, POWDER, FOR SOLUTION INTRAVENOUS at 09:22

## 2024-01-25 RX ADMIN — LOSARTAN POTASSIUM 100 MG: 50 TABLET, FILM COATED ORAL at 09:22

## 2024-01-25 RX ADMIN — SERTRALINE 100 MG: 100 TABLET, FILM COATED ORAL at 09:22

## 2024-01-25 RX ADMIN — METRONIDAZOLE 500 MG: 500 TABLET ORAL at 13:34

## 2024-01-25 RX ADMIN — ATORVASTATIN CALCIUM 20 MG: 20 TABLET, FILM COATED ORAL at 17:39

## 2024-01-25 RX ADMIN — BUPROPION HYDROCHLORIDE 300 MG: 300 TABLET, FILM COATED, EXTENDED RELEASE ORAL at 09:22

## 2024-01-25 RX ADMIN — VERAPAMIL HYDROCHLORIDE 240 MG: 120 TABLET, FILM COATED, EXTENDED RELEASE ORAL at 21:24

## 2024-01-25 RX ADMIN — POTASSIUM CHLORIDE 40 MEQ: 1500 TABLET, EXTENDED RELEASE ORAL at 17:39

## 2024-01-25 RX ADMIN — POTASSIUM CHLORIDE 40 MEQ: 1500 TABLET, EXTENDED RELEASE ORAL at 09:22

## 2024-01-25 RX ADMIN — FLUTICASONE FUROATE AND VILANTEROL TRIFENATATE 1 PUFF: 200; 25 POWDER RESPIRATORY (INHALATION) at 09:23

## 2024-01-25 RX ADMIN — LORAZEPAM 0.5 MG: 0.5 TABLET ORAL at 21:24

## 2024-01-25 RX ADMIN — ENOXAPARIN SODIUM 40 MG: 100 INJECTION SUBCUTANEOUS at 09:22

## 2024-01-25 RX ADMIN — METRONIDAZOLE 500 MG: 500 TABLET ORAL at 05:03

## 2024-01-25 RX ADMIN — VERAPAMIL HYDROCHLORIDE 120 MG: 120 TABLET, FILM COATED, EXTENDED RELEASE ORAL at 21:24

## 2024-01-25 RX ADMIN — POTASSIUM CHLORIDE 40 MEQ: 1500 TABLET, EXTENDED RELEASE ORAL at 22:42

## 2024-01-25 RX ADMIN — CEFEPIME HYDROCHLORIDE 2000 MG: 2 INJECTION, SOLUTION INTRAVENOUS at 01:40

## 2024-01-25 NOTE — PROGRESS NOTES
"Progress Note - General Surgery   Kathi Ferris 63 y.o. female MRN: [id number]  Unit/Bed#: -01 Encounter: [id number]    Assessment:  60-year-old female with Stage IV NSCLC admitted with SBO  Peritoneal carcinomatosis  Neutropenia on current chemotherapy, improved, and now with leukocytosis   COPD/Empysema  HTN  HLD     Plan:  -NG out, tolerating clears  -advanced to surg soft  -With elevated wbc this am. She did receive G-CSF  -ID on board  -Cont to follow bowel function, ambulate   -Pt remains a poor candidate with stage IV cancer, carcinomatosis, current chemo and neutropenia       Subjective/Objective   Chief Complaint: I want some ginger ale    Subjective: patient states she is tolerating clears  Having Bms  Denies abdominal pain, N/V    Objective:     Blood pressure 108/56, pulse 83, temperature (!) 97 °F (36.1 °C), temperature source Temporal, resp. rate 16, height 5' 5\" (1.651 m), weight 81.3 kg (179 lb 3.7 oz), SpO2 93%.,Body mass index is 29.83 kg/m².      Intake/Output Summary (Last 24 hours) at 1/25/2024 0823  Last data filed at 1/24/2024 1808  Gross per 24 hour   Intake --   Output 200 ml   Net -200 ml       Invasive Devices       Central Venous Catheter Line  Duration             Port A Cath 04/18/23 Right Subclavian 282 days              Peripheral Intravenous Line  Duration             Peripheral IV 01/24/24 Left Antecubital <1 day              Drain  Duration             Pleural Effusion Long-Term Catheter 15.5 Fr. 274 days                    Physical Exam:   General appearance: alert and oriented, in no acute distress  Head: Normocephalic, without obvious abnormality, atraumatic, sclerae anicteric, mucous membranes dry  Neck: no JVD and supple, symmetrical, trachea midline  Lungs: clear to auscultation, no wheezes or rales  Heart:   Regular rate and rhythm, S1-S2 normal, no murmur  Abdomen:   Soft, NT, ND, +BS  Extremities:   No edema, redness or tenderness in the calves or thighs  Skin: " Warm, dry; pale  Nursing notes and vital signs reviewed      Lab, Imaging and other studies:I have personally reviewed pertinent lab results.  , CBC:   Lab Results   Component Value Date    WBC 17.90 (H) 01/25/2024    HGB 9.9 (L) 01/25/2024    HCT 31.2 (L) 01/25/2024    MCV 84 01/25/2024     01/25/2024    RBC 3.70 (L) 01/25/2024    MCH 26.8 01/25/2024    MCHC 31.7 01/25/2024    RDW 17.1 (H) 01/25/2024    MPV 8.7 (L) 01/25/2024   , CMP:   Lab Results   Component Value Date    SODIUM 134 (L) 01/25/2024    K 2.8 (L) 01/25/2024    CL 99 01/25/2024    CO2 28 01/25/2024    BUN 15 01/25/2024    CREATININE 0.60 01/25/2024    CALCIUM 8.1 (L) 01/25/2024    AST 11 (L) 01/25/2024    ALT 7 01/25/2024    ALKPHOS 97 01/25/2024    EGFR 95 01/25/2024     VTE Pharmacologic Prophylaxis: Lovenox  VTE Mechanical Prophylaxis: sequential compression device    Hannah Sorenson

## 2024-01-25 NOTE — PROGRESS NOTES
Oncology Progress Note  Kathi Ferris 63 y.o. female MRN: [id number]  Unit/Bed#: -01 Encounter: [id number]      Oncology History Overview Note   5/2019 - Stage IA adenocarcinoma of the right lung s/p RLLectomy    11/2022 - fall after tripping on a dog gate - CT showed pulmonary nodules that required 3 month followup    3/2023 - CT showed right pleural effusion with enlarging lung lesion, she was otherwise symptomatic, thoracentesis cell block showed adenocarcinoma c/w her prior lung primary    PET - no disease outside the chest    Caris - no targetable mutations    4/20/2023 - carbo/pem/pembro    5/11/2-23 - cycle 2, pemetrexed dose reduced by 20% and carbo dose reduced to AUC 4 due to nausea and fatigue    7/2023 - removed carbo as of cycle 5 due to good response and increasing fatigue    8/2023 - add back carbo for cycle 6 due to increasing output from right pleural catheter    8/24/2023 - remove carboplatin for cycle 7 because no impact was made on output from pleural catheter    10/2023 - disease progression in the omentum.  Plan treatment change to docetaxel and ramucirumab    11/9/2023 - start taxotere/ramucirumab     Non-small cell cancer of right lung (HCC)   4/7/2023 Initial Diagnosis    Non-small cell cancer of right lung (HCC)     4/20/2023 - 9/14/2023 Chemotherapy    cyanocobalamin, 1,000 mcg, Intramuscular, Once, 5 of 10 cycles  Administration: 1,000 mcg (4/13/2023), 1,000 mcg (6/1/2023), 1,000 mcg (8/3/2023), 1,000 mcg (8/24/2023), 1,000 mcg (9/14/2023)  alteplase (CATHFLO), 2 mg, Intracatheter, Every 1 Minute as needed, 8 of 13 cycles  fosaprepitant (EMEND) IVPB, 150 mg, Intravenous, Once, 5 of 5 cycles  Administration: 150 mg (4/20/2023), 150 mg (6/1/2023), 150 mg (6/22/2023), 150 mg (5/11/2023), 150 mg (8/3/2023)  CARBOplatin (PARAPLATIN) IVPB ([facility] DOSING), 553 mg, Intravenous, Once, 5 of 5 cycles  Administration: 553 mg (4/20/2023), 442.4 mg (6/1/2023), 442.4 mg (6/22/2023), 442.4 mg  "(5/11/2023), 438 mg (8/3/2023)  pemetrexed (ALIMTA) chemo infusion, 945 mg, Intravenous, Once, 8 of 13 cycles  Dose modification: 400 mg/m2 (original dose 500 mg/m2, Cycle 3, Reason: Nausea/Vomiting, Comment: 20% dose reduction due to nausea)  Administration: 900 mg (4/20/2023), 756 mg (6/1/2023), 756 mg (6/22/2023), 756 mg (8/3/2023), 756 mg (5/11/2023), 756 mg (7/13/2023), 756 mg (8/24/2023), 756 mg (9/14/2023)  pembrolizumab (KEYTRUDA) IVPB, 200 mg, Intravenous, Once, 8 of 13 cycles  Administration: 200 mg (4/20/2023), 200 mg (6/1/2023), 200 mg (6/22/2023), 200 mg (8/3/2023), 200 mg (5/11/2023), 200 mg (7/13/2023), 200 mg (8/24/2023), 200 mg (9/14/2023)     6/9/2023 -  Cancer Staged    Staging form: Lung, AJCC 8th Edition  - Clinical: Stage ALESHA (cT1c, cN2, cM1a) - Signed by Shani Phipps DO on 6/9/2023 11/9/2023 -  Chemotherapy    alteplase (CATHFLO), 2 mg, Intracatheter, Every 1 Minute as needed, 4 of 6 cycles  pegfilgrastim (NEULASTA ONPRO), 6 mg, Subcutaneous, Once, 0 of 2 cycles  ramucirumab (CYRAMZA) IVPB, 830 mg, Intravenous, Once, 4 of 6 cycles  Administration: 800 mg (11/9/2023), 800 mg (11/30/2023), 800 mg (12/21/2023), 800 mg (1/11/2024)  DOCEtaxel (TAXOTERE) chemo infusion, 75 mg/m2 = 142.6 mg, Intravenous, Once, 4 of 6 cycles  Administration: 142.6 mg (11/9/2023), 142.6 mg (11/30/2023), 142.6 mg (12/21/2023), 142.6 mg (1/11/2024)         Subjective: Feeling better. Abdominal pain improved. Denies fevers/chills. Has dry cough.      Objective: NG tube out. XR abdominal obstruction series shows improvement in keeping with resolving small bowel obstruction.  She is tolerating clear liquids.  Having loose stools  White count now elevated.  Afebrile.  Denies dysuria.      /56 (BP Location: Right arm)   Pulse 83   Temp (!) 97 °F (36.1 °C) (Temporal)   Resp 16   Ht 5' 5\" (1.651 m)   Wt 81.3 kg (179 lb 3.7 oz)   SpO2 93%   BMI 29.83 kg/m²   General Appearance:    Alert, oriented      "   Eyes:    PERRL   Ears:    Normal external ear canals, both ears   Nose:   Nares normal, septum midline.      Throat:   Mucosa moist. Pharynx without injection.    Neck:   Supple       Lungs:     Clear to auscultation bilaterally   Chest Wall:    No tenderness or deformity    Heart:    Regular rate and rhythm       Abdomen:     Soft, non-tender, no bowel sounds, no organomegaly           Extremities:   Extremities no cyanosis or edema       Skin:   no rash or icterus.    Lymph nodes:   Cervical, supraclavicular, and axillary nodes normal   Neurologic:   CNII-XII intact, normal strength, sensation and reflexes     throughout        Recent Results (from the past 48 hour(s))   Magnesium    Collection Time: 01/24/24  2:37 AM   Result Value Ref Range    Magnesium 2.0 1.9 - 2.7 mg/dL   Comprehensive metabolic panel    Collection Time: 01/24/24  2:37 AM   Result Value Ref Range    Sodium 136 135 - 147 mmol/L    Potassium 3.1 (L) 3.5 - 5.3 mmol/L    Chloride 99 96 - 108 mmol/L    CO2 26 21 - 32 mmol/L    ANION GAP 11 mmol/L    BUN 17 5 - 25 mg/dL    Creatinine 0.62 0.60 - 1.30 mg/dL    Glucose 84 65 - 140 mg/dL    Calcium 8.3 (L) 8.4 - 10.2 mg/dL    Corrected Calcium 9.2 8.3 - 10.1 mg/dL    AST 13 13 - 39 U/L    ALT 9 7 - 52 U/L    Alkaline Phosphatase 86 34 - 104 U/L    Total Protein 5.8 (L) 6.4 - 8.4 g/dL    Albumin 2.9 (L) 3.5 - 5.0 g/dL    Total Bilirubin 0.27 0.20 - 1.00 mg/dL    eGFR 94 ml/min/1.73sq m   CBC and differential    Collection Time: 01/24/24  2:37 AM   Result Value Ref Range    WBC 15.75 (H) 4.31 - 10.16 Thousand/uL    RBC 3.69 (L) 3.81 - 5.12 Million/uL    Hemoglobin 10.0 (L) 11.5 - 15.4 g/dL    Hematocrit 32.1 (L) 34.8 - 46.1 %    MCV 87 82 - 98 fL    MCH 27.1 26.8 - 34.3 pg    MCHC 31.2 (L) 31.4 - 37.4 g/dL    RDW 17.2 (H) 11.6 - 15.1 %    MPV 9.3 8.9 - 12.7 fL    Platelets 439 (H) 149 - 390 Thousands/uL   Manual Differential(PHLEBS Do Not Order)    Collection Time: 01/24/24  2:37 AM   Result Value Ref  Range    Segmented % 73 43 - 75 %    Bands % 3 0 - 8 %    Lymphocytes % 9 (L) 14 - 44 %    Monocytes % 7 4 - 12 %    Eosinophils, % 0 0 - 6 %    Basophils % 0 0 - 1 %    Metamyelocytes% 1 0 - 1 %    Myelocytes % 1 0 - 1 %    Atypical Lymphocytes % 6 (H) <=0 %    Absolute Neutrophils 11.97 (H) 1.85 - 7.62 Thousand/uL    Lymphocytes Absolute 2.36 0.60 - 4.47 Thousand/uL    Monocytes Absolute 1.10 0.00 - 1.22 Thousand/uL    Eosinophils Absolute 0.00 0.00 - 0.40 Thousand/uL    Basophils Absolute 0.00 0.00 - 0.10 Thousand/uL    Total Counted      RBC Morphology Present     Platelet Estimate Increased (A) Adequate    Large Platelet Present     Anisocytosis Present     Polychromasia Present    CBC and differential    Collection Time: 01/25/24  5:11 AM   Result Value Ref Range    WBC 17.90 (H) 4.31 - 10.16 Thousand/uL    RBC 3.70 (L) 3.81 - 5.12 Million/uL    Hemoglobin 9.9 (L) 11.5 - 15.4 g/dL    Hematocrit 31.2 (L) 34.8 - 46.1 %    MCV 84 82 - 98 fL    MCH 26.8 26.8 - 34.3 pg    MCHC 31.7 31.4 - 37.4 g/dL    RDW 17.1 (H) 11.6 - 15.1 %    MPV 8.7 (L) 8.9 - 12.7 fL    Platelets 366 149 - 390 Thousands/uL   Comprehensive metabolic panel    Collection Time: 01/25/24  5:11 AM   Result Value Ref Range    Sodium 134 (L) 135 - 147 mmol/L    Potassium 2.8 (L) 3.5 - 5.3 mmol/L    Chloride 99 96 - 108 mmol/L    CO2 28 21 - 32 mmol/L    ANION GAP 7 mmol/L    BUN 15 5 - 25 mg/dL    Creatinine 0.60 0.60 - 1.30 mg/dL    Glucose 109 65 - 140 mg/dL    Calcium 8.1 (L) 8.4 - 10.2 mg/dL    Corrected Calcium 9.1 8.3 - 10.1 mg/dL    AST 11 (L) 13 - 39 U/L    ALT 7 7 - 52 U/L    Alkaline Phosphatase 97 34 - 104 U/L    Total Protein 5.3 (L) 6.4 - 8.4 g/dL    Albumin 2.7 (L) 3.5 - 5.0 g/dL    Total Bilirubin 0.21 0.20 - 1.00 mg/dL    eGFR 95 ml/min/1.73sq m   Procalcitonin    Collection Time: 01/25/24  5:11 AM   Result Value Ref Range    Procalcitonin 0.31 (H) <=0.25 ng/ml         XR chest 1 view portable    Result Date: 1/22/2024  Narrative:  CHEST INDICATION:   post NG tube insertion. COMPARISON: CTA chest, abdomen, pelvis 1/21/2024; chest radiograph 12/17/2023 EXAM PERFORMED/VIEWS:  XR CHEST PORTABLE Images:  1 FINDINGS: Patient is rotated to the right. Right chest wall port with tip overlying the superior cavoatrial junction. Nasogastric tube courses below the level of diaphragm with tip projecting over the left upper quadrant. Right-sided chest tube. Cardiomediastinal silhouette appears unremarkable. Persistent small right-sided pleural effusion. No left-sided effusion. No pneumothorax. Persistent right infrahilar opacity, in keeping with patient's known lung cancer diagnosis. Osseous structures appear within normal limits for patient age.     Impression: Nasogastric tube tip within the stomach. Persistent small right pleural effusion with chest tube in place. Persistent right infrahilar opacity is better appreciated on same day CTA. Resident: RAMONA CHAWLA I, the attending radiologist, have reviewed the images and agree with the final report above. Workstation performed: [id number]     PE Study with CT Abdomen and Pelvis with contrast    Result Date: 1/21/2024  Narrative: CT PULMONARY ANGIOGRAM OF THE CHEST AND CT ABDOMEN AND PELVIS WITH INTRAVENOUS CONTRAST INDICATION: elevated dimer, vomiting. History of metastatic non-small cell lung cancer. COMPARISON: CT chest/abdomen/pelvis 1/18/2024. PET/CT 11/2/2023. TECHNIQUE: CT examination of the chest, abdomen and pelvis was performed. Thin section CT angiographic technique was used in the chest in order to evaluate for pulmonary embolus and coronal 3D MIP postprocessing was performed on the acquisition scanner. Multiplanar 2D reformatted images were created from the source data. This examination, like all CT scans performed in the [facility], was performed utilizing techniques to minimize radiation dose exposure, including the use of iterative reconstruction and automated exposure  control. Radiation dose length product (DLP) for this visit: 935.89 mGy-cm IV Contrast: 100 mL of iohexol (OMNIPAQUE) Enteric Contrast: Enteric contrast was not administered. FINDINGS: CHEST PULMONARY ARTERIAL TREE: No pulmonary embolus is seen. Note suboptimal contrast bolus timing. LUNGS: Postsurgical changes of right lower lobectomy. Moderate to severe centrilobular and paraseptal emphysema. Redemonstrated ill-defined right infrahilar tumor measuring 6.4 x 4.3 cm (3/148), similar to prior study. Surrounding groundglass opacities and interlobular septal thickening within the right lung similar compared to CT 12/15/2023 and increased from 10/27/2023, suggestive of lymphangitic carcinomatosis. Multifocal solid and groundglass nodularity throughout the left lung, similar to prior study and likely metastatic in etiology. For example, a 12 mm left upper lobe nodule (5/58), unchanged. PLEURA: Right chest tube terminating in the right apex. Stable small bilateral pleural effusions, left greater than right. No pneumothorax. Stable pleural nodularity in keeping with metastases. HEART/AORTA: Heart is unremarkable for patient's age. No thoracic aortic aneurysm. Coronary artery calcifications. Right chest port with catheter tip terminating in the upper right atrium. MEDIASTINUM AND SUMIT: Unremarkable. CHEST WALL AND LOWER NECK: Unremarkable. ABDOMEN LIVER/BILIARY TREE: Unremarkable. GALLBLADDER: No calcified gallstones. No pericholecystic inflammatory change. Pericholecystic fluid is favored to be on the basis of ascites. SPLEEN: Unremarkable. PANCREAS: Unremarkable. ADRENAL GLANDS: Unremarkable. KIDNEYS/URETERS: Unremarkable. No hydronephrosis. STOMACH AND BOWEL: Diffuse dilation of small bowel with abrupt point of transition in the right lower quadrant (4/112), in keeping with small bowel obstruction likely on the basis of serosal implants versus adhesions. Colonic diverticulosis. APPENDIX: No findings to suggest  appendicitis. ABDOMINOPELVIC CAVITY: Small to moderate volume of abdominopelvic ascites, likely malignant. Similar peritoneal/omental nodularity in keeping with peritoneal carcinomatosis. No pneumoperitoneum. VESSELS: Atherosclerosis without abdominal aortic aneurysm. PELVIS REPRODUCTIVE ORGANS: Hysterectomy. No adnexal mass. URINARY BLADDER: Unremarkable. ABDOMINAL WALL/INGUINAL REGIONS: Small fat-containing umbilical hernia. BONES: Pathologic left posterior fifth rib fracture with surrounding callus. Right second rib metastasis is better appreciated on the PET/CT. Grade 2 anterolisthesis of L5 on S1 with bilateral pars defects, unchanged.     Impression: 1.  Acute small bowel obstruction with point of transition in the right lower quadrant, likely on the basis of serosal implants versus adhesions. 2.  Stable appearance of right infrahilar lung cancer with findings of lymphangitic carcinomatosis. Stable left lung groundglass and solid nodules likely representing metastases. 3.  Small bilateral pleural effusions with right chest tube in place. 4.  Moderate volume of abdominopelvic ascites with similar findings of peritoneal carcinomatosis. * I personally telephoned this result to ANGEL WEATHERS on 1/21/2024 11:27 AM. Workstation performed: [id number]     CT chest abdomen pelvis w contrast    Result Date: 1/19/2024  Narrative: CT CHEST, ABDOMEN AND PELVIS WITH IV CONTRAST INDICATION: C34.91: Malignant neoplasm of unspecified part of right bronchus or lung. COMPARISON: CTA chest PE study 12/15/2023. PET/CT 11/2/2023. CT chest abdomen pelvis 10/27/2023. TECHNIQUE: CT examination of the chest, abdomen and pelvis was performed. Multiplanar 2D reformatted images were created from the source data. This examination, like all CT scans performed in the [facility], was performed utilizing techniques to minimize radiation dose exposure, including the use of iterative reconstruction and automated exposure  control. Radiation dose length product (DLP) for this visit: 1047.73 mGy-cm IV Contrast: 100 mL of iohexol (OMNIPAQUE) Enteric Contrast: FINDINGS: CHEST LUNGS: Ill-defined right infrahilar tumor now measuring approximately 6.6 x 4.4 cm unchanged when remeasured with similar technique on the 12/15/2023 study. Unchanged bilateral groundglass opacities most prominent through the right lower lobe. Pulmonary emphysema. Enlarged 13 mm left upper lobe nodule measured 9 mm on the 12/15/2023 study. Other subcentimeter left upper lobe nodules are unchanged. PLEURA: Unchanged right pleural effusion with diffuse right pleural thickening. A pleural drainage catheter is in place. Decreased size of a small left pleural effusion. HEART/GREAT VESSELS: Borderline heart size. Coronary artery calcifications. Thin pericardial effusion measuring a maximum of 6 mm. No thoracic aortic aneurysm. MEDIASTINUM AND SUMIT: No lymphadenopathy. The FDG avid nodes seen on the PET/CT are not enlarged. CHEST WALL AND LOWER NECK: Right-sided chest port. ABDOMEN LIVER/BILIARY TREE: Unremarkable. GALLBLADDER: No calcified gallstones. No pericholecystic inflammatory change. SPLEEN: Unremarkable. PANCREAS: Unremarkable. ADRENAL GLANDS: Unremarkable. KIDNEYS/URETERS: Unremarkable. No hydronephrosis. STOMACH AND BOWEL: Discrete serosal implants are not identified. Colonic diverticulosis without diverticulitis. APPENDIX: No findings to suggest appendicitis. ABDOMINOPELVIC CAVITY: Small quantity of abdominal and pelvic ascites. No pneumoperitoneum. The 13 mm left retroperitoneal node seen on the PET/CT is now subcentimeter in size. No residual retroperitoneal adenopathy. Persistent but improved omental caking correlating to FDG avid disease. Discrete peritoneal nodules are not apparent. VESSELS: Unremarkable for patient's age. PELVIS REPRODUCTIVE ORGANS: Hysterectomy. URINARY BLADDER: Unremarkable. ABDOMINAL WALL/INGUINAL REGIONS: Unremarkable. BONES:  Pathologic left posterior fifth rib fracture with surrounding callus. Right second rib metastasis is better appreciated on the PET/CT. Bilateral L5 pars defects with unchanged grade 2 anterolisthesis of L5 on S1.     Impression: Unchanged ill-defined 6.6 cm right infrahilar tumor. Enlarged 13 mm left upper lobe nodule previously measured 9 mm. Other subcentimeter left upper lobe nodules are unchanged. FDG-avid mediastinal and retroperitoneal nodes are now all subcentimeter in size. Small right pleural effusion with diffuse right-sided pleural thickening. Diffuse omental caking less apparent than prior studies. Healing left posterior fifth rib pathologic fracture. Workstation performed: [id number]         Assessment and Plan : Patient is a 63 year old female with a history of stage IV lung cancer with peritoneal carcinomatosis, malignant pleural effusion on systemic treatment with Taxotere and ramucirumab.    Her disease is overall stable based on recent imaging.   Small bowel obstruction improving.  She was neutropenic on admission, status post 3 doses of Granix.  Granix last given on 1/23/2024  White count up to 17.9 today.  Pro-Adan mildly elevated     Chemo on hold.  She is scheduled to see Dr. Phipps for hospital follow-up on 1/29/2024    Please continue to closely monitor CBC with differential daily and reach out with any questions      I spent 30 minutes in chart review, face to face counseling, coordination of care and documentation.

## 2024-01-25 NOTE — PROGRESS NOTES
"[facility]  Progress Note  Name: Kathi Ferris I  MRN: [id number]  Unit/Bed#: -01 I Date of Admission: 1/21/2024   Date of Service: 1/25/2024 I Hospital Day: 4    Assessment/Plan   Sepsis ([facility])  Assessment & Plan  Patient admitted with sepsis with leukopenia and tachycardia  Source-pneumonia versus abdominal  ER gave cefepime and vancomycin  Continue on cefepime, add flgyl  Cx negative  F/u CBC, fever      Chronic respiratory failure with hypoxia ([facility])  Assessment & Plan  Patient has chronic respiratory failure with hypoxia and is on 2.5 to 3 L of supplemental oxygen at home   Continue oxygen supplementation  Monitor respiratory status    Generalized anxiety disorder  Assessment & Plan  Home medications Ativan, Wellbutrin, Zoloft   restart    Malignant pleural effusion  Assessment & Plan  With right lung asept catheter in place, drain 2x weekly by VNA  Monitor resp status    Non-small cell cancer of right lung ([facility])  Assessment & Plan  Patient has history of stage IV metastatic lung cancer on treatment with Taxotere and ramucirumab.  She has findings of a new left pleural effusion status post thoracentesis on 12/17/2023.    Last chemotherapy on January 11, 2024  Follows up with Dr. Phipps as outpatient  Pathologic left posterior fifth rib fracture and right second rib mets   Pain management  Continue outpatient chemotherapy      Essential hypertension  Assessment & Plan  Home regimen: Micardis 80 mg, verapamil 360 mg daily, Lasix 20 mg daily, Lopressor  Will retsart    COPD (chronic obstructive pulmonary disease) ([facility])  Assessment & Plan  Not in acute exacerbation  Continue inhalers    * SBO (small bowel obstruction) ([facility])  Assessment & Plan  Patient presented with nausea and vomiting  CT chest abdomen pelvis showed-\"  Acute small bowel obstruction with point of transition in the right lower quadrant, likely on the basis of serosal implants versus adhesions.  2.  Stable " "appearance of right infrahilar lung cancer with findings of lymphangitic carcinomatosis. Stable left lung groundglass and solid nodules likely representing metastases.  3.  Small bilateral pleural effusions with right chest tube in place.  Pathologic left posterior fifth rib fracture with surrounding callus. Right second rib metastasis is better appreciated on the PET/CT   4.  Moderate volume of abdominopelvic ascites with similar findings of peritoneal carcinomatosis.\"  Surgical consult appreciated  NG tube removed today  Clears started  Will start Flgyl for intrabdominal   DC vanco- Cx negative for 48 hours      Neutropenia ([facility])-resolved as of 1/23/2024  Assessment & Plan  Patient noted to have neutropenia on admission  Will start on filgrastim x 3 doses  Trend CBC daily  Oncology consult  Continue on IV antibiotics               VTE Pharmacologic Prophylaxis: VTE Score: 8 Moderate Risk (Score 3-4) - Pharmacological DVT Prophylaxis Ordered: enoxaparin (Lovenox).    Mobility:   Basic Mobility Inpatient Raw Score: 21  JH-HLM Goal: 6: Walk 10 steps or more  JH-HLM Achieved: 7: Walk 25 feet or more  HLM Goal achieved. Continue to encourage appropriate mobility.    Patient Centered Rounds: I performed bedside rounds with nursing staff today.   Discussions with Specialists or Other Care Team Provider: ID, heme- onc    Education and Discussions with Family / Patient: Updated  () at bedside.    Total Time Spent on Date of Encounter in care of patient: 50 mins. This time was spent on one or more of the following: performing physical exam; counseling and coordination of care; obtaining or reviewing history; documenting in the medical record; reviewing/ordering tests, medications or procedures; communicating with other healthcare professionals and discussing with patient's family/caregivers.    Current Length of Stay: 4 day(s)  Current Patient Status: Inpatient   Certification Statement: The patient " will continue to require additional inpatient hospital stay due to pending improvement  Discharge Plan:  not stbale    Code Status: Level 3 - DNAR and DNI    Subjective:   Patient stated abdominal pain is better, worried about her potassium, white count this morning    Objective:     Vitals:   Temp (24hrs), Av °F (36.1 °C), Min:97 °F (36.1 °C), Max:97 °F (36.1 °C)    Temp:  [97 °F (36.1 °C)] 97 °F (36.1 °C)  HR:  [] 83  Resp:  [16-18] 16  BP: (108-160)/(56-86) 108/56  SpO2:  [93 %-99 %] 93 %  Body mass index is 29.83 kg/m².     Input and Output Summary (last 24 hours):     Intake/Output Summary (Last 24 hours) at 2024 0804  Last data filed at 2024 1808  Gross per 24 hour   Intake --   Output 200 ml   Net -200 ml       Physical Exam:   Physical Exam   Gen.-Patient comfortable   Neck- Supple. No thyromegaly or lymphadenopathy  Lungs-Clear bilaterally without any wheeze or rales   Heart S1-S2, regular rate and rhythm, no murmurs  Abdomen-soft nontender, no organomegaly. Bowel sounds present  Extremities-no cyanosi,  clubbing or edema  Skin- no rash  Neuro-nonfocal     Additional Data:     Labs:  Results from last 7 days   Lab Units 24  0511 24  0237 24  0459 24  0540   WBC Thousand/uL 17.90* 15.75*   < > 2.26*   HEMOGLOBIN g/dL 9.9* 10.0*   < > 10.0*   HEMATOCRIT % 31.2* 32.1*   < > 32.2*   PLATELETS Thousands/uL 366 439*   < > 406*   BANDS PCT %  --  3  --   --    NEUTROS PCT %  --   --   --  21*   LYMPHS PCT %  --   --   --  28   LYMPHO PCT %  --  9*   < >  --    MONOS PCT %  --   --   --  50*   MONO PCT %  --  7   < >  --    EOS PCT %  --  0   < > 0    < > = values in this interval not displayed.     Results from last 7 days   Lab Units 24  0511   SODIUM mmol/L 134*   POTASSIUM mmol/L 2.8*   CHLORIDE mmol/L 99   CO2 mmol/L 28   BUN mg/dL 15   CREATININE mg/dL 0.60   ANION GAP mmol/L 7   CALCIUM mg/dL 8.1*   ALBUMIN g/dL 2.7*   TOTAL BILIRUBIN mg/dL 0.21   ALK PHOS  U/L 97   ALT U/L 7   AST U/L 11*   GLUCOSE RANDOM mg/dL 109     Results from last 7 days   Lab Units 01/22/24  0540   INR  1.05             Results from last 7 days   Lab Units 01/25/24  0511 01/22/24  0540 01/21/24  0844   LACTIC ACID mmol/L  --   --  1.4   PROCALCITONIN ng/ml 0.31* 0.11 0.08       Lines/Drains:  Invasive Devices       Central Venous Catheter Line  Duration             Port A Cath 04/18/23 Right Subclavian 282 days              Peripheral Intravenous Line  Duration             Peripheral IV 01/24/24 Left Antecubital <1 day              Drain  Duration             Pleural Effusion Long-Term Catheter 15.5 Fr. 274 days                    Central Line:  Goal for removal: N/A - Chronic PICC             Imaging: Reviewed radiology reports from this admission including: chest xray    Recent Cultures (last 7 days):   Results from last 7 days   Lab Units 01/21/24  0844   BLOOD CULTURE  No Growth at 72 hrs.  No Growth at 72 hrs.       Last 24 Hours Medication List:   Current Facility-Administered Medications   Medication Dose Route Frequency Provider Last Rate    albuterol  2 puff Inhalation Q6H PRN Ronel Menezes PA-C      albuterol  2.5 mg Nebulization Q6H PRN Toan Yu MD      atorvastatin  20 mg Oral Daily Noman Stauffer MD      buPROPion  300 mg Oral Daily Noman Stauffer MD      cefepime  2,000 mg Intravenous Q8H Noman Stauffer MD 2,000 mg (01/25/24 0140)    enoxaparin  40 mg Subcutaneous Daily Toan Yu MD      fluticasone-vilanterol  1 puff Inhalation Daily Toan Yu MD      LORazepam  0.5 mg Oral Daily PRN Noman Stauffer MD      losartan  100 mg Oral Daily Noman Stauffer MD      metoprolol tartrate  25 mg Oral Q12H ULISES Noman Stauffer MD      metroNIDAZOLE  500 mg Oral Q8H ULISES Noman Stauffer MD      ondansetron  4 mg Intravenous Q6H PRN Toan Yu MD      pantoprazole  40 mg Intravenous Q24H ULISES Edel Gamino PA-C      phenol  1 spray Mouth/Throat Q2H  NILDA Gamino, JOHN      potassium chloride  40 mEq Oral BID Marga Mohan, JOHN      potassium chloride  20 mEq Intravenous Once Noman Stauffer MD      sertraline  100 mg Oral Daily Noman Stauffer MD      verapamil  120 mg Oral HS Noman Stauffer MD      verapamil  240 mg Oral HS Noman Stauffer MD          Today, Patient Was Seen By: Noman Stauffer MD    **Please Note: This note may have been constructed using a voice recognition system.**

## 2024-01-25 NOTE — PLAN OF CARE
Problem: Potential for Falls  Goal: Patient will remain free of falls  Description: INTERVENTIONS:  - Educate patient/family on patient safety including physical limitations  - Instruct patient to call for assistance with activity   - Consult OT/PT to assist with strengthening/mobility   - Keep Call bell within reach  - Keep bed low and locked with side rails adjusted as appropriate  - Keep care items and personal belongings within reach  - Initiate and maintain comfort rounds  - Make Fall Risk Sign visible to staff  - Offer Toileting every 2 Hours, in advance of need  - Initiate/Maintain 2alarm  - Obtain necessary fall risk management equipment: 2  - Apply yellow socks and bracelet for high fall risk patients  - Consider moving patient to room near nurses station  Outcome: Progressing     Problem: PAIN - ADULT  Goal: Verbalizes/displays adequate comfort level or baseline comfort level  Description: Interventions:  - Encourage patient to monitor pain and request assistance  - Assess pain using appropriate pain scale  - Administer analgesics based on type and severity of pain and evaluate response  - Implement non-pharmacological measures as appropriate and evaluate response  - Consider cultural and social influences on pain and pain management  - Notify physician/advanced practitioner if interventions unsuccessful or patient reports new pain  Outcome: Progressing     Problem: INFECTION - ADULT  Goal: Absence or prevention of progression during hospitalization  Description: INTERVENTIONS:  - Assess and monitor for signs and symptoms of infection  - Monitor lab/diagnostic results  - Monitor all insertion sites, i.e. indwelling lines, tubes, and drains  - Monitor endotracheal if appropriate and nasal secretions for changes in amount and color  - [place] appropriate cooling/warming therapies per order  - Administer medications as ordered  - Instruct and encourage patient and family to use good hand hygiene  technique  - Identify and instruct in appropriate isolation precautions for identified infection/condition  Outcome: Progressing  Goal: Absence of fever/infection during neutropenic period  Description: INTERVENTIONS:  - Monitor WBC    Outcome: Progressing     Problem: SAFETY ADULT  Goal: Patient will remain free of falls  Description: INTERVENTIONS:  - Educate patient/family on patient safety including physical limitations  - Instruct patient to call for assistance with activity   - Consult OT/PT to assist with strengthening/mobility   - Keep Call bell within reach  - Keep bed low and locked with side rails adjusted as appropriate  - Keep care items and personal belongings within reach  - Initiate and maintain comfort rounds  - Make Fall Risk Sign visible to staff  - Offer Toileting every 2 Hours, in advance of need  - Initiate/Maintain 2alarm  - Obtain necessary fall risk management equipment: 2  - Apply yellow socks and bracelet for high fall risk patients  - Consider moving patient to room near nurses station  Outcome: Progressing  Goal: Maintain or return to baseline ADL function  Description: INTERVENTIONS:  - Educate patient/family on patient safety including physical limitations  - Instruct patient to call for assistance with activity   - Consult OT/PT to assist with strengthening/mobility   - Keep Call bell within reach  - Keep bed low and locked with side rails adjusted as appropriate  - Keep care items and personal belongings within reach  - Initiate and maintain comfort rounds  - Make Fall Risk Sign visible to staff  - Offer Toileting every 2 Hours, in advance of need  - Initiate/Maintain 2alarm  - Obtain necessary fall risk management equipment: 2  - Apply yellow socks and bracelet for high fall risk patients  - Consider moving patient to room near nurses station  Outcome: Progressing     Problem: DISCHARGE PLANNING  Goal: Discharge to home or other facility with appropriate resources  Description:  INTERVENTIONS:  - Identify barriers to discharge w/patient and caregiver  - Arrange for needed discharge resources and transportation as appropriate  - Identify discharge learning needs (meds, wound care, etc.)  - Arrange for interpretive services to assist at discharge as needed  - Refer to Case Management Department for coordinating discharge planning if the patient needs post-hospital services based on physician/advanced practitioner order or complex needs related to functional status, cognitive ability, or social support system  Outcome: Progressing     Problem: Knowledge Deficit  Goal: Patient/family/caregiver demonstrates understanding of disease process, treatment plan, medications, and discharge instructions  Description: Complete learning assessment and assess knowledge base.  Interventions:  - Provide teaching at level of understanding  - Provide teaching via preferred learning methods  Outcome: Progressing     Problem: RESPIRATORY - ADULT  Goal: Achieves optimal ventilation and oxygenation  Description: INTERVENTIONS:  - Assess for changes in respiratory status  - Assess for changes in mentation and behavior  - Position to facilitate oxygenation and minimize respiratory effort  - Oxygen administered by appropriate delivery if ordered  - Initiate smoking cessation education as indicated  - Encourage broncho-pulmonary hygiene including cough, deep breathe, Incentive Spirometry  - Assess the need for suctioning and aspirate as needed  - Assess and instruct to report SOB or any respiratory difficulty  - Respiratory Therapy support as indicated  Outcome: Progressing     Problem: GASTROINTESTINAL - ADULT  Goal: Minimal or absence of nausea and/or vomiting  Description: INTERVENTIONS:  - Administer IV fluids if ordered to ensure adequate hydration  - Maintain NPO status until nausea and vomiting are resolved  - Nasogastric tube if ordered  - Administer ordered antiemetic medications as needed  - Provide  nonpharmacologic comfort measures as appropriate  - Advance diet as tolerated, if ordered  - Consider nutrition services referral to assist patient with adequate nutrition and appropriate food choices  Outcome: Progressing  Goal: Maintains or returns to baseline bowel function  Description: INTERVENTIONS:  - Assess bowel function  - Encourage oral fluids to ensure adequate hydration  - Administer IV fluids if ordered to ensure adequate hydration  - Administer ordered medications as needed  - Encourage mobilization and activity  - Consider nutritional services referral to assist patient with adequate nutrition and appropriate food choices  Outcome: Progressing  Goal: Maintains adequate nutritional intake  Description: INTERVENTIONS:  - Monitor percentage of each meal consumed  - Identify factors contributing to decreased intake, treat as appropriate  - Assist with meals as needed  - Monitor I&O, weight, and lab values if indicated  - Obtain nutrition services referral as needed  Outcome: Progressing  Goal: Establish and maintain optimal ostomy function  Description: INTERVENTIONS:  - Assess bowel function  - Encourage oral fluids to ensure adequate hydration  - Administer IV fluids if ordered to ensure adequate hydration   - Administer ordered medications as needed  - Encourage mobilization and activity  - Nutrition services referral to assist patient with appropriate food choices  - Assess stoma site  - Consider wound care consult   Outcome: Progressing  Goal: Oral mucous membranes remain intact  Description: INTERVENTIONS  - Assess oral mucosa and hygiene practices  - Implement preventative oral hygiene regimen  - Implement oral medicated treatments as ordered  - Initiate Nutrition services referral as needed  Outcome: Progressing     Problem: METABOLIC, FLUID AND ELECTROLYTES - ADULT  Goal: Electrolytes maintained within normal limits  Description: INTERVENTIONS:  - Monitor labs and assess patient for signs and  symptoms of electrolyte imbalances  - Administer electrolyte replacement as ordered  - Monitor response to electrolyte replacements, including repeat lab results as appropriate  - Instruct patient on fluid and nutrition as appropriate  Outcome: Progressing  Goal: Fluid balance maintained  Description: INTERVENTIONS:  - Monitor labs   - Monitor I/O and WT  - Instruct patient on fluid and nutrition as appropriate  - Assess for signs & symptoms of volume excess or deficit  Outcome: Progressing  Goal: Glucose maintained within target range  Description: INTERVENTIONS:  - Monitor Blood Glucose as ordered  - Assess for signs and symptoms of hyperglycemia and hypoglycemia  - Administer ordered medications to maintain glucose within target range  - Assess nutritional intake and initiate nutrition service referral as needed  Outcome: Progressing     Problem: Nutrition/Hydration-ADULT  Goal: Nutrient/Hydration intake appropriate for improving, restoring or maintaining nutritional needs  Description: Monitor and assess patient's nutrition/hydration status for malnutrition. Collaborate with interdisciplinary team and initiate plan and interventions as ordered.  Monitor patient's weight and dietary intake as ordered or per policy. Utilize nutrition screening tool and intervene as necessary. Determine patient's food preferences and provide high-protein, high-caloric foods as appropriate.     INTERVENTIONS:  - Monitor oral intake, urinary output, labs, and treatment plans  - Assess nutrition and hydration status and recommend course of action  - Evaluate amount of meals eaten  - Assist patient with eating if necessary   - Allow adequate time for meals  - Recommend/ encourage appropriate diets, oral nutritional supplements, and vitamin/mineral supplements  - Order, calculate, and assess calorie counts as needed  - Recommend, monitor, and adjust tube feedings and TPN/PPN based on assessed needs  - Assess need for intravenous  fluids  - Provide specific nutrition/hydration education as appropriate  - Include patient/family/caregiver in decisions related to nutrition  Outcome: Progressing     Problem: Prexisting or High Potential for Compromised Skin Integrity  Goal: Skin integrity is maintained or improved  Description: INTERVENTIONS:  - Identify patients at risk for skin breakdown  - Assess and monitor skin integrity  - Assess and monitor nutrition and hydration status  - Monitor labs   - Assess for incontinence   - Turn and reposition patient  - Assist with mobility/ambulation  - Relieve pressure over bony prominences  - Avoid friction and shearing  - Provide appropriate hygiene as needed including keeping skin clean and dry  - Evaluate need for skin moisturizer/barrier cream  - Collaborate with interdisciplinary team   - Patient/family teaching  - Consider wound care consult   Outcome: Progressing

## 2024-01-25 NOTE — PROGRESS NOTES
Pastoral Care Progress Note    2024  Patient: Kathi Ferris : 1960  Admission Date & Time: 2024 0826  MRN: [id number] [facility]: [id number]       24 1000   Clinical Encounter Type   Visited With Patient and family together   Routine Visit Introduction   Referral From Verbal;Nurse   Referral To    Jainism Encounters   Jainism Needs Prayer;Jainism articles;Sacred Text        Intern visited with pt based on nurse referral.  Intern introduced themself to the pt and pt's  and discussed her feelings on being in the hospital. Pt expressed that she is feeling well today and  Intern noted that she appeared in high spirits.  Intern asked the pt what sort of spiritual resources she needed. Pt expressed an interest in prayer, a devotional booklet, and a rosary.  Intern brought the the devotional booklet and rosary to the pt and prayed with her. Pt expressed gratitude for the visit.  Intern informed pt on how to contact chaplains if needed. Spiritual care remains available.               Chaplaincy Interventions Utilized:   Empowerment: Encouraged self-care and Provided education regarding spiritual practice(s)    Exploration: Explored relational needs & resources, Explored spiritual needs & resources, Facilitated story telling, and Identified, evaluated & reinforced appropriate coping strategies    Collaboration: Facilitated respect for spiritual/cultural practice during hospitalization    Relationship Building: Cultivated a relationship of care and support, Listened empathically, Hospitality, and Provided silent and supportive presence    Ritual: Provided prayer, Provided Scientology resources, and Read sacred text      Chaplaincy Outcomes Achieved:  Emotional resources utilized, Expressed gratitude, Identified meaningful connections, and Spiritual resources utilized    Spiritual Coping Strategies Utilized:   Spiritual practices, Spiritual  comfort, Spiritual empowerment, and Spiritual gratitude

## 2024-01-25 NOTE — CONSULTS
Consultation - Infectious Disease   Kathi Ferris 63 y.o. female MRN: [id number]  Unit/Bed#: -01 Encounter: [id number]      Assessment/Plan     Assessment:  63-year-old woman with stage IV NSCLC, initial neutropenia but now leukocytosis, acute kidney injury, small bowel obstruction    Plan:  1) Leukocytosis - Though patient initially with neutropenia, she seems to responded more than adequately to administration of G-CSF. Fortunately, admission cultures remain negative and patient remains afebrile. Given that SBO is also resolving, very low clinical suspicion for ongoing infection so we will discontinue antibiotics and observe patient off of same. Should she become febrile after discontinuation of antimicrobials, would reculture and resume treatment.    ===> Low suspicion for infection, will DISCONTINUE ANTIBIOTICS and observe off of same.    ===> Reculture if febrile    ===> White blood cell count in and of itself unlikely to be reliable indicator of infection given recent G-CSF administration as discussed above.  Will continue to trend.    2) LEE - Will dose abx for pt's renal function if needed, and avoid nephrotoxic agents as able. Suspect secondary to dehydration, regardless this is improving.    3) SBO - Resolving, mgmt. per 1'.      ===> Discussed w/ 1' team   ===> Will follow     History of Present Illness   Physician Requesting Consult: Noman Stauffer MD    HPI:   Briefly, 63-year-old woman with COPD as well as metastatic non-small cell lung cancer presented to the [facility] emergency department on 1/21 complaining about 4 days of worsening and severe nausea, vomiting, and abdominal pain. Workup at the time of admission was notable for neutropenia (white blood cell count of 1570) however this is increased to 17,900 after administration of G-CSF. Patient was also noted to have acute kidney injury on admission (creatinine 1.26), however this is improved to 0.6 with administration of IV fluids. Hospital  course is complicated by small bowel obstruction which appears to be resolving. Presently, patient is taking p.o. and reports good bowel function. Blood and urine cultures drawn on admission are unremarkable, and CT scan done in the emergency department was negative with respect to acute findings. Owing to neutropenia present on admission patient was placed on vancomycin and cefepime, and metronidazole was added due to small bowel obstruction. Patient remains on these agents, but reports significant improvement clinically compared with the time of admission. Review of available records within the [facility] EMR shows no history of extensively drug-resistant organisms.    Inpatient consult to Infectious Diseases  Consult performed by: Anirudh Dugan MD  Consult ordered by: Noman Stauffer MD          Review of Systems  A complete review of systems was done and is negative except as per the HPI.    Historical Information   Past Medical History:   Diagnosis Date    Cancer (HCC)     COPD (chronic obstructive pulmonary disease) (HCC)     Emphysema of lung (HCC)     Hyperlipidemia     Hypertension     Lung cancer (HCC) 06/26/2019    right lower lobe removed    Lung nodule     Pleural effusion      Past Surgical History:   Procedure Laterality Date    APPENDECTOMY      BREAST BIOPSY Right     benign    BRONCHOSCOPY  06/2019    COLON SURGERY      HYSTERECTOMY      age 46    IR PLEURAL EFFUSION LONG-TERM CATHETER PLACEMENT  04/25/2023    IR PORT PLACEMENT  04/18/2023    IR THORACENTESIS  03/24/2023    IR THORACENTESIS  04/12/2023    IR THORACENTESIS  04/18/2023    LUNG BIOPSY  06/2019    LUNG LOBECTOMY      OOPHORECTOMY Bilateral     age 46     Social History   Social History     Substance and Sexual Activity   Alcohol Use Not Currently    Comment: Rarely drink, socially only     Social History     Substance and Sexual Activity   Drug Use No     E-Cigarette/Vaping    E-Cigarette Use Never User      E-Cigarette/Vaping  Substances    Nicotine No     THC No     CBD No     Flavoring No     Other No     Unknown No      Social History     Tobacco Use   Smoking Status Former    Current packs/day: 0.00    Average packs/day: 1.5 packs/day for 53.2 years (79.8 ttl pk-yrs)    Types: Cigarettes    Start date:     Quit date: 3/15/2023    Years since quittin.8   Smokeless Tobacco Never     Family History: non-contributory    Meds/Allergies   all current active meds have been reviewed    Allergies   Allergen Reactions    Amoxicillin Hives    Vilazodone Hcl Nausea Only and Dizziness     (Viibryd)    Wasp Venom Swelling    Zithromax [Azithromycin] Hives       Objective       Intake/Output Summary (Last 24 hours) at 2024 1138  Last data filed at 2024 1808  Gross per 24 hour   Intake --   Output 200 ml   Net -200 ml       Invasive Devices:   Port A Cath 23 Right Subclavian (Active)   Access Date 24 1123   Access Time 11224 1123   Accessed by: SHANE 24 1123   Size (Gauge) 20 G 24 1123   Needle Length  0.75 inches 24 1123   Reasons to continue Port Access Other (comment) 24   Line Necessity Reviewed Yes, reviewed with provider 24   Site Assessment WDL;Clean;Intact;Dry 24 1123   Line Status No blood return;Flushed;Blood return noted 24   Dressing Type Gauze 24   Dressing Status Clean;Dry;Intact 24   Flush Performed Yes 24   De-Accessed Date 24 1123   De-Accessed Time 1126 24 1123   De-accessed by: SHANE 24 1123       Peripheral IV 24 Left Antecubital (Active)   Site Assessment WDL;Clean;Dry;Intact 24   Dressing Type Transparent 24   Line Status Flushed;Infusing 24   Dressing Status Clean;Dry;Intact 24   Dressing Change Due 24   Reason Not Rotated Not due 24       Pleural Effusion Long-Term Catheter 15.5 Fr. (Active)    Site Assessment Unable to assess 01/24/24 0800   Securement Method Tape 01/24/24 0800   Output (mL) 150 mL 01/22/24 1509       Physical Exam  Gen: AA, NAD, VS reviewed  ENT: MMM  CV: No m/r/g, S1, S2  Pulm: Lungs CTAB, no respiratory distress  ABD: S/NT/ND  Skin: No rash on exposed skin  Psych: Oriented, nl. affect    Lab Results: I have personally reviewed pertinent labs.  Imaging Studies: I have personally reviewed pertinent reports.   and I have personally reviewed pertinent films in PACS  EKG, Pathology, and Other Studies: I have personally reviewed pertinent reports.

## 2024-01-25 NOTE — PLAN OF CARE
Problem: Potential for Falls  Goal: Patient will remain free of falls  Description: INTERVENTIONS:  - Educate patient/family on patient safety including physical limitations  - Instruct patient to call for assistance with activity   - Consult OT/PT to assist with strengthening/mobility   - Keep Call bell within reach  - Keep bed low and locked with side rails adjusted as appropriate  - Keep care items and personal belongings within reach  - Initiate and maintain comfort rounds  - Make Fall Risk Sign visible to staff  - Offer Toileting every 2 Hours, in advance of need  - Initiate/Maintain 2alarm  - Obtain necessary fall risk management equipment: 2  - Apply yellow socks and bracelet for high fall risk patients  - Consider moving patient to room near nurses station  Outcome: Progressing     Problem: PAIN - ADULT  Goal: Verbalizes/displays adequate comfort level or baseline comfort level  Description: Interventions:  - Encourage patient to monitor pain and request assistance  - Assess pain using appropriate pain scale  - Administer analgesics based on type and severity of pain and evaluate response  - Implement non-pharmacological measures as appropriate and evaluate response  - Consider cultural and social influences on pain and pain management  - Notify physician/advanced practitioner if interventions unsuccessful or patient reports new pain  Outcome: Progressing     Problem: INFECTION - ADULT  Goal: Absence or prevention of progression during hospitalization  Description: INTERVENTIONS:  - Assess and monitor for signs and symptoms of infection  - Monitor lab/diagnostic results  - Monitor all insertion sites, i.e. indwelling lines, tubes, and drains  - Monitor endotracheal if appropriate and nasal secretions for changes in amount and color  - [place] appropriate cooling/warming therapies per order  - Administer medications as ordered  - Instruct and encourage patient and family to use good hand hygiene  technique  - Identify and instruct in appropriate isolation precautions for identified infection/condition  Outcome: Progressing  Goal: Absence of fever/infection during neutropenic period  Description: INTERVENTIONS:  - Monitor WBC    Outcome: Progressing     Problem: SAFETY ADULT  Goal: Patient will remain free of falls  Description: INTERVENTIONS:  - Educate patient/family on patient safety including physical limitations  - Instruct patient to call for assistance with activity   - Consult OT/PT to assist with strengthening/mobility   - Keep Call bell within reach  - Keep bed low and locked with side rails adjusted as appropriate  - Keep care items and personal belongings within reach  - Initiate and maintain comfort rounds  - Make Fall Risk Sign visible to staff  - Offer Toileting every 2 Hours, in advance of need  - Initiate/Maintain 2alarm  - Obtain necessary fall risk management equipment: 2  - Apply yellow socks and bracelet for high fall risk patients  - Consider moving patient to room near nurses station  Outcome: Progressing  Goal: Maintain or return to baseline ADL function  Description: INTERVENTIONS:  - Educate patient/family on patient safety including physical limitations  - Instruct patient to call for assistance with activity   - Consult OT/PT to assist with strengthening/mobility   - Keep Call bell within reach  - Keep bed low and locked with side rails adjusted as appropriate  - Keep care items and personal belongings within reach  - Initiate and maintain comfort rounds  - Make Fall Risk Sign visible to staff  - Offer Toileting every 2 Hours, in advance of need  - Initiate/Maintain 2alarm  - Obtain necessary fall risk management equipment: 2  - Apply yellow socks and bracelet for high fall risk patients  - Consider moving patient to room near nurses station  Outcome: Progressing  Goal: Maintains/Returns to pre admission functional level  Description: INTERVENTIONS:  - Perform AM-PAC 6 Click Basic  Mobility/ Daily Activity assessment daily.  - Set and communicate daily mobility goal to care team and patient/family/caregiver.   - Collaborate with rehabilitation services on mobility goals if consulted  - Perform Range of Motion 3 times a day.  - Stand patient 3 times a day  - Ambulate patient 3 times a day  - Out of bed to chair 3 times a day   - Out of bed for meals 3 times a day  - Out of bed for toileting  - Record patient progress and toleration of activity level   Outcome: Progressing     Problem: DISCHARGE PLANNING  Goal: Discharge to home or other facility with appropriate resources  Description: INTERVENTIONS:  - Identify barriers to discharge w/patient and caregiver  - Arrange for needed discharge resources and transportation as appropriate  - Identify discharge learning needs (meds, wound care, etc.)  - Arrange for interpretive services to assist at discharge as needed  - Refer to Case Management Department for coordinating discharge planning if the patient needs post-hospital services based on physician/advanced practitioner order or complex needs related to functional status, cognitive ability, or social support system  Outcome: Progressing     Problem: Knowledge Deficit  Goal: Patient/family/caregiver demonstrates understanding of disease process, treatment plan, medications, and discharge instructions  Description: Complete learning assessment and assess knowledge base.  Interventions:  - Provide teaching at level of understanding  - Provide teaching via preferred learning methods  Outcome: Progressing     Problem: GASTROINTESTINAL - ADULT  Goal: Minimal or absence of nausea and/or vomiting  Description: INTERVENTIONS:  - Administer IV fluids if ordered to ensure adequate hydration  - Maintain NPO status until nausea and vomiting are resolved  - Nasogastric tube if ordered  - Administer ordered antiemetic medications as needed  - Provide nonpharmacologic comfort measures as appropriate  - Advance  diet as tolerated, if ordered  - Consider nutrition services referral to assist patient with adequate nutrition and appropriate food choices  Outcome: Progressing  Goal: Maintains or returns to baseline bowel function  Description: INTERVENTIONS:  - Assess bowel function  - Encourage oral fluids to ensure adequate hydration  - Administer IV fluids if ordered to ensure adequate hydration  - Administer ordered medications as needed  - Encourage mobilization and activity  - Consider nutritional services referral to assist patient with adequate nutrition and appropriate food choices  Outcome: Progressing  Goal: Maintains adequate nutritional intake  Description: INTERVENTIONS:  - Monitor percentage of each meal consumed  - Identify factors contributing to decreased intake, treat as appropriate  - Assist with meals as needed  - Monitor I&O, weight, and lab values if indicated  - Obtain nutrition services referral as needed  Outcome: Progressing  Goal: Establish and maintain optimal ostomy function  Description: INTERVENTIONS:  - Assess bowel function  - Encourage oral fluids to ensure adequate hydration  - Administer IV fluids if ordered to ensure adequate hydration   - Administer ordered medications as needed  - Encourage mobilization and activity  - Nutrition services referral to assist patient with appropriate food choices  - Assess stoma site  - Consider wound care consult   Outcome: Progressing  Goal: Oral mucous membranes remain intact  Description: INTERVENTIONS  - Assess oral mucosa and hygiene practices  - Implement preventative oral hygiene regimen  - Implement oral medicated treatments as ordered  - Initiate Nutrition services referral as needed  Outcome: Progressing     Problem: RESPIRATORY - ADULT  Goal: Achieves optimal ventilation and oxygenation  Description: INTERVENTIONS:  - Assess for changes in respiratory status  - Assess for changes in mentation and behavior  - Position to facilitate oxygenation and  minimize respiratory effort  - Oxygen administered by appropriate delivery if ordered  - Initiate smoking cessation education as indicated  - Encourage broncho-pulmonary hygiene including cough, deep breathe, Incentive Spirometry  - Assess the need for suctioning and aspirate as needed  - Assess and instruct to report SOB or any respiratory difficulty  - Respiratory Therapy support as indicated  Outcome: Progressing     Problem: Prexisting or High Potential for Compromised Skin Integrity  Goal: Skin integrity is maintained or improved  Description: INTERVENTIONS:  - Identify patients at risk for skin breakdown  - Assess and monitor skin integrity  - Assess and monitor nutrition and hydration status  - Monitor labs   - Assess for incontinence   - Turn and reposition patient  - Assist with mobility/ambulation  - Relieve pressure over bony prominences  - Avoid friction and shearing  - Provide appropriate hygiene as needed including keeping skin clean and dry  - Evaluate need for skin moisturizer/barrier cream  - Collaborate with interdisciplinary team   - Patient/family teaching  - Consider wound care consult   Outcome: Progressing

## 2024-01-26 ENCOUNTER — TELEPHONE (OUTPATIENT)
Dept: HEMATOLOGY ONCOLOGY | Facility: CLINIC | Age: 64
End: 2024-01-26

## 2024-01-26 LAB
ANION GAP SERPL CALCULATED.3IONS-SCNC: 4 MMOL/L
BACTERIA BLD CULT: NORMAL
BACTERIA BLD CULT: NORMAL
BUN SERPL-MCNC: 12 MG/DL (ref 5–25)
CALCIUM SERPL-MCNC: 8.2 MG/DL (ref 8.4–10.2)
CHLORIDE SERPL-SCNC: 103 MMOL/L (ref 96–108)
CO2 SERPL-SCNC: 26 MMOL/L (ref 21–32)
CREAT SERPL-MCNC: 0.6 MG/DL (ref 0.6–1.3)
ERYTHROCYTE [DISTWIDTH] IN BLOOD BY AUTOMATED COUNT: 17.3 % (ref 11.6–15.1)
GFR SERPL CREATININE-BSD FRML MDRD: 95 ML/MIN/1.73SQ M
GLUCOSE SERPL-MCNC: 89 MG/DL (ref 65–140)
HCT VFR BLD AUTO: 32.2 % (ref 34.8–46.1)
HGB BLD-MCNC: 10 G/DL (ref 11.5–15.4)
MCH RBC QN AUTO: 26.5 PG (ref 26.8–34.3)
MCHC RBC AUTO-ENTMCNC: 31.1 G/DL (ref 31.4–37.4)
MCV RBC AUTO: 85 FL (ref 82–98)
NRBC BLD AUTO-RTO: 0 /100 WBCS
PLATELET # BLD AUTO: 384 THOUSANDS/UL (ref 149–390)
PMV BLD AUTO: 9 FL (ref 8.9–12.7)
POTASSIUM SERPL-SCNC: 4.5 MMOL/L (ref 3.5–5.3)
RBC # BLD AUTO: 3.77 MILLION/UL (ref 3.81–5.12)
SODIUM SERPL-SCNC: 133 MMOL/L (ref 135–147)
WBC # BLD AUTO: 13.62 THOUSAND/UL (ref 4.31–10.16)

## 2024-01-26 PROCEDURE — 99233 SBSQ HOSP IP/OBS HIGH 50: CPT | Performed by: INTERNAL MEDICINE

## 2024-01-26 PROCEDURE — C9113 INJ PANTOPRAZOLE SODIUM, VIA: HCPCS | Performed by: PHYSICIAN ASSISTANT

## 2024-01-26 PROCEDURE — 99232 SBSQ HOSP IP/OBS MODERATE 35: CPT | Performed by: SURGERY

## 2024-01-26 PROCEDURE — 85027 COMPLETE CBC AUTOMATED: CPT | Performed by: INTERNAL MEDICINE

## 2024-01-26 PROCEDURE — 80048 BASIC METABOLIC PNL TOTAL CA: CPT | Performed by: INTERNAL MEDICINE

## 2024-01-26 RX ADMIN — FLUTICASONE FUROATE AND VILANTEROL TRIFENATATE 1 PUFF: 200; 25 POWDER RESPIRATORY (INHALATION) at 08:44

## 2024-01-26 RX ADMIN — ENOXAPARIN SODIUM 40 MG: 100 INJECTION SUBCUTANEOUS at 08:42

## 2024-01-26 RX ADMIN — POTASSIUM CHLORIDE 40 MEQ: 1500 TABLET, EXTENDED RELEASE ORAL at 17:46

## 2024-01-26 RX ADMIN — VERAPAMIL HYDROCHLORIDE 120 MG: 120 TABLET, FILM COATED, EXTENDED RELEASE ORAL at 21:53

## 2024-01-26 RX ADMIN — ALBUTEROL SULFATE 2 PUFF: 90 AEROSOL, METERED RESPIRATORY (INHALATION) at 08:58

## 2024-01-26 RX ADMIN — BUPROPION HYDROCHLORIDE 300 MG: 300 TABLET, FILM COATED, EXTENDED RELEASE ORAL at 08:43

## 2024-01-26 RX ADMIN — LORAZEPAM 0.5 MG: 0.5 TABLET ORAL at 21:56

## 2024-01-26 RX ADMIN — METOPROLOL TARTRATE 25 MG: 25 TABLET, FILM COATED ORAL at 21:53

## 2024-01-26 RX ADMIN — POTASSIUM CHLORIDE 40 MEQ: 1500 TABLET, EXTENDED RELEASE ORAL at 08:43

## 2024-01-26 RX ADMIN — VERAPAMIL HYDROCHLORIDE 240 MG: 120 TABLET, FILM COATED, EXTENDED RELEASE ORAL at 21:54

## 2024-01-26 RX ADMIN — LOSARTAN POTASSIUM 100 MG: 50 TABLET, FILM COATED ORAL at 08:43

## 2024-01-26 RX ADMIN — PANTOPRAZOLE SODIUM 40 MG: 40 INJECTION, POWDER, FOR SOLUTION INTRAVENOUS at 08:42

## 2024-01-26 RX ADMIN — SERTRALINE 100 MG: 100 TABLET, FILM COATED ORAL at 08:43

## 2024-01-26 RX ADMIN — ATORVASTATIN CALCIUM 20 MG: 20 TABLET, FILM COATED ORAL at 17:46

## 2024-01-26 RX ADMIN — METOPROLOL TARTRATE 25 MG: 25 TABLET, FILM COATED ORAL at 08:43

## 2024-01-26 NOTE — PROGRESS NOTES
"Progress Note - General Surgery   Kathi Ferris 63 y.o. female MRN: [id number]  Unit/Bed#: -01 Encounter: [id number]    Assessment:  60-year-old female with Stage IV NSCLC admitted with SBO  -patient is tolerating diet, passing gas and having liquid bowel movements  -Abdominal pain resolved  LEE, resolved  Peritoneal carcinomatosis  Neutropenia on current chemotherapy, improving after G-CSF administration  COPD/Empysema  HTN  HLD     Plan:    Bowel obstruction appears currently resolved, continue soft diet as tolerated  Monitor bowel function and abdominal exam  No plans for surgical intervention  Continue chemotherapy as able  Discontinuation of antibiotics, WBCs unreliable due to G-CSF administration  Rest of care per primary service    Subjective/Objective   Chief Complaint: no c/o    Subjective: Patient is tolerating diet.  No distention or nausea.  Continues to have loose bowel movements.  No fevers or chills.    Objective:     Blood pressure 117/61, pulse 80, temperature 97.7 °F (36.5 °C), temperature source Temporal, resp. rate 17, height 5' 5\" (1.651 m), weight 84.5 kg (186 lb 4.6 oz), SpO2 95%.,Body mass index is 31 kg/m².    No intake or output data in the 24 hours ending 01/26/24 1059    Invasive Devices       Central Venous Catheter Line  Duration             Port A Cath 04/18/23 Right Subclavian 283 days              Peripheral Intravenous Line  Duration             Peripheral IV 01/24/24 Left Antecubital 1 day              Drain  Duration             Pleural Effusion Long-Term Catheter 15.5 Fr. 275 days                    Physical Exam:   General appearance: alert and oriented, in no acute distress  Head: Normocephalic, without obvious abnormality, atraumatic, sclerae anicteric, mucous membranes moist  Neck: no JVD and supple, symmetrical, trachea midline  Lungs: clear to auscultation, no wheezes or rales  Heart:   Regular rate and rhythm, S1-S2 normal, no murmur  Abdomen:   Flat, soft, " nontender, active bowel sounds  Extremities:   No edema, redness or tenderness in the calves or thighs  Skin: Warm, dry  Nursing notes and vital signs reviewed      Lab, Imaging and other studies:I have personally reviewed pertinent lab results.  , CBC:   Lab Results   Component Value Date    WBC 13.62 (H) 01/26/2024    HGB 10.0 (L) 01/26/2024    HCT 32.2 (L) 01/26/2024    MCV 85 01/26/2024     01/26/2024    RBC 3.77 (L) 01/26/2024    MCH 26.5 (L) 01/26/2024    MCHC 31.1 (L) 01/26/2024    RDW 17.3 (H) 01/26/2024    MPV 9.0 01/26/2024    NRBC 0 01/26/2024   , CMP:   Lab Results   Component Value Date    SODIUM 133 (L) 01/26/2024    K 4.5 01/26/2024     01/26/2024    CO2 26 01/26/2024    BUN 12 01/26/2024    CREATININE 0.60 01/26/2024    CALCIUM 8.2 (L) 01/26/2024    EGFR 95 01/26/2024     VTE Pharmacologic Prophylaxis: Heparin  VTE Mechanical Prophylaxis: sequential compression device    Brittany Anusha

## 2024-01-26 NOTE — PLAN OF CARE
Problem: Potential for Falls  Goal: Patient will remain free of falls  Description: INTERVENTIONS:  - Educate patient/family on patient safety including physical limitations  - Instruct patient to call for assistance with activity   - Consult OT/PT to assist with strengthening/mobility   - Keep Call bell within reach  - Keep bed low and locked with side rails adjusted as appropriate  - Keep care items and personal belongings within reach  - Initiate and maintain comfort rounds  - Make Fall Risk Sign visible to staff  - Offer Toileting every 2 Hours, in advance of need  - Initiate/Maintain 2alarm  - Obtain necessary fall risk management equipment: 2  - Apply yellow socks and bracelet for high fall risk patients  - Consider moving patient to room near nurses station  Outcome: Progressing     Problem: PAIN - ADULT  Goal: Verbalizes/displays adequate comfort level or baseline comfort level  Description: Interventions:  - Encourage patient to monitor pain and request assistance  - Assess pain using appropriate pain scale  - Administer analgesics based on type and severity of pain and evaluate response  - Implement non-pharmacological measures as appropriate and evaluate response  - Consider cultural and social influences on pain and pain management  - Notify physician/advanced practitioner if interventions unsuccessful or patient reports new pain  Outcome: Progressing     Problem: INFECTION - ADULT  Goal: Absence or prevention of progression during hospitalization  Description: INTERVENTIONS:  - Assess and monitor for signs and symptoms of infection  - Monitor lab/diagnostic results  - Monitor all insertion sites, i.e. indwelling lines, tubes, and drains  - Monitor endotracheal if appropriate and nasal secretions for changes in amount and color  - [place] appropriate cooling/warming therapies per order  - Administer medications as ordered  - Instruct and encourage patient and family to use good hand hygiene  technique  - Identify and instruct in appropriate isolation precautions for identified infection/condition  Outcome: Progressing  Goal: Absence of fever/infection during neutropenic period  Description: INTERVENTIONS:  - Monitor WBC    Outcome: Progressing     Problem: SAFETY ADULT  Goal: Patient will remain free of falls  Description: INTERVENTIONS:  - Educate patient/family on patient safety including physical limitations  - Instruct patient to call for assistance with activity   - Consult OT/PT to assist with strengthening/mobility   - Keep Call bell within reach  - Keep bed low and locked with side rails adjusted as appropriate  - Keep care items and personal belongings within reach  - Initiate and maintain comfort rounds  - Make Fall Risk Sign visible to staff  - Offer Toileting every 2 Hours, in advance of need  - Initiate/Maintain 2alarm  - Obtain necessary fall risk management equipment: 2  - Apply yellow socks and bracelet for high fall risk patients  - Consider moving patient to room near nurses station  Outcome: Progressing  Goal: Maintain or return to baseline ADL function  Description: INTERVENTIONS:  - Educate patient/family on patient safety including physical limitations  - Instruct patient to call for assistance with activity   - Consult OT/PT to assist with strengthening/mobility   - Keep Call bell within reach  - Keep bed low and locked with side rails adjusted as appropriate  - Keep care items and personal belongings within reach  - Initiate and maintain comfort rounds  - Make Fall Risk Sign visible to staff  - Offer Toileting every 2 Hours, in advance of need  - Initiate/Maintain 2alarm  - Obtain necessary fall risk management equipment: 2  - Apply yellow socks and bracelet for high fall risk patients  - Consider moving patient to room near nurses station  Outcome: Progressing  Goal: Maintains/Returns to pre admission functional level  Description: INTERVENTIONS:  - Perform AM-PAC 6 Click Basic  Mobility/ Daily Activity assessment daily.  - Set and communicate daily mobility goal to care team and patient/family/caregiver.   - Collaborate with rehabilitation services on mobility goals if consulted  - Perform Range of Motion 6 times a day.  - Reposition patient every 2 hours.  - Dangle patient x times a day  - Stand patient x times a day  - Ambulate patient x times a day  - Out of bed to chair x times a day   - Out of bed for meals x times a day  - Out of bed for toileting  - Record patient progress and toleration of activity level   Outcome: Progressing     Problem: DISCHARGE PLANNING  Goal: Discharge to home or other facility with appropriate resources  Description: INTERVENTIONS:  - Identify barriers to discharge w/patient and caregiver  - Arrange for needed discharge resources and transportation as appropriate  - Identify discharge learning needs (meds, wound care, etc.)  - Arrange for interpretive services to assist at discharge as needed  - Refer to Case Management Department for coordinating discharge planning if the patient needs post-hospital services based on physician/advanced practitioner order or complex needs related to functional status, cognitive ability, or social support system  Outcome: Progressing     Problem: Knowledge Deficit  Goal: Patient/family/caregiver demonstrates understanding of disease process, treatment plan, medications, and discharge instructions  Description: Complete learning assessment and assess knowledge base.  Interventions:  - Provide teaching at level of understanding  - Provide teaching via preferred learning methods  Outcome: Progressing     Problem: RESPIRATORY - ADULT  Goal: Achieves optimal ventilation and oxygenation  Description: INTERVENTIONS:  - Assess for changes in respiratory status  - Assess for changes in mentation and behavior  - Position to facilitate oxygenation and minimize respiratory effort  - Oxygen administered by appropriate delivery if  ordered  - Initiate smoking cessation education as indicated  - Encourage broncho-pulmonary hygiene including cough, deep breathe, Incentive Spirometry  - Assess the need for suctioning and aspirate as needed  - Assess and instruct to report SOB or any respiratory difficulty  - Respiratory Therapy support as indicated  Outcome: Progressing     Problem: GASTROINTESTINAL - ADULT  Goal: Minimal or absence of nausea and/or vomiting  Description: INTERVENTIONS:  - Administer IV fluids if ordered to ensure adequate hydration  - Maintain NPO status until nausea and vomiting are resolved  - Nasogastric tube if ordered  - Administer ordered antiemetic medications as needed  - Provide nonpharmacologic comfort measures as appropriate  - Advance diet as tolerated, if ordered  - Consider nutrition services referral to assist patient with adequate nutrition and appropriate food choices  Outcome: Progressing  Goal: Maintains or returns to baseline bowel function  Description: INTERVENTIONS:  - Assess bowel function  - Encourage oral fluids to ensure adequate hydration  - Administer IV fluids if ordered to ensure adequate hydration  - Administer ordered medications as needed  - Encourage mobilization and activity  - Consider nutritional services referral to assist patient with adequate nutrition and appropriate food choices  Outcome: Progressing  Goal: Maintains adequate nutritional intake  Description: INTERVENTIONS:  - Monitor percentage of each meal consumed  - Identify factors contributing to decreased intake, treat as appropriate  - Assist with meals as needed  - Monitor I&O, weight, and lab values if indicated  - Obtain nutrition services referral as needed  Outcome: Progressing  Goal: Establish and maintain optimal ostomy function  Description: INTERVENTIONS:  - Assess bowel function  - Encourage oral fluids to ensure adequate hydration  - Administer IV fluids if ordered to ensure adequate hydration   - Administer ordered  medications as needed  - Encourage mobilization and activity  - Nutrition services referral to assist patient with appropriate food choices  - Assess stoma site  - Consider wound care consult   Outcome: Progressing  Goal: Oral mucous membranes remain intact  Description: INTERVENTIONS  - Assess oral mucosa and hygiene practices  - Implement preventative oral hygiene regimen  - Implement oral medicated treatments as ordered  - Initiate Nutrition services referral as needed  Outcome: Progressing     Problem: METABOLIC, FLUID AND ELECTROLYTES - ADULT  Goal: Electrolytes maintained within normal limits  Description: INTERVENTIONS:  - Monitor labs and assess patient for signs and symptoms of electrolyte imbalances  - Administer electrolyte replacement as ordered  - Monitor response to electrolyte replacements, including repeat lab results as appropriate  - Instruct patient on fluid and nutrition as appropriate  Outcome: Progressing  Goal: Fluid balance maintained  Description: INTERVENTIONS:  - Monitor labs   - Monitor I/O and WT  - Instruct patient on fluid and nutrition as appropriate  - Assess for signs & symptoms of volume excess or deficit  Outcome: Progressing  Goal: Glucose maintained within target range  Description: INTERVENTIONS:  - Monitor Blood Glucose as ordered  - Assess for signs and symptoms of hyperglycemia and hypoglycemia  - Administer ordered medications to maintain glucose within target range  - Assess nutritional intake and initiate nutrition service referral as needed  Outcome: Progressing     Problem: Nutrition/Hydration-ADULT  Goal: Nutrient/Hydration intake appropriate for improving, restoring or maintaining nutritional needs  Description: Monitor and assess patient's nutrition/hydration status for malnutrition. Collaborate with interdisciplinary team and initiate plan and interventions as ordered.  Monitor patient's weight and dietary intake as ordered or per policy. Utilize nutrition screening  tool and intervene as necessary. Determine patient's food preferences and provide high-protein, high-caloric foods as appropriate.     INTERVENTIONS:  - Monitor oral intake, urinary output, labs, and treatment plans  - Assess nutrition and hydration status and recommend course of action  - Evaluate amount of meals eaten  - Assist patient with eating if necessary   - Allow adequate time for meals  - Recommend/ encourage appropriate diets, oral nutritional supplements, and vitamin/mineral supplements  - Order, calculate, and assess calorie counts as needed  - Recommend, monitor, and adjust tube feedings and TPN/PPN based on assessed needs  - Assess need for intravenous fluids  - Provide specific nutrition/hydration education as appropriate  - Include patient/family/caregiver in decisions related to nutrition  Outcome: Progressing     Problem: Prexisting or High Potential for Compromised Skin Integrity  Goal: Skin integrity is maintained or improved  Description: INTERVENTIONS:  - Identify patients at risk for skin breakdown  - Assess and monitor skin integrity  - Assess and monitor nutrition and hydration status  - Monitor labs   - Assess for incontinence   - Turn and reposition patient  - Assist with mobility/ambulation  - Relieve pressure over bony prominences  - Avoid friction and shearing  - Provide appropriate hygiene as needed including keeping skin clean and dry  - Evaluate need for skin moisturizer/barrier cream  - Collaborate with interdisciplinary team   - Patient/family teaching  - Consider wound care consult   Outcome: Progressing

## 2024-01-26 NOTE — TELEPHONE ENCOUNTER
I phoned the patient and left a VM message indicating that I was calling from [facility]'s Hematology/Oncology (Dr. Phipps's office) to remind her of her appointment on Monday 1/29 at 1300 with Dr. Phipps in the [facility] office. I provided the [facility] number as the office contact.

## 2024-01-26 NOTE — CASE MANAGEMENT
Case Management Discharge Planning Note    Patient name Kathi Ferris  Location /-01 MRN [id number]  : 1960 Date 2024       Current Admission Date: 2024  Current Admission Diagnosis:SBO (small bowel obstruction) ([facility])   Patient Active Problem List    Diagnosis Date Noted    SBO (small bowel obstruction) ([facility]) 2024    Sepsis ([facility]) 2024    Chronic respiratory failure with hypoxia ([facility]) 10/24/2023    Generalized anxiety disorder 10/06/2023    Hyponatremia 2023    Palliative care patient 2023    Malignant pleural effusion 2023    Dyspnea on exertion 2023    CINV (chemotherapy-induced nausea and vomiting) 2023    Non-small cell cancer of right lung ([facility]) 2023    Closed fracture of multiple ribs of left side 2022    Cigarette nicotine dependence in remission 2022    COPD (chronic obstructive pulmonary disease) ([facility]) 2022    Hypercholesterolemia 2022    Essential hypertension 2022    Impingement syndrome of left shoulder 2021      LOS (days): 5  Geometric Mean LOS (GMLOS) (days): 5.1  Days to GMLOS:0.2     OBJECTIVE:  Risk of Unplanned Readmission Score: 31.23         Current admission status: Inpatient   Preferred Pharmacy:   [id number] [place] - [address]  [address]  [place] [address]  Phone: [phone] Fax: [phone]    [facility] [place] - [facility]  [facility]  [place] [address]  Phone: [phone] Fax: [phone]    Primary Care Provider: Keyon Miller MD    Primary Insurance: BLUE CROSS  Secondary Insurance: MEDICARE    DISCHARGE DETAILS:        IMM reviewed with patient's caregiver, patient's caregiver agrees with discharge determination.           IMM Given (Date):: 24 (936am)  IMM Given to:: Family  Family notified:: Mynor Barrington, spouse

## 2024-01-26 NOTE — PLAN OF CARE
Problem: Potential for Falls  Goal: Patient will remain free of falls  Description: INTERVENTIONS:  - Educate patient/family on patient safety including physical limitations  - Instruct patient to call for assistance with activity   - Consult OT/PT to assist with strengthening/mobility   - Keep Call bell within reach  - Keep bed low and locked with side rails adjusted as appropriate  - Keep care items and personal belongings within reach  - Initiate and maintain comfort rounds  - Make Fall Risk Sign visible to staff  - Offer Toileting every 2 Hours, in advance of need  - Initiate/Maintain 2alarm  - Obtain necessary fall risk management equipment: 2  - Apply yellow socks and bracelet for high fall risk patients  - Consider moving patient to room near nurses station  Outcome: Progressing     Problem: PAIN - ADULT  Goal: Verbalizes/displays adequate comfort level or baseline comfort level  Description: Interventions:  - Encourage patient to monitor pain and request assistance  - Assess pain using appropriate pain scale  - Administer analgesics based on type and severity of pain and evaluate response  - Implement non-pharmacological measures as appropriate and evaluate response  - Consider cultural and social influences on pain and pain management  - Notify physician/advanced practitioner if interventions unsuccessful or patient reports new pain  Outcome: Progressing     Problem: INFECTION - ADULT  Goal: Absence or prevention of progression during hospitalization  Description: INTERVENTIONS:  - Assess and monitor for signs and symptoms of infection  - Monitor lab/diagnostic results  - Monitor all insertion sites, i.e. indwelling lines, tubes, and drains  - Monitor endotracheal if appropriate and nasal secretions for changes in amount and color  - [place] appropriate cooling/warming therapies per order  - Administer medications as ordered  - Instruct and encourage patient and family to use good hand hygiene  technique  - Identify and instruct in appropriate isolation precautions for identified infection/condition  Outcome: Progressing  Goal: Absence of fever/infection during neutropenic period  Description: INTERVENTIONS:  - Monitor WBC    Outcome: Progressing     Problem: SAFETY ADULT  Goal: Patient will remain free of falls  Description: INTERVENTIONS:  - Educate patient/family on patient safety including physical limitations  - Instruct patient to call for assistance with activity   - Consult OT/PT to assist with strengthening/mobility   - Keep Call bell within reach  - Keep bed low and locked with side rails adjusted as appropriate  - Keep care items and personal belongings within reach  - Initiate and maintain comfort rounds  - Make Fall Risk Sign visible to staff  - Offer Toileting every 2 Hours, in advance of need  - Initiate/Maintain 2alarm  - Obtain necessary fall risk management equipment: 2  - Apply yellow socks and bracelet for high fall risk patients  - Consider moving patient to room near nurses station  Outcome: Progressing  Goal: Maintain or return to baseline ADL function  Description: INTERVENTIONS:  - Educate patient/family on patient safety including physical limitations  - Instruct patient to call for assistance with activity   - Consult OT/PT to assist with strengthening/mobility   - Keep Call bell within reach  - Keep bed low and locked with side rails adjusted as appropriate  - Keep care items and personal belongings within reach  - Initiate and maintain comfort rounds  - Make Fall Risk Sign visible to staff  - Offer Toileting every 2 Hours, in advance of need  - Initiate/Maintain 2alarm  - Obtain necessary fall risk management equipment: 2  - Apply yellow socks and bracelet for high fall risk patients  - Consider moving patient to room near nurses station  Outcome: Progressing     Problem: DISCHARGE PLANNING  Goal: Discharge to home or other facility with appropriate resources  Description:  INTERVENTIONS:  - Identify barriers to discharge w/patient and caregiver  - Arrange for needed discharge resources and transportation as appropriate  - Identify discharge learning needs (meds, wound care, etc.)  - Arrange for interpretive services to assist at discharge as needed  - Refer to Case Management Department for coordinating discharge planning if the patient needs post-hospital services based on physician/advanced practitioner order or complex needs related to functional status, cognitive ability, or social support system  Outcome: Progressing     Problem: Knowledge Deficit  Goal: Patient/family/caregiver demonstrates understanding of disease process, treatment plan, medications, and discharge instructions  Description: Complete learning assessment and assess knowledge base.  Interventions:  - Provide teaching at level of understanding  - Provide teaching via preferred learning methods  Outcome: Progressing     Problem: RESPIRATORY - ADULT  Goal: Achieves optimal ventilation and oxygenation  Description: INTERVENTIONS:  - Assess for changes in respiratory status  - Assess for changes in mentation and behavior  - Position to facilitate oxygenation and minimize respiratory effort  - Oxygen administered by appropriate delivery if ordered  - Initiate smoking cessation education as indicated  - Encourage broncho-pulmonary hygiene including cough, deep breathe, Incentive Spirometry  - Assess the need for suctioning and aspirate as needed  - Assess and instruct to report SOB or any respiratory difficulty  - Respiratory Therapy support as indicated  Outcome: Progressing     Problem: GASTROINTESTINAL - ADULT  Goal: Minimal or absence of nausea and/or vomiting  Description: INTERVENTIONS:  - Administer IV fluids if ordered to ensure adequate hydration  - Maintain NPO status until nausea and vomiting are resolved  - Nasogastric tube if ordered  - Administer ordered antiemetic medications as needed  - Provide  nonpharmacologic comfort measures as appropriate  - Advance diet as tolerated, if ordered  - Consider nutrition services referral to assist patient with adequate nutrition and appropriate food choices  Outcome: Progressing  Goal: Maintains or returns to baseline bowel function  Description: INTERVENTIONS:  - Assess bowel function  - Encourage oral fluids to ensure adequate hydration  - Administer IV fluids if ordered to ensure adequate hydration  - Administer ordered medications as needed  - Encourage mobilization and activity  - Consider nutritional services referral to assist patient with adequate nutrition and appropriate food choices  Outcome: Progressing  Goal: Maintains adequate nutritional intake  Description: INTERVENTIONS:  - Monitor percentage of each meal consumed  - Identify factors contributing to decreased intake, treat as appropriate  - Assist with meals as needed  - Monitor I&O, weight, and lab values if indicated  - Obtain nutrition services referral as needed  Outcome: Progressing  Goal: Establish and maintain optimal ostomy function  Description: INTERVENTIONS:  - Assess bowel function  - Encourage oral fluids to ensure adequate hydration  - Administer IV fluids if ordered to ensure adequate hydration   - Administer ordered medications as needed  - Encourage mobilization and activity  - Nutrition services referral to assist patient with appropriate food choices  - Assess stoma site  - Consider wound care consult   Outcome: Progressing  Goal: Oral mucous membranes remain intact  Description: INTERVENTIONS  - Assess oral mucosa and hygiene practices  - Implement preventative oral hygiene regimen  - Implement oral medicated treatments as ordered  - Initiate Nutrition services referral as needed  Outcome: Progressing     Problem: METABOLIC, FLUID AND ELECTROLYTES - ADULT  Goal: Electrolytes maintained within normal limits  Description: INTERVENTIONS:  - Monitor labs and assess patient for signs and  symptoms of electrolyte imbalances  - Administer electrolyte replacement as ordered  - Monitor response to electrolyte replacements, including repeat lab results as appropriate  - Instruct patient on fluid and nutrition as appropriate  Outcome: Progressing  Goal: Fluid balance maintained  Description: INTERVENTIONS:  - Monitor labs   - Monitor I/O and WT  - Instruct patient on fluid and nutrition as appropriate  - Assess for signs & symptoms of volume excess or deficit  Outcome: Progressing  Goal: Glucose maintained within target range  Description: INTERVENTIONS:  - Monitor Blood Glucose as ordered  - Assess for signs and symptoms of hyperglycemia and hypoglycemia  - Administer ordered medications to maintain glucose within target range  - Assess nutritional intake and initiate nutrition service referral as needed  Outcome: Progressing     Problem: Nutrition/Hydration-ADULT  Goal: Nutrient/Hydration intake appropriate for improving, restoring or maintaining nutritional needs  Description: Monitor and assess patient's nutrition/hydration status for malnutrition. Collaborate with interdisciplinary team and initiate plan and interventions as ordered.  Monitor patient's weight and dietary intake as ordered or per policy. Utilize nutrition screening tool and intervene as necessary. Determine patient's food preferences and provide high-protein, high-caloric foods as appropriate.     INTERVENTIONS:  - Monitor oral intake, urinary output, labs, and treatment plans  - Assess nutrition and hydration status and recommend course of action  - Evaluate amount of meals eaten  - Assist patient with eating if necessary   - Allow adequate time for meals  - Recommend/ encourage appropriate diets, oral nutritional supplements, and vitamin/mineral supplements  - Order, calculate, and assess calorie counts as needed  - Recommend, monitor, and adjust tube feedings and TPN/PPN based on assessed needs  - Assess need for intravenous  fluids  - Provide specific nutrition/hydration education as appropriate  - Include patient/family/caregiver in decisions related to nutrition  Outcome: Progressing     Problem: Prexisting or High Potential for Compromised Skin Integrity  Goal: Skin integrity is maintained or improved  Description: INTERVENTIONS:  - Identify patients at risk for skin breakdown  - Assess and monitor skin integrity  - Assess and monitor nutrition and hydration status  - Monitor labs   - Assess for incontinence   - Turn and reposition patient  - Assist with mobility/ambulation  - Relieve pressure over bony prominences  - Avoid friction and shearing  - Provide appropriate hygiene as needed including keeping skin clean and dry  - Evaluate need for skin moisturizer/barrier cream  - Collaborate with interdisciplinary team   - Patient/family teaching  - Consider wound care consult   Outcome: Progressing

## 2024-01-26 NOTE — PROGRESS NOTES
Progress Note - Infectious Disease   Kathi Ferris 63 y.o. female MRN: [id number]  Unit/Bed#: -K[facility] Encounter: [id number]    Assessment:  63-year-old woman with stage IV NSCLC, initial neutropenia but now leukocytosis, acute kidney injury, small bowel obstruction     Plan:  1) Leukocytosis - Though patient initially with neutropenia, she seems to responded more than adequately to administration of G-CSF. Fortunately, admission cultures remain negative and patient remains afebrile.  Stable off of antibiotics, so would not resume.  From an ID standpoint, stable for discharge.    ===> Low suspicion for infection.  ===> Reculture if febrile     ===> White blood cell count in and of itself unlikely to be reliable indicator of infection given recent G-CSF administration as discussed above.      2) LEE - Will dose abx for pt's renal function if needed, and avoid nephrotoxic agents as able. Suspect secondary to dehydration, regardless this is improving.     3) SBO - Resolving, mgmt. per 1'.        ===> Will sign off, but please do not hesitate to contact us with further questions, or if a change in the patient's clinical status warrants.    ===> Patient does NOT need to schedule f/u with ID on d/c UNLESS a change in clinical status or a recrudescence of symptoms warrants, but was given our contact information should any issues with prescribed post-d/c treatment arise.      Subjective/Objective   Overnight, no acute events.  Patient denies fever, chills, nausea, vomiting.  White blood cell count somewhat decreased today.  Patient taking p.o., reports improvement in GI symptoms.    Temp:  [96.8 °F (36 °C)-97.7 °F (36.5 °C)] 97.7 °F (36.5 °C)  HR:  [77-80] 80  Resp:  [16-17] 17  BP: (110-117)/(61-68) 117/61  SpO2:  [95 %] 95 %  Temp (24hrs), Av.2 °F (36.2 °C), Min:96.8 °F (36 °C), Max:97.7 °F (36.5 °C)  Current: Temperature: 97.7 °F (36.5 °C)    Physical Exam:   Gen: AA, NAD, conversant, VS reviewed  ENT: MMM  CV: No  m/r/g, S1, S2  Pulm: Lungs CTAB, no respiratory distress  ABD: S/NT/ND  Skin: No rash on exposed skin  Psych: Oriented, nl. affect     Invasive Devices       Central Venous Catheter Line  Duration             Port A Cath 04/18/23 Right Subclavian 283 days              Peripheral Intravenous Line  Duration             Peripheral IV 01/24/24 Left Antecubital 2 days              Drain  Duration             Pleural Effusion Long-Term Catheter 15.5 Fr. 275 days                    Lab, Imaging and other studies: I have personally reviewed pertinent reports.

## 2024-01-27 VITALS
SYSTOLIC BLOOD PRESSURE: 108 MMHG | WEIGHT: 188.71 LBS | HEIGHT: 65 IN | TEMPERATURE: 97 F | BODY MASS INDEX: 31.44 KG/M2 | HEART RATE: 86 BPM | OXYGEN SATURATION: 95 % | RESPIRATION RATE: 16 BRPM | DIASTOLIC BLOOD PRESSURE: 69 MMHG

## 2024-01-27 LAB
ALBUMIN SERPL BCP-MCNC: 2.7 G/DL (ref 3.5–5)
ALP SERPL-CCNC: 91 U/L (ref 34–104)
ALT SERPL W P-5'-P-CCNC: 8 U/L (ref 7–52)
ANION GAP SERPL CALCULATED.3IONS-SCNC: 4 MMOL/L
ANISOCYTOSIS BLD QL SMEAR: PRESENT
AST SERPL W P-5'-P-CCNC: 13 U/L (ref 13–39)
BASOPHILS # BLD MANUAL: 0 THOUSAND/UL (ref 0–0.1)
BASOPHILS NFR MAR MANUAL: 0 % (ref 0–1)
BILIRUB SERPL-MCNC: 0.17 MG/DL (ref 0.2–1)
BUN SERPL-MCNC: 10 MG/DL (ref 5–25)
CALCIUM ALBUM COR SERPL-MCNC: 9.2 MG/DL (ref 8.3–10.1)
CALCIUM SERPL-MCNC: 8.2 MG/DL (ref 8.4–10.2)
CHLORIDE SERPL-SCNC: 105 MMOL/L (ref 96–108)
CO2 SERPL-SCNC: 25 MMOL/L (ref 21–32)
CREAT SERPL-MCNC: 0.49 MG/DL (ref 0.6–1.3)
EOSINOPHIL # BLD MANUAL: 0 THOUSAND/UL (ref 0–0.4)
EOSINOPHIL NFR BLD MANUAL: 0 % (ref 0–6)
ERYTHROCYTE [DISTWIDTH] IN BLOOD BY AUTOMATED COUNT: 17.4 % (ref 11.6–15.1)
GFR SERPL CREATININE-BSD FRML MDRD: 102 ML/MIN/1.73SQ M
GLUCOSE SERPL-MCNC: 95 MG/DL (ref 65–140)
HCT VFR BLD AUTO: 33.8 % (ref 34.8–46.1)
HGB BLD-MCNC: 10.3 G/DL (ref 11.5–15.4)
LG PLATELETS BLD QL SMEAR: PRESENT
LYMPHOCYTES # BLD AUTO: 0.99 THOUSAND/UL (ref 0.6–4.47)
LYMPHOCYTES # BLD AUTO: 7 % (ref 14–44)
MCH RBC QN AUTO: 26.3 PG (ref 26.8–34.3)
MCHC RBC AUTO-ENTMCNC: 30.5 G/DL (ref 31.4–37.4)
MCV RBC AUTO: 86 FL (ref 82–98)
MONOCYTES # BLD AUTO: 0.37 THOUSAND/UL (ref 0–1.22)
MONOCYTES NFR BLD: 3 % (ref 4–12)
NEUTROPHILS # BLD MANUAL: 11.04 THOUSAND/UL (ref 1.85–7.62)
NEUTS BAND NFR BLD MANUAL: 3 % (ref 0–8)
NEUTS SEG NFR BLD AUTO: 86 % (ref 43–75)
PLATELET # BLD AUTO: 364 THOUSANDS/UL (ref 149–390)
PLATELET BLD QL SMEAR: ADEQUATE
PMV BLD AUTO: 9.2 FL (ref 8.9–12.7)
POTASSIUM SERPL-SCNC: 4.7 MMOL/L (ref 3.5–5.3)
PROT SERPL-MCNC: 5.3 G/DL (ref 6.4–8.4)
RBC # BLD AUTO: 3.92 MILLION/UL (ref 3.81–5.12)
RBC MORPH BLD: PRESENT
SODIUM SERPL-SCNC: 134 MMOL/L (ref 135–147)
TOXIC GRANULES BLD QL SMEAR: PRESENT
VARIANT LYMPHS # BLD AUTO: 1 %
WBC # BLD AUTO: 12.41 THOUSAND/UL (ref 4.31–10.16)

## 2024-01-27 PROCEDURE — C9113 INJ PANTOPRAZOLE SODIUM, VIA: HCPCS | Performed by: PHYSICIAN ASSISTANT

## 2024-01-27 PROCEDURE — 85027 COMPLETE CBC AUTOMATED: CPT | Performed by: INTERNAL MEDICINE

## 2024-01-27 PROCEDURE — 99239 HOSP IP/OBS DSCHRG MGMT >30: CPT | Performed by: INTERNAL MEDICINE

## 2024-01-27 PROCEDURE — 80053 COMPREHEN METABOLIC PANEL: CPT | Performed by: INTERNAL MEDICINE

## 2024-01-27 PROCEDURE — 85007 BL SMEAR W/DIFF WBC COUNT: CPT | Performed by: INTERNAL MEDICINE

## 2024-01-27 RX ORDER — POLYETHYLENE GLYCOL 3350 17 G/17G
17 POWDER, FOR SOLUTION ORAL DAILY PRN
Qty: 225 G | Refills: 0 | Status: SHIPPED | OUTPATIENT
Start: 2024-01-27 | End: 2024-02-26

## 2024-01-27 RX ORDER — POTASSIUM CHLORIDE 20 MEQ/1
40 TABLET, EXTENDED RELEASE ORAL DAILY
Qty: 30 TABLET | Refills: 0 | Status: SHIPPED | OUTPATIENT
Start: 2024-01-27

## 2024-01-27 RX ADMIN — SERTRALINE 100 MG: 100 TABLET, FILM COATED ORAL at 09:14

## 2024-01-27 RX ADMIN — ENOXAPARIN SODIUM 40 MG: 100 INJECTION SUBCUTANEOUS at 09:14

## 2024-01-27 RX ADMIN — LOSARTAN POTASSIUM 100 MG: 50 TABLET, FILM COATED ORAL at 09:14

## 2024-01-27 RX ADMIN — PANTOPRAZOLE SODIUM 40 MG: 40 INJECTION, POWDER, FOR SOLUTION INTRAVENOUS at 09:13

## 2024-01-27 RX ADMIN — BUPROPION HYDROCHLORIDE 300 MG: 300 TABLET, FILM COATED, EXTENDED RELEASE ORAL at 09:13

## 2024-01-27 RX ADMIN — FLUTICASONE FUROATE AND VILANTEROL TRIFENATATE 1 PUFF: 200; 25 POWDER RESPIRATORY (INHALATION) at 09:15

## 2024-01-27 NOTE — ASSESSMENT & PLAN NOTE
Patient admitted with sepsis with leukopenia and tachycardia  Source-pneumonia versus abdominal  ER gave cefepime and vancomycin  Monitor off antibiotics

## 2024-01-27 NOTE — PROGRESS NOTES
"[facility]  Progress Note  Name: Kathi Ferris I  MRN: [id number]  Unit/Bed#: -01 I Date of Admission: 1/21/2024   Date of Service: 1/26/2024 I Hospital Day: 5    Assessment/Plan   Sepsis ([facility])  Assessment & Plan  Patient admitted with sepsis with leukopenia and tachycardia  Source-pneumonia versus abdominal  ER gave cefepime and vancomycin  Monitor off antibiotics    Chronic respiratory failure with hypoxia (HCC)  Assessment & Plan  Patient has chronic respiratory failure with hypoxia and is on 2.5 to 3 L of supplemental oxygen at home   Continue oxygen supplementation  Monitor respiratory status    Generalized anxiety disorder  Assessment & Plan  Home medications Ativan, Wellbutrin, Zoloft   restart    Malignant pleural effusion  Assessment & Plan  With right lung asept catheter in place, drain 2x weekly by VNA  Monitor resp status    Non-small cell cancer of right lung (HCC)  Assessment & Plan  Patient has history of stage IV metastatic lung cancer on treatment with Taxotere and ramucirumab.  She has findings of a new left pleural effusion status post thoracentesis on 12/17/2023.    Last chemotherapy on January 11, 2024  Follows up with Dr. Phipps as outpatient  Pathologic left posterior fifth rib fracture and right second rib mets   Pain management  Continue outpatient chemotherapy      Essential hypertension  Assessment & Plan  Home regimen: Micardis 80 mg, verapamil 360 mg daily, Lasix 20 mg daily, Lopressor  Will retsart    COPD (chronic obstructive pulmonary disease) ([facility])  Assessment & Plan  Not in acute exacerbation  Continue inhalers    * SBO (small bowel obstruction) ([facility])  Assessment & Plan  Patient presented with nausea and vomiting  CT chest abdomen pelvis showed-\"  Acute small bowel obstruction with point of transition in the right lower quadrant, likely on the basis of serosal implants versus adhesions.  2.  Stable appearance of right infrahilar lung cancer " "with findings of lymphangitic carcinomatosis. Stable left lung groundglass and solid nodules likely representing metastases.  3.  Small bilateral pleural effusions with right chest tube in place.  Pathologic left posterior fifth rib fracture with surrounding callus. Right second rib metastasis is better appreciated on the PET/CT   4.  Moderate volume of abdominopelvic ascites with similar findings of peritoneal carcinomatosis.\"  Surgical consult appreciated  NG tube removed today  Advance diet  Monitor off antibiotics      Neutropenia (HCC)-resolved as of 1/23/2024  Assessment & Plan  Patient noted to have neutropenia on admission  Will start on filgrastim x 3 doses  Trend CBC daily  Oncology consult  Biotics discontinued               VTE Pharmacologic Prophylaxis: VTE Score: 8 Moderate Risk (Score 3-4) - Pharmacological DVT Prophylaxis Ordered: enoxaparin (Lovenox).    Mobility:   Basic Mobility Inpatient Raw Score: 24  JH-HLM Goal: 8: Walk 250 feet or more  JH-HLM Achieved: 7: Walk 25 feet or more  HLM Goal achieved. Continue to encourage appropriate mobility.    Patient Centered Rounds: I performed bedside rounds with nursing staff today.   Discussions with Specialists or Other Care Team Provider: ID, heme- onc    Education and Discussions with Family / Patient: Updated  () at bedside.    Total Time Spent on Date of Encounter in care of patient: 50 mins. This time was spent on one or more of the following: performing physical exam; counseling and coordination of care; obtaining or reviewing history; documenting in the medical record; reviewing/ordering tests, medications or procedures; communicating with other healthcare professionals and discussing with patient's family/caregivers.    Current Length of Stay: 5 day(s)  Current Patient Status: Inpatient   Certification Statement: The patient will continue to require additional inpatient hospital stay due to pending improvement  Discharge " Plan:  not stbale    Code Status: Level 3 - DNAR and DNI    Subjective:   Patient stated abdominal pain is better, able to tolerate diet    Objective:     Vitals:   Temp (24hrs), Av.2 °F (36.2 °C), Min:96.8 °F (36 °C), Max:97.7 °F (36.5 °C)    Temp:  [96.8 °F (36 °C)-97.7 °F (36.5 °C)] 96.8 °F (36 °C)  HR:  [80-90] 90  Resp:  [16-17] 17  BP: (112-117)/(61-69) 112/69  SpO2:  [94 %-95 %] 94 %  Body mass index is 31 kg/m².     Input and Output Summary (last 24 hours):     Intake/Output Summary (Last 24 hours) at 2024 1903  Last data filed at 2024 1542  Gross per 24 hour   Intake 720 ml   Output 175 ml   Net 545 ml       Physical Exam:   Physical Exam   Gen.-Patient comfortable   Neck- Supple. No thyromegaly or lymphadenopathy  Lungs-Clear bilaterally without any wheeze or rales   Heart S1-S2, regular rate and rhythm, no murmurs  Abdomen-soft nontender, no organomegaly. Bowel sounds present  Extremities-no cyanosi,  clubbing or edema  Skin- no rash  Neuro-nonfocal     Additional Data:     Labs:  Results from last 7 days   Lab Units 24  0529 24  0511 24  0237 24  0459 24  0540   WBC Thousand/uL 13.62*   < > 15.75*   < > 2.26*   HEMOGLOBIN g/dL 10.0*   < > 10.0*   < > 10.0*   HEMATOCRIT % 32.2*   < > 32.1*   < > 32.2*   PLATELETS Thousands/uL 384   < > 439*   < > 406*   BANDS PCT %  --   --  3  --   --    NEUTROS PCT %  --   --   --   --  21*   LYMPHS PCT %  --   --   --   --  28   LYMPHO PCT %  --   --  9*   < >  --    MONOS PCT %  --   --   --   --  50*   MONO PCT %  --   --  7   < >  --    EOS PCT %  --   --  0   < > 0    < > = values in this interval not displayed.     Results from last 7 days   Lab Units 24  0517 24  1817 24  0511   SODIUM mmol/L 133*   < > 134*   POTASSIUM mmol/L 4.5   < > 2.8*   CHLORIDE mmol/L 103   < > 99   CO2 mmol/L 26   < > 28   BUN mg/dL 12   < > 15   CREATININE mg/dL 0.60   < > 0.60   ANION GAP mmol/L 4   < > 7   CALCIUM mg/dL  8.2*   < > 8.1*   ALBUMIN g/dL  --   --  2.7*   TOTAL BILIRUBIN mg/dL  --   --  0.21   ALK PHOS U/L  --   --  97   ALT U/L  --   --  7   AST U/L  --   --  11*   GLUCOSE RANDOM mg/dL 89   < > 109    < > = values in this interval not displayed.     Results from last 7 days   Lab Units 01/22/24  0540   INR  1.05             Results from last 7 days   Lab Units 01/25/24  0511 01/22/24  0540 01/21/24  0844   LACTIC ACID mmol/L  --   --  1.4   PROCALCITONIN ng/ml 0.31* 0.11 0.08       Lines/Drains:  Invasive Devices       Central Venous Catheter Line  Duration             Port A Cath 04/18/23 Right Subclavian 283 days              Peripheral Intravenous Line  Duration             Peripheral IV 01/24/24 Left Antecubital 2 days              Drain  Duration             Pleural Effusion Long-Term Catheter 15.5 Fr. 276 days                    Central Line:  Goal for removal: N/A - Chronic PICC             Imaging: Reviewed radiology reports from this admission including: chest xray    Recent Cultures (last 7 days):   Results from last 7 days   Lab Units 01/21/24  0844   BLOOD CULTURE  No Growth After 5 Days.  No Growth After 5 Days.       Last 24 Hours Medication List:   Current Facility-Administered Medications   Medication Dose Route Frequency Provider Last Rate    albuterol  2 puff Inhalation Q6H PRN Ronel Menezes PA-C      albuterol  2.5 mg Nebulization Q6H PRN Toan Yu MD      atorvastatin  20 mg Oral Daily Noman Stauffer MD      buPROPion  300 mg Oral Daily Noman Stauffer MD      enoxaparin  40 mg Subcutaneous Daily Toan Yu MD      fluticasone-vilanterol  1 puff Inhalation Daily Toan Yu MD      LORazepam  0.5 mg Oral Daily PRN Noman Stauffer MD      losartan  100 mg Oral Daily Noman Stauffer MD      metoprolol tartrate  25 mg Oral Q12H Replaced by [facility] Noman Stauffer MD      ondansetron  4 mg Intravenous Q6H PRN Toan Yu MD      pantoprazole  40 mg Intravenous Q24H Replaced by [facility] Edel Gamino,  JOHN      phenol  1 spray Mouth/Throat Q2H PRN Edel Gamino PA-C      potassium chloride  40 mEq Oral BID Marga Mohan PA-C      sertraline  100 mg Oral Daily Noman Stauffer MD      verapamil  120 mg Oral HS Noman Stauffer MD      verapamil  240 mg Oral HS Noman Stauffer MD          Today, Patient Was Seen By: Noman Stauffer MD    **Please Note: This note may have been constructed using a voice recognition system.**

## 2024-01-27 NOTE — DISCHARGE SUMMARY
"[facility]  Discharge- Kathi Ferris 1960, 63 y.o. female MRN: [id number]  Unit/Bed#: [facility] Encounter: [id number]  Primary Care Provider: Keyon Miller MD   Date and time admitted to hospital: 1/21/2024  8:26 AM    Sepsis ([facility])  Assessment & Plan  Patient admitted with sepsis with leukopenia and tachycardia  Source-pneumonia versus abdominal  ER gave cefepime and vancomycin  Monitor off antibiotics    Chronic respiratory failure with hypoxia (HCC)  Assessment & Plan  Patient has chronic respiratory failure with hypoxia and is on 2.5 to 3 L of supplemental oxygen at home   Continue oxygen supplementation  Monitor respiratory status    Generalized anxiety disorder  Assessment & Plan  Home medications Ativan, Wellbutrin, Zoloft   restart    Malignant pleural effusion  Assessment & Plan  With right lung asept catheter in place, drain 2x weekly by VNA  Monitor resp status    Non-small cell cancer of right lung ([facility])  Assessment & Plan  Patient has history of stage IV metastatic lung cancer on treatment with Taxotere and ramucirumab.  She has findings of a new left pleural effusion status post thoracentesis on 12/17/2023.    Last chemotherapy on January 11, 2024  Follows up with Dr. Phipps as outpatient  Pathologic left posterior fifth rib fracture and right second rib mets   Pain management  Continue outpatient chemotherapy      Essential hypertension  Assessment & Plan  Home regimen: Micardis 80 mg, verapamil 360 mg daily, Lasix 20 mg daily, Lopressor  Will retsart    COPD (chronic obstructive pulmonary disease) ([facility])  Assessment & Plan  Not in acute exacerbation  Continue inhalers    * SBO (small bowel obstruction) ([facility])  Assessment & Plan  Patient presented with nausea and vomiting  CT chest abdomen pelvis showed-\"  Acute small bowel obstruction with point of transition in the right lower quadrant, likely on the basis of serosal implants versus adhesions.  2.  Stable " "appearance of right infrahilar lung cancer with findings of lymphangitic carcinomatosis. Stable left lung groundglass and solid nodules likely representing metastases.  3.  Small bilateral pleural effusions with right chest tube in place.  Pathologic left posterior fifth rib fracture with surrounding callus. Right second rib metastasis is better appreciated on the PET/CT   4.  Moderate volume of abdominopelvic ascites with similar findings of peritoneal carcinomatosis.\"  Patient stable for dc, able to tolerate diet      Neutropenia (HCC)-resolved as of 1/23/2024  Assessment & Plan  Patient noted to have neutropenia on admission  Will start on filgrastim x 3 doses  Trend CBC daily  Oncology consult  Biotics discontinued              Medical Problems       Resolved Problems  Date Reviewed: 1/21/2024            Resolved    Neutropenia (HCC) 1/23/2024     Resolved by  Noman Stauffer MD          Admission Date:   Admission Orders (From admission, onward)       Ordered        01/21/24 1207  INPATIENT ADMISSION  Once            01/21/24 1202  Place in Observation  Once,   Status:  Canceled                            Admitting Diagnosis: Hypomagnesemia [E83.42]  Vomiting [R11.10]  Small bowel obstruction (HCC) [K56.609]  Chronic respiratory failure with hypoxia (HCC) [J96.11]  Neutropenia (HCC) [D70.9]  Chronic obstructive pulmonary disease, unspecified COPD type (HCC) [J44.9]  Metastatic non-small cell lung cancer (HCC) [C34.90]        Procedures Performed:   Orders Placed This Encounter   Procedures    ED ECG Documentation Only       Summary of Hospital Course:     Kathi Ferris is a 63 y.o. female with past medical history of stage IV metastatic lung cancer, hypertension, malignant pleural effusion with ASAP catheter on right side, COPD with chronic respiratory failure on supplemental oxygen presented to emergency department with nausea, vomiting and epigastric tenderness. Patient had CT scan, which showed SBO. " Surgery was consulted, NG tube was placed. Patient had neutropenia on admission. Heme- onc was consulted, started on Abx, Filgrastim. Patient had Neutropenia resolved. Abx were changed to cefepime, flagyl for leukocytosis.  With persitent leukocytosis, ID was consulted. Patient had NG tube output decreased over 2 days, NG tube removed and started on clear liquid diet. With clinical improvement, she was monitored off Abx after ID recommendations. Patient had advanced diet, and is stable for dc.    CM setup home health for asept cath drain    Gen.-Patient comfortable   Neck- Supple. No thyromegaly or lymphadenopathy  Lungs-Clear bilaterally without any wheeze or rales   Heart S1-S2, regular rate and rhythm, no murmurs  Abdomen-soft nontender, no organomegaly. Bowel sounds present  Extremities-no cyanosi,  clubbing or edema  Skin- no rash  Neuro-nonfocal     Significant Findings, Care, Treatment and Services Provided:     XR chest portable    Result Date: 1/25/2024  Impression: Stable moderate right pleural effusion. No new lung opacity. Support devices as described. Workstation performed: [id number]     XR abdomen obstruction series    Result Date: 1/24/2024  Impression: Progression of enteric contrast through the bowel, now mostly in the colon with some persistent gas-filled mildly distended small bowel loops. Overall, the appearance seems improved from yesterday in keeping with resolving small bowel obstruction. Lines and tubes appear stable. Right pleural effusion and lung infiltrates noted. Workstation performed: [id number]     XR abdomen obstruction series    Result Date: 1/23/2024  Impression: Bowel gas pattern is most suggestive of a persistent partial/low-grade bowel obstruction with mildly dilated small bowel loops in the left side of the abdomen and more decompressed but contrast filled small bowel loops in the right side of the abdomen and contrast in the colon. No free air identified. Pleural effusions right  greater than left with bilateral pulmonary infiltrates and right hilar density which apparently is known to represent mass. Lines and tubes appear satisfactory Continued close clinical follow-up is advised for the persistent partial bowel obstruction Workstation performed: [id number]     XR chest 1 view portable    Result Date: 1/22/2024  Impression: Nasogastric tube tip within the stomach. Persistent small right pleural effusion with chest tube in place. Persistent right infrahilar opacity is better appreciated on same day CTA. Resident: RAMONA CHAWLA I, the attending radiologist, have reviewed the images and agree with the final report above. Workstation performed: [id number]     PE Study with CT Abdomen and Pelvis with contrast    Result Date: 1/21/2024  Impression: 1.  Acute small bowel obstruction with point of transition in the right lower quadrant, likely on the basis of serosal implants versus adhesions. 2.  Stable appearance of right infrahilar lung cancer with findings of lymphangitic carcinomatosis. Stable left lung groundglass and solid nodules likely representing metastases. 3.  Small bilateral pleural effusions with right chest tube in place. 4.  Moderate volume of abdominopelvic ascites with similar findings of peritoneal carcinomatosis. * I personally telephoned this result to ANGEL WEATHERS on 1/21/2024 11:27 AM. Workstation performed: [id number]       No Chest XR results available for this patient.      Complications: none    Condition at Discharge: stable         Discharge instructions/Information to patient and family:   See after visit summary for information provided to patient and family.      Provisions for Follow-Up Care:  See after visit summary for information related to follow-up care and any pertinent home health orders.      PCP: Keyon Miller MD    Disposition: Home    Planned Readmission: No    Discharge Statement   I spent 33 minutes discharging the patient. This time was spent  on the day of discharge. I had direct contact with the patient on the day of discharge. Additional documentation is required if more than 30 minutes were spent on discharge.     Discharge Medications:  See after visit summary for reconciled discharge medications provided to patient and family.

## 2024-01-27 NOTE — PLAN OF CARE
Problem: Potential for Falls  Goal: Patient will remain free of falls  Description: INTERVENTIONS:  - Educate patient/family on patient safety including physical limitations  - Instruct patient to call for assistance with activity   - Consult OT/PT to assist with strengthening/mobility   - Keep Call bell within reach  - Keep bed low and locked with side rails adjusted as appropriate  - Keep care items and personal belongings within reach  - Initiate and maintain comfort rounds  - Make Fall Risk Sign visible to staff  - Offer Toileting every 2 Hours, in advance of need  - Initiate/Maintain 2alarm  - Obtain necessary fall risk management equipment: 2  - Apply yellow socks and bracelet for high fall risk patients  - Consider moving patient to room near nurses station  Outcome: Progressing     Problem: PAIN - ADULT  Goal: Verbalizes/displays adequate comfort level or baseline comfort level  Description: Interventions:  - Encourage patient to monitor pain and request assistance  - Assess pain using appropriate pain scale  - Administer analgesics based on type and severity of pain and evaluate response  - Implement non-pharmacological measures as appropriate and evaluate response  - Consider cultural and social influences on pain and pain management  - Notify physician/advanced practitioner if interventions unsuccessful or patient reports new pain  Outcome: Progressing     Problem: INFECTION - ADULT  Goal: Absence or prevention of progression during hospitalization  Description: INTERVENTIONS:  - Assess and monitor for signs and symptoms of infection  - Monitor lab/diagnostic results  - Monitor all insertion sites, i.e. indwelling lines, tubes, and drains  - Monitor endotracheal if appropriate and nasal secretions for changes in amount and color  - [place] appropriate cooling/warming therapies per order  - Administer medications as ordered  - Instruct and encourage patient and family to use good hand hygiene  technique  - Identify and instruct in appropriate isolation precautions for identified infection/condition  Outcome: Progressing  Goal: Absence of fever/infection during neutropenic period  Description: INTERVENTIONS:  - Monitor WBC    Outcome: Progressing     Problem: SAFETY ADULT  Goal: Patient will remain free of falls  Description: INTERVENTIONS:  - Educate patient/family on patient safety including physical limitations  - Instruct patient to call for assistance with activity   - Consult OT/PT to assist with strengthening/mobility   - Keep Call bell within reach  - Keep bed low and locked with side rails adjusted as appropriate  - Keep care items and personal belongings within reach  - Initiate and maintain comfort rounds  - Make Fall Risk Sign visible to staff  - Offer Toileting every 2 Hours, in advance of need  - Initiate/Maintain 2alarm  - Obtain necessary fall risk management equipment: 2  - Apply yellow socks and bracelet for high fall risk patients  - Consider moving patient to room near nurses station  Outcome: Progressing  Goal: Maintain or return to baseline ADL function  Description: INTERVENTIONS:  - Educate patient/family on patient safety including physical limitations  - Instruct patient to call for assistance with activity   - Consult OT/PT to assist with strengthening/mobility   - Keep Call bell within reach  - Keep bed low and locked with side rails adjusted as appropriate  - Keep care items and personal belongings within reach  - Initiate and maintain comfort rounds  - Make Fall Risk Sign visible to staff  - Offer Toileting every 2 Hours, in advance of need  - Initiate/Maintain 2alarm  - Obtain necessary fall risk management equipment: 2  - Apply yellow socks and bracelet for high fall risk patients  - Consider moving patient to room near nurses station  Outcome: Progressing  Goal: Maintains/Returns to pre admission functional level  Description: INTERVENTIONS:  - Perform AM-PAC 6 Click Basic  Mobility/ Daily Activity assessment daily.  - Set and communicate daily mobility goal to care team and patient/family/caregiver.   - Collaborate with rehabilitation services on mobility goals if consulted  - Perform Range of Motion 2 times a day.  - Reposition patient every 2 hours.  - Dangle patient 2 times a day  - Stand patient 2 times a day  - Ambulate patient 2 times a day  - Out of bed to chair 2 times a day   - Out of bed for meals 2 times a day  - Out of bed for toileting  - Record patient progress and toleration of activity level   Outcome: Progressing     Problem: DISCHARGE PLANNING  Goal: Discharge to home or other facility with appropriate resources  Description: INTERVENTIONS:  - Identify barriers to discharge w/patient and caregiver  - Arrange for needed discharge resources and transportation as appropriate  - Identify discharge learning needs (meds, wound care, etc.)  - Arrange for interpretive services to assist at discharge as needed  - Refer to Case Management Department for coordinating discharge planning if the patient needs post-hospital services based on physician/advanced practitioner order or complex needs related to functional status, cognitive ability, or social support system  Outcome: Progressing     Problem: Knowledge Deficit  Goal: Patient/family/caregiver demonstrates understanding of disease process, treatment plan, medications, and discharge instructions  Description: Complete learning assessment and assess knowledge base.  Interventions:  - Provide teaching at level of understanding  - Provide teaching via preferred learning methods  Outcome: Progressing     Problem: RESPIRATORY - ADULT  Goal: Achieves optimal ventilation and oxygenation  Description: INTERVENTIONS:  - Assess for changes in respiratory status  - Assess for changes in mentation and behavior  - Position to facilitate oxygenation and minimize respiratory effort  - Oxygen administered by appropriate delivery if  ordered  - Initiate smoking cessation education as indicated  - Encourage broncho-pulmonary hygiene including cough, deep breathe, Incentive Spirometry  - Assess the need for suctioning and aspirate as needed  - Assess and instruct to report SOB or any respiratory difficulty  - Respiratory Therapy support as indicated  Outcome: Progressing     Problem: GASTROINTESTINAL - ADULT  Goal: Minimal or absence of nausea and/or vomiting  Description: INTERVENTIONS:  - Administer IV fluids if ordered to ensure adequate hydration  - Maintain NPO status until nausea and vomiting are resolved  - Nasogastric tube if ordered  - Administer ordered antiemetic medications as needed  - Provide nonpharmacologic comfort measures as appropriate  - Advance diet as tolerated, if ordered  - Consider nutrition services referral to assist patient with adequate nutrition and appropriate food choices  Outcome: Progressing  Goal: Maintains or returns to baseline bowel function  Description: INTERVENTIONS:  - Assess bowel function  - Encourage oral fluids to ensure adequate hydration  - Administer IV fluids if ordered to ensure adequate hydration  - Administer ordered medications as needed  - Encourage mobilization and activity  - Consider nutritional services referral to assist patient with adequate nutrition and appropriate food choices  Outcome: Progressing  Goal: Maintains adequate nutritional intake  Description: INTERVENTIONS:  - Monitor percentage of each meal consumed  - Identify factors contributing to decreased intake, treat as appropriate  - Assist with meals as needed  - Monitor I&O, weight, and lab values if indicated  - Obtain nutrition services referral as needed  Outcome: Progressing  Goal: Establish and maintain optimal ostomy function  Description: INTERVENTIONS:  - Assess bowel function  - Encourage oral fluids to ensure adequate hydration  - Administer IV fluids if ordered to ensure adequate hydration   - Administer ordered  medications as needed  - Encourage mobilization and activity  - Nutrition services referral to assist patient with appropriate food choices  - Assess stoma site  - Consider wound care consult   Outcome: Progressing  Goal: Oral mucous membranes remain intact  Description: INTERVENTIONS  - Assess oral mucosa and hygiene practices  - Implement preventative oral hygiene regimen  - Implement oral medicated treatments as ordered  - Initiate Nutrition services referral as needed  Outcome: Progressing     Problem: METABOLIC, FLUID AND ELECTROLYTES - ADULT  Goal: Electrolytes maintained within normal limits  Description: INTERVENTIONS:  - Monitor labs and assess patient for signs and symptoms of electrolyte imbalances  - Administer electrolyte replacement as ordered  - Monitor response to electrolyte replacements, including repeat lab results as appropriate  - Instruct patient on fluid and nutrition as appropriate  Outcome: Progressing  Goal: Fluid balance maintained  Description: INTERVENTIONS:  - Monitor labs   - Monitor I/O and WT  - Instruct patient on fluid and nutrition as appropriate  - Assess for signs & symptoms of volume excess or deficit  Outcome: Progressing  Goal: Glucose maintained within target range  Description: INTERVENTIONS:  - Monitor Blood Glucose as ordered  - Assess for signs and symptoms of hyperglycemia and hypoglycemia  - Administer ordered medications to maintain glucose within target range  - Assess nutritional intake and initiate nutrition service referral as needed  Outcome: Progressing     Problem: Nutrition/Hydration-ADULT  Goal: Nutrient/Hydration intake appropriate for improving, restoring or maintaining nutritional needs  Description: Monitor and assess patient's nutrition/hydration status for malnutrition. Collaborate with interdisciplinary team and initiate plan and interventions as ordered.  Monitor patient's weight and dietary intake as ordered or per policy. Utilize nutrition screening  tool and intervene as necessary. Determine patient's food preferences and provide high-protein, high-caloric foods as appropriate.     INTERVENTIONS:  - Monitor oral intake, urinary output, labs, and treatment plans  - Assess nutrition and hydration status and recommend course of action  - Evaluate amount of meals eaten  - Assist patient with eating if necessary   - Allow adequate time for meals  - Recommend/ encourage appropriate diets, oral nutritional supplements, and vitamin/mineral supplements  - Order, calculate, and assess calorie counts as needed  - Recommend, monitor, and adjust tube feedings and TPN/PPN based on assessed needs  - Assess need for intravenous fluids  - Provide specific nutrition/hydration education as appropriate  - Include patient/family/caregiver in decisions related to nutrition  Outcome: Progressing     Problem: Prexisting or High Potential for Compromised Skin Integrity  Goal: Skin integrity is maintained or improved  Description: INTERVENTIONS:  - Identify patients at risk for skin breakdown  - Assess and monitor skin integrity  - Assess and monitor nutrition and hydration status  - Monitor labs   - Assess for incontinence   - Turn and reposition patient  - Assist with mobility/ambulation  - Relieve pressure over bony prominences  - Avoid friction and shearing  - Provide appropriate hygiene as needed including keeping skin clean and dry  - Evaluate need for skin moisturizer/barrier cream  - Collaborate with interdisciplinary team   - Patient/family teaching  - Consider wound care consult   Outcome: Progressing

## 2024-01-27 NOTE — ASSESSMENT & PLAN NOTE
Patient noted to have neutropenia on admission  Will start on filgrastim x 3 doses  Trend CBC daily  Oncology consult  Biotics discontinued

## 2024-01-27 NOTE — PLAN OF CARE
Problem: Potential for Falls  Goal: Patient will remain free of falls  Description: INTERVENTIONS:  - Educate patient/family on patient safety including physical limitations  - Instruct patient to call for assistance with activity   - Consult OT/PT to assist with strengthening/mobility   - Keep Call bell within reach  - Keep bed low and locked with side rails adjusted as appropriate  - Keep care items and personal belongings within reach  - Initiate and maintain comfort rounds  - Make Fall Risk Sign visible to staff  - Offer Toileting every 2 Hours, in advance of need  - Initiate/Maintain 2alarm  - Obtain necessary fall risk management equipment: 2  - Apply yellow socks and bracelet for high fall risk patients  - Consider moving patient to room near nurses station  Outcome: Progressing     Problem: PAIN - ADULT  Goal: Verbalizes/displays adequate comfort level or baseline comfort level  Description: Interventions:  - Encourage patient to monitor pain and request assistance  - Assess pain using appropriate pain scale  - Administer analgesics based on type and severity of pain and evaluate response  - Implement non-pharmacological measures as appropriate and evaluate response  - Consider cultural and social influences on pain and pain management  - Notify physician/advanced practitioner if interventions unsuccessful or patient reports new pain  Outcome: Progressing     Problem: SAFETY ADULT  Goal: Patient will remain free of falls  Description: INTERVENTIONS:  - Educate patient/family on patient safety including physical limitations  - Instruct patient to call for assistance with activity   - Consult OT/PT to assist with strengthening/mobility   - Keep Call bell within reach  - Keep bed low and locked with side rails adjusted as appropriate  - Keep care items and personal belongings within reach  - Initiate and maintain comfort rounds  - Make Fall Risk Sign visible to staff  - Offer Toileting every 2 Hours, in  advance of need  - Initiate/Maintain 2alarm  - Obtain necessary fall risk management equipment: 2  - Apply yellow socks and bracelet for high fall risk patients  - Consider moving patient to room near nurses station  Outcome: Progressing     Problem: DISCHARGE PLANNING  Goal: Discharge to home or other facility with appropriate resources  Description: INTERVENTIONS:  - Identify barriers to discharge w/patient and caregiver  - Arrange for needed discharge resources and transportation as appropriate  - Identify discharge learning needs (meds, wound care, etc.)  - Arrange for interpretive services to assist at discharge as needed  - Refer to Case Management Department for coordinating discharge planning if the patient needs post-hospital services based on physician/advanced practitioner order or complex needs related to functional status, cognitive ability, or social support system  Outcome: Progressing     Problem: Knowledge Deficit  Goal: Patient/family/caregiver demonstrates understanding of disease process, treatment plan, medications, and discharge instructions  Description: Complete learning assessment and assess knowledge base.  Interventions:  - Provide teaching at level of understanding  - Provide teaching via preferred learning methods  Outcome: Progressing     Problem: RESPIRATORY - ADULT  Goal: Achieves optimal ventilation and oxygenation  Description: INTERVENTIONS:  - Assess for changes in respiratory status  - Assess for changes in mentation and behavior  - Position to facilitate oxygenation and minimize respiratory effort  - Oxygen administered by appropriate delivery if ordered  - Initiate smoking cessation education as indicated  - Encourage broncho-pulmonary hygiene including cough, deep breathe, Incentive Spirometry  - Assess the need for suctioning and aspirate as needed  - Assess and instruct to report SOB or any respiratory difficulty  - Respiratory Therapy support as indicated  Outcome:  Progressing

## 2024-01-27 NOTE — PLAN OF CARE
Problem: Potential for Falls  Goal: Patient will remain free of falls  Description: INTERVENTIONS:  - Educate patient/family on patient safety including physical limitations  - Instruct patient to call for assistance with activity   - Consult OT/PT to assist with strengthening/mobility   - Keep Call bell within reach  - Keep bed low and locked with side rails adjusted as appropriate  - Keep care items and personal belongings within reach  - Initiate and maintain comfort rounds  - Make Fall Risk Sign visible to staff  - Offer Toileting every 2 Hours, in advance of need  - Initiate/Maintain 2alarm  - Obtain necessary fall risk management equipment: 2  - Apply yellow socks and bracelet for high fall risk patients  - Consider moving patient to room near nurses station  1/27/2024 0023 by Gee Johns RN  Outcome: Progressing  1/26/2024 2041 by Gee Johns RN  Outcome: Progressing     Problem: PAIN - ADULT  Goal: Verbalizes/displays adequate comfort level or baseline comfort level  Description: Interventions:  - Encourage patient to monitor pain and request assistance  - Assess pain using appropriate pain scale  - Administer analgesics based on type and severity of pain and evaluate response  - Implement non-pharmacological measures as appropriate and evaluate response  - Consider cultural and social influences on pain and pain management  - Notify physician/advanced practitioner if interventions unsuccessful or patient reports new pain  1/27/2024 0023 by Gee Johns RN  Outcome: Progressing  1/26/2024 2041 by Gee Johns RN  Outcome: Progressing     Problem: INFECTION - ADULT  Goal: Absence or prevention of progression during hospitalization  Description: INTERVENTIONS:  - Assess and monitor for signs and symptoms of infection  - Monitor lab/diagnostic results  - Monitor all insertion sites, i.e. indwelling lines, tubes, and drains  - Monitor endotracheal if appropriate and nasal secretions for  changes in amount and color  - [place] appropriate cooling/warming therapies per order  - Administer medications as ordered  - Instruct and encourage patient and family to use good hand hygiene technique  - Identify and instruct in appropriate isolation precautions for identified infection/condition  1/27/2024 0023 by Gee Johns RN  Outcome: Progressing  1/26/2024 2041 by Gee Johns RN  Outcome: Progressing  Goal: Absence of fever/infection during neutropenic period  Description: INTERVENTIONS:  - Monitor WBC    1/27/2024 0023 by Gee Johns RN  Outcome: Progressing  1/26/2024 2041 by Gee Johns RN  Outcome: Progressing     Problem: SAFETY ADULT  Goal: Patient will remain free of falls  Description: INTERVENTIONS:  - Educate patient/family on patient safety including physical limitations  - Instruct patient to call for assistance with activity   - Consult OT/PT to assist with strengthening/mobility   - Keep Call bell within reach  - Keep bed low and locked with side rails adjusted as appropriate  - Keep care items and personal belongings within reach  - Initiate and maintain comfort rounds  - Make Fall Risk Sign visible to staff  - Offer Toileting every 2 Hours, in advance of need  - Initiate/Maintain 2alarm  - Obtain necessary fall risk management equipment: 2  - Apply yellow socks and bracelet for high fall risk patients  - Consider moving patient to room near nurses station  1/27/2024 0023 by Gee Johns RN  Outcome: Progressing  1/26/2024 2041 by Gee Johns RN  Outcome: Progressing  Goal: Maintain or return to baseline ADL function  Description: INTERVENTIONS:  - Educate patient/family on patient safety including physical limitations  - Instruct patient to call for assistance with activity   - Consult OT/PT to assist with strengthening/mobility   - Keep Call bell within reach  - Keep bed low and locked with side rails adjusted as appropriate  - Keep care items and  personal belongings within reach  - Initiate and maintain comfort rounds  - Make Fall Risk Sign visible to staff  - Offer Toileting every 2 Hours, in advance of need  - Initiate/Maintain 2alarm  - Obtain necessary fall risk management equipment: 2  - Apply yellow socks and bracelet for high fall risk patients  - Consider moving patient to room near nurses station  1/27/2024 0023 by Gee Johns RN  Outcome: Progressing  1/26/2024 2041 by Gee Johns RN  Outcome: Progressing  Goal: Maintains/Returns to pre admission functional level  Description: INTERVENTIONS:  - Perform AM-PAC 6 Click Basic Mobility/ Daily Activity assessment daily.  - Set and communicate daily mobility goal to care team and patient/family/caregiver.   - Collaborate with rehabilitation services on mobility goals if consulted  - Perform Range of Motion 2 times a day.  - Reposition patient every 2 hours.  - Dangle patient 2 times a day  - Stand patient 2 times a day  - Ambulate patient 2 times a day  - Out of bed to chair 2 times a day   - Out of bed for meals 2 times a day  - Out of bed for toileting  - Record patient progress and toleration of activity level   1/27/2024 0023 by Gee Johns RN  Outcome: Progressing  1/26/2024 2041 by Gee Johns RN  Outcome: Progressing     Problem: DISCHARGE PLANNING  Goal: Discharge to home or other facility with appropriate resources  Description: INTERVENTIONS:  - Identify barriers to discharge w/patient and caregiver  - Arrange for needed discharge resources and transportation as appropriate  - Identify discharge learning needs (meds, wound care, etc.)  - Arrange for interpretive services to assist at discharge as needed  - Refer to Case Management Department for coordinating discharge planning if the patient needs post-hospital services based on physician/advanced practitioner order or complex needs related to functional status, cognitive ability, or social support system  1/27/2024 0023  by Gee Johns RN  Outcome: Progressing  1/26/2024 2041 by Gee Johns RN  Outcome: Progressing     Problem: Knowledge Deficit  Goal: Patient/family/caregiver demonstrates understanding of disease process, treatment plan, medications, and discharge instructions  Description: Complete learning assessment and assess knowledge base.  Interventions:  - Provide teaching at level of understanding  - Provide teaching via preferred learning methods  1/27/2024 0023 by Gee Johns RN  Outcome: Progressing  1/26/2024 2041 by Gee Johns RN  Outcome: Progressing     Problem: RESPIRATORY - ADULT  Goal: Achieves optimal ventilation and oxygenation  Description: INTERVENTIONS:  - Assess for changes in respiratory status  - Assess for changes in mentation and behavior  - Position to facilitate oxygenation and minimize respiratory effort  - Oxygen administered by appropriate delivery if ordered  - Initiate smoking cessation education as indicated  - Encourage broncho-pulmonary hygiene including cough, deep breathe, Incentive Spirometry  - Assess the need for suctioning and aspirate as needed  - Assess and instruct to report SOB or any respiratory difficulty  - Respiratory Therapy support as indicated  1/27/2024 0023 by Gee Johns RN  Outcome: Progressing  1/26/2024 2041 by Gee Johns RN  Outcome: Progressing     Problem: GASTROINTESTINAL - ADULT  Goal: Minimal or absence of nausea and/or vomiting  Description: INTERVENTIONS:  - Administer IV fluids if ordered to ensure adequate hydration  - Maintain NPO status until nausea and vomiting are resolved  - Nasogastric tube if ordered  - Administer ordered antiemetic medications as needed  - Provide nonpharmacologic comfort measures as appropriate  - Advance diet as tolerated, if ordered  - Consider nutrition services referral to assist patient with adequate nutrition and appropriate food choices  1/27/2024 0023 by Gee Johns RN  Outcome:  Progressing  1/26/2024 2041 by Gee Johns RN  Outcome: Progressing  Goal: Maintains or returns to baseline bowel function  Description: INTERVENTIONS:  - Assess bowel function  - Encourage oral fluids to ensure adequate hydration  - Administer IV fluids if ordered to ensure adequate hydration  - Administer ordered medications as needed  - Encourage mobilization and activity  - Consider nutritional services referral to assist patient with adequate nutrition and appropriate food choices  1/27/2024 0023 by Gee Johns RN  Outcome: Progressing  1/26/2024 2041 by Gee Johns RN  Outcome: Progressing  Goal: Maintains adequate nutritional intake  Description: INTERVENTIONS:  - Monitor percentage of each meal consumed  - Identify factors contributing to decreased intake, treat as appropriate  - Assist with meals as needed  - Monitor I&O, weight, and lab values if indicated  - Obtain nutrition services referral as needed  1/27/2024 0023 by Gee Johns RN  Outcome: Progressing  1/26/2024 2041 by Gee Johns RN  Outcome: Progressing  Goal: Establish and maintain optimal ostomy function  Description: INTERVENTIONS:  - Assess bowel function  - Encourage oral fluids to ensure adequate hydration  - Administer IV fluids if ordered to ensure adequate hydration   - Administer ordered medications as needed  - Encourage mobilization and activity  - Nutrition services referral to assist patient with appropriate food choices  - Assess stoma site  - Consider wound care consult   1/27/2024 0023 by Gee Johns RN  Outcome: Progressing  1/26/2024 2041 by Gee Johns RN  Outcome: Progressing  Goal: Oral mucous membranes remain intact  Description: INTERVENTIONS  - Assess oral mucosa and hygiene practices  - Implement preventative oral hygiene regimen  - Implement oral medicated treatments as ordered  - Initiate Nutrition services referral as needed  1/27/2024 0023 by Gee Johns RN  Outcome:  Progressing  1/26/2024 2041 by Gee Johns RN  Outcome: Progressing     Problem: METABOLIC, FLUID AND ELECTROLYTES - ADULT  Goal: Electrolytes maintained within normal limits  Description: INTERVENTIONS:  - Monitor labs and assess patient for signs and symptoms of electrolyte imbalances  - Administer electrolyte replacement as ordered  - Monitor response to electrolyte replacements, including repeat lab results as appropriate  - Instruct patient on fluid and nutrition as appropriate  1/27/2024 0023 by Gee Johns RN  Outcome: Progressing  1/26/2024 2041 by Gee Johns RN  Outcome: Progressing  Goal: Fluid balance maintained  Description: INTERVENTIONS:  - Monitor labs   - Monitor I/O and WT  - Instruct patient on fluid and nutrition as appropriate  - Assess for signs & symptoms of volume excess or deficit  1/27/2024 0023 by Gee Johns RN  Outcome: Progressing  1/26/2024 2041 by Gee Johns RN  Outcome: Progressing  Goal: Glucose maintained within target range  Description: INTERVENTIONS:  - Monitor Blood Glucose as ordered  - Assess for signs and symptoms of hyperglycemia and hypoglycemia  - Administer ordered medications to maintain glucose within target range  - Assess nutritional intake and initiate nutrition service referral as needed  1/27/2024 0023 by Gee Johns RN  Outcome: Progressing  1/26/2024 2041 by Gee Johns RN  Outcome: Progressing     Problem: Nutrition/Hydration-ADULT  Goal: Nutrient/Hydration intake appropriate for improving, restoring or maintaining nutritional needs  Description: Monitor and assess patient's nutrition/hydration status for malnutrition. Collaborate with interdisciplinary team and initiate plan and interventions as ordered.  Monitor patient's weight and dietary intake as ordered or per policy. Utilize nutrition screening tool and intervene as necessary. Determine patient's food preferences and provide high-protein, high-caloric foods as  appropriate.     INTERVENTIONS:  - Monitor oral intake, urinary output, labs, and treatment plans  - Assess nutrition and hydration status and recommend course of action  - Evaluate amount of meals eaten  - Assist patient with eating if necessary   - Allow adequate time for meals  - Recommend/ encourage appropriate diets, oral nutritional supplements, and vitamin/mineral supplements  - Order, calculate, and assess calorie counts as needed  - Recommend, monitor, and adjust tube feedings and TPN/PPN based on assessed needs  - Assess need for intravenous fluids  - Provide specific nutrition/hydration education as appropriate  - Include patient/family/caregiver in decisions related to nutrition  1/27/2024 0023 by Gee Johns RN  Outcome: Progressing  1/26/2024 2041 by Gee Johns RN  Outcome: Progressing     Problem: Prexisting or High Potential for Compromised Skin Integrity  Goal: Skin integrity is maintained or improved  Description: INTERVENTIONS:  - Identify patients at risk for skin breakdown  - Assess and monitor skin integrity  - Assess and monitor nutrition and hydration status  - Monitor labs   - Assess for incontinence   - Turn and reposition patient  - Assist with mobility/ambulation  - Relieve pressure over bony prominences  - Avoid friction and shearing  - Provide appropriate hygiene as needed including keeping skin clean and dry  - Evaluate need for skin moisturizer/barrier cream  - Collaborate with interdisciplinary team   - Patient/family teaching  - Consider wound care consult   1/27/2024 0023 by Gee Johns RN  Outcome: Progressing  1/26/2024 2041 by Gee Johns RN  Outcome: Progressing

## 2024-01-27 NOTE — ASSESSMENT & PLAN NOTE
"Patient presented with nausea and vomiting  CT chest abdomen pelvis showed-\"  Acute small bowel obstruction with point of transition in the right lower quadrant, likely on the basis of serosal implants versus adhesions.  2.  Stable appearance of right infrahilar lung cancer with findings of lymphangitic carcinomatosis. Stable left lung groundglass and solid nodules likely representing metastases.  3.  Small bilateral pleural effusions with right chest tube in place.  Pathologic left posterior fifth rib fracture with surrounding callus. Right second rib metastasis is better appreciated on the PET/CT   4.  Moderate volume of abdominopelvic ascites with similar findings of peritoneal carcinomatosis.\"  Patient stable for dc, able to tolerate diet    "

## 2024-01-29 ENCOUNTER — TRANSITIONAL CARE MANAGEMENT (OUTPATIENT)
Dept: FAMILY MEDICINE CLINIC | Facility: HOSPITAL | Age: 64
End: 2024-01-29

## 2024-01-29 ENCOUNTER — HOME CARE VISIT (OUTPATIENT)
Dept: HOME HEALTH SERVICES | Facility: HOME HEALTHCARE | Age: 64
End: 2024-01-29
Payer: COMMERCIAL

## 2024-01-29 ENCOUNTER — TELEPHONE (OUTPATIENT)
Age: 64
End: 2024-01-29

## 2024-01-29 ENCOUNTER — OFFICE VISIT (OUTPATIENT)
Age: 64
End: 2024-01-29
Payer: MEDICARE

## 2024-01-29 ENCOUNTER — HOSPITAL ENCOUNTER (OUTPATIENT)
Dept: INFUSION CENTER | Facility: HOSPITAL | Age: 64
Discharge: HOME | End: 2024-01-29

## 2024-01-29 VITALS
BODY MASS INDEX: 30.07 KG/M2 | HEIGHT: 65 IN | DIASTOLIC BLOOD PRESSURE: 72 MMHG | SYSTOLIC BLOOD PRESSURE: 123 MMHG | OXYGEN SATURATION: 90 % | HEART RATE: 97 BPM | WEIGHT: 180.5 LBS | TEMPERATURE: 98 F | RESPIRATION RATE: 18 BRPM

## 2024-01-29 DIAGNOSIS — C34.91 NON-SMALL CELL CANCER OF RIGHT LUNG (HCC): Primary | ICD-10-CM

## 2024-01-29 PROCEDURE — 99215 OFFICE O/P EST HI 40 MIN: CPT | Performed by: INTERNAL MEDICINE

## 2024-01-29 RX ORDER — ONDANSETRON 2 MG/ML
8 INJECTION INTRAMUSCULAR; INTRAVENOUS ONCE
Start: 2024-02-22

## 2024-01-29 RX ORDER — SODIUM CHLORIDE 9 MG/ML
20 INJECTION, SOLUTION INTRAVENOUS ONCE
OUTPATIENT
Start: 2024-02-22

## 2024-01-29 RX ORDER — SODIUM CHLORIDE 9 MG/ML
20 INJECTION, SOLUTION INTRAVENOUS ONCE
OUTPATIENT
Start: 2024-02-15

## 2024-01-29 RX ORDER — SODIUM CHLORIDE 9 MG/ML
20 INJECTION, SOLUTION INTRAVENOUS ONCE
Status: CANCELLED | OUTPATIENT
Start: 2024-02-08

## 2024-01-29 RX ORDER — ONDANSETRON 2 MG/ML
8 INJECTION INTRAMUSCULAR; INTRAVENOUS ONCE
Start: 2024-02-15

## 2024-01-29 NOTE — TELEPHONE ENCOUNTER
Spoke with patient and let her know that I scheduled her to see Dr. Zuniga on Feb 7th at 0900. I informed her that I will send her a [employer] message with further details. Patient verbalized understanding.

## 2024-01-29 NOTE — UTILIZATION REVIEW
NOTIFICATION OF ADMISSION DISCHARGE   This is a Notification of Discharge from [facility]. Please be advised that this patient has been discharge from our facility. Below you will find the admission and discharge date and time including the patient’s disposition.   UTILIZATION REVIEW CONTACT:  Johanna Cline  Utilization   Network Utilization Review Department  Phone: [phone] x carefully listen to the prompts. All voicemails are confidential.  Email: [email or web address]     ADMISSION INFORMATION  PRESENTATION DATE: 1/21/2024  8:26 AM  OBERVATION ADMISSION DATE:   INPATIENT ADMISSION DATE: 1/21/24 12:07 PM   DISCHARGE DATE: 1/27/2024  3:32 PM   DISPOSITION:Home with Home Health Care    Network Utilization Review Department  ATTENTION: Please call with any questions or concerns to [phone] and carefully listen to the prompts so that you are directed to the right person. All voicemails are confidential.   For Discharge needs, contact Care Management DC Support Team at [phone] opt. 2  Send all requests for admission clinical reviews, approved or denied determinations and any other requests to dedicated fax number below belonging to the campus where the patient is receiving treatment. List of dedicated fax numbers for the Facilities:  FACILITY NAME UR FAX NUMBER   ADMISSION DENIALS (Administrative/Medical Necessity) [phone]   DISCHARGE SUPPORT TEAM ([facility]) [phone]   PARENT CHILD HEALTH (Maternity/NICU/Pediatrics) [phone]   [facility] [phone]   [facility] [phone]   [facility] [phone]   [facility] [phone]   [facility] [phone]   [facility] [phone]   [facility] [phone]   [facility]  [phone]   [facility] [phone]   [facility] [phone]   [facility] [phone]   [facility] [phone]

## 2024-01-29 NOTE — TELEPHONE ENCOUNTER
See new treatment plan to start next Thursday, please schedule an appt with Dr Phipps prior to cycle 2 at     Thank you

## 2024-01-30 ENCOUNTER — HOME CARE VISIT (OUTPATIENT)
Dept: HOME HEALTH SERVICES | Facility: HOME HEALTHCARE | Age: 64
End: 2024-01-30
Payer: COMMERCIAL

## 2024-01-30 VITALS
RESPIRATION RATE: 22 BRPM | OXYGEN SATURATION: 95 % | DIASTOLIC BLOOD PRESSURE: 74 MMHG | SYSTOLIC BLOOD PRESSURE: 130 MMHG | TEMPERATURE: 97.8 F | HEART RATE: 94 BPM

## 2024-01-30 PROCEDURE — G0299 HHS/HOSPICE OF RN EA 15 MIN: HCPCS

## 2024-01-30 NOTE — PROGRESS NOTES
HEMATOLOGY / ONCOLOGY CLINIC FOLLOW UP NOTE    Primary Care Provider: Keyon Miller MD  Referring Provider:    MRN: [id number]  : 1960    Reason for Encounter: follow up metastatic NSCLC       Oncology History Overview Note   2019 - Stage IA adenocarcinoma of the right lung s/p RLLectomy    2022 - fall after tripping on a dog gate - CT showed pulmonary nodules that required 3 month followup    3/2023 - CT showed right pleural effusion with enlarging lung lesion, she was otherwise symptomatic, thoracentesis cell block showed adenocarcinoma c/w her prior lung primary    PET - no disease outside the chest    Caris - no targetable mutations    2023 - carbo/pem/pembro    - - cycle 2, pemetrexed dose reduced by 20% and carbo dose reduced to AUC 4 due to nausea and fatigue    2023 - removed carbo as of cycle 5 due to good response and increasing fatigue    2023 - add back carbo for cycle 6 due to increasing output from right pleural catheter    2023 - remove carboplatin for cycle 7 because no impact was made on output from pleural catheter    10/2023 - disease progression in the omentum.  Plan treatment change to docetaxel and ramucirumab    2023 - start taxotere/ramucirumab    2024 - change treatment to gemcitabine due to concern of inadequate response     Non-small cell cancer of right lung (HCC)   2023 Initial Diagnosis    Non-small cell cancer of right lung (HCC)     2023 - 2023 Chemotherapy    cyanocobalamin, 1,000 mcg, Intramuscular, Once, 5 of 10 cycles  Administration: 1,000 mcg (2023), 1,000 mcg (2023), 1,000 mcg (8/3/2023), 1,000 mcg (2023), 1,000 mcg (2023)  alteplase (CATHFLO), 2 mg, Intracatheter, Every 1 Minute as needed, 8 of 13 cycles  fosaprepitant (EMEND) IVPB, 150 mg, Intravenous, Once, 5 of 5 cycles  Administration: 150 mg (2023), 150 mg (2023), 150 mg (2023), 150 mg (2023), 150 mg (8/3/2023)  CARBOplatin  (PARAPLATIN) IVPB (GOG AUC DOSING), 553 mg, Intravenous, Once, 5 of 5 cycles  Administration: 553 mg (4/20/2023), 442.4 mg (6/1/2023), 442.4 mg (6/22/2023), 442.4 mg (5/11/2023), 438 mg (8/3/2023)  pemetrexed (ALIMTA) chemo infusion, 945 mg, Intravenous, Once, 8 of 13 cycles  Dose modification: 400 mg/m2 (original dose 500 mg/m2, Cycle 3, Reason: Nausea/Vomiting, Comment: 20% dose reduction due to nausea)  Administration: 900 mg (4/20/2023), 756 mg (6/1/2023), 756 mg (6/22/2023), 756 mg (8/3/2023), 756 mg (5/11/2023), 756 mg (7/13/2023), 756 mg (8/24/2023), 756 mg (9/14/2023)  pembrolizumab (KEYTRUDA) IVPB, 200 mg, Intravenous, Once, 8 of 13 cycles  Administration: 200 mg (4/20/2023), 200 mg (6/1/2023), 200 mg (6/22/2023), 200 mg (8/3/2023), 200 mg (5/11/2023), 200 mg (7/13/2023), 200 mg (8/24/2023), 200 mg (9/14/2023)     6/9/2023 -  Cancer Staged    Staging form: Lung, AJCC 8th Edition  - Clinical: Stage ALESHA (cT1c, cN2, cM1a) - Signed by Shani Phipps DO on 6/9/2023 11/9/2023 - 1/11/2024 Chemotherapy    alteplase (CATHFLO), 2 mg, Intracatheter, Every 1 Minute as needed, 4 of 6 cycles  pegfilgrastim (NEULASTA ONPRO), 6 mg, Subcutaneous, Once, 0 of 2 cycles  ramucirumab (CYRAMZA) IVPB, 830 mg, Intravenous, Once, 4 of 6 cycles  Administration: 800 mg (11/9/2023), 800 mg (11/30/2023), 800 mg (12/21/2023), 800 mg (1/11/2024)  DOCEtaxel (TAXOTERE) chemo infusion, 75 mg/m2 = 142.6 mg, Intravenous, Once, 4 of 6 cycles  Administration: 142.6 mg (11/9/2023), 142.6 mg (11/30/2023), 142.6 mg (12/21/2023), 142.6 mg (1/11/2024)     2/9/2024 -  Chemotherapy    alteplase (CATHFLO), 2 mg, Intracatheter, Every 1 Minute as needed, 0 of 6 cycles  gemcitabine (GEMZAR) infusion, 1,000 mg/m2 = 1,890.1 mg, Intravenous, Once, 0 of 6 cycles         Interval History: Patient presents for follow up of her stage IV adenocarcinoma of the lung.  She was just admitted with a small bowel obstruction that resolved with conservative  measures.  She also was treated for possible pneumonia.  She is status post 4 cycles of Taxotere and ramucirumab.  She has noticed decreased output in her pleural effusions.  They are drained 2 times a week and put out about 250 mL.  Since she has been home she is able to take about 1 boost shake plus an egg sandwich each day.  She is not having any nausea or vomiting.  No abdominal pain.  She is passing gas and has moved her bowels.  She has dyspnea on exertion.  She uses oxygen at home.  Kathi is here with her  today who unfortunately also just got diagnosed with esophageal cancer and is a patient in my office.         REVIEW OF SYSTEMS:  Please note that a 14-point review of systems was performed to include Constitutional, HEENT, Respiratory, CVS, GI, , Musculoskeletal, Integumentary, Neurologic, Rheumatologic, Endocrinologic, Psychiatric, Lymphatic, and Hematologic/Oncologic systems were reviewed and are negative unless otherwise stated in HPI. Positive and negative findings pertinent to this evaluation are incorporated into the history of present illness.      ECOG PS: 2    PROBLEM LIST:  Patient Active Problem List   Diagnosis    Impingement syndrome of left shoulder    Closed fracture of multiple ribs of left side    Cigarette nicotine dependence in remission    COPD (chronic obstructive pulmonary disease) (HCC)    Hypercholesterolemia    Essential hypertension    Non-small cell cancer of right lung (HCC)    Malignant pleural effusion    Dyspnea on exertion    CINV (chemotherapy-induced nausea and vomiting)    Palliative care patient    Hyponatremia    Generalized anxiety disorder    Chronic respiratory failure with hypoxia (HCC)    SBO (small bowel obstruction) (HCC)    Sepsis (HCC)       Assessment / Plan 63-year-old female with stage IV adenocarcinoma of the lung.  I personally reviewed all the imaging from the hospital.  It is not clear to me the exact response she is having with the Taxotere  and ramucirumab.  What ever it is, it is certainly not adequate because she is having bowel obstructions.  ramucirumab can also carry a small risk of bowel perforation, and because of the obstruction, I am a little nervous to give it right now.  Some areas in her lungs look worse to me.  The omental disease looks slightly better but she has not clinically improved.  For that reason I am going to switch her to gemcitabine.  Risks and benefits of gemcitabine were discussed and consent was signed.  She understands this is not with curative intent.  I told her that my biggest concern is if she would have a bowel obstruction that we cannot resolve with conservative measures, she would not be a candidate for surgery.  At that point we would need to focus more on comfort measures.  The patient is very realistic about her prognosis.  I am also referring her to [facility] to investigate any options for clinical trials.  I will plan on seeing her prior to cycle 2 of gemcitabine.  She knows to call in the interim with any questions or concerns.       I spent 45 minutes on chart review, face to face counseling time, coordination of care and documentation.    Past Medical History:   has a past medical history of Cancer (HCC), COPD (chronic obstructive pulmonary disease) (HCC), Emphysema of lung (HCC), Hyperlipidemia, Hypertension, Lung cancer (HCC) (06/26/2019), Lung nodule, and Pleural effusion.    PAST SURGICAL HISTORY:   has a past surgical history that includes Appendectomy; Colon surgery; Lung lobectomy; Hysterectomy; Oophorectomy (Bilateral); Breast biopsy (Right); IR thoracentesis (03/24/2023); IR thoracentesis (04/12/2023); IR thoracentesis (04/18/2023); IR port placement (04/18/2023); IR pleural effusion long-term catheter placement (04/25/2023); Bronchoscopy (06/2019); and Lung biopsy (06/2019).    CURRENT MEDICATIONS  Current Outpatient Medications   Medication Sig Dispense Refill    albuterol (2.5 mg/3 mL) 0.083 %  nebulizer solution Take 3 mL (2.5 mg total) by nebulization every 6 (six) hours as needed for wheezing or shortness of breath 270 mL 4    albuterol (PROVENTIL HFA,VENTOLIN HFA) 90 mcg/act inhaler Inhale 2 puffs every 4 (four) hours  3    Ascorbic Acid (vitamin C) 1000 MG tablet 1 daily      atorvastatin (LIPITOR) 20 mg tablet Take 20 mg by mouth daily.      Biotin 48247 MCG TABS 1 daily      buPROPion (WELLBUTRIN XL) 300 mg 24 hr tablet TAKE 1 TABLET BY MOUTH EVERY DAY IN THE MORNING FOR 90 DAYS      Cholecalciferol (Vitamin D3) 25 MCG (1000 UT) CAPS 1 capsule every 24 hours      dexamethasone sodium phosphate 0.1 % ophthalmic solution Administer 1 drop to both eyes every 3 (three) hours as needed (eye irritation) (Patient not taking: Reported on 1/21/2024) 5 mL 5    diphenhydrAMINE (BENADRYL) 50 MG tablet Take 1 tablet (50 mg total) by mouth every 6 (six) hours as needed for itching (Rash) 30 tablet 0    diphenhydrAMINE-APAP, sleep, (TYLENOL PM EXTRA STRENGTH PO) Take 325 mg by mouth daily at bedtime      fluticasone-umeclidinium-vilanterol (Trelegy Ellipta) 200-62.5-25 mcg/actuation AEPB inhaler Inhale 1 puff daily Rinse mouth after use. 60 blister 0    furosemide (LASIX) 20 mg tablet Take 1 tablet (20 mg total) by mouth every morning 90 tablet 1    lidocaine-prilocaine (EMLA) cream APPLY TO PORT 30 TO 60 MINUTES PRIOR TO USE 30 g 3    LORazepam (Ativan) 0.5 mg tablet Take 1 tablet (0.5 mg total) by mouth daily as needed for anxiety 30 tablet 0    metoprolol tartrate (LOPRESSOR) 25 mg tablet Take 1 tablet (25 mg total) by mouth every 12 (twelve) hours 180 tablet 1    Multiple Vitamin (MULTIVITAMINS PO) every 24 hours      oxygen gas Inhale 2 L/min continuous. May increase to 3L PRN for dyspnea and sats under 89%  Indications: Hypoxia      polyethylene glycol (GLYCOLAX) 17 GM/SCOOP powder Take 17 g by mouth daily as needed (constipation) 225 g 0    potassium chloride (Klor-Con M20) 20 mEq tablet Take 2 tablets (40  "mEq total) by mouth daily 30 tablet 0    Probiotic Product (PROBIOTIC DAILY PO) 1 daily      Sennosides (Senna) 8.6 MG CAPS Take 2 capsules by mouth as needed (constipation)      sertraline (ZOLOFT) 100 mg tablet 2 daily      telmisartan (MICARDIS) 80 MG tablet Take 1 tablet (80 mg total) by mouth daily 90 tablet 1    verapamil (CALAN-SR) 120 mg CR tablet Take 1 tablet (120 mg total) by mouth daily at bedtime 1 hs 90 tablet 3    verapamil (CALAN-SR) 240 mg CR tablet Take 1 tablet (240 mg total) by mouth daily at bedtime 1 hs 90 tablet 3     No current facility-administered medications for this visit.     [unfilled]    SOCIAL HISTORY:   reports that she quit smoking about 10 months ago. Her smoking use included cigarettes. She started smoking about 54 years ago. She has a 79.8 pack-year smoking history. She has never used smokeless tobacco. She reports that she does not currently use alcohol. She reports that she does not use drugs.     FAMILY HISTORY:  family history includes Arthritis in her mother; COPD in her mother; Cancer in her maternal aunt, maternal aunt, maternal grandmother, and mother; Colon cancer in her maternal aunt, maternal aunt, maternal grandmother, and mother; Depression in her mother; Diabetes in her maternal grandmother; Drug abuse in her brother; Hearing loss in her mother; Hyperlipidemia in her mother; Hypertension in her mother; Lung cancer in her mother.     ALLERGIES:  is allergic to amoxicillin, vilazodone hcl, wasp venom, and zithromax [azithromycin].      Physical Exam:  Vital Signs:   Visit Vitals  /72 (BP Location: Left arm, Patient Position: Sitting, Cuff Size: Adult)   Pulse 97   Temp 98 °F (36.7 °C)   Resp 18   Ht 5' 5\" (1.651 m)   Wt 81.9 kg (180 lb 8 oz)   SpO2 90%   BMI 30.04 kg/m²   OB Status Hysterectomy   Smoking Status Former   BSA 1.89 m²     Body mass index is 30.04 kg/m².  Body surface area is 1.89 meters squared.    GEN: Alert, awake oriented x3, in no acute " distress  HEENT- No pallor, icterus, cyanosis, no oral mucosal lesions,   LAD - no palpable cervical, clavicle, axillary, inguinal LAD  Heart- normal S1 S2, regular rate and rhythm, No murmur, rubs.   Lungs- clear breathing sound bilateral.   Abdomen- soft, Non tender, bowel sounds present but decreased  Extremities- No cyanosis, clubbing, edema  Neuro- No focal neurological deficit    Labs:  Lab Results   Component Value Date    WBC 12.41 (H) 01/27/2024    HGB 10.3 (L) 01/27/2024    HCT 33.8 (L) 01/27/2024    MCV 86 01/27/2024     01/27/2024     Lab Results   Component Value Date    SODIUM 134 (L) 01/27/2024    K 4.7 01/27/2024     01/27/2024    CO2 25 01/27/2024    AGAP 4 01/27/2024    BUN 10 01/27/2024    CREATININE 0.49 (L) 01/27/2024    GLUC 95 01/27/2024    GLUF 105 (H) 09/11/2023    CALCIUM 8.2 (L) 01/27/2024    AST 13 01/27/2024    ALT 8 01/27/2024    ALKPHOS 91 01/27/2024    TP 5.3 (L) 01/27/2024    TBILI 0.17 (L) 01/27/2024    EGFR 102 01/27/2024

## 2024-02-01 ENCOUNTER — HOSPITAL ENCOUNTER (OUTPATIENT)
Dept: INFUSION CENTER | Facility: HOSPITAL | Age: 64
End: 2024-02-01
Attending: INTERNAL MEDICINE

## 2024-02-02 ENCOUNTER — OFFICE VISIT (OUTPATIENT)
Dept: FAMILY MEDICINE CLINIC | Facility: HOSPITAL | Age: 64
End: 2024-02-02
Payer: MEDICARE

## 2024-02-02 ENCOUNTER — HOME CARE VISIT (OUTPATIENT)
Dept: HOME HEALTH SERVICES | Facility: HOME HEALTHCARE | Age: 64
End: 2024-02-02
Payer: COMMERCIAL

## 2024-02-02 VITALS
SYSTOLIC BLOOD PRESSURE: 120 MMHG | DIASTOLIC BLOOD PRESSURE: 78 MMHG | OXYGEN SATURATION: 98 % | RESPIRATION RATE: 18 BRPM | TEMPERATURE: 97.9 F | HEART RATE: 88 BPM

## 2024-02-02 VITALS
HEART RATE: 91 BPM | SYSTOLIC BLOOD PRESSURE: 112 MMHG | TEMPERATURE: 97.7 F | RESPIRATION RATE: 16 BRPM | HEIGHT: 65 IN | BODY MASS INDEX: 29.66 KG/M2 | WEIGHT: 178 LBS | OXYGEN SATURATION: 93 % | DIASTOLIC BLOOD PRESSURE: 62 MMHG

## 2024-02-02 DIAGNOSIS — I10 ESSENTIAL HYPERTENSION: ICD-10-CM

## 2024-02-02 DIAGNOSIS — C34.91 NON-SMALL CELL CANCER OF RIGHT LUNG (HCC): ICD-10-CM

## 2024-02-02 DIAGNOSIS — J96.11 CHRONIC RESPIRATORY FAILURE WITH HYPOXIA (HCC): ICD-10-CM

## 2024-02-02 DIAGNOSIS — J44.9 CHRONIC OBSTRUCTIVE PULMONARY DISEASE, UNSPECIFIED COPD TYPE (HCC): ICD-10-CM

## 2024-02-02 DIAGNOSIS — K56.609 SBO (SMALL BOWEL OBSTRUCTION) (HCC): Primary | ICD-10-CM

## 2024-02-02 DIAGNOSIS — M75.02 ADHESIVE CAPSULITIS OF LEFT SHOULDER: ICD-10-CM

## 2024-02-02 PROBLEM — A41.9 SEPSIS (HCC): Status: RESOLVED | Noted: 2024-01-21 | Resolved: 2024-02-02

## 2024-02-02 PROBLEM — R06.09 DYSPNEA ON EXERTION: Status: RESOLVED | Noted: 2023-04-23 | Resolved: 2024-02-02

## 2024-02-02 PROCEDURE — G0299 HHS/HOSPICE OF RN EA 15 MIN: HCPCS

## 2024-02-02 PROCEDURE — 99496 TRANSJ CARE MGMT HIGH F2F 7D: CPT | Performed by: INTERNAL MEDICINE

## 2024-02-02 NOTE — ASSESSMENT & PLAN NOTE
She has chronic hypoxic respiratory failure due to COPD, stage IV non-small cell lung cancer    Continue oxygen via nasal cannula which is currently at 3 L/min continuous

## 2024-02-02 NOTE — PROGRESS NOTES
Assessment/Plan:     SBO (small bowel obstruction) (HCC)  Small bowel obstruction resolved.  Patient's abdomen is nondistended, nontender.  Will monitor closely    Non-small cell cancer of right lung (HCC)  She has omental metastases from stage IV non-small cell lung cancer.  She will try new chemotherapy and we will see if there is any trial at [facility] that she could enter.    Continue malignant pleural fluid drainage twice a week by indwelling catheter    COPD (chronic obstructive pulmonary disease) (HCC)  COPD is stable.  Her lungs are clear to auscultation completely.  Trelegy is not covered by her insurance company.  Continue Advair and as needed albuterol    Chronic respiratory failure with hypoxia (HCC)  She has chronic hypoxic respiratory failure due to COPD, stage IV non-small cell lung cancer    Continue oxygen via nasal cannula which is currently at 3 L/min continuous    Essential hypertension  Blood pressure is controlled.  She denies dizziness or lightheadedness.  Potassium and renal function were normal on January 27.  Continue myocarditis, verapamil, Lopressor, Lasix at the current dosages    Adhesive capsulitis of left shoulder  She has markedly limited range of motion in the left shoulder for couple of months.  Her adhesive capsulitis has recurred.  I found no signs of septic arthritis today.  In November 2019 she received a start injection in her left shoulder that did not help according to her.  I referred patient to physical therapy       Diagnoses and all orders for this visit:    SBO (small bowel obstruction) (HCC)    Non-small cell cancer of right lung (HCC)    Chronic obstructive pulmonary disease, unspecified COPD type (HCC)    Chronic respiratory failure with hypoxia (HCC)    Adhesive capsulitis of left shoulder  -     Ambulatory Referral to Physical Therapy; Future    Essential hypertension         Subjective:     Patient ID: Kathi Ferris is a 63 y.o. female.    Patient presents with  her  for ANAMARIA management.  She was discharged on January 27 after hospital stay for small bowel obstruction that resolved with conservative measures.    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  Small bowel obstruction was attributed to that.  I reviewed CT abdomen results from January 18    Patient denies any nausea, vomiting, abdominal pain.  She is able to eat and drink.  She has loose bowel movements without signs of GI bleeding.        Review of Systems   Constitutional:  Negative for fever.   HENT:  Negative for hearing loss.    Eyes:  Negative for visual disturbance.   Respiratory:  Positive for shortness of breath (Stable on oxygen, inhalers, Lasix). Negative for cough.    Cardiovascular:  Negative for chest pain and palpitations.   Gastrointestinal:  Negative for abdominal pain, blood in stool, constipation and diarrhea.   Endocrine: Negative for polydipsia.   Genitourinary:  Negative for dysuria and hematuria.   Musculoskeletal:  Positive for arthralgias. Negative for myalgias.   Skin:  Negative for rash.   Neurological:  Negative for headaches.   Psychiatric/Behavioral:  Negative for dysphoric mood.    All other systems reviewed and are negative.        Objective:     Physical Exam  Constitutional:       General: She is not in acute distress.  HENT:      Head: Normocephalic.      Mouth/Throat:      Mouth: Mucous membranes are moist.   Eyes:      Conjunctiva/sclera: Conjunctivae normal.   Cardiovascular:      Rate and Rhythm: Normal rate and regular rhythm.      Heart sounds: No murmur heard.  Pulmonary:      Effort: No respiratory distress.      Breath sounds: No wheezing or rales.   Abdominal:      General: Bowel sounds are normal. There is no distension.      Palpations: Abdomen is soft.      Tenderness: There is no abdominal tenderness.   Musculoskeletal:      Cervical back: Neck supple.      Comments: She has markedly limited left shoulder motion actively and  "passively.  Left shoulder is not erythematous, warm, tender   Skin:     General: Skin is warm and dry.   Neurological:      Mental Status: She is alert and oriented to person, place, and time.      Cranial Nerves: No cranial nerve deficit.      Motor: No weakness.   Psychiatric:         Mood and Affect: Mood normal.         Thought Content: Thought content normal.           Vitals:    02/02/24 1303   BP: 112/62   Pulse: 91   Resp: 16   Temp: 97.7 °F (36.5 °C)   TempSrc: Tympanic   SpO2: 93%   Weight: 80.7 kg (178 lb)   Height: 5' 5\" (1.651 m)       Transitional Care Management Review:  Kathi Ferris is a 63 y.o. female here for TCM follow up.     During the TCM phone call patient stated:    TCM Call     Date and time call was made  1/30/2024 10:41 AM    Patient was hospitial[facility] at  [facility]    Date of Admission  01/21/24    Date of discharge  01/27/24    Diagnosis  ADMITTING DX--SBO, D/C DX--ACTIVE PROBLEMS    Disposition  Home    Were the patients medications reviewed and updated  Yes    Current Symptoms  None      TCM Call     Should patient be enrolled in anticoag monitoring?  No    Scheduled for follow up?  Yes    I have advised the patient to call PCP with any new or worsening symptoms  [facility], [address]    Living Arrangements  Spouse or Significiant other    Comments  SPOKE TO PT.  DOING GOOD.  MEDS REVIEWED, CHART CURRENT.  SENT TO FRONT TO SCHED APPT WITHIN 7 DAYS.  ANGELLA Miller MD      "

## 2024-02-02 NOTE — ASSESSMENT & PLAN NOTE
COPD is stable.  Her lungs are clear to auscultation completely.  Trelegy is not covered by her insurance company.  Continue Advair and as needed albuterol

## 2024-02-02 NOTE — ASSESSMENT & PLAN NOTE
She has omental metastases from stage IV non-small cell lung cancer.  She will try new chemotherapy and we will see if there is any trial at [facility] that she could enter.    Continue malignant pleural fluid drainage twice a week by indwelling catheter

## 2024-02-02 NOTE — ASSESSMENT & PLAN NOTE
Small bowel obstruction resolved.  Patient's abdomen is nondistended, nontender.  Will monitor closely

## 2024-02-02 NOTE — ASSESSMENT & PLAN NOTE
She has markedly limited range of motion in the left shoulder for couple of months.  Her adhesive capsulitis has recurred.  I found no signs of septic arthritis today.  In November 2019 she received a start injection in her left shoulder that did not help according to her.  I referred patient to physical therapy

## 2024-02-05 ENCOUNTER — HOME CARE VISIT (OUTPATIENT)
Dept: HOME HEALTH SERVICES | Facility: HOME HEALTHCARE | Age: 64
End: 2024-02-05
Payer: COMMERCIAL

## 2024-02-05 VITALS
HEART RATE: 80 BPM | RESPIRATION RATE: 20 BRPM | SYSTOLIC BLOOD PRESSURE: 120 MMHG | DIASTOLIC BLOOD PRESSURE: 76 MMHG | TEMPERATURE: 97.6 F | OXYGEN SATURATION: 94 %

## 2024-02-05 PROCEDURE — G0299 HHS/HOSPICE OF RN EA 15 MIN: HCPCS

## 2024-02-06 ENCOUNTER — HOSPITAL ENCOUNTER (OUTPATIENT)
Dept: INFUSION CENTER | Facility: HOSPITAL | Age: 64
Discharge: HOME | End: 2024-02-06
Payer: COMMERCIAL

## 2024-02-06 DIAGNOSIS — C34.91 NON-SMALL CELL CANCER OF RIGHT LUNG (HCC): Primary | ICD-10-CM

## 2024-02-06 LAB
ALBUMIN SERPL BCP-MCNC: 2.8 G/DL (ref 3.5–5)
ALP SERPL-CCNC: 77 U/L (ref 34–104)
ALT SERPL W P-5'-P-CCNC: 7 U/L (ref 7–52)
ANION GAP SERPL CALCULATED.3IONS-SCNC: 9 MMOL/L
AST SERPL W P-5'-P-CCNC: 12 U/L (ref 13–39)
BASOPHILS # BLD AUTO: 0.02 THOUSANDS/ÂΜL (ref 0–0.1)
BASOPHILS NFR BLD AUTO: 0 % (ref 0–1)
BILIRUB SERPL-MCNC: 0.32 MG/DL (ref 0.2–1)
BUN SERPL-MCNC: 12 MG/DL (ref 5–25)
CALCIUM ALBUM COR SERPL-MCNC: 9.8 MG/DL (ref 8.3–10.1)
CALCIUM SERPL-MCNC: 8.8 MG/DL (ref 8.4–10.2)
CHLORIDE SERPL-SCNC: 97 MMOL/L (ref 96–108)
CO2 SERPL-SCNC: 25 MMOL/L (ref 21–32)
CREAT SERPL-MCNC: 0.43 MG/DL (ref 0.6–1.3)
EOSINOPHIL # BLD AUTO: 0 THOUSAND/ÂΜL (ref 0–0.61)
EOSINOPHIL NFR BLD AUTO: 0 % (ref 0–6)
ERYTHROCYTE [DISTWIDTH] IN BLOOD BY AUTOMATED COUNT: 18.3 % (ref 11.6–15.1)
GFR SERPL CREATININE-BSD FRML MDRD: 107 ML/MIN/1.73SQ M
GLUCOSE SERPL-MCNC: 98 MG/DL (ref 65–140)
HCT VFR BLD AUTO: 31.5 % (ref 34.8–46.1)
HGB BLD-MCNC: 9.9 G/DL (ref 11.5–15.4)
IMM GRANULOCYTES # BLD AUTO: 0.07 THOUSAND/UL (ref 0–0.2)
IMM GRANULOCYTES NFR BLD AUTO: 1 % (ref 0–2)
LYMPHOCYTES # BLD AUTO: 0.78 THOUSANDS/ÂΜL (ref 0.6–4.47)
LYMPHOCYTES NFR BLD AUTO: 8 % (ref 14–44)
MCH RBC QN AUTO: 26.9 PG (ref 26.8–34.3)
MCHC RBC AUTO-ENTMCNC: 31.4 G/DL (ref 31.4–37.4)
MCV RBC AUTO: 86 FL (ref 82–98)
MONOCYTES # BLD AUTO: 0.6 THOUSAND/ÂΜL (ref 0.17–1.22)
MONOCYTES NFR BLD AUTO: 6 % (ref 4–12)
NEUTROPHILS # BLD AUTO: 8.35 THOUSANDS/ÂΜL (ref 1.85–7.62)
NEUTS SEG NFR BLD AUTO: 85 % (ref 43–75)
NRBC BLD AUTO-RTO: 0 /100 WBCS
PLATELET # BLD AUTO: 456 THOUSANDS/UL (ref 149–390)
PMV BLD AUTO: 9.1 FL (ref 8.9–12.7)
POTASSIUM SERPL-SCNC: 3.8 MMOL/L (ref 3.5–5.3)
PROT SERPL-MCNC: 6.1 G/DL (ref 6.4–8.4)
RBC # BLD AUTO: 3.68 MILLION/UL (ref 3.81–5.12)
SODIUM SERPL-SCNC: 131 MMOL/L (ref 135–147)
WBC # BLD AUTO: 9.82 THOUSAND/UL (ref 4.31–10.16)

## 2024-02-06 PROCEDURE — 80053 COMPREHEN METABOLIC PANEL: CPT | Performed by: INTERNAL MEDICINE

## 2024-02-06 PROCEDURE — 85025 COMPLETE CBC W/AUTO DIFF WBC: CPT | Performed by: INTERNAL MEDICINE

## 2024-02-06 NOTE — PROGRESS NOTES
Kathi Ferris  tolerated treatment well with no complications.      Kathi Ferris is aware of future appt on 2/8 at 10am.     AVS printed and given to Kathi Ferris:  No (Declined by Kathi Freris)

## 2024-02-08 ENCOUNTER — PATIENT OUTREACH (OUTPATIENT)
Dept: HEMATOLOGY ONCOLOGY | Facility: CLINIC | Age: 64
End: 2024-02-08

## 2024-02-08 ENCOUNTER — HOSPITAL ENCOUNTER (OUTPATIENT)
Dept: INFUSION CENTER | Facility: HOSPITAL | Age: 64
Discharge: HOME | End: 2024-02-08
Attending: INTERNAL MEDICINE
Payer: COMMERCIAL

## 2024-02-08 VITALS
HEART RATE: 110 BPM | DIASTOLIC BLOOD PRESSURE: 73 MMHG | TEMPERATURE: 97.8 F | BODY MASS INDEX: 29.79 KG/M2 | SYSTOLIC BLOOD PRESSURE: 118 MMHG | HEIGHT: 65 IN | RESPIRATION RATE: 18 BRPM | OXYGEN SATURATION: 97 % | WEIGHT: 178.79 LBS

## 2024-02-08 DIAGNOSIS — C34.91 NON-SMALL CELL CANCER OF RIGHT LUNG (HCC): Primary | ICD-10-CM

## 2024-02-08 PROCEDURE — 96367 TX/PROPH/DG ADDL SEQ IV INF: CPT

## 2024-02-08 PROCEDURE — 96413 CHEMO IV INFUSION 1 HR: CPT

## 2024-02-08 RX ORDER — SODIUM CHLORIDE 9 MG/ML
20 INJECTION, SOLUTION INTRAVENOUS ONCE
Status: COMPLETED | OUTPATIENT
Start: 2024-02-08 | End: 2024-02-08

## 2024-02-08 RX ADMIN — DEXAMETHASONE SODIUM PHOSPHATE 10 MG: 10 INJECTION, SOLUTION INTRAMUSCULAR; INTRAVENOUS at 11:03

## 2024-02-08 RX ADMIN — SODIUM CHLORIDE 20 ML/HR: 0.9 INJECTION, SOLUTION INTRAVENOUS at 10:56

## 2024-02-08 RX ADMIN — ONDANSETRON 8 MG: 2 INJECTION INTRAMUSCULAR; INTRAVENOUS at 11:28

## 2024-02-08 RX ADMIN — GEMCITABINE 1800 MG: 38 INJECTION, SOLUTION INTRAVENOUS at 11:56

## 2024-02-08 NOTE — PROGRESS NOTES
Pt tolerated chemotherapy infusion without any adverse reactions. Port flushed and de-accessed. Pt ambulated out of unit with a steady gait and the assist of a walker. Declined AVS     Aware of next appt on 02/14/2024 @ 0930 am

## 2024-02-08 NOTE — PROGRESS NOTES
"MSW me with the pt and her spouse in the infusion center this day.  The pt shared that she had another hospital stay, which has further weakened her, causing her to need her walker.  The pt expressed frustration over this and her inability to do as much as she once had been able to do before.  Additionally, she is concerned for her spouse, who was recently diagnosed with esophageal cancer.  Spouse made light of his diagnosis and reminded the pt that she was his priority.  MSW attempted to address her concerns with the spouse, however he repeatedly made comments such as \"I will be fine, I can take care of myself, we won't need help\" but the pt was getting frustrated as she attempted to express that these are real fears.  She fears that he will need assistance and she will not be able to help him.  MSW spent time listening and acknowledging her fears and validating her feelings.  MSW reminded the pt about [facility] services for any transport needs.  Information on home help was offered as well as meals.  At this time, they do not feel as through they need these services, however MSW encouraged pt to be assured that the resources are available if and when they may be needed.  Pt appeared less anxious hearing about this.  MSW offered information on the [employer] for the pt's grandson as well.  Significant support offered and MSW will continue to follow.  "

## 2024-02-09 ENCOUNTER — HOME CARE VISIT (OUTPATIENT)
Dept: HOME HEALTH SERVICES | Facility: HOME HEALTHCARE | Age: 64
End: 2024-02-09
Payer: COMMERCIAL

## 2024-02-09 VITALS
SYSTOLIC BLOOD PRESSURE: 128 MMHG | HEART RATE: 80 BPM | TEMPERATURE: 97.8 F | RESPIRATION RATE: 18 BRPM | DIASTOLIC BLOOD PRESSURE: 80 MMHG | OXYGEN SATURATION: 95 %

## 2024-02-09 PROCEDURE — G0299 HHS/HOSPICE OF RN EA 15 MIN: HCPCS

## 2024-02-12 ENCOUNTER — HOME CARE VISIT (OUTPATIENT)
Dept: HOME HEALTH SERVICES | Facility: HOME HEALTHCARE | Age: 64
End: 2024-02-12
Payer: COMMERCIAL

## 2024-02-12 ENCOUNTER — APPOINTMENT (EMERGENCY)
Dept: RADIOLOGY | Facility: HOSPITAL | Age: 64
DRG: 176 | End: 2024-02-12
Payer: COMMERCIAL

## 2024-02-12 ENCOUNTER — APPOINTMENT (EMERGENCY)
Dept: CT IMAGING | Facility: HOSPITAL | Age: 64
DRG: 176 | End: 2024-02-12
Payer: COMMERCIAL

## 2024-02-12 ENCOUNTER — HOSPITAL ENCOUNTER (INPATIENT)
Facility: HOSPITAL | Age: 64
LOS: 2 days | Discharge: HOME HEALTH | DRG: 176 | End: 2024-02-14
Attending: EMERGENCY MEDICINE | Admitting: INTERNAL MEDICINE
Payer: COMMERCIAL

## 2024-02-12 VITALS
HEART RATE: 122 BPM | SYSTOLIC BLOOD PRESSURE: 140 MMHG | RESPIRATION RATE: 24 BRPM | OXYGEN SATURATION: 99 % | DIASTOLIC BLOOD PRESSURE: 80 MMHG | TEMPERATURE: 97.5 F

## 2024-02-12 DIAGNOSIS — R26.2 AMBULATORY DYSFUNCTION: ICD-10-CM

## 2024-02-12 DIAGNOSIS — R19.7 DIARRHEA, UNSPECIFIED TYPE: ICD-10-CM

## 2024-02-12 DIAGNOSIS — I26.99 PULMONARY EMBOLISM (HCC): Primary | ICD-10-CM

## 2024-02-12 DIAGNOSIS — J96.11 CHRONIC RESPIRATORY FAILURE WITH HYPOXIA (HCC): ICD-10-CM

## 2024-02-12 DIAGNOSIS — I10 ESSENTIAL HYPERTENSION: ICD-10-CM

## 2024-02-12 DIAGNOSIS — J90 PLEURAL EFFUSION ON RIGHT: ICD-10-CM

## 2024-02-12 DIAGNOSIS — C34.91 NON-SMALL CELL CANCER OF RIGHT LUNG (HCC): ICD-10-CM

## 2024-02-12 DIAGNOSIS — R79.89 ELEVATED TROPONIN: ICD-10-CM

## 2024-02-12 LAB
2HR DELTA HS TROPONIN: 23 NG/L
4HR DELTA HS TROPONIN: -5 NG/L
ALBUMIN SERPL BCP-MCNC: 3 G/DL (ref 3.5–5)
ALP SERPL-CCNC: 105 U/L (ref 34–104)
ALT SERPL W P-5'-P-CCNC: 14 U/L (ref 7–52)
ANION GAP SERPL CALCULATED.3IONS-SCNC: 12 MMOL/L
APTT PPP: 31 SECONDS (ref 23–37)
AST SERPL W P-5'-P-CCNC: 28 U/L (ref 13–39)
BILIRUB SERPL-MCNC: 0.51 MG/DL (ref 0.2–1)
BNP SERPL-MCNC: 183 PG/ML (ref 0–100)
BUN SERPL-MCNC: 21 MG/DL (ref 5–25)
CALCIUM ALBUM COR SERPL-MCNC: 10.3 MG/DL (ref 8.3–10.1)
CALCIUM SERPL-MCNC: 9.5 MG/DL (ref 8.4–10.2)
CARDIAC TROPONIN I PNL SERPL HS: 155 NG/L
CARDIAC TROPONIN I PNL SERPL HS: 160 NG/L
CARDIAC TROPONIN I PNL SERPL HS: 183 NG/L
CHLORIDE SERPL-SCNC: 94 MMOL/L (ref 96–108)
CO2 SERPL-SCNC: 23 MMOL/L (ref 21–32)
CREAT SERPL-MCNC: 0.67 MG/DL (ref 0.6–1.3)
ERYTHROCYTE [DISTWIDTH] IN BLOOD BY AUTOMATED COUNT: 18.7 % (ref 11.6–15.1)
GFR SERPL CREATININE-BSD FRML MDRD: 92 ML/MIN/1.73SQ M
GLUCOSE SERPL-MCNC: 97 MG/DL (ref 65–140)
HCT VFR BLD AUTO: 27.7 % (ref 34.8–46.1)
HGB BLD-MCNC: 8.9 G/DL (ref 11.5–15.4)
MCH RBC QN AUTO: 27.5 PG (ref 26.8–34.3)
MCHC RBC AUTO-ENTMCNC: 32.1 G/DL (ref 31.4–37.4)
MCV RBC AUTO: 86 FL (ref 82–98)
PLATELET # BLD AUTO: 430 THOUSANDS/UL (ref 149–390)
PMV BLD AUTO: 9.2 FL (ref 8.9–12.7)
POTASSIUM SERPL-SCNC: 4 MMOL/L (ref 3.5–5.3)
PROT SERPL-MCNC: 6.5 G/DL (ref 6.4–8.4)
RBC # BLD AUTO: 3.24 MILLION/UL (ref 3.81–5.12)
SODIUM SERPL-SCNC: 129 MMOL/L (ref 135–147)
WBC # BLD AUTO: 11.17 THOUSAND/UL (ref 4.31–10.16)

## 2024-02-12 PROCEDURE — G1004 CDSM NDSC: HCPCS

## 2024-02-12 PROCEDURE — 99285 EMERGENCY DEPT VISIT HI MDM: CPT | Performed by: EMERGENCY MEDICINE

## 2024-02-12 PROCEDURE — 99223 1ST HOSP IP/OBS HIGH 75: CPT | Performed by: INTERNAL MEDICINE

## 2024-02-12 PROCEDURE — 71045 X-RAY EXAM CHEST 1 VIEW: CPT

## 2024-02-12 PROCEDURE — G0299 HHS/HOSPICE OF RN EA 15 MIN: HCPCS

## 2024-02-12 PROCEDURE — 85610 PROTHROMBIN TIME: CPT | Performed by: INTERNAL MEDICINE

## 2024-02-12 PROCEDURE — 80053 COMPREHEN METABOLIC PANEL: CPT | Performed by: EMERGENCY MEDICINE

## 2024-02-12 PROCEDURE — 36415 COLL VENOUS BLD VENIPUNCTURE: CPT | Performed by: EMERGENCY MEDICINE

## 2024-02-12 PROCEDURE — 84484 ASSAY OF TROPONIN QUANT: CPT | Performed by: EMERGENCY MEDICINE

## 2024-02-12 PROCEDURE — 85730 THROMBOPLASTIN TIME PARTIAL: CPT | Performed by: EMERGENCY MEDICINE

## 2024-02-12 PROCEDURE — 85027 COMPLETE CBC AUTOMATED: CPT | Performed by: EMERGENCY MEDICINE

## 2024-02-12 PROCEDURE — 0241U HB NFCT DS VIR RESP RNA 4 TRGT: CPT | Performed by: INTERNAL MEDICINE

## 2024-02-12 PROCEDURE — 83880 ASSAY OF NATRIURETIC PEPTIDE: CPT | Performed by: EMERGENCY MEDICINE

## 2024-02-12 PROCEDURE — 93005 ELECTROCARDIOGRAM TRACING: CPT

## 2024-02-12 PROCEDURE — 85007 BL SMEAR W/DIFF WBC COUNT: CPT | Performed by: EMERGENCY MEDICINE

## 2024-02-12 PROCEDURE — 71275 CT ANGIOGRAPHY CHEST: CPT

## 2024-02-12 PROCEDURE — 99285 EMERGENCY DEPT VISIT HI MDM: CPT

## 2024-02-12 RX ORDER — BUPROPION HYDROCHLORIDE 300 MG/1
300 TABLET ORAL DAILY
Status: DISCONTINUED | OUTPATIENT
Start: 2024-02-13 | End: 2024-02-14 | Stop reason: HOSPADM

## 2024-02-12 RX ORDER — FUROSEMIDE 20 MG/1
20 TABLET ORAL EVERY MORNING
Qty: 90 TABLET | Refills: 1 | Status: SHIPPED | OUTPATIENT
Start: 2024-02-12 | End: 2024-02-23

## 2024-02-12 RX ORDER — ACETAMINOPHEN 325 MG/1
650 TABLET ORAL EVERY 6 HOURS PRN
Status: DISCONTINUED | OUTPATIENT
Start: 2024-02-12 | End: 2024-02-14 | Stop reason: HOSPADM

## 2024-02-12 RX ORDER — LORAZEPAM 0.5 MG/1
0.5 TABLET ORAL DAILY PRN
Status: DISCONTINUED | OUTPATIENT
Start: 2024-02-12 | End: 2024-02-14 | Stop reason: HOSPADM

## 2024-02-12 RX ORDER — MELATONIN
1000 DAILY
Status: DISCONTINUED | OUTPATIENT
Start: 2024-02-13 | End: 2024-02-14 | Stop reason: HOSPADM

## 2024-02-12 RX ORDER — SACCHAROMYCES BOULARDII 250 MG
250 CAPSULE ORAL 2 TIMES DAILY
Status: DISCONTINUED | OUTPATIENT
Start: 2024-02-13 | End: 2024-02-14 | Stop reason: HOSPADM

## 2024-02-12 RX ORDER — HEPARIN SODIUM 1000 [USP'U]/ML
6400 INJECTION, SOLUTION INTRAVENOUS; SUBCUTANEOUS EVERY 6 HOURS PRN
Status: DISCONTINUED | OUTPATIENT
Start: 2024-02-12 | End: 2024-02-13

## 2024-02-12 RX ORDER — FLUTICASONE FUROATE AND VILANTEROL 200; 25 UG/1; UG/1
1 POWDER RESPIRATORY (INHALATION)
Status: DISCONTINUED | OUTPATIENT
Start: 2024-02-13 | End: 2024-02-14 | Stop reason: HOSPADM

## 2024-02-12 RX ORDER — FUROSEMIDE 20 MG/1
20 TABLET ORAL EVERY MORNING
Status: DISCONTINUED | OUTPATIENT
Start: 2024-02-13 | End: 2024-02-14 | Stop reason: HOSPADM

## 2024-02-12 RX ORDER — SERTRALINE HYDROCHLORIDE 100 MG/1
200 TABLET, FILM COATED ORAL DAILY
Status: DISCONTINUED | OUTPATIENT
Start: 2024-02-13 | End: 2024-02-14 | Stop reason: HOSPADM

## 2024-02-12 RX ORDER — LOSARTAN POTASSIUM 50 MG/1
100 TABLET ORAL DAILY
Status: DISCONTINUED | OUTPATIENT
Start: 2024-02-13 | End: 2024-02-14 | Stop reason: HOSPADM

## 2024-02-12 RX ORDER — POTASSIUM CHLORIDE 20 MEQ/1
40 TABLET, EXTENDED RELEASE ORAL DAILY
Status: DISCONTINUED | OUTPATIENT
Start: 2024-02-13 | End: 2024-02-14 | Stop reason: HOSPADM

## 2024-02-12 RX ORDER — ATORVASTATIN CALCIUM 20 MG/1
20 TABLET, FILM COATED ORAL
Status: DISCONTINUED | OUTPATIENT
Start: 2024-02-13 | End: 2024-02-14 | Stop reason: HOSPADM

## 2024-02-12 RX ORDER — HEPARIN SODIUM 1000 [USP'U]/ML
6400 INJECTION, SOLUTION INTRAVENOUS; SUBCUTANEOUS ONCE
Status: COMPLETED | OUTPATIENT
Start: 2024-02-12 | End: 2024-02-12

## 2024-02-12 RX ORDER — ALBUTEROL SULFATE 90 UG/1
2 AEROSOL, METERED RESPIRATORY (INHALATION) EVERY 4 HOURS PRN
Status: DISCONTINUED | OUTPATIENT
Start: 2024-02-12 | End: 2024-02-14 | Stop reason: HOSPADM

## 2024-02-12 RX ORDER — HEPARIN SODIUM 1000 [USP'U]/ML
3200 INJECTION, SOLUTION INTRAVENOUS; SUBCUTANEOUS EVERY 6 HOURS PRN
Status: DISCONTINUED | OUTPATIENT
Start: 2024-02-12 | End: 2024-02-13

## 2024-02-12 RX ORDER — ASPIRIN 81 MG/1
324 TABLET, CHEWABLE ORAL ONCE
Status: COMPLETED | OUTPATIENT
Start: 2024-02-12 | End: 2024-02-12

## 2024-02-12 RX ORDER — ALBUTEROL SULFATE 2.5 MG/3ML
2.5 SOLUTION RESPIRATORY (INHALATION) EVERY 6 HOURS PRN
Status: DISCONTINUED | OUTPATIENT
Start: 2024-02-12 | End: 2024-02-14 | Stop reason: HOSPADM

## 2024-02-12 RX ORDER — TELMISARTAN 80 MG/1
80 TABLET ORAL DAILY
Qty: 90 TABLET | Refills: 1 | Status: ON HOLD | OUTPATIENT
Start: 2024-02-12

## 2024-02-12 RX ORDER — HEPARIN SODIUM 10000 [USP'U]/100ML
3-30 INJECTION, SOLUTION INTRAVENOUS
Status: DISCONTINUED | OUTPATIENT
Start: 2024-02-12 | End: 2024-02-13

## 2024-02-12 RX ADMIN — METOPROLOL TARTRATE 25 MG: 25 TABLET, FILM COATED ORAL at 23:58

## 2024-02-12 RX ADMIN — VERAPAMIL HYDROCHLORIDE 360 MG: 120 TABLET, FILM COATED, EXTENDED RELEASE ORAL at 23:58

## 2024-02-12 RX ADMIN — HEPARIN SODIUM 6400 UNITS: 1000 INJECTION INTRAVENOUS; SUBCUTANEOUS at 23:15

## 2024-02-12 RX ADMIN — ASPIRIN 81 MG CHEWABLE TABLET 324 MG: 81 TABLET CHEWABLE at 19:20

## 2024-02-12 RX ADMIN — HEPARIN SODIUM 18 UNITS/KG/HR: 10000 INJECTION, SOLUTION INTRAVENOUS at 23:21

## 2024-02-12 RX ADMIN — IOHEXOL 85 ML: 350 INJECTION, SOLUTION INTRAVENOUS at 20:48

## 2024-02-12 NOTE — ED PROVIDER NOTES
History  Chief Complaint   Patient presents with    Shortness of Breath     Patient presents to ED with SOB worsening last night, patient sent by visiting nurse     63-year-old female presents for evaluation of shortness of breath which has been worsening since last evening.      Shortness of Breath      Prior to Admission Medications   Prescriptions Last Dose Informant Patient Reported? Taking?   Ascorbic Acid (vitamin C) 1000 MG tablet Past Month Self Yes Yes   Si daily   Biotin 82476 MCG TABS Past Month Self Yes Yes   Si daily   Cholecalciferol (Vitamin D3) 25 MCG (1000 UT) CAPS Past Month Self Yes Yes   Si capsule every 24 hours   Fluticasone-Salmeterol (Advair) 500-50 mcg/dose inhaler 2024  Yes Yes   Sig: Inhale 1 puff 2 (two) times a day. Indications: Asthma   LORazepam (Ativan) 0.5 mg tablet Past Week  No Yes   Sig: Take 1 tablet (0.5 mg total) by mouth daily as needed for anxiety   Multiple Vitamin (MULTIVITAMINS PO) Past Month Self Yes Yes   Sig: every 24 hours   Probiotic Product (PROBIOTIC DAILY PO) Past Month Self Yes Yes   Si daily   Sennosides (Senna) 8.6 MG CAPS Past Month Self Yes Yes   Sig: Take 2 capsules by mouth as needed (constipation)   albuterol (2.5 mg/3 mL) 0.083 % nebulizer solution Past Month Self No Yes   Sig: Take 3 mL (2.5 mg total) by nebulization every 6 (six) hours as needed for wheezing or shortness of breath   albuterol (PROVENTIL HFA,VENTOLIN HFA) 90 mcg/act inhaler Past Month Self Yes Yes   Sig: Inhale 2 puffs every 4 (four) hours   atorvastatin (LIPITOR) 20 mg tablet 2024 Self Yes Yes   Sig: Take 20 mg by mouth daily.   buPROPion (WELLBUTRIN XL) 300 mg 24 hr tablet 2024 Self Yes Yes   Sig: TAKE 1 TABLET BY MOUTH EVERY DAY IN THE MORNING FOR 90 DAYS   dexamethasone sodium phosphate 0.1 % ophthalmic solution Not Taking Self No No   Sig: Administer 1 drop to both eyes every 3 (three) hours as needed (eye irritation)   Patient not taking: Reported on  2024   diphenhydrAMINE (BENADRYL) 50 MG tablet More than a month Self No No   Sig: Take 1 tablet (50 mg total) by mouth every 6 (six) hours as needed for itching (Rash)   diphenhydrAMINE-APAP, sleep, (TYLENOL PM EXTRA STRENGTH PO) 2024 Self Yes Yes   Sig: Take 325 mg by mouth daily at bedtime   furosemide (LASIX) 20 mg tablet 2024  No Yes   Sig: TAKE 1 TABLET BY MOUTH EVERY DAY IN THE MORNING   lidocaine-prilocaine (EMLA) cream Past Week  No Yes   Sig: APPLY TO PORT 30 TO 60 MINUTES PRIOR TO USE   metoprolol tartrate (LOPRESSOR) 25 mg tablet 2024  No Yes   Sig: Take 1 tablet (25 mg total) by mouth every 12 (twelve) hours   oxygen gas 2024  Yes Yes   Sig: Inhale 2 L/min continuous. May increase to 3L PRN for dyspnea and sats under 89%  Indications: Hypoxia   polyethylene glycol (GLYCOLAX) 17 GM/SCOOP powder Past Week  No Yes   Sig: Take 17 g by mouth daily as needed (constipation)   potassium chloride (Klor-Con M20) 20 mEq tablet Past Month  No Yes   Sig: Take 2 tablets (40 mEq total) by mouth daily   sertraline (ZOLOFT) 100 mg tablet 2024 Self Yes Yes   Si daily   telmisartan (MICARDIS) 80 MG tablet 2024  No Yes   Sig: TAKE 1 TABLET BY MOUTH EVERY DAY   verapamil (CALAN-SR) 120 mg CR tablet 2024 Self No Yes   Sig: Take 1 tablet (120 mg total) by mouth daily at bedtime 1 hs   verapamil (CALAN-SR) 240 mg CR tablet 2024 Self No Yes   Sig: Take 1 tablet (240 mg total) by mouth daily at bedtime 1 hs      Facility-Administered Medications: None       Past Medical History:   Diagnosis Date    Cancer (HCC)     COPD (chronic obstructive pulmonary disease) (HCC)     Emphysema of lung (HCC)     Hyperlipidemia     Hypertension     Lung cancer (HCC) 2019    right lower lobe removed    Lung nodule     Pleural effusion        Past Surgical History:   Procedure Laterality Date    APPENDECTOMY      BREAST BIOPSY Right     benign    BRONCHOSCOPY  2019    COLON SURGERY       HYSTERECTOMY      age 46    IR PLEURAL EFFUSION LONG-TERM CATHETER PLACEMENT  2023    IR PORT PLACEMENT  2023    IR THORACENTESIS  2023    IR THORACENTESIS  2023    IR THORACENTESIS  2023    LUNG BIOPSY  2019    LUNG LOBECTOMY      OOPHORECTOMY Bilateral     age 46       Family History   Problem Relation Age of Onset    Colon cancer Mother     Hypertension Mother     Hyperlipidemia Mother     Hearing loss Mother     Depression Mother     COPD Mother     Cancer Mother         Lung    Arthritis Mother     Lung cancer Mother     Drug abuse Brother     Diabetes Maternal Grandmother     Cancer Maternal Grandmother         Colon    Colon cancer Maternal Grandmother     Cancer Maternal Aunt     Colon cancer Maternal Aunt     Colon cancer Maternal Aunt     Cancer Maternal Aunt         Colon     I have reviewed and agree with the history as documented.    E-Cigarette/Vaping    E-Cigarette Use Never User      E-Cigarette/Vaping Substances    Nicotine No     THC No     CBD No     Flavoring No     Other No     Unknown No      Social History     Tobacco Use    Smoking status: Former     Current packs/day: 0.00     Average packs/day: 1.5 packs/day for 53.2 years (79.8 ttl pk-yrs)     Types: Cigarettes     Start date:      Quit date: 3/15/2023     Years since quittin.9     Passive exposure: Past    Smokeless tobacco: Never    Tobacco comments:     Patient quit 1 year ago   Vaping Use    Vaping status: Never Used   Substance Use Topics    Alcohol use: Not Currently     Comment: Rarely drink, socially only    Drug use: No     Comment: has her medical marijuana card but does not like the way it makes her feel, she does not use it       Review of Systems   Respiratory:  Positive for chest tightness and shortness of breath.        Physical Exam  Physical Exam  Vitals and nursing note reviewed.   Constitutional:       General: She is not in acute distress.     Appearance: She is well-developed.    HENT:      Head: Normocephalic and atraumatic.      Right Ear: External ear normal.      Left Ear: External ear normal.   Eyes:      General: No scleral icterus.  Cardiovascular:      Rate and Rhythm: Regular rhythm. Tachycardia present.   Pulmonary:      Effort: Pulmonary effort is normal. Tachypnea present. No respiratory distress.      Breath sounds: Examination of the right-lower field reveals decreased breath sounds. Decreased breath sounds present.   Abdominal:      General: There is no distension.   Musculoskeletal:         General: Normal range of motion.      Cervical back: Normal range of motion.   Skin:     General: Skin is warm and dry.      Coloration: Skin is pale.      Findings: No rash.   Neurological:      Mental Status: She is alert. Mental status is at baseline.   Psychiatric:         Mood and Affect: Mood normal.         Vital Signs  ED Triage Vitals   Temperature Pulse Respirations Blood Pressure SpO2   02/12/24 1613 02/12/24 1612 02/12/24 1612 02/12/24 1616 02/12/24 1612   98.3 °F (36.8 °C) (!) 126 (!) 24 127/65 96 %      Temp Source Heart Rate Source Patient Position - Orthostatic VS BP Location FiO2 (%)   02/12/24 1613 02/12/24 1612 02/12/24 1700 02/12/24 1700 --   Oral Monitor Sitting Right arm       Pain Score       02/12/24 1736       No Pain           Vitals:    02/12/24 2357 02/13/24 0640 02/13/24 0955 02/13/24 1601   BP: 130/81 114/74 114/74 132/85   Pulse: (!) 108 89 88 76   Patient Position - Orthostatic VS:    Lying         Visual Acuity      ED Medications  Medications   heparin (porcine) 25,000 units in 0.45% NaCl 250 mL infusion (premix) (15 Units/kg/hr × 80 kg (Order-Specific) Intravenous Restarted 2/13/24 0643)   heparin (porcine) injection 6,400 Units (has no administration in time range)   heparin (porcine) injection 3,200 Units (has no administration in time range)   albuterol (PROVENTIL HFA,VENTOLIN HFA) inhaler 2 puff (has no administration in time range)   atorvastatin  (LIPITOR) tablet 20 mg (has no administration in time range)   cholecalciferol (VITAMIN D3) tablet 1,000 Units (1,000 Units Oral Given 2/13/24 0821)   fluticasone-vilanterol 200-25 mcg/actuation 1 puff (1 puff Inhalation Given 2/13/24 1016)   furosemide (LASIX) tablet 20 mg (20 mg Oral Given 2/13/24 0821)   metoprolol tartrate (LOPRESSOR) tablet 25 mg (25 mg Oral Given 2/13/24 0820)   multivitamin-minerals (CENTRUM) tablet 1 tablet (1 tablet Oral Given 2/13/24 0820)   potassium chloride (Klor-Con M20) CR tablet 40 mEq (40 mEq Oral Given 2/13/24 0820)   losartan (COZAAR) tablet 100 mg (100 mg Oral Given 2/13/24 0821)   verapamil (CALAN-SR) CR tablet 360 mg (360 mg Oral Given 2/12/24 2358)   albuterol inhalation solution 2.5 mg (has no administration in time range)   LORazepam (ATIVAN) tablet 0.5 mg (has no administration in time range)   buPROPion (WELLBUTRIN XL) 24 hr tablet 300 mg (300 mg Oral Given 2/13/24 0821)   saccharomyces boulardii (FLORASTOR) capsule 250 mg (250 mg Oral Given 2/13/24 0820)   sertraline (ZOLOFT) tablet 200 mg (200 mg Oral Given 2/13/24 0821)   acetaminophen (TYLENOL) tablet 650 mg (has no administration in time range)   aspirin chewable tablet 324 mg (324 mg Oral Given 2/12/24 1920)   iohexol (OMNIPAQUE) 350 MG/ML injection (MULTI-DOSE) 100 mL (85 mL Intravenous Given 2/12/24 2048)   heparin (porcine) injection 6,400 Units (6,400 Units Intravenous Given 2/12/24 2315)   sodium chloride 0.9 % bolus 250 mL (250 mL Intravenous New Bag 2/13/24 0011)   magnesium sulfate 2 g/50 mL IVPB (premix) 2 g (2 g Intravenous New Bag 2/13/24 1054)       Diagnostic Studies  Results Reviewed       Procedure Component Value Units Date/Time    Path Slide Review [[id number]] Collected: 02/12/24 1727    Lab Status: Final result Specimen: Blood from Arm, Left Updated: 02/13/24 9480     Case Report --     Clinical Pathology Report                         Case: [id number]                                  Authorizing  Provider:  Tom Lindsay DO     Collected:           02/12/2024 1727              Ordering Location:      [facility]    Received:            02/12/2024 1904                                     [facility]                                                                                    Department                                                                   Pathologist:           Anneliese Hemphill MD                                                                  Specimen:    Arm, Left                                                                                   Path Slide --     Peripheral blood smear shows normochromic normocytic anemia, mild thrombocytosis, mild leukocytosis with neutrophilia.    Evaluated by Anneliese Hemphill M.D.  Interpretation performed at [facility], [address] [facility] [place] [address]      Protime-INR [[id number]]  (Normal) Collected: 02/12/24 2355    Lab Status: Final result Specimen: Blood from Arm, Left Updated: 02/13/24 0009     Protime 13.5 seconds      INR 0.99    APTT [[id number]]  (Normal) Collected: 02/12/24 2303    Lab Status: Final result Specimen: Blood from Arm, Left Updated: 02/12/24 2328     PTT 31 seconds     HS Troponin I 4hr [[id number]]  (Abnormal) Collected: 02/12/24 2141    Lab Status: Final result Specimen: Blood from Arm, Left Updated: 02/12/24 2210     hs TnI 4hr 155 ng/L      Delta 4hr hsTnI -5 ng/L     HS Troponin I 2hr [[id number]]  (Abnormal) Collected: 02/12/24 1925    Lab Status: Final result Specimen: Blood from Arm, Left Updated: 02/12/24 1955     hs TnI 2hr 183 ng/L      Delta 2hr hsTnI 23 ng/L     RBC Morphology Reflex Test [[id number]] Collected: 02/12/24 1727    Lab Status: Final result Specimen: Blood from Arm, Left Updated: 02/12/24 1901    CBC and differential [[id number]]  (Abnormal) Collected: 02/12/24 1727    Lab Status: Final result Specimen: Blood from Arm, Left Updated: 02/12/24 1859     WBC 11.17 Thousand/uL      RBC 3.24 Million/uL       Hemoglobin 8.9 g/dL      Hematocrit 27.7 %      MCV 86 fL      MCH 27.5 pg      MCHC 32.1 g/dL      RDW 18.7 %      MPV 9.2 fL      Platelets 430 Thousands/uL     Narrative:      This is an appended report.  These results have been appended to a previously verified report.    Manual Differential(PHLEBS Do Not Order) [[id number]]  (Abnormal) Collected: 02/12/24 1727    Lab Status: Edited Specimen: Blood from Arm, Left Updated: 02/12/24 1859     Segmented % 95 %      Lymphocytes % 1 %      Monocytes % 0 %      Eosinophils, % 0 %      Basophils % 0 %      Atypical Lymphocytes % 4 %      Absolute Neutrophils 10.61 Thousand/uL      Lymphocytes Absolute 0.56 Thousand/uL      Monocytes Absolute 0.00 Thousand/uL      Eosinophils Absolute 0.00 Thousand/uL      Basophils Absolute 0.00 Thousand/uL      Total Counted --     Hypersegmented Neutrophils Present     Toxic Granulation Present     RBC Morphology Present     Platelet Estimate Increased     Differential Comment see note     Anisocytosis Present     Hypochromia Present     Ovalocytes Present    Comprehensive metabolic panel [[id number]]  (Abnormal) Collected: 02/12/24 1727    Lab Status: Final result Specimen: Blood from Arm, Left Updated: 02/12/24 1821     Sodium 129 mmol/L      Potassium 4.0 mmol/L      Chloride 94 mmol/L      CO2 23 mmol/L      ANION GAP 12 mmol/L      BUN 21 mg/dL      Creatinine 0.67 mg/dL      Glucose 97 mg/dL      Calcium 9.5 mg/dL      Corrected Calcium 10.3 mg/dL      AST 28 U/L      ALT 14 U/L      Alkaline Phosphatase 105 U/L      Total Protein 6.5 g/dL      Albumin 3.0 g/dL      Total Bilirubin 0.51 mg/dL      eGFR 92 ml/min/1.73sq m     Narrative:      National Kid[employer] guidelines for Chronic Kidney Disease (CKD):     Stage 1 with normal or high GFR (GFR > 90 mL/min/1.73 square meters)    Stage 2 Mild CKD (GFR = 60-89 mL/min/1.73 square meters)    Stage 3A Moderate CKD (GFR = 45-59 mL/min/1.73 square meters)    Stage 3B  Moderate CKD (GFR = 30-44 mL/min/1.73 square meters)    Stage 4 Severe CKD (GFR = 15-29 mL/min/1.73 square meters)    Stage 5 End Stage CKD (GFR <15 mL/min/1.73 square meters)  Note: GFR calculation is accurate only with a steady state creatinine    HS Troponin 0hr (reflex protocol) [[id number]]  (Abnormal) Collected: 02/12/24 1727    Lab Status: Final result Specimen: Blood from Arm, Left Updated: 02/12/24 1808     hs TnI 0hr 160 ng/L     B-Type Natriuretic Peptide(BNP) [[id number]]  (Abnormal) Collected: 02/12/24 1727    Lab Status: Final result Specimen: Blood from Arm, Left Updated: 02/12/24 1808      pg/mL                    CTA ED chest PE study   Final Result by Chato Guerrero MD (02/12 2146)      1.  Small filling defect in the right interlobar pulmonary artery presumably extending to the resection margin of prior right lower lobectomy; the middle lobe remains perfused. Measured RV/LV ratio is within normal limits at less than 0.9.   2.  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      The study was marked in EPIC for immediate notification.      Workstation performed: [id number]         XR chest 1 view portable   ED Interpretation by Tom Lindsay DO (02/12 1739)   Decreased right pleural effusion when compared to previous      Final Result by Clay Castaneda MD (02/13 8588)      Right infrahilar opacity reflects mass better evident on subsequent CT.   Bilateral pleural effusions, which are better evident on subsequent CT.            Workstation performed: [id number]          VAS VENOUS DUPLEX - LOWER LIMB BILATERAL    (Results Pending)              Procedures  ECG 12 Lead Documentation Only    Date/Time: 2/12/2024 5:40 PM    Performed by: Tom Lindsay DO  Authorized by: Tom Lindsay DO    Indications / Diagnosis:  SOB  ECG reviewed by  me, the ED Provider: yes    Patient location:  ED  Previous ECG:     Previous ECG:  Compared to current    Comparison ECG info:  1/21/24    Similarity:  No change  Interpretation:     Interpretation: abnormal    Rate:     ECG rate:  118    ECG rate assessment: tachycardic    Rhythm:     Rhythm: sinus tachycardia    Ectopy:     Ectopy: none    QRS:     QRS axis:  Normal    QRS intervals:  Normal  Conduction:     Conduction: normal    ST segments:     ST segments:  Non-specific  T waves:     T waves: normal    Other findings:     Other findings: prolonged qTc interval             ED Course  ED Course as of 02/13/24 1609   Mon Feb 12, 2024   1748 Hemoglobin(!): 8.9  Last 9.9   1916 Note the elevated troponin and BNP.  Will CT to rule out pulmonary embolism.  Will admit after CT   1917 hs TnI 0hr(!): 160  Elevated when compared to previous   2040 Patient stable upon re-eval. Patient to CT now                               SBIRT 20yo+      Flowsheet Row Most Recent Value   Initial Alcohol Screen: US AUDIT-C     1. How often do you have a drink containing alcohol? 0 Filed at: 02/12/2024 1611   2. How many drinks containing alcohol do you have on a typical day you are drinking?  0 Filed at: 02/12/2024 1611   3a. Male UNDER 65: How often do you have five or more drinks on one occasion? 0 Filed at: 02/12/2024 1611   3b. FEMALE Any Age, or MALE 65+: How often do you have 4 or more drinks on one occassion? 0 Filed at: 02/12/2024 1611   Audit-C Score 0 Filed at: 02/12/2024 1611   BEVERLEY: How many times in the past year have you...    Used an illegal drug or used a prescription medication for non-medical reasons? Never Filed at: 02/12/2024 1611                      Medical Decision Making  Differential diagnosis: Arrhythmia, anemia, electrolyte disturbance, volume depletion, pneumonia, pleural effusion, pulmonary embolism    The plan is for EKG, laboratories, chest x-ray and CT chest to rule out PE given patient's history of  active cancer and new shortness of breath.    Patient with elevated troponin when compared to previous as well as mildly elevated BNP.  I discussed case with Ronel from [facility] for admission with pending CT to rule out pulmonary embolism.  Agrees with the plan for need for admission regardless of the CT findings    Amount and/or Complexity of Data Reviewed  External Data Reviewed: notes.     Details: Dr. Miller office note from February 2 reviewed  Labs: ordered. Decision-making details documented in ED Course.  Radiology: ordered and independent interpretation performed.    Risk  OTC drugs.  Prescription drug management.  Decision regarding hospitalization.             Disposition  Final diagnoses:   Elevated troponin   Pleural effusion on right   Pulmonary embolism (HCC)     Time reflects when diagnosis was documented in both MDM as applicable and the Disposition within this note       Time User Action Codes Description Comment    2/12/2024  7:17 PM Tom Lindsay Add [R79.89] Elevated troponin     2/12/2024  7:17 PM Tom Lindsay Add [J90] Pleural effusion on right     2/12/2024 10:08 PM Kate Mckeon Add [I26.99] Pulmonary embolism (HCC)     2/12/2024 11:16 PM Ronel Menezes Add [C34.91] Non-small cell cancer of right lung (HCC)     2/12/2024 11:17 PM Ronel Menezes Modify [R79.89] Elevated troponin     2/12/2024 11:17 PM Ronel Menezes Modify [J90] Pleural effusion on right     2/12/2024 11:17 PM Ronel Menezes Modify [I26.99] Pulmonary embolism (HCC)     2/12/2024 11:17 PM Ronel Menezes Modify [C34.91] Non-small cell cancer of right lung (HCC)           ED Disposition       ED Disposition   Admit    Condition   Stable    Date/Time   Mon Feb 12, 2024 2208    Comment   Case was discussed with [facility] and the patient's admission status was agreed to be Admission Status: inpatient status to the service of Dr. Yu .               Follow-up Information    None         Current Discharge Medication List        START taking these  medications    Details   apixaban (Eliquis) 5 mg Take 2 tablets (10 mg total) by mouth 2 (two) times a day for 7 days, THEN 1 tablet (5 mg total) 2 (two) times a day.  Qty: 88 tablet, Refills: 0    Associated Diagnoses: Pulmonary embolism (HCC)           CONTINUE these medications which have NOT CHANGED    Details   albuterol (2.5 mg/3 mL) 0.083 % nebulizer solution Take 3 mL (2.5 mg total) by nebulization every 6 (six) hours as needed for wheezing or shortness of breath  Qty: 270 mL, Refills: 4    Associated Diagnoses: Chronic obstructive pulmonary disease, unspecified COPD type (HCC)      albuterol (PROVENTIL HFA,VENTOLIN HFA) 90 mcg/act inhaler Inhale 2 puffs every 4 (four) hours  Refills: 3    Comments: <!--EPICS-->Substitution to a formulary equivalent within the same pharmaceutical class is authorized.<!--EPICE-->      Ascorbic Acid (vitamin C) 1000 MG tablet 1 daily      atorvastatin (LIPITOR) 20 mg tablet Take 20 mg by mouth daily.      Biotin 68420 MCG TABS 1 daily      buPROPion (WELLBUTRIN XL) 300 mg 24 hr tablet TAKE 1 TABLET BY MOUTH EVERY DAY IN THE MORNING FOR 90 DAYS      Cholecalciferol (Vitamin D3) 25 MCG (1000 UT) CAPS 1 capsule every 24 hours      diphenhydrAMINE-APAP, sleep, (TYLENOL PM EXTRA STRENGTH PO) Take 325 mg by mouth daily at bedtime      Fluticasone-Salmeterol (Advair) 500-50 mcg/dose inhaler Inhale 1 puff 2 (two) times a day. Indications: Asthma      furosemide (LASIX) 20 mg tablet TAKE 1 TABLET BY MOUTH EVERY DAY IN THE MORNING  Qty: 90 tablet, Refills: 1    Associated Diagnoses: Essential hypertension      lidocaine-prilocaine (EMLA) cream APPLY TO PORT 30 TO 60 MINUTES PRIOR TO USE  Qty: 30 g, Refills: 3    Associated Diagnoses: Non-small cell cancer of right lung (HCC)      LORazepam (Ativan) 0.5 mg tablet Take 1 tablet (0.5 mg total) by mouth daily as needed for anxiety  Qty: 30 tablet, Refills: 0    Associated Diagnoses: Generalized anxiety disorder      metoprolol tartrate  (LOPRESSOR) 25 mg tablet Take 1 tablet (25 mg total) by mouth every 12 (twelve) hours  Qty: 180 tablet, Refills: 1    Associated Diagnoses: Pleural effusion; Metastatic lung cancer (metastasis from lung to other site) (HCC)      Multiple Vitamin (MULTIVITAMINS PO) every 24 hours      oxygen gas Inhale 2 L/min continuous. May increase to 3L PRN for dyspnea and sats under 89%  Indications: Hypoxia      polyethylene glycol (GLYCOLAX) 17 GM/SCOOP powder Take 17 g by mouth daily as needed (constipation)  Qty: 225 g, Refills: 0    Associated Diagnoses: SBO (small bowel obstruction) (HCC)      potassium chloride (Klor-Con M20) 20 mEq tablet Take 2 tablets (40 mEq total) by mouth daily  Qty: 30 tablet, Refills: 0    Associated Diagnoses: Non-small cell cancer of right lung (HCC); Hypokalemia      Probiotic Product (PROBIOTIC DAILY PO) 1 daily      Sennosides (Senna) 8.6 MG CAPS Take 2 capsules by mouth as needed (constipation)      sertraline (ZOLOFT) 100 mg tablet 2 daily      telmisartan (MICARDIS) 80 MG tablet TAKE 1 TABLET BY MOUTH EVERY DAY  Qty: 90 tablet, Refills: 1    Associated Diagnoses: Essential hypertension      !! verapamil (CALAN-SR) 120 mg CR tablet Take 1 tablet (120 mg total) by mouth daily at bedtime 1 hs  Qty: 90 tablet, Refills: 3    Associated Diagnoses: Essential hypertension      !! verapamil (CALAN-SR) 240 mg CR tablet Take 1 tablet (240 mg total) by mouth daily at bedtime 1 hs  Qty: 90 tablet, Refills: 3    Comments: Cancel rx for verapamil 360 if not available!  Associated Diagnoses: Essential hypertension      dexamethasone sodium phosphate 0.1 % ophthalmic solution Administer 1 drop to both eyes every 3 (three) hours as needed (eye irritation)  Qty: 5 mL, Refills: 5    Associated Diagnoses: Other conjunctivitis of both eyes      diphenhydrAMINE (BENADRYL) 50 MG tablet Take 1 tablet (50 mg total) by mouth every 6 (six) hours as needed for itching (Rash)  Qty: 30 tablet, Refills: 0    Associated  Diagnoses: Drug rash       !! - Potential duplicate medications found. Please discuss with provider.          No discharge procedures on file.    PDMP Review         Value Time User    PDMP Reviewed  Yes 1/21/2024  1:54 PM Toan Yu MD            ED Provider  Electronically Signed by             Tom Lindsay DO  02/13/24 6392

## 2024-02-13 ENCOUNTER — HOME CARE VISIT (OUTPATIENT)
Dept: HOME HEALTH SERVICES | Facility: HOME HEALTHCARE | Age: 64
End: 2024-02-13
Payer: COMMERCIAL

## 2024-02-13 ENCOUNTER — APPOINTMENT (INPATIENT)
Dept: NON INVASIVE DIAGNOSTICS | Facility: HOSPITAL | Age: 64
DRG: 176 | End: 2024-02-13
Payer: COMMERCIAL

## 2024-02-13 PROBLEM — R19.7 DIARRHEA: Status: ACTIVE | Noted: 2024-02-13

## 2024-02-13 LAB
ANION GAP SERPL CALCULATED.3IONS-SCNC: 12 MMOL/L
ANISOCYTOSIS BLD QL SMEAR: PRESENT
AORTIC ROOT: 3.4 CM
APICAL FOUR CHAMBER EJECTION FRACTION: 71 %
APTT PPP: 177 SECONDS (ref 23–37)
APTT PPP: 83 SECONDS (ref 23–37)
ASCENDING AORTA: 2.9 CM
BASOPHILS # BLD AUTO: 0.01 THOUSANDS/ÂΜL (ref 0–0.1)
BASOPHILS # BLD MANUAL: 0 THOUSAND/UL (ref 0–0.1)
BASOPHILS NFR BLD AUTO: 0 % (ref 0–1)
BASOPHILS NFR MAR MANUAL: 0 % (ref 0–1)
BSA FOR ECHO PROCEDURE: 1.9 M2
BUN SERPL-MCNC: 19 MG/DL (ref 5–25)
C COLI+JEJUNI TUF STL QL NAA+PROBE: NEGATIVE
C DIFF TOX GENS STL QL NAA+PROBE: NEGATIVE
CALCIUM SERPL-MCNC: 9.2 MG/DL (ref 8.4–10.2)
CHLORIDE SERPL-SCNC: 98 MMOL/L (ref 96–108)
CO2 SERPL-SCNC: 25 MMOL/L (ref 21–32)
CREAT SERPL-MCNC: 0.51 MG/DL (ref 0.6–1.3)
DIFFERENTIAL COMMENT: ABNORMAL
E WAVE DECELERATION TIME: 177 MS
E/A RATIO: 0.79
EC STX1+STX2 GENES STL QL NAA+PROBE: NEGATIVE
EOSINOPHIL # BLD AUTO: 0.04 THOUSAND/ÂΜL (ref 0–0.61)
EOSINOPHIL # BLD MANUAL: 0 THOUSAND/UL (ref 0–0.4)
EOSINOPHIL NFR BLD AUTO: 1 % (ref 0–6)
EOSINOPHIL NFR BLD MANUAL: 0 % (ref 0–6)
ERYTHROCYTE [DISTWIDTH] IN BLOOD BY AUTOMATED COUNT: 19 % (ref 11.6–15.1)
FLUAV RNA RESP QL NAA+PROBE: NEGATIVE
FLUBV RNA RESP QL NAA+PROBE: NEGATIVE
FRACTIONAL SHORTENING: 36 (ref 28–44)
GFR SERPL CREATININE-BSD FRML MDRD: 101 ML/MIN/1.73SQ M
GLOBAL LONGITUIDAL STRAIN: -19 %
GLUCOSE SERPL-MCNC: 88 MG/DL (ref 65–140)
HCT VFR BLD AUTO: 28.3 % (ref 34.8–46.1)
HGB BLD-MCNC: 8.9 G/DL (ref 11.5–15.4)
IMM GRANULOCYTES # BLD AUTO: 0.04 THOUSAND/UL (ref 0–0.2)
IMM GRANULOCYTES NFR BLD AUTO: 1 % (ref 0–2)
INR PPP: 0.99 (ref 0.84–1.19)
INTERVENTRICULAR SEPTUM IN DIASTOLE (PARASTERNAL SHORT AXIS VIEW): 1.1 CM
INTERVENTRICULAR SEPTUM: 1.1 CM (ref 0.6–1.1)
LAAS-AP2: 9.8 CM2
LAAS-AP4: 17.5 CM2
LEFT ATRIUM SIZE: 3.6 CM
LEFT ATRIUM VOLUME (MOD BIPLANE): 35 ML
LEFT ATRIUM VOLUME INDEX (MOD BIPLANE): 18.4 ML/M2
LEFT INTERNAL DIMENSION IN SYSTOLE: 2.7 CM (ref 2.1–4)
LEFT VENTRICLE DIASTOLIC VOLUME (MOD BIPLANE): 109 ML
LEFT VENTRICLE DIASTOLIC VOLUME INDEX (MOD BIPLANE): 57.4 ML/M2
LEFT VENTRICLE SYSTOLIC VOLUME (MOD BIPLANE): 34 ML
LEFT VENTRICLE SYSTOLIC VOLUME INDEX (MOD BIPLANE): 17.9 ML/M2
LEFT VENTRICULAR INTERNAL DIMENSION IN DIASTOLE: 4.2 CM (ref 3.5–6)
LEFT VENTRICULAR POSTERIOR WALL IN END DIASTOLE: 1.3 CM
LEFT VENTRICULAR STROKE VOLUME: 52 ML
LV EF: 69 %
LVSV (TEICH): 52 ML
LYMPHOCYTES # BLD AUTO: 0.32 THOUSANDS/ÂΜL (ref 0.6–4.47)
LYMPHOCYTES # BLD AUTO: 0.56 THOUSAND/UL (ref 0.6–4.47)
LYMPHOCYTES # BLD AUTO: 5 % (ref 14–44)
LYMPHOCYTES NFR BLD AUTO: 4 % (ref 14–44)
MAGNESIUM SERPL-MCNC: 1.6 MG/DL (ref 1.9–2.7)
MCH RBC QN AUTO: 27.3 PG (ref 26.8–34.3)
MCHC RBC AUTO-ENTMCNC: 31.4 G/DL (ref 31.4–37.4)
MCV RBC AUTO: 87 FL (ref 82–98)
MONOCYTES # BLD AUTO: 0 THOUSAND/UL (ref 0–1.22)
MONOCYTES # BLD AUTO: 0.1 THOUSAND/ÂΜL (ref 0.17–1.22)
MONOCYTES NFR BLD AUTO: 1 % (ref 4–12)
MONOCYTES NFR BLD: 0 % (ref 4–12)
MV E'TISSUE VEL-LAT: 18 CM/S
MV E'TISSUE VEL-SEP: 8 CM/S
MV PEAK A VEL: 0.91 M/S
MV PEAK E VEL: 72 CM/S
MV STENOSIS PRESSURE HALF TIME: 51 MS
MV VALVE AREA P 1/2 METHOD: 4.31
NEUTROPHILS # BLD AUTO: 7.64 THOUSANDS/ÂΜL (ref 1.85–7.62)
NEUTROPHILS # BLD MANUAL: 10.61 THOUSAND/UL (ref 1.85–7.62)
NEUTS SEG NFR BLD AUTO: 93 % (ref 43–75)
NEUTS SEG NFR BLD AUTO: 95 % (ref 43–75)
NRBC BLD AUTO-RTO: 0 /100 WBCS
PATHOLOGY REVIEW: YES
PLATELET # BLD AUTO: 377 THOUSANDS/UL (ref 149–390)
PLATELET BLD QL SMEAR: ABNORMAL
PLATELET BLD QL SMEAR: ABNORMAL
PMV BLD AUTO: 8.8 FL (ref 8.9–12.7)
POTASSIUM SERPL-SCNC: 3.5 MMOL/L (ref 3.5–5.3)
PROTHROMBIN TIME: 13.5 SECONDS (ref 11.6–14.5)
RBC # BLD AUTO: 3.26 MILLION/UL (ref 3.81–5.12)
RBC MORPH BLD: NORMAL
RBC MORPH BLD: PRESENT
RIGHT ATRIUM AREA SYSTOLE A4C: 10.3 CM2
RIGHT VENTRICLE ID DIMENSION: 2.7 CM
RSV RNA RESP QL NAA+PROBE: NEGATIVE
SALMONELLA SP SPAO STL QL NAA+PROBE: NEGATIVE
SARS-COV-2 RNA RESP QL NAA+PROBE: NEGATIVE
SHIGELLA SP+EIEC IPAH STL QL NAA+PROBE: NEGATIVE
SL CV LEFT ATRIUM LENGTH A2C: 4 CM
SL CV LV EF: 65
SL CV PED ECHO LEFT VENTRICLE DIASTOLIC VOLUME (MOD BIPLANE) 2D: 78 ML
SL CV PED ECHO LEFT VENTRICLE SYSTOLIC VOLUME (MOD BIPLANE) 2D: 26 ML
SODIUM SERPL-SCNC: 135 MMOL/L (ref 135–147)
TR MAX PG: 34 MMHG
TR PEAK VELOCITY: 2.9 M/S
TRICUSPID ANNULAR PLANE SYSTOLIC EXCURSION: 2.1 CM
TRICUSPID VALVE PEAK REGURGITATION VELOCITY: 2.91 M/S
WBC # BLD AUTO: 8.15 THOUSAND/UL (ref 4.31–10.16)

## 2024-02-13 PROCEDURE — 99223 1ST HOSP IP/OBS HIGH 75: CPT | Performed by: INTERNAL MEDICINE

## 2024-02-13 PROCEDURE — 87505 NFCT AGENT DETECTION GI: CPT | Performed by: INTERNAL MEDICINE

## 2024-02-13 PROCEDURE — NC001 PR NO CHARGE: Performed by: INTERNAL MEDICINE

## 2024-02-13 PROCEDURE — 93356 MYOCRD STRAIN IMG SPCKL TRCK: CPT | Performed by: INTERNAL MEDICINE

## 2024-02-13 PROCEDURE — 83735 ASSAY OF MAGNESIUM: CPT | Performed by: INTERNAL MEDICINE

## 2024-02-13 PROCEDURE — 93306 TTE W/DOPPLER COMPLETE: CPT

## 2024-02-13 PROCEDURE — 93306 TTE W/DOPPLER COMPLETE: CPT | Performed by: INTERNAL MEDICINE

## 2024-02-13 PROCEDURE — 93970 EXTREMITY STUDY: CPT

## 2024-02-13 PROCEDURE — 85025 COMPLETE CBC W/AUTO DIFF WBC: CPT | Performed by: INTERNAL MEDICINE

## 2024-02-13 PROCEDURE — 93356 MYOCRD STRAIN IMG SPCKL TRCK: CPT

## 2024-02-13 PROCEDURE — 85730 THROMBOPLASTIN TIME PARTIAL: CPT | Performed by: INTERNAL MEDICINE

## 2024-02-13 PROCEDURE — 80048 BASIC METABOLIC PNL TOTAL CA: CPT | Performed by: INTERNAL MEDICINE

## 2024-02-13 PROCEDURE — 99232 SBSQ HOSP IP/OBS MODERATE 35: CPT | Performed by: INTERNAL MEDICINE

## 2024-02-13 PROCEDURE — 87493 C DIFF AMPLIFIED PROBE: CPT | Performed by: INTERNAL MEDICINE

## 2024-02-13 PROCEDURE — 85060 BLOOD SMEAR INTERPRETATION: CPT | Performed by: PATHOLOGY

## 2024-02-13 RX ORDER — MAGNESIUM SULFATE HEPTAHYDRATE 40 MG/ML
2 INJECTION, SOLUTION INTRAVENOUS ONCE
Status: COMPLETED | OUTPATIENT
Start: 2024-02-13 | End: 2024-02-14

## 2024-02-13 RX ORDER — LOPERAMIDE HYDROCHLORIDE 2 MG/1
2 CAPSULE ORAL 4 TIMES DAILY PRN
Status: DISCONTINUED | OUTPATIENT
Start: 2024-02-13 | End: 2024-02-14 | Stop reason: HOSPADM

## 2024-02-13 RX ADMIN — ATORVASTATIN CALCIUM 20 MG: 20 TABLET, FILM COATED ORAL at 17:23

## 2024-02-13 RX ADMIN — FUROSEMIDE 20 MG: 20 TABLET ORAL at 08:21

## 2024-02-13 RX ADMIN — LOSARTAN POTASSIUM 100 MG: 50 TABLET, FILM COATED ORAL at 08:21

## 2024-02-13 RX ADMIN — Medication 250 MG: at 08:20

## 2024-02-13 RX ADMIN — Medication 1000 UNITS: at 08:21

## 2024-02-13 RX ADMIN — METOPROLOL TARTRATE 25 MG: 25 TABLET, FILM COATED ORAL at 08:20

## 2024-02-13 RX ADMIN — SODIUM CHLORIDE 250 ML: 0.9 INJECTION, SOLUTION INTRAVENOUS at 00:11

## 2024-02-13 RX ADMIN — VERAPAMIL HYDROCHLORIDE 360 MG: 120 TABLET, FILM COATED, EXTENDED RELEASE ORAL at 21:51

## 2024-02-13 RX ADMIN — MAGNESIUM SULFATE HEPTAHYDRATE 2 G: 2 INJECTION, SOLUTION INTRAVENOUS at 10:54

## 2024-02-13 RX ADMIN — Medication 250 MG: at 17:23

## 2024-02-13 RX ADMIN — APIXABAN 10 MG: 5 TABLET, FILM COATED ORAL at 17:23

## 2024-02-13 RX ADMIN — MULTIPLE VITAMINS W/ MINERALS TAB 1 TABLET: TAB ORAL at 08:20

## 2024-02-13 RX ADMIN — METOPROLOL TARTRATE 25 MG: 25 TABLET, FILM COATED ORAL at 21:51

## 2024-02-13 RX ADMIN — SERTRALINE 200 MG: 100 TABLET, FILM COATED ORAL at 08:21

## 2024-02-13 RX ADMIN — POTASSIUM CHLORIDE 40 MEQ: 1500 TABLET, EXTENDED RELEASE ORAL at 08:20

## 2024-02-13 RX ADMIN — BUPROPION HYDROCHLORIDE 300 MG: 300 TABLET, EXTENDED RELEASE ORAL at 08:21

## 2024-02-13 RX ADMIN — FLUTICASONE FUROATE AND VILANTEROL TRIFENATATE 1 PUFF: 200; 25 POWDER RESPIRATORY (INHALATION) at 10:16

## 2024-02-13 NOTE — ASSESSMENT & PLAN NOTE
Recent Labs     02/12/24  1727 02/13/24  0456   SODIUM 129* 135      Sodium 129  Nausea over the past couple of days. Decreased intake.  Also with ongoing loose stool  Prior history of hyponatremia.  Over the past month (131-134)  Possibly related to cancer and or Gemcitabine chemotherapy   Last infusion 2/8/24  Oncology consult   Resolved with 250 bolus normal saline  Monitor

## 2024-02-13 NOTE — ASSESSMENT & PLAN NOTE
Stage IV  Follows with Dr. Phipps   Last seen on 1/29/2024.  Patient's regimen was switched to gemcitabine due to her developing an SBO in January.  Gemcitabine started on 2/6/2024.  Not with curative intent.   Per notes patient is to follow-up with Todd Negrete to see if any options for clinical trials  Pleura cath in place. Gets drained 2x weekly. Last drained 2/12. 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  Oncology was consulted, appreciate recommendations.  Patient will follow-up with hematology oncology outpatient

## 2024-02-13 NOTE — ASSESSMENT & PLAN NOTE
Presents due to worsening shortness of breath since night of 2/11  Known non-small cell lung cancer and COPD.  On baseline 3 L nasal cannula > 95%  CTA chest  Small filling defect in the right interlobar pulmonary artery presumably extending to the resection margin of prior right lower lobectomy; the middle lobe remains perfused. Measured RV/LV ratio is within normal limits at less than 0.9.  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  Echo showed LVEF 65% diastolic dysfunction 1, right ventricle normal, mild pulmonary hypertension  Patient has stable vital signs.  Was started on heparin drip, will discontinue and start patient on Eliquis 10 mg twice daily for 7 days and continue with 5 twice daily patient .will need to follow-up with hematology oncology  Pulmonology was consulted, appreciate recommendations  Eliquis was price checked will cost $30 a month  Discussed with the patient about medication side effect, patient agreeable to start Eliquis

## 2024-02-13 NOTE — CONSULTS
"Consultation - Pulmonary Medicine   Kathi Ferris 63 y.o. female MRN: [id number]  Unit/Bed#: -01 Encounter: [id number]      Assessment/Plan:    Chronic hypoxic respiratory failure  Pulmonary embolism  SIRS  Non-small cell lung cancer of right lung, currently on chemotherapy  Malignant pleural effusion with pleural catheter in place  COPD of unknown severity without acute exacerbation      Patient is currently on 2L oxygen saturating at 97%. Titrate oxygen to maintain saturations >89%. She is maintained on 2-3L oxygen at baseline.  CTA chest shows small filling defect in the right interlobar pulmonary artery.  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 She trialed Trelegy but her insurance did not cover it.  Continue heparin drip. She will need lifelong oral anticoagulation upon discharge. Recommend DOAC Xarelto vs Eliquis pending insurance coverage.  Continue Advair daily, albuterol inhaler and nebulizer as needed  She follows outpatient with heme/onc and recently started new chemotherapy treatment of gemcitabine on 2/6/24 and receives this weekly  She will need to follow up with pulmonary outpatient. She should complete PFTs    History of Present Illness   Physician Requesting Consult: Johanna Atkinson MD  Reason for Consult / Principal Problem: Pulmonary embolism  Chief Complaint: \"I am okay\"  HPI: Kathi Ferris is a 63 y.o.  female with PMH non-small cell lung cancer, hypertension, chronic respiratory failure, chronic malignant pleural effusion with ASEPT catheter who presented to [facility] with complaints of worsening shortness of breath. She reports she had a home care visit yesterday to drain her pleural catheter, and they suggested she come to the hospital due to her symptoms.     She reports some nausea and fatigue. She has a chronic dry cough, " and increased shortness of breath. She denies any chest pain. She has had an ASEPT pleural catheter for her malignant right pleural effusion since April 2023 and reports getting it drained twice weekly. She gets about 200mL of drainage out of it with each session.    She is currently reciveing gemcitabine chemotherapy treatment weekly. She started this new treatment 2/6/24.    Inpatient consult to Pulmonology  Consult performed by: CAT Dang  Consult ordered by: Ronel Menezes PA-C        Review of Systems   Constitutional:  Positive for fatigue. Negative for chills and fever.   HENT:  Negative for congestion and sinus pain.    Respiratory:  Positive for cough and shortness of breath. Negative for chest tightness and wheezing.    Cardiovascular:  Negative for chest pain, palpitations and leg swelling.   Gastrointestinal:  Positive for diarrhea and nausea.   Genitourinary: Negative.    Musculoskeletal: Negative.    Skin: Negative.    Neurological: Negative.    Psychiatric/Behavioral: Negative.         Historical Information   Past Medical History:   Diagnosis Date    Cancer (HCC)     COPD (chronic obstructive pulmonary disease) (HCC)     Emphysema of lung (HCC)     Hyperlipidemia     Hypertension     Lung cancer (HCC) 06/26/2019    right lower lobe removed    Lung nodule     Pleural effusion      Past Surgical History:   Procedure Laterality Date    APPENDECTOMY      BREAST BIOPSY Right     benign    BRONCHOSCOPY  06/2019    COLON SURGERY      HYSTERECTOMY      age 46    IR PLEURAL EFFUSION LONG-TERM CATHETER PLACEMENT  04/25/2023    IR PORT PLACEMENT  04/18/2023    IR THORACENTESIS  03/24/2023    IR THORACENTESIS  04/12/2023    IR THORACENTESIS  04/18/2023    LUNG BIOPSY  06/2019    LUNG LOBECTOMY      OOPHORECTOMY Bilateral     age 46     Social History   Social History     Substance and Sexual Activity   Alcohol Use Not Currently    Comment: Rarely drink, socially only     Social History     Substance  and Sexual Activity   Drug Use No    Comment: has her medical marijuana card but does not like the way it makes her feel, she does not use it     Social History     Tobacco Use   Smoking Status Former    Current packs/day: 0.00    Average packs/day: 1.5 packs/day for 53.2 years (79.8 ttl pk-yrs)    Types: Cigarettes    Start date:     Quit date: 3/15/2023    Years since quittin.9    Passive exposure: Past   Smokeless Tobacco Never   Tobacco Comments    Patient quit 1 year ago     E-Cigarette/Vaping    E-Cigarette Use Never User      E-Cigarette/Vaping Substances    Nicotine No     THC No     CBD No     Flavoring No     Other No     Unknown No        Meds/Allergies   all current active meds have been reviewed and current meds:   Current Facility-Administered Medications   Medication Dose Route Frequency    acetaminophen (TYLENOL) tablet 650 mg  650 mg Oral Q6H PRN    albuterol (PROVENTIL HFA,VENTOLIN HFA) inhaler 2 puff  2 puff Inhalation Q4H PRN    albuterol inhalation solution 2.5 mg  2.5 mg Nebulization Q6H PRN    atorvastatin (LIPITOR) tablet 20 mg  20 mg Oral After Dinner    buPROPion (WELLBUTRIN XL) 24 hr tablet 300 mg  300 mg Oral Daily    cholecalciferol (VITAMIN D3) tablet 1,000 Units  1,000 Units Oral Daily    fluticasone-vilanterol 200-25 mcg/actuation 1 puff  1 puff Inhalation Daily    furosemide (LASIX) tablet 20 mg  20 mg Oral QAM    heparin (porcine) 25,000 units in 0.45% NaCl 250 mL infusion (premix)  3-30 Units/kg/hr (Order-Specific) Intravenous Titrated    heparin (porcine) injection 3,200 Units  3,200 Units Intravenous Q6H PRN    heparin (porcine) injection 6,400 Units  6,400 Units Intravenous Q6H PRN    LORazepam (ATIVAN) tablet 0.5 mg  0.5 mg Oral Daily PRN    losartan (COZAAR) tablet 100 mg  100 mg Oral Daily    magnesium sulfate 2 g/50 mL IVPB (premix) 2 g  2 g Intravenous Once    metoprolol tartrate (LOPRESSOR) tablet 25 mg  25 mg Oral Q12H ULISES    multivitamin-minerals (CENTRUM) tablet  "1 tablet  1 tablet Oral Daily    potassium chloride (Klor-Con M20) CR tablet 40 mEq  40 mEq Oral Daily    saccharomyces boulardii (FLORASTOR) capsule 250 mg  250 mg Oral BID    sertraline (ZOLOFT) tablet 200 mg  200 mg Oral Daily    verapamil (CALAN-SR) CR tablet 360 mg  360 mg Oral HS       Allergies   Allergen Reactions    Amoxicillin Hives    Vilazodone Hcl Nausea Only and Dizziness     (Viibryd)    Wasp Venom Swelling    Zithromax [Azithromycin] Hives       Objective   Vitals: Blood pressure 114/74, pulse 89, temperature 98.2 °F (36.8 °C), resp. rate 16, height 5' 5\" (1.651 m), weight 82.4 kg (181 lb 10.5 oz), SpO2 99%.2L,Body mass index is 30.23 kg/m².    Intake/Output Summary (Last 24 hours) at 2/13/2024 0846  Last data filed at 2/13/2024 0801  Gross per 24 hour   Intake 610 ml   Output 300 ml   Net 310 ml     Invasive Devices       Central Venous Catheter Line  Duration             Port A Cath 04/18/23 Right Subclavian 301 days              Peripheral Intravenous Line  Duration             Peripheral IV 02/12/24 Dorsal (posterior);Left Forearm <1 day    Peripheral IV 02/12/24 Left Antecubital <1 day              Drain  Duration             Pleural Effusion Long-Term Catheter 15.5 Fr. 293 days                    Physical Exam  Constitutional:       Appearance: Normal appearance. She is ill-appearing.   HENT:      Head: Normocephalic and atraumatic.      Nose: Nose normal.      Mouth/Throat:      Pharynx: Oropharynx is clear.   Eyes:      Conjunctiva/sclera: Conjunctivae normal.   Cardiovascular:      Rate and Rhythm: Normal rate.   Pulmonary:      Effort: Pulmonary effort is normal. No respiratory distress.      Breath sounds: Decreased breath sounds present.   Abdominal:      Palpations: Abdomen is soft.   Musculoskeletal:         General: Normal range of motion.      Cervical back: Normal range of motion and neck supple.   Skin:     General: Skin is warm and dry.      Capillary Refill: Capillary refill takes " "less than 2 seconds.   Neurological:      General: No focal deficit present.      Mental Status: She is alert and oriented to person, place, and time.   Psychiatric:         Mood and Affect: Mood normal.         Lab Results: I have personally reviewed pertinent lab results., BNP:   Lab Results   Component Value Date     (H) 02/12/2024   , CBC:   Lab Results   Component Value Date    WBC 8.15 02/13/2024    HGB 8.9 (L) 02/13/2024    HCT 28.3 (L) 02/13/2024    MCV 87 02/13/2024     02/13/2024    RBC 3.26 (L) 02/13/2024    MCH 27.3 02/13/2024    MCHC 31.4 02/13/2024    RDW 19.0 (H) 02/13/2024    MPV 8.8 (L) 02/13/2024    NRBC 0 02/13/2024   , CMP:   Lab Results   Component Value Date    SODIUM 135 02/13/2024    K 3.5 02/13/2024    CL 98 02/13/2024    CO2 25 02/13/2024    BUN 19 02/13/2024    CREATININE 0.51 (L) 02/13/2024    CALCIUM 9.2 02/13/2024    AST 28 02/12/2024    ALT 14 02/12/2024    ALKPHOS 105 (H) 02/12/2024    EGFR 101 02/13/2024       Flu/COVID/RSV PCR: Negative        Imaging Studies: I have personally reviewed pertinent reports.   and I have personally reviewed pertinent films in PACS     CTA chest, 2/12/2024  1.  Small filling defect in the right interlobar pulmonary artery presumably extending to the resection margin of prior right lower lobectomy; the middle lobe remains perfused. Measured RV/LV ratio is within normal limits at less than 0.9.  2.  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        Code Status: Level 3 - DNAR and DNI      Meggan Carrasco, MSN RN FNP-BC  Nurse Practitioner  [facility]       Portions of the record may have been created with voice recognition software.  Occasional wrong word or \"sound a like\" substitutions may have occurred due to the inherent limitations of voice recognition software.  Read " the chart carefully and recognize, using context, where substitutions have occurred.

## 2024-02-13 NOTE — ASSESSMENT & PLAN NOTE
Stage IV  Follows with Dr. Phipps   Last seen on 1/29/2024.  Patient's regimen was switched to gemcitabine due to her developing an SBO in January.  Gemcitabine started on 2/6/2024.  Not with curative intent.   Per notes patient is to follow-up with Todd Negrete to see if any options for clinical trials  Pleura cath in place. Gets drained 2x weekly. Last drained 2/12. 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  Consult oncology.

## 2024-02-13 NOTE — ASSESSMENT & PLAN NOTE
Home regimen:  Micardis 80 mg daily  Verapamil 360 mg daily at bedtime  Metoprolol tartrate 25 mg twice daily  Normotensive thus far.

## 2024-02-13 NOTE — ASSESSMENT & PLAN NOTE
Patient reported watery diarrhea in the past 4 days.  Started gemcitabine chemotherapy last week  C. difficile, stool bacterial negative  Suspect diarrhea is chemotherapy related  Will start Imodium

## 2024-02-13 NOTE — ASSESSMENT & PLAN NOTE
Presents due to worsening shortness of breath since night of 2/11  Known non-small cell lung cancer and COPD.  On baseline 3 L nasal cannula > 95%  CTA chest  Small filling defect in the right interlobar pulmonary artery presumably extending to the resection margin of prior right lower lobectomy; the middle lobe remains perfused. Measured RV/LV ratio is within normal limits at less than 0.9.  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  Start IV heparin drip   Obtain ECHO  Telemetry   Consult Pulmonology

## 2024-02-13 NOTE — ASSESSMENT & PLAN NOTE
Sodium 129  Nausea over the past couple of days. Decreased intake.  Also with ongoing loose stool  Prior history of hyponatremia.  Over the past month (131-134)  Possibly related to cancer and or Gemcitabine chemotherapy   Last infusion 2/8/24  Oncology consult   Order 250 ml bolus of NS.   BMP ordered for am.

## 2024-02-13 NOTE — UTILIZATION REVIEW
Initial Clinical Review    Admission: Date/Time/Statement:   Admission Orders (From admission, onward)       Ordered        02/12/24 2210  INPATIENT ADMISSION  Once                          Orders Placed This Encounter   Procedures    INPATIENT ADMISSION     Standing Status:   Standing     Number of Occurrences:   1     Order Specific Question:   Level of Care     Answer:   Med Surg [16]     Order Specific Question:   Estimated length of stay     Answer:   More than 2 Midnights     Order Specific Question:   Certification     Answer:   I certify that inpatient services are medically necessary for this patient for a duration of greater than two midnights. See H&P and MD Progress Notes for additional information about the patient's course of treatment.     ED Arrival Information       Expected   -    Arrival   2/12/2024 15:39    Acuity   Emergent              Means of arrival   Wheelchair    Escorted by   Spouse    Service   Hospitalist    Admission type   Emergency              Arrival complaint   sob             Chief Complaint   Patient presents with    Shortness of Breath     Patient presents to ED with SOB worsening last night, patient sent by visiting nurse       Initial Presentation: 63 y.o. female from home to ED admitted inpatient due to acute pulmonary embolus/SIRS/hyponatremia/elevated troponin.   Has stage IV non small cell cancer of Right lung on chemo.   Has chronic oxygen 2 to 3 liters.    Presented due to shortness of breath starting evening prior to arrival.   Last few days with nausea, poor intake, loose stool   On exam:  tachycardia.  Tachypnea.   Lungs decreased breath sounds.  Pleura cath in place, last drained 2/12, 250 ml.   Pale.  Wbc 11.17.   H&H 8.9/27.7.   na 129.   Hs tnl 160 > 183/23>155/-5.   Cta chest showed PE.  In the ED given full dose asa.    Plan:  heparin bolus then drip.   Consult oncology and pulmonary. Wean oxygen as able. Echo.  Telemetry.    Hold antibiotics.    Bolus with IV  NSS and trend BMP.      Date: 2/13/24    Day 2:  + nausea and fatigue.  Chronic dry cough.  Increased shortness of breath.    On exam: appears ill.  Decreased breath sounds.     On continued Heparin gtt.     2/13/24 per Pulmonary:    Chronic hypoxic respiratory failure/ Acute Pulmonary embolism/ Stage IV adenocarcinoma s/p RLLectomy, s/p chemotherapy/Chronic R lung malignant effusion s/p ASEPT catheter in place/COPD without acute exacerbation.  Plan:  would benefit from Lovenox due to malignancy.   Transition eventually to DOAC, will be life long.    Wean oxygen as able.   Will need ambulatory pulse ox.  Echo no significant strain.  Check US LE.  Continue symbicort. Follow with oncology for chemo.    OP PFT    2/13/24 per oncology- patient has stage IV lung cancer with peritoneal carcinomatosis, malignant pleural effusion with pleural catheter in place, recent hospitalization with small bowel obstruction conservatively.  She is undergoing systemic therapy with palliative intent.  She has progressed through multiple treatment regimens.   She had a recent change in therapy to gemcitabine, first dose given 2/6/24.  Now with acute PE.   Recommend transition to DOAC prior to dc.   Will need lifelong anticoagulation.      ED Triage Vitals   Temperature Pulse Respirations Blood Pressure SpO2   02/12/24 1613 02/12/24 1612 02/12/24 1612 02/12/24 1616 02/12/24 1612   98.3 °F (36.8 °C) (!) 126 (!) 24 127/65 96 %      Temp Source Heart Rate Source Patient Position - Orthostatic VS BP Location FiO2 (%)   02/12/24 1613 02/12/24 1612 02/12/24 1700 02/12/24 1700 --   Oral Monitor Sitting Right arm       Pain Score       02/12/24 1736       No Pain          Wt Readings from Last 1 Encounters:   02/13/24 82.1 kg (181 lb)     Additional Vital Signs:   2/13/24 1400 -- -- -- -- -- -- 32 3 L/min Nasal cannula --   02/13/24 0955 -- 88 -- 114/74 -- -- -- -- -- --   02/13/24 0900 -- -- -- -- -- 97 % -- -- -- --   02/13/24 06:40:16 98.2 °F  (36.8 °C) 89 16 114/74 87 99 % -- -- -- --   02/12/24 23:57:41 -- 108 Abnormal  -- 130/81 97 97 % -- -- -- --   02/12/24 2247 -- -- -- 130/80  -- -- -- -- -- Lying   BP: dynamap at 02/12/24 2247 02/12/24 22:41:24 97.9 °F (36.6 °C) 113 Abnormal  18 -- -- 98 % 32 3 L/min Nasal cannula --   02/12/24 2200 -- 109 Abnormal  19 123/61 85 98 % -- -- None (Room air) Lying   02/12/24 2130 -- 109 Abnormal  21 121/65 88 99 % -- -- None (Room air) Lying   02/12/24 2000 -- 105 23 Abnormal  112/58 80 98 % -- -- None (Room air) Sitting   02/12/24 1900 -- 107 Abnormal  23 Abnormal  107/57 75 98 % -- -- Nasal cannula Sitting   02/12/24 1800 -- 108 Abnormal  22 107/62 78 98 % -- -- None (Room air) Sitting   02/12/24 1700 -- 118 Abnormal  20 141/75 100 98 % -- -- Nasal cannula      Pertinent Labs/Diagnostic Test Results:   CTA ED chest PE study   Final Result by Chato Guerrero MD (02/12 2146)      1.  Small filling defect in the right interlobar pulmonary artery presumably extending to the resection margin of prior right lower lobectomy; the middle lobe remains perfused. Measured RV/LV ratio is within normal limits at less than 0.9.   2.  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      The study was marked in EPIC for immediate notification.      Workstation performed: [id number]         XR chest 1 view portable   ED Interpretation by Tom Lindsay DO (02/12 1739)   Decreased right pleural effusion when compared to previous      Final Result by Clay Castaneda MD (02/13 3344)      Right infrahilar opacity reflects mass better evident on subsequent CT.   Bilateral pleural effusions, which are better evident on subsequent CT.            Workstation performed: [id number]          VAS VENOUS DUPLEX - LOWER LIMB BILATERAL    (Results Pending)       2/13/24 echo Strain was performed to  quantify interventricular dyssynchrony and evaluate components of myocardial function due Chemotherapy . Results from the utilization of Strain Analysis are listed in the report below    Left Ventricle: Left ventricular cavity size is normal. Wall thickness is upper normal. The left ventricular ejection fraction is 65%. Systolic function is normal. at -19%. Wall motion is normal. Diastolic function is mildly abnormal, consistent with grade I (abnormal) relaxation.    Right Ventricle: Right ventricular cavity size is normal. Systolic function is normal.    Mitral Valve: There is mild annular calcification. There is trace regurgitation.    Tricuspid Valve: There is mild regurgitation.    Pulmonary Artery: The pulmonary artery systolic pressure is mildly increased.    Results from last 7 days   Lab Units 02/12/24  2355   SARS-COV-2  Negative     Results from last 7 days   Lab Units 02/13/24  0456 02/12/24  1727   WBC Thousand/uL 8.15 11.17*   HEMOGLOBIN g/dL 8.9* 8.9*   HEMATOCRIT % 28.3* 27.7*   PLATELETS Thousands/uL 377 430*   NEUTROS ABS Thousands/µL 7.64*  --      Results from last 7 days   Lab Units 02/13/24  0456 02/12/24  1727   SODIUM mmol/L 135 129*   POTASSIUM mmol/L 3.5 4.0   CHLORIDE mmol/L 98 94*   CO2 mmol/L 25 23   ANION GAP mmol/L 12 12   BUN mg/dL 19 21   CREATININE mg/dL 0.51* 0.67   EGFR ml/min/1.73sq m 101 92   CALCIUM mg/dL 9.2 9.5   MAGNESIUM mg/dL 1.6*  --      Results from last 7 days   Lab Units 02/12/24  1727   AST U/L 28   ALT U/L 14   ALK PHOS U/L 105*   TOTAL PROTEIN g/dL 6.5   ALBUMIN g/dL 3.0*   TOTAL BILIRUBIN mg/dL 0.51     Results from last 7 days   Lab Units 02/13/24  0456 02/12/24  1727   GLUCOSE RANDOM mg/dL 88 97     Results from last 7 days   Lab Units 02/12/24  2141 02/12/24  1925 02/12/24  1727   HS TNI 0HR ng/L  --   --  160*   HS TNI 2HR ng/L  --  183*  --    HSTNI D2 ng/L  --  23*  --    HS TNI 4HR ng/L 155*  --   --    HSTNI D4 ng/L -5  --   --      Results from last 7 days    Lab Units 02/13/24  1249 02/13/24  0456 02/12/24  2355 02/12/24  2303   PROTIME seconds  --   --  13.5  --    INR   --   --  0.99  --    PTT seconds 83* 177*  --  31     Results from last 7 days   Lab Units 02/12/24  1727   BNP pg/mL 183*     Results from last 7 days   Lab Units 02/12/24  2355   INFLUENZA A PCR  Negative   INFLUENZA B PCR  Negative   RSV PCR  Negative     Results from last 7 days   Lab Units 02/13/24  0250   C DIFF TOXIN B BY PCR  Negative     Results from last 7 days   Lab Units 02/13/24  0250   SALMONELLA SP PCR  Negative   SHIGELLA SP/ENTEROINVASIVE E. COLI (EIEC)  Negative   CAMPYLOBACTER SP (JEJUNI AND COLI)  Negative   SHIGA TOXIN 1/SHIGA TOXIN 2  Negative     ED Treatment:   Medication Administration from 02/12/2024 1539 to 02/12/2024 2230         Date/Time Order Dose Route Action Comments     02/12/2024 1920 EST aspirin chewable tablet 324 mg 324 mg Oral Given --          Past Medical History:   Diagnosis Date    Cancer (HCC)     COPD (chronic obstructive pulmonary disease) (HCC)     Emphysema of lung (HCC)     Hyperlipidemia     Hypertension     Lung cancer (HCC) 06/26/2019    right lower lobe removed    Lung nodule     Pleural effusion      Present on Admission:   Chronic respiratory failure with hypoxia (HCC)   COPD (chronic obstructive pulmonary disease) (HCC)   Essential hypertension   Non-small cell cancer of right lung (HCC)   Hyponatremia   Acute pulmonary embolism (HCC)   SIRS (systemic inflammatory response syndrome) (HCC)      Admitting Diagnosis: Pulmonary embolism (HCC) [I26.99]  SOB (shortness of breath) [R06.02]  Elevated troponin [R79.89]  Pleural effusion on right [J90]  Age/Sex: 63 y.o. female  Admission Orders: 2/12/24 2210 inpatient   Scheduled Medications:  atorvastatin, 20 mg, Oral, After Dinner  buPROPion, 300 mg, Oral, Daily  cholecalciferol, 1,000 Units, Oral, Daily  fluticasone-vilanterol, 1 puff, Inhalation, Daily  furosemide, 20 mg, Oral, QAM  losartan, 100 mg,  Oral, Daily  metoprolol tartrate, 25 mg, Oral, Q12H ULISES  multivitamin-minerals, 1 tablet, Oral, Daily  potassium chloride, 40 mEq, Oral, Daily  saccharomyces boulardii, 250 mg, Oral, BID  sertraline, 200 mg, Oral, Daily  verapamil, 360 mg, Oral, HS    heparin (porcine) injection 6,400 Units  Dose: 6,400 Units  Freq: Once Route: IV  Start: 02/12/24 2215 End: 02/12/24 2315   magnesium sulfate 2 g/50 mL IVPB (premix) 2 g  Dose: 2 g  Freq: Once Route: IV  Last Dose: 2 g (02/13/24 1054)  Start: 02/13/24 0830 End: 02/13/24 1254     potassium chloride (Klor-Con M20) CR tablet 40 mEq  Dose: 40 mEq  Freq: Daily Route: PO  Start: 02/13/24 0900     sodium chloride 0.9 % bolus 250 mL  Dose: 250 mL  Freq: Once Route: IV  Last Dose: 250 mL (02/13/24 0011)  Start: 02/12/24 2330 End: 02/13/24 0211     Continuous IV Infusions:  heparin (porcine), 3-30 Units/kg/hr (Order-Specific), Intravenous, Titrated      PRN Meds: not used   acetaminophen, 650 mg, Oral, Q6H PRN  albuterol, 2 puff, Inhalation, Q4H PRN  albuterol, 2.5 mg, Nebulization, Q6H PRN  heparin (porcine), 3,200 Units, Intravenous, Q6H PRN  heparin (porcine), 6,400 Units, Intravenous, Q6H PRN  LORazepam, 0.5 mg, Oral, Daily PRN        IP CONSULT TO PULMONOLOGY  IP CONSULT TO ONCOLOGY    Network Utilization Review Department  ATTENTION: Please call with any questions or concerns to [phone] and carefully listen to the prompts so that you are directed to the right person. All voicemails are confidential.   For Discharge needs, contact Care Management DC Support Team at [phone] opt. 2  Send all requests for admission clinical reviews, approved or denied determinations and any other requests to dedicated fax number below belonging to the campus where the patient is receiving treatment. List of dedicated fax numbers for the Facilities:  FACILITY NAME UR FAX NUMBER   ADMISSION DENIALS (Administrative/Medical Necessity) [phone]   DISCHARGE SUPPORT TEAM (NETWORK)  [phone]   PARENT CHILD HEALTH (Maternity/NICU/Pediatrics) [phone]   [facility] [phone]   [facility] [phone]   [facility] [phone]   [facility] [phone]   [facility] [phone]   [facility] [phone]   [facility] [phone]   [facility] [phone]   [facility] [phone]   [facility] [phone]   [facility] [phone]   [facility] [phone]

## 2024-02-13 NOTE — ASSESSMENT & PLAN NOTE
SIRS: tachycardia + tachypnea   On exam patient without tachypnea. Per patient SOB constant but worse with exertion.   HR into the 120s. Prescribed metoprolol and cardizem. Did not take on 2/12 due to nausea at home.   Restart home medications   Denies fevers or chills.  CTA chest negative for infection   Obtain covid/flu/rsv   Reports ongoing loose stools   Obtain cdiff and stool panel as a precaution  Do not suspect infection at this time. Hold off on starting abx at this time.

## 2024-02-13 NOTE — CASE MANAGEMENT
Case Management Assessment & Discharge Planning Note    Patient name Kathi Ferris  Location /-01 MRN [id number]  : 1960 Date 2024       Current Admission Date: 2024  Current Admission Diagnosis:Acute pulmonary embolism (HCC)   Patient Active Problem List    Diagnosis Date Noted    Elevated troponin 2024    SBO (small bowel obstruction) ([facility]) 2024    Chronic respiratory failure with hypoxia ([facility]) 10/24/2023    Generalized anxiety disorder 10/06/2023    SIRS (systemic inflammatory response syndrome) ([facility]) 2023    Hyponatremia 2023    Palliative care patient 2023    Malignant pleural effusion 2023    Acute pulmonary embolism ([facility]) 2023    CINV (chemotherapy-induced nausea and vomiting) 2023    Non-small cell cancer of right lung ([facility]) 2023    Closed fracture of multiple ribs of left side 2022    Cigarette nicotine dependence in remission 2022    COPD (chronic obstructive pulmonary disease) ([facility]) 2022    Hypercholesterolemia 2022    Essential hypertension 2022    Adhesive capsulitis of left shoulder 2021    Impingement syndrome of left shoulder 2021      LOS (days): 1  Geometric Mean LOS (GMLOS) (days): 2.5  Days to GMLOS:1.7     OBJECTIVE:  PATIENT READMITTED TO HOSPITAL  Risk of Unplanned Readmission Score: 31.44         Current admission status: Inpatient       Preferred Pharmacy:   [id number] [place] - [address]  [address]  [place] [address]  Phone: [phone] Fax: [phone]    [facility] - [place] - [address]  [address]  [place] [address]  Phone: [phone] Fax: [phone]    Primary Care Provider: Keyon Miller MD    Primary Insurance: BLUE CROSS  Secondary Insurance: MEDICARE    ASSESSMENT:  Active Health Care Proxies       Mynor Ferris Health Care Agent - Spouse    Primary Phone: [phone] (Mobile)  Home Phone: [phone]                 Advance Directives  Does patient have a Health Care POA?: Yes  Does patient have Advance Directives?: Yes  Advance Directives: Living will, Power of  for health care  Primary Contact: Mynor ()    Readmission Root Cause  30 Day Readmission: Yes  Who directed you to return to the hospital?: [facility]  Did you understand whom to contact if you had questions or problems?: Yes  Did you get your prescriptions before you left the hospital?: No  Reason:: Delivery service not offered  Were you able to get your prescriptions filled when you left the hospital?: Yes  Did you take your medications as prescribed?: Yes  Were you able to get to your follow-up appointments?: No  Reason:: Readmitted prior to appointment  During previous admission, was a post-acute recommendation made?: Yes  What post-acute resources were offered?: [facility]  Patient was readmitted due to: presents from home due to worsening shortness of breath since night of 2/11.    Patient Information  Admitted from:: Home  Mental Status: Alert  During Assessment patient was accompanied by: Spouse  Assessment information provided by:: Patient  Primary Caregiver: Self  Support Systems: Spouse/significant other, Home care staff  County of Residence: [place]  [place] [facility] do you live in?: [place]  Home entry access options. Select all that apply.: Stairs  Number of steps to enter home.: 1  Do the steps have railings?: No  Type of Current Residence: [address] [place] [address] home  Upon entering residence, is there a bedroom on the main floor (no further steps)?: No  A bedroom is located on the following floor levels of residence (select all that apply):: 2nd Floor  Upon entering residence, is there a bathroom on the main floor (no further steps)?: No  Indicate which floors of current residence have a bathroom (select all the apply):: 2nd Floor  Number of steps to 2nd floor from main floor: One  Flight  Living Arrangements: Lives w/ Spouse/significant other  Is patient a ?: No    Activities of Daily Living Prior to Admission  Functional Status: Independent  Completes ADLs independently?: Yes  Ambulates independently?: Yes  Does patient use assisted devices?: Yes  Assisted Devices (DME) used: Home Oxygen concentrator, Nebulizer, Walker, Shower Chair  DME Company Name (respiratory supplies): [employer]  O2 Rate(s): 3L  Does patient currently own DME?: Yes  What DME does the patient currently own?: Home Oxygen concentrator, Nebulizer, Shower Chair, Walker  Does patient have a history of Outpatient Therapy (PT/OT)?: No  Does the patient have a history of Short-Term Rehab?: No  Does patient have a history of HHC?: Yes (-HHC)  Does patient currently have HHC?: Yes    Current Home Health Care  Type of Current Home Care Services: Nurse visit  Current Home Health Agency:: [facility]  Current Home Health Follow-Up Provider:: PCP    Patient Information Continued  Does patient have prescription coverage?: Yes  Does patient receive dialysis treatments?: No  Does patient have a history of substance abuse?: No  Does patient have a history of Mental Health Diagnosis?: No    Means of Transportation  Means of Transport to Appts:: Family transport      Social Determinants of Health (SDOH)      Flowsheet Row Most Recent Value   Housing Stability    In the last 12 months, was there a time when you were not able to pay the mortgage or rent on time? N   In the last 12 months, how many places have you lived? 1   In the last 12 months, was there a time when you did not have a steady place to sleep or slept in a shelter (including now)? N   Transportation Needs    In the past 12 months, has lack of transportation kept you from medical appointments or from getting medications? no   In the past 12 months, has lack of transportation kept you from meetings, work, or from getting things needed for daily living? No   Food  Insecurity    Within the past 12 months, you worried that your food would run out before you got the money to buy more. Never true   Within the past 12 months, the food you bought just didn't last and you didn't have money to get more. Never true   Utilities    In the past 12 months has the electric, gas, oil, or water company threatened to shut off services in your home? No            DISCHARGE DETAILS:    Discharge planning discussed with:: patient and  Mynor  Freedom of Choice: Yes  Comments - Freedom of Choice: patient is requesting to continue [facility]; referral sent.  CM contacted family/caregiver?: Yes  Were Treatment Team discharge recommendations reviewed with patient/caregiver?: Yes  Did patient/caregiver verbalize understanding of patient care needs?: Yes  Were patient/caregiver advised of the risks associated with not following Treatment Team discharge recommendations?: Yes    Contacts  Patient Contacts: Mynor Ferris - spouse  Relationship to Patient:: Family  Contact Method: In Person  Reason/Outcome: Discharge Planning, Referral    Requested Home Health Care         Is the patient interested in HHC at discharge?: Yes  Home Health Discipline requested:: Nursing  Home Health Agency Name:: [facility]  HHA External Referral Reason (only applicable if external HHA name selected): Patient has established relationship with provider  Home Health Follow-Up Provider:: PCP  Home Health Services Needed:: Other (comment) (Ascept catheter)  Homebound Criteria Met:: Requires the Assistance of Another Person for Safe Ambulation or to Leave the Home  Supporting Clincal Findings:: Limited Endurance    DME Referral Provided  Referral made for DME?: No    Other Referral/Resources/Interventions Provided:  Interventions: HHC  Referral Comments: Referral to [facility].    Treatment Team Recommendation: Home with Home Health Care  Discharge Destination Plan:: Home with Home Health Care  Transport at Discharge : Family      Additional Comments: Met with pt and pt's  Mynor to discuss the role of CM and to discuss any help pt may need prior to dc. Pt lives with her  Mynor in a 2 story home with 1STE; stair glide to 2nd floor and powder room on 1st floor. Pt performed ADL's indptly pta, pt has home 02 3L through [employer], nebulizer, RW, and shower chair. Pt is currently receiving HHC services through --[facility]HC for  nursing/ASEPT catheters. No hx of rehab. No hx of mental health or D&A treatment. Pt's  drives. Pt's POA is her  and has living will. Pt goes to  infusion center weekly. Pt reports her family and neighbors are supportive. Ref. to [facility]. [facility] accepted. CM added -C to pt's dc instructions. Pt's friend will transport home at dc. CM was requested to check Eliquis cost. CM called pt's pharmacy and spoke to Dennis who states copay is $30. Met with pt and spouse to discuss same. They are agreeable. 30 day trail card and $10 copay card were provided.

## 2024-02-13 NOTE — CONSULTS
e-Consult (IPC)   Kathi Ferris 63 y.o. female MRN: [id number]  Unit/Bed#: -01 Encounter: [id number]      Reason for Consult / Principal Problem: Stage IV non-small cell lung cancer  Inpatient consult to Oncology  Consult performed by: CAT Garcia  Consult ordered by: Ronel Menezes PA-C        02/13/24      ASSESSMENT:  Patient is a 63 year old female with a history of stage IV lung cancer with peritoneal carcinomatosis, malignant pleural effusion with pleural catheter in place, recent hospitalization with small bowel obstruction conservatively.  She is well-known to medical oncology, follows with Dr. Phipps.  She is undergoing systemic therapy with palliative intent.  She has progressed through multiple treatment regimens.  She had a recent change in therapy to gemcitabine, first dose given 2/6/24.    To ED last evening with worsening shortness of breath CTA PE study of chest demonstrates new findings of PE.  Stable appearance of right infrahilar lung mass with findings of lymphangitic carcinomatosis, probable metastasis in left lung and unchanged findings of peritoneal pneumatosis  Admission labs shows findings of hyponatremia, elevated troponin, elevated BNP, normocytic anemia stable, mild thrombocytosis, mild leukocytosis.    Oncology History Overview Note   5/2019 - Stage IA adenocarcinoma of the right lung s/p RLLectomy    11/2022 - fall after tripping on a dog gate - CT showed pulmonary nodules that required 3 month followup    3/2023 - CT showed right pleural effusion with enlarging lung lesion, she was otherwise symptomatic, thoracentesis cell block showed adenocarcinoma c/w her prior lung primary    PET - no disease outside the chest    Caris - no targetable mutations    4/20/2023 - carbo/pem/pembro    5/11/2-23 - cycle 2, pemetrexed dose reduced by 20% and carbo dose reduced to AUC 4 due to nausea and fatigue    7/2023 - removed carbo as of cycle 5 due to good response and increasing  fatigue    8/2023 - add back carbo for cycle 6 due to increasing output from right pleural catheter    8/24/2023 - remove carboplatin for cycle 7 because no impact was made on output from pleural catheter    10/2023 - disease progression in the omentum.  Plan treatment change to docetaxel and ramucirumab    11/9/2023 - start taxotere/ramucirumab    1/2024 - change treatment to gemcitabine due to concern of inadequate response     Non-small cell cancer of right lung (HCC)   4/7/2023 Initial Diagnosis    Non-small cell cancer of right lung (HCC)     4/20/2023 - 9/14/2023 Chemotherapy    cyanocobalamin, 1,000 mcg, Intramuscular, Once, 5 of 10 cycles  Administration: 1,000 mcg (4/13/2023), 1,000 mcg (6/1/2023), 1,000 mcg (8/3/2023), 1,000 mcg (8/24/2023), 1,000 mcg (9/14/2023)  alteplase (CATHFLO), 2 mg, Intracatheter, Every 1 Minute as needed, 8 of 13 cycles  fosaprepitant (EMEND) IVPB, 150 mg, Intravenous, Once, 5 of 5 cycles  Administration: 150 mg (4/20/2023), 150 mg (6/1/2023), 150 mg (6/22/2023), 150 mg (5/11/2023), 150 mg (8/3/2023)  CARBOplatin (PARAPLATIN) IVPB (GO AUC DOSING), 553 mg, Intravenous, Once, 5 of 5 cycles  Administration: 553 mg (4/20/2023), 442.4 mg (6/1/2023), 442.4 mg (6/22/2023), 442.4 mg (5/11/2023), 438 mg (8/3/2023)  pemetrexed (ALIMTA) chemo infusion, 945 mg, Intravenous, Once, 8 of 13 cycles  Dose modification: 400 mg/m2 (original dose 500 mg/m2, Cycle 3, Reason: Nausea/Vomiting, Comment: 20% dose reduction due to nausea)  Administration: 900 mg (4/20/2023), 756 mg (6/1/2023), 756 mg (6/22/2023), 756 mg (8/3/2023), 756 mg (5/11/2023), 756 mg (7/13/2023), 756 mg (8/24/2023), 756 mg (9/14/2023)  pembrolizumab (KEYTRUDA) IVPB, 200 mg, Intravenous, Once, 8 of 13 cycles  Administration: 200 mg (4/20/2023), 200 mg (6/1/2023), 200 mg (6/22/2023), 200 mg (8/3/2023), 200 mg (5/11/2023), 200 mg (7/13/2023), 200 mg (8/24/2023), 200 mg (9/14/2023)     6/9/2023 -  Cancer Staged    Staging form: Lung,  AJCC 8th Edition  - Clinical: Stage ALESHA (cT1c, cN2, cM1a) - Signed by Shani Phipps DO on 6/9/2023 11/9/2023 - 1/11/2024 Chemotherapy    alteplase (CATHFLO), 2 mg, Intracatheter, Every 1 Minute as needed, 4 of 6 cycles  pegfilgrastim (NEULASTA ONPRO), 6 mg, Subcutaneous, Once, 0 of 2 cycles  ramucirumab (CYRAMZA) IVPB, 830 mg, Intravenous, Once, 4 of 6 cycles  Administration: 800 mg (11/9/2023), 800 mg (11/30/2023), 800 mg (12/21/2023), 800 mg (1/11/2024)  DOCEtaxel (TAXOTERE) chemo infusion, 75 mg/m2 = 142.6 mg, Intravenous, Once, 4 of 6 cycles  Administration: 142.6 mg (11/9/2023), 142.6 mg (11/30/2023), 142.6 mg (12/21/2023), 142.6 mg (1/11/2024)     2/8/2024 -  Chemotherapy    alteplase (CATHFLO), 2 mg, Intracatheter, Every 1 Minute as needed, 1 of 6 cycles  gemcitabine (GEMZAR) infusion, 1,890.1 mg, Intravenous, Once, 1 of 6 cycles  Administration: 1,800 mg (2/8/2024)          RECOMMENDATIONS:  Stage IV NSCLC   2. Acute PE  3.  Hyponatremia    Patient has stage IV adenocarcinoma of the lung with recurrent malignant pleural effusion, omental disease on systemic therapy with palliative intent.  She was just treated last week with gemcitabine.  Presents now with worsening shortness of breath and CT imaging demonstrates acute PE.  On heparin drip.  Would transition to DOAC prior to discharge.  Patient will need lifelong anticoagulation due to hypercoagulable state of malignancy    Hyponatremia on admission was likely multifactorial and secondary to poor intake, nausea, diarrhea.  Sodium has normalized with replacement.  Recommend supportive measures during this hospitalization.   Discussed with Dr. Phipps.  No inpatient recommendations at this time from a medical oncology perspective.    Medical oncology will see patient in person on Wednesday, 2/14/2024.  Please reach out with any questions       21-30 minutes, >50% of the total time devoted to medical consultative verbal/EMR discussion between  providers. Written report will be generated in the EMR.    CAT Garcia

## 2024-02-13 NOTE — ASSESSMENT & PLAN NOTE
SIRS: tachycardia + tachypnea   On exam patient without tachypnea. Per patient SOB constant but worse with exertion.   HR into the 120s. Prescribed metoprolol and cardizem. Did not take on 2/12 due to nausea at home.   Restart home medications   Denies fevers or chills.  CTA chest negative for infection   covid/flu/rsv negative   Reports ongoing loose stools    cdiff and stool panel negative   Do not suspect infection at this time. Hold off on starting abx at this time.

## 2024-02-13 NOTE — ASSESSMENT & PLAN NOTE
Troponin 160, 183, 4 hr trop pending   2 hr delta 23  Received 324 mg aspirin in the ER   Denies CP. Presented due to worsening SOB. Found to have PE.   Per CT report RV/LV < 0.9. Does not appear to be causing heart strain  IV heparin   Telemetry   Cardiology consult

## 2024-02-13 NOTE — PLAN OF CARE
Problem: Potential for Falls  Goal: Patient will remain free of falls  Description: INTERVENTIONS:  - Educate patient/family on patient safety including physical limitations  - Instruct patient to call for assistance with activity   - Consult OT/PT to assist with strengthening/mobility   - Keep Call bell within reach  - Keep bed low and locked with side rails adjusted as appropriate  - Keep care items and personal belongings within reach  - Initiate and maintain comfort rounds  - Make Fall Risk Sign visible to staff  - Offer Toileting every 2 Hours, in advance of need  - Initiate/Maintain bed alarm  - Obtain necessary fall risk management equipment: socks  - Apply yellow socks and bracelet for high fall risk patients  - Consider moving patient to room near nurses station  Outcome: Progressing     Problem: PAIN - ADULT  Goal: Verbalizes/displays adequate comfort level or baseline comfort level  Description: Interventions:  - Encourage patient to monitor pain and request assistance  - Assess pain using appropriate pain scale  - Administer analgesics based on type and severity of pain and evaluate response  - Implement non-pharmacological measures as appropriate and evaluate response  - Consider cultural and social influences on pain and pain management  - Notify physician/advanced practitioner if interventions unsuccessful or patient reports new pain  Outcome: Progressing     Problem: INFECTION - ADULT  Goal: Absence or prevention of progression during hospitalization  Description: INTERVENTIONS:  - Assess and monitor for signs and symptoms of infection  - Monitor lab/diagnostic results  - Monitor all insertion sites, i.e. indwelling lines, tubes, and drains  - Monitor endotracheal if appropriate and nasal secretions for changes in amount and color  - [place] appropriate cooling/warming therapies per order  - Administer medications as ordered  - Instruct and encourage patient and family to use good hand hygiene  technique  - Identify and instruct in appropriate isolation precautions for identified infection/condition  Outcome: Progressing  Goal: Absence of fever/infection during neutropenic period  Description: INTERVENTIONS:  - Monitor WBC    Outcome: Progressing     Problem: SAFETY ADULT  Goal: Patient will remain free of falls  Description: INTERVENTIONS:  - Educate patient/family on patient safety including physical limitations  - Instruct patient to call for assistance with activity   - Consult OT/PT to assist with strengthening/mobility   - Keep Call bell within reach  - Keep bed low and locked with side rails adjusted as appropriate  - Keep care items and personal belongings within reach  - Initiate and maintain comfort rounds  - Make Fall Risk Sign visible to staff  - Offer Toileting every 2 Hours, in advance of need  - Initiate/Maintain bed alarm  - Obtain necessary fall risk management equipment: socks  - Apply yellow socks and bracelet for high fall risk patients  - Consider moving patient to room near nurses station  Outcome: Progressing  Goal: Maintain or return to baseline ADL function  Description: INTERVENTIONS:  - Educate patient/family on patient safety including physical limitations  - Instruct patient to call for assistance with activity   - Consult OT/PT to assist with strengthening/mobility   - Keep Call bell within reach  - Keep bed low and locked with side rails adjusted as appropriate  - Keep care items and personal belongings within reach  - Initiate and maintain comfort rounds  - Make Fall Risk Sign visible to staff  - Offer Toileting every 2 Hours, in advance of need  - Initiate/Maintain bed alarm  - Obtain necessary fall risk management equipment socks  - Apply yellow socks and bracelet for high fall risk patients  - Consider moving patient to room near nurses station  Outcome: Progressing  Goal: Maintains/Returns to pre admission functional level  Description: INTERVENTIONS:  - Perform AM-PAC 6  Click Basic Mobility/ Daily Activity assessment daily.  - Set and communicate daily mobility goal to care team and patient/family/caregiver.   - Collaborate with rehabilitation services on mobility goals if consulted  - Perform Range of Motion x times a day.  - Reposition patient every x hours.  - Dangle patient x times a day  - Stand patient x times a day  - Ambulate patient x times a day  - Out of bed to chair x times a day   - Out of bed for meals x times a day  - Out of bed for toileting  - Record patient progress and toleration of activity level   Outcome: Progressing     Problem: DISCHARGE PLANNING  Goal: Discharge to home or other facility with appropriate resources  Description: INTERVENTIONS:  - Identify barriers to discharge w/patient and caregiver  - Arrange for needed discharge resources and transportation as appropriate  - Identify discharge learning needs (meds, wound care, etc.)  - Arrange for interpretive services to assist at discharge as needed  - Refer to Case Management Department for coordinating discharge planning if the patient needs post-hospital services based on physician/advanced practitioner order or complex needs related to functional status, cognitive ability, or social support system  Outcome: Progressing     Problem: Knowledge Deficit  Goal: Patient/family/caregiver demonstrates understanding of disease process, treatment plan, medications, and discharge instructions  Description: Complete learning assessment and assess knowledge base.  Interventions:  - Provide teaching at level of understanding  - Provide teaching via preferred learning methods  Outcome: Progressing     Problem: RESPIRATORY - ADULT  Goal: Achieves optimal ventilation and oxygenation  Description: INTERVENTIONS:  - Assess for changes in respiratory status  - Assess for changes in mentation and behavior  - Position to facilitate oxygenation and minimize respiratory effort  - Oxygen administered by appropriate delivery  if ordered  - Initiate smoking cessation education as indicated  - Encourage broncho-pulmonary hygiene including cough, deep breathe, Incentive Spirometry  - Assess the need for suctioning and aspirate as needed  - Assess and instruct to report SOB or any respiratory difficulty  - Respiratory Therapy support as indicated  Outcome: Progressing     Problem: GASTROINTESTINAL - ADULT  Goal: Minimal or absence of nausea and/or vomiting  Description: INTERVENTIONS:  - Administer IV fluids if ordered to ensure adequate hydration  - Maintain NPO status until nausea and vomiting are resolved  - Nasogastric tube if ordered  - Administer ordered antiemetic medications as needed  - Provide nonpharmacologic comfort measures as appropriate  - Advance diet as tolerated, if ordered  - Consider nutrition services referral to assist patient with adequate nutrition and appropriate food choices  Outcome: Progressing  Goal: Maintains or returns to baseline bowel function  Description: INTERVENTIONS:  - Assess bowel function  - Encourage oral fluids to ensure adequate hydration  - Administer IV fluids if ordered to ensure adequate hydration  - Administer ordered medications as needed  - Encourage mobilization and activity  - Consider nutritional services referral to assist patient with adequate nutrition and appropriate food choices  Outcome: Progressing  Goal: Maintains adequate nutritional intake  Description: INTERVENTIONS:  - Monitor percentage of each meal consumed  - Identify factors contributing to decreased intake, treat as appropriate  - Assist with meals as needed  - Monitor I&O, weight, and lab values if indicated  - Obtain nutrition services referral as needed  Outcome: Progressing  Goal: Establish and maintain optimal ostomy function  Description: INTERVENTIONS:  - Assess bowel function  - Encourage oral fluids to ensure adequate hydration  - Administer IV fluids if ordered to ensure adequate hydration   - Administer ordered  medications as needed  - Encourage mobilization and activity  - Nutrition services referral to assist patient with appropriate food choices  - Assess stoma site  - Consider wound care consult   Outcome: Progressing  Goal: Oral mucous membranes remain intact  Description: INTERVENTIONS  - Assess oral mucosa and hygiene practices  - Implement preventative oral hygiene regimen  - Implement oral medicated treatments as ordered  - Initiate Nutrition services referral as needed  Outcome: Progressing     Problem: Prexisting or High Potential for Compromised Skin Integrity  Goal: Skin integrity is maintained or improved  Description: INTERVENTIONS:  - Identify patients at risk for skin breakdown  - Assess and monitor skin integrity  - Assess and monitor nutrition and hydration status  - Monitor labs   - Assess for incontinence   - Turn and reposition patient  - Assist with mobility/ambulation  - Relieve pressure over bony prominences  - Avoid friction and shearing  - Provide appropriate hygiene as needed including keeping skin clean and dry  - Evaluate need for skin moisturizer/barrier cream  - Collaborate with interdisciplinary team   - Patient/family teaching  - Consider wound care consult   Outcome: Progressing

## 2024-02-13 NOTE — ASSESSMENT & PLAN NOTE
Not in acute exacerbation at this time  Home regimen  Advair twice daily  Albuterol nebulizer as needed

## 2024-02-13 NOTE — PROGRESS NOTES
[facility]  Progress Note  Name: Kathi Ferris I  MRN: [id number]  Unit/Bed#: [facility]-01 I Date of Admission: 2/12/2024   Date of Service: 2/13/2024 I Hospital Day: 1    Assessment/Plan   * Acute pulmonary embolism (HCC)  Assessment & Plan  Presents due to worsening shortness of breath since night of 2/11  Known non-small cell lung cancer and COPD.  On baseline 3 L nasal cannula > 95%  CTA chest  Small filling defect in the right interlobar pulmonary artery presumably extending to the resection margin of prior right lower lobectomy; the middle lobe remains perfused. Measured RV/LV ratio is within normal limits at less than 0.9.  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  Echo showed LVEF 65% diastolic dysfunction 1, right ventricle normal, mild pulmonary hypertension  Patient has stable vital signs.  Was started on heparin drip, will discontinue and start patient on Eliquis 10 mg twice daily for 7 days and continue with 5 twice daily patient .will need to follow-up with hematology oncology  Pulmonology was consulted, appreciate recommendations  Eliquis was price checked will cost $30 a month  Discussed with the patient about medication side effect, patient agreeable to start Eliquis    Diarrhea  Assessment & Plan  Patient reported watery diarrhea in the past 4 days.  Started gemcitabine chemotherapy last week  C. difficile, stool bacterial negative  Suspect diarrhea is chemotherapy related  Will start Imodium    Elevated troponin  Assessment & Plan  Troponin 160, 183,155  2 hr delta 23  Received 324 mg aspirin in the ER   Denies CP. Presented due to worsening SOB. Found to have PE.   Per CT report RV/LV < 0.9. Does not appear to be causing heart strain  Non-MI troponin elevation due to PE    Chronic respiratory failure with hypoxia  (HCC)  Assessment & Plan  Chronically on 2-3 L nasal cannula in the setting of COPD and stage IV non-small cell lung cancer    SIRS (systemic inflammatory response syndrome) (HCC)  Assessment & Plan  SIRS: tachycardia + tachypnea   On exam patient without tachypnea. Per patient SOB constant but worse with exertion.   HR into the 120s. Prescribed metoprolol and cardizem. Did not take on 2/12 due to nausea at home.   Restart home medications   Denies fevers or chills.  CTA chest negative for infection   covid/flu/rsv negative   Reports ongoing loose stools    cdiff and stool panel negative   Do not suspect infection at this time. Hold off on starting abx at this time.     Hyponatremia  Assessment & Plan  Recent Labs     02/12/24  1727 02/13/24  0456   SODIUM 129* 135      Sodium 129  Nausea over the past couple of days. Decreased intake.  Also with ongoing loose stool  Prior history of hyponatremia.  Over the past month (131-134)  Possibly related to cancer and or Gemcitabine chemotherapy   Last infusion 2/8/24  Oncology consult   Resolved with 250 bolus normal saline  Monitor    Non-small cell cancer of right lung (HCC)  Assessment & Plan  Stage IV  Follows with Dr. Phipps   Last seen on 1/29/2024.  Patient's regimen was switched to gemcitabine due to her developing an SBO in January.  Gemcitabine started on 2/6/2024.  Not with curative intent.   Per notes patient is to follow-up with Todd Negreet to see if any options for clinical trials  Pleura cath in place. Gets drained 2x weekly. Last drained 2/12. 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  Oncology was consulted, appreciate recommendations.  Patient will follow-up with hematology oncology outpatient    Essential hypertension  Assessment & Plan  Home regimen:  Micardis 80 mg daily  Verapamil 360 mg  daily at bedtime  Metoprolol tartrate 25 mg twice daily  Normotensive thus far.     COPD (chronic obstructive pulmonary disease) ([facility])  Assessment & Plan  Not in acute exacerbation at this time  Home regimen  Advair twice daily  Albuterol nebulizer as needed             VTE Pharmacologic Prophylaxis: VTE Score: 3 Moderate Risk (Score 3-4) - Pharmacological DVT Prophylaxis Ordered: apixaban (Eliquis).    Mobility:   Basic Mobility Inpatient Raw Score: 19  JH-HLM Goal: 6: Walk 10 steps or more  JH-HLM Achieved: 7: Walk 25 feet or more  HLM Goal achieved. Continue to encourage appropriate mobility.    Patient Centered Rounds: I performed bedside rounds with nursing staff today.   Discussions with Specialists or Other Care Team Provider: cm, pulmonology    Education and Discussions with Family / Patient: Patient declined call to .     Total Time Spent on Date of Encounter in care of patient: 60 mins. This time was spent on one or more of the following: performing physical exam; counseling and coordination of care; obtaining or reviewing history; documenting in the medical record; reviewing/ordering tests, medications or procedures; communicating with other healthcare professionals and discussing with patient's family/caregivers.    Current Length of Stay: 1 day(s)  Current Patient Status: Inpatient   Certification Statement: The patient will continue to require additional inpatient hospital stay due to diarrhea, pe   Discharge Plan: Anticipate discharge tomorrow to home.    Code Status: Level 3 - DNAR and DNI    Subjective:     Is feeling well. Still with diarrhea. Sob is at baseline. Weaker than she normally is     Objective:     Vitals:   Temp (24hrs), Av °F (36.7 °C), Min:97.8 °F (36.6 °C), Max:98.2 °F (36.8 °C)    Temp:  [97.8 °F (36.6 °C)-98.2 °F (36.8 °C)] 97.8 °F (36.6 °C)  HR:  [] 76  Resp:  [16-23] 18  BP: (106-141)/(56-85) 132/85  SpO2:  [97 %-99 %] 97 %  Body mass index is 30.12 kg/m².      Input and Output Summary (last 24 hours):     Intake/Output Summary (Last 24 hours) at 2/13/2024 1658  Last data filed at 2/13/2024 0801  Gross per 24 hour   Intake 1077.04 ml   Output 300 ml   Net 777.04 ml       Physical Exam:   Physical Exam  Vitals and nursing note reviewed.   Constitutional:       General: She is not in acute distress.     Appearance: She is not diaphoretic.   HENT:      Head: Normocephalic.   Eyes:      General: No scleral icterus.        Right eye: No discharge.         Left eye: No discharge.   Cardiovascular:      Rate and Rhythm: Normal rate and regular rhythm.      Heart sounds: No murmur heard.  Pulmonary:      Effort: Pulmonary effort is normal. No respiratory distress.      Breath sounds: Normal breath sounds. No wheezing, rhonchi or rales.   Abdominal:      General: There is no distension.      Palpations: Abdomen is soft.      Tenderness: There is no abdominal tenderness. There is no guarding or rebound.   Musculoskeletal:      Cervical back: Normal range of motion.      Right lower leg: No edema.      Left lower leg: No edema.   Skin:     General: Skin is warm.      Coloration: Skin is pale.   Neurological:      Mental Status: She is alert and oriented to person, place, and time.   Psychiatric:         Mood and Affect: Mood normal.         Behavior: Behavior normal.         Thought Content: Thought content normal.         Judgment: Judgment normal.          Additional Data:     Labs:  Results from last 7 days   Lab Units 02/13/24  0456   WBC Thousand/uL 8.15   HEMOGLOBIN g/dL 8.9*   HEMATOCRIT % 28.3*   PLATELETS Thousands/uL 377   NEUTROS PCT % 93*   LYMPHS PCT % 4*   MONOS PCT % 1*   EOS PCT % 1     Results from last 7 days   Lab Units 02/13/24  0456 02/12/24  1727   SODIUM mmol/L 135 129*   POTASSIUM mmol/L 3.5 4.0   CHLORIDE mmol/L 98 94*   CO2 mmol/L 25 23   BUN mg/dL 19 21   CREATININE mg/dL 0.51* 0.67   ANION GAP mmol/L 12 12   CALCIUM mg/dL 9.2 9.5   ALBUMIN g/dL  --   3.0*   TOTAL BILIRUBIN mg/dL  --  0.51   ALK PHOS U/L  --  105*   ALT U/L  --  14   AST U/L  --  28   GLUCOSE RANDOM mg/dL 88 97     Results from last 7 days   Lab Units 02/12/24  2355   INR  0.99                   Lines/Drains:  Invasive Devices       Central Venous Catheter Line  Duration             Port A Cath 04/18/23 Right Subclavian 301 days              Peripheral Intravenous Line  Duration             Peripheral IV 02/12/24 Dorsal (posterior);Left Forearm <1 day    Peripheral IV 02/12/24 Left Antecubital <1 day              Drain  Duration             Pleural Effusion Long-Term Catheter 15.5 Fr. 294 days                    Central Line:  Goal for removal: N/A - Chronic PICC         Telemetry:  Telemetry Orders (From admission, onward)               24 Hour Telemetry Monitoring  (ED Bridging Orders Panel)  Continuous x 24 Hours (Telem)        Expiring   Question:  Reason for 24 Hour Telemetry  Answer:  Pulmonary Embolism                     Telemetry Reviewed: Normal Sinus Rhythm  Indication for Continued Telemetry Use: No indication for continued use. Will discontinue.              Imaging: Reviewed radiology reports from this admission including: chest CT scan    Recent Cultures (last 7 days):   Results from last 7 days   Lab Units 02/13/24  0250   C DIFF TOXIN B BY PCR  Negative       Last 24 Hours Medication List:   Current Facility-Administered Medications   Medication Dose Route Frequency Provider Last Rate    acetaminophen  650 mg Oral Q6H PRN Ronel Menezes PA-C      albuterol  2 puff Inhalation Q4H PRN Ronel Menezes PA-C      albuterol  2.5 mg Nebulization Q6H PRN Ronel Menezes PA-C      apixaban  10 mg Oral BID Johanna Atkinson MD      atorvastatin  20 mg Oral After Dinner Ronel Menezes PA-C      buPROPion  300 mg Oral Daily Ronel Menezes PA-C      cholecalciferol  1,000 Units Oral Daily Ronel Menezes PA-C      fluticasone-vilanterol  1 puff Inhalation Daily Ronel Menezes PA-C      furosemide  20 mg Oral QAM  Ronel Menezes PA-C      loperamide  2 mg Oral 4x Daily PRN Johanna Atkinson MD      LORazepam  0.5 mg Oral Daily PRN Ronel Menezes PA-C      losartan  100 mg Oral Daily Ronel Menezes PA-C      metoprolol tartrate  25 mg Oral Q12H [facility] Ronel Menezes PA-C      multivitamin-minerals  1 tablet Oral Daily Ronel Menezes PA-C      potassium chloride  40 mEq Oral Daily Ronel Menezes PA-C      saccharomyces boulardii  250 mg Oral BID Ronel Menezes PA-C      sertraline  200 mg Oral Daily Ronel Menezes PA-C      verapamil  360 mg Oral HS Ronel Menezes PA-C          Today, Patient Was Seen By: Johanna Atkinson MD    **Please Note: This note may have been constructed using a voice recognition system.**

## 2024-02-13 NOTE — ASSESSMENT & PLAN NOTE
Troponin 160, 183,155  2 hr delta 23  Received 324 mg aspirin in the ER   Denies CP. Presented due to worsening SOB. Found to have PE.   Per CT report RV/LV < 0.9. Does not appear to be causing heart strain  Non-MI troponin elevation due to PE

## 2024-02-13 NOTE — H&P
[facility]  H&P  Name: Kathi Ferris 63 y.o. female I MRN: [id number]  Unit/Bed#: [facility] I Date of Admission: 2/12/2024   Date of Service: 2/13/2024 I Hospital Day: 1      Assessment/Plan   * Acute pulmonary embolism (HCC)  Assessment & Plan  Presents due to worsening shortness of breath since night of 2/11  Known non-small cell lung cancer and COPD.  On baseline 3 L nasal cannula > 95%  CTA chest  Small filling defect in the right interlobar pulmonary artery presumably extending to the resection margin of prior right lower lobectomy; the middle lobe remains perfused. Measured RV/LV ratio is within normal limits at less than 0.9.  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  Start IV heparin drip   Obtain ECHO  Telemetry   Consult Pulmonology     SIRS (systemic inflammatory response syndrome) (HCC)  Assessment & Plan  SIRS: tachycardia + tachypnea   On exam patient without tachypnea. Per patient SOB constant but worse with exertion.   HR into the 120s. Prescribed metoprolol and cardizem. Did not take on 2/12 due to nausea at home.   Restart home medications   Denies fevers or chills.  CTA chest negative for infection   Obtain covid/flu/rsv   Reports ongoing loose stools   Obtain cdiff and stool panel as a precaution  Do not suspect infection at this time. Hold off on starting abx at this time.     Hyponatremia  Assessment & Plan  Sodium 129  Nausea over the past couple of days. Decreased intake.  Also with ongoing loose stool  Prior history of hyponatremia.  Over the past month (131-134)  Possibly related to cancer and or Gemcitabine chemotherapy   Last infusion 2/8/24  Oncology consult   Order 250 ml bolus of NS.   BMP ordered for am.     Elevated troponin  Assessment & Plan  Troponin 160, 183, 4 hr trop pending   2 hr delta 23  Received 324 mg  aspirin in the ER   Denies CP. Presented due to worsening SOB. Found to have PE.   Per CT report RV/LV < 0.9. Does not appear to be causing heart strain  IV heparin   Telemetry   Cardiology consult     Non-small cell cancer of right lung (HCC)  Assessment & Plan  Stage IV  Follows with Dr. Phipps   Last seen on 1/29/2024.  Patient's regimen was switched to gemcitabine due to her developing an SBO in January.  Gemcitabine started on 2/6/2024.  Not with curative intent.   Per notes patient is to follow-up with Todd Negrete to see if any options for clinical trials  Pleura cath in place. Gets drained 2x weekly. Last drained 2/12. 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  Consult oncology.     Chronic respiratory failure with hypoxia (HCC)  Assessment & Plan  Chronically on 2-3 L nasal cannula in the setting of COPD and stage IV non-small cell lung cancer    Essential hypertension  Assessment & Plan  Home regimen:  Micardis 80 mg daily  Verapamil 360 mg daily at bedtime  Metoprolol tartrate 25 mg twice daily  Normotensive thus far.     COPD (chronic obstructive pulmonary disease) (HCC)  Assessment & Plan  Not in acute exacerbation at this time  Home regimen  Advair twice daily  Albuterol nebulizer as needed         VTE Pharmacologic Prophylaxis: VTE Score: 3 Moderate Risk (Score 3-4) - Pharmacological DVT Prophylaxis Ordered: heparin drip.  Code Status: Level 3 - DNAR and DNI   Discussion with family: Patient declined call to .     Anticipated Length of Stay: Patient will be admitted on an inpatient basis with an anticipated length of stay of greater than 2 midnights secondary to PE.    Total Time Spent on Date of Encounter in care of patient: 55 mins. This time was spent on one or more of the following: performing physical exam; counseling  and coordination of care; obtaining or reviewing history; documenting in the medical record; reviewing/ordering tests, medications or procedures; communicating with other healthcare professionals and discussing with patient's family/caregivers.    Chief Complaint: Short of breath     History of Present Illness:  Kathi Ferris is a 63 y.o. female with a PMH of non-small cell lung cancer, hypertension, chronic respiratory failure who presents from home due to worsening shortness of breath since night of 2/11.  Known non-small cell lung cancer and COPD.  On her baseline 2 to 3 L nasal cannula.  CTA of the chest performed in the ER showing acute PE.  Patient denies prior PE or current blood thinner use.  Denies chest pain, fevers or chill.  Started on IV heparin drip.     Patient also noted to be hyponatremic.  Patient reports poor oral intake and nausea over the past couple of days.  New chemotherapy infusion on 2/8/2024.  Also with ongoing loose stool.     Review of Systems:  Review of Systems   Constitutional:  Negative for fatigue and fever.   HENT:  Negative for sore throat.    Respiratory:  Positive for cough (dry (chronic)) and shortness of breath. Negative for chest tightness.    Cardiovascular:  Negative for chest pain.   Gastrointestinal:  Positive for diarrhea (loose stools chronic) and nausea. Negative for abdominal distention, abdominal pain and vomiting.   Genitourinary:  Negative for difficulty urinating.   Musculoskeletal:  Negative for arthralgias.   Neurological:  Negative for weakness and headaches.   Psychiatric/Behavioral:  Negative for agitation and behavioral problems.    All other systems reviewed and are negative.      Past Medical and Surgical History:   Past Medical History:   Diagnosis Date    Cancer (HCC)     COPD (chronic obstructive pulmonary disease) (HCC)     Emphysema of lung (HCC)     Hyperlipidemia     Hypertension     Lung cancer (HCC) 06/26/2019    right lower lobe removed     Lung nodule     Pleural effusion        Past Surgical History:   Procedure Laterality Date    APPENDECTOMY      BREAST BIOPSY Right     benign    BRONCHOSCOPY  06/2019    COLON SURGERY      HYSTERECTOMY      age 46    IR PLEURAL EFFUSION LONG-TERM CATHETER PLACEMENT  04/25/2023    IR PORT PLACEMENT  04/18/2023    IR THORACENTESIS  03/24/2023    IR THORACENTESIS  04/12/2023    IR THORACENTESIS  04/18/2023    LUNG BIOPSY  06/2019    LUNG LOBECTOMY      OOPHORECTOMY Bilateral     age 46       Meds/Allergies:  Prior to Admission medications    Medication Sig Start Date End Date Taking? Authorizing Provider   albuterol (2.5 mg/3 mL) 0.083 % nebulizer solution Take 3 mL (2.5 mg total) by nebulization every 6 (six) hours as needed for wheezing or shortness of breath 5/2/23   Shani Ricketts, DO   albuterol (PROVENTIL HFA,VENTOLIN HFA) 90 mcg/act inhaler Inhale 2 puffs every 4 (four) hours 8/6/19   Historical Provider, MD   Ascorbic Acid (vitamin C) 1000 MG tablet 1 daily    Historical Provider, MD   atorvastatin (LIPITOR) 20 mg tablet Take 20 mg by mouth daily.    Historical Provider, MD   Biotin 26578 MCG TABS 1 daily    Historical Provider, MD   buPROPion (WELLBUTRIN XL) 300 mg 24 hr tablet TAKE 1 TABLET BY MOUTH EVERY DAY IN THE MORNING FOR 90 DAYS 5/27/23   Historical Provider, MD   Cholecalciferol (Vitamin D3) 25 MCG (1000 UT) CAPS 1 capsule every 24 hours    Historical Provider, MD   dexamethasone sodium phosphate 0.1 % ophthalmic solution Administer 1 drop to both eyes every 3 (three) hours as needed (eye irritation)  Patient not taking: Reported on 1/21/2024 8/24/23   Shani Phipps,    diphenhydrAMINE (BENADRYL) 50 MG tablet Take 1 tablet (50 mg total) by mouth every 6 (six) hours as needed for itching (Rash) 6/16/20   Tomas Ratliff, DO   diphenhydrAMINE-APAP, sleep, (TYLENOL PM EXTRA STRENGTH PO) Take 325 mg by mouth daily at bedtime    Historical Provider, MD   Fluticasone-Salmeterol (Advair) 500-50  mcg/dose inhaler Inhale 1 puff 2 (two) times a day. Indications: Asthma 1/30/24   Historical Provider, MD   furosemide (LASIX) 20 mg tablet TAKE 1 TABLET BY MOUTH EVERY DAY IN THE MORNING 2/12/24   Keyon Miller MD   lidocaine-prilocaine (EMLA) cream APPLY TO PORT 30 TO 60 MINUTES PRIOR TO USE 9/7/23   [facility] [place]   LORazepam (Ativan) 0.5 mg tablet Take 1 tablet (0.5 mg total) by mouth daily as needed for anxiety 10/6/23   Keyon Miller MD   metoprolol tartrate (LOPRESSOR) 25 mg tablet Take 1 tablet (25 mg total) by mouth every 12 (twelve) hours 12/20/23 6/17/24  Keyon Miller MD   Multiple Vitamin (MULTIVITAMINS PO) every 24 hours    Historical Provider, MD   oxygen gas Inhale 2 L/min continuous. May increase to 3L PRN for dyspnea and sats under 89%  Indications: Hypoxia 10/25/23   Historical Provider, MD   polyethylene glycol (GLYCOLAX) 17 GM/SCOOP powder Take 17 g by mouth daily as needed (constipation) 1/27/24 2/26/24  Noman Stauffer MD   potassium chloride (Klor-Con M20) 20 mEq tablet Take 2 tablets (40 mEq total) by mouth daily 1/27/24   Noman Stauffer MD   Probiotic Product (PROBIOTIC DAILY PO) 1 daily    Historical Provider, MD   Sennosides (Senna) 8.6 MG CAPS Take 2 capsules by mouth as needed (constipation) 5/1/23   Historical Provider, MD   sertraline (ZOLOFT) 100 mg tablet 2 daily    Historical Provider, MD   telmisartan (MICARDIS) 80 MG tablet TAKE 1 TABLET BY MOUTH EVERY DAY 2/12/24   Keyon Miller MD   verapamil (CALAN-SR) 120 mg CR tablet Take 1 tablet (120 mg total) by mouth daily at bedtime 1 hs 6/8/23   Keyon Miller MD   verapamil (CALAN-SR) 240 mg CR tablet Take 1 tablet (240 mg total) by mouth daily at bedtime 1 hs 6/8/23   Keyon Miller MD   furosemide (LASIX) 20 mg tablet Take 1 tablet (20 mg total) by mouth every morning 8/22/23 2/12/24  Keyon Miller MD   telmisartan (MICARDIS) 80 MG tablet Take 1 tablet (80 mg total) by mouth daily 8/22/23 2/12/24  Keyon Miller MD     I have  "reviewed home medications with patient personally.    Allergies:   Allergies   Allergen Reactions    Amoxicillin Hives    Vilazodone Hcl Nausea Only and Dizziness     (Viibryd)    Wasp Venom Swelling    Zithromax [Azithromycin] Hives       Social History:  Marital Status: /Civil Union   Occupation: unknown   Patient Pre-hospital Living Situation: Home  Patient Pre-hospital Level of Mobility: walks  Patient Pre-hospital Diet Restrictions: regular   Substance Use History:   Social History     Substance and Sexual Activity   Alcohol Use Not Currently    Comment: Rarely drink, socially only     Social History     Tobacco Use   Smoking Status Former    Current packs/day: 0.00    Average packs/day: 1.5 packs/day for 53.2 years (79.8 ttl pk-yrs)    Types: Cigarettes    Start date:     Quit date: 3/15/2023    Years since quittin.9    Passive exposure: Past   Smokeless Tobacco Never   Tobacco Comments    Patient quit 1 year ago     Social History     Substance and Sexual Activity   Drug Use No    Comment: has her medical marijuana card but does not like the way it makes her feel, she does not use it       Family History:  Family History   Problem Relation Age of Onset    Colon cancer Mother     Hypertension Mother     Hyperlipidemia Mother     Hearing loss Mother     Depression Mother     COPD Mother     Cancer Mother         Lung    Arthritis Mother     Lung cancer Mother     Drug abuse Brother     Diabetes Maternal Grandmother     Cancer Maternal Grandmother         Colon    Colon cancer Maternal Grandmother     Cancer Maternal Aunt     Colon cancer Maternal Aunt     Colon cancer Maternal Aunt     Cancer Maternal Aunt         Colon       Physical Exam:     Vitals:   Blood Pressure: 130/81 (247)  Pulse: (!) 108 (24)  Temperature: 97.9 °F (36.6 °C) (24)  Temp Source: Oral (24 1613)  Respirations: 18 (24)  Height: 5' 5\" (165.1 cm) (24 2320)  Weight - Scale: " 82.4 kg (181 lb 10.5 oz) (02/12/24 2320)  SpO2: 97 % (02/12/24 2357)    Physical Exam  Vitals and nursing note reviewed.   Constitutional:       Appearance: Normal appearance. She is ill-appearing (chronic).   HENT:      Head: Normocephalic.   Eyes:      Extraocular Movements: Extraocular movements intact.      Pupils: Pupils are equal, round, and reactive to light.   Cardiovascular:      Rate and Rhythm: Normal rate and regular rhythm.      Heart sounds: No murmur heard.  Pulmonary:      Effort: Pulmonary effort is normal. No respiratory distress.      Breath sounds: Normal breath sounds. No wheezing.   Abdominal:      General: Bowel sounds are normal. There is distension.      Tenderness: There is no abdominal tenderness. There is no guarding.   Musculoskeletal:         General: Normal range of motion.      Cervical back: Normal range of motion.      Right lower leg: No edema.      Left lower leg: No edema.   Skin:     General: Skin is warm.   Neurological:      General: No focal deficit present.      Mental Status: She is alert and oriented to person, place, and time.   Psychiatric:         Mood and Affect: Mood normal.         Behavior: Behavior normal.         Thought Content: Thought content normal.          Additional Data:     Lab Results:  Results from last 7 days   Lab Units 02/12/24  1727 02/06/24  0937   WBC Thousand/uL 11.17* 9.82   HEMOGLOBIN g/dL 8.9* 9.9*   HEMATOCRIT % 27.7* 31.5*   PLATELETS Thousands/uL 430* 456*   NEUTROS PCT %  --  85*   LYMPHS PCT %  --  8*   LYMPHO PCT % 1*  --    MONOS PCT %  --  6   MONO PCT % 0*  --    EOS PCT % 0 0     Results from last 7 days   Lab Units 02/12/24  1727   SODIUM mmol/L 129*   POTASSIUM mmol/L 4.0   CHLORIDE mmol/L 94*   CO2 mmol/L 23   BUN mg/dL 21   CREATININE mg/dL 0.67   ANION GAP mmol/L 12   CALCIUM mg/dL 9.5   ALBUMIN g/dL 3.0*   TOTAL BILIRUBIN mg/dL 0.51   ALK PHOS U/L 105*   ALT U/L 14   AST U/L 28   GLUCOSE RANDOM mg/dL 97                        Lines/Drains:  Invasive Devices       Central Venous Catheter Line  Duration             Port A Cath 04/18/23 Right Subclavian 301 days              Peripheral Intravenous Line  Duration             Peripheral IV 02/12/24 Dorsal (posterior);Left Forearm <1 day    Peripheral IV 02/12/24 Left Antecubital <1 day              Drain  Duration             Pleural Effusion Long-Term Catheter 15.5 Fr. 293 days                    Central Line:  Goal for removal: N/A - Chronic PICC           Imaging: Reviewed radiology reports from this admission including: chest CT scan  CTA ED chest PE study   Final Result by Chato Guerrero MD (02/12 2146)      1.  Small filling defect in the right interlobar pulmonary artery presumably extending to the resection margin of prior right lower lobectomy; the middle lobe remains perfused. Measured RV/LV ratio is within normal limits at less than 0.9.   2.  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      The study was marked in EPIC for immediate notification.      Workstation performed: [id number]         XR chest 1 view portable   ED Interpretation by Tom Lindsay DO (02/12 1739)   Decreased right pleural effusion when compared to previous          EKG and Other Studies Reviewed on Admission:   EKG:  Accelerated junctional, 118.    ** Please Note: This note has been constructed using a voice recognition system. **

## 2024-02-13 NOTE — PLAN OF CARE
Problem: Potential for Falls  Goal: Patient will remain free of falls  Description: INTERVENTIONS:  - Educate patient/family on patient safety including physical limitations  - Instruct patient to call for assistance with activity   - Consult OT/PT to assist with strengthening/mobility   - Keep Call bell within reach  - Keep bed low and locked with side rails adjusted as appropriate  - Keep care items and personal belongings within reach  - Initiate and maintain comfort rounds  - Make Fall Risk Sign visible to staff  - Offer Toileting every 2 Hours, in advance of need  - Initiate/Maintain bed alarm  - Obtain necessary fall risk management equipment: socks  - Apply yellow socks and bracelet for high fall risk patients  - Consider moving patient to room near nurses station  2/13/2024 0228 by Vianey Hansen  Outcome: Progressing  2/13/2024 0227 by Vianey Hansen  Outcome: Progressing     Problem: PAIN - ADULT  Goal: Verbalizes/displays adequate comfort level or baseline comfort level  Description: Interventions:  - Encourage patient to monitor pain and request assistance  - Assess pain using appropriate pain scale  - Administer analgesics based on type and severity of pain and evaluate response  - Implement non-pharmacological measures as appropriate and evaluate response  - Consider cultural and social influences on pain and pain management  - Notify physician/advanced practitioner if interventions unsuccessful or patient reports new pain  2/13/2024 0228 by Vianey Hansen  Outcome: Progressing  2/13/2024 0227 by Vianey Hansen  Outcome: Progressing     Problem: INFECTION - ADULT  Goal: Absence or prevention of progression during hospitalization  Description: INTERVENTIONS:  - Assess and monitor for signs and symptoms of infection  - Monitor lab/diagnostic results  - Monitor all insertion sites, i.e. indwelling lines, tubes, and drains  - Monitor endotracheal if appropriate and nasal secretions for changes in  amount and color  - [place] appropriate cooling/warming therapies per order  - Administer medications as ordered  - Instruct and encourage patient and family to use good hand hygiene technique  - Identify and instruct in appropriate isolation precautions for identified infection/condition  2/13/2024 0228 by Vianey Hansen  Outcome: Progressing  2/13/2024 0227 by Vianey Hansen  Outcome: Progressing  Goal: Absence of fever/infection during neutropenic period  Description: INTERVENTIONS:  - Monitor WBC    2/13/2024 0228 by Vianey Hansen  Outcome: Progressing  2/13/2024 0227 by Vianey Hansen  Outcome: Progressing     Problem: SAFETY ADULT  Goal: Patient will remain free of falls  Description: INTERVENTIONS:  - Educate patient/family on patient safety including physical limitations  - Instruct patient to call for assistance with activity   - Consult OT/PT to assist with strengthening/mobility   - Keep Call bell within reach  - Keep bed low and locked with side rails adjusted as appropriate  - Keep care items and personal belongings within reach  - Initiate and maintain comfort rounds  - Make Fall Risk Sign visible to staff  - Offer Toileting every 2 Hours, in advance of need  - Initiate/Maintain bed alarm  - Obtain necessary fall risk management equipment: socks  - Apply yellow socks and bracelet for high fall risk patients  - Consider moving patient to room near nurses station  2/13/2024 0228 by Vianey Hansen  Outcome: Progressing  2/13/2024 0227 by Vianey Hansen  Outcome: Progressing  Goal: Maintain or return to baseline ADL function  Description: INTERVENTIONS:  - Educate patient/family on patient safety including physical limitations  - Instruct patient to call for assistance with activity   - Consult OT/PT to assist with strengthening/mobility   - Keep Call bell within reach  - Keep bed low and locked with side rails adjusted as appropriate  - Keep care items and personal belongings within  reach  - Initiate and maintain comfort rounds  - Make Fall Risk Sign visible to staff  - Offer Toileting every 2 Hours, in advance of need  - Initiate/Maintain bed alarm  - Obtain necessary fall risk management equipment socks  - Apply yellow socks and bracelet for high fall risk patients  - Consider moving patient to room near nurses station  2/13/2024 0228 by Vianey Hansen  Outcome: Progressing  2/13/2024 0227 by Vianey Hansen  Outcome: Progressing  Goal: Maintains/Returns to pre admission functional level  Description: INTERVENTIONS:  - Perform AM-PAC 6 Click Basic Mobility/ Daily Activity assessment daily.  - Set and communicate daily mobility goal to care team and patient/family/caregiver.   - Collaborate with rehabilitation services on mobility goals if consulted  - Perform Range of Motion 3 times a day.  - Reposition patient every 2 hours.  - Dangle patient 3 times a day  - Stand patient 3 times a day  - Ambulate patient 3 times a day  - Out of bed to chair 3 times a day   - Out of bed for meals 3 times a day  - Out of bed for toileting  - Record patient progress and toleration of activity level   2/13/2024 0228 by Vianey Hansen  Outcome: Progressing  2/13/2024 0227 by Vianey Hansen  Outcome: Progressing     Problem: DISCHARGE PLANNING  Goal: Discharge to home or other facility with appropriate resources  Description: INTERVENTIONS:  - Identify barriers to discharge w/patient and caregiver  - Arrange for needed discharge resources and transportation as appropriate  - Identify discharge learning needs (meds, wound care, etc.)  - Arrange for interpretive services to assist at discharge as needed  - Refer to Case Management Department for coordinating discharge planning if the patient needs post-hospital services based on physician/advanced practitioner order or complex needs related to functional status, cognitive ability, or social support system  2/13/2024 0228 by Vianey Hansen  Outcome:  Progressing  2/13/2024 0227 by Vianey Hansen  Outcome: Progressing     Problem: Knowledge Deficit  Goal: Patient/family/caregiver demonstrates understanding of disease process, treatment plan, medications, and discharge instructions  Description: Complete learning assessment and assess knowledge base.  Interventions:  - Provide teaching at level of understanding  - Provide teaching via preferred learning methods  2/13/2024 0228 by Vianey Hansen  Outcome: Progressing  2/13/2024 0227 by Vianey Hansen  Outcome: Progressing     Problem: RESPIRATORY - ADULT  Goal: Achieves optimal ventilation and oxygenation  Description: INTERVENTIONS:  - Assess for changes in respiratory status  - Assess for changes in mentation and behavior  - Position to facilitate oxygenation and minimize respiratory effort  - Oxygen administered by appropriate delivery if ordered  - Initiate smoking cessation education as indicated  - Encourage broncho-pulmonary hygiene including cough, deep breathe, Incentive Spirometry  - Assess the need for suctioning and aspirate as needed  - Assess and instruct to report SOB or any respiratory difficulty  - Respiratory Therapy support as indicated  2/13/2024 0228 by Vianey Hansen  Outcome: Progressing  2/13/2024 0227 by Vianey Hansen  Outcome: Progressing     Problem: GASTROINTESTINAL - ADULT  Goal: Minimal or absence of nausea and/or vomiting  Description: INTERVENTIONS:  - Administer IV fluids if ordered to ensure adequate hydration  - Maintain NPO status until nausea and vomiting are resolved  - Nasogastric tube if ordered  - Administer ordered antiemetic medications as needed  - Provide nonpharmacologic comfort measures as appropriate  - Advance diet as tolerated, if ordered  - Consider nutrition services referral to assist patient with adequate nutrition and appropriate food choices  2/13/2024 0228 by Vianey Hansen  Outcome: Progressing  2/13/2024 0227 by Vianey Hansen  Outcome:  Progressing  Goal: Maintains or returns to baseline bowel function  Description: INTERVENTIONS:  - Assess bowel function  - Encourage oral fluids to ensure adequate hydration  - Administer IV fluids if ordered to ensure adequate hydration  - Administer ordered medications as needed  - Encourage mobilization and activity  - Consider nutritional services referral to assist patient with adequate nutrition and appropriate food choices  2/13/2024 0228 by Vianey Hansen  Outcome: Progressing  2/13/2024 0227 by Vianey Hansen  Outcome: Progressing  Goal: Maintains adequate nutritional intake  Description: INTERVENTIONS:  - Monitor percentage of each meal consumed  - Identify factors contributing to decreased intake, treat as appropriate  - Assist with meals as needed  - Monitor I&O, weight, and lab values if indicated  - Obtain nutrition services referral as needed  2/13/2024 0228 by Vianey Hansen  Outcome: Progressing  2/13/2024 0227 by Vianey Hansen  Outcome: Progressing  Goal: Establish and maintain optimal ostomy function  Description: INTERVENTIONS:  - Assess bowel function  - Encourage oral fluids to ensure adequate hydration  - Administer IV fluids if ordered to ensure adequate hydration   - Administer ordered medications as needed  - Encourage mobilization and activity  - Nutrition services referral to assist patient with appropriate food choices  - Assess stoma site  - Consider wound care consult   2/13/2024 0228 by Vianey Hansen  Outcome: Progressing  2/13/2024 0227 by Vianey Hansen  Outcome: Progressing  Goal: Oral mucous membranes remain intact  Description: INTERVENTIONS  - Assess oral mucosa and hygiene practices  - Implement preventative oral hygiene regimen  - Implement oral medicated treatments as ordered  - Initiate Nutrition services referral as needed  2/13/2024 0228 by Vianey Hansen  Outcome: Progressing  2/13/2024 0227 by Vianey Hansen  Outcome: Progressing

## 2024-02-14 ENCOUNTER — HOSPITAL ENCOUNTER (OUTPATIENT)
Dept: INFUSION CENTER | Facility: HOSPITAL | Age: 64
Discharge: HOME | End: 2024-02-14

## 2024-02-14 VITALS
WEIGHT: 181 LBS | OXYGEN SATURATION: 95 % | RESPIRATION RATE: 16 BRPM | SYSTOLIC BLOOD PRESSURE: 124 MMHG | BODY MASS INDEX: 30.16 KG/M2 | TEMPERATURE: 97.8 F | HEART RATE: 96 BPM | HEIGHT: 65 IN | DIASTOLIC BLOOD PRESSURE: 71 MMHG

## 2024-02-14 PROBLEM — R26.2 AMBULATORY DYSFUNCTION: Status: ACTIVE | Noted: 2024-02-14

## 2024-02-14 LAB
ANION GAP SERPL CALCULATED.3IONS-SCNC: 9 MMOL/L
ATRIAL RATE: 37 BPM
BUN SERPL-MCNC: 19 MG/DL (ref 5–25)
CALCIUM SERPL-MCNC: 9.3 MG/DL (ref 8.4–10.2)
CHLORIDE SERPL-SCNC: 98 MMOL/L (ref 96–108)
CO2 SERPL-SCNC: 25 MMOL/L (ref 21–32)
CREAT SERPL-MCNC: 0.52 MG/DL (ref 0.6–1.3)
ERYTHROCYTE [DISTWIDTH] IN BLOOD BY AUTOMATED COUNT: 18.9 % (ref 11.6–15.1)
GFR SERPL CREATININE-BSD FRML MDRD: 100 ML/MIN/1.73SQ M
GLUCOSE SERPL-MCNC: 114 MG/DL (ref 65–140)
HCT VFR BLD AUTO: 28 % (ref 34.8–46.1)
HGB BLD-MCNC: 8.7 G/DL (ref 11.5–15.4)
MAGNESIUM SERPL-MCNC: 1.9 MG/DL (ref 1.9–2.7)
MCH RBC QN AUTO: 26.8 PG (ref 26.8–34.3)
MCHC RBC AUTO-ENTMCNC: 31.1 G/DL (ref 31.4–37.4)
MCV RBC AUTO: 86 FL (ref 82–98)
PLATELET # BLD AUTO: 377 THOUSANDS/UL (ref 149–390)
PMV BLD AUTO: 9 FL (ref 8.9–12.7)
POTASSIUM SERPL-SCNC: 3.8 MMOL/L (ref 3.5–5.3)
QRS AXIS: 3 DEGREES
QRSD INTERVAL: 78 MS
QT INTERVAL: 450 MS
QTC INTERVAL: 630 MS
RBC # BLD AUTO: 3.25 MILLION/UL (ref 3.81–5.12)
SODIUM SERPL-SCNC: 132 MMOL/L (ref 135–147)
T WAVE AXIS: 49 DEGREES
VENTRICULAR RATE: 118 BPM
WBC # BLD AUTO: 3.67 THOUSAND/UL (ref 4.31–10.16)

## 2024-02-14 PROCEDURE — 99239 HOSP IP/OBS DSCHRG MGMT >30: CPT | Performed by: INTERNAL MEDICINE

## 2024-02-14 PROCEDURE — 80048 BASIC METABOLIC PNL TOTAL CA: CPT | Performed by: INTERNAL MEDICINE

## 2024-02-14 PROCEDURE — 93970 EXTREMITY STUDY: CPT | Performed by: SURGERY

## 2024-02-14 PROCEDURE — 83735 ASSAY OF MAGNESIUM: CPT | Performed by: INTERNAL MEDICINE

## 2024-02-14 PROCEDURE — 97162 PT EVAL MOD COMPLEX 30 MIN: CPT

## 2024-02-14 PROCEDURE — 85027 COMPLETE CBC AUTOMATED: CPT | Performed by: INTERNAL MEDICINE

## 2024-02-14 PROCEDURE — 99232 SBSQ HOSP IP/OBS MODERATE 35: CPT | Performed by: INTERNAL MEDICINE

## 2024-02-14 PROCEDURE — 99233 SBSQ HOSP IP/OBS HIGH 50: CPT | Performed by: NURSE PRACTITIONER

## 2024-02-14 PROCEDURE — 97166 OT EVAL MOD COMPLEX 45 MIN: CPT

## 2024-02-14 PROCEDURE — 94761 N-INVAS EAR/PLS OXIMETRY MLT: CPT

## 2024-02-14 RX ORDER — LOPERAMIDE HYDROCHLORIDE 2 MG/1
4 CAPSULE ORAL ONCE
Status: COMPLETED | OUTPATIENT
Start: 2024-02-14 | End: 2024-02-14

## 2024-02-14 RX ORDER — LOPERAMIDE HYDROCHLORIDE 2 MG/1
2 CAPSULE ORAL 4 TIMES DAILY PRN
Qty: 30 CAPSULE | Refills: 0 | Status: ON HOLD | OUTPATIENT
Start: 2024-02-14

## 2024-02-14 RX ADMIN — METOPROLOL TARTRATE 25 MG: 25 TABLET, FILM COATED ORAL at 08:44

## 2024-02-14 RX ADMIN — APIXABAN 10 MG: 5 TABLET, FILM COATED ORAL at 08:44

## 2024-02-14 RX ADMIN — MULTIPLE VITAMINS W/ MINERALS TAB 1 TABLET: TAB ORAL at 08:43

## 2024-02-14 RX ADMIN — POTASSIUM CHLORIDE 40 MEQ: 1500 TABLET, EXTENDED RELEASE ORAL at 08:44

## 2024-02-14 RX ADMIN — ATORVASTATIN CALCIUM 20 MG: 20 TABLET, FILM COATED ORAL at 17:00

## 2024-02-14 RX ADMIN — Medication 250 MG: at 08:43

## 2024-02-14 RX ADMIN — FUROSEMIDE 20 MG: 20 TABLET ORAL at 08:44

## 2024-02-14 RX ADMIN — SERTRALINE 200 MG: 100 TABLET, FILM COATED ORAL at 08:43

## 2024-02-14 RX ADMIN — LOPERAMIDE HYDROCHLORIDE 4 MG: 2 CAPSULE ORAL at 10:38

## 2024-02-14 RX ADMIN — APIXABAN 10 MG: 5 TABLET, FILM COATED ORAL at 17:00

## 2024-02-14 RX ADMIN — Medication 1000 UNITS: at 08:44

## 2024-02-14 RX ADMIN — Medication 250 MG: at 17:00

## 2024-02-14 RX ADMIN — BUPROPION HYDROCHLORIDE 300 MG: 300 TABLET, EXTENDED RELEASE ORAL at 08:44

## 2024-02-14 RX ADMIN — LOSARTAN POTASSIUM 100 MG: 50 TABLET, FILM COATED ORAL at 08:44

## 2024-02-14 RX ADMIN — FLUTICASONE FUROATE AND VILANTEROL TRIFENATATE 1 PUFF: 200; 25 POWDER RESPIRATORY (INHALATION) at 08:44

## 2024-02-14 NOTE — OCCUPATIONAL THERAPY NOTE
Occupational Therapy Evaluation     Patient Name: Kathi Ferris  Today's Date: 2/14/2024  Problem List  Principal Problem:    Acute pulmonary embolism (HCC)  Active Problems:    COPD (chronic obstructive pulmonary disease) (HCC)    Essential hypertension    Non-small cell cancer of right lung (HCC)    Hyponatremia    SIRS (systemic inflammatory response syndrome) (HCC)    Chronic respiratory failure with hypoxia (HCC)    Elevated troponin    Diarrhea    Past Medical History  Past Medical History:   Diagnosis Date    Cancer (HCC)     COPD (chronic obstructive pulmonary disease) (HCC)     Emphysema of lung (HCC)     Hyperlipidemia     Hypertension     Lung cancer (HCC) 06/26/2019    right lower lobe removed    Lung nodule     Pleural effusion      Past Surgical History  Past Surgical History:   Procedure Laterality Date    APPENDECTOMY      BREAST BIOPSY Right     benign    BRONCHOSCOPY  06/2019    COLON SURGERY      HYSTERECTOMY      age 46    IR PLEURAL EFFUSION LONG-TERM CATHETER PLACEMENT  04/25/2023    IR PORT PLACEMENT  04/18/2023    IR THORACENTESIS  03/24/2023    IR THORACENTESIS  04/12/2023    IR THORACENTESIS  04/18/2023    LUNG BIOPSY  06/2019    LUNG LOBECTOMY      OOPHORECTOMY Bilateral     age 46           02/14/24 1212   OT Last Visit   OT Visit Date 02/14/24   Note Type   Note type Evaluation   Pain Assessment   Pain Assessment Tool 0-10   Pain Score No Pain   Restrictions/Precautions   Weight Bearing Precautions Per Order No   Other Precautions Chair Alarm;Bed Alarm;Multiple lines;Telemetry;O2   Home Living   Type of Home House   Home Layout Two level;Stairs to enter with rails;Bed/bath upstairs;1/2 bath on main level  (1 ELISA, stair glide access to 2nd floor bedroom and full bathroom)   Bathroom Shower/Tub Tub/shower unit   Bathroom Toilet Standard   Bathroom Equipment Shower chair   Bathroom Accessibility Accessible   Home Equipment Other (Comment)  (rollator walker, home O2 (3L))   Prior  Function   Level of [place] Independent with ADLs;Independent with functional mobility;Independent with IADLS   Lives With Spouse   Receives Help From Family;Friend(s);Neighbor   IADLs Independent with meal prep;Independent with medication management;Family/Friend/Other provides transportation   Falls in the last 6 months 0   Vocational Retired   Comments Pt reports being independent with all self care and functional mobility. uses rollator walker in the home and no AD in community with S. Reports not going out of the home without spouse. Has 2 mcclain retrievers at home.   Lifestyle   Autonomy Lives with  who is having having surgery today, +2 dogs, [place]ogo/Indep for all self help skillls,  (-)    Reciprocal Relationships spouse   General   Family/Caregiver Present No   ADL   Where Assessed Edge of bed   Eating Assistance 7  Independent   Grooming Assistance 7  Independent   UB Bathing Assistance 6  Modified Independent   UB Dressing Assistance 6  Modified independent  (gown management)   Toileting Assistance  6  Modified independent   Bed Mobility   Supine to Sit 6  Modified independent   Additional items HOB elevated;Bedrails   Transfers   Sit to Stand 6  Modified independent   Additional items   (rollator walker)   Stand to Sit 6  Modified independent   Additional items Other  (rollator walker)   Stand pivot 6  Modified independent   Additional items Other  (rollator walker)   Toilet transfer 6  Modified independent   Additional items Other  (rollator)   Activity Tolerance   Activity Tolerance Patient tolerated treatment well   RUE Assessment   RUE Assessment WFL   LUE Assessment   LUE Assessment WFL   Vision-Basic Assessment   Current Vision Wears glasses only for reading   Psychosocial   Psychosocial (WDL) WDL   Cognition   Overall Cognitive Status WFL   Arousal/Participation Alert   Attention Within functional limits   Orientation Level Oriented X4   Memory Within functional limits   Following  "Commands Follows all commands and directions without difficulty   Assessment   Assessment Pt seen today for OT IE admitted with SOB. She was living with her  (currently in hospital) in a 2STH, 1 ELISA at Diogo level for self help skills, IADLs, functional mobility with rollator in home, O2L3, stair glide to 2nd floor and (-). Pt presents today with O2L3 via NC and mild endurance deficits. She was able to complete bed mobility, sit<>stand and functional ambulation in room at [place] level with Rollator.  Pt performs all aspects of toileting at Diogo level, gown management at Indep level and is Indep for grooming tasks standing at sink. Good O2 line management noted t/o session. Pt is at baseline functioning for ADLs, will sign off for OT with recommended d/c home when medically able.   Goals   Patient Goals \"to go home and sleep\"   Discharge Recommendation   Rehab Resource Intensity Level, OT No post-acute rehabilitation needs   AM-PAC Daily Activity Inpatient   Lower Body Dressing 4   Bathing 4   Toileting 4   Upper Body Dressing 4   Grooming 4   Eating 4   Daily Activity Raw Score 24   Daily Activity Standardized Score (Calc for Raw Score >=11) 57.54   AM-PAC Applied Cognition Inpatient   Following a Speech/Presentation 4   Understanding Ordinary Conversation 4   Taking Medications 4   Remembering Where Things Are Placed or Put Away 4   Remembering List of 4-5 Errands 4   Taking Care of Complicated Tasks 4   Applied Cognition Raw Score 24   Applied Cognition Standardized Score 62.21   End of Consult   Education Provided Yes   Patient Position at End of Consult Bed/Chair alarm activated;All needs within reach;Bedside chair   Nurse Communication Nurse aware of consult          "

## 2024-02-14 NOTE — PROGRESS NOTES
Oncology Progress Note  Kathi Ferris 63 y.o. female MRN: [id number]  Unit/Bed#: -01 Encounter: [id number]      Oncology History Overview Note   5/2019 - Stage IA adenocarcinoma of the right lung s/p RLLectomy    11/2022 - fall after tripping on a dog gate - CT showed pulmonary nodules that required 3 month followup    3/2023 - CT showed right pleural effusion with enlarging lung lesion, she was otherwise symptomatic, thoracentesis cell block showed adenocarcinoma c/w her prior lung primary    PET - no disease outside the chest    Caris - no targetable mutations    4/20/2023 - carbo/pem/pembro    5/11/2-23 - cycle 2, pemetrexed dose reduced by 20% and carbo dose reduced to AUC 4 due to nausea and fatigue    7/2023 - removed carbo as of cycle 5 due to good response and increasing fatigue    8/2023 - add back carbo for cycle 6 due to increasing output from right pleural catheter    8/24/2023 - remove carboplatin for cycle 7 because no impact was made on output from pleural catheter    10/2023 - disease progression in the omentum.  Plan treatment change to docetaxel and ramucirumab    11/9/2023 - start taxotere/ramucirumab    1/2024 - change treatment to gemcitabine due to concern of inadequate response     Non-small cell cancer of right lung (HCC)   4/7/2023 Initial Diagnosis    Non-small cell cancer of right lung (HCC)     4/20/2023 - 9/14/2023 Chemotherapy    cyanocobalamin, 1,000 mcg, Intramuscular, Once, 5 of 10 cycles  Administration: 1,000 mcg (4/13/2023), 1,000 mcg (6/1/2023), 1,000 mcg (8/3/2023), 1,000 mcg (8/24/2023), 1,000 mcg (9/14/2023)  alteplase (CATHFLO), 2 mg, Intracatheter, Every 1 Minute as needed, 8 of 13 cycles  fosaprepitant (EMEND) IVPB, 150 mg, Intravenous, Once, 5 of 5 cycles  Administration: 150 mg (4/20/2023), 150 mg (6/1/2023), 150 mg (6/22/2023), 150 mg (5/11/2023), 150 mg (8/3/2023)  CARBOplatin (PARAPLATIN) IVPB ([facility] AUC DOSING), 553 mg, Intravenous, Once, 5 of 5  "cycles  Administration: 553 mg (4/20/2023), 442.4 mg (6/1/2023), 442.4 mg (6/22/2023), 442.4 mg (5/11/2023), 438 mg (8/3/2023)  pemetrexed (ALIMTA) chemo infusion, 945 mg, Intravenous, Once, 8 of 13 cycles  Dose modification: 400 mg/m2 (original dose 500 mg/m2, Cycle 3, Reason: Nausea/Vomiting, Comment: 20% dose reduction due to nausea)  Administration: 900 mg (4/20/2023), 756 mg (6/1/2023), 756 mg (6/22/2023), 756 mg (8/3/2023), 756 mg (5/11/2023), 756 mg (7/13/2023), 756 mg (8/24/2023), 756 mg (9/14/2023)  pembrolizumab (KEYTRUDA) IVPB, 200 mg, Intravenous, Once, 8 of 13 cycles  Administration: 200 mg (4/20/2023), 200 mg (6/1/2023), 200 mg (6/22/2023), 200 mg (8/3/2023), 200 mg (5/11/2023), 200 mg (7/13/2023), 200 mg (8/24/2023), 200 mg (9/14/2023)     6/9/2023 -  Cancer Staged    Staging form: Lung, AJCC 8th Edition  - Clinical: Stage ALESHA (cT1c, cN2, cM1a) - Signed by Shani Phipps DO on 6/9/2023 11/9/2023 - 1/11/2024 Chemotherapy    alteplase (CATHFLO), 2 mg, Intracatheter, Every 1 Minute as needed, 4 of 6 cycles  pegfilgrastim (NEULASTA ONPRO), 6 mg, Subcutaneous, Once, 0 of 2 cycles  ramucirumab (CYRAMZA) IVPB, 830 mg, Intravenous, Once, 4 of 6 cycles  Administration: 800 mg (11/9/2023), 800 mg (11/30/2023), 800 mg (12/21/2023), 800 mg (1/11/2024)  DOCEtaxel (TAXOTERE) chemo infusion, 75 mg/m2 = 142.6 mg, Intravenous, Once, 4 of 6 cycles  Administration: 142.6 mg (11/9/2023), 142.6 mg (11/30/2023), 142.6 mg (12/21/2023), 142.6 mg (1/11/2024)     2/8/2024 -  Chemotherapy    alteplase (CATHFLO), 2 mg, Intracatheter, Every 1 Minute as needed, 1 of 6 cycles  gemcitabine (GEMZAR) infusion, 1,890.1 mg, Intravenous, Once, 1 of 6 cycles  Administration: 1,800 mg (2/8/2024)         Subjective: \" I feel better\"     Objective: Patient is feeling better today.  She has been transitioned off heparin drip to Eliquis.  Breathing improved.  Denies chest pain.  She has had some diarrhea, approximately 5 loose stools " "today      /71   Pulse 96   Temp 97.8 °F (36.6 °C)   Resp 16   Ht 5' 5\" (1.651 m)   Wt 82.1 kg (181 lb)   SpO2 96%   BMI 30.12 kg/m²   General Appearance:    Alert, oriented, chronically ill-appearing       Eyes:    PERRL   Ears:    Normal external ear canals, both ears   Nose:   Nares normal, septum midline   Throat:   Mucosa moist. Pharynx without injection.    Neck:   Supple       Lungs:     Clear to auscultation bilaterally, O2 nasal cannula   Chest Wall:    No tenderness or deformity    Heart:    Regular rate and rhythm       Abdomen:     Soft, non-tender, bowel sounds +, no organomegaly           Extremities:   Extremities no cyanosis or edema       Skin:   no rash or icterus.    Lymph nodes:   Cervical, supraclavicular, and axillary nodes normal   Neurologic:   CNII-XII intact, normal strength, sensation and reflexes     throughout        Recent Results (from the past 48 hour(s))   CBC and differential    Collection Time: 02/12/24  5:27 PM   Result Value Ref Range    WBC 11.17 (H) 4.31 - 10.16 Thousand/uL    RBC 3.24 (L) 3.81 - 5.12 Million/uL    Hemoglobin 8.9 (L) 11.5 - 15.4 g/dL    Hematocrit 27.7 (L) 34.8 - 46.1 %    MCV 86 82 - 98 fL    MCH 27.5 26.8 - 34.3 pg    MCHC 32.1 31.4 - 37.4 g/dL    RDW 18.7 (H) 11.6 - 15.1 %    MPV 9.2 8.9 - 12.7 fL    Platelets 430 (H) 149 - 390 Thousands/uL   Comprehensive metabolic panel    Collection Time: 02/12/24  5:27 PM   Result Value Ref Range    Sodium 129 (L) 135 - 147 mmol/L    Potassium 4.0 3.5 - 5.3 mmol/L    Chloride 94 (L) 96 - 108 mmol/L    CO2 23 21 - 32 mmol/L    ANION GAP 12 mmol/L    BUN 21 5 - 25 mg/dL    Creatinine 0.67 0.60 - 1.30 mg/dL    Glucose 97 65 - 140 mg/dL    Calcium 9.5 8.4 - 10.2 mg/dL    Corrected Calcium 10.3 (H) 8.3 - 10.1 mg/dL    AST 28 13 - 39 U/L    ALT 14 7 - 52 U/L    Alkaline Phosphatase 105 (H) 34 - 104 U/L    Total Protein 6.5 6.4 - 8.4 g/dL    Albumin 3.0 (L) 3.5 - 5.0 g/dL    Total Bilirubin 0.51 0.20 - 1.00 mg/dL " "   eGFR 92 ml/min/1.73sq m   HS Troponin 0hr (reflex protocol)    Collection Time: 02/12/24  5:27 PM   Result Value Ref Range    hs TnI 0hr 160 (H) \"Refer to ACS Flowchart\"- see link ng/L   B-Type Natriuretic Peptide(BNP)    Collection Time: 02/12/24  5:27 PM   Result Value Ref Range     (H) 0 - 100 pg/mL   Manual Differential(PHLEBS Do Not Order)    Collection Time: 02/12/24  5:27 PM   Result Value Ref Range    Segmented % 95 (H) 43 - 75 %    Lymphocytes % 5 (L) 14 - 44 %    Monocytes % 0 (L) 4 - 12 %    Eosinophils, % 0 0 - 6 %    Basophils % 0 0 - 1 %    Atypical Lymphocytes %      Absolute Neutrophils 10.61 (H) 1.85 - 7.62 Thousand/uL    Lymphocytes Absolute 0.56 (L) 0.60 - 4.47 Thousand/uL    Monocytes Absolute 0.00 0.00 - 1.22 Thousand/uL    Eosinophils Absolute 0.00 0.00 - 0.40 Thousand/uL    Basophils Absolute 0.00 0.00 - 0.10 Thousand/uL    Total Counted      Hypersegmented Neutrophils      Toxic Granulation      RBC Morphology Normal     Platelet Estimate Increased (A) Adequate    Pathology Review Yes (A) No    Differential Comment see note     Anisocytosis      Hypochromia      Ovalocytes     Path Slide Review    Collection Time: 02/12/24  5:27 PM   Result Value Ref Range    Case Report       Clinical Pathology Report                         Case: [id number]                                  Authorizing Provider:  Tom Lindsay DO     Collected:           02/12/2024 1727              Ordering Location:      [facility]    Received:            02/12/2024 [address] [place] [address] Emergency                                                                                    Department                                                                   Pathologist:           Anneliese Hemphill MD                                                                  Specimen:    Arm, Left                                                                                  Path Slide      " " Peripheral blood smear shows normochromic normocytic anemia, mild thrombocytosis, mild leukocytosis with neutrophilia.    Evaluated by Anneliese Hemphill M.D.  Interpretation performed at [facility], [address] [place] [address]     ECG 12 lead    Collection Time: 02/12/24  5:35 PM   Result Value Ref Range    Ventricular Rate 118 BPM    Atrial Rate 37 BPM    NH Interval  ms    QRSD Interval 78 ms    QT Interval 450 ms    QTC Interval 630 ms    P Axis  degrees    QRS Axis 3 degrees    T Wave [place] 49 degrees   HS Troponin I 2hr    Collection Time: 02/12/24  7:25 PM   Result Value Ref Range    hs TnI 2hr 183 (H) \"Refer to ACS Flowchart\"- see link ng/L    Delta 2hr hsTnI 23 (H) <20 ng/L   HS Troponin I 4hr    Collection Time: 02/12/24  9:41 PM   Result Value Ref Range    hs TnI 4hr 155 (H) \"Refer to ACS Flowchart\"- see link ng/L    Delta 4hr hsTnI -5 <20 ng/L   APTT    Collection Time: 02/12/24 11:03 PM   Result Value Ref Range    PTT 31 23 - 37 seconds   Protime-INR    Collection Time: 02/12/24 11:55 PM   Result Value Ref Range    Protime 13.5 11.6 - 14.5 seconds    INR 0.99 0.84 - 1.19   COVID/FLU/RSV    Collection Time: 02/12/24 11:55 PM    Specimen: Nose; Nares   Result Value Ref Range    SARS-CoV-2 Negative Negative    INFLUENZA A PCR Negative Negative    INFLUENZA B PCR Negative Negative    RSV PCR Negative Negative   Clostridium difficile toxin by PCR with EIA    Collection Time: 02/13/24  2:50 AM    Specimen: Rectum; Stool   Result Value Ref Range    C.difficile toxin by PCR Negative Negative   Stool Enteric Bacterial Panel by PCR    Collection Time: 02/13/24  2:50 AM    Specimen: Rectum; Stool   Result Value Ref Range    Salmonella sp PCR Negative Negative    Shigella sp/Enteroinvasive E. coli (EIEC) PCR Negative Negative    Campylobacter sp (jejuni and coli) PCR Negative Negative    Shiga toxin 1/Shiga toxin 2 genes PCR Negative Negative   APTT    Collection Time: 02/13/24  4:56 AM   Result Value Ref Range    "  (HH) 23 - 37 seconds   Basic metabolic panel    Collection Time: 02/13/24  4:56 AM   Result Value Ref Range    Sodium 135 135 - 147 mmol/L    Potassium 3.5 3.5 - 5.3 mmol/L    Chloride 98 96 - 108 mmol/L    CO2 25 21 - 32 mmol/L    ANION GAP 12 mmol/L    BUN 19 5 - 25 mg/dL    Creatinine 0.51 (L) 0.60 - 1.30 mg/dL    Glucose 88 65 - 140 mg/dL    Calcium 9.2 8.4 - 10.2 mg/dL    eGFR 101 ml/min/1.73sq m   CBC and differential    Collection Time: 02/13/24  4:56 AM   Result Value Ref Range    WBC 8.15 4.31 - 10.16 Thousand/uL    RBC 3.26 (L) 3.81 - 5.12 Million/uL    Hemoglobin 8.9 (L) 11.5 - 15.4 g/dL    Hematocrit 28.3 (L) 34.8 - 46.1 %    MCV 87 82 - 98 fL    MCH 27.3 26.8 - 34.3 pg    MCHC 31.4 31.4 - 37.4 g/dL    RDW 19.0 (H) 11.6 - 15.1 %    MPV 8.8 (L) 8.9 - 12.7 fL    Platelets 377 149 - 390 Thousands/uL    nRBC 0 /100 WBCs    Neutrophils Relative 93 (H) 43 - 75 %    Immat GRANS % 1 0 - 2 %    Lymphocytes Relative 4 (L) 14 - 44 %    Monocytes Relative 1 (L) 4 - 12 %    Eosinophils Relative 1 0 - 6 %    Basophils Relative 0 0 - 1 %    Neutrophils Absolute 7.64 (H) 1.85 - 7.62 Thousands/µL    Immature Grans Absolute 0.04 0.00 - 0.20 Thousand/uL    Lymphocytes Absolute 0.32 (L) 0.60 - 4.47 Thousands/µL    Monocytes Absolute 0.10 (L) 0.17 - 1.22 Thousand/µL    Eosinophils Absolute 0.04 0.00 - 0.61 Thousand/µL    Basophils Absolute 0.01 0.00 - 0.10 Thousands/µL   Magnesium    Collection Time: 02/13/24  4:56 AM   Result Value Ref Range    Magnesium 1.6 (L) 1.9 - 2.7 mg/dL   Smear Review(Phlebs Do Not Order)    Collection Time: 02/13/24  4:56 AM   Result Value Ref Range    RBC Morphology Present     Platelet Estimate Increased (A) Adequate    Anisocytosis Present    Echo complete w/ contrast if indicated    Collection Time: 02/13/24 10:50 AM   Result Value Ref Range    Triscuspid Valve Regurgitation Peak Gradient 34.0 mmHg    RAA A4C 10.3 cm2    LA Volume Index (BP) 18.4 mL/m2    LV Diastolic Volume (BP) 109 mL     LV Systolic Volume (BP) 34 mL    MV Peak A Elier 0.91 m/s    MV stenosis pressure 1/2 time 51 ms    MV Peak E Elier 72 cm/s    E wave deceleration time 177 ms    E/A ratio 0.79     MV valve area p 1/2 method 4.31     TR Peak Elier 2.9 m/s    RVID d 2.7 cm    A4C EF 71 %    Tricuspid valve peak regurgitation velocity 2.91 m/s    Left ventricular stroke volume (2D) 52.00 mL    IVSd 1.10 cm    Tricuspid annular plane systolic excursion 2.10 cm    Ao root 3.40 cm    LVPWd 1.30 cm    LA size 3.6 cm    Asc Ao 2.9 cm    LA volume (BP) 35 mL    EF 69 %    FS 36 28 - 44    LVIDS 2.70 cm    IVS 1.1 cm    LVIDd 4.20 cm    LA length (A2C) 4.00 cm    LEFT VENTRICLE SYSTOLIC VOLUME (MOD BIPLANE) 2D 26 mL    LV DIASTOLIC VOLUME (MOD BIPLANE) 2D 78 mL    Left Atrium Area-systolic Four Chamber 17.5 cm2    Left Atrium Area-systolic Apical Two Chamber 9.8 cm2    MV E' Tissue Velocity Lateral 18 cm/s    MV E' Tissue Velocity Septal 8 cm/s    LVSV, 2D 52 mL    BSA 1.9 m2    LV Diastolic Volume Index (BP) 57.4 mL/m2    LV Systolic Volume Index (BP) 17.9 mL/m2    GLS -19 %    LV EF 65    APTT    Collection Time: 02/13/24 12:49 PM   Result Value Ref Range    PTT 83 (H) 23 - 37 seconds   Basic metabolic panel    Collection Time: 02/14/24  2:55 AM   Result Value Ref Range    Sodium 132 (L) 135 - 147 mmol/L    Potassium 3.8 3.5 - 5.3 mmol/L    Chloride 98 96 - 108 mmol/L    CO2 25 21 - 32 mmol/L    ANION GAP 9 mmol/L    BUN 19 5 - 25 mg/dL    Creatinine 0.52 (L) 0.60 - 1.30 mg/dL    Glucose 114 65 - 140 mg/dL    Calcium 9.3 8.4 - 10.2 mg/dL    eGFR 100 ml/min/1.73sq m   CBC    Collection Time: 02/14/24  2:55 AM   Result Value Ref Range    WBC 3.67 (L) 4.31 - 10.16 Thousand/uL    RBC 3.25 (L) 3.81 - 5.12 Million/uL    Hemoglobin 8.7 (L) 11.5 - 15.4 g/dL    Hematocrit 28.0 (L) 34.8 - 46.1 %    MCV 86 82 - 98 fL    MCH 26.8 26.8 - 34.3 pg    MCHC 31.1 (L) 31.4 - 37.4 g/dL    RDW 18.9 (H) 11.6 - 15.1 %    Platelets 377 149 - 390 Thousands/uL    MPV  9.0 8.9 - 12.7 fL   Magnesium    Collection Time: 02/14/24  2:55 AM   Result Value Ref Range    Magnesium 1.9 1.9 - 2.7 mg/dL          VAS VENOUS DUPLEX - LOWER LIMB BILATERAL    Result Date: 2/14/2024  Narrative:  [facility] REPORT CLINICAL: Indications: Pulmonary Emboli [I26.99].  Patient presents with recent discovery of pulmonary embolism and physician wants to determine potential source. Patient reports having shortness of breath for a few weeks that became progressively worse. Operative History: 2023-04-25 IR pleural effusion from long term cath placement 2023-04-18 IR port placement Risk Factors The patient has history of HTN, Hyperlipidemia and previous smoking (quit <1yr ago).   CONCLUSION: Impression: RIGHT LOWER LIMB: No evidence of acute or chronic deep vein thrombosis. No evidence of superficial thrombophlebitis noted. Doppler evaluation shows a normal response to augmentation maneuvers. Popliteal, posterior tibial and anterior tibial arterial Doppler waveform's are triphasic.  LEFT LOWER LIMB: No evidence of acute or chronic deep vein thrombosis. No evidence of superficial thrombophlebitis noted. Doppler evaluation shows a normal response to augmentation maneuvers. Popliteal, posterior tibial and anterior tibial arterial Doppler waveform's are triphasic.  Technical findings were faxed to the patient's chart.  SIGNATURE: Electronically Signed by: MEENU GARBER DO on 2024-02-14 11:11:33 AM    Echo complete w/ contrast if indicated    Result Date: 2/13/2024  Narrative:   Left Ventricle: Left ventricular cavity size is normal. Wall thickness is upper normal. The left ventricular ejection fraction is 65%. Systolic function is normal. at -19%. Wall motion is normal. Diastolic function is mildly abnormal, consistent with grade I (abnormal) relaxation.   Right Ventricle: Right ventricular cavity size is normal. Systolic function is normal.   Mitral Valve: There is mild annular calcification. There is  trace regurgitation.   Tricuspid Valve: There is mild regurgitation.   Pulmonary Artery: The pulmonary artery systolic pressure is mildly increased. Strain was performed to quantify interventricular dyssynchrony and evaluate components of myocardial function due Chemotherapy . Results from the utilization of Strain Analysis are listed in the report below.     XR chest 1 view portable    Result Date: 2/13/2024  Narrative: XR CHEST PORTABLE INDICATION: SOB. COMPARISON: Chest radiograph January 25, 2024 FINDINGS: Right chest wall port with catheter tip projecting over the superior cavoatrial junction. Post right lower lobectomy. Right infrahilar opacity. Bilateral pleural effusions. Right pleural catheter. No pneumothorax. Normal cardiomediastinal silhouette.     Impression: Right infrahilar opacity reflects mass better evident on subsequent CT. Bilateral pleural effusions, which are better evident on subsequent CT. Workstation performed: [id number]     CTA ED chest PE study    Result Date: 2/12/2024  Narrative: CTA - CHEST WITH IV CONTRAST - PULMONARY ANGIOGRAM INDICATION:   Pulmonary embolism (PE) suspected, high prob S?O RO PE. COMPARISON: CTA chest abdomen/pelvis 1/21/2024. TECHNIQUE: CTA examination of the chest was performed using angiographic technique according to a protocol specifically tailored to evaluate for pulmonary embolism.  Axial, sagittal, and coronal 2D reformatted images were created from the source data and  submitted for interpretation.  In addition, coronal and axial 3D MIP postprocessing was performed on the acquisition scanner. Radiation dose length product (DLP) for this visit:  460.09 mGy-cm .  This examination, like all CT scans performed in the [facility], was performed utilizing techniques to minimize radiation dose exposure, including the use of iterative  reconstruction and automated exposure control. IV Contrast:  85 mL of iohexol (OMNIPAQUE) CHEST: PULMONARY ARTERIAL  TREE: Small filling defect in the right interlobar pulmonary artery presumably extending to the resection margin of prior right lower lobectomy; the middle lobe remains perfused. Normal caliber main pulmonary artery. LARGE AIRWAYS: Large airways are clear with no tracheal or endobronchial lesion. LUNGS: Postsurgical changes of right lower lobectomy. Moderate to severe background emphysema. Redemonstration of an ill-defined right infrahilar tumor measuring approximately 5.9 x 4.0 cm (previously 5.7 x 3.7 cm remeasured in same fashion) on series 2/143. Unchanged surrounding groundglass opacities and interlobular septal thickening throughout the middle lobe consistent with lymphangitic spread of disease. Unchanged multifocal groundglass and solid opacities throughout the left lung, likely metastatic. PLEURA: Stable small left and trace right pleural effusions with right sided chest tube in unchanged position. HEART: Normal cardiac size and morphology. Moderate coronary artery calcification. Trace pericardial effusion. VESSELS: Mild MEDIASTINUM AND SUMIT: Small hiatal hernia noted.  No lymphadenopathy. CHEST WALL AND LOWER NECK:   Within normal limits. VISUALIZED STRUCTURES IN THE UPPER ABDOMEN: Small-to-moderate volume upper abdominal ascites with peritoneal/omental nodularity similar to prior. BONES: Moderate multilevel spondylosis. Redemonstration of pathologic fracture of the left posterior fifth rib. Right second rib metastasis not well seen on current exam.     Impression: 1.  Small filling defect in the right interlobar pulmonary artery presumably extending to the resection margin of prior right lower lobectomy; the middle lobe remains perfused. Measured RV/LV ratio is within normal limits at less than 0.9. 2.  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 The study was marked in  EPIC for immediate notification. Workstation performed: [id number]     XR chest portable    Result Date: 1/25/2024  Narrative: CHEST INDICATION:   cough. COMPARISON: 1/21/2024 EXAM PERFORMED/VIEWS:  XR CHEST PORTABLE FINDINGS: Interval removal of the enteric tube. The tip of the right jugular port catheter projects at the superior cavoatrial junction. Right chest tube again seen. Cardiomediastinal silhouette appears unremarkable. Stable moderate right pleural effusion with associated right lung opacity, likely combination of the known lung cancer and atelectasis. No pneumothorax. Osseous structures appear unchanged.     Impression: Stable moderate right pleural effusion. No new lung opacity. Support devices as described. Workstation performed: [id number]     XR abdomen obstruction series    Result Date: 1/24/2024  Narrative: OBSTRUCTION SERIES INDICATION:   SBO. COMPARISON: 1/23/2024 EXAM PERFORMED/VIEWS:  XR ABDOMEN OBSTRUCTION SERIES FINDINGS: Enteric contrast has significantly progressed since the prior exam with contrast now predominantly located just within the colon. There are numerous sigmoid diverticula which contain contrast. There are some small bowel loops which remain mildly distended by gas. Overall, the appearance seems improved from yesterday suggesting resolving bowel obstruction. An endogastric tube is present and has the tip in the region of the gastric body or antrum. No free air beneath the hemidiaphragms. Osseous structures are stable. Persistent right pleural effusion and right lung opacity. Hazy left base interstitial infiltrate and also some hazy infiltrate in the left upper lobe. Right-sided infusion port catheter device noted. A small right-sided chest tube is visible laterally. No pneumothorax seen.     Impression: Progression of enteric contrast through the bowel, now mostly in the colon with some persistent gas-filled mildly distended small bowel loops. Overall, the appearance seems  improved from yesterday in keeping with resolving small bowel obstruction. Lines and tubes appear stable. Right pleural effusion and lung infiltrates noted. Workstation performed: [id number]     XR abdomen obstruction series    Result Date: 1/23/2024  Narrative: OBSTRUCTION SERIES INDICATION:   SBO. COMPARISON: 1/21/2024 EXAM PERFORMED/VIEWS:  XR ABDOMEN OBSTRUCTION SERIES FINDINGS: Enteric contrast is present in the stomach and within somewhat dilated small bowel loops mostly in the left side of the abdomen. There is also contrast within normal caliber small bowel loops in the right side of the abdomen and there is some contrast visible throughout much of the colon. This pattern suggests a low-grade/partial small bowel obstruction. An endogastric tube is present with the tip of the tube in the gastric body. No free air beneath the hemidiaphragms. There is some arthritis of the hips. Cardiomediastinal silhouette is stable. There is prominence of the right hilum which apparently corresponds to a known tumor. There is right pleural effusion which is moderate in size. There may be trace left effusion. There is a right-sided chest tube in place. A right infusion port catheter device is present. There is infiltrate in the right mid to lower lung field and minimal patchy infiltrate at the left base and groundglass density in the left upper lobe. The discrete nodularity in the lungs which  was seen on the CT scan is not readily apparent on plain film..     Impression: Bowel gas pattern is most suggestive of a persistent partial/low-grade bowel obstruction with mildly dilated small bowel loops in the left side of the abdomen and more decompressed but contrast filled small bowel loops in the right side of the abdomen and contrast in the colon. No free air identified. Pleural effusions right greater than left with bilateral pulmonary infiltrates and right hilar density which apparently is known to represent mass. Lines and  tubes appear satisfactory Continued close clinical follow-up is advised for the persistent partial bowel obstruction Workstation performed: [id number]     XR chest 1 view portable    Result Date: 1/22/2024  Narrative: CHEST INDICATION:   post NG tube insertion. COMPARISON: CTA chest, abdomen, pelvis 1/21/2024; chest radiograph 12/17/2023 EXAM PERFORMED/VIEWS:  XR CHEST PORTABLE Images:  1 FINDINGS: Patient is rotated to the right. Right chest wall port with tip overlying the superior cavoatrial junction. Nasogastric tube courses below the level of diaphragm with tip projecting over the left upper quadrant. Right-sided chest tube. Cardiomediastinal silhouette appears unremarkable. Persistent small right-sided pleural effusion. No left-sided effusion. No pneumothorax. Persistent right infrahilar opacity, in keeping with patient's known lung cancer diagnosis. Osseous structures appear within normal limits for patient age.     Impression: Nasogastric tube tip within the stomach. Persistent small right pleural effusion with chest tube in place. Persistent right infrahilar opacity is better appreciated on same day CTA. Resident: RAMONA CHAWLA I, the attending radiologist, have reviewed the images and agree with the final report above. Workstation performed: [id number]     PE Study with CT Abdomen and Pelvis with contrast    Result Date: 1/21/2024  Narrative: CT PULMONARY ANGIOGRAM OF THE CHEST AND CT ABDOMEN AND PELVIS WITH INTRAVENOUS CONTRAST INDICATION: elevated dimer, vomiting. History of metastatic non-small cell lung cancer. COMPARISON: CT chest/abdomen/pelvis 1/18/2024. PET/CT 11/2/2023. TECHNIQUE: CT examination of the chest, abdomen and pelvis was performed. Thin section CT angiographic technique was used in the chest in order to evaluate for pulmonary embolus and coronal 3D MIP postprocessing was performed on the acquisition scanner. Multiplanar 2D reformatted images were created from the source data. This  examination, like all CT scans performed in the [facility], was performed utilizing techniques to minimize radiation dose exposure, including the use of iterative reconstruction and automated exposure control. Radiation dose length product (DLP) for this visit: 935.89 mGy-cm IV Contrast: 100 mL of iohexol (OMNIPAQUE) Enteric Contrast: Enteric contrast was not administered. FINDINGS: CHEST PULMONARY ARTERIAL TREE: No pulmonary embolus is seen. Note suboptimal contrast bolus timing. LUNGS: Postsurgical changes of right lower lobectomy. Moderate to severe centrilobular and paraseptal emphysema. Redemonstrated ill-defined right infrahilar tumor measuring 6.4 x 4.3 cm (3/148), similar to prior study. Surrounding groundglass opacities and interlobular septal thickening within the right lung similar compared to CT 12/15/2023 and increased from 10/27/2023, suggestive of lymphangitic carcinomatosis. Multifocal solid and groundglass nodularity throughout the left lung, similar to prior study and likely metastatic in etiology. For example, a 12 mm left upper lobe nodule (5/58), unchanged. PLEURA: Right chest tube terminating in the right apex. Stable small bilateral pleural effusions, left greater than right. No pneumothorax. Stable pleural nodularity in keeping with metastases. HEART/AORTA: Heart is unremarkable for patient's age. No thoracic aortic aneurysm. Coronary artery calcifications. Right chest port with catheter tip terminating in the upper right atrium. MEDIASTINUM AND SUMIT: Unremarkable. CHEST WALL AND LOWER NECK: Unremarkable. ABDOMEN LIVER/BILIARY TREE: Unremarkable. GALLBLADDER: No calcified gallstones. No pericholecystic inflammatory change. Pericholecystic fluid is favored to be on the basis of ascites. SPLEEN: Unremarkable. PANCREAS: Unremarkable. ADRENAL GLANDS: Unremarkable. KIDNEYS/URETERS: Unremarkable. No hydronephrosis. STOMACH AND BOWEL: Diffuse dilation of small bowel with abrupt  point of transition in the right lower quadrant (4/112), in keeping with small bowel obstruction likely on the basis of serosal implants versus adhesions. Colonic diverticulosis. APPENDIX: No findings to suggest appendicitis. ABDOMINOPELVIC CAVITY: Small to moderate volume of abdominopelvic ascites, likely malignant. Similar peritoneal/omental nodularity in keeping with peritoneal carcinomatosis. No pneumoperitoneum. VESSELS: Atherosclerosis without abdominal aortic aneurysm. PELVIS REPRODUCTIVE ORGANS: Hysterectomy. No adnexal mass. URINARY BLADDER: Unremarkable. ABDOMINAL WALL/INGUINAL REGIONS: Small fat-containing umbilical hernia. BONES: Pathologic left posterior fifth rib fracture with surrounding callus. Right second rib metastasis is better appreciated on the PET/CT. Grade 2 anterolisthesis of L5 on S1 with bilateral pars defects, unchanged.     Impression: 1.  Acute small bowel obstruction with point of transition in the right lower quadrant, likely on the basis of serosal implants versus adhesions. 2.  Stable appearance of right infrahilar lung cancer with findings of lymphangitic carcinomatosis. Stable left lung groundglass and solid nodules likely representing metastases. 3.  Small bilateral pleural effusions with right chest tube in place. 4.  Moderate volume of abdominopelvic ascites with similar findings of peritoneal carcinomatosis. * I personally telephoned this result to ANGEL WEATHERS on 1/21/2024 11:27 AM. Workstation performed: [id number]     CT chest abdomen pelvis w contrast    Result Date: 1/19/2024  Narrative: CT CHEST, ABDOMEN AND PELVIS WITH IV CONTRAST INDICATION: C34.91: Malignant neoplasm of unspecified part of right bronchus or lung. COMPARISON: CTA chest PE study 12/15/2023. PET/CT 11/2/2023. CT chest abdomen pelvis 10/27/2023. TECHNIQUE: CT examination of the chest, abdomen and pelvis was performed. Multiplanar 2D reformatted images were created from the source data. This  examination, like all CT scans performed in the [facility], was performed utilizing techniques to minimize radiation dose exposure, including the use of iterative reconstruction and automated exposure control. Radiation dose length product (DLP) for this visit: 1047.73 mGy-cm IV Contrast: 100 mL of iohexol (OMNIPAQUE) Enteric Contrast: FINDINGS: CHEST LUNGS: Ill-defined right infrahilar tumor now measuring approximately 6.6 x 4.4 cm unchanged when remeasured with similar technique on the 12/15/2023 study. Unchanged bilateral groundglass opacities most prominent through the right lower lobe. Pulmonary emphysema. Enlarged 13 mm left upper lobe nodule measured 9 mm on the 12/15/2023 study. Other subcentimeter left upper lobe nodules are unchanged. PLEURA: Unchanged right pleural effusion with diffuse right pleural thickening. A pleural drainage catheter is in place. Decreased size of a small left pleural effusion. HEART/GREAT VESSELS: Borderline heart size. Coronary artery calcifications. Thin pericardial effusion measuring a maximum of 6 mm. No thoracic aortic aneurysm. MEDIASTINUM AND SUMIT: No lymphadenopathy. The FDG avid nodes seen on the PET/CT are not enlarged. CHEST WALL AND LOWER NECK: Right-sided chest port. ABDOMEN LIVER/BILIARY TREE: Unremarkable. GALLBLADDER: No calcified gallstones. No pericholecystic inflammatory change. SPLEEN: Unremarkable. PANCREAS: Unremarkable. ADRENAL GLANDS: Unremarkable. KIDNEYS/URETERS: Unremarkable. No hydronephrosis. STOMACH AND BOWEL: Discrete serosal implants are not identified. Colonic diverticulosis without diverticulitis. APPENDIX: No findings to suggest appendicitis. ABDOMINOPELVIC CAVITY: Small quantity of abdominal and pelvic ascites. No pneumoperitoneum. The 13 mm left retroperitoneal node seen on the PET/CT is now subcentimeter in size. No residual retroperitoneal adenopathy. Persistent but improved omental caking correlating to FDG avid disease.  Discrete peritoneal nodules are not apparent. VESSELS: Unremarkable for patient's age. PELVIS REPRODUCTIVE ORGANS: Hysterectomy. URINARY BLADDER: Unremarkable. ABDOMINAL WALL/INGUINAL REGIONS: Unremarkable. BONES: Pathologic left posterior fifth rib fracture with surrounding callus. Right second rib metastasis is better appreciated on the PET/CT. Bilateral L5 pars defects with unchanged grade 2 anterolisthesis of L5 on S1.     Impression: Unchanged ill-defined 6.6 cm right infrahilar tumor. Enlarged 13 mm left upper lobe nodule previously measured 9 mm. Other subcentimeter left upper lobe nodules are unchanged. FDG-avid mediastinal and retroperitoneal nodes are now all subcentimeter in size. Small right pleural effusion with diffuse right-sided pleural thickening. Diffuse omental caking less apparent than prior studies. Healing left posterior fifth rib pathologic fracture. 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   She had a recent change in therapy to single agent gemcitabine, first dose given last week.  Admitted with worsening shortness of breath and pleuritic chest pain.  CT imaging demonstrated findings of acute PE.  She was initiated on heparin drip now transitioned to Eliquis.  She will discharge on Eliquis and require lifelong anticoagulation due to hypercoagulable state of malignancy.  She has been experiencing some loose stools.  Diarrhea likely secondary to chemotherapy.  Imodium helping.  May use Lomotil as well if needed    Patient has been medically cleared for discharge.  She will continue with chemotherapy as planned, next cycle of treatment is scheduled for Friday, 2/16/2024.  Follow-up with Dr. Phipps is planned for 3/6/2024.          I spent 30 minutes in  chart review, face to face counseling, coordination of care and documentation.

## 2024-02-14 NOTE — NURSING NOTE
Patient discharged to home, no needs. Discharge instructions reviewed with patient and friend - all questions/concerns addressed prior to d/c.

## 2024-02-14 NOTE — DISCHARGE INSTR - AVS FIRST PAGE
No changes in your medication, take as before.  Eliquis was added as a new medication. Take as it was ordered

## 2024-02-14 NOTE — RESPIRATORY THERAPY NOTE
Home Oxygen Qualifying Test     Patient name: Kathi Ferris        : 1960   Date of Test:  2024  Diagnosis:    Home Oxygen Test:    **Medicare Guidelines require item(s) 1-5 on all ambulatory patients or 1 and 2 on non-ambulatory patients.    1. Baseline SPO2 on Room Air at rest 88 %   If <= 88% on Room Air add O2 via NC to obtain SpO2 >=88%. If LPM needed, document LPM 3 needed to reach =>88%    SPO2 during exertion on Room Air 86  %  During exertion monitor SPO2. If SPO2 increases >=89%, do not add supplemental oxygen    SPO2 on Oxygen at Rest 96 % at 3 LPM    SPO2 during exertion on Oxygen 91 % at 3 LPM    Test performed during exertion activity.      []  Supplemental Home Oxygen is indicated.    [x]  Client does not qualify for home oxygen.        Etta Garcia, RT

## 2024-02-14 NOTE — PROGRESS NOTES
"Progress Note - Pulmonary   Kathi Ferris 63 y.o. female MRN: [id number]  Unit/Bed#: -01 Encounter: [id number]    Assessment & Plan:  Acute pulmonary embolism  Chronic hypoxic respiratory failure  Stage IV adenocarcinoma status post right lower lobectomy and chemotherapy  Chronic right lung malignant effusion status post A-fib catheter in place  COPD without acute exacerbation    Patient stable on her baseline oxygen requirement of 3 L  Anticoagulation per primary.  Will need lifelong anticoagulation with DOAC  Echo without significant right heart strain   Home regimen: Advair daily, albuterol nebulizer prn. She trialed Trelegy but her insurance did not cover it.  Outpatient pulmonary follow-up including PFTs.  No further inpatient recommendations.  Please call with questions.    Discussed with internal medicine attending    Subjective:   Patient feels her breathing is much better.  Complains of diarrhea    Objective:     Vitals: Blood pressure 123/70, pulse 96, temperature 97.9 °F (36.6 °C), resp. rate 16, height 5' 5\" (1.651 m), weight 82.1 kg (181 lb), SpO2 96%.,Body mass index is 30.12 kg/m².    No intake or output data in the 24 hours ending 02/14/24 1134    Invasive Devices       Central Venous Catheter Line  Duration             Port A Cath 04/18/23 Right Subclavian 302 days              Peripheral Intravenous Line  Duration             Peripheral IV 02/12/24 Dorsal (posterior);Left Forearm 1 day    Peripheral IV 02/12/24 Left Antecubital 1 day              Drain  Duration             Pleural Effusion Long-Term Catheter 15.5 Fr. 294 days                    Physical Exam:   General appearance: Alert and awake, in no acute distress  Head: Normocephalic, without obvious abnormality, atraumatic  Eyes: No scleral icterus   Lungs: Decreased breath sounds  Heart: Regular rate  Abdomen:  No appreciable distension or tenderness  Extremities: No deformity  Skin: Warm and dry  Neurologic: No acute focal deficits " are noted      Labs: I have personally reviewed pertinent lab results.  Imaging and other studies: I have personally reviewed pertinent reports.

## 2024-02-14 NOTE — PLAN OF CARE
Problem: Potential for Falls  Goal: Patient will remain free of falls  Description: INTERVENTIONS:  - Educate patient/family on patient safety including physical limitations  - Instruct patient to call for assistance with activity   - Consult OT/PT to assist with strengthening/mobility   - Keep Call bell within reach  - Keep bed low and locked with side rails adjusted as appropriate  - Keep care items and personal belongings within reach  - Initiate and maintain comfort rounds  - Make Fall Risk Sign visible to staff  - Offer Toileting every 2 Hours, in advance of need  - Initiate/Maintain bed alarm  - Obtain necessary fall risk management equipment: socks  - Apply yellow socks and bracelet for high fall risk patients  - Consider moving patient to room near nurses station  Outcome: Progressing     Problem: PAIN - ADULT  Goal: Verbalizes/displays adequate comfort level or baseline comfort level  Description: Interventions:  - Encourage patient to monitor pain and request assistance  - Assess pain using appropriate pain scale  - Administer analgesics based on type and severity of pain and evaluate response  - Implement non-pharmacological measures as appropriate and evaluate response  - Consider cultural and social influences on pain and pain management  - Notify physician/advanced practitioner if interventions unsuccessful or patient reports new pain  Outcome: Progressing     Problem: INFECTION - ADULT  Goal: Absence or prevention of progression during hospitalization  Description: INTERVENTIONS:  - Assess and monitor for signs and symptoms of infection  - Monitor lab/diagnostic results  - Monitor all insertion sites, i.e. indwelling lines, tubes, and drains  - Monitor endotracheal if appropriate and nasal secretions for changes in amount and color  - [place] appropriate cooling/warming therapies per order  - Administer medications as ordered  - Instruct and encourage patient and family to use good hand hygiene  technique  - Identify and instruct in appropriate isolation precautions for identified infection/condition  Outcome: Progressing  Goal: Absence of fever/infection during neutropenic period  Description: INTERVENTIONS:  - Monitor WBC    Outcome: Progressing     Problem: SAFETY ADULT  Goal: Patient will remain free of falls  Description: INTERVENTIONS:  - Educate patient/family on patient safety including physical limitations  - Instruct patient to call for assistance with activity   - Consult OT/PT to assist with strengthening/mobility   - Keep Call bell within reach  - Keep bed low and locked with side rails adjusted as appropriate  - Keep care items and personal belongings within reach  - Initiate and maintain comfort rounds  - Make Fall Risk Sign visible to staff  - Offer Toileting every 2 Hours, in advance of need  - Initiate/Maintain bed alarm  - Obtain necessary fall risk management equipment: socks  - Apply yellow socks and bracelet for high fall risk patients  - Consider moving patient to room near nurses station  Outcome: Progressing  Goal: Maintain or return to baseline ADL function  Description: INTERVENTIONS:  - Educate patient/family on patient safety including physical limitations  - Instruct patient to call for assistance with activity   - Consult OT/PT to assist with strengthening/mobility   - Keep Call bell within reach  - Keep bed low and locked with side rails adjusted as appropriate  - Keep care items and personal belongings within reach  - Initiate and maintain comfort rounds  - Make Fall Risk Sign visible to staff  - Offer Toileting every 2 Hours, in advance of need  - Initiate/Maintain bed alarm  - Obtain necessary fall risk management equipment socks  - Apply yellow socks and bracelet for high fall risk patients  - Consider moving patient to room near nurses station  Outcome: Progressing  Goal: Maintains/Returns to pre admission functional level  Description: INTERVENTIONS:  - Perform AM-PAC 6  Click Basic Mobility/ Daily Activity assessment daily.  - Set and communicate daily mobility goal to care team and patient/family/caregiver.   - Collaborate with rehabilitation services on mobility goals if consulted  - Perform Range of Motion 3 times a day.  - Reposition patient every 2 hours.  - Dangle patient 3 times a day  - Stand patient 3 times a day  - Ambulate patient 3 times a day  - Out of bed to chair 3 times a day   - Out of bed for meals 3 times a day  - Out of bed for toileting  - Record patient progress and toleration of activity level   Outcome: Progressing     Problem: DISCHARGE PLANNING  Goal: Discharge to home or other facility with appropriate resources  Description: INTERVENTIONS:  - Identify barriers to discharge w/patient and caregiver  - Arrange for needed discharge resources and transportation as appropriate  - Identify discharge learning needs (meds, wound care, etc.)  - Arrange for interpretive services to assist at discharge as needed  - Refer to Case Management Department for coordinating discharge planning if the patient needs post-hospital services based on physician/advanced practitioner order or complex needs related to functional status, cognitive ability, or social support system  Outcome: Progressing     Problem: Knowledge Deficit  Goal: Patient/family/caregiver demonstrates understanding of disease process, treatment plan, medications, and discharge instructions  Description: Complete learning assessment and assess knowledge base.  Interventions:  - Provide teaching at level of understanding  - Provide teaching via preferred learning methods  Outcome: Progressing     Problem: RESPIRATORY - ADULT  Goal: Achieves optimal ventilation and oxygenation  Description: INTERVENTIONS:  - Assess for changes in respiratory status  - Assess for changes in mentation and behavior  - Position to facilitate oxygenation and minimize respiratory effort  - Oxygen administered by appropriate delivery  if ordered  - Initiate smoking cessation education as indicated  - Encourage broncho-pulmonary hygiene including cough, deep breathe, Incentive Spirometry  - Assess the need for suctioning and aspirate as needed  - Assess and instruct to report SOB or any respiratory difficulty  - Respiratory Therapy support as indicated  Outcome: Progressing     Problem: GASTROINTESTINAL - ADULT  Goal: Minimal or absence of nausea and/or vomiting  Description: INTERVENTIONS:  - Administer IV fluids if ordered to ensure adequate hydration  - Maintain NPO status until nausea and vomiting are resolved  - Nasogastric tube if ordered  - Administer ordered antiemetic medications as needed  - Provide nonpharmacologic comfort measures as appropriate  - Advance diet as tolerated, if ordered  - Consider nutrition services referral to assist patient with adequate nutrition and appropriate food choices  Outcome: Progressing  Goal: Maintains or returns to baseline bowel function  Description: INTERVENTIONS:  - Assess bowel function  - Encourage oral fluids to ensure adequate hydration  - Administer IV fluids if ordered to ensure adequate hydration  - Administer ordered medications as needed  - Encourage mobilization and activity  - Consider nutritional services referral to assist patient with adequate nutrition and appropriate food choices  Outcome: Progressing  Goal: Maintains adequate nutritional intake  Description: INTERVENTIONS:  - Monitor percentage of each meal consumed  - Identify factors contributing to decreased intake, treat as appropriate  - Assist with meals as needed  - Monitor I&O, weight, and lab values if indicated  - Obtain nutrition services referral as needed  Outcome: Progressing  Goal: Establish and maintain optimal ostomy function  Description: INTERVENTIONS:  - Assess bowel function  - Encourage oral fluids to ensure adequate hydration  - Administer IV fluids if ordered to ensure adequate hydration   - Administer ordered  medications as needed  - Encourage mobilization and activity  - Nutrition services referral to assist patient with appropriate food choices  - Assess stoma site  - Consider wound care consult   Outcome: Progressing  Goal: Oral mucous membranes remain intact  Description: INTERVENTIONS  - Assess oral mucosa and hygiene practices  - Implement preventative oral hygiene regimen  - Implement oral medicated treatments as ordered  - Initiate Nutrition services referral as needed  Outcome: Progressing     Problem: Prexisting or High Potential for Compromised Skin Integrity  Goal: Skin integrity is maintained or improved  Description: INTERVENTIONS:  - Identify patients at risk for skin breakdown  - Assess and monitor skin integrity  - Assess and monitor nutrition and hydration status  - Monitor labs   - Assess for incontinence   - Turn and reposition patient  - Assist with mobility/ambulation  - Relieve pressure over bony prominences  - Avoid friction and shearing  - Provide appropriate hygiene as needed including keeping skin clean and dry  - Evaluate need for skin moisturizer/barrier cream  - Collaborate with interdisciplinary team   - Patient/family teaching  - Consider wound care consult   Outcome: Progressing

## 2024-02-14 NOTE — PLAN OF CARE
Problem: Potential for Falls  Goal: Patient will remain free of falls  Description: INTERVENTIONS:  - Educate patient/family on patient safety including physical limitations  - Instruct patient to call for assistance with activity   - Consult OT/PT to assist with strengthening/mobility   - Keep Call bell within reach  - Keep bed low and locked with side rails adjusted as appropriate  - Keep care items and personal belongings within reach  - Initiate and maintain comfort rounds  - Make Fall Risk Sign visible to staff  - Offer Toileting every 2 Hours, in advance of need  - Initiate/Maintain bed alarm  - Obtain necessary fall risk management equipment: socks  - Apply yellow socks and bracelet for high fall risk patients  - Consider moving patient to room near nurses station  Outcome: Progressing     Problem: PAIN - ADULT  Goal: Verbalizes/displays adequate comfort level or baseline comfort level  Description: Interventions:  - Encourage patient to monitor pain and request assistance  - Assess pain using appropriate pain scale  - Administer analgesics based on type and severity of pain and evaluate response  - Implement non-pharmacological measures as appropriate and evaluate response  - Consider cultural and social influences on pain and pain management  - Notify physician/advanced practitioner if interventions unsuccessful or patient reports new pain  Outcome: Progressing     Problem: INFECTION - ADULT  Goal: Absence or prevention of progression during hospitalization  Description: INTERVENTIONS:  - Assess and monitor for signs and symptoms of infection  - Monitor lab/diagnostic results  - Monitor all insertion sites, i.e. indwelling lines, tubes, and drains  - Monitor endotracheal if appropriate and nasal secretions for changes in amount and color  - [place] appropriate cooling/warming therapies per order  - Administer medications as ordered  - Instruct and encourage patient and family to use good hand hygiene  technique  - Identify and instruct in appropriate isolation precautions for identified infection/condition  Outcome: Progressing  Goal: Absence of fever/infection during neutropenic period  Description: INTERVENTIONS:  - Monitor WBC    Outcome: Progressing     Problem: SAFETY ADULT  Goal: Patient will remain free of falls  Description: INTERVENTIONS:  - Educate patient/family on patient safety including physical limitations  - Instruct patient to call for assistance with activity   - Consult OT/PT to assist with strengthening/mobility   - Keep Call bell within reach  - Keep bed low and locked with side rails adjusted as appropriate  - Keep care items and personal belongings within reach  - Initiate and maintain comfort rounds  - Make Fall Risk Sign visible to staff  - Offer Toileting every 2 Hours, in advance of need  - Initiate/Maintain bed alarm  - Obtain necessary fall risk management equipment: socks  - Apply yellow socks and bracelet for high fall risk patients  - Consider moving patient to room near nurses station  Outcome: Progressing  Goal: Maintain or return to baseline ADL function  Description: INTERVENTIONS:  - Educate patient/family on patient safety including physical limitations  - Instruct patient to call for assistance with activity   - Consult OT/PT to assist with strengthening/mobility   - Keep Call bell within reach  - Keep bed low and locked with side rails adjusted as appropriate  - Keep care items and personal belongings within reach  - Initiate and maintain comfort rounds  - Make Fall Risk Sign visible to staff  - Offer Toileting every 2 Hours, in advance of need  - Initiate/Maintain bed alarm  - Obtain necessary fall risk management equipment socks  - Apply yellow socks and bracelet for high fall risk patients  - Consider moving patient to room near nurses station  Outcome: Progressing  Goal: Maintains/Returns to pre admission functional level  Description: INTERVENTIONS:  - Perform AM-PAC 6  Click Basic Mobility/ Daily Activity assessment daily.  - Set and communicate daily mobility goal to care team and patient/family/caregiver.   - Collaborate with rehabilitation services on mobility goals if consulted  - Perform Range of Motion  times a day.  - Reposition patient every  hours.  - Dangle patient  times a day  - Stand patient  times a day  - Ambulate patient  times a day  - Out of bed to chair  times a day   - Out of bed times a day  - Out of bed for toileting  - Record patient progress and toleration of activity level   Outcome: Progressing     Problem: DISCHARGE PLANNING  Goal: Discharge to home or other facility with appropriate resources  Description: INTERVENTIONS:  - Identify barriers to discharge w/patient and caregiver  - Arrange for needed discharge resources and transportation as appropriate  - Identify discharge learning needs (meds, wound care, etc.)  - Arrange for interpretive services to assist at discharge as needed  - Refer to Case Management Department for coordinating discharge planning if the patient needs post-hospital services based on physician/advanced practitioner order or complex needs related to functional status, cognitive ability, or social support system  Outcome: Progressing     Problem: Knowledge Deficit  Goal: Patient/family/caregiver demonstrates understanding of disease process, treatment plan, medications, and discharge instructions  Description: Complete learning assessment and assess knowledge base.  Interventions:  - Provide teaching at level of understanding  - Provide teaching via preferred learning methods  Outcome: Progressing     Problem: RESPIRATORY - ADULT  Goal: Achieves optimal ventilation and oxygenation  Description: INTERVENTIONS:  - Assess for changes in respiratory status  - Assess for changes in mentation and behavior  - Position to facilitate oxygenation and minimize respiratory effort  - Oxygen administered by appropriate delivery if ordered  -  Initiate smoking cessation education as indicated  - Encourage broncho-pulmonary hygiene including cough, deep breathe, Incentive Spirometry  - Assess the need for suctioning and aspirate as needed  - Assess and instruct to report SOB or any respiratory difficulty  - Respiratory Therapy support as indicated  Outcome: Progressing     Problem: GASTROINTESTINAL - ADULT  Goal: Minimal or absence of nausea and/or vomiting  Description: INTERVENTIONS:  - Administer IV fluids if ordered to ensure adequate hydration  - Maintain NPO status until nausea and vomiting are resolved  - Nasogastric tube if ordered  - Administer ordered antiemetic medications as needed  - Provide nonpharmacologic comfort measures as appropriate  - Advance diet as tolerated, if ordered  - Consider nutrition services referral to assist patient with adequate nutrition and appropriate food choices  Outcome: Progressing  Goal: Maintains or returns to baseline bowel function  Description: INTERVENTIONS:  - Assess bowel function  - Encourage oral fluids to ensure adequate hydration  - Administer IV fluids if ordered to ensure adequate hydration  - Administer ordered medications as needed  - Encourage mobilization and activity  - Consider nutritional services referral to assist patient with adequate nutrition and appropriate food choices  Outcome: Progressing  Goal: Maintains adequate nutritional intake  Description: INTERVENTIONS:  - Monitor percentage of each meal consumed  - Identify factors contributing to decreased intake, treat as appropriate  - Assist with meals as needed  - Monitor I&O, weight, and lab values if indicated  - Obtain nutrition services referral as needed  Outcome: Progressing  Goal: Establish and maintain optimal ostomy function  Description: INTERVENTIONS:  - Assess bowel function  - Encourage oral fluids to ensure adequate hydration  - Administer IV fluids if ordered to ensure adequate hydration   - Administer ordered medications  as needed  - Encourage mobilization and activity  - Nutrition services referral to assist patient with appropriate food choices  - Assess stoma site  - Consider wound care consult   Outcome: Progressing  Goal: Oral mucous membranes remain intact  Description: INTERVENTIONS  - Assess oral mucosa and hygiene practices  - Implement preventative oral hygiene regimen  - Implement oral medicated treatments as ordered  - Initiate Nutrition services referral as needed  Outcome: Progressing     Problem: Prexisting or High Potential for Compromised Skin Integrity  Goal: Skin integrity is maintained or improved  Description: INTERVENTIONS:  - Identify patients at risk for skin breakdown  - Assess and monitor skin integrity  - Assess and monitor nutrition and hydration status  - Monitor labs   - Assess for incontinence   - Turn and reposition patient  - Assist with mobility/ambulation  - Relieve pressure over bony prominences  - Avoid friction and shearing  - Provide appropriate hygiene as needed including keeping skin clean and dry  - Evaluate need for skin moisturizer/barrier cream  - Collaborate with interdisciplinary team   - Patient/family teaching  - Consider wound care consult   Outcome: Progressing

## 2024-02-14 NOTE — PHYSICAL THERAPY NOTE
PHYSICAL THERAPY EVALUATION  DATE: 02/14/24  TIME: 8479-5303    NAME:  Kathi Ferris  AGE:   63 y.o.  Mrn:   [id number]  Length Of Stay: 2    ADMIT DX:  Pulmonary embolism (HCC) [I26.99]  SOB (shortness of breath) [R06.02]  Elevated troponin [R79.89]  Pleural effusion on right [J90]    Past Medical History:   Diagnosis Date    Cancer (HCC)     COPD (chronic obstructive pulmonary disease) (HCC)     Emphysema of lung (HCC)     Hyperlipidemia     Hypertension     Lung cancer (HCC) 06/26/2019    right lower lobe removed    Lung nodule     Pleural effusion      Past Surgical History:   Procedure Laterality Date    APPENDECTOMY      BREAST BIOPSY Right     benign    BRONCHOSCOPY  06/2019    COLON SURGERY      HYSTERECTOMY      age 46    IR PLEURAL EFFUSION LONG-TERM CATHETER PLACEMENT  04/25/2023    IR PORT PLACEMENT  04/18/2023    IR THORACENTESIS  03/24/2023    IR THORACENTESIS  04/12/2023    IR THORACENTESIS  04/18/2023    LUNG BIOPSY  06/2019    LUNG LOBECTOMY      OOPHORECTOMY Bilateral     age 46       Performed at least 2 patient identifiers during session: Name, Birthday, ID bracelet, and Epic photo     02/14/24 1211   PT Last Visit   PT Visit Date 02/14/24   Note Type   Note type Evaluation   Pain Assessment   Pain Assessment Tool 0-10   Pain Score No Pain   Restrictions/Precautions   Weight Bearing Precautions Per Order No   Other Precautions O2   Home Living   Type of Home House   Home Layout Two level;Bed/bath upstairs;1/2 bath on main level;Stairs to enter without rails  (1 ELISA through doorway, stair glide access to 2nd floor bedroom and full bathroom)   Bathroom Shower/Tub Tub/shower unit   Bathroom Toilet Standard   Bathroom Equipment Shower chair   Bathroom Accessibility Accessible   Home Equipment Other (Comment)  (rollator walker, home O2 (3L))   Prior Function   Level of [place] Independent with ADLs;Independent with functional mobility;Independent with IADLS   Lives With Spouse   Receives  "Help From Family;Friend(s);Neighbor   IADLs Independent with meal prep;Independent with medication management;Family/Friend/Other provides transportation   Falls in the last 6 months 0   Vocational Retired   Comments Pt reports being independent with all self care and functional mobility. Uses rollator walker in the home and no AD in community with S. Reports not going out of the home without spouse. Has 2 mcclain retrievers at home. Reports that pt is currently in hospital for esophageal surgery.   General   Family/Caregiver Present No   Cognition   Overall Cognitive Status WFL   Arousal/Participation Cooperative   Orientation Level Oriented X4   Memory Within functional limits   Following Commands Follows all commands and directions without difficulty   Subjective   Subjective \"I'm so ready to go home. You don't get much sleep in the hospital.\"   RUE Assessment   RUE Assessment WFL   LUE Assessment   LUE Assessment WFL   RLE Assessment   RLE Assessment WFL   LLE Assessment   LLE Assessment WFL   Vision-Basic Assessment   Current Vision Wears glasses only for reading   Coordination   Movements are Fluid and Coordinated 1   Sensation WFL   Light Touch   RLE Light Touch Grossly intact   LLE Light Touch Grossly intact   Proprioception   RLE Proprioception Grossly intact   LLE Proprioception Grossly Intact   Bed Mobility   Supine to Sit 6  Modified independent   Additional items HOB elevated;Bedrails   Sit to Supine   (NT as pt was left seated OOB in recliner chair at end of session)   Transfers   Sit to Stand 6  Modified independent   Additional items Other  (rollator walker)   Stand to Sit 6  Modified independent   Additional items Other  (rollator walker)   Stand pivot 6  Modified independent   Additional items Other  (rollator walker)   Ambulation/Elevation   Gait pattern WNL;Decreased foot clearance;Step through pattern   Gait Assistance 6  Modified independent   Assistive Device 4-wheeled walker   Distance 80ft " "  Stair Management Assistance 5  Supervision   Additional items Increased time required   Stair Management Technique Foreward   Number of Stairs 1  (to simulate her ELISA the home)   Balance   Static Sitting Normal   Dynamic Sitting Normal   Static Standing Normal   Dynamic Standing Good   Ambulatory Good   Activity Tolerance   Activity Tolerance Patient tolerated treatment well   Medical Staff Made Aware Spoke with CM, OT   Assessment   Prognosis Good   Assessment Pt seen for PT evaluation for mobility assessment & discharge needs. Activity orders: Up and OOB as tolerated. Pt admitted 2/12/2024 w/ dx Acute pulmonary embolism (HCC). Comorbidities affecting pt's fnxl performance include: COPD, HTN, non small cell lung CA, SIRS, L shoulder adhesive capsulitis, anxiety. During PT IE, pt completes all bed mobility at ISIAH level, transfers at ISIAH level, ambulates 80ft with Rollator walker at ISIAH level, and negotiates 1 step up/down at S level. Pt displays above outlined functional impairments & limitations, and presents close to her baseline level of functional mobility. The AM-PAC & Barthel Index outcome tools were used to assist in determining pt safety w/ mobility/self care & appropriate d/c recommendations, see above for scores. Based on pt presentation & limited functional impairments, pt would most appropriately benefit from return to home with increased social supports upon d/c, no skilled PT services indicated.   Barriers to Discharge Decreased caregiver support  (pt's spouse currently hospitalized for a surgery)   Goals   Patient Goals \"to go home and sleep\"   PT Treatment Day 0   Discharge Recommendation   Rehab Resource Intensity Level, PT No post-acute rehabilitation needs   AM-PAC Basic Mobility Inpatient   Turning in Flat Bed Without Bedrails 4   Lying on Back to Sitting on Edge of Flat Bed Without Bedrails 4   Moving Bed to Chair 4   Standing Up From Chair Using Arms 4   Walk in Room 4   Climb 3-5 Stairs " With Railing 3   Basic Mobility Inpatient Raw Score 23   Basic Mobility Standardized Score 50.88   Highest Level Of Mobility   -HLM Goal 7: Walk 25 feet or more   JH-HLM Achieved 7: Walk 25 feet or more   Modified [place]ila Scale   Modified [place] Scale 2   Barthel Index   Feeding 10   Bathing 5   Grooming Score 5   Dressing Score 10   Bladder Score 10   Bowels Score 10   Toilet Use Score 10   Transfers (Bed/Chair) Score 15   Mobility (Level Surface) Score 10   Stairs Score 5   Barthel Index Score 90   End of Consult   Patient Position at End of Consult Bedside chair;All needs within reach     Based on patient's [facility] Highest Level of Mobility scores today, patient currently has a goal of -HLM Levels: 7: WALK 25 FEET OR MORE, to be completed with RN staffing each shift, in order to improve overall activity tolerance and mobility, combat hospital related deconditioning, and maximize outcomes for d/c from the acute care setting.     The patient's AM-PAC Basic Mobility Inpatient Short Form Raw Score is 23. A Raw score of greater than 16 suggests the patient may benefit from discharge to home. Please also refer to the recommendation of the Physical Therapist for safe discharge planning.      Mirna Montes PT, DPT   Available via [employer]  NPI # [id number]  [place] License - [id number]  2/14/2024

## 2024-02-15 ENCOUNTER — TRANSITIONAL CARE MANAGEMENT (OUTPATIENT)
Dept: FAMILY MEDICINE CLINIC | Facility: HOSPITAL | Age: 64
End: 2024-02-15

## 2024-02-15 ENCOUNTER — HOME CARE VISIT (OUTPATIENT)
Dept: HOME HEALTH SERVICES | Facility: HOME HEALTHCARE | Age: 64
End: 2024-02-15
Payer: COMMERCIAL

## 2024-02-15 ENCOUNTER — PATIENT OUTREACH (OUTPATIENT)
Dept: HEMATOLOGY ONCOLOGY | Facility: CLINIC | Age: 64
End: 2024-02-15

## 2024-02-15 VITALS
TEMPERATURE: 97.2 F | SYSTOLIC BLOOD PRESSURE: 126 MMHG | OXYGEN SATURATION: 3 % | DIASTOLIC BLOOD PRESSURE: 72 MMHG | BODY MASS INDEX: 29.16 KG/M2 | RESPIRATION RATE: 20 BRPM | HEIGHT: 65 IN | HEART RATE: 100 BPM | WEIGHT: 175 LBS

## 2024-02-15 PROCEDURE — G0299 HHS/HOSPICE OF RN EA 15 MIN: HCPCS

## 2024-02-15 NOTE — DISCHARGE SUMMARY
[facility]  Discharge- Kathi CHINCHILLA Cipolla 1960, 63 y.o. female MRN: [id number]  Unit/Bed#: [facility] Encounter: [id number]  Primary Care Provider: Keyon Miller MD   Date and time admitted to hospital: 2/12/2024  4:54 PM    * Acute pulmonary embolism (HCC)  Assessment & Plan  Presents due to worsening shortness of breath since night of 2/11  Known non-small cell lung cancer and COPD.  On baseline 3 L nasal cannula > 95%  CTA chest  Small filling defect in the right interlobar pulmonary artery presumably extending to the resection margin of prior right lower lobectomy; the middle lobe remains perfused. Measured RV/LV ratio is within normal limits at less than 0.9.  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  Echo showed LVEF 65% diastolic dysfunction 1, right ventricle normal, mild pulmonary hypertension  Patient has stable vital signs.  Was started on heparin drip, that was later discontinue and started Eliquis 10 mg twice daily for 7 days and continue with 5 twice daily..will need to follow-up with hematology oncology  Pulmonology was consulted, appreciate recommendations  Eliquis was price checked will cost $30 a month  Discussed with the patient about medication side effect    Diarrhea  Assessment & Plan  Patient reported watery diarrhea in the past 4 days.  Started gemcitabine chemotherapy last week  C. difficile, stool bacterial negative  Suspect diarrhea is chemotherapy related  Had 3 BM yesterday   Imodium was started. Continue as needed     Elevated troponin  Assessment & Plan  Troponin 160, 183,155  2 hr delta 23  Received 324 mg aspirin in the ER   Denies CP. Presented due to worsening SOB. Found to have PE.   Per CT report RV/LV < 0.9. Does not appear to be causing heart strain  Non-MI troponin elevation due to PE    Chronic respiratory  failure with hypoxia (HCC)  Assessment & Plan  Chronically on 2-3 L nasal cannula in the setting of COPD and stage IV non-small cell lung cancer  Did not required extra oxygen on ambulation     SIRS (systemic inflammatory response syndrome) (HCC)  Assessment & Plan  SIRS: tachycardia + tachypnea   On exam patient without tachypnea. Per patient SOB constant but worse with exertion.   HR into the 120s. Prescribed metoprolol and cardizem. Did not take on 2/12 due to nausea at home.   Restart home medications   Denies fevers or chills.  CTA chest negative for infection   covid/flu/rsv negative   Reports ongoing loose stools    cdiff and stool panel negative   Do not suspect infection    Hyponatremia  Assessment & Plan  Recent Labs     02/12/24  1727 02/13/24  0456 02/14/24  0255   SODIUM 129* 135 132*        Sodium 129  Nausea over the past couple of days. Decreased intake.  Also with ongoing loose stool  Prior history of hyponatremia.  Over the past month (131-134)  Possibly related to cancer and or Gemcitabine chemotherapy   Last infusion 2/8/24  Oncology consult   Stable     Non-small cell cancer of right lung (HCC)  Assessment & Plan  Stage IV  Follows with Dr. Phipps   Last seen on 1/29/2024.  Patient's regimen was switched to gemcitabine due to her developing an SBO in January.  Gemcitabine started on 2/6/2024.  Not with curative intent.   Per notes patient is to follow-up with Todd Negrete to see if any options for clinical trials  Pleura cath in place. Gets drained 2x weekly. Last drained 2/12. 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  Oncology was consulted, appreciate recommendations.  Patient will follow-up with hematology oncology outpatient    Essential hypertension  Assessment & Plan  Home regimen:  Micardis 80 mg daily  Verapamil 360 mg  daily at bedtime  Metoprolol tartrate 25 mg twice daily  Normotensive thus far.     COPD (chronic obstructive pulmonary disease) (HCC)  Assessment & Plan  Not in acute exacerbation at this time  Home regimen- continue         Medical Problems       Resolved Problems  Date Reviewed: 2/12/2024   None       Discharging Physician / Practitioner: Johanna Atkinson MD  PCP: Keyon Miller MD  Admission Date:   Admission Orders (From admission, onward)       Ordered        02/12/24 2210  INPATIENT ADMISSION  Once                          Discharge Date: 02/14/24    Consultations During Hospital Stay:  Pulm, oncology    Procedures Performed:   none    Significant Findings / Test Results:   XR chest 1 view portable  Result Date: 2/13/2024  Impression: Right infrahilar opacity reflects mass better evident on subsequent CT. Bilateral pleural effusions, which are better evident on subsequent CT.     CTA ED chest PE study  Result Date: 2/12/2024  Impression: 1.  Small filling defect in the right interlobar pulmonary artery presumably extending to the resection margin of prior right lower lobectomy; the middle lobe remains perfused. Measured RV/LV ratio is within normal limits at less than 0.9. 2.  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       Incidental Findings:   none     Test Results Pending at Discharge (will require follow up):   none     Outpatient Tests Requested:  none    Complications:  none    Reason for Admission: shortness of breath     Hospital Course:   Kathi Ferris is a 63 y.o. female patient who originally presented to the hospital on 2/12/2024 due to worsening shortness of breath. Has chronic respiratory failure on 2-3 L NC oxygen continuously, but felt more SOB compared to her baseline. On presentation CTA chest showed a small filling defect as pulmonary emboli.  "Echo was showed good LVEF, normal right ventricle.had normal Bp and telemetry was unremarkable.    Initially on heparin drip and transitioned to eliquis. Patient discharged on eliquis. Will f/u with pulmonology and hematology       Please see above list of diagnoses and related plan for additional information.     Condition at Discharge: good    Discharge Day Visit / Exam:   Subjective:  shortness of breath at baseline   Vitals: Blood Pressure: 124/71 (02/14/24 1452)  Pulse: 96 (02/14/24 0801)  Temperature: 97.8 °F (36.6 °C) (02/14/24 1452)  Temp Source: Oral (02/13/24 1601)  Respirations: 16 (02/14/24 1452)  Height: 5' 5\" (165.1 cm) (02/13/24 0955)  Weight - Scale: 82.1 kg (181 lb) (02/13/24 0955)  SpO2: 95 % (02/14/24 1300)  Exam:   Physical Exam  Vitals and nursing note reviewed.   Constitutional:       General: She is not in acute distress.     Appearance: She is not diaphoretic.   HENT:      Head: Normocephalic.   Eyes:      General: No scleral icterus.        Right eye: No discharge.         Left eye: No discharge.   Cardiovascular:      Rate and Rhythm: Normal rate and regular rhythm.   Pulmonary:      Effort: Pulmonary effort is normal. No respiratory distress.      Breath sounds: Normal breath sounds. No wheezing, rhonchi or rales.   Abdominal:      General: There is no distension.      Palpations: Abdomen is soft.      Tenderness: There is no abdominal tenderness. There is no guarding or rebound.   Musculoskeletal:      Cervical back: Normal range of motion.      Right lower leg: No edema.      Left lower leg: No edema.   Skin:     General: Skin is warm.   Neurological:      Mental Status: She is alert and oriented to person, place, and time.   Psychiatric:         Mood and Affect: Mood normal.         Behavior: Behavior normal.         Thought Content: Thought content normal.         Judgment: Judgment normal.          Discussion with Family: Updated  () at bedside.    Discharge " instructions/Information to patient and family:   See after visit summary for information provided to patient and family.      Provisions for Follow-Up Care:  See after visit summary for information related to follow-up care and any pertinent home health orders.      Mobility at time of Discharge:   Basic Mobility Inpatient Raw Score: 23  JH-HLM Goal: 7: Walk 25 feet or more  JH-HLM Achieved: 7: Walk 25 feet or more  HLM Goal achieved. Continue to encourage appropriate mobility.     Disposition:   Home    Planned Readmission: no     Discharge Statement:  I spent 60 minutes discharging the patient. This time was spent on the day of discharge. I had direct contact with the patient on the day of discharge. Greater than 50% of the total time was spent examining patient, answering all patient questions, arranging and discussing plan of care with patient as well as directly providing post-discharge instructions.  Additional time then spent on discharge activities.    Discharge Medications:  See after visit summary for reconciled discharge medications provided to patient and/or family.      **Please Note: This note may have been constructed using a voice recognition system**

## 2024-02-15 NOTE — PROGRESS NOTES
MSW made outreach to pt to offer support and to assess any needs.  Pt's voicemail was received.  MSW left a detailed message, along with a contact number and the pt was encouraged to return the call.

## 2024-02-15 NOTE — ASSESSMENT & PLAN NOTE
Recent Labs     02/12/24  1727 02/13/24  0456 02/14/24  0255   SODIUM 129* 135 132*        Sodium 129  Nausea over the past couple of days. Decreased intake.  Also with ongoing loose stool  Prior history of hyponatremia.  Over the past month (131-134)  Possibly related to cancer and or Gemcitabine chemotherapy   Last infusion 2/8/24  Oncology consult   Stable

## 2024-02-15 NOTE — ASSESSMENT & PLAN NOTE
Patient reported watery diarrhea in the past 4 days.  Started gemcitabine chemotherapy last week  C. difficile, stool bacterial negative  Suspect diarrhea is chemotherapy related  Had 3 BM yesterday   Imodium was started. Continue as needed

## 2024-02-15 NOTE — ASSESSMENT & PLAN NOTE
Presents due to worsening shortness of breath since night of 2/11  Known non-small cell lung cancer and COPD.  On baseline 3 L nasal cannula > 95%  CTA chest  Small filling defect in the right interlobar pulmonary artery presumably extending to the resection margin of prior right lower lobectomy; the middle lobe remains perfused. Measured RV/LV ratio is within normal limits at less than 0.9.  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  Echo showed LVEF 65% diastolic dysfunction 1, right ventricle normal, mild pulmonary hypertension  Patient has stable vital signs.  Was started on heparin drip, that was later discontinue and started Eliquis 10 mg twice daily for 7 days and continue with 5 twice daily..will need to follow-up with hematology oncology  Pulmonology was consulted, appreciate recommendations  Eliquis was price checked will cost $30 a month  Discussed with the patient about medication side effect

## 2024-02-15 NOTE — ASSESSMENT & PLAN NOTE
Chronically on 2-3 L nasal cannula in the setting of COPD and stage IV non-small cell lung cancer  Did not required extra oxygen on ambulation

## 2024-02-15 NOTE — ASSESSMENT & PLAN NOTE
SIRS: tachycardia + tachypnea   On exam patient without tachypnea. Per patient SOB constant but worse with exertion.   HR into the 120s. Prescribed metoprolol and cardizem. Did not take on 2/12 due to nausea at home.   Restart home medications   Denies fevers or chills.  CTA chest negative for infection   covid/flu/rsv negative   Reports ongoing loose stools    cdiff and stool panel negative   Do not suspect infection

## 2024-02-15 NOTE — UTILIZATION REVIEW
NOTIFICATION OF ADMISSION DISCHARGE   This is a Notification of Discharge from [facility]. Please be advised that this patient has been discharge from our facility. Below you will find the admission and discharge date and time including the patient’s disposition.   UTILIZATION REVIEW CONTACT:  Johanna Cline  Utilization   Network Utilization Review Department  Phone: [phone] carefully listen to the prompts. All voicemails are confidential.  Email: [email or web address]     ADMISSION INFORMATION  PRESENTATION DATE: 2/12/2024  4:54 PM  OBERVATION ADMISSION DATE:   INPATIENT ADMISSION DATE: 2/12/24 10:10 PM   DISCHARGE DATE: 2/14/2024  5:57 PM   DISPOSITION:Home/Self Care    Network Utilization Review Department  ATTENTION: Please call with any questions or concerns to [phone] and carefully listen to the prompts so that you are directed to the right person. All voicemails are confidential.   For Discharge needs, contact Care Management DC Support Team at [phone] opt. [phone]  Send all requests for admission clinical reviews, approved or denied determinations and any other requests to dedicated fax number below belonging to the campus where the patient is receiving treatment. List of dedicated fax numbers for the Facilities:  FACILITY NAME UR FAX NUMBER   ADMISSION DENIALS (Administrative/Medical Necessity) [phone]   DISCHARGE SUPPORT TEAM ([facility]) [phone]   PARENT CHILD HEALTH (Maternity/NICU/Pediatrics) [phone]   [facility] [phone]   [facility] [phone]   [facility] [phone]   [facility] [phone]   [facility] [phone]   [facility] [phone]   [facility] [phone]   [facility] [phone]    [facility] [phone]   [facility] [phone]   [facility] [phone]   [facility] [phone]

## 2024-02-16 ENCOUNTER — TELEPHONE (OUTPATIENT)
Dept: PULMONOLOGY | Facility: CLINIC | Age: 64
End: 2024-02-16

## 2024-02-16 ENCOUNTER — HOSPITAL ENCOUNTER (OUTPATIENT)
Dept: INFUSION CENTER | Facility: HOSPITAL | Age: 64
End: 2024-02-16
Attending: INTERNAL MEDICINE
Payer: COMMERCIAL

## 2024-02-16 VITALS
HEART RATE: 112 BPM | RESPIRATION RATE: 18 BRPM | BODY MASS INDEX: 29.97 KG/M2 | HEIGHT: 65 IN | DIASTOLIC BLOOD PRESSURE: 63 MMHG | WEIGHT: 179.9 LBS | OXYGEN SATURATION: 96 % | SYSTOLIC BLOOD PRESSURE: 134 MMHG | TEMPERATURE: 96.8 F

## 2024-02-16 DIAGNOSIS — C34.91 NON-SMALL CELL CANCER OF RIGHT LUNG (HCC): Primary | ICD-10-CM

## 2024-02-16 PROCEDURE — 96413 CHEMO IV INFUSION 1 HR: CPT

## 2024-02-16 PROCEDURE — 96367 TX/PROPH/DG ADDL SEQ IV INF: CPT

## 2024-02-16 RX ORDER — SODIUM CHLORIDE 9 MG/ML
20 INJECTION, SOLUTION INTRAVENOUS ONCE
Status: COMPLETED | OUTPATIENT
Start: 2024-02-16 | End: 2024-02-16

## 2024-02-16 RX ORDER — FAMOTIDINE 10 MG/ML
INJECTION, SOLUTION INTRAVENOUS
Status: DISCONTINUED
Start: 2024-02-16 | End: 2024-02-16 | Stop reason: WASHOUT

## 2024-02-16 RX ADMIN — ONDANSETRON 8 MG: 2 INJECTION INTRAMUSCULAR; INTRAVENOUS at 11:48

## 2024-02-16 RX ADMIN — DEXAMETHASONE SODIUM PHOSPHATE 10 MG: 10 INJECTION, SOLUTION INTRAMUSCULAR; INTRAVENOUS at 11:17

## 2024-02-16 RX ADMIN — GEMCITABINE 1800 MG: 38 INJECTION, SOLUTION INTRAVENOUS at 12:22

## 2024-02-16 RX ADMIN — SODIUM CHLORIDE 20 ML/HR: 9 INJECTION, SOLUTION INTRAVENOUS at 11:16

## 2024-02-16 NOTE — PROGRESS NOTES
Kathi Ferris  tolerated treatment well with no complications.      Kathi Ferris is aware of future appt on 2/20 at 1030.     AVS printed and given to Kathi eFrris:  No (Declined by Kathi Ferris)

## 2024-02-19 ENCOUNTER — HOME CARE VISIT (OUTPATIENT)
Dept: HOME HEALTH SERVICES | Facility: HOME HEALTHCARE | Age: 64
End: 2024-02-19
Payer: COMMERCIAL

## 2024-02-19 VITALS
HEART RATE: 76 BPM | TEMPERATURE: 97.6 F | OXYGEN SATURATION: 97 % | DIASTOLIC BLOOD PRESSURE: 64 MMHG | SYSTOLIC BLOOD PRESSURE: 120 MMHG | RESPIRATION RATE: 22 BRPM

## 2024-02-19 PROCEDURE — G0299 HHS/HOSPICE OF RN EA 15 MIN: HCPCS

## 2024-02-20 ENCOUNTER — HOSPITAL ENCOUNTER (OUTPATIENT)
Dept: INFUSION CENTER | Facility: HOSPITAL | Age: 64
Discharge: HOME | End: 2024-02-20
Payer: COMMERCIAL

## 2024-02-20 DIAGNOSIS — C34.91 NON-SMALL CELL CANCER OF RIGHT LUNG (HCC): Primary | ICD-10-CM

## 2024-02-20 LAB
BASOPHILS # BLD AUTO: 0.02 THOUSANDS/ÂΜL (ref 0–0.1)
BASOPHILS NFR BLD AUTO: 0 % (ref 0–1)
EOSINOPHIL # BLD AUTO: 0.01 THOUSAND/ÂΜL (ref 0–0.61)
EOSINOPHIL NFR BLD AUTO: 0 % (ref 0–6)
ERYTHROCYTE [DISTWIDTH] IN BLOOD BY AUTOMATED COUNT: 19.5 % (ref 11.6–15.1)
HCT VFR BLD AUTO: 25.8 % (ref 34.8–46.1)
HGB BLD-MCNC: 8.2 G/DL (ref 11.5–15.4)
IMM GRANULOCYTES # BLD AUTO: 0.12 THOUSAND/UL (ref 0–0.2)
IMM GRANULOCYTES NFR BLD AUTO: 1 % (ref 0–2)
LYMPHOCYTES # BLD AUTO: 0.26 THOUSANDS/ÂΜL (ref 0.6–4.47)
LYMPHOCYTES NFR BLD AUTO: 2 % (ref 14–44)
MCH RBC QN AUTO: 27.7 PG (ref 26.8–34.3)
MCHC RBC AUTO-ENTMCNC: 31.8 G/DL (ref 31.4–37.4)
MCV RBC AUTO: 87 FL (ref 82–98)
MONOCYTES # BLD AUTO: 0.02 THOUSAND/ÂΜL (ref 0.17–1.22)
MONOCYTES NFR BLD AUTO: 0 % (ref 4–12)
NEUTROPHILS # BLD AUTO: 12.91 THOUSANDS/ÂΜL (ref 1.85–7.62)
NEUTS SEG NFR BLD AUTO: 97 % (ref 43–75)
NRBC BLD AUTO-RTO: 0 /100 WBCS
PLATELET # BLD AUTO: 292 THOUSANDS/UL (ref 149–390)
PMV BLD AUTO: 9.4 FL (ref 8.9–12.7)
RBC # BLD AUTO: 2.96 MILLION/UL (ref 3.81–5.12)
WBC # BLD AUTO: 13.34 THOUSAND/UL (ref 4.31–10.16)

## 2024-02-20 PROCEDURE — 85025 COMPLETE CBC W/AUTO DIFF WBC: CPT | Performed by: INTERNAL MEDICINE

## 2024-02-20 NOTE — PROGRESS NOTES
..Kathi Ferris  tolerated treatment well with no complications.      Kathi Ferris is aware of future appt on 2/22 at 8:30.     AVS printed and given to Kathi Ferris:   No (Declined by Kathi Ferris)

## 2024-02-21 ENCOUNTER — HOME CARE VISIT (OUTPATIENT)
Dept: HOME HEALTH SERVICES | Facility: HOME HEALTHCARE | Age: 64
End: 2024-02-21
Payer: COMMERCIAL

## 2024-02-22 ENCOUNTER — PATIENT OUTREACH (OUTPATIENT)
Dept: HEMATOLOGY ONCOLOGY | Facility: CLINIC | Age: 64
End: 2024-02-22

## 2024-02-22 ENCOUNTER — HOSPITAL ENCOUNTER (OUTPATIENT)
Dept: INFUSION CENTER | Facility: HOSPITAL | Age: 64
Discharge: HOME | End: 2024-02-22
Attending: INTERNAL MEDICINE
Payer: COMMERCIAL

## 2024-02-22 VITALS
DIASTOLIC BLOOD PRESSURE: 66 MMHG | TEMPERATURE: 98.3 F | OXYGEN SATURATION: 92 % | RESPIRATION RATE: 18 BRPM | SYSTOLIC BLOOD PRESSURE: 129 MMHG | BODY MASS INDEX: 29.68 KG/M2 | HEIGHT: 65 IN | HEART RATE: 96 BPM | WEIGHT: 178.13 LBS

## 2024-02-22 DIAGNOSIS — C34.91 NON-SMALL CELL CANCER OF RIGHT LUNG (HCC): Primary | ICD-10-CM

## 2024-02-22 PROCEDURE — 96367 TX/PROPH/DG ADDL SEQ IV INF: CPT

## 2024-02-22 PROCEDURE — 96413 CHEMO IV INFUSION 1 HR: CPT

## 2024-02-22 RX ORDER — SODIUM CHLORIDE 9 MG/ML
20 INJECTION, SOLUTION INTRAVENOUS ONCE
Status: COMPLETED | OUTPATIENT
Start: 2024-02-22 | End: 2024-02-22

## 2024-02-22 RX ADMIN — ALTEPLASE 2 MG: 2.2 INJECTION, POWDER, LYOPHILIZED, FOR SOLUTION INTRAVENOUS at 07:46

## 2024-02-22 RX ADMIN — DEXAMETHASONE SODIUM PHOSPHATE 10 MG: 100 INJECTION INTRAMUSCULAR; INTRAVENOUS at 09:52

## 2024-02-22 RX ADMIN — GEMCITABINE 1800 MG: 38 INJECTION, SOLUTION INTRAVENOUS at 10:39

## 2024-02-22 RX ADMIN — SODIUM CHLORIDE 20 ML/HR: 0.9 INJECTION, SOLUTION INTRAVENOUS at 09:52

## 2024-02-22 RX ADMIN — ONDANSETRON 8 MG: 2 INJECTION INTRAMUSCULAR; INTRAVENOUS at 10:19

## 2024-02-22 NOTE — PROGRESS NOTES
Kathi Ferris  tolerated treatment well with no complications.      Kathi Ferris is aware of future appt on 3/5 at 9am.     AVS printed and given to Kathi Ferris:  No (Declined by Kathi Ferris)

## 2024-02-22 NOTE — PROGRESS NOTES
"LSW met with the pt and her spouse in the infusion center. Pt shared her recent hospital stay and how she learned that she had a blood clot.  Pt was in the hospital as her  was also in getting his port and feeding tube placed.  The pt presents in good spirits and appears to be coping appropriately.  Pt shared that her son, who had been avoiding her due to his struggle with seeing her sick, has \"stepped up to the plate\".  He stayed overnight with her while her spouse was still in the hospital and has been quite attentive to both of their needs.  She expressed how happy this has made her and this happiness was evident as she shared the story.   The pt and her spouse spoke highly of their neighbors, who have been providing meals and transportation.  Pt states that she has no specific need at this time.  She may need STAR when her spouse starts treatment and she will call this LSW.  Emotional support offered and LSW will continue to follow.  "

## 2024-02-22 NOTE — PLAN OF CARE
Problem: Potential for Falls  Goal: Patient will remain free of falls  Description: INTERVENTIONS:  - Educate patient/family on patient safety including physical limitations  - Instruct patient to call for assistance with activity   - Keep Call bell within reach  - Keep bed low and locked with side rails adjusted as appropriate  - Keep care items and personal belongings within reach  - Initiate and maintain comfort rounds  - Consider moving patient to room near nurses station  Outcome: Progressing     Problem: Knowledge Deficit  Goal: Patient/family/caregiver demonstrates understanding of disease process, treatment plan, medications, and discharge instructions  Description: Complete learning assessment and assess knowledge base.  Interventions:  - Provide teaching at level of understanding  - Provide teaching via preferred learning methods  Outcome: Progressing

## 2024-02-23 ENCOUNTER — HOME CARE VISIT (OUTPATIENT)
Dept: HOME HEALTH SERVICES | Facility: HOME HEALTHCARE | Age: 64
End: 2024-02-23
Payer: COMMERCIAL

## 2024-02-23 ENCOUNTER — OFFICE VISIT (OUTPATIENT)
Dept: FAMILY MEDICINE CLINIC | Facility: HOSPITAL | Age: 64
End: 2024-02-23
Payer: COMMERCIAL

## 2024-02-23 VITALS
OXYGEN SATURATION: 93 % | DIASTOLIC BLOOD PRESSURE: 60 MMHG | RESPIRATION RATE: 16 BRPM | TEMPERATURE: 99 F | BODY MASS INDEX: 29.66 KG/M2 | HEIGHT: 65 IN | SYSTOLIC BLOOD PRESSURE: 110 MMHG | WEIGHT: 178 LBS | HEART RATE: 97 BPM

## 2024-02-23 VITALS
TEMPERATURE: 97.8 F | RESPIRATION RATE: 22 BRPM | OXYGEN SATURATION: 90 % | SYSTOLIC BLOOD PRESSURE: 120 MMHG | DIASTOLIC BLOOD PRESSURE: 84 MMHG | HEART RATE: 80 BPM

## 2024-02-23 DIAGNOSIS — J96.11 CHRONIC RESPIRATORY FAILURE WITH HYPOXIA (HCC): ICD-10-CM

## 2024-02-23 DIAGNOSIS — I26.99 OTHER ACUTE PULMONARY EMBOLISM, UNSPECIFIED WHETHER ACUTE COR PULMONALE PRESENT (HCC): Primary | ICD-10-CM

## 2024-02-23 DIAGNOSIS — I10 ESSENTIAL HYPERTENSION: ICD-10-CM

## 2024-02-23 DIAGNOSIS — J44.9 CHRONIC OBSTRUCTIVE PULMONARY DISEASE, UNSPECIFIED COPD TYPE (HCC): ICD-10-CM

## 2024-02-23 PROBLEM — R19.7 DIARRHEA: Status: RESOLVED | Noted: 2024-02-13 | Resolved: 2024-02-23

## 2024-02-23 PROBLEM — K56.609 SBO (SMALL BOWEL OBSTRUCTION) (HCC): Status: RESOLVED | Noted: 2024-01-21 | Resolved: 2024-02-23

## 2024-02-23 PROBLEM — R65.10 SIRS (SYSTEMIC INFLAMMATORY RESPONSE SYNDROME) (HCC): Status: RESOLVED | Noted: 2023-09-28 | Resolved: 2024-02-23

## 2024-02-23 PROBLEM — R79.89 ELEVATED TROPONIN: Status: RESOLVED | Noted: 2024-02-12 | Resolved: 2024-02-23

## 2024-02-23 PROCEDURE — 400014 VN F/U

## 2024-02-23 PROCEDURE — 99495 TRANSJ CARE MGMT MOD F2F 14D: CPT | Performed by: INTERNAL MEDICINE

## 2024-02-23 PROCEDURE — G0299 HHS/HOSPICE OF RN EA 15 MIN: HCPCS

## 2024-02-23 RX ORDER — TORSEMIDE 20 MG/1
20 TABLET ORAL EVERY MORNING
Qty: 30 TABLET | Refills: 3 | Status: ON HOLD | OUTPATIENT
Start: 2024-02-23 | End: 2024-06-22

## 2024-02-23 RX ORDER — TORSEMIDE 20 MG/1
20 TABLET ORAL EVERY MORNING
Qty: 30 TABLET | Refills: 3 | Status: SHIPPED | OUTPATIENT
Start: 2024-02-23 | End: 2024-02-23 | Stop reason: SDUPTHER

## 2024-02-23 NOTE — ASSESSMENT & PLAN NOTE
COPD stable.  Lungs are completely clear to auscultation today.  Continue Advair, oxygen, as needed albuterol

## 2024-02-23 NOTE — ASSESSMENT & PLAN NOTE
Patient will continue Eliquis 5 mg p.o. twice daily.  She denies any signs of GI or  bleeding.  Her hemoglobin was 8.2 on February 20 and she will have repeat hemoglobin done on March 7.  She will call if she denies any signs of GI/ bleeding

## 2024-02-23 NOTE — ASSESSMENT & PLAN NOTE
She has chronic hypoxic respiratory failure due to metastatic lung cancer.  She will continue oxygen via nasal cannula at 3 L/min continuous

## 2024-02-23 NOTE — PROGRESS NOTES
Assessment/Plan:     Acute pulmonary embolism (HCC)  Patient will continue Eliquis 5 mg p.o. twice daily.  She denies any signs of GI or  bleeding.  Her hemoglobin was 8.2 on February 20 and she will have repeat hemoglobin done on March 7.  She will call if she denies any signs of GI/ bleeding    Chronic respiratory failure with hypoxia (HCC)  She has chronic hypoxic respiratory failure due to metastatic lung cancer.  She will continue oxygen via nasal cannula at 3 L/min continuous    COPD (chronic obstructive pulmonary disease) (HCC)  COPD stable.  Lungs are completely clear to auscultation today.  Continue Advair, oxygen, as needed albuterol    Essential hypertension  Blood pressure is controlled.    Patient's lower EXTR edema is likely due to hypoalbuminemia on review of blood tests from last hospital stay.    She does not have acute congestive heart failure, did not have DVT, renal function and liver function test were normal this month.    Compression stockings, elevating the legs and taking Lasix do not control her lower extremity edema.    I replaced furosemide with torsemide 20 mg in the morning.  Her electrolytes and renal function will be checked on March 7.    Non-small cell cancer of right lung (HCC)  She has metastatic non-small cell lung cancer.  She is undergoing chemotherapy.    We had a goals of care discussion today with patient's  present as well.  Patient wishes to be comfort measures only if she were to have no pulse or breathing, oxygen as needed for comfort.  She declined tube feeds but accepts antibiotics if needed.    We filled out POLST form and patient will take it home with her       Diagnoses and all orders for this visit:    Other acute pulmonary embolism, unspecified whether acute cor pulmonale present (HCC)    Chronic respiratory failure with hypoxia (HCC)    Chronic obstructive pulmonary disease, unspecified COPD type (HCC)    Essential hypertension  -     Discontinue:  torsemide (DEMADEX) 20 mg tablet; Take 1 tablet (20 mg total) by mouth every morning  -     torsemide (DEMADEX) 20 mg tablet; Take 1 tablet (20 mg total) by mouth every morning         Subjective:     Patient ID: Kathi Ferris is a 63 y.o. female.    Patient presents for ANAMARIA management with .  She was discharged on February 14 after hospital stay for acute pulm embolism exacerbating her chronic shortness of breath due to metastatic lung cancer and COPD.  Her lower EXTR venous Doppler showed no DVT and her echocardiogram showed normal LV systolic function as well as normal RV systolic function.    She has chronic dyspnea which is around her baseline, she uses oxygen via nasal cannula at 3 L/min.  She complains of increasing lower extremity edema despite using bilateral compression stockings and elevating legs.    She had diarrhea on presentation to the hospital which resolved.  She no longer has diarrhea.    She denies signs of GI or  bleeding on the Eliquis            Review of Systems   Constitutional:  Negative for fever.   HENT:  Negative for hearing loss.    Eyes:  Negative for visual disturbance.   Respiratory:  Positive for shortness of breath. Negative for cough.    Cardiovascular:  Positive for leg swelling. Negative for chest pain and palpitations.   Gastrointestinal:  Negative for abdominal pain, blood in stool and diarrhea.   Endocrine: Negative for polydipsia and polyphagia.   Genitourinary:  Negative for dysuria, hematuria and vaginal bleeding.   Musculoskeletal:  Negative for arthralgias and myalgias.   Skin:  Negative for rash.   Neurological:  Negative for headaches.   Psychiatric/Behavioral:  Negative for dysphoric mood.    All other systems reviewed and are negative.        Objective:     Physical Exam  HENT:      Head: Normocephalic.      Mouth/Throat:      Mouth: Mucous membranes are moist.   Eyes:      Conjunctiva/sclera: Conjunctivae normal.   Neck:      Comments: She had no  "JVD  Cardiovascular:      Rate and Rhythm: Normal rate and regular rhythm.      Heart sounds: No murmur heard.  Pulmonary:      Effort: No respiratory distress.      Breath sounds: No wheezing or rales.   Abdominal:      General: Bowel sounds are normal.      Palpations: Abdomen is soft.      Tenderness: There is no abdominal tenderness.   Musculoskeletal:      Cervical back: Neck supple.   Skin:     General: Skin is warm and dry.      Comments: She had 1+ bilateral lower extremity edema   Neurological:      Mental Status: She is alert and oriented to person, place, and time.      Cranial Nerves: No cranial nerve deficit.   Psychiatric:         Mood and Affect: Mood normal.         Thought Content: Thought content normal.           Vitals:    02/23/24 1602   BP: 110/60   Pulse: 97   Resp: 16   Temp: 99 °F (37.2 °C)   TempSrc: Tympanic   SpO2: 93%   Weight: 80.7 kg (178 lb)   Height: 5' 5\" (1.651 m)       Transitional Care Management Review:  Kathi Ferris is a 63 y.o. female here for TCM follow up.     During the TCM phone call patient stated:    TCM Call     Date and time call was made  1/30/2024 10:41 AM    Patient was hospitial[facility] at  [facility]    Date of Admission  02/12/24    Date of discharge  02/14/24    Diagnosis  sob    Disposition  Home    Were the patients medications reviewed and updated  Yes    Current Symptoms  None      TCM Call     Should patient be enrolled in anticoag monitoring?  No    Scheduled for follow up?  Yes    I have advised the patient to call PCP with any new or worsening symptoms  regan samaniego ma [facility], [address]    Living Arrangements  Spouse or Significiant other    Comments  cr initially reached out to pt on 2/15/24, communication got mixed up. meds reviewed, chart is current.   has tcm with cb 2/23/24.            Depression Screening and Follow-up Plan: Patient's depression screening was positive with a PHQ-2 score of 5. Their PHQ-9 " score was 15. Patient assessed for underlying major depression. Brief counseling provided and recommend additional follow-up/re-evaluation next office visit.         Keyon Miller MD

## 2024-02-23 NOTE — ASSESSMENT & PLAN NOTE
She has metastatic non-small cell lung cancer.  She is undergoing chemotherapy.    We had a goals of care discussion today with patient's  present as well.  Patient wishes to be comfort measures only if she were to have no pulse or breathing, oxygen as needed for comfort.  She declined tube feeds but accepts antibiotics if needed.    We filled out POLST form and patient will take it home with her

## 2024-02-23 NOTE — ASSESSMENT & PLAN NOTE
Blood pressure is controlled.    Patient's lower EXTR edema is likely due to hypoalbuminemia on review of blood tests from last hospital stay.    She does not have acute congestive heart failure, did not have DVT, renal function and liver function test were normal this month.    Compression stockings, elevating the legs and taking Lasix do not control her lower extremity edema.    I replaced furosemide with torsemide 20 mg in the morning.  Her electrolytes and renal function will be checked on March 7.

## 2024-02-25 ENCOUNTER — HOSPITAL ENCOUNTER (INPATIENT)
Facility: HOSPITAL | Age: 64
LOS: 5 days | DRG: 374 | End: 2024-03-01
Attending: EMERGENCY MEDICINE | Admitting: INTERNAL MEDICINE
Payer: COMMERCIAL

## 2024-02-25 ENCOUNTER — HOME CARE VISIT (OUTPATIENT)
Dept: HOME HEALTH SERVICES | Facility: HOME HEALTHCARE | Age: 64
End: 2024-02-25
Payer: COMMERCIAL

## 2024-02-25 ENCOUNTER — APPOINTMENT (INPATIENT)
Dept: CT IMAGING | Facility: HOSPITAL | Age: 64
DRG: 374 | End: 2024-02-25
Payer: COMMERCIAL

## 2024-02-25 ENCOUNTER — APPOINTMENT (EMERGENCY)
Dept: RADIOLOGY | Facility: HOSPITAL | Age: 64
DRG: 374 | End: 2024-02-25
Payer: COMMERCIAL

## 2024-02-25 DIAGNOSIS — I10 ESSENTIAL HYPERTENSION: ICD-10-CM

## 2024-02-25 DIAGNOSIS — J91.0 MALIGNANT PLEURAL EFFUSION: ICD-10-CM

## 2024-02-25 DIAGNOSIS — Z51.5 PALLIATIVE CARE PATIENT: ICD-10-CM

## 2024-02-25 DIAGNOSIS — C34.91 NON-SMALL CELL CANCER OF RIGHT LUNG (HCC): ICD-10-CM

## 2024-02-25 DIAGNOSIS — E83.42 HYPOMAGNESEMIA: ICD-10-CM

## 2024-02-25 DIAGNOSIS — J96.11 CHRONIC RESPIRATORY FAILURE WITH HYPOXIA (HCC): ICD-10-CM

## 2024-02-25 DIAGNOSIS — I50.21 ACUTE SYSTOLIC CONGESTIVE HEART FAILURE (HCC): Primary | ICD-10-CM

## 2024-02-25 DIAGNOSIS — R65.10 SIRS (SYSTEMIC INFLAMMATORY RESPONSE SYNDROME) (HCC): ICD-10-CM

## 2024-02-25 PROBLEM — R74.8 ELEVATED ALKALINE PHOSPHATASE LEVEL: Status: ACTIVE | Noted: 2024-02-25

## 2024-02-25 PROBLEM — R18.8 ASCITES: Status: ACTIVE | Noted: 2024-02-25

## 2024-02-25 PROBLEM — J96.21 ACUTE ON CHRONIC RESPIRATORY FAILURE WITH HYPOXIA (HCC): Status: ACTIVE | Noted: 2024-02-25

## 2024-02-25 PROBLEM — J18.9 PNEUMONIA: Status: ACTIVE | Noted: 2024-02-25

## 2024-02-25 LAB
2HR DELTA HS TROPONIN: -1 NG/L
ALBUMIN SERPL BCP-MCNC: 2.7 G/DL (ref 3.5–5)
ALP SERPL-CCNC: 115 U/L (ref 34–104)
ALT SERPL W P-5'-P-CCNC: 19 U/L (ref 7–52)
AMORPH URATE CRY URNS QL MICRO: ABNORMAL
ANION GAP SERPL CALCULATED.3IONS-SCNC: 10 MMOL/L
ANISOCYTOSIS BLD QL SMEAR: PRESENT
APTT PPP: 40 SECONDS (ref 23–37)
AST SERPL W P-5'-P-CCNC: 24 U/L (ref 13–39)
ATRIAL RATE: 107 BPM
BACTERIA UR QL AUTO: ABNORMAL /HPF
BASOPHILS # BLD MANUAL: 0 THOUSAND/UL (ref 0–0.1)
BASOPHILS NFR MAR MANUAL: 0 % (ref 0–1)
BILIRUB SERPL-MCNC: 0.58 MG/DL (ref 0.2–1)
BILIRUB UR QL STRIP: NEGATIVE
BNP SERPL-MCNC: 99 PG/ML (ref 0–100)
BUN SERPL-MCNC: 21 MG/DL (ref 5–25)
CALCIUM ALBUM COR SERPL-MCNC: 10.1 MG/DL (ref 8.3–10.1)
CALCIUM SERPL-MCNC: 9.1 MG/DL (ref 8.4–10.2)
CARDIAC TROPONIN I PNL SERPL HS: 19 NG/L
CARDIAC TROPONIN I PNL SERPL HS: 20 NG/L
CHLORIDE SERPL-SCNC: 96 MMOL/L (ref 96–108)
CLARITY UR: CLEAR
CO2 SERPL-SCNC: 24 MMOL/L (ref 21–32)
COLOR UR: YELLOW
CREAT SERPL-MCNC: 0.53 MG/DL (ref 0.6–1.3)
EOSINOPHIL # BLD MANUAL: 0 THOUSAND/UL (ref 0–0.4)
EOSINOPHIL NFR BLD MANUAL: 0 % (ref 0–6)
ERYTHROCYTE [DISTWIDTH] IN BLOOD BY AUTOMATED COUNT: 19.6 % (ref 11.6–15.1)
FLUAV RNA RESP QL NAA+PROBE: NEGATIVE
FLUBV RNA RESP QL NAA+PROBE: NEGATIVE
GFR SERPL CREATININE-BSD FRML MDRD: 99 ML/MIN/1.73SQ M
GLUCOSE SERPL-MCNC: 97 MG/DL (ref 65–140)
GLUCOSE UR STRIP-MCNC: NEGATIVE MG/DL
HCT VFR BLD AUTO: 25.1 % (ref 34.8–46.1)
HGB BLD-MCNC: 7.9 G/DL (ref 11.5–15.4)
HGB UR QL STRIP.AUTO: NEGATIVE
INR PPP: 1.42 (ref 0.84–1.19)
KETONES UR STRIP-MCNC: ABNORMAL MG/DL
LACTATE SERPL-SCNC: 1.3 MMOL/L (ref 0.5–2)
LEUKOCYTE ESTERASE UR QL STRIP: ABNORMAL
LG PLATELETS BLD QL SMEAR: PRESENT
LYMPHOCYTES # BLD AUTO: 0.25 THOUSAND/UL (ref 0.6–4.47)
LYMPHOCYTES # BLD AUTO: 2 % (ref 14–44)
MAGNESIUM SERPL-MCNC: 1.6 MG/DL (ref 1.9–2.7)
MCH RBC QN AUTO: 27.6 PG (ref 26.8–34.3)
MCHC RBC AUTO-ENTMCNC: 31.5 G/DL (ref 31.4–37.4)
MCV RBC AUTO: 88 FL (ref 82–98)
MONOCYTES # BLD AUTO: 0 THOUSAND/UL (ref 0–1.22)
MONOCYTES NFR BLD: 0 % (ref 4–12)
MUCOUS THREADS UR QL AUTO: ABNORMAL
NEUTROPHILS # BLD MANUAL: 12.14 THOUSAND/UL (ref 1.85–7.62)
NEUTS SEG NFR BLD AUTO: 98 % (ref 43–75)
NITRITE UR QL STRIP: NEGATIVE
NON-SQ EPI CELLS URNS QL MICRO: ABNORMAL /HPF
P AXIS: 39 DEGREES
PH UR STRIP.AUTO: 6 [PH]
PLATELET # BLD AUTO: 144 THOUSANDS/UL (ref 149–390)
PLATELET BLD QL SMEAR: ADEQUATE
PMV BLD AUTO: 10 FL (ref 8.9–12.7)
POIKILOCYTOSIS BLD QL SMEAR: PRESENT
POTASSIUM SERPL-SCNC: 4 MMOL/L (ref 3.5–5.3)
PR INTERVAL: 158 MS
PROCALCITONIN SERPL-MCNC: 0.52 NG/ML
PROT SERPL-MCNC: 6.2 G/DL (ref 6.4–8.4)
PROT UR STRIP-MCNC: ABNORMAL MG/DL
PROTHROMBIN TIME: 17.8 SECONDS (ref 11.6–14.5)
QRS AXIS: 2 DEGREES
QRSD INTERVAL: 80 MS
QT INTERVAL: 326 MS
QTC INTERVAL: 435 MS
RBC # BLD AUTO: 2.86 MILLION/UL (ref 3.81–5.12)
RBC #/AREA URNS AUTO: ABNORMAL /HPF
RBC MORPH BLD: PRESENT
RSV RNA RESP QL NAA+PROBE: NEGATIVE
SARS-COV-2 RNA RESP QL NAA+PROBE: NEGATIVE
SODIUM SERPL-SCNC: 130 MMOL/L (ref 135–147)
SP GR UR STRIP.AUTO: 1.01 (ref 1–1.03)
T WAVE AXIS: 30 DEGREES
UROBILINOGEN UR STRIP-ACNC: <2 MG/DL
VENTRICULAR RATE: 107 BPM
WBC # BLD AUTO: 12.39 THOUSAND/UL (ref 4.31–10.16)
WBC #/AREA URNS AUTO: ABNORMAL /HPF

## 2024-02-25 PROCEDURE — 71045 X-RAY EXAM CHEST 1 VIEW: CPT

## 2024-02-25 PROCEDURE — 99222 1ST HOSP IP/OBS MODERATE 55: CPT

## 2024-02-25 PROCEDURE — 96365 THER/PROPH/DIAG IV INF INIT: CPT

## 2024-02-25 PROCEDURE — 85007 BL SMEAR W/DIFF WBC COUNT: CPT | Performed by: EMERGENCY MEDICINE

## 2024-02-25 PROCEDURE — 87154 CUL TYP ID BLD PTHGN 6+ TRGT: CPT | Performed by: EMERGENCY MEDICINE

## 2024-02-25 PROCEDURE — 87449 NOS EACH ORGANISM AG IA: CPT

## 2024-02-25 PROCEDURE — 83735 ASSAY OF MAGNESIUM: CPT | Performed by: EMERGENCY MEDICINE

## 2024-02-25 PROCEDURE — 85730 THROMBOPLASTIN TIME PARTIAL: CPT | Performed by: EMERGENCY MEDICINE

## 2024-02-25 PROCEDURE — 0241U HB NFCT DS VIR RESP RNA 4 TRGT: CPT | Performed by: EMERGENCY MEDICINE

## 2024-02-25 PROCEDURE — 99285 EMERGENCY DEPT VISIT HI MDM: CPT

## 2024-02-25 PROCEDURE — 71275 CT ANGIOGRAPHY CHEST: CPT

## 2024-02-25 PROCEDURE — 94664 DEMO&/EVAL PT USE INHALER: CPT

## 2024-02-25 PROCEDURE — 84145 PROCALCITONIN (PCT): CPT | Performed by: EMERGENCY MEDICINE

## 2024-02-25 PROCEDURE — 93005 ELECTROCARDIOGRAM TRACING: CPT

## 2024-02-25 PROCEDURE — 85610 PROTHROMBIN TIME: CPT | Performed by: EMERGENCY MEDICINE

## 2024-02-25 PROCEDURE — 74177 CT ABD & PELVIS W/CONTRAST: CPT

## 2024-02-25 PROCEDURE — 36415 COLL VENOUS BLD VENIPUNCTURE: CPT | Performed by: EMERGENCY MEDICINE

## 2024-02-25 PROCEDURE — 84484 ASSAY OF TROPONIN QUANT: CPT | Performed by: EMERGENCY MEDICINE

## 2024-02-25 PROCEDURE — 87040 BLOOD CULTURE FOR BACTERIA: CPT | Performed by: EMERGENCY MEDICINE

## 2024-02-25 PROCEDURE — 99285 EMERGENCY DEPT VISIT HI MDM: CPT | Performed by: EMERGENCY MEDICINE

## 2024-02-25 PROCEDURE — 83880 ASSAY OF NATRIURETIC PEPTIDE: CPT | Performed by: EMERGENCY MEDICINE

## 2024-02-25 PROCEDURE — 81001 URINALYSIS AUTO W/SCOPE: CPT | Performed by: EMERGENCY MEDICINE

## 2024-02-25 PROCEDURE — 85027 COMPLETE CBC AUTOMATED: CPT | Performed by: EMERGENCY MEDICINE

## 2024-02-25 PROCEDURE — 93010 ELECTROCARDIOGRAM REPORT: CPT | Performed by: INTERNAL MEDICINE

## 2024-02-25 PROCEDURE — 80053 COMPREHEN METABOLIC PANEL: CPT | Performed by: EMERGENCY MEDICINE

## 2024-02-25 PROCEDURE — 83605 ASSAY OF LACTIC ACID: CPT | Performed by: EMERGENCY MEDICINE

## 2024-02-25 RX ORDER — ALBUTEROL SULFATE 90 UG/1
2 AEROSOL, METERED RESPIRATORY (INHALATION) EVERY 4 HOURS PRN
Status: DISCONTINUED | OUTPATIENT
Start: 2024-02-25 | End: 2024-02-27

## 2024-02-25 RX ORDER — LIDOCAINE 40 MG/G
CREAM TOPICAL DAILY PRN
Status: DISCONTINUED | OUTPATIENT
Start: 2024-02-25 | End: 2024-02-29

## 2024-02-25 RX ORDER — ALBUTEROL SULFATE 90 UG/1
2 AEROSOL, METERED RESPIRATORY (INHALATION) EVERY 4 HOURS
Status: CANCELLED | OUTPATIENT
Start: 2024-02-25

## 2024-02-25 RX ORDER — LIDOCAINE AND PRILOCAINE 25; 25 MG/G; MG/G
CREAM TOPICAL DAILY PRN
Status: DISCONTINUED | OUTPATIENT
Start: 2024-02-25 | End: 2024-02-25

## 2024-02-25 RX ORDER — LORAZEPAM 0.5 MG/1
0.5 TABLET ORAL DAILY PRN
Status: DISCONTINUED | OUTPATIENT
Start: 2024-02-25 | End: 2024-03-01 | Stop reason: HOSPADM

## 2024-02-25 RX ORDER — ATORVASTATIN CALCIUM 20 MG/1
20 TABLET, FILM COATED ORAL
Status: DISCONTINUED | OUTPATIENT
Start: 2024-02-25 | End: 2024-03-01 | Stop reason: HOSPADM

## 2024-02-25 RX ORDER — MAGNESIUM SULFATE HEPTAHYDRATE 40 MG/ML
2 INJECTION, SOLUTION INTRAVENOUS ONCE
Status: COMPLETED | OUTPATIENT
Start: 2024-02-25 | End: 2024-02-25

## 2024-02-25 RX ORDER — DIPHENHYDRAMINE HCL 25 MG
25 TABLET ORAL
Status: DISCONTINUED | OUTPATIENT
Start: 2024-02-25 | End: 2024-03-01 | Stop reason: HOSPADM

## 2024-02-25 RX ORDER — CEFTRIAXONE 1 G/50ML
1000 INJECTION, SOLUTION INTRAVENOUS EVERY 24 HOURS
Status: DISCONTINUED | OUTPATIENT
Start: 2024-02-25 | End: 2024-02-29

## 2024-02-25 RX ORDER — VERAPAMIL HYDROCHLORIDE 240 MG/1
240 TABLET, FILM COATED, EXTENDED RELEASE ORAL
Status: DISCONTINUED | OUTPATIENT
Start: 2024-02-25 | End: 2024-03-01 | Stop reason: HOSPADM

## 2024-02-25 RX ORDER — ACETAMINOPHEN 325 MG/1
650 TABLET ORAL EVERY 6 HOURS PRN
Status: DISCONTINUED | OUTPATIENT
Start: 2024-02-25 | End: 2024-03-01 | Stop reason: HOSPADM

## 2024-02-25 RX ORDER — OMEGA-3S/DHA/EPA/FISH OIL/D3 300MG-1000
CAPSULE ORAL
Status: CANCELLED | OUTPATIENT
Start: 2024-02-25

## 2024-02-25 RX ORDER — FLUTICASONE FUROATE AND VILANTEROL 200; 25 UG/1; UG/1
1 POWDER RESPIRATORY (INHALATION) DAILY
Status: DISCONTINUED | OUTPATIENT
Start: 2024-02-25 | End: 2024-03-01 | Stop reason: HOSPADM

## 2024-02-25 RX ORDER — SERTRALINE HYDROCHLORIDE 100 MG/1
200 TABLET, FILM COATED ORAL DAILY
Status: DISCONTINUED | OUTPATIENT
Start: 2024-02-25 | End: 2024-03-01 | Stop reason: HOSPADM

## 2024-02-25 RX ORDER — FUROSEMIDE 10 MG/ML
40 INJECTION INTRAMUSCULAR; INTRAVENOUS
Status: DISCONTINUED | OUTPATIENT
Start: 2024-02-25 | End: 2024-02-27

## 2024-02-25 RX ORDER — BUPROPION HYDROCHLORIDE 300 MG/1
300 TABLET ORAL DAILY
Status: DISCONTINUED | OUTPATIENT
Start: 2024-02-25 | End: 2024-03-01 | Stop reason: HOSPADM

## 2024-02-25 RX ORDER — LOSARTAN POTASSIUM 50 MG/1
100 TABLET ORAL DAILY
Status: DISCONTINUED | OUTPATIENT
Start: 2024-02-25 | End: 2024-02-28

## 2024-02-25 RX ORDER — POTASSIUM CHLORIDE 20 MEQ/1
40 TABLET, EXTENDED RELEASE ORAL DAILY
Status: DISCONTINUED | OUTPATIENT
Start: 2024-02-25 | End: 2024-03-01 | Stop reason: HOSPADM

## 2024-02-25 RX ADMIN — LORAZEPAM 0.5 MG: 0.5 TABLET ORAL at 20:53

## 2024-02-25 RX ADMIN — POTASSIUM CHLORIDE 40 MEQ: 1500 TABLET, EXTENDED RELEASE ORAL at 17:08

## 2024-02-25 RX ADMIN — ATORVASTATIN CALCIUM 20 MG: 20 TABLET, FILM COATED ORAL at 16:08

## 2024-02-25 RX ADMIN — SERTRALINE 200 MG: 100 TABLET, FILM COATED ORAL at 17:08

## 2024-02-25 RX ADMIN — APIXABAN 5 MG: 5 TABLET, FILM COATED ORAL at 17:08

## 2024-02-25 RX ADMIN — CEFTRIAXONE 1000 MG: 1 INJECTION, SOLUTION INTRAVENOUS at 16:36

## 2024-02-25 RX ADMIN — LOSARTAN POTASSIUM 100 MG: 50 TABLET, FILM COATED ORAL at 17:07

## 2024-02-25 RX ADMIN — FLUTICASONE FUROATE AND VILANTEROL TRIFENATATE 1 PUFF: 200; 25 POWDER RESPIRATORY (INHALATION) at 17:11

## 2024-02-25 RX ADMIN — MAGNESIUM SULFATE HEPTAHYDRATE 2 G: 2 INJECTION, SOLUTION INTRAVENOUS at 12:53

## 2024-02-25 RX ADMIN — BUPROPION HYDROCHLORIDE 300 MG: 300 TABLET, EXTENDED RELEASE ORAL at 17:07

## 2024-02-25 RX ADMIN — B-COMPLEX W/ C & FOLIC ACID TAB 1 TABLET: TAB at 16:10

## 2024-02-25 RX ADMIN — METOPROLOL TARTRATE 25 MG: 25 TABLET, FILM COATED ORAL at 20:53

## 2024-02-25 RX ADMIN — VERAPAMIL HYDROCHLORIDE 240 MG: 240 TABLET ORAL at 21:03

## 2024-02-25 RX ADMIN — IOHEXOL 100 ML: 350 INJECTION, SOLUTION INTRAVENOUS at 14:20

## 2024-02-25 NOTE — ASSESSMENT & PLAN NOTE
Likely multifactorial in the setting of poor oral intake, and volume overload.  Will start IV lasix 40mg BID  Continue to monitor sodium on BMP daily

## 2024-02-25 NOTE — ASSESSMENT & PLAN NOTE
No acute exacerbation   Continue home regimen   Continue Advair, supplemental O2, and PRN bronchodilators

## 2024-02-25 NOTE — PLAN OF CARE
Problem: PAIN - ADULT  Goal: Verbalizes/displays adequate comfort level or baseline comfort level  Description: Interventions:  - Encourage patient to monitor pain and request assistance  - Assess pain using appropriate pain scale  - Administer analgesics based on type and severity of pain and evaluate response  - Implement non-pharmacological measures as appropriate and evaluate response  - Consider cultural and social influences on pain and pain management  - Notify physician/advanced practitioner if interventions unsuccessful or patient reports new pain  Outcome: Progressing     Problem: INFECTION - ADULT  Goal: Absence or prevention of progression during hospitalization  Description: INTERVENTIONS:  - Assess and monitor for signs and symptoms of infection  - Monitor lab/diagnostic results  - Monitor all insertion sites, i.e. indwelling lines, tubes, and drains  - Monitor endotracheal if appropriate and nasal secretions for changes in amount and color  - [place] appropriate cooling/warming therapies per order  - Administer medications as ordered  - Instruct and encourage patient and family to use good hand hygiene technique  - Identify and instruct in appropriate isolation precautions for identified infection/condition  Outcome: Progressing  Goal: Absence of fever/infection during neutropenic period  Description: INTERVENTIONS:  - Monitor WBC    Outcome: Progressing     Problem: SAFETY ADULT  Goal: Patient will remain free of falls  Description: INTERVENTIONS:  - Educate patient/family on patient safety including physical limitations  - Instruct patient to call for assistance with activity   - Consult OT/PT to assist with strengthening/mobility   - Keep Call bell within reach  - Keep bed low and locked with side rails adjusted as appropriate  - Keep care items and personal belongings within reach  - Initiate and maintain comfort rounds  - Make Fall Risk Sign visible to staff  - Offer Toileting every 3 Hours,  in advance of need  - Initiate/Maintain call bell alarm  - Obtain necessary fall risk management equipment: non-slip socks, commode  - Apply yellow socks and bracelet for high fall risk patients  - Consider moving patient to room near nurses station  Outcome: Progressing  Goal: Maintain or return to baseline ADL function  Description: INTERVENTIONS:  -  Assess patient's ability to carry out ADLs; assess patient's baseline for ADL function and identify physical deficits which impact ability to perform ADLs (bathing, care of mouth/teeth, toileting, grooming, dressing, etc.)  - Assess/evaluate cause of self-care deficits   - Assess range of motion  - Assess patient's mobility; develop plan if impaired  - Assess patient's need for assistive devices and provide as appropriate  - Encourage maximum independence but intervene and supervise when necessary  - Involve family in performance of ADLs  - Assess for home care needs following discharge   - Consider OT consult to assist with ADL evaluation and planning for discharge  - Provide patient education as appropriate  Outcome: Progressing  Goal: Maintains/Returns to pre admission functional level  Description: INTERVENTIONS:  - Perform AM-PAC 6 Click Basic Mobility/ Daily Activity assessment daily.  - Set and communicate daily mobility goal to care team and patient/family/caregiver.   - Collaborate with rehabilitation services on mobility goals if consulted  - Perform Range of Motion 2 times a day.  - Reposition patient every 3 hours.  - Dangle patient 2 times a day  - Stand patient 3 times a day  - Ambulate patient 2 times a day  - Out of bed to chair 2 times a day   - Out of bed for meals 3 times a day  - Out of bed for toileting  - Record patient progress and toleration of activity level   Outcome: Progressing     Problem: DISCHARGE PLANNING  Goal: Discharge to home or other facility with appropriate resources  Description: INTERVENTIONS:  - Identify barriers to discharge  w/patient and caregiver  - Arrange for needed discharge resources and transportation as appropriate  - Identify discharge learning needs (meds, wound care, etc.)  - Arrange for interpretive services to assist at discharge as needed  - Refer to Case Management Department for coordinating discharge planning if the patient needs post-hospital services based on physician/advanced practitioner order or complex needs related to functional status, cognitive ability, or social support system  Outcome: Progressing     Problem: Knowledge Deficit  Goal: Patient/family/caregiver demonstrates understanding of disease process, treatment plan, medications, and discharge instructions  Description: Complete learning assessment and assess knowledge base.  Interventions:  - Provide teaching at level of understanding  - Provide teaching via preferred learning methods  Outcome: Progressing     Problem: Nutrition/Hydration-ADULT  Goal: Nutrient/Hydration intake appropriate for improving, restoring or maintaining nutritional needs  Description: Monitor and assess patient's nutrition/hydration status for malnutrition. Collaborate with interdisciplinary team and initiate plan and interventions as ordered.  Monitor patient's weight and dietary intake as ordered or per policy. Utilize nutrition screening tool and intervene as necessary. Determine patient's food preferences and provide high-protein, high-caloric foods as appropriate.     INTERVENTIONS:  - Monitor oral intake, urinary output, labs, and treatment plans  - Assess nutrition and hydration status and recommend course of action  - Evaluate amount of meals eaten  - Assist patient with eating if necessary   - Allow adequate time for meals  - Recommend/ encourage appropriate diets, oral nutritional supplements, and vitamin/mineral supplements  - Order, calculate, and assess calorie counts as needed  - Recommend, monitor, and adjust tube feedings and TPN/PPN based on assessed needs  -  Assess need for intravenous fluids  - Provide specific nutrition/hydration education as appropriate  - Include patient/family/caregiver in decisions related to nutrition  Outcome: Progressing     Problem: CARDIOVASCULAR - ADULT  Goal: Maintains optimal cardiac output and hemodynamic stability  Description: INTERVENTIONS:  - Monitor I/O, vital signs and rhythm  - Monitor for S/S and trends of decreased cardiac output  - Administer and titrate ordered vasoactive medications to optimize hemodynamic stability  - Assess quality of pulses, skin color and temperature  - Assess for signs of decreased coronary artery perfusion  - Instruct patient to report change in severity of symptoms  Outcome: Progressing     Problem: GASTROINTESTINAL - ADULT  Goal: Minimal or absence of nausea and/or vomiting  Description: INTERVENTIONS:  - Administer IV fluids if ordered to ensure adequate hydration  - Maintain NPO status until nausea and vomiting are resolved  - Nasogastric tube if ordered  - Administer ordered antiemetic medications as needed  - Provide nonpharmacologic comfort measures as appropriate  - Advance diet as tolerated, if ordered  - Consider nutrition services referral to assist patient with adequate nutrition and appropriate food choices  Outcome: Progressing  Goal: Maintains or returns to baseline bowel function  Description: INTERVENTIONS:  - Assess bowel function  - Encourage oral fluids to ensure adequate hydration  - Administer IV fluids if ordered to ensure adequate hydration  - Administer ordered medications as needed  - Encourage mobilization and activity  - Consider nutritional services referral to assist patient with adequate nutrition and appropriate food choices  Outcome: Progressing  Goal: Maintains adequate nutritional intake  Description: INTERVENTIONS:  - Monitor percentage of each meal consumed  - Identify factors contributing to decreased intake, treat as appropriate  - Assist with meals as needed  -  Monitor I&O, weight, and lab values if indicated  - Obtain nutrition services referral as needed  Outcome: Progressing  Goal: Establish and maintain optimal ostomy function  Description: INTERVENTIONS:  - Assess bowel function  - Encourage oral fluids to ensure adequate hydration  - Administer IV fluids if ordered to ensure adequate hydration   - Administer ordered medications as needed  - Encourage mobilization and activity  - Nutrition services referral to assist patient with appropriate food choices  - Assess stoma site  - Consider wound care consult   Outcome: Progressing  Goal: Oral mucous membranes remain intact  Description: INTERVENTIONS  - Assess oral mucosa and hygiene practices  - Implement preventative oral hygiene regimen  - Implement oral medicated treatments as ordered  - Initiate Nutrition services referral as needed  Outcome: Progressing     Problem: GASTROINTESTINAL - ADULT  Goal: Minimal or absence of nausea and/or vomiting  Description: INTERVENTIONS:  - Administer IV fluids if ordered to ensure adequate hydration  - Maintain NPO status until nausea and vomiting are resolved  - Nasogastric tube if ordered  - Administer ordered antiemetic medications as needed  - Provide nonpharmacologic comfort measures as appropriate  - Advance diet as tolerated, if ordered  - Consider nutrition services referral to assist patient with adequate nutrition and appropriate food choices  Outcome: Progressing  Goal: Maintains or returns to baseline bowel function  Description: INTERVENTIONS:  - Assess bowel function  - Encourage oral fluids to ensure adequate hydration  - Administer IV fluids if ordered to ensure adequate hydration  - Administer ordered medications as needed  - Encourage mobilization and activity  - Consider nutritional services referral to assist patient with adequate nutrition and appropriate food choices  Outcome: Progressing  Goal: Maintains adequate nutritional intake  Description:  INTERVENTIONS:  - Monitor percentage of each meal consumed  - Identify factors contributing to decreased intake, treat as appropriate  - Assist with meals as needed  - Monitor I&O, weight, and lab values if indicated  - Obtain nutrition services referral as needed  Outcome: Progressing  Goal: Establish and maintain optimal ostomy function  Description: INTERVENTIONS:  - Assess bowel function  - Encourage oral fluids to ensure adequate hydration  - Administer IV fluids if ordered to ensure adequate hydration   - Administer ordered medications as needed  - Encourage mobilization and activity  - Nutrition services referral to assist patient with appropriate food choices  - Assess stoma site  - Consider wound care consult   Outcome: Progressing  Goal: Oral mucous membranes remain intact  Description: INTERVENTIONS  - Assess oral mucosa and hygiene practices  - Implement preventative oral hygiene regimen  - Implement oral medicated treatments as ordered  - Initiate Nutrition services referral as needed  Outcome: Progressing     Problem: METABOLIC, FLUID AND ELECTROLYTES - ADULT  Goal: Electrolytes maintained within normal limits  Description: INTERVENTIONS:  - Monitor labs and assess patient for signs and symptoms of electrolyte imbalances  - Administer electrolyte replacement as ordered  - Monitor response to electrolyte replacements, including repeat lab results as appropriate  - Instruct patient on fluid and nutrition as appropriate  Outcome: Progressing  Goal: Fluid balance maintained  Description: INTERVENTIONS:  - Monitor labs   - Monitor I/O and WT  - Instruct patient on fluid and nutrition as appropriate  - Assess for signs & symptoms of volume excess or deficit  Outcome: Progressing     Problem: MUSCULOSKELETAL - ADULT  Goal: Maintain proper alignment of affected body part  Description: INTERVENTIONS:  - Support, maintain and protect limb and body alignment  - Provide patient/ family with appropriate  education  Outcome: Progressing

## 2024-02-25 NOTE — ASSESSMENT & PLAN NOTE
SIRS criteria met at time of admission with tachycardia, tachypnea, leukocytosis,   Suspected source: worsening groundglass parenchymal opacities throughout the left lung superimposed pneumonia.   CXR: bilateral effusions, extensive bilateral ground glass opacities  UA: pending  Continue to trend leukocytosis and fever curve  Procalcitonin: slightly elevated 0.52, continue to trend   Lactic acid: within normal limits  Blood Cultures pending

## 2024-02-25 NOTE — ASSESSMENT & PLAN NOTE
Does not appear to be in acute exacerbation   Continue home regimen   Continue Advair, supplemental O2, and albuterol as needed

## 2024-02-25 NOTE — ASSESSMENT & PLAN NOTE
Recently discharged 2 weeks ago after being treated for pulmonary embolism  Patient maintained indefinitely on Eliquis 5 mg twice daily, continue

## 2024-02-25 NOTE — ED PROVIDER NOTES
History  Chief Complaint   Patient presents with    Shortness of Breath     Pt is c/o increase sob. Pt has hx of lung ca. Usually on 3 L/min oxygen.      63-year-old female ex-smoker with history of stage IV non-small cell lung cancer on the right, malignant pleural effusion, COPD, chronic respiratory failure with hypoxia, hypertension, pulmonary embolism on Eliquis complains of worsening dyspnea over the past week.  Patient is status post right lower lobe lobectomy and has started chemo therapy.  Was admitted to this hospital - with acute PE, SIRS criteria without obvious infection.  She endorses increased pedal edema and abdominal girth this past week.  She saw her PCP 2 days ago who stopped furosemide and started her on Demadex 20 mg daily and she had increased urine output yesterday.  Denies chest pain, fever, change in cough, signs of bleeding.  Notes her home 3 L/min nasal cannula oxygen has not been helping much with her increased dyspnea this week.        Prior to Admission Medications   Prescriptions Last Dose Informant Patient Reported? Taking?   Ascorbic Acid (vitamin C) 1000 MG tablet  Self Yes No   Si daily   Biotin 95449 MCG TABS  Self Yes No   Si daily   Cholecalciferol (Vitamin D3) 25 MCG (1000 UT) CAPS  Self Yes No   Si capsule every 24 hours   Fluticasone-Salmeterol (Advair) 500-50 mcg/dose inhaler   Yes No   Sig: Inhale 1 puff 2 (two) times a day. Indications: Asthma   LORazepam (Ativan) 0.5 mg tablet   No No   Sig: Take 1 tablet (0.5 mg total) by mouth daily as needed for anxiety   Multiple Vitamin (MULTIVITAMINS PO)  Self Yes No   Sig: every 24 hours   Probiotic Product (PROBIOTIC DAILY PO)  Self Yes No   Si daily   albuterol (2.5 mg/3 mL) 0.083 % nebulizer solution  Self No No   Sig: Take 3 mL (2.5 mg total) by nebulization every 6 (six) hours as needed for wheezing or shortness of breath   albuterol (PROVENTIL HFA,VENTOLIN HFA) 90 mcg/act inhaler  Self Yes No    Sig: Inhale 2 puffs every 4 (four) hours   apixaban (Eliquis) 5 mg   No No   Sig: Take 2 tablets (10 mg total) by mouth 2 (two) times a day for 7 days, THEN 1 tablet (5 mg total) 2 (two) times a day.   atorvastatin (LIPITOR) 20 mg tablet  Self Yes No   Sig: Take 20 mg by mouth daily.   buPROPion (WELLBUTRIN XL) 300 mg 24 hr tablet  Self Yes No   Sig: TAKE 1 TABLET BY MOUTH EVERY DAY IN THE MORNING FOR 90 DAYS   dexamethasone sodium phosphate 0.1 % ophthalmic solution  Self No No   Sig: Administer 1 drop to both eyes every 3 (three) hours as needed (eye irritation)   Patient not taking: Reported on 2024   diphenhydrAMINE (BENADRYL) 50 MG tablet  Self No No   Sig: Take 1 tablet (50 mg total) by mouth every 6 (six) hours as needed for itching (Rash)   diphenhydrAMINE-APAP, sleep, (TYLENOL PM EXTRA STRENGTH PO)  Self Yes No   Sig: Take 325 mg by mouth daily at bedtime   lidocaine-prilocaine (EMLA) cream   No No   Sig: APPLY TO PORT 30 TO 60 MINUTES PRIOR TO USE   loperamide (IMODIUM) 2 mg capsule   No No   Sig: Take 1 capsule (2 mg total) by mouth 4 (four) times a day as needed for diarrhea   metoprolol tartrate (LOPRESSOR) 25 mg tablet   No No   Sig: Take 1 tablet (25 mg total) by mouth every 12 (twelve) hours   oxygen gas   Yes No   Sig: Inhale 2 L/min continuous. May increase to 3L PRN for dyspnea and sats under 89%  Indications: Hypoxia   potassium chloride (Klor-Con M20) 20 mEq tablet   No No   Sig: Take 2 tablets (40 mEq total) by mouth daily   sertraline (ZOLOFT) 100 mg tablet  Self Yes No   Si daily   telmisartan (MICARDIS) 80 MG tablet   No No   Sig: TAKE 1 TABLET BY MOUTH EVERY DAY   torsemide (DEMADEX) 20 mg tablet   No No   Sig: Take 1 tablet (20 mg total) by mouth every morning   verapamil (CALAN-SR) 240 mg CR tablet  Self No No   Sig: Take 1 tablet (240 mg total) by mouth daily at bedtime 1 hs      Facility-Administered Medications: None       Past Medical History:   Diagnosis Date    Cancer  (HCC)     COPD (chronic obstructive pulmonary disease) (HCC)     Emphysema of lung (HCC)     Hyperlipidemia     Hypertension     Lung cancer (HCC) 2019    right lower lobe removed    Lung nodule     Pleural effusion        Past Surgical History:   Procedure Laterality Date    APPENDECTOMY      BREAST BIOPSY Right     benign    BRONCHOSCOPY  2019    COLON SURGERY      HYSTERECTOMY      age 46    IR PLEURAL EFFUSION LONG-TERM CATHETER PLACEMENT  2023    IR PORT PLACEMENT  2023    IR THORACENTESIS  2023    IR THORACENTESIS  2023    IR THORACENTESIS  2023    LUNG BIOPSY  2019    LUNG LOBECTOMY      OOPHORECTOMY Bilateral     age 46       Family History   Problem Relation Age of Onset    Colon cancer Mother     Hypertension Mother     Hyperlipidemia Mother     Hearing loss Mother     Depression Mother     COPD Mother     Cancer Mother         Lung    Arthritis Mother     Lung cancer Mother     Drug abuse Brother     Diabetes Maternal Grandmother     Cancer Maternal Grandmother         Colon    Colon cancer Maternal Grandmother     Cancer Maternal Aunt     Colon cancer Maternal Aunt     Colon cancer Maternal Aunt     Cancer Maternal Aunt         Colon     I have reviewed and agree with the history as documented.    E-Cigarette/Vaping    E-Cigarette Use Never User      E-Cigarette/Vaping Substances    Nicotine No     THC No     CBD No     Flavoring No     Other No     Unknown No      Social History     Tobacco Use    Smoking status: Former     Current packs/day: 0.00     Average packs/day: 1.5 packs/day for 53.2 years (79.8 ttl pk-yrs)     Types: Cigarettes     Start date:      Quit date: 3/15/2023     Years since quittin.9     Passive exposure: Past    Smokeless tobacco: Never    Tobacco comments:     Patient quit 1 year ago   Vaping Use    Vaping status: Never Used   Substance Use Topics    Alcohol use: Not Currently     Comment: Rarely drink, socially only    Drug use:  No     Comment: has her medical marijuana card but does not like the way it makes her feel, she does not use it       Review of Systems    Physical Exam  Physical Exam  Vitals and nursing note reviewed.   Constitutional:       General: She is not in acute distress.     Appearance: She is well-developed. She is ill-appearing. She is not diaphoretic.   HENT:      Head: Normocephalic and atraumatic.      Mouth/Throat:      Mouth: Mucous membranes are moist.      Pharynx: Oropharynx is clear. No oropharyngeal exudate.   Eyes:      Conjunctiva/sclera: Conjunctivae normal.      Pupils: Pupils are equal, round, and reactive to light.   Cardiovascular:      Rate and Rhythm: Regular rhythm. Tachycardia present.      Heart sounds: No murmur heard.  Pulmonary:      Effort: Pulmonary effort is normal.      Breath sounds: Examination of the right-middle field reveals rales. Examination of the right-lower field reveals rales. Examination of the left-lower field reveals rales. Rales present.   Chest:      Chest wall: No deformity or tenderness.   Abdominal:      General: Bowel sounds are normal.      Palpations: Abdomen is soft.      Tenderness: There is no abdominal tenderness. There is no guarding.      Comments: Distended. Fluid wave noted.   Musculoskeletal:         General: Normal range of motion.      Cervical back: Normal range of motion and neck supple.      Right lower leg: No tenderness. Edema present.      Left lower leg: No tenderness. Edema present.   Lymphadenopathy:      Cervical: No cervical adenopathy.   Skin:     General: Skin is warm and dry.      Capillary Refill: Capillary refill takes 2 to 3 seconds.      Coloration: Skin is pale.      Findings: No rash.   Neurological:      General: No focal deficit present.      Mental Status: She is alert and oriented to person, place, and time.      Cranial Nerves: No cranial nerve deficit.      Coordination: Coordination normal.      Deep Tendon Reflexes: Reflexes are  normal and symmetric.   Psychiatric:         Mood and Affect: Mood normal.         Behavior: Behavior normal.         Vital Signs  ED Triage Vitals [02/25/24 1057]   Temperature Pulse Respirations Blood Pressure SpO2   97.8 °F (36.6 °C) (!) 108 (!) 24 121/63 (!) 86 %      Temp Source Heart Rate Source Patient Position - Orthostatic VS BP Location FiO2 (%)   Temporal Monitor Sitting Left arm --      Pain Score       No Pain           Vitals:    02/25/24 1300 02/25/24 1330 02/25/24 1400 02/25/24 1457   BP: 110/59 105/66 122/61 110/66   Pulse: 99 101 104 (!) 118   Patient Position - Orthostatic VS:    Lying         Visual Acuity      ED Medications  Medications   atorvastatin (LIPITOR) tablet 20 mg (has no administration in time range)   buPROPion (WELLBUTRIN XL) 24 hr tablet 300 mg (has no administration in time range)   diphenhydrAMINE-acetaminophen (TYLENOL PM)  MG per tablet 1 tablet (has no administration in time range)   fluticasone-vilanterol 200-25 mcg/actuation 1 puff (has no administration in time range)   LORazepam (ATIVAN) tablet 0.5 mg (has no administration in time range)   metoprolol tartrate (LOPRESSOR) tablet 25 mg (has no administration in time range)   multivitamin stress formula tablet 1 tablet (has no administration in time range)   potassium chloride (Klor-Con M20) CR tablet 40 mEq (has no administration in time range)   losartan (COZAAR) tablet 100 mg (has no administration in time range)   verapamil (CALAN-SR) CR tablet 240 mg (has no administration in time range)   sertraline (ZOLOFT) tablet 200 mg (has no administration in time range)   albuterol (PROVENTIL HFA,VENTOLIN HFA) inhaler 2 puff (has no administration in time range)   lidocaine-prilocaine (EMLA) cream (has no administration in time range)   acetaminophen (TYLENOL) tablet 650 mg (has no administration in time range)   apixaban (ELIQUIS) tablet 5 mg (has no administration in time range)   furosemide (LASIX) injection 40 mg (has  no administration in time range)   cefTRIAXone (ROCEPHIN) IVPB (premix in dextrose) 1,000 mg 50 mL (has no administration in time range)   magnesium sulfate 2 g/50 mL IVPB (premix) 2 g (2 g Intravenous New Bag 2/25/24 1253)   iohexol (OMNIPAQUE) 350 MG/ML injection (MULTI-DOSE) 100 mL (100 mL Intravenous Given 2/25/24 1420)       Diagnostic Studies  Results Reviewed       Procedure Component Value Units Date/Time    HS Troponin I 2hr [[id number]]  (Normal) Collected: 02/25/24 1400    Lab Status: Final result Specimen: Blood from Hand, Right Updated: 02/25/24 1427     hs TnI 2hr 19 ng/L      Delta 2hr hsTnI -1 ng/L     HS Troponin 0hr (reflex protocol) [[id number]]  (Normal) Collected: 02/25/24 1114    Lab Status: Final result Specimen: Blood from Arm, Right Updated: 02/25/24 1330     hs TnI 0hr 20 ng/L     HS Troponin I 4hr [[id number]]     Lab Status: No result Specimen: Blood     B-Type Natriuretic Peptide(BNP) [[id number]]  (Normal) Collected: 02/25/24 1114    Lab Status: Final result Specimen: Blood from Arm, Left Updated: 02/25/24 1326     BNP 99 pg/mL     RBC Morphology Reflex Test [[id number]] Collected: 02/25/24 1114    Lab Status: Final result Specimen: Blood from Arm, Left Updated: 02/25/24 1202    FLU/RSV/COVID - if FLU/RSV clinically relevant [[id number]]  (Normal) Collected: 02/25/24 1114    Lab Status: Final result Specimen: Nares from Nose Updated: 02/25/24 1159     SARS-CoV-2 Negative     INFLUENZA A PCR Negative     INFLUENZA B PCR Negative     RSV PCR Negative    Narrative:      FOR PEDIATRIC PATIENTS - copy/paste COVID Guidelines URL to browser: [email or web address]    SARS-CoV-2 assay is a Nucleic Acid Amplification assay intended for the  qualitative detection of nucleic acid from SARS-CoV-2 in nasopharyngeal  swabs. Results are for the presumptive identification of SARS-CoV-2 RNA.    Positive results are indicative of infection with SARS-CoV-2,  the virus  causing COVID-19, but do not rule out bacterial infection or co-infection  with other viruses. Laboratories within the [place] and its  territories are required to report all positive results to the appropriate  public health authorities. Negative results do not preclude SARS-CoV-2  infection and should not be used as the sole basis for treatment or other  patient management decisions. Negative results must be combined with  clinical observations, patient history, and epidemiological information.  This test has not been FDA cleared or approved.    This test has been authorized by FDA under an Emergency Use Authorization  (EUA). This test is only authorized for the duration of time the  declaration that circumstances exist justifying the authorization of the  emergency use of an in vitro diagnostic tests for detection of SARS-CoV-2  virus and/or diagnosis of COVID-19 infection under section 564(b)(1) of  the Act, 21 U.S.C. 360bbb-3(b)(1), unless the authorization is terminated  or revoked sooner. The test has been validated but independent review by FDA  and CLIA is pending.    Test performed using [employer] GeneXpert: This RT-PCR assay targets N2,  a region unique to SARS-CoV-2. A conserved region in the E-gene was chosen  for pan-Sarbecovirus detection which includes SARS-CoV-2.    According to CMS-2020-01-R, this platform meets the definition of high-throughput technology.    Procalcitonin [[id number]]  (Abnormal) Collected: 02/25/24 1114    Lab Status: Final result Specimen: Blood from Arm, Left Updated: 02/25/24 1151     Procalcitonin 0.52 ng/ml     Lactic acid [[id number]]  (Normal) Collected: 02/25/24 1114    Lab Status: Final result Specimen: Blood from Arm, Left Updated: 02/25/24 1141     LACTIC ACID 1.3 mmol/L     Narrative:      Result may be elevated if tourniquet was used during collection.    CBC and differential [[id number]]  (Abnormal) Collected: 02/25/24 1114    Lab Status: Final result  Specimen: Blood from Arm, Left Updated: 02/25/24 1141     WBC 12.39 Thousand/uL      RBC 2.86 Million/uL      Hemoglobin 7.9 g/dL      Hematocrit 25.1 %      MCV 88 fL      MCH 27.6 pg      MCHC 31.5 g/dL      RDW 19.6 %      MPV 10.0 fL      Platelets 144 Thousands/uL     Comprehensive metabolic panel [[id number]]  (Abnormal) Collected: 02/25/24 1114    Lab Status: Final result Specimen: Blood from Arm, Left Updated: 02/25/24 1141     Sodium 130 mmol/L      Potassium 4.0 mmol/L      Chloride 96 mmol/L      CO2 24 mmol/L      ANION GAP 10 mmol/L      BUN 21 mg/dL      Creatinine 0.53 mg/dL      Glucose 97 mg/dL      Calcium 9.1 mg/dL      Corrected Calcium 10.1 mg/dL      AST 24 U/L      ALT 19 U/L      Alkaline Phosphatase 115 U/L      Total Protein 6.2 g/dL      Albumin 2.7 g/dL      Total Bilirubin 0.58 mg/dL      eGFR 99 ml/min/1.73sq m     Narrative:      National Kidney Disease Foundation guidelines for Chronic Kidney Disease (CKD):     Stage 1 with normal or high GFR (GFR > 90 mL/min/1.73 square meters)    Stage 2 Mild CKD (GFR = 60-89 mL/min/1.73 square meters)    Stage 3A Moderate CKD (GFR = 45-59 mL/min/1.73 square meters)    Stage 3B Moderate CKD (GFR = 30-44 mL/min/1.73 square meters)    Stage 4 Severe CKD (GFR = 15-29 mL/min/1.73 square meters)    Stage 5 End Stage CKD (GFR <15 mL/min/1.73 square meters)  Note: GFR calculation is accurate only with a steady state creatinine    Magnesium [[id number]]  (Abnormal) Collected: 02/25/24 1114    Lab Status: Final result Specimen: Blood from Arm, Left Updated: 02/25/24 1141     Magnesium 1.6 mg/dL     Manual Differential(PHLEBS Do Not Order) [[id number]]  (Abnormal) Collected: 02/25/24 1114    Lab Status: Final result Specimen: Blood from Arm, Left Updated: 02/25/24 1141     Segmented % 98 %      Lymphocytes % 2 %      Monocytes % 0 %      Eosinophils, % 0 %      Basophils % 0 %      Absolute Neutrophils 12.14 Thousand/uL      Lymphocytes Absolute 0.25 Thousand/uL       Monocytes Absolute 0.00 Thousand/uL      Eosinophils Absolute 0.00 Thousand/uL      Basophils Absolute 0.00 Thousand/uL      Total Counted --     RBC Morphology Present     Platelet Estimate Adequate     Large Platelet Present     Anisocytosis Present     Poikilocytes Present    Protime-INR [[id number]]  (Abnormal) Collected: 02/25/24 1114    Lab Status: Final result Specimen: Blood from Arm, Left Updated: 02/25/24 1136     Protime 17.8 seconds      INR 1.42    APTT [[id number]]  (Abnormal) Collected: 02/25/24 1114    Lab Status: Final result Specimen: Blood from Arm, Left Updated: 02/25/24 1136     PTT 40 seconds     Blood culture #1 [[id number]] Collected: 02/25/24 1114    Lab Status: In process Specimen: Blood from Arm, Left Updated: 02/25/24 1126    Blood culture #2 [[id number]] Collected: 02/25/24 1114    Lab Status: In process Specimen: Blood from Hand, Left Updated: 02/25/24 1120    Urinalysis with microscopic [[id number]]     Lab Status: No result Specimen: Urine                    CTA chest (pe study) abdomen pelvis contrast   Final Result by Roosevelt Shoemaker MD (02/25 1516)      1.Unchanged filling defect at the postsurgical site in the right distal pulmonary artery with no new embolus although limited by poor opacification.   Measured RV/LV ratio is within normal limits at less than 0.9.      2. Small to moderate right pleural effusion with Pleurx catheter in place with moderate left effusion unchanged.      3. Mostly unchanged right lower lung mass with extension to the suture site in the hilum.      4. Large amount of ascites increased from prior study with omental nodularity of carcinomatosis.   Previous obstructive pattern is resolved.      5. Unchanged right lung parenchymal changes with worsening groundglass parenchymal opacities throughout the left lung of superimposed pneumonia.      Workstation performed: [id number]         XR chest portable   ED Interpretation by Mariano Suazo DO  (02/25 1242)   Acute CHF versus worsening lung cancer      Final Result by Glenis Mac MD (02/25 1450)      Right pleural drainage catheter with small loculated right effusion with no pneumothorax.      Left pleural effusion on subsequent CT not visible.      Extensive bilateral groundglass opacity.            Workstation performed: [id number]                    Procedures  ECG 12 Lead Documentation Only    Date/Time: 2/25/2024 11:36 AM    Performed by: Mariano Suazo DO  Authorized by: Mariano Suazo DO    ECG reviewed by me, the ED Provider: yes    Patient location:  ED  Previous ECG:     Previous ECG:  Compared to current    Comparison ECG info:  Nonspecific T wave abnormality, worse in inferior leads    Similarity:  Changes noted    Comparison to cardiac monitor: Yes    Rhythm:     Rhythm: sinus tachycardia    Ectopy:     Ectopy: none    QRS:     QRS axis:  Normal    QRS intervals:  Normal  Conduction:     Conduction: normal    Comments:      Cannot rule out anterior infarct (cited previously).           ED Course                                             Medical Decision Making  63-year-old female with stage IV non-small cell lung cancer, ligaments pleural effusion, COPD on Eliquis for pulmonary embolism complains of increasing shortness of breath and increasing edema.  No fever or signs of infection.  Denies chest pain and palpitations also denies signs of bleeding.  Patient does look pale and has had some anemia without ever requiring blood transfusion.  Concern for worsening malignant pleural effusion, increasing ascites, anasarca, ACS, CHF, dysrhythmia, exacerbation of COPD and worsening anemia.  Will check labs and imaging.    Amount and/or Complexity of Data Reviewed  Independent Historian: spouse  External Data Reviewed: labs, radiology, ECG and notes.  Labs: ordered.  Radiology: ordered and independent interpretation performed.  ECG/medicine tests: ordered and independent interpretation  performed. Decision-making details documented in ED Course.    Risk  Prescription drug management.  Decision regarding hospitalization.             Disposition  Final diagnoses:   Acute systolic congestive heart failure (HCC)   Non-small cell cancer of right lung (HCC)   Hypomagnesemia   Chronic respiratory failure with hypoxia (HCC)     Time reflects when diagnosis was documented in both MDM as applicable and the Disposition within this note       Time User Action Codes Description Comment    2/25/2024  1:17 PM Mariano Suazo Add [I50.21] Acute systolic congestive heart failure (HCC)     2/25/2024  1:17 PM Mariano Suazo Add [C34.91] Non-small cell cancer of right lung (HCC)     2/25/2024  1:18 PM Mariano Suazo Add [E83.42] Hypomagnesemia     2/25/2024  1:18 PM Mariano Suazo Add [J96.11] Chronic respiratory failure with hypoxia (HCC)     2/25/2024  3:01 PM Modesta Bentley Modify [I50.21] Acute systolic congestive heart failure (HCC)     2/25/2024  3:01 PM Modesta Bentley Modify [C34.91] Non-small cell cancer of right lung (HCC)     2/25/2024  3:01 PM Modesta Bentley Modify [E83.42] Hypomagnesemia     2/25/2024  3:01 PM Modesta Bentley Modify [J96.11] Chronic respiratory failure with hypoxia (HCC)           ED Disposition       ED Disposition   Admit    Condition   Stable    Date/Time   Sun Feb 25, 2024  1:17 PM    Comment   Case was discussed with Dr. Kent and the patient's admission status was agreed to be Admission Status: inpatient status to the service of Dr. Kent .               Follow-up Information    None         Current Discharge Medication List        CONTINUE these medications which have NOT CHANGED    Details   albuterol (2.5 mg/3 mL) 0.083 % nebulizer solution Take 3 mL (2.5 mg total) by nebulization every 6 (six) hours as needed for wheezing or shortness of breath  Qty: 270 mL, Refills: 4    Associated Diagnoses: Chronic obstructive pulmonary disease, unspecified COPD type ([facility])       albuterol (PROVENTIL HFA,VENTOLIN HFA) 90 mcg/act inhaler Inhale 2 puffs every 4 (four) hours  Refills: 3    Comments: <!--EPICS-->Substitution to a formulary equivalent within the same pharmaceutical class is authorized.<!--EPICE-->      apixaban (Eliquis) 5 mg Take 2 tablets (10 mg total) by mouth 2 (two) times a day for 7 days, THEN 1 tablet (5 mg total) 2 (two) times a day.  Qty: 88 tablet, Refills: 0    Associated Diagnoses: Pulmonary embolism (HCC)      Ascorbic Acid (vitamin C) 1000 MG tablet 1 daily      atorvastatin (LIPITOR) 20 mg tablet Take 20 mg by mouth daily.      Biotin 00174 MCG TABS 1 daily      buPROPion (WELLBUTRIN XL) 300 mg 24 hr tablet TAKE 1 TABLET BY MOUTH EVERY DAY IN THE MORNING FOR 90 DAYS      Cholecalciferol (Vitamin D3) 25 MCG (1000 UT) CAPS 1 capsule every 24 hours      dexamethasone sodium phosphate 0.1 % ophthalmic solution Administer 1 drop to both eyes every 3 (three) hours as needed (eye irritation)  Qty: 5 mL, Refills: 5    Associated Diagnoses: Other conjunctivitis of both eyes      diphenhydrAMINE (BENADRYL) 50 MG tablet Take 1 tablet (50 mg total) by mouth every 6 (six) hours as needed for itching (Rash)  Qty: 30 tablet, Refills: 0    Associated Diagnoses: Drug rash      diphenhydrAMINE-APAP, sleep, (TYLENOL PM EXTRA STRENGTH PO) Take 325 mg by mouth daily at bedtime      Fluticasone-Salmeterol (Advair) 500-50 mcg/dose inhaler Inhale 1 puff 2 (two) times a day. Indications: Asthma      lidocaine-prilocaine (EMLA) cream APPLY TO PORT 30 TO 60 MINUTES PRIOR TO USE  Qty: 30 g, Refills: 3    Associated Diagnoses: Non-small cell cancer of right lung (HCC)      loperamide (IMODIUM) 2 mg capsule Take 1 capsule (2 mg total) by mouth 4 (four) times a day as needed for diarrhea  Qty: 30 capsule, Refills: 0    Associated Diagnoses: Diarrhea, unspecified type      LORazepam (Ativan) 0.5 mg tablet Take 1 tablet (0.5 mg total) by mouth daily as needed for anxiety  Qty: 30 tablet,  Refills: 0    Associated Diagnoses: Generalized anxiety disorder      metoprolol tartrate (LOPRESSOR) 25 mg tablet Take 1 tablet (25 mg total) by mouth every 12 (twelve) hours  Qty: 180 tablet, Refills: 1    Associated Diagnoses: Pleural effusion; Metastatic lung cancer (metastasis from lung to other site) (HCC)      Multiple Vitamin (MULTIVITAMINS PO) every 24 hours      oxygen gas Inhale 2 L/min continuous. May increase to 3L PRN for dyspnea and sats under 89%  Indications: Hypoxia      potassium chloride (Klor-Con M20) 20 mEq tablet Take 2 tablets (40 mEq total) by mouth daily  Qty: 30 tablet, Refills: 0    Associated Diagnoses: Non-small cell cancer of right lung (HCC); Hypokalemia      Probiotic Product (PROBIOTIC DAILY PO) 1 daily      sertraline (ZOLOFT) 100 mg tablet 2 daily      telmisartan (MICARDIS) 80 MG tablet TAKE 1 TABLET BY MOUTH EVERY DAY  Qty: 90 tablet, Refills: 1    Associated Diagnoses: Essential hypertension      torsemide (DEMADEX) 20 mg tablet Take 1 tablet (20 mg total) by mouth every morning  Qty: 30 tablet, Refills: 3    Comments: Discontinue furosemide 20mg and verapamil 120mg!  Associated Diagnoses: Essential hypertension      verapamil (CALAN-SR) 240 mg CR tablet Take 1 tablet (240 mg total) by mouth daily at bedtime 1 hs  Qty: 90 tablet, Refills: 3    Comments: Cancel rx for verapamil 360 if not available!  Associated Diagnoses: Essential hypertension             No discharge procedures on file.    PDMP Review         Value Time User    PDMP Reviewed  Yes 1/21/2024  1:54 PM Toan Yu MD            ED Provider  Electronically Signed by             Mariano Suazo DO  02/25/24 3715

## 2024-02-25 NOTE — ASSESSMENT & PLAN NOTE
CT chest: suspected left lung superimposed pneumonia   Urine antigens negative  Continue Rocephin, day #2   Sputum cultures pending

## 2024-02-25 NOTE — ASSESSMENT & PLAN NOTE
In the setting of metastatic lung cancer, COPD, and suspected left lung superimposed PNA  Adequately saturating on baseline 3L NC  Seen by Dr. Miller 2/23 (primary care) - switched from lasix to torsemide 20mg for swelling without improvement   CXR  Right pleural drainage catheter with small loculated right effusion with no pneumothorax.  Left pleural effusion on subsequent CT not visible.  Extensive bilateral groundglass opacity.  CT  Unchanged right lung parenchymal changes with worsening groundglass parenchymal opacities throughout the left lung of superimposed pneumonia.  Unchanged filling defect at the postsurgical site in the right distal pulmonary artery with no new embolus although limited by poor opacification.  Small to moderate right pleural effusion with Pleurx catheter in place with moderate left effusion unchanged.  Last drained Friday with about 250ml    Plan:   Continue IV abx for PNA   Start IV lasix 40mg BID for volume overload   Supplemental O2

## 2024-02-25 NOTE — ASSESSMENT & PLAN NOTE
Etiology 2/2 malignancy  CT abd/pelvis:   Large amount of ascites increased from prior study with omental nodularity of carcinomatosis    IR paracentesis pending; albumin ordered

## 2024-02-25 NOTE — ASSESSMENT & PLAN NOTE
Patient complaining of increased abdominal distention   CT with continued findings of large amount of ascites increased from prior study with omental nodularity of carcinomatosis    IR consult placed for IR paracentesis

## 2024-02-25 NOTE — ASSESSMENT & PLAN NOTE
Likely multifactorial in the setting of hypervolemia & malignancy  Continue IV Lasix   Trend     Recent Labs     02/25/24  1114 02/26/24  0510   SODIUM 130* 130*

## 2024-02-25 NOTE — ASSESSMENT & PLAN NOTE
Noted to be slightly elevated on labs from last admission  LFTs, and bilirubin within normal limits   Likely related to metastatic disease

## 2024-02-25 NOTE — ASSESSMENT & PLAN NOTE
On baseline 3L NC  Urine antigens, pending     Plan   Rocephin 1g [id number] started on 2/25.   CBC daily during admission   Sputum culture ordered  Blood cultures pending   Trend WBC, and procal

## 2024-02-25 NOTE — H&P
[facility]  H&P  Name: Kathi Ferris 63 y.o. female I MRN: [id number]  Unit/Bed#: -01 I Date of Admission: 2/25/2024   Date of Service: 2/25/2024 I Hospital Day: 0      Assessment/Plan   * Acute on chronic respiratory failure with hypoxia (HCC)  Assessment & Plan  In the setting of metastatic lung cancer, COPD, and suspected left lung superimposed PNA  Seen by Dr. Miller 2/23 (primary care) - switched from lasix to torsemide 20mg for swelling without improvement   Presenting to the ED with saturations 86% on 3L NC  CXR  Right pleural drainage catheter with small loculated right effusion with no pneumothorax.  Left pleural effusion on subsequent CT not visible.  Extensive bilateral groundglass opacity.  CT  Unchanged right lung parenchymal changes with worsening groundglass parenchymal opacities throughout the left lung of superimposed pneumonia.  Unchanged filling defect at the postsurgical site in the right distal pulmonary artery with no new embolus although limited by poor opacification.  Small to moderate right pleural effusion with Pleurx catheter in place with moderate left effusion unchanged.  Last drained Friday with about 250ml    Plan:   Continue IV abx for PNA   Start IV lasix 40mg BID for volume overload   Supplemental O2     SIRS (systemic inflammatory response syndrome) (HCC)  Assessment & Plan  SIRS criteria met at time of admission with tachycardia, tachypnea, leukocytosis,   Suspected source: worsening groundglass parenchymal opacities throughout the left lung superimposed pneumonia.   CXR: bilateral effusions, extensive bilateral ground glass opacities  UA: pending  Continue to trend leukocytosis and fever curve  Procalcitonin: slightly elevated 0.52, continue to trend   Lactic acid: within normal limits  Blood Cultures pending     Pneumonia  Assessment & Plan  On baseline 3L NC  Urine antigens, pending     Plan   Rocephin 1g [id number] started on 2/25.   CBC  daily during admission   Sputum culture ordered  Blood cultures pending   Trend WBC, and procal       Ascites  Assessment & Plan  Patient complaining of increased abdominal distention   CT with continued findings of large amount of ascites increased from prior study with omental nodularity of carcinomatosis    IR consult placed for IR paracentesis     Elevated alkaline phosphatase level  Assessment & Plan  Noted to be slightly elevated on labs from last admission  LFTs, and bilirubin within normal limits   Likely related to metastatic disease        Hyponatremia  Assessment & Plan  Likely multifactorial in the setting of poor oral intake, and volume overload.  Will start IV lasix 40mg BID  Continue to monitor sodium on BMP daily       Pulmonary embolism (HCC)  Assessment & Plan  Recently discharged 2 weeks ago after being treated for pulmonary embolism  Patient maintained indefinitely on Eliquis 5 mg twice daily, continue     Non-small cell cancer of right lung (HCC)  Assessment & Plan  Palliative chemotherapy   Last infusion Thursday 2/22  Continue to follow outpatient heme/onc     Essential hypertension  Assessment & Plan  Continue metoprolol 25mg daily, Micardis 80mg daily, and verapamil 240mg daily     COPD (chronic obstructive pulmonary disease) (HCC)  Assessment & Plan  Does not appear to be in acute exacerbation   Continue home regimen   Continue Advair, supplemental O2, and albuterol as needed           VTE Pharmacologic Prophylaxis: VTE Score: 6 High Risk (Score >/= 5) - Pharmacological DVT Prophylaxis Ordered: apixaban (Eliquis). Sequential Compression Devices Ordered.  Code Status: Level 3 - DNAR and DNI discussed with patient  Discussion with family: Patient declined call to .     Anticipated Length of Stay: Patient will be admitted on an inpatient basis with an anticipated length of stay of greater than 2 midnights secondary to IV antibiotics, IV diuresis, and possible need for  IR.    Total Time Spent on Date of Encounter in care of patient: 75 mins. This time was spent on one or more of the following: performing physical exam; counseling and coordination of care; obtaining or reviewing history; documenting in the medical record; reviewing/ordering tests, medications or procedures; communicating with other healthcare professionals and discussing with patient's family/caregivers.    Chief Complaint: Shortness of breath    History of Present Illness:  Kathi Ferris is a 63 y.o. female with a PMH of metastatic lung cancer, COPD, malignant pleural effusion, HTN, HLD, on palliative chemotherapy, recent PE anticoagulated on eliquis  who presents with worsening SOB. States her symptoms started Friday, and have continued to progress. She saw her PCP outpatient Friday and he switched her Furosemide dosing to Torsemide 20mg without improvement. She is additionally complaining of bilateral LE swelling. Patient states she has a dry cough, however no fevers, chills, nausea, vomiting, diarrhea. She denies chest pain at this time. Also states she has not had much of an appetite. She has a history of asept cath that was last drained on Friday with 200-250 output. Patient had evidence of superimposed pneumonia. Additionally found to have moderate amount of ascites in the setting of carcinomatosis. No evidence of new PE. Patient will be admitted to the hospital for IV abx and IV diuresis.     Review of Systems:  Review of Systems   Constitutional:  Negative for chills and fever.   HENT: Negative.     Eyes: Negative.    Respiratory:  Positive for cough (non productive), shortness of breath and wheezing.    Cardiovascular:  Positive for leg swelling (bilaterally). Negative for chest pain.   Gastrointestinal:  Positive for abdominal distention and abdominal pain.   Endocrine: Negative.    Genitourinary: Negative.    Musculoskeletal: Negative.    Allergic/Immunologic: Negative.    Neurological: Negative.     Hematological: Negative.        Past Medical and Surgical History:   Past Medical History:   Diagnosis Date    Cancer (HCC)     COPD (chronic obstructive pulmonary disease) (HCC)     Emphysema of lung (HCC)     Hyperlipidemia     Hypertension     Lung cancer (HCC) 06/26/2019    right lower lobe removed    Lung nodule     Pleural effusion        Past Surgical History:   Procedure Laterality Date    APPENDECTOMY      BREAST BIOPSY Right     benign    BRONCHOSCOPY  06/2019    COLON SURGERY      HYSTERECTOMY      age 46    IR PLEURAL EFFUSION LONG-TERM CATHETER PLACEMENT  04/25/2023    IR PORT PLACEMENT  04/18/2023    IR THORACENTESIS  03/24/2023    IR THORACENTESIS  04/12/2023    IR THORACENTESIS  04/18/2023    LUNG BIOPSY  06/2019    LUNG LOBECTOMY      OOPHORECTOMY Bilateral     age 46       Meds/Allergies:  Prior to Admission medications    Medication Sig Start Date End Date Taking? Authorizing Provider   albuterol (2.5 mg/3 mL) 0.083 % nebulizer solution Take 3 mL (2.5 mg total) by nebulization every 6 (six) hours as needed for wheezing or shortness of breath 5/2/23   Shani Ricketts DO   albuterol (PROVENTIL HFA,VENTOLIN HFA) 90 mcg/act inhaler Inhale 2 puffs every 4 (four) hours 8/6/19   Historical Provider, MD   apixaban (Eliquis) 5 mg Take 2 tablets (10 mg total) by mouth 2 (two) times a day for 7 days, THEN 1 tablet (5 mg total) 2 (two) times a day. 2/14/24 3/22/24  Johanna Atkinson MD   Ascorbic Acid (vitamin C) 1000 MG tablet 1 daily    Historical Provider, MD   atorvastatin (LIPITOR) 20 mg tablet Take 20 mg by mouth daily.    Historical Provider, MD   Biotin 56963 MCG TABS 1 daily    Historical Provider, MD   buPROPion (WELLBUTRIN XL) 300 mg 24 hr tablet TAKE 1 TABLET BY MOUTH EVERY DAY IN THE MORNING FOR 90 DAYS 5/27/23   Historical Provider, MD   Cholecalciferol (Vitamin D3) 25 MCG (1000 UT) CAPS 1 capsule every 24 hours    Historical Provider, MD   dexamethasone sodium phosphate 0.1 % ophthalmic  solution Administer 1 drop to both eyes every 3 (three) hours as needed (eye irritation)  Patient not taking: Reported on 1/21/2024 8/24/23   Shani Phipps DO   diphenhydrAMINE (BENADRYL) 50 MG tablet Take 1 tablet (50 mg total) by mouth every 6 (six) hours as needed for itching (Rash) 6/16/20   Tomas Ratliff DO   diphenhydrAMINE-APAP, sleep, (TYLENOL PM EXTRA STRENGTH PO) Take 325 mg by mouth daily at bedtime    Historical Provider, MD   Fluticasone-Salmeterol (Advair) 500-50 mcg/dose inhaler Inhale 1 puff 2 (two) times a day. Indications: Asthma 1/30/24   Historical Provider, MD   lidocaine-prilocaine (EMLA) cream APPLY TO PORT 30 TO 60 MINUTES PRIOR TO USE 9/7/23   CAT [place]   loperamide (IMODIUM) 2 mg capsule Take 1 capsule (2 mg total) by mouth 4 (four) times a day as needed for diarrhea 2/14/24   Johanna Atkinson MD   LORazepam (Ativan) 0.5 mg tablet Take 1 tablet (0.5 mg total) by mouth daily as needed for anxiety 10/6/23   Keyon Miller MD   metoprolol tartrate (LOPRESSOR) 25 mg tablet Take 1 tablet (25 mg total) by mouth every 12 (twelve) hours 12/20/23 6/17/24  Keyon Miller MD   Multiple Vitamin (MULTIVITAMINS PO) every 24 hours    Historical Provider, MD   oxygen gas Inhale 2 L/min continuous. May increase to 3L PRN for dyspnea and sats under 89%  Indications: Hypoxia 10/25/23   Historical Provider, MD   potassium chloride (Klor-Con M20) 20 mEq tablet Take 2 tablets (40 mEq total) by mouth daily 1/27/24   Noman Stauffer MD   Probiotic Product (PROBIOTIC DAILY PO) 1 daily    Historical Provider, MD   sertraline (ZOLOFT) 100 mg tablet 2 daily    Historical Provider, MD   telmisartan (MICARDIS) 80 MG tablet TAKE 1 TABLET BY MOUTH EVERY DAY 2/12/24   Keyon Miller MD   torsemide (DEMADEX) 20 mg tablet Take 1 tablet (20 mg total) by mouth every morning 2/23/24 6/22/24  Keyon Miller MD   verapamil (CALAN-SR) 240 mg CR tablet Take 1 tablet (240 mg total) by mouth daily at bedtime 1 hs  23   Keyon Miller MD     I have reviewed home medications with patient personally.    Allergies:   Allergies   Allergen Reactions    Amoxicillin Hives    Vilazodone Hcl Nausea Only and Dizziness     (Viibryd)    Wasp Venom Swelling    Zithromax [Azithromycin] Hives       Social History:  Marital Status: /Civil Union   Occupation: N/A  Patient Pre-hospital Living Situation: Home  Patient Pre-hospital Level of Mobility: walks  Patient Pre-hospital Diet Restrictions: None  Substance Use History:   Social History     Substance and Sexual Activity   Alcohol Use Not Currently    Comment: Rarely drink, socially only     Social History     Tobacco Use   Smoking Status Former    Current packs/day: 0.00    Average packs/day: 1.5 packs/day for 53.2 years (79.8 ttl pk-yrs)    Types: Cigarettes    Start date:     Quit date: 3/15/2023    Years since quittin.9    Passive exposure: Past   Smokeless Tobacco Never   Tobacco Comments    Patient quit 1 year ago     Social History     Substance and Sexual Activity   Drug Use No    Comment: has her medical marijuana card but does not like the way it makes her feel, she does not use it       Family History:  Family History   Problem Relation Age of Onset    Colon cancer Mother     Hypertension Mother     Hyperlipidemia Mother     Hearing loss Mother     Depression Mother     COPD Mother     Cancer Mother         Lung    Arthritis Mother     Lung cancer Mother     Drug abuse Brother     Diabetes Maternal Grandmother     Cancer Maternal Grandmother         Colon    Colon cancer Maternal Grandmother     Cancer Maternal Aunt     Colon cancer Maternal Aunt     Colon cancer Maternal Aunt     Cancer Maternal Aunt         Colon       Physical Exam:     Vitals:   Blood Pressure: 107/60 (24 1612)  Pulse: (!) 123 (24 1612)  Temperature: (!) 97.2 °F (36.2 °C) (24 1457)  Temp Source: Oral (24 1457)  Respirations: 19 (24 1457)  SpO2: 94 % (24  1612)    Physical Exam  Constitutional:       Appearance: She is ill-appearing.   HENT:      Mouth/Throat:      Mouth: Mucous membranes are moist.   Eyes:      Conjunctiva/sclera: Conjunctivae normal.   Cardiovascular:      Rate and Rhythm: Tachycardia present.      Heart sounds: No murmur heard.     No friction rub. No gallop.   Pulmonary:      Breath sounds: Rhonchi and rales present. No wheezing.   Abdominal:      General: Abdomen is flat.   Musculoskeletal:      Right lower leg: Edema present.      Left lower leg: Edema present.   Skin:     General: Skin is warm and dry.   Neurological:      General: No focal deficit present.   Psychiatric:         Mood and Affect: Mood normal.          Additional Data:     Lab Results:  Results from last 7 days   Lab Units 02/25/24  1114 02/20/24  1100   WBC Thousand/uL 12.39* 13.34*   HEMOGLOBIN g/dL 7.9* 8.2*   HEMATOCRIT % 25.1* 25.8*   PLATELETS Thousands/uL 144* 292   NEUTROS PCT %  --  97*   LYMPHS PCT %  --  2*   LYMPHO PCT % 2*  --    MONOS PCT %  --  0*   MONO PCT % 0*  --    EOS PCT % 0 0     Results from last 7 days   Lab Units 02/25/24  1114   SODIUM mmol/L 130*   POTASSIUM mmol/L 4.0   CHLORIDE mmol/L 96   CO2 mmol/L 24   BUN mg/dL 21   CREATININE mg/dL 0.53*   ANION GAP mmol/L 10   CALCIUM mg/dL 9.1   ALBUMIN g/dL 2.7*   TOTAL BILIRUBIN mg/dL 0.58   ALK PHOS U/L 115*   ALT U/L 19   AST U/L 24   GLUCOSE RANDOM mg/dL 97     Results from last 7 days   Lab Units 02/25/24  1114   INR  1.42*             Results from last 7 days   Lab Units 02/25/24  1114   LACTIC ACID mmol/L 1.3   PROCALCITONIN ng/ml 0.52*       Lines/Drains:  Invasive Devices       Central Venous Catheter Line  Duration             Port A Cath 04/18/23 Right Subclavian 313 days              Peripheral Intravenous Line  Duration             Peripheral IV 02/25/24 Dorsal (posterior);Right Forearm <1 day              Drain  Duration             Pleural Effusion Long-Term Catheter 15.5 Fr. 306 days                     Central Line:  Goal for removal: Port accessed. Will de-access as appropriate.           Imaging: Reviewed radiology reports from this admission including: chest xray and chest CT scan  CTA chest (pe study) abdomen pelvis contrast   Final Result by Roosevelt Shoemaker MD (02/25 0456)      1.Unchanged filling defect at the postsurgical site in the right distal pulmonary artery with no new embolus although limited by poor opacification.   Measured RV/LV ratio is within normal limits at less than 0.9.      2. Small to moderate right pleural effusion with Pleurx catheter in place with moderate left effusion unchanged.      3. Mostly unchanged right lower lung mass with extension to the suture site in the hilum.      4. Large amount of ascites increased from prior study with omental nodularity of carcinomatosis.   Previous obstructive pattern is resolved.      5. Unchanged right lung parenchymal changes with worsening groundglass parenchymal opacities throughout the left lung of superimposed pneumonia.      Workstation performed: [id number]         XR chest portable   ED Interpretation by Mariano Suazo DO (02/25 1242)   Acute CHF versus worsening lung cancer      Final Result by Glenis Mac MD (02/25 1450)      Right pleural drainage catheter with small loculated right effusion with no pneumothorax.      Left pleural effusion on subsequent CT not visible.      Extensive bilateral groundglass opacity.            Workstation performed: [id number]         IR INPATIENT Paracentesis    (Results Pending)       EKG and Other Studies Reviewed on Admission:   EKG: Sinus Tachycardia. . Poor quality     ** Please Note: This note has been constructed using a voice recognition system. **

## 2024-02-25 NOTE — ASSESSMENT & PLAN NOTE
POA, criteria met w/ tachycardia, tachypnea, & leukocytosis   CT chest: suspected left lung superimposed pneumonia   UA & LA benign   Procalcitonin: 0.52 -> 0.57   BCx pending   Afebrile     Recent Labs     02/25/24  1114 02/26/24  0510   WBC 12.39* 8.92

## 2024-02-26 ENCOUNTER — HOME CARE VISIT (OUTPATIENT)
Dept: HOME HEALTH SERVICES | Facility: HOME HEALTHCARE | Age: 64
End: 2024-02-26
Payer: COMMERCIAL

## 2024-02-26 ENCOUNTER — APPOINTMENT (INPATIENT)
Dept: INTERVENTIONAL RADIOLOGY/VASCULAR | Facility: HOSPITAL | Age: 64
DRG: 374 | End: 2024-02-26
Payer: COMMERCIAL

## 2024-02-26 PROBLEM — J96.10 CHRONIC RESPIRATORY FAILURE (HCC): Status: ACTIVE | Noted: 2024-02-25

## 2024-02-26 PROBLEM — E87.70 HYPERVOLEMIA: Status: ACTIVE | Noted: 2024-02-26

## 2024-02-26 PROBLEM — D64.9 ANEMIA: Status: ACTIVE | Noted: 2024-02-26

## 2024-02-26 PROBLEM — R06.00 DYSPNEA: Status: ACTIVE | Noted: 2024-02-26

## 2024-02-26 PROBLEM — R78.81 BACTEREMIA: Status: ACTIVE | Noted: 2024-02-26

## 2024-02-26 LAB
ABO GROUP BLD: NORMAL
ABO GROUP BLD: NORMAL
ANION GAP SERPL CALCULATED.3IONS-SCNC: 8 MMOL/L
BLD GP AB SCN SERPL QL: NEGATIVE
BUN SERPL-MCNC: 21 MG/DL (ref 5–25)
CALCIUM SERPL-MCNC: 9 MG/DL (ref 8.4–10.2)
CHLORIDE SERPL-SCNC: 98 MMOL/L (ref 96–108)
CO2 SERPL-SCNC: 24 MMOL/L (ref 21–32)
CREAT SERPL-MCNC: 0.58 MG/DL (ref 0.6–1.3)
ERYTHROCYTE [DISTWIDTH] IN BLOOD BY AUTOMATED COUNT: 19.7 % (ref 11.6–15.1)
GFR SERPL CREATININE-BSD FRML MDRD: 97 ML/MIN/1.73SQ M
GLUCOSE SERPL-MCNC: 92 MG/DL (ref 65–140)
HCT VFR BLD AUTO: 21 % (ref 34.8–46.1)
HCT VFR BLD AUTO: 22.3 % (ref 34.8–46.1)
HGB BLD-MCNC: 6.7 G/DL (ref 11.5–15.4)
HGB BLD-MCNC: 7.1 G/DL (ref 11.5–15.4)
L PNEUMO1 AG UR QL IA.RAPID: NEGATIVE
MCH RBC QN AUTO: 27.7 PG (ref 26.8–34.3)
MCHC RBC AUTO-ENTMCNC: 31.9 G/DL (ref 31.4–37.4)
MCV RBC AUTO: 87 FL (ref 82–98)
PLATELET # BLD AUTO: 135 THOUSANDS/UL (ref 149–390)
PMV BLD AUTO: 9.9 FL (ref 8.9–12.7)
POTASSIUM SERPL-SCNC: 4.6 MMOL/L (ref 3.5–5.3)
PROCALCITONIN SERPL-MCNC: 0.57 NG/ML
RBC # BLD AUTO: 2.42 MILLION/UL (ref 3.81–5.12)
RH BLD: POSITIVE
RH BLD: POSITIVE
S PNEUM AG UR QL: NEGATIVE
SODIUM SERPL-SCNC: 130 MMOL/L (ref 135–147)
SPECIMEN EXPIRATION DATE: NORMAL
WBC # BLD AUTO: 8.92 THOUSAND/UL (ref 4.31–10.16)

## 2024-02-26 PROCEDURE — 49083 ABD PARACENTESIS W/IMAGING: CPT | Performed by: RADIOLOGY

## 2024-02-26 PROCEDURE — 49083 ABD PARACENTESIS W/IMAGING: CPT

## 2024-02-26 PROCEDURE — 86900 BLOOD TYPING SEROLOGIC ABO: CPT | Performed by: PHYSICIAN ASSISTANT

## 2024-02-26 PROCEDURE — 86901 BLOOD TYPING SEROLOGIC RH(D): CPT | Performed by: PHYSICIAN ASSISTANT

## 2024-02-26 PROCEDURE — 84145 PROCALCITONIN (PCT): CPT | Performed by: INTERNAL MEDICINE

## 2024-02-26 PROCEDURE — 99232 SBSQ HOSP IP/OBS MODERATE 35: CPT | Performed by: PHYSICIAN ASSISTANT

## 2024-02-26 PROCEDURE — 0W9G3ZZ DRAINAGE OF PERITONEAL CAVITY, PERCUTANEOUS APPROACH: ICD-10-PCS | Performed by: RADIOLOGY

## 2024-02-26 PROCEDURE — 97167 OT EVAL HIGH COMPLEX 60 MIN: CPT

## 2024-02-26 PROCEDURE — 85018 HEMOGLOBIN: CPT | Performed by: PHYSICIAN ASSISTANT

## 2024-02-26 PROCEDURE — 99223 1ST HOSP IP/OBS HIGH 75: CPT | Performed by: PHYSICIAN ASSISTANT

## 2024-02-26 PROCEDURE — 99223 1ST HOSP IP/OBS HIGH 75: CPT | Performed by: NURSE PRACTITIONER

## 2024-02-26 PROCEDURE — 86850 RBC ANTIBODY SCREEN: CPT | Performed by: PHYSICIAN ASSISTANT

## 2024-02-26 PROCEDURE — 80048 BASIC METABOLIC PNL TOTAL CA: CPT | Performed by: INTERNAL MEDICINE

## 2024-02-26 PROCEDURE — 85014 HEMATOCRIT: CPT | Performed by: PHYSICIAN ASSISTANT

## 2024-02-26 PROCEDURE — 99418 PROLNG IP/OBS E/M EA 15 MIN: CPT | Performed by: PHYSICIAN ASSISTANT

## 2024-02-26 PROCEDURE — 87040 BLOOD CULTURE FOR BACTERIA: CPT | Performed by: HOSPITALIST

## 2024-02-26 PROCEDURE — 85027 COMPLETE CBC AUTOMATED: CPT | Performed by: INTERNAL MEDICINE

## 2024-02-26 RX ORDER — DOCUSATE SODIUM 100 MG/1
100 CAPSULE, LIQUID FILLED ORAL ONCE
Status: COMPLETED | OUTPATIENT
Start: 2024-02-26 | End: 2024-02-26

## 2024-02-26 RX ORDER — ALBUMIN (HUMAN) 12.5 G/50ML
12.5 SOLUTION INTRAVENOUS ONCE
Status: COMPLETED | OUTPATIENT
Start: 2024-02-26 | End: 2024-02-26

## 2024-02-26 RX ORDER — LIDOCAINE HYDROCHLORIDE 10 MG/ML
INJECTION, SOLUTION EPIDURAL; INFILTRATION; INTRACAUDAL; PERINEURAL AS NEEDED
Status: COMPLETED | OUTPATIENT
Start: 2024-02-26 | End: 2024-02-26

## 2024-02-26 RX ORDER — VANCOMYCIN HYDROCHLORIDE 1 G/200ML
1000 INJECTION, SOLUTION INTRAVENOUS EVERY 12 HOURS
Status: DISCONTINUED | OUTPATIENT
Start: 2024-02-26 | End: 2024-02-29

## 2024-02-26 RX ADMIN — VANCOMYCIN HYDROCHLORIDE 1000 MG: 1 INJECTION, SOLUTION INTRAVENOUS at 23:14

## 2024-02-26 RX ADMIN — POTASSIUM CHLORIDE 40 MEQ: 1500 TABLET, EXTENDED RELEASE ORAL at 09:53

## 2024-02-26 RX ADMIN — BUPROPION HYDROCHLORIDE 300 MG: 300 TABLET, EXTENDED RELEASE ORAL at 09:53

## 2024-02-26 RX ADMIN — METOPROLOL TARTRATE 25 MG: 25 TABLET, FILM COATED ORAL at 09:53

## 2024-02-26 RX ADMIN — B-COMPLEX W/ C & FOLIC ACID TAB 1 TABLET: TAB at 09:54

## 2024-02-26 RX ADMIN — ALBUMIN (HUMAN) 12.5 G: 0.25 INJECTION, SOLUTION INTRAVENOUS at 09:50

## 2024-02-26 RX ADMIN — FLUTICASONE FUROATE AND VILANTEROL TRIFENATATE 1 PUFF: 200; 25 POWDER RESPIRATORY (INHALATION) at 09:57

## 2024-02-26 RX ADMIN — SERTRALINE 200 MG: 100 TABLET, FILM COATED ORAL at 09:53

## 2024-02-26 RX ADMIN — FUROSEMIDE 40 MG: 10 INJECTION, SOLUTION INTRAMUSCULAR; INTRAVENOUS at 09:51

## 2024-02-26 RX ADMIN — ATORVASTATIN CALCIUM 20 MG: 20 TABLET, FILM COATED ORAL at 17:30

## 2024-02-26 RX ADMIN — APIXABAN 5 MG: 5 TABLET, FILM COATED ORAL at 17:30

## 2024-02-26 RX ADMIN — APIXABAN 5 MG: 5 TABLET, FILM COATED ORAL at 09:54

## 2024-02-26 RX ADMIN — VANCOMYCIN HYDROCHLORIDE 1750 MG: 1 INJECTION, POWDER, LYOPHILIZED, FOR SOLUTION INTRAVENOUS at 14:22

## 2024-02-26 RX ADMIN — DIPHENHYDRAMINE HYDROCHLORIDE 25 MG: 25 TABLET ORAL at 23:14

## 2024-02-26 RX ADMIN — LIDOCAINE HYDROCHLORIDE 7 ML: 10 INJECTION, SOLUTION EPIDURAL; INFILTRATION; INTRACAUDAL; PERINEURAL at 11:36

## 2024-02-26 RX ADMIN — CEFTRIAXONE 1000 MG: 1 INJECTION, SOLUTION INTRAVENOUS at 17:30

## 2024-02-26 RX ADMIN — DOCUSATE SODIUM 100 MG: 100 CAPSULE, LIQUID FILLED ORAL at 09:55

## 2024-02-26 RX ADMIN — FUROSEMIDE 40 MG: 10 INJECTION, SOLUTION INTRAMUSCULAR; INTRAVENOUS at 17:30

## 2024-02-26 NOTE — ASSESSMENT & PLAN NOTE
Chronically on 3L in setting of NSCLC  Presented with SOB/AQUINO and Spo2 86% on 3L   Secondary to malignancy, pna - see individual problems   Patient now stable on chronic 3L at rest however still AQUINO   Repeat CXR today

## 2024-02-26 NOTE — BRIEF OP NOTE (RAD/CATH)
INTERVENTIONAL RADIOLOGY PROCEDURE NOTE    Date: 2/26/2024    Procedure: Paracentesis  Procedure Summary       Date:  Room / Location:     Anesthesia Start:  Anesthesia Stop:     Procedure:  Diagnosis:     Scheduled Providers:  Responsible Provider:     Anesthesia Type: Not recorded ASA Status: Not recorded            Preoperative diagnosis:   1. Acute systolic congestive heart failure (HCC)    2. Non-small cell cancer of right lung (HCC)    3. Hypomagnesemia    4. Chronic respiratory failure with hypoxia (HCC)         Postoperative diagnosis: Same.    Surgeon: Tariq Brambila MD     Assistant: None. No qualified resident was available.    Blood loss: None    Specimens: 5540 mL clear yellow ascites fluid removed.     Findings: Successful paracentesis.    Complications: None immediate.    Anesthesia: local

## 2024-02-26 NOTE — SEDATION DOCUMENTATION
Therapeutic paracentesis, LLQ. Removed 5540ml clear yellow fluid. Patient tolerated the procedure well. Band aid on the site. Consent was obtained.

## 2024-02-26 NOTE — CONSULTS
Medical Oncology/Hematology Consult Note  Kathi Ferris, 63 y.o., 1960  /-[birth year], [id number]  02/26/24    Assessment and Plan:    1. Stage IV NSCLC  2.  Malignant pleural effusion  3.  Ascites  4.  Anemia  Patient is a 63 year old female with a history of stage IV lung cancer with peritoneal carcinomatosis, malignant pleural effusion with pleural catheter in place, recent hospitalization with small bowel obstruction conservatively. Recent diagnosis of PE on Eliquis.  She is well-known to medical oncology, follows with Dr. Phipps.  She is undergoing systemic therapy with palliative intent.  She has progressed through multiple treatment regimens.  She had a recent change in therapy to gemcitabine, last treated on 2/22/24.    This is her third admission over the last month.  This admission, imaging demonstrates mostly unchanged right lower lung mass with extension to the suture site in the ileum, small to moderate right pleural effusion with Pleurx catheter in place with moderate left pleural effusion which is unchanged, large amount of ascites increased from prior study with omental nodularity of carcinomatosis previous obstructive pattern resolved.  Worsening groundglass opacities throughout left lung concerning for superimposed pneumonia, unchanged PE    2/26 SP IR thoracentesis for 5540 mL clear yellow ascitic fluid    Patient has chemotherapy induced cytopenias.  Anemia secondary to recent treatment with gemcitabine.  2/26 hemoglobin dropped to 6.7 today.  Please transfuse with a unit of blood.  Maintain hemoglobin greater than 7    - as with any advanced malignancy, I did review GOC with Kathi.  She has a good understanding that her disease is very advanced and admits she has been considering stopping treatment.  Complicating the picture is her spouse who is newly diagnosed with esophageal cancer and is undergoing concurrent chemoradiation.  Her dyspnea on exertion limits her mobility.  She is  on third line treatment with single agent gemcitabine.  Given extent of disease, it is unlikely gemcitabine is going to significantly impact her disease burden.   I did bring up hospice.  She is strongly considering this option.  She would like time to discuss with her spouse  Appreciate palliative care assistance with help discussing hospice and options associated with focusing on comfort.  For now, continue on medically optimizing her by treating her pneumonia, anemia with blood transfusions as needed.    Medical oncology will follow along and be available as needed.  If patient does not elect hospice prior to discharge, she will follow-up with Dr. Phipps as scheduled on 3/6/2024      Please do not hesitate to contact me if you have any questions or need additional information. Thank you for this consult.    Palma Gustafson MSN, CRNP, ACHPN, OCN  Hematology Oncology Nurse Practitioner      Reason for Consultation: Stage IV NSCLC        History of present illness:   Kathi Ferris is a 63 y.o. female who presents with worsening shortness of breath.  Patient is well-known to medical oncology for history of stage IV lung cancer with peritoneal carcinomatosis, malignant pleural effusion, PE who has progressed through multiple lines of therapy.  She is on systemic chemo with palliative intent with single agent gemcitabine  Follows with Dr. Phipps      Kathi CHINCHILLA Barrington current outpatient oncology regimen is: Gemcitabine, last dose 2/22/24      Oncologic History:  Primary Outpatient Hematology/Oncology Provider: Shani Phipps DO  Oncology History Overview Note   5/2019 - Stage IA adenocarcinoma of the right lung s/p RLLectomy    11/2022 - fall after tripping on a dog gate - CT showed pulmonary nodules that required 3 month followup    3/2023 - CT showed right pleural effusion with enlarging lung lesion, she was otherwise symptomatic, thoracentesis cell block showed adenocarcinoma c/w her prior lung  primary    PET - no disease outside the chest    Caris - no targetable mutations    4/20/2023 - carbo/pem/pembro    5/11/2-23 - cycle 2, pemetrexed dose reduced by 20% and carbo dose reduced to AUC 4 due to nausea and fatigue    7/2023 - removed carbo as of cycle 5 due to good response and increasing fatigue    8/2023 - add back carbo for cycle 6 due to increasing output from right pleural catheter    8/24/2023 - remove carboplatin for cycle 7 because no impact was made on output from pleural catheter    10/2023 - disease progression in the omentum.  Plan treatment change to docetaxel and ramucirumab    11/9/2023 - start taxotere/ramucirumab    1/2024 - change treatment to gemcitabine due to concern of inadequate response     Non-small cell cancer of right lung (HCC)   4/7/2023 Initial Diagnosis    Non-small cell cancer of right lung (HCC)     4/20/2023 - 9/14/2023 Chemotherapy    cyanocobalamin, 1,000 mcg, Intramuscular, Once, 5 of 10 cycles  Administration: 1,000 mcg (4/13/2023), 1,000 mcg (6/1/2023), 1,000 mcg (8/3/2023), 1,000 mcg (8/24/2023), 1,000 mcg (9/14/2023)  alteplase (CATHFLO), 2 mg, Intracatheter, Every 1 Minute as needed, 8 of 13 cycles  fosaprepitant (EMEND) IVPB, 150 mg, Intravenous, Once, 5 of 5 cycles  Administration: 150 mg (4/20/2023), 150 mg (6/1/2023), 150 mg (6/22/2023), 150 mg (5/11/2023), 150 mg (8/3/2023)  CARBOplatin (PARAPLATIN) IVPB (GO AUC DOSING), 553 mg, Intravenous, Once, 5 of 5 cycles  Administration: 553 mg (4/20/2023), 442.4 mg (6/1/2023), 442.4 mg (6/22/2023), 442.4 mg (5/11/2023), 438 mg (8/3/2023)  pemetrexed (ALIMTA) chemo infusion, 945 mg, Intravenous, Once, 8 of 13 cycles  Dose modification: 400 mg/m2 (original dose 500 mg/m2, Cycle 3, Reason: Nausea/Vomiting, Comment: 20% dose reduction due to nausea)  Administration: 900 mg (4/20/2023), 756 mg (6/1/2023), 756 mg (6/22/2023), 756 mg (8/3/2023), 756 mg (5/11/2023), 756 mg (7/13/2023), 756 mg (8/24/2023), 756 mg  (9/14/2023)  pembrolizumab (KEYTRUDA) IVPB, 200 mg, Intravenous, Once, 8 of 13 cycles  Administration: 200 mg (4/20/2023), 200 mg (6/1/2023), 200 mg (6/22/2023), 200 mg (8/3/2023), 200 mg (5/11/2023), 200 mg (7/13/2023), 200 mg (8/24/2023), 200 mg (9/14/2023)     6/9/2023 -  Cancer Staged    Staging form: Lung, AJCC 8th Edition  - Clinical: Stage ALESHA (cT1c, cN2, cM1a) - Signed by Shani Phipps DO on 6/9/2023 11/9/2023 - 1/11/2024 Chemotherapy    alteplase (CATHFLO), 2 mg, Intracatheter, Every 1 Minute as needed, 4 of 6 cycles  pegfilgrastim (NEULASTA ONPRO), 6 mg, Subcutaneous, Once, 0 of 2 cycles  ramucirumab (CYRAMZA) IVPB, 830 mg, Intravenous, Once, 4 of 6 cycles  Administration: 800 mg (11/9/2023), 800 mg (11/30/2023), 800 mg (12/21/2023), 800 mg (1/11/2024)  DOCEtaxel (TAXOTERE) chemo infusion, 75 mg/m2 = 142.6 mg, Intravenous, Once, 4 of 6 cycles  Administration: 142.6 mg (11/9/2023), 142.6 mg (11/30/2023), 142.6 mg (12/21/2023), 142.6 mg (1/11/2024)     2/8/2024 -  Chemotherapy    alteplase (CATHFLO), 2 mg, Intracatheter, Every 1 Minute as needed, 1 of 6 cycles  Administration: 2 mg (2/22/2024)  gemcitabine (GEMZAR) infusion, 1,890.1 mg, Intravenous, Once, 1 of 6 cycles  Administration: 1,800 mg (2/8/2024), 1,800 mg (2/16/2024), 1,800 mg (2/22/2024)         Interval history: Status post IR paracentesis today.  Still feels short of breath, however has less conversational dyspnea status post paracentesis  No fever/chills.  Denies chest pain.  No cough, no hemoptysis    Review of Systems   Constitutional:  Positive for activity change.   Respiratory:  Positive for shortness of breath.    Gastrointestinal:  Positive for abdominal distention.   Neurological:  Positive for weakness.   All other systems reviewed and are negative.    All other review of systems were negative.    Past medical history:   Past Medical History:   Diagnosis Date    Cancer (HCC)     COPD (chronic obstructive pulmonary disease)  (HCC)     Emphysema of lung (HCC)     Hyperlipidemia     Hypertension     Lung cancer (HCC) 06/26/2019    right lower lobe removed    Lung nodule     Pleural effusion        Past surgical history:   Past Surgical History:   Procedure Laterality Date    APPENDECTOMY      BREAST BIOPSY Right     benign    BRONCHOSCOPY  06/2019    COLON SURGERY      HYSTERECTOMY      age 46    IR PLEURAL EFFUSION LONG-TERM CATHETER PLACEMENT  04/25/2023    IR PORT PLACEMENT  04/18/2023    IR THORACENTESIS  03/24/2023    IR THORACENTESIS  04/12/2023    IR THORACENTESIS  04/18/2023    LUNG BIOPSY  06/2019    LUNG LOBECTOMY      OOPHORECTOMY Bilateral     age 46       Allergies:   Allergies   Allergen Reactions    Amoxicillin Hives    Vilazodone Hcl Nausea Only and Dizziness     (Viibryd)    Wasp Venom Swelling     Edema of hand; no anaphylaxis     Zithromax [Azithromycin] Hives       Home medications:   Medications Prior to Admission   Medication    albuterol (2.5 mg/3 mL) 0.083 % nebulizer solution    albuterol (PROVENTIL HFA,VENTOLIN HFA) 90 mcg/act inhaler    apixaban (Eliquis) 5 mg    Ascorbic Acid (vitamin C) 1000 MG tablet    atorvastatin (LIPITOR) 20 mg tablet    Biotin 24875 MCG TABS    buPROPion (WELLBUTRIN XL) 300 mg 24 hr tablet    Cholecalciferol (Vitamin D3) 25 MCG (1000 UT) CAPS    dexamethasone sodium phosphate 0.1 % ophthalmic solution    diphenhydrAMINE (BENADRYL) 50 MG tablet    diphenhydrAMINE-APAP, sleep, (TYLENOL PM EXTRA STRENGTH PO)    Fluticasone-Salmeterol (Advair) 500-50 mcg/dose inhaler    lidocaine-prilocaine (EMLA) cream    loperamide (IMODIUM) 2 mg capsule    LORazepam (Ativan) 0.5 mg tablet    metoprolol tartrate (LOPRESSOR) 25 mg tablet    Multiple Vitamin (MULTIVITAMINS PO)    oxygen gas    potassium chloride (Klor-Con M20) 20 mEq tablet    Probiotic Product (PROBIOTIC DAILY PO)    sertraline (ZOLOFT) 100 mg tablet    telmisartan (MICARDIS) 80 MG tablet    torsemide (DEMADEX) 20 mg tablet    verapamil  (CALAN-SR) 240 mg CR tablet       Hospital medications:   Current Facility-Administered Medications:     acetaminophen (TYLENOL) tablet 650 mg, 650 mg, Oral, Q6H PRN, Modesta Bentley PA-C    albuterol (PROVENTIL HFA,VENTOLIN HFA) inhaler 2 puff, 2 puff, Inhalation, Q4H PRN, Modesta Bentley PA-C    apixaban (ELIQUIS) tablet 5 mg, 5 mg, Oral, BID, Modesta Bentley PA-C, 5 mg at 02/26/24 0954    atorvastatin (LIPITOR) tablet 20 mg, 20 mg, Oral, Daily With Dinner, Modesta Bentley PA-C, 20 mg at 02/25/24 1608    buPROPion (WELLBUTRIN XL) 24 hr tablet 300 mg, 300 mg, Oral, Daily, Modesta Bentley PA-C, 300 mg at 02/26/24 0953    cefTRIAXone (ROCEPHIN) IVPB (premix in dextrose) 1,000 mg 50 mL, 1,000 mg, Intravenous, Q24H, Modesta Bentley PA-C, Last Rate: 100 mL/hr at 02/25/24 1636, 1,000 mg at 02/25/24 1636    diphenhydrAMINE (BENADRYL) tablet 25 mg, 25 mg, Oral, HS, Modesta Bentley PA-C    fluticasone-vilanterol 200-25 mcg/actuation 1 puff, 1 puff, Inhalation, Daily, Modesta Betnley PA-C, 1 puff at 02/26/24 0957    furosemide (LASIX) injection 40 mg, 40 mg, Intravenous, BID (diuretic), Modesta Bentley PA-C, 40 mg at 02/26/24 0951    lidocaine (LMX) 4 % cream, , Topical, Daily PRN, Modesta Bentley PA-C    LORazepam (ATIVAN) tablet 0.5 mg, 0.5 mg, Oral, Daily PRN, Modesta Bentley PA-C, 0.5 mg at 02/25/24 2053    losartan (COZAAR) tablet 100 mg, 100 mg, Oral, Daily, Modesta Bentley PA-C, 100 mg at 02/25/24 1707    metoprolol tartrate (LOPRESSOR) tablet 25 mg, 25 mg, Oral, Q12H ULISES, Modesta Bentley PA-C, 25 mg at 02/26/24 0953    multivitamin stress formula tablet 1 tablet, 1 tablet, Oral, Daily, Modesta Bentley PA-C, 1 tablet at 02/26/24 0954    potassium chloride (Klor-Con M20) CR tablet 40 mEq, 40 mEq, Oral, Daily, Modesta Bentley PA-C, 40 mEq at 02/26/24 0953    sertraline (ZOLOFT) tablet 200 mg, 200 mg, Oral, Daily, Modesta Bentley PA-C, 200 mg at 02/26/24 0953    verapamil  (CALAN-SR) CR tablet 240 mg, 240 mg, Oral, HS, Modesta Keke Bentley PA-C, 240 mg at 24    Social history:   Social History     Tobacco Use    Smoking status: Former     Current packs/day: 0.00     Average packs/day: 1.5 packs/day for 53.2 years (79.8 ttl pk-yrs)     Types: Cigarettes     Start date:      Quit date: 3/15/2023     Years since quittin.9     Passive exposure: Past    Smokeless tobacco: Never    Tobacco comments:     Patient quit 1 year ago   Vaping Use    Vaping status: Never Used   Substance Use Topics    Alcohol use: Not Currently     Comment: Rarely drink, socially only    Drug use: No     Comment: has her medical marijuana card but does not like the way it makes her feel, she does not use it       Family history:   Family History   Problem Relation Age of Onset    Colon cancer Mother     Hypertension Mother     Hyperlipidemia Mother     Hearing loss Mother     Depression Mother     COPD Mother     Cancer Mother         Lung    Arthritis Mother     Lung cancer Mother     Drug abuse Brother     Diabetes Maternal Grandmother     Cancer Maternal Grandmother         Colon    Colon cancer Maternal Grandmother     Cancer Maternal Aunt     Colon cancer Maternal Aunt     Colon cancer Maternal Aunt     Cancer Maternal Aunt         Colon       Vitals:  Vitals:    24 0941   BP: 115/65   Pulse: 105   Resp:    Temp:    SpO2: 93%       Physical Exam    Recent Results (from the past 48 hour(s))   ECG 12 lead    Collection Time: 24 11:01 AM   Result Value Ref Range    Ventricular Rate 107 BPM    Atrial Rate 107 BPM    MS Interval 158 ms    QRSD Interval 80 ms    QT Interval 326 ms    QTC Interval 435 ms    P Axis 39 degrees    QRS Axis 2 degrees    T Wave Axis 30 degrees   CBC and differential    Collection Time: 24 11:14 AM   Result Value Ref Range    WBC 12.39 (H) 4.31 - 10.16 Thousand/uL    RBC 2.86 (L) 3.81 - 5.12 Million/uL    Hemoglobin 7.9 (L) 11.5 - 15.4 g/dL    Hematocrit  25.1 (L) 34.8 - 46.1 %    MCV 88 82 - 98 fL    MCH 27.6 26.8 - 34.3 pg    MCHC 31.5 31.4 - 37.4 g/dL    RDW 19.6 (H) 11.6 - 15.1 %    MPV 10.0 8.9 - 12.7 fL    Platelets 144 (L) 149 - 390 Thousands/uL   Comprehensive metabolic panel    Collection Time: 02/25/24 11:14 AM   Result Value Ref Range    Sodium 130 (L) 135 - 147 mmol/L    Potassium 4.0 3.5 - 5.3 mmol/L    Chloride 96 96 - 108 mmol/L    CO2 24 21 - 32 mmol/L    ANION GAP 10 mmol/L    BUN 21 5 - 25 mg/dL    Creatinine 0.53 (L) 0.60 - 1.30 mg/dL    Glucose 97 65 - 140 mg/dL    Calcium 9.1 8.4 - 10.2 mg/dL    Corrected Calcium 10.1 8.3 - 10.1 mg/dL    AST 24 13 - 39 U/L    ALT 19 7 - 52 U/L    Alkaline Phosphatase 115 (H) 34 - 104 U/L    Total Protein 6.2 (L) 6.4 - 8.4 g/dL    Albumin 2.7 (L) 3.5 - 5.0 g/dL    Total Bilirubin 0.58 0.20 - 1.00 mg/dL    eGFR 99 ml/min/1.73sq m   Lactic acid    Collection Time: 02/25/24 11:14 AM   Result Value Ref Range    LACTIC ACID 1.3 0.5 - 2.0 mmol/L   Procalcitonin    Collection Time: 02/25/24 11:14 AM   Result Value Ref Range    Procalcitonin 0.52 (H) <=0.25 ng/ml   Protime-INR    Collection Time: 02/25/24 11:14 AM   Result Value Ref Range    Protime 17.8 (H) 11.6 - 14.5 seconds    INR 1.42 (H) 0.84 - 1.19   APTT    Collection Time: 02/25/24 11:14 AM   Result Value Ref Range    PTT 40 (H) 23 - 37 seconds   Blood culture #1    Collection Time: 02/25/24 11:14 AM    Specimen: Arm, Left; Blood   Result Value Ref Range    Blood Culture Received in Microbiology Lab. Culture in Progress.    Blood culture #2    Collection Time: 02/25/24 11:14 AM    Specimen: Hand, Left; Blood   Result Value Ref Range    Blood Culture Received in Microbiology Lab. Culture in Progress.    Magnesium    Collection Time: 02/25/24 11:14 AM   Result Value Ref Range    Magnesium 1.6 (L) 1.9 - 2.7 mg/dL   FLU/RSV/COVID - if FLU/RSV clinically relevant    Collection Time: 02/25/24 11:14 AM    Specimen: Nose; Nares   Result Value Ref Range    SARS-CoV-2  "Negative Negative    INFLUENZA A PCR Negative Negative    INFLUENZA B PCR Negative Negative    RSV PCR Negative Negative   Manual Differential(PHLEBS Do Not Order)    Collection Time: 02/25/24 11:14 AM   Result Value Ref Range    Segmented % 98 (H) 43 - 75 %    Lymphocytes % 2 (L) 14 - 44 %    Monocytes % 0 (L) 4 - 12 %    Eosinophils, % 0 0 - 6 %    Basophils % 0 0 - 1 %    Absolute Neutrophils 12.14 (H) 1.85 - 7.62 Thousand/uL    Lymphocytes Absolute 0.25 (L) 0.60 - 4.47 Thousand/uL    Monocytes Absolute 0.00 0.00 - 1.22 Thousand/uL    Eosinophils Absolute 0.00 0.00 - 0.40 Thousand/uL    Basophils Absolute 0.00 0.00 - 0.10 Thousand/uL    Total Counted      RBC Morphology Present     Platelet Estimate Adequate Adequate    Large Platelet Present     Anisocytosis Present     Poikilocytes Present    HS Troponin 0hr (reflex protocol)    Collection Time: 02/25/24 11:14 AM   Result Value Ref Range    hs TnI 0hr 20 \"Refer to ACS Flowchart\"- see link ng/L   B-Type Natriuretic Peptide(BNP)    Collection Time: 02/25/24 11:14 AM   Result Value Ref Range    BNP 99 0 - 100 pg/mL   HS Troponin I 2hr    Collection Time: 02/25/24  2:00 PM   Result Value Ref Range    hs TnI 2hr 19 \"Refer to ACS Flowchart\"- see link ng/L    Delta 2hr hsTnI -1 <20 ng/L   Urinalysis with microscopic    Collection Time: 02/25/24  4:06 PM   Result Value Ref Range    Color, UA Yellow     Clarity, UA Clear     Specific Gravity, UA 1.010 1.005 - 1.030    pH, UA 6.0 4.5, 5.0, 5.5, 6.0, 6.5, 7.0, 7.5, 8.0    Leukocytes, UA Trace (A) Negative    Nitrite, UA Negative Negative    Protein, UA Trace (A) Negative mg/dl    Glucose, UA Negative Negative mg/dl    Ketones, UA Trace (A) Negative mg/dl    Urobilinogen, UA <2.0 <2.0 mg/dl mg/dl    Bilirubin, UA Negative Negative    Occult Blood, UA Negative Negative    RBC, UA 0-1 (A) None Seen, 2-4 /hpf    WBC, UA 2-4 None Seen, 2-4, 5-60 /hpf    Epithelial Cells Occasional None Seen, Occasional /hpf    Bacteria, UA " Moderate (A) None Seen, Occasional /hpf    MUCUS THREADS Occasional (A) None Seen    Amorphous Crystals, UA Occasional    Strep Pneumoniae, Urine    Collection Time: 02/25/24  4:06 PM    Specimen: Urine, Clean Catch   Result Value Ref Range    Strep pneumoniae antigen, urine Negative Negative   Legionella antigen, Urine    Collection Time: 02/25/24  4:06 PM    Specimen: Urine   Result Value Ref Range    Legionella Urinary Antigen Negative Negative   Basic metabolic panel, AM Draw, Tomorrow    Collection Time: 02/26/24  5:10 AM   Result Value Ref Range    Sodium 130 (L) 135 - 147 mmol/L    Potassium 4.6 3.5 - 5.3 mmol/L    Chloride 98 96 - 108 mmol/L    CO2 24 21 - 32 mmol/L    ANION GAP 8 mmol/L    BUN 21 5 - 25 mg/dL    Creatinine 0.58 (L) 0.60 - 1.30 mg/dL    Glucose 92 65 - 140 mg/dL    Calcium 9.0 8.4 - 10.2 mg/dL    eGFR 97 ml/min/1.73sq m   CBC and Platelet, AM Draw, Tomorrow    Collection Time: 02/26/24  5:10 AM   Result Value Ref Range    WBC 8.92 4.31 - 10.16 Thousand/uL    RBC 2.42 (L) 3.81 - 5.12 Million/uL    Hemoglobin 6.7 (L) 11.5 - 15.4 g/dL    Hematocrit 21.0 (L) 34.8 - 46.1 %    MCV 87 82 - 98 fL    MCH 27.7 26.8 - 34.3 pg    MCHC 31.9 31.4 - 37.4 g/dL    RDW 19.7 (H) 11.6 - 15.1 %    Platelets 135 (L) 149 - 390 Thousands/uL    MPV 9.9 8.9 - 12.7 fL   Procalcitonin, Next Day AM Collection    Collection Time: 02/26/24  5:10 AM   Result Value Ref Range    Procalcitonin 0.57 (H) <=0.25 ng/ml   Hemoglobin and hematocrit, blood    Collection Time: 02/26/24  9:39 AM   Result Value Ref Range    Hemoglobin 7.1 (L) 11.5 - 15.4 g/dL    Hematocrit 22.3 (L) 34.8 - 46.1 %   Type and screen    Collection Time: 02/26/24  9:39 AM   Result Value Ref Range    ABO Grouping A     Rh Factor Positive     Antibody Screen Negative     Specimen Expiration Date [id number]    Standard Wheelchair Package (Under 250 lbs)    Collection Time: 02/26/24 10:54 AM   Result Value Ref Range    [facility]  [employer]     Supplier Phone Number [phone]     Order Status Supplier Submitted     Delivery Note      Delivery Request Date 02/26/2024     Item Description Standard Wheelchair, 16 in x 16 in     Item Description Standard Seat Width, 16 in     Item Description Standard Seat Depth, 16 in     Item Description Wheelchair Anti Tippers     Item Description Wheelchair Back Cushion, 16 in     Item Description Wheelchair Brake Extenders     Item Description Swing-Away Foot Rests     Item Description Wheelchair Seat Cushion, 16 in, General Use        Imaging Studies:   CTA chest (pe study) abdomen pelvis contrast    Result Date: 2/25/2024  Narrative: CT PULMONARY ANGIOGRAM OF THE CHEST AND CT ABDOMEN AND PELVIS WITH INTRAVENOUS CONTRAST INDICATION: mets. COMPARISON: Compared with CT chest of 2/12/2024 and abdomen study of 1/21/2024 TECHNIQUE: CT examination of the chest, abdomen and pelvis was performed. Thin section CT angiographic technique was used in the chest in order to evaluate for pulmonary embolus and coronal 3D MIP postprocessing was performed on the acquisition scanner. Multiplanar 2D reformatted images were created from the source data. This examination, like all CT scans performed in the [facility], was performed utilizing techniques to minimize radiation dose exposure, including the use of iterative reconstruction and automated exposure control. Radiation dose length product (DLP) for this visit: 889.73 mGy-cm IV Contrast: 100 mL of iohexol (OMNIPAQUE) Enteric Contrast: Not administered. FINDINGS: CHEST PULMONARY ARTERIAL TREE: Poor opacification of pulmonary artery and branches. Defect in the distal right interlobar pulmonary artery to the resection site similar to prior study. Small caliber lower lobe pulmonary artery branches on the right. LUNGS: Previous right lower lobe mass again identified measuring 5.9 x 4.7 cm unchanged. Postsurgical changes in the right hilum with soft tissue  surrounding the bronchovascular structures unchanged. Decreased right lung volume. Dense groundglass peripheral opacities in the right mid and lower lung with honeycomb like changes similar to prior study. Patchy groundglass parenchymal changes throughout the left lung worsened from prior study with coarse nodular interstitial prominence predominantly in the left upper lobe. Left upper lobe nodule measuring 1.1 cm is unchanged. PLEURA: Small to moderate right pleural effusion with Pleurx catheter in place moderate left pleural effusion. Pleural thickening on the right. HEART/AORTA: Heart is unremarkable for patient's age. No thoracic aortic aneurysm. Coronary vascular calcifications. MEDIASTINUM AND SUMIT: Unremarkable. CHEST WALL AND LOWER NECK: Right chest port catheter in place ABDOMEN LIVER/BILIARY TREE: Unremarkable. GALLBLADDER: No calcified gallstones. No pericholecystic inflammatory change. SPLEEN: Unremarkable. PANCREAS: Unremarkable. ADRENAL GLANDS: Unremarkable. KIDNEYS/URETERS: Unremarkable. No hydronephrosis. STOMACH AND BOWEL: Sutures in the rectosigmoid colon.. APPENDIX: No findings to suggest appendicitis. ABDOMINOPELVIC CAVITY: Large amount of ascites. Omental nodularity of carcinomatosis unchanged.. No pneumoperitoneum. No lymphadenopathy. VESSELS: Moderate-severe atherosclerotic changes in the abdominal aorta. PELVIS REPRODUCTIVE ORGANS: Unremarkable for patient's age. URINARY BLADDER: Unremarkable. ABDOMINAL WALL/INGUINAL REGIONS: Unremarkable. BONES: Degenerative disease at L5-S1 with grade 2 anterolisthesis of L5 on S1 secondary to spondylolysis. No acute fracture or suspicious osseous lesion.     Impression: 1.Unchanged filling defect at the postsurgical site in the right distal pulmonary artery with no new embolus although limited by poor opacification. Measured RV/LV ratio is within normal limits at less than 0.9. 2. Small to moderate right pleural effusion with Pleurx catheter in place with  moderate left effusion unchanged. 3. Mostly unchanged right lower lung mass with extension to the suture site in the hilum. 4. Large amount of ascites increased from prior study with omental nodularity of carcinomatosis. Previous obstructive pattern is resolved. 5. Unchanged right lung parenchymal changes with worsening groundglass parenchymal opacities throughout the left lung of superimposed pneumonia. Workstation performed: [id number]     XR chest portable    Result Date: 2/25/2024  Narrative: XR CHEST PORTABLE INDICATION: Increased dyspnea, rales, stage IV lung cancer. COMPARISON: Chest CT 2/25/2024 and 2/12/2024, CXR 2/12/2024 FINDINGS: Right port at cavoatrial junction. Volume loss from right lower lobectomy. Extensive bilateral groundglass opacity. Right pleural drainage catheter with small right effusion. Left pleural effusion on CT not visible. No pneumothorax. Normal cardiomediastinal silhouette. Bones are unremarkable for age. Normal upper abdomen.     Impression: Right pleural drainage catheter with small loculated right effusion with no pneumothorax. Left pleural effusion on subsequent CT not visible. Extensive bilateral groundglass opacity. Workstation performed: [id number]      VAS VENOUS DUPLEX - LOWER LIMB BILATERAL    Result Date: 2/14/2024  Narrative:  [facility] REPORT CLINICAL: Indications: Pulmonary Emboli [I26.99].  Patient presents with recent discovery of pulmonary embolism and physician wants to determine potential source. Patient reports having shortness of breath for a few weeks that became progressively worse. Operative History: 2023-04-25 IR pleural effusion from long term cath placement 2023-04-18 IR port placement Risk Factors The patient has history of HTN, Hyperlipidemia and previous smoking (quit <1yr ago).   CONCLUSION: Impression: RIGHT LOWER LIMB: No evidence of acute or chronic deep vein thrombosis. No evidence of superficial thrombophlebitis noted. Doppler evaluation  shows a normal response to augmentation maneuvers. Popliteal, posterior tibial and anterior tibial arterial Doppler waveform's are triphasic.  LEFT LOWER LIMB: No evidence of acute or chronic deep vein thrombosis. No evidence of superficial thrombophlebitis noted. Doppler evaluation shows a normal response to augmentation maneuvers. Popliteal, posterior tibial and anterior tibial arterial Doppler waveform's are triphasic.  Technical findings were faxed to the patient's chart.  SIGNATURE: Electronically Signed by: MEENU GARBER DO on 2024-02-14 11:11:33 AM    Echo complete w/ contrast if indicated    Result Date: 2/13/2024  Narrative:   Left Ventricle: Left ventricular cavity size is normal. Wall thickness is upper normal. The left ventricular ejection fraction is 65%. Systolic function is normal. at -19%. Wall motion is normal. Diastolic function is mildly abnormal, consistent with grade I (abnormal) relaxation.   Right Ventricle: Right ventricular cavity size is normal. Systolic function is normal.   Mitral Valve: There is mild annular calcification. There is trace regurgitation.   Tricuspid Valve: There is mild regurgitation.   Pulmonary Artery: The pulmonary artery systolic pressure is mildly increased. Strain was performed to quantify interventricular dyssynchrony and evaluate components of myocardial function due Chemotherapy . Results from the utilization of Strain Analysis are listed in the report below.     XR chest 1 view portable    Result Date: 2/13/2024  Narrative: XR CHEST PORTABLE INDICATION: SOB. COMPARISON: Chest radiograph January 25, 2024 FINDINGS: Right chest wall port with catheter tip projecting over the superior cavoatrial junction. Post right lower lobectomy. Right infrahilar opacity. Bilateral pleural effusions. Right pleural catheter. No pneumothorax. Normal cardiomediastinal silhouette.     Impression: Right infrahilar opacity reflects mass better evident on subsequent CT. Bilateral pleural  effusions, which are better evident on subsequent CT. Workstation performed: [id number]     CTA ED chest PE study    Result Date: 2/12/2024  Narrative: CTA - CHEST WITH IV CONTRAST - PULMONARY ANGIOGRAM INDICATION:   Pulmonary embolism (PE) suspected, high prob S?O RO PE. COMPARISON: CTA chest abdomen/pelvis 1/21/2024. TECHNIQUE: CTA examination of the chest was performed using angiographic technique according to a protocol specifically tailored to evaluate for pulmonary embolism.  Axial, sagittal, and coronal 2D reformatted images were created from the source data and  submitted for interpretation.  In addition, coronal and axial 3D MIP postprocessing was performed on the acquisition scanner. Radiation dose length product (DLP) for this visit:  460.09 mGy-cm .  This examination, like all CT scans performed in the [facility], was performed utilizing techniques to minimize radiation dose exposure, including the use of iterative  reconstruction and automated exposure control. IV Contrast:  85 mL of iohexol (OMNIPAQUE) CHEST: PULMONARY ARTERIAL TREE: Small filling defect in the right interlobar pulmonary artery presumably extending to the resection margin of prior right lower lobectomy; the middle lobe remains perfused. Normal caliber main pulmonary artery. LARGE AIRWAYS: Large airways are clear with no tracheal or endobronchial lesion. LUNGS: Postsurgical changes of right lower lobectomy. Moderate to severe background emphysema. Redemonstration of an ill-defined right infrahilar tumor measuring approximately 5.9 x 4.0 cm (previously 5.7 x 3.7 cm remeasured in same fashion) on series 2/143. Unchanged surrounding groundglass opacities and interlobular septal thickening throughout the middle lobe consistent with lymphangitic spread of disease. Unchanged multifocal groundglass and solid opacities throughout the left lung, likely metastatic. PLEURA: Stable small left and trace right pleural effusions  with right sided chest tube in unchanged position. HEART: Normal cardiac size and morphology. Moderate coronary artery calcification. Trace pericardial effusion. VESSELS: Mild MEDIASTINUM AND SUMIT: Small hiatal hernia noted.  No lymphadenopathy. CHEST WALL AND LOWER NECK:   Within normal limits. VISUALIZED STRUCTURES IN THE UPPER ABDOMEN: Small-to-moderate volume upper abdominal ascites with peritoneal/omental nodularity similar to prior. BONES: Moderate multilevel spondylosis. Redemonstration of pathologic fracture of the left posterior fifth rib. Right second rib metastasis not well seen on current exam.     Impression: 1.  Small filling defect in the right interlobar pulmonary artery presumably extending to the resection margin of prior right lower lobectomy; the middle lobe remains perfused. Measured RV/LV ratio is within normal limits at less than 0.9. 2.  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 The study was marked in EPIC for immediate notification. Workstation performed: [id number]       Counseling / Coordination of Care  Total floor / unit time spent today 75 minutes. Greater than 50% of total time was spent with the patient and / or family counseling and / or coordination of care.     CAT Garcia    Please note:  This report has been generated by voice recognition software system. Therefore, there may be syntax, spelling and/or grammatical errors.  Please call if you have any questions.

## 2024-02-26 NOTE — ASSESSMENT & PLAN NOTE
Dyspneic, gross ascites, & 2+ LE edema   ECHO 01/2024: EF 60%; grade 1 diastolic dysfunction  CT Chest- Small to moderate right pleural effusion with Pleurx catheter in place with moderate left effusion unchanged  Home diuretic: Lasix 20 q day (2 days PTA, pt switched to torsemide 20 mg q day)    Overall suspect pleural effusion and ascites most likely related to malignancy > CHF  LE edema also more likely due to hypoalbuminemia/3rd spacing (album 2.7 on admission)    S/P IV lasix 40 mg BID without significant improvement in LE edema   DC IV lasix, likely less effective given low albumin  Repeat CXR to assess for pulm vas congestion   Will give IV bumex 1 mg tonight and resume PO torsemide tomorrow   SBP is currently stable at 115  Could consider additional  IV albumin as needed as well pending CXR  DANIEL stockings

## 2024-02-26 NOTE — UTILIZATION REVIEW
NOTIFICATION OF INPATIENT ADMISSION   AUTHORIZATION REQUEST   SERVICING FACILITY:   [place] [address]  Tax ID: [id number]  NPI: [id number] ATTENDING PROVIDER:  Attending Name and NPI#: Angely Carmen Md [[id number]]  Address: [address] [place] [address]  Phone: [phone]   ADMISSION INFORMATION:  Place of Service: Inpatient [facility]  Place of Service Code: 21  Inpatient Admission Date/Time: 2/25/24  1:19 PM  Discharge Date/Time: No discharge date for patient encounter.  Admitting Diagnosis Code/Description:  Hypomagnesemia [E83.42]  Acute systolic congestive heart failure (HCC) [I50.21]  Chronic respiratory failure with hypoxia (HCC) [J96.11]  Non-small cell cancer of right lung (HCC) [C34.91]     UTILIZATION REVIEW CONTACT:  Johanna Cline Utilization   Network Utilization Review Department  Phone: [phone]  Fax [phone]  Email: [email or web address]  Contact for approvals/pending authorizations, clinical reviews, and discharge.     PHYSICIAN ADVISORY SERVICES:  Medical Necessity Denial & Beic-ql-Hagh Review  Phone: [phone]  Fax: [phone]  Email: [email or web address]     DISCHARGE SUPPORT TEAM:  For Patients Discharge Needs & Updates  Phone: [phone] opt. 2 Fax: [phone]  Email: [email or web address]

## 2024-02-26 NOTE — PROGRESS NOTES
Kathi Ferris is a 63 y.o. female who is currently ordered Vancomycin IV with management by the Pharmacy Consult service.  Relevant clinical data and objective / subjective history reviewed.  Vancomycin Assessment:  Indication and Goal AUC/Trough: Bacteremia (goal -600, trough >10)  Clinical Status: stable  Micro:     Renal Function:  SCr: 0.58 mg/dL  CrCl: 101 mL/min  Renal replacement: Not on dialysis  Days of Therapy: 1  Current Dose: Initial dosing: Vancomycin 1750mg (25mg/kg ABW) IV x1 followed by 1000mg IV Q12H (429/12.9)  Vancomycin Plan:  New Dosing: As per initial dosing above.  Estimated AUC: 429 mcg*hr/mL  Estimated Trough: 12.9 mcg/mL  Next Level: Random Level AM labs 2/28/24  Renal Function Monitoring: Daily BMP and UOP  Pharmacy will continue to follow closely for s/sx of nephrotoxicity, infusion reactions and appropriateness of therapy.  BMP and CBC will be ordered per protocol. We will continue to follow the patient’s culture results and clinical progress daily.    Mann Maldonado, Pharmacist

## 2024-02-26 NOTE — ASSESSMENT & PLAN NOTE
Etiology 2/2 malignancy  CT abd/pelvis:   Large amount of ascites increased from prior study with omental nodularity of carcinomatosis    IR consulted  S/P paracentesis 2/26 - >5L removed

## 2024-02-26 NOTE — ASSESSMENT & PLAN NOTE
Recent Labs     02/25/24  1114 02/26/24  0510 02/26/24  0939   HGB 7.9* 6.7* 7.1*      Baseline Hgb 9-10   Trend on Eliquis & transfuse PRN; type & screen requested   Suspect component of dilution  Request folate, vitamin B12, occult blood & iron panel  No overt sources of bleeding

## 2024-02-26 NOTE — ASSESSMENT & PLAN NOTE
POA, criteria met w/ tachycardia, tachypnea, & leukocytosis   Lactic WNL  Procal elevated   SOI: PNA   UA without infection  Viral swab neg  Urine antigens neg  Initial BC 1/2 with staph epidermidis - suspect contaminant  Repeat BC x 2 pending   Patient was initiated on vanc 2/26  Given immunosuppression will continue for now   DC if MRSA swab neg

## 2024-02-26 NOTE — ASSESSMENT & PLAN NOTE
Stage 4, currently undergoing palliative chemotherapy (last 2/22)  Oncology and Palliative care consulted   Patient with multiple recent admissions, overall aware of her prognosis/advanced malignancy  Topic of hospice has been introduced - patient is considering this however would like to discuss with her family prior to further decisions at this time  For now continue to treat PNA and maintain hgb > 7

## 2024-02-26 NOTE — PLAN OF CARE
Problem: OCCUPATIONAL THERAPY ADULT  Goal: Performs self-care activities at highest level of function for planned discharge setting.  See evaluation for individualized goals.  Description: Treatment Interventions: ADL retraining, Functional transfer training, Endurance training, Patient/family training, Equipment evaluation/education, Compensatory technique education, Continued evaluation, Energy conservation, Activityengagement          See flowsheet documentation for full assessment, interventions and recommendations.   Note: Limitation: Decreased ADL status, Decreased endurance, Decreased self-care trans, Decreased high-level ADLs  Prognosis: Guarded  Assessment: Pt is a 63 y.o. female seen for OT evaluation s/p admission to [facility] on 2/25/2024 due to shortness of breath. Diagnosed with Acute on chronic respiratory failure with hypoxia (HCC). Personal and env factors supporting pt at time of IE include age, (I) PLOF, attitude towards recovery, accessible home environment, and FFSU. Personal and env factors inhibiting engagement in occupations include limited social support, difficulty completing ADLs, and difficulty completing IADLs. Performance deficits that affect the pt’s occupational performance can be seen above. Due to pt's current functional limitations and medical complications pt is functioning below baseline. Pt would benefit from continued skilled OT treatment in order to maximize safety, independence and overall performance with ADLs, IADLs, functional mobility, and functional transfers in order to achieve highest level of function.     Rehab Resource Intensity Level, OT: III (Minimum Resource Intensity) (increased support at home required for discharge home d/t recent change in caregivers ability to provide assistance)

## 2024-02-26 NOTE — PROGRESS NOTES
Critical access hospital  Progress Note  Name: Kathi Ferris I  MRN: [id number]  Unit/Bed#: -Kieran I Date of Admission: 2/25/2024   Date of Service: 2/26/2024 I Hospital Day: 1    Assessment/Plan   * Dyspnea  Assessment & Plan  In setting of metastatic lung cancer, superimposed pneumonia, symptomatic anemia, chronic COPD, and gross ascites  Supportive care; continue O2 therapy     Pneumonia  Assessment & Plan  CT chest: suspected left lung superimposed pneumonia   Urine antigens negative  Continue Rocephin, day #2   Sputum cultures pending    SIRS (systemic inflammatory response syndrome) ([facility])  Assessment & Plan  POA, criteria met w/ tachycardia, tachypnea, & leukocytosis   CT chest: suspected left lung superimposed pneumonia   UA & LA benign   Procalcitonin: 0.52 -> 0.57   BCx pending   Afebrile     Recent Labs     02/25/24  1114 02/26/24  0510   WBC 12.39* 8.92         Hypervolemia  Assessment & Plan  Dyspneic, gross ascites, & 2+ LE edema   ECHO 01/2024: EF 60%; grade 1 diastolic dysfunction  Home diuretic:  Lasix 20 q day   2 days PTA, pt switched to torsemide 20 mg q day   CT of chest:   Small to moderate right pleural effusion with Pleurx catheter in place with moderate left effusion unchanged  Last drained 02/23, ~ 250 cc   Continue Lasix 40 mg IV BID   Daily standing weights  Pending paracentesis and albumin    Anemia  Assessment & Plan  Recent Labs     02/25/24  1114 02/26/24  0510 02/26/24  0939   HGB 7.9* 6.7* 7.1*      Baseline Hgb 9-10   Trend on Eliquis & transfuse PRN; type & screen requested   Suspect component of dilution  Request folate, vitamin B12, occult blood & iron panel  No overt sources of bleeding      Ascites  Assessment & Plan  Etiology 2/2 malignancy  CT abd/pelvis:   Large amount of ascites increased from prior study with omental nodularity of carcinomatosis    IR paracentesis pending; albumin ordered    Chronic respiratory failure (HCC)  Assessment &  Plan  Dyspneic but stable on home O2, 3 L  Per report, presented to ED 86% on 3 L; acute demand resolved       Non-small cell cancer of right lung (HCC)  Assessment & Plan  Stage 4   Palliative chemotherapy; last infusion on 02/22  Continue to follow outpatient heme/onc     Hyponatremia  Assessment & Plan  Likely multifactorial in the setting of hypervolemia & malignancy  Continue IV Lasix   Trend     Recent Labs     02/25/24  1114 02/26/24  0510   SODIUM 130* 130*          Pulmonary embolism (HCC)  Assessment & Plan  Admitted in early 02/2023 for PE  Maintained indefinitely on Eliquis 5 mg BID   Trend Hgb     Essential hypertension  Assessment & Plan  Continue metoprolol 25mg daily, Micardis 80mg daily, and verapamil 240mg daily   BP stable     COPD (chronic obstructive pulmonary disease) (HCC)  Assessment & Plan  No acute exacerbation   Continue home regimen   Continue Advair, supplemental O2, and PRN bronchodilators         VTE Pharmacologic Prophylaxis: VTE Score: 6 High Risk (Score >/= 5) - Pharmacological DVT Prophylaxis Ordered: apixaban (Eliquis). Sequential Compression Devices Ordered.    Mobility:   Basic Mobility Inpatient Raw Score: 17  JH-HLM Goal: 5: Stand one or more mins  JH-HLM Achieved: 4: Move to chair/commode  HLM Goal achieved. Continue to encourage appropriate mobility.    Patient Centered Rounds: I performed bedside rounds with nursing staff today.   Discussions with Specialists or Other Care Team Provider: Case management; oncology consultation please    Education and Discussions with Family / Patient: Updated  () via phone.    Total Time Spent on Date of Encounter in care of patient: 50 mins. This time was spent on one or more of the following: performing physical exam; counseling and coordination of care; obtaining or reviewing history; documenting in the medical record; reviewing/ordering tests, medications or procedures; communicating with other healthcare  professionals and discussing with patient's family/caregivers.    Current Length of Stay: 1 day(s)  Current Patient Status: Inpatient   Certification Statement: The patient will continue to require additional inpatient hospital stay due to anemia, hyperkalemia, and pneumonia  Discharge Plan: Anticipate discharge in 24-48 hrs to home with home services.    Code Status: Level 3 - DNAR and DNI    Subjective:   Patient is doing okay.  She admits to persistent shortness of breath since admission.  Her dyspnea has only had some very mild improvement had a bowel movement this morning which was small.  She is urinating without discomfort.    Objective:     Vitals:   Temp (24hrs), Av.1 °F (36.7 °C), Min:97.2 °F (36.2 °C), Max:98.7 °F (37.1 °C)    Temp:  [97.2 °F (36.2 °C)-98.7 °F (37.1 °C)] 98.2 °F (36.8 °C)  HR:  [] 105  Resp:  [18-24] 20  BP: ()/(52-66) 115/65  SpO2:  [86 %-98 %] 93 %  Body mass index is 30.49 kg/m².     Input and Output Summary (last 24 hours):     Intake/Output Summary (Last 24 hours) at 2024 1050  Last data filed at 2024 0112  Gross per 24 hour   Intake --   Output 550 ml   Net -550 ml       Physical Exam:   Physical Exam  Constitutional:       Appearance: She is normal weight.      Comments: Frail; chronically ill-appearing   HENT:      Head: Normocephalic.      Right Ear: External ear normal.      Left Ear: External ear normal.      Nose: Nose normal.      Mouth/Throat:      Mouth: Mucous membranes are moist.      Pharynx: Oropharynx is clear.   Eyes:      Extraocular Movements: Extraocular movements intact.      Conjunctiva/sclera: Conjunctivae normal.   Cardiovascular:      Rate and Rhythm: Regular rhythm. Tachycardia present.      Pulses: Normal pulses.      Comments: Mild tachycardia  Pulmonary:      Effort: Pulmonary effort is normal. No respiratory distress.      Breath sounds: Rales present. No wheezing.      Comments: Decreased bibasilarly; scant rales at  bases  Abdominal:      General: Abdomen is flat. Bowel sounds are normal. There is distension.      Palpations: Abdomen is soft.      Tenderness: There is no abdominal tenderness. There is no guarding.      Comments: Fluid wave present   Musculoskeletal:         General: Swelling (2+ pitting lower extremity edema) present. Normal range of motion.      Cervical back: Normal range of motion.   Skin:     General: Skin is warm and dry.      Capillary Refill: Capillary refill takes less than 2 seconds.   Neurological:      General: No focal deficit present.      Mental Status: She is alert. Mental status is at baseline.   Psychiatric:         Mood and Affect: Mood normal.         Behavior: Behavior normal.        Additional Data:     Labs:  Results from last 7 days   Lab Units 02/26/24  0939 02/26/24  0510 02/25/24  1114 02/20/24  1100   WBC Thousand/uL  --  8.92 12.39* 13.34*   HEMOGLOBIN g/dL 7.1* 6.7* 7.9* 8.2*   HEMATOCRIT % 22.3* 21.0* 25.1* 25.8*   PLATELETS Thousands/uL  --  135* 144* 292   NEUTROS PCT %  --   --   --  97*   LYMPHS PCT %  --   --   --  2*   LYMPHO PCT %  --   --  2*  --    MONOS PCT %  --   --   --  0*   MONO PCT %  --   --  0*  --    EOS PCT %  --   --  0 0     Results from last 7 days   Lab Units 02/26/24  0510 02/25/24  1114   SODIUM mmol/L 130* 130*   POTASSIUM mmol/L 4.6 4.0   CHLORIDE mmol/L 98 96   CO2 mmol/L 24 24   BUN mg/dL 21 21   CREATININE mg/dL 0.58* 0.53*   ANION GAP mmol/L 8 10   CALCIUM mg/dL 9.0 9.1   ALBUMIN g/dL  --  2.7*   TOTAL BILIRUBIN mg/dL  --  0.58   ALK PHOS U/L  --  115*   ALT U/L  --  19   AST U/L  --  24   GLUCOSE RANDOM mg/dL 92 97     Results from last 7 days   Lab Units 02/25/24  1114   INR  1.42*             Results from last 7 days   Lab Units 02/26/24  0510 02/25/24  1114   LACTIC ACID mmol/L  --  1.3   PROCALCITONIN ng/ml 0.57* 0.52*       Lines/Drains:  Invasive Devices       Central Venous Catheter Line  Duration             Port A Cath 04/18/23 Right  Subclavian 314 days              Peripheral Intravenous Line  Duration             Peripheral IV 02/25/24 Dorsal (posterior);Right Forearm <1 day              Drain  Duration             Pleural Effusion Long-Term Catheter 15.5 Fr. 306 days                    Central Line:  Goal for removal:  Receiving chemotherapy         Telemetry:  Telemetry Orders (From admission, onward)               24 Hour Telemetry Monitoring  Continuous x 24 Hours (Telem)        Question:  Reason for 24 Hour Telemetry  Answer:  Decompensated CHF- and any one of the following: continuous diuretic infusion or total diuretic dose >200 mg daily, associated electrolyte derangement (I.e. K < 3.0), ionotropic drip (continuous infusion), hx of ventricular arrhythmia, or new EF < 35%                     Telemetry Reviewed: Sinus Tachycardia  Indication for Continued Telemetry Use: Metabolic/electrolyte disturbance with high probability of dysrhythmia             Imaging: Reviewed radiology reports from this admission including: chest xray, chest CT scan, and abdominal/pelvic CT    Recent Cultures (last 7 days):   Results from last 7 days   Lab Units 02/25/24  1606 02/25/24  1114   BLOOD CULTURE   --  Received in Microbiology Lab. Culture in Progress.  Received in Microbiology Lab. Culture in Progress.   LEGIONELLA URINARY ANTIGEN  Negative  --        Last 24 Hours Medication List:   Current Facility-Administered Medications   Medication Dose Route Frequency Provider Last Rate    acetaminophen  650 mg Oral Q6H PRN Modesta Bentley PA-C      albuterol  2 puff Inhalation Q4H PRN Modesta Bentley PA-C      apixaban  5 mg Oral BID Modesta Bentley PA-C      atorvastatin  20 mg Oral Daily With Dinner Modesta Bentley PA-C      buPROPion  300 mg Oral Daily Modesta Bentley PA-C      cefTRIAXone  1,000 mg Intravenous Q24H Modesta Bentley PA-C 1,000 mg (02/25/24 1636)    diphenhydrAMINE  25 mg Oral HS Modesta Bentley PA-C       fluticasone-vilanterol  1 puff Inhalation Daily Modesta Bentley PA-C      furosemide  40 mg Intravenous BID (diuretic) Modesta Bentley PA-C      lidocaine   Topical Daily PRN Modesta Bentley PA-C      LORazepam  0.5 mg Oral Daily PRN Modesta Bentley PA-C      losartan  100 mg Oral Daily Modesta Bentley PA-C      metoprolol tartrate  25 mg Oral Q12H ULISES Modesta Bentley PA-C      multivitamin stress formula  1 tablet Oral Daily Modesta Bentley PA-C      potassium chloride  40 mEq Oral Daily Modesta Bentley PA-C      sertraline  200 mg Oral Daily Modesta Bentley PA-C      verapamil  240 mg Oral HS Modesta Bentley PA-C          Today, Patient Was Seen By: Shani Hoffman PA-C    **Please Note: This note may have been constructed using a voice recognition system.**

## 2024-02-26 NOTE — ASSESSMENT & PLAN NOTE
In setting of metastatic lung cancer, superimposed pneumonia, symptomatic anemia, chronic COPD, and gross ascites  Supportive care; continue O2 therapy

## 2024-02-26 NOTE — OCCUPATIONAL THERAPY NOTE
Occupational Therapy Evaluation     Patient Name: Kathi Ferris  Today's Date: 2/26/2024  Problem List  Principal Problem:    Acute on chronic respiratory failure with hypoxia (HCC)  Active Problems:    COPD (chronic obstructive pulmonary disease) (HCC)    Essential hypertension    Non-small cell cancer of right lung (HCC)    Pulmonary embolism (HCC)    Hyponatremia    SIRS (systemic inflammatory response syndrome) (HCC)    Elevated alkaline phosphatase level    Ascites    Pneumonia    Anemia    Past Medical History  Past Medical History:   Diagnosis Date    Cancer (HCC)     COPD (chronic obstructive pulmonary disease) (HCC)     Emphysema of lung (HCC)     Hyperlipidemia     Hypertension     Lung cancer (HCC) 06/26/2019    right lower lobe removed    Lung nodule     Pleural effusion      Past Surgical History  Past Surgical History:   Procedure Laterality Date    APPENDECTOMY      BREAST BIOPSY Right     benign    BRONCHOSCOPY  06/2019    COLON SURGERY      HYSTERECTOMY      age 46    IR PLEURAL EFFUSION LONG-TERM CATHETER PLACEMENT  04/25/2023    IR PORT PLACEMENT  04/18/2023    IR THORACENTESIS  03/24/2023    IR THORACENTESIS  04/12/2023    IR THORACENTESIS  04/18/2023    LUNG BIOPSY  06/2019    LUNG LOBECTOMY      OOPHORECTOMY Bilateral     age 46         02/26/24 0829   OT Last Visit   OT Visit Date 02/26/24   Note Type   Note type Evaluation   Pain Assessment   Pain Assessment Tool 0-10   Pain Score No Pain   Restrictions/Precautions   Weight Bearing Precautions Per Order No   Other Precautions Chair Alarm;Bed Alarm;Fall Risk;O2   Home Living   Type of Home House   Home Layout Two level;1/2 bath on main level;Bed/bath upstairs;Stairs to enter without rails;Able to live on main level with bedroom/bathroom  (stairglide to second floor, bed and bath upstairs, FFSU)   Bathroom Shower/Tub Tub/shower unit  (Sponge bathing at baseline)   Bathroom Toilet Standard   Bathroom Equipment Grab bars around toilet;Grab  bars in shower;Shower chair   Bathroom Accessibility Accessible   Home Equipment Walker;Stair glide;Grab bars  (interested in a wheelchair, rollator)   Prior Function   Level of [place] Independent with ADLs;Independent with functional mobility;Needs assistance with IADLS  (spouse assists w/ IADLs)   Lives With Spouse   Receives Help From Neighbor;Home health  (home health comes in 2x/wk for cath care)   IADLs Family/Friend/Other provides transportation;Family/Friend/Other provides meals;Independent with medication management   Falls in the last 6 months 0   Vocational Retired   Comments  recently had surgery and is unable to provide as much assistance as before, interested in home health   Lifestyle   Autonomy pta pt was (I) w ADLs and (A) w IADLs, (-) , 0 falls, lives w  and 2 mcclain retrivers   Reciprocal Relationships spouse   Service to Others retired   Intrinsic Gratification her dogs   General   Additional Pertinent History COPD, hx of non small cell cancer right lung, hx of pulmonary embolism, SIRS, Pneumonia, CHF, hypertension, ambulatory dysfunction, OTTO   Family/Caregiver Present No   Additional General Comments Pt was pleasant and cooperative   Subjective   Subjective I may need assistance at home   ADL   Where Assessed Edge of bed   Eating Assistance 7  Independent   Grooming Assistance 5  Supervision/Setup   UB Bathing Assistance 5  Supervision/Setup   LB Bathing Assistance 5  Supervision/Setup   UB Dressing Assistance 5  Supervision/Setup   LB Dressing Assistance 5  Supervision/Setup   LB Dressing Deficit Thread RLE into pants;Thread LLE into pants;Pull up over hips   Toileting Assistance  5  Supervision/Setup   Toileting Deficit Clothing management up;Clothing management down;Perineal hygiene;Bedside commode;Increased time to complete;Supervison/safety   Bed Mobility   Rolling L 5  Supervision   Additional items Bedrails;HOB elevated;Increased time required   Supine to Sit 5   Supervision   Additional items Increased time required   Additional Comments Pt seated OOB in recliner chair at end of session   Transfers   Sit to Stand 5  Supervision   Additional items Increased time required   Stand to Sit 5  Supervision   Additional items Increased time required   Stand pivot 5  Supervision   Additional items Increased time required   Toilet transfer 5  Supervision   Additional items Commode;Increased time required   Functional Mobility   Functional Mobility 5  Supervision   Additional Comments side steps to BSC d/t shortness of breath, drop in O2 to 86% w/ fnxl mobility   Balance   Static Sitting Good   Dynamic Sitting Fair +   Static Standing Fair +   Dynamic Standing Fair   Ambulatory Fair   Activity Tolerance   Activity Tolerance Patient limited by fatigue;Treatment limited secondary to medical complications (Comment)  (drop in O2)   Medical Staff Made Aware RN aware   RUE Assessment   RUE Assessment WFL   LUE Assessment   LUE Assessment WFL   Psychosocial   Psychosocial (WDL) WDL   Cognition   Overall Cognitive Status WFL   Arousal/Participation Alert;Cooperative   Attention Within functional limits   Orientation Level Oriented X4   Memory Within functional limits   Following Commands Follows all commands and directions without difficulty   Assessment   Limitation Decreased ADL status;Decreased endurance;Decreased self-care trans;Decreased high-level ADLs   Prognosis Guarded   Assessment Pt is a 63 y.o. female seen for OT evaluation s/p admission to [facility] on [facility]/25/[facility]24 due to shortness of breath. Diagnosed with Acute on chronic respiratory failure with hypoxia (HCC). Personal and env factors supporting pt at time of IE include age, (I) PLOF, attitude towards recovery, accessible home environment, and FFSU. Personal and env factors inhibiting engagement in occupations include limited social support, difficulty completing ADLs, and difficulty completing IADLs. Performance deficits that affect  the pt’s occupational performance can be seen above. Due to pt's current functional limitations and medical complications pt is functioning below baseline. Pt would benefit from continued skilled OT treatment in order to maximize safety, independence and overall performance with ADLs, IADLs, functional mobility, and functional transfers in order to achieve highest level of function.   Goals   Patient Goals to go home   LTG Time Frame 7-10   Long Term Goal see below   Plan   Treatment Interventions ADL retraining;Functional transfer training;Endurance training;Patient/family training;Equipment evaluation/education;Compensatory technique education;Continued evaluation;Energy conservation;Activityengagement   Goal Expiration Date 03/07/24   Discharge Recommendation   Rehab Resource Intensity Level, OT III (Minimum Resource Intensity)  (increased support at home required for discharge home d/t recent change in caregivers ability to provide assistance)   Additional Comments  The patient's raw score on the AM-PAC Daily Activity Inpatient Short Form is 20. A raw score of greater than or equal to 19 suggests the patient may benefit from discharge to home. Please refer to the recommendation of the Occupational Therapist for safe discharge planning.   AM-PAC Daily Activity Inpatient   Lower Body Dressing 3   Bathing 3   Toileting 3   Upper Body Dressing 3   Grooming 4   Eating 4   Daily Activity Raw Score 20   Daily Activity Standardized Score (Calc for Raw Score >=11) 42.03   AM-PAC Applied Cognition Inpatient   Following a Speech/Presentation 4   Understanding Ordinary Conversation 4   Taking Medications 4   Remembering Where Things Are Placed or Put Away 4   Remembering List of 4-5 Errands 4   Taking Care of Complicated Tasks 4   Applied Cognition Raw Score 24   Applied Cognition Standardized Score 62.21   End of Consult   Education Provided Yes   Patient Position at End of Consult Bedside chair;All needs within reach    Nurse Communication Nurse aware of consult     GOALS    Pt will improve activity tolerance to G for min 30 min txment sessions for increase engagement in functional tasks    Pt will complete bed mobility at a Mod I level w/ G balance/safety demonstrated to decrease caregiver assistance required     Pt will complete UB dressing/self care w/ mod I using adaptive device and DME as needed     Pt will complete LB dressing/self care w/ mod I using adaptive device and DME as needed    Pt will complete toileting w/ mod I w/ G hygiene/thoroughness using DME as needed    Pt will improve functional transfers to Mod I on/off all surfaces using DME as needed w/ G balance/safety     Pt will improve functional mobility during ADL/IADL/leisure tasks to Mod I using DME as needed w/ G balance/safety    Pt will demonstrate 100% carryover of energy conservation techniques t/o functional I/ADL/leisure tasks w/o cues s/p skilled education to increase endurance during functional tasks      The patient's raw score on the -PAC Daily Activity Inpatient Short Form is 20. A raw score of greater than or equal to 19 suggests the patient may benefit from discharge to home. Please refer to the recommendation of the Occupational Therapist for safe discharge planning.    Pam Zuniga, BASIAR/L

## 2024-02-26 NOTE — CASE MANAGEMENT
Case Management Assessment & Discharge Planning Note    Patient name Kathi Ferris  Location /-01 MRN [id number]  : 1960 Date 2024       Current Admission Date: 2024  Current Admission Diagnosis:Dyspnea   Patient Active Problem List    Diagnosis Date Noted    Anemia 2024    Hypervolemia 2024    Dyspnea 2024    Chronic respiratory failure (HCC) 2024    Ascites 2024    Pneumonia 2024    Ambulatory dysfunction 2024    Chronic respiratory failure with hypoxia (HCC) 10/24/2023    Generalized anxiety disorder 10/06/2023    SIRS (systemic inflammatory response syndrome) ([facility]) 2023    Hyponatremia 2023    Palliative care patient 2023    Malignant pleural effusion 2023    Pulmonary embolism ([facility]) 2023    CINV (chemotherapy-induced nausea and vomiting) 2023    Non-small cell cancer of right lung ([facility]) 2023    Closed fracture of multiple ribs of left side 2022    Cigarette nicotine dependence in remission 2022    COPD (chronic obstructive pulmonary disease) ([facility]) 2022    Hypercholesterolemia 2022    Essential hypertension 2022    Adhesive capsulitis of left shoulder 2021    Impingement syndrome of left shoulder 2021      LOS (days): 1  Geometric Mean LOS (GMLOS) (days):   Days to GMLOS:     OBJECTIVE:  PATIENT READMITTED TO HOSPITAL  Risk of Unplanned Readmission Score: 41.18         Current admission status: Inpatient       Preferred Pharmacy:   [id number] [place] - [address]  [address]  [place] [address]  Phone: [phone] Fax: [phone]    [facility] - [place] - [address]  [address]  [place] [address]  Phone: [phone] Fax: [phone]    Primary Care Provider: Keyon Miller MD    Primary Insurance: BLUE CROSS  Secondary Insurance:  MEDICARE    ASSESSMENT:  Active Health Care Proxies       Mynor Ferris Health Care Agent - Spouse   Primary Phone: [phone] (Mobile)  Home Phone: [phone]                 Advance Directives  Does patient have a Health Care POA?: Yes  Does patient have Advance Directives?: Yes  Advance Directives: Power of  for health care  Primary Contact: Mynor Ferris: : [phone]; [phone]    Readmission Root Cause  30 Day Readmission: Yes  Who directed you to return to the hospital?: Self  Did you understand whom to contact if you had questions or problems?: Yes  Did you get your prescriptions before you left the hospital?: No  Reason:: Delivery service not offered  Were you able to get your prescriptions filled when you left the hospital?: Yes  Did you take your medications as prescribed?: Yes  Were you able to get to your follow-up appointments?: Yes  During previous admission, was a post-acute recommendation made?: Yes  What post-acute resources were offered?: [facility]  Patient was readmitted due to: Increased shortness of breath  Action Plan: IV ABX, IV lasix, blood cultures pending    Patient Information  Admitted from:: Home  Mental Status: Alert  During Assessment patient was accompanied by: Not accompanied during assessment  Assessment information provided by:: Patient  Primary Caregiver: Self  Support Systems: Self, Spouse/significant other  County of Residence: [place]  [place] you live in[place]  Type of Current Residence: 2 [place] home  Upon entering residence, is there a bedroom on the main floor (no further steps)?: No  A bedroom is located on the following floor levels of residence (select all that apply):: 2nd Floor  Upon entering residence, is there a bathroom on the main floor (no further steps)?: Yes  Number of steps to 2nd floor from main floor: One Flight (has chair lift)  Living Arrangements: Lives w/ Spouse/significant other  Is patient a ?: No    Activities of Daily  Living Prior to Admission  Functional Status: Independent  Completes ADLs independently?: Yes  Ambulates independently?: Yes (ambulates with walker)  Does patient use assisted devices?: Yes  Assisted Devices (DME) used: Home Oxygen concentrator, Nebulizer, Portable Oxygen tanks, Other (Comment), Walker (inhaler)  DME Company Name (respiratory supplies): [employer]  O2 Rate(s): 3LO2  Does patient currently own DME?: Yes  What DME does the patient currently own?: Nebulizer, Other (Comment), Home Oxygen concentrator, Walker, Portable Oxygen tanks (inhaler)  Does patient have a history of Outpatient Therapy (PT/OT)?: No  Does the patient have a history of Short-Term Rehab?: No  Does patient have a history of HHC?: Yes ([facility])  Does patient currently have HHC?: Yes ([facility])    Current Home Health Care  Type of Current Home Care Services: Nurse visit  [facility]  Current Home Health Follow-Up Provider:: PCP    Patient Information Continued  Income Source: Pension/intermediate  Does patient have prescription coverage?: Yes  Does patient receive dialysis treatments?: No  Does patient have a history of substance abuse?: No  Does patient have a history of Mental Health Diagnosis?: No  Has patient received inpatient treatment related to mental health in the last 2 years?: No    Means of Transportation  Means of Transport to Appts:: Family transport      Social Determinants of Health (SDOH)      Flowsheet Row Most Recent Value   Housing Stability    In the last 12 months, was there a time when you were not able to pay the mortgage or rent on time? N   In the last 12 months, how many places have you lived? 1   In the last 12 months, was there a time when you did not have a steady place to sleep or slept in a shelter (including now)? N   Transportation Needs    In the past 12 months, has lack of transportation kept you from medical appointments or from getting medications? no   In  the past 12 months, has lack of transportation kept you from meetings, work, or from getting things needed for daily living? No   Food Insecurity    Within the past 12 months, you worried that your food would run out before you got the money to buy more. Never true   Within the past 12 months, the food you bought just didn't last and you didn't have money to get more. Never true   Utilities    In the past 12 months has the electric, gas, oil, or water company threatened to shut off services in your home? No            DISCHARGE DETAILS:    Discharge planning discussed with:: patient  Freedom of Choice: Yes  Comments - Freedom of Choice: Referral to [facility] for SN/PT/OT. Referral to [facility] for wheelchair    Requested Home Health Care         Is the patient interested in HHC at discharge?: Yes  Home Health Discipline requested:: Nursing, Occupational Therapy, Physical Therapy  [facility]  Home Health Follow-Up Provider:: PCP  Home Health Services Needed:: Evaluate Functional Status and Safety, Gait/ADL Training, Strengthening/Theraputic Exercises to Improve Function, Oxygen Via Nasal Cannula  Oxygen LPM Ordered (if applicable based on home health services needed):: 3 LPM  Homebound Criteria Met:: Uses an Assist Device (i.e. cane, walker, etc)    DME Referral Provided  Referral made for DME?: Yes  DME referral completed for the following items:: Wheelchair  DME Supplier Name:: [employer]    IMM Given (Date):: 02/26/24  IMM Given to:: Patient     Additional Comments: Met with Pt. Pt presents AA&Ox3. Discussed role of case management. Pt lives with  in 2sh, has chair lift to 2nd floor. Powder room on 1st. Uses walker. Wears home O2 at 3LO2, has nebulizer and inhaler. Pt's POA is her . Pt reports she uses [facility] in [place], has prescription plan and able to afford medications. Pt denies hx of VNA/SNF/MH/D&A. Has ASEPT catheter. Pt reports she has [facility] SN. Pt denies  hx of SNF/MH/D&A. Receives grocery deliveries and pharmacy delivery. Pt requesting wheelchair if possible and agreeable for [employer]. Referral sent to [facility] via [place]. Information for Mom's Meals and Meals on Wheels given to Pt. Referral sent to [facility] via [employer]. CM to follow.

## 2024-02-26 NOTE — ASSESSMENT & PLAN NOTE
Recent Labs     02/25/24  1114 02/26/24  0510 02/26/24  0939   HGB 7.9* 6.7* 7.1*     Baseline Hgb 9-10  Hgb remains borderline at 7.2 this morning  No overt evidence of bleeding   Iron/B12/Folate pending   Heme/onc following   Suspect chemotherapy contributing to cytopenias   Maintain Hgb > 7  Continue to trend

## 2024-02-26 NOTE — ASSESSMENT & PLAN NOTE
Dyspneic, gross ascites, & 2+ LE edema   ECHO 01/2024: EF 60%; grade 1 diastolic dysfunction  Home diuretic:  Lasix 20 q day   2 days PTA, pt switched to torsemide 20 mg q day   CT of chest:   Small to moderate right pleural effusion with Pleurx catheter in place with moderate left effusion unchanged  Last drained 02/23, ~ 250 cc   Continue Lasix 40 mg IV BID   Daily standing weights  Pending paracentesis and albumin

## 2024-02-26 NOTE — ASSESSMENT & PLAN NOTE
CT chest: suspected left lung superimposed pneumonia   Urine antigens negative  Continue Rocephin + vanc   DC vanc if MRSA neg

## 2024-02-26 NOTE — UTILIZATION REVIEW
[facility] not available due to electronic issues.        Initial Clinical Review    Admission: Date/Time/Statement:   Admission Orders (From admission, onward)       Ordered        02/25/24 1319  INPATIENT ADMISSION  Once                          Orders Placed This Encounter   Procedures    INPATIENT ADMISSION     Standing Status:   Standing     Number of Occurrences:   1     Order Specific Question:   Level of Care     Answer:   Med Surg [16]     Order Specific Question:   Estimated length of stay     Answer:   More than 2 Midnights     Order Specific Question:   Certification     Answer:   I certify that inpatient services are medically necessary for this patient for a duration of greater than two midnights. See H&P and MD Progress Notes for additional information about the patient's course of treatment.     ED Arrival Information       Expected   -    Arrival   2/25/2024 10:51    Acuity   Emergent              Means of arrival   Wheelchair    Escorted by   Spouse    Service   Hospitalist    Admission type   Emergency              Arrival complaint   SOB             Chief Complaint   Patient presents with    Shortness of Breath     Pt is c/o increase sob. Pt has hx of lung ca. Usually on 3 L/min oxygen.        Initial Presentation: 63 y.o. female presents to ed from home for evaluation and treatment of shortness of breath.  Patient reports 3L O2nc is not helping.  PMHX: LUNG CA, CHRONIC COPD 3LO2 nc, malignant pleural effusion, RLL lobectomy, chemo, PE on eliquis.    Clinical assessment significant for ht rt , 86% O2 sat, tachypnea 22-24, increased pedal edema, increased abdominal girth, fluid wave, BLLE edema, rales.  Procal 0.52, WBC 12.39, HB 7.9, , .  UA: mod bacteria. Imaging shows large ascites, RLL mass extension to suture site in hilum, mod right pleural effusion with pleurx catheter in place, moderate left effusion., extensive ground glass opacity.  EKG: sinus tachycardia, non specific  T abnormality.  Initially treated with iv Mag x1.  Admit to inpatient med surg for pneumonia, dyspnea, hypervolemia, anemia, ascites, hyponatremia..  Plan includes iv antibiotics for pneumonia, iv lasix bid for volume overload, supplemental oxygen     Date: 2-26-24   Day 2: inpatient med surg  Continue iv rocephin for pneumonia and follow up sputum and blood cultures.  Plan IR paracentesis, iv lasix, iv albumin, daily wt for hypervolemia. Trend HH ( baseline 9-10) suspect dilution. Obtain folate vit B12, stool for occult blood and iron panel.  Continue baseline 3L o2nc. Oncology consult pending.      Addendum: successful IR paracentesis 5540 ml clear yellow ascites fluid removed.  1 set of positive blood cultures for gram positive cocci in clusters.  Add iv vancomycin.  Consult oncology: stave NSC LC, malignant pleural effusion, ascites and anemia.  Plan to transfuse 1U prbc for Hb 6.7.  Patientis considering stopping treatment.  He spouse is also undergoing chemoradiation for esophageal cancer.  Her dyspnea limits her mobility.  She is on 3rd line treatment with gemcitabine.  Recommend palliative consult. Palliative care consult completed.  Goals of care discussed .  Hospice is appropriate.  She will consider.            ED Triage Vitals [02/25/24 1057]   Temperature Pulse Respirations Blood Pressure SpO2   97.8 °F (36.6 °C) (!) 108 (!) 24 121/63 (!) 86 %      Temporal Monitor         No Pain          Wt Readings from Last 1 Encounters:   02/25/24 83.1 kg (183 lb 3.2 oz)     Additional Vital Signs:     Date/Time Temp Pulse Resp BP MAP (mmHg) SpO2 Nasal Cannula O2 Flow Rate (L/min) O2 Device   02/26/24 09:41:01 -- 105 -- 115/65 82 93 % -- --   02/26/24 07:28:05 98.2 °F (36.8 °C) 107 Abnormal  20 102/58 73 91 % -- --   02/26/24 05:08:04 97.8 °F (36.6 °C) 98 18 99/59 72 91 % 3 L/min Nasal cannula   02/26/24 0500 -- 98 -- -- -- 93 % -- --   02/26/24 0300 -- 98 -- -- -- 91 % -- --   02/26/24 0100 -- 96 -- -- -- 91 % --  --   02/25/24 2300 -- 116 Abnormal  -- -- -- 94 % -- --   02/25/24 2100 -- 116 Abnormal  -- -- -- 95 % -- --   02/25/24 20:33:32 98.7 °F (37.1 °C) 127 Abnormal  -- 119/63 82 94 % -- --   02/25/24 20:31:42 98.7 °F (37.1 °C) 127 Abnormal  18 95/61 72 93 % 3 L/min Nasal cannula   02/25/24 1900 -- 118 Abnormal  -- -- -- 93 % -- --   02/25/24 17:47:57 -- 119 Abnormal  -- 104/64 77 94 % -- --   02/25/24 16:12:53 -- 123 Abnormal  -- 107/60 76 94 % -- --   02/25/24 14:57:57 97.2 °F (36.2 °C) Abnormal  118 Abnormal  19 110/66 81 93 % 3 L/min Nasal cannula   02/25/24 1400 -- 104 22 122/61 85 97 % -- --   02/25/24 1330 -- 101 24 Abnormal  105/66 81 98 % -- --   02/25/24 1300 -- 99 24 Abnormal  110/59 78 95 % -- --   02/25/24 1230 -- 101 22 107/52 75 98 % -- Nasal cannula   02/25/24 1200 -- 100 20 114/58 79 97 % -- --   02/25/24 1130 -- 100 20 117/59 83 91 % -- --   02/25/24 1100 -- 108 Abnormal  22 123/64 85 92 % -- --   02/25/24 1057 97.8 °F (36.6 °C) 108 Abnormal  24 Abnormal  121/63 85 86 % Abnormal  3 L/min Nasal cannula         Pertinent Labs/Diagnostic Test Results:     CTA chest (pe study) abdomen pelvis contrast   Final (02/25 1516)      1.Unchanged filling defect at the postsurgical site in the right distal pulmonary artery with no new embolus although limited by poor opacification.      2. Small to moderate right pleural effusion with Pleurx catheter in place with moderate left effusion unchanged.      3. Mostly unchanged right lower lung mass with extension to the suture site in the hilum.      4. Large amount of ascites increased from prior study with omental nodularity of carcinomatosis.   Previous obstructive pattern is resolved.      5. Unchanged right lung parenchymal changes with worsening groundglass parenchymal opacities throughout the left lung of superimposed pneumonia.         XR chest portable      Final (02/25 1450)      Right pleural drainage catheter with small loculated right effusion with no  pneumothorax.      Left pleural effusion on subsequent CT not visible.      Extensive bilateral groundglass opacity.           Results from last 7 days   Lab Units 02/25/24  1114   SARS-COV-2  Negative     Results from last 7 days   Lab Units 02/26/24  0939 02/26/24  0510 02/25/24  1114 02/20/24  1100   WBC Thousand/uL  --  8.92 12.39* 13.34*   HEMOGLOBIN g/dL 7.1* 6.7* 7.9* 8.2*   HEMATOCRIT % 22.3* 21.0* 25.1* 25.8*   PLATELETS Thousands/uL  --  135* 144* 292   NEUTROS ABS Thousands/µL  --   --   --  12.91*         Results from last 7 days   Lab Units 02/26/24  0510 02/25/24  1114   SODIUM mmol/L 130* 130*   POTASSIUM mmol/L 4.6 4.0   CHLORIDE mmol/L 98 96   CO2 mmol/L 24 24   ANION GAP mmol/L 8 10   BUN mg/dL 21 21   CREATININE mg/dL 0.58* 0.53*   EGFR ml/min/1.73sq m 97 99   CALCIUM mg/dL 9.0 9.1   MAGNESIUM mg/dL  --  1.6*     Results from last 7 days   Lab Units 02/25/24  1114   AST U/L 24   ALT U/L 19   ALK PHOS U/L 115*   TOTAL PROTEIN g/dL 6.2*   ALBUMIN g/dL 2.7*   TOTAL BILIRUBIN mg/dL 0.58         Results from last 7 days   Lab Units 02/26/24  0510 02/25/24  1114   GLUCOSE RANDOM mg/dL 92 97     Results from last 7 days   Lab Units 02/25/24  1400 02/25/24  1114   HS TNI 0HR ng/L  --  20   HS TNI 2HR ng/L 19  --    HSTNI D2 ng/L -1  --          Results from last 7 days   Lab Units 02/25/24  1114   PROTIME seconds 17.8*   INR  1.42*   PTT seconds 40*         Results from last 7 days   Lab Units 02/26/24  0510 02/25/24  1114   PROCALCITONIN ng/ml 0.57* 0.52*     Results from last 7 days   Lab Units 02/25/24  1114   LACTIC ACID mmol/L 1.3       Results from last 7 days   Lab Units 02/25/24  1114   BNP pg/mL 99           Results from last 7 days   Lab Units 02/25/24  1606   CLARITY UA  Clear   COLOR UA  Yellow   SPEC GRAV UA  1.010   PH UA  6.0   GLUCOSE UA mg/dl Negative   KETONES UA mg/dl Trace*   BLOOD UA  Negative   PROTEIN UA mg/dl Trace*   NITRITE UA  Negative   BILIRUBIN UA  Negative   UROBILINOGEN UA  (BE) mg/dl <2.0   LEUKOCYTES UA  Trace*   WBC UA /hpf 2-4   RBC UA /hpf 0-1*   BACTERIA UA /hpf Moderate*   EPITHELIAL CELLS WET PREP /hpf Occasional   MUCUS THREADS  Occasional*     Results from last 7 days   Lab Units 02/25/24  1606 02/25/24  1114   STREP PNEUMONIAE ANTIGEN, URINE  Negative  --    LEGIONELLA URINARY ANTIGEN  Negative  --    INFLUENZA A PCR   --  Negative   INFLUENZA B PCR   --  Negative   RSV PCR   --  Negative       Results from last 7 days   Lab Units 02/25/24  1114   BLOOD CULTURE  Received in Microbiology Lab. Culture in Progress.    Received in Microbiology Lab. Culture in Progress.     ED Treatment:   Medication Administration from 02/25/2024 1051 to 02/25/2024 1448         Date/Time Order Dose Route Action     02/25/2024 1253 EST magnesium sulfate 2 g/50 mL IVPB (premix) 2 g 2 g Intravenous New Bag          Past Medical History:   Diagnosis Date    Cancer (HCC)     COPD (chronic obstructive pulmonary disease) (HCC)     Emphysema of lung (HCC)     Hyperlipidemia     Hypertension     Lung cancer (HCC) 06/26/2019    right lower lobe removed    Lung nodule     Pleural effusion      Present on Admission:   COPD (chronic obstructive pulmonary disease) (HCC)   Essential hypertension   SIRS (systemic inflammatory response syndrome) (HCC)   Hyponatremia   Non-small cell cancer of right lung (HCC)   Pulmonary embolism (HCC)      Admitting Diagnosis:     Hypomagnesemia [E83.42]  Acute systolic congestive heart failure (HCC) [I50.21]  Chronic respiratory failure with hypoxia (HCC) [J96.11]  Non-small cell cancer of right lung (HCC) [C34.91]    Age/Sex: 63 y.o. female    Scheduled Medications:    apixaban, 5 mg, Oral, BID  atorvastatin, 20 mg, Oral, Daily With Dinner  buPROPion, 300 mg, Oral, Daily  cefTRIAXone, 1,000 mg, Intravenous, Q24H  diphenhydrAMINE, 25 mg, Oral, HS  fluticasone-vilanterol, 1 puff, Inhalation, Daily  furosemide, 40 mg, Intravenous, BID (diuretic)  losartan, 100 mg, Oral,  Daily  metoprolol tartrate, 25 mg, Oral, Q12H ULISES  multivitamin stress formula, 1 tablet, Oral, Daily  potassium chloride, 40 mEq, Oral, Daily  sertraline, 200 mg, Oral, Daily  verapamil, 240 mg, Oral, HS      Continuous IV Infusions:     PRN Meds:  acetaminophen, 650 mg, Oral, Q6H PRN  albuterol, 2 puff, Inhalation, Q4H PRN  lidocaine, , Topical, Daily PRN  LORazepam, 0.5 mg, Oral, Daily PRN        IP CONSULT TO ONCOLOGY  IP CONSULT TO CASE MANAGEMENT    Network Utilization Review Department  ATTENTION: Please call with any questions or concerns to [phone] and carefully listen to the prompts so that you are directed to the right person. All voicemails are confidential.   For Discharge needs, contact Care Management DC Support Team at [phone] opt. 2  Send all requests for admission clinical reviews, approved or denied determinations and any other requests to dedicated fax number below belonging to the campus where the patient is receiving treatment. List of dedicated fax numbers for the Facilities:  FACILITY NAME UR FAX NUMBER   ADMISSION DENIALS (Administrative/Medical Necessity) [phone]   DISCHARGE SUPPORT TEAM (NETWORK) [phone]   PARENT CHILD HEALTH (Maternity/NICU/Pediatrics) [phone]   [facility] [phone]   [facility] [phone]   [facility] [phone]   [facility] [phone]   [facility] [phone]   [facility] [phone]   [facility] [phone]   [facility] [phone]   [facility] [phone]   [facility] [phone]   [facility] [phone]   [facility] [phone]

## 2024-02-26 NOTE — DISCHARGE INSTRUCTIONS
Abdominal Paracentesis     WHAT YOU NEED TO KNOW:   Abdominal paracentesis is a procedure to remove abnormal fluid buildup in your abdomen. Fluid builds up because of liver problems, such as swelling and scarring. Heart failure, kidney disease, a mass, or problems with your pancreas may also cause fluid buildup.     DISCHARGE INSTRUCTIONS:     Follow up with your healthcare provider as directed: Write down your questions so you remember to ask them during your visits.     Wound care: Remove dressing after 24 hours. Leave glue in place.    Return to your normal activities    Contact Interventional Radiology at [phone] ([place] PATIENTS: Contact Interventional Radiology at [phone]) ([place] PATIENTS: Contact Interventional Radiology at [phone]) if:  You have a fever and your wound is red and swollen.   You have yellow, green, or bad-smelling discharge coming from your wound.   You have pain or swelling in your abdomen.   You have an upset stomach or you vomit.   You have sudden, sharp pain in your abdomen.   You urinate very little or not at all.   You feel confused and more tired than usual.   Your arm or leg feels warm, tender, and painful. It may look swollen and red.   You suddenly feel lightheaded and have trouble breathing.     Abdominal Paracentesis     WHAT YOU NEED TO KNOW:   Abdominal paracentesis is a procedure to remove abnormal fluid buildup in your abdomen. Fluid builds up because of liver problems, such as swelling and scarring. Heart failure, kidney disease, a mass, or problems with your pancreas may also cause fluid buildup.     DISCHARGE INSTRUCTIONS:     Follow up with your healthcare provider as directed: Write down your questions so you remember to ask them during your visits.     Wound care: Remove dressing after 24 hours. Leave glue in place.    Return to your normal activities    Contact Interventional Radiology at [phone] ([place] PATIENTS: Contact Interventional Radiology at  [phone]) ([place] PATIENTS: Contact Interventional Radiology at [phone]) if:  You have a fever and your wound is red and swollen.   You have yellow, green, or bad-smelling discharge coming from your wound.   You have pain or swelling in your abdomen.   You have an upset stomach or you vomit.   You have sudden, sharp pain in your abdomen.   You urinate very little or not at all.   You feel confused and more tired than usual.   Your arm or leg feels warm, tender, and painful. It may look swollen and red.   You suddenly feel lightheaded and have trouble breathing.

## 2024-02-26 NOTE — CONSULTS
Consultation - Palliative and Supportive Care   Kathi CHINCHILLA Cipolla 63 y.o. female [id number]    Assessment/Problems actively addressed this visit:  Goals of care counseling  Encounter for palliative and supportive care   Malignant pleural effusion  Stage IV non-small cell lung cancer  Ascites  Anemia  Cancer related pain    PLAN:  1. Goals of care  I met with Kathi at bedside with her neighbor/Aurelia. Oncology NP Mirian SHELTON also present for this visit.   Kathi has been considering whether or not to continue treatment.  We explained that we will support her either way however it would be appropriate for her to consider hospice at this time.  She will think about it.  She states it has been difficult to have this conversation with her spouse because he does not like to talk about it.  Emotional support provided.  We discussed strategies to facilitate care in the home and quality of life.  Kathi has my contact information; will allow her time to think about this. We will continue to follow to discuss next steps.    2. Symptom management   Symptoms currently well-controlled on current regimen.  Continue.    Social support:  Time spent providing supportive listening.     Patient is finding comfort in excellent social and family support.              I have reviewed the patient's controlled substance dispensing history in the Prescription Drug Monitoring Program in compliance with the [facility] regulations before prescribing any controlled substances.  Last refills  Filled  Written  ID  Drug  QTY  Days  Prescriber  RX #  Dispenser  Refill  Daily Dose*  Pymt Type      10/29/2023 10/29/2023 1 Oxycodone Hcl (Ir) 5 Mg Tablet 20.00 3 Ch Paulette [id number] Pen (6052) 0 50.00 MME Comm Ins [place]   10/06/2023 10/06/2023 1 Lorazepam 0.5 Mg Tablet 30.00 30 Cs Omer [id number] Pen (6052) 0 0.50 LME Comm Ins [place]   11/14/2022 11/14/2022 1 Oxycodone Hcl (Ir) 5 Mg Tablet 6.00 2 Ma Zba [id number] Pen (6052) 0 22.50 MME Comm Ins [place]      Decisional apparatus:  Patient is competent on exam today.  If competence is lost, patient's substitute decision maker would default to spouse by [place] Act 169.  Advance Directive/Living Will/POLST: documents on file.     We appreciate the invitation to be involved in this patient's care.  We will continue to follow throughout this hospitalization.  Please do not hesitate to reach our on call provider through our clinic answering service at [phone] should you have acute symptom control concerns.    Marilyn Gaspar PA-C  Palliative and Supportive Care  Clinic/Answering Service: [phone]  You can find me on [employer]!     IDENTIFICATION:  Inpatient consult to Palliative Care  Consult performed by: Marilyn Gaspar PA-C  Consult ordered by: Shani Hoffman PA-C        Physician Requesting Consult: Angely Carmen MD  Reason for Consult / Principal Problem: GOC counseling and SM secondary to progressive cancer     History of Present Illness:  Kathi Ferris is a 63 y.o. female who presents with shortness of breath. Kathi had a past medical hx of Stage IV lung cancer, malignant R pleural effusion with indwelling catheter, and recent PE on Eliquis. She presented due to worsening shortness breath. She was found to have L pleural effusion and ascites.  She was admitted for further workup and care with oncology consult.  Palliative and supportive care also consulted for goals of care discussion.    Review of Systems:   Review of Systems   Constitutional: Positive for malaise/fatigue. Negative for decreased appetite.   HENT:  Negative for congestion and hearing loss.    Cardiovascular:  Positive for dyspnea on exertion. Negative for chest pain.   Skin:  Negative for nail changes.   Musculoskeletal:  Negative for arthritis and back pain.   Gastrointestinal:  Negative for nausea and vomiting.   Psychiatric/Behavioral:  Negative for altered mental status and depression.        Past Medical  History:   Diagnosis Date    Cancer (HCC)     COPD (chronic obstructive pulmonary disease) (HCC)     Emphysema of lung (HCC)     Hyperlipidemia     Hypertension     Lung cancer (HCC) 2019    right lower lobe removed    Lung nodule     Pleural effusion      Past Surgical History:   Procedure Laterality Date    APPENDECTOMY      BREAST BIOPSY Right     benign    BRONCHOSCOPY  2019    COLON SURGERY      HYSTERECTOMY      age 46    IR PLEURAL EFFUSION LONG-TERM CATHETER PLACEMENT  2023    IR PORT PLACEMENT  2023    IR THORACENTESIS  2023    IR THORACENTESIS  2023    IR THORACENTESIS  2023    LUNG BIOPSY  2019    LUNG LOBECTOMY      OOPHORECTOMY Bilateral     age 46     Social History     Socioeconomic History    Marital status: /Civil Union     Spouse name: Not on file    Number of children: Not on file    Years of education: Not on file    Highest education level: Not on file   Occupational History    Not on file   Tobacco Use    Smoking status: Former     Current packs/day: 0.00     Average packs/day: 1.5 packs/day for 53.2 years (79.8 ttl pk-yrs)     Types: Cigarettes     Start date:      Quit date: 3/15/2023     Years since quittin.9     Passive exposure: Past    Smokeless tobacco: Never    Tobacco comments:     Patient quit 1 year ago   Vaping Use    Vaping status: Never Used   Substance and Sexual Activity    Alcohol use: Not Currently     Comment: Rarely drink, socially only    Drug use: No     Comment: has her medical marijuana card but does not like the way it makes her feel, she does not use it    Sexual activity: Not Currently     Partners: Male     Birth control/protection: Post-menopausal, Female Sterilization   Other Topics Concern    Not on file   Social History Narrative    Lives with      Social Determinants of Health     Financial Resource Strain: Not on file   Food Insecurity: No Food Insecurity (2024)    Hunger Vital Sign      Worried About Running Out of Food in the Last Year: Never true     Ran Out of Food in the Last Year: Never true   Transportation Needs: No Transportation Needs (2/26/2024)    PRAPARE - Transportation     Lack of Transportation (Medical): No     Lack of Transportation (Non-Medical): No   Physical Activity: Not on file   Stress: Not on file   Social Connections: Not on file   Intimate Partner Violence: Not on file   Housing Stability: Low Risk  (2/26/2024)    Housing Stability Vital Sign     Unable to Pay for Housing in the Last Year: No     Number of Places Lived in the Last Year: 1     Unstable Housing in the Last Year: No     Family History   Problem Relation Age of Onset    Colon cancer Mother     Hypertension Mother     Hyperlipidemia Mother     Hearing loss Mother     Depression Mother     COPD Mother     Cancer Mother         Lung    Arthritis Mother     Lung cancer Mother     Drug abuse Brother     Diabetes Maternal Grandmother     Cancer Maternal Grandmother         Colon    Colon cancer Maternal Grandmother     Cancer Maternal Aunt     Colon cancer Maternal Aunt     Colon cancer Maternal Aunt     Cancer Maternal Aunt         Colon     Medications:  all current active meds have been reviewed and current meds:   Current Facility-Administered Medications   Medication Dose Route Frequency    acetaminophen (TYLENOL) tablet 650 mg  650 mg Oral Q6H PRN    albuterol (PROVENTIL HFA,VENTOLIN HFA) inhaler 2 puff  2 puff Inhalation Q4H PRN    apixaban (ELIQUIS) tablet 5 mg  5 mg Oral BID    atorvastatin (LIPITOR) tablet 20 mg  20 mg Oral Daily With Dinner    buPROPion (WELLBUTRIN XL) 24 hr tablet 300 mg  300 mg Oral Daily    cefTRIAXone (ROCEPHIN) IVPB (premix in dextrose) 1,000 mg 50 mL  1,000 mg Intravenous Q24H    diphenhydrAMINE (BENADRYL) tablet 25 mg  25 mg Oral HS    fluticasone-vilanterol 200-25 mcg/actuation 1 puff  1 puff Inhalation Daily    furosemide (LASIX) injection 40 mg  40 mg Intravenous BID (diuretic)     lidocaine (LMX) 4 % cream   Topical Daily PRN    LORazepam (ATIVAN) tablet 0.5 mg  0.5 mg Oral Daily PRN    losartan (COZAAR) tablet 100 mg  100 mg Oral Daily    metoprolol tartrate (LOPRESSOR) tablet 25 mg  25 mg Oral Q12H [facility]    multivitamin stress formula tablet 1 tablet  1 tablet Oral Daily    potassium chloride (Klor-Con M20) CR tablet 40 mEq  40 mEq Oral Daily    sertraline (ZOLOFT) tablet 200 mg  200 mg Oral Daily    verapamil (CALAN-SR) CR tablet 240 mg  240 mg Oral HS       Allergies   Allergen Reactions    Amoxicillin Hives    Vilazodone Hcl Nausea Only and Dizziness     (Viibryd)    Wasp Venom Swelling     Edema of hand; no anaphylaxis     Zithromax [Azithromycin] Hives     Medications    Current Facility-Administered Medications:     acetaminophen (TYLENOL) tablet 650 mg, 650 mg, Oral, Q6H PRN, Modesta Bentley PA-C    albuterol (PROVENTIL HFA,VENTOLIN HFA) inhaler 2 puff, 2 puff, Inhalation, Q4H PRN, Modesta Bentley PA-C    apixaban (ELIQUIS) tablet 5 mg, 5 mg, Oral, BID, Modesta Bentley PA-C, 5 mg at 02/26/24 0954    atorvastatin (LIPITOR) tablet 20 mg, 20 mg, Oral, Daily With Dinner, Modesta Bentley PA-C, 20 mg at 02/25/24 1608    buPROPion (WELLBUTRIN XL) 24 hr tablet 300 mg, 300 mg, Oral, Daily, Modesta Bentley PA-C, 300 mg at 02/26/24 0953    cefTRIAXone (ROCEPHIN) IVPB (premix in dextrose) 1,000 mg 50 mL, 1,000 mg, Intravenous, Q24H, Modesta Bentley PA-C, Last Rate: 100 mL/hr at 02/25/24 1636, 1,000 mg at 02/25/24 1636    diphenhydrAMINE (BENADRYL) tablet 25 mg, 25 mg, Oral, HS, Modesta Bentley PA-C    fluticasone-vilanterol 200-25 mcg/actuation 1 puff, 1 puff, Inhalation, Daily, Modesta Bentley PA-C, 1 puff at 02/26/24 0957    furosemide (LASIX) injection 40 mg, 40 mg, Intravenous, BID (diuretic), Modesta Bentley PA-C, 40 mg at 02/26/24 0951    lidocaine (LMX) 4 % cream, , Topical, Daily PRN, Modesta Bentley PA-C    LORazepam (ATIVAN) tablet 0.5 mg, 0.5 mg, Oral,  "Daily PRN, Modesta Bentley PA-C, 0.5 mg at 02/25/24 2053    losartan (COZAAR) tablet 100 mg, 100 mg, Oral, Daily, Modesta Bentley PA-C, 100 mg at 02/25/24 1707    metoprolol tartrate (LOPRESSOR) tablet 25 mg, 25 mg, Oral, Q12H ULISES, Modesta Bentley PA-C, 25 mg at 02/26/24 0953    multivitamin stress formula tablet 1 tablet, 1 tablet, Oral, Daily, Modesta Bentley PA-C, 1 tablet at 02/26/24 0954    potassium chloride (Klor-Con M20) CR tablet 40 mEq, 40 mEq, Oral, Daily, Modesta Bentley PA-C, 40 mEq at 02/26/24 0953    sertraline (ZOLOFT) tablet 200 mg, 200 mg, Oral, Daily, Modesta Bentley PA-C, 200 mg at 02/26/24 0953    verapamil (CALAN-SR) CR tablet 240 mg, 240 mg, Oral, HS, Modesta Bentley PA-C, 240 mg at 02/25/24 2103    Objective  /65   Pulse 85   Temp 98.2 °F (36.8 °C)   Resp 18   Ht 5' 5\" (1.651 m)   Wt 80.7 kg (178 lb)   SpO2 96%   BMI 29.62 kg/m²     Physical Exam:   Physical Exam  Vitals and nursing note reviewed.   Constitutional:       General: She is not in acute distress.     Appearance: She is underweight. She is ill-appearing.   HENT:      Head: Normocephalic and atraumatic.   Eyes:      General:         Right eye: No discharge.         Left eye: No discharge.      Conjunctiva/sclera: Conjunctivae normal.   Pulmonary:      Effort: Pulmonary effort is normal. No respiratory distress.   Musculoskeletal:         General: No swelling.      Cervical back: Neck supple.   Skin:     General: Skin is warm.      Coloration: Skin is pale.   Neurological:      Mental Status: She is alert.   Psychiatric:         Mood and Affect: Mood normal.         Behavior: Behavior normal.       Lab Results: I have personally reviewed pertinent labs., CBC:   Lab Results   Component Value Date    WBC 8.92 02/26/2024    HGB 7.1 (L) 02/26/2024    HCT 22.3 (L) 02/26/2024    MCV 87 02/26/2024     (L) 02/26/2024    RBC 2.42 (L) 02/26/2024    MCH 27.7 02/26/2024    MCHC 31.9 02/26/2024    RDW " "19.7 (H) 02/26/2024    MPV 9.9 02/26/2024   , CMP:   Lab Results   Component Value Date    SODIUM 130 (L) 02/26/2024    K 4.6 02/26/2024    CL 98 02/26/2024    CO2 24 02/26/2024    BUN 21 02/26/2024    CREATININE 0.58 (L) 02/26/2024    CALCIUM 9.0 02/26/2024    EGFR 97 02/26/2024     Imaging Studies: I have personally reviewed pertinent reports.  EKG, Pathology, and Other Studies: I have personally reviewed pertinent reports.    Counseling / Coordination of Care  Total floor / unit time spent today 90 minutes. Greater than 50% of total time was spent with the patient and / or family counseling and / or coordination of care. A description of the counseling / coordination of care: reviewed chart, reviewed lab values, reviewed imaging, provided medical updates, discussed palliative care and symptom management, discussed hospice care and comfort care, provided anticipatory guidance, provided psychosocial and emotional support, assessed competency and decision-making, and facilitated interdisciplinary communication. Reviewed with SLIM, oncology team, RN and CM.    Portions of this document may have been created using dictation software and as such some \"sound alike\" terms may have been generated by the system. Do not hesitate to contact me with any questions or clarifications.    "

## 2024-02-26 NOTE — ASSESSMENT & PLAN NOTE
Na 130-132  S/P IV lasix without significant improvement  Suspect SIADH in setting of lung cancer   Labs pending   Trial PO fluid restriction

## 2024-02-26 NOTE — ASSESSMENT & PLAN NOTE
Dyspneic but stable on home O2, 3 L  Per report, presented to ED 86% on 3 L; acute demand resolved

## 2024-02-27 ENCOUNTER — APPOINTMENT (INPATIENT)
Dept: RADIOLOGY | Facility: HOSPITAL | Age: 64
DRG: 374 | End: 2024-02-27
Payer: COMMERCIAL

## 2024-02-27 PROBLEM — J96.21 ACUTE ON CHRONIC RESPIRATORY FAILURE WITH HYPOXIA (HCC): Status: ACTIVE | Noted: 2024-02-26

## 2024-02-27 LAB
ANION GAP SERPL CALCULATED.3IONS-SCNC: 8 MMOL/L
BUN SERPL-MCNC: 13 MG/DL (ref 5–25)
CALCIUM SERPL-MCNC: 8.4 MG/DL (ref 8.4–10.2)
CHLORIDE SERPL-SCNC: 98 MMOL/L (ref 96–108)
CO2 SERPL-SCNC: 25 MMOL/L (ref 21–32)
CREAT SERPL-MCNC: 0.43 MG/DL (ref 0.6–1.3)
DME PARACHUTE DELIVERY DATE ACTUAL: NORMAL
DME PARACHUTE DELIVERY DATE EXPECTED: NORMAL
DME PARACHUTE DELIVERY DATE REQUESTED: NORMAL
DME PARACHUTE ITEM DESCRIPTION: NORMAL
DME PARACHUTE ORDER STATUS: NORMAL
DME PARACHUTE SUPPLIER NAME: NORMAL
DME PARACHUTE SUPPLIER PHONE: NORMAL
ERYTHROCYTE [DISTWIDTH] IN BLOOD BY AUTOMATED COUNT: 19.4 % (ref 11.6–15.1)
FERRITIN SERPL-MCNC: 1832 NG/ML (ref 11–307)
FOLATE SERPL-MCNC: 9.7 NG/ML
GFR SERPL CREATININE-BSD FRML MDRD: 107 ML/MIN/1.73SQ M
GLUCOSE SERPL-MCNC: 94 MG/DL (ref 65–140)
HCT VFR BLD AUTO: 20.7 % (ref 34.8–46.1)
HCT VFR BLD AUTO: 22.8 % (ref 34.8–46.1)
HCT VFR BLD AUTO: 24 % (ref 34.8–46.1)
HGB BLD-MCNC: 6.6 G/DL (ref 11.5–15.4)
HGB BLD-MCNC: 7.2 G/DL (ref 11.5–15.4)
HGB BLD-MCNC: 7.5 G/DL (ref 11.5–15.4)
IRON SATN MFR SERPL: 20 % (ref 15–50)
IRON SERPL-MCNC: 33 UG/DL (ref 50–212)
MCH RBC QN AUTO: 27.4 PG (ref 26.8–34.3)
MCHC RBC AUTO-ENTMCNC: 31.9 G/DL (ref 31.4–37.4)
MCV RBC AUTO: 86 FL (ref 82–98)
PLATELET # BLD AUTO: 103 THOUSANDS/UL (ref 149–390)
PMV BLD AUTO: 10 FL (ref 8.9–12.7)
POTASSIUM SERPL-SCNC: 3.9 MMOL/L (ref 3.5–5.3)
RBC # BLD AUTO: 2.41 MILLION/UL (ref 3.81–5.12)
SODIUM SERPL-SCNC: 131 MMOL/L (ref 135–147)
TIBC SERPL-MCNC: 169 UG/DL (ref 250–450)
UIBC SERPL-MCNC: 136 UG/DL (ref 155–355)
VANCOMYCIN SERPL-MCNC: 19.9 UG/ML (ref 10–20)
VIT B12 SERPL-MCNC: 534 PG/ML (ref 180–914)
WBC # BLD AUTO: 3 THOUSAND/UL (ref 4.31–10.16)

## 2024-02-27 PROCEDURE — 71045 X-RAY EXAM CHEST 1 VIEW: CPT

## 2024-02-27 PROCEDURE — 80048 BASIC METABOLIC PNL TOTAL CA: CPT | Performed by: HOSPITALIST

## 2024-02-27 PROCEDURE — 80202 ASSAY OF VANCOMYCIN: CPT | Performed by: HOSPITALIST

## 2024-02-27 PROCEDURE — 83540 ASSAY OF IRON: CPT | Performed by: HOSPITALIST

## 2024-02-27 PROCEDURE — 85027 COMPLETE CBC AUTOMATED: CPT | Performed by: HOSPITALIST

## 2024-02-27 PROCEDURE — 85014 HEMATOCRIT: CPT | Performed by: INTERNAL MEDICINE

## 2024-02-27 PROCEDURE — 85014 HEMATOCRIT: CPT

## 2024-02-27 PROCEDURE — 85018 HEMOGLOBIN: CPT

## 2024-02-27 PROCEDURE — 97163 PT EVAL HIGH COMPLEX 45 MIN: CPT

## 2024-02-27 PROCEDURE — 87081 CULTURE SCREEN ONLY: CPT

## 2024-02-27 PROCEDURE — 85018 HEMOGLOBIN: CPT | Performed by: INTERNAL MEDICINE

## 2024-02-27 PROCEDURE — 82607 VITAMIN B-12: CPT | Performed by: HOSPITALIST

## 2024-02-27 PROCEDURE — 82746 ASSAY OF FOLIC ACID SERUM: CPT | Performed by: HOSPITALIST

## 2024-02-27 PROCEDURE — G0179 MD RECERTIFICATION HHA PT: HCPCS | Performed by: INTERNAL MEDICINE

## 2024-02-27 PROCEDURE — 82728 ASSAY OF FERRITIN: CPT | Performed by: HOSPITALIST

## 2024-02-27 PROCEDURE — 83550 IRON BINDING TEST: CPT | Performed by: HOSPITALIST

## 2024-02-27 PROCEDURE — 99233 SBSQ HOSP IP/OBS HIGH 50: CPT

## 2024-02-27 RX ORDER — FERROUS SULFATE 325(65) MG
325 TABLET ORAL
Status: DISCONTINUED | OUTPATIENT
Start: 2024-02-28 | End: 2024-03-01 | Stop reason: HOSPADM

## 2024-02-27 RX ORDER — BUMETANIDE 0.25 MG/ML
1 INJECTION INTRAMUSCULAR; INTRAVENOUS ONCE
Status: COMPLETED | OUTPATIENT
Start: 2024-02-27 | End: 2024-02-27

## 2024-02-27 RX ORDER — LEVALBUTEROL INHALATION SOLUTION 0.63 MG/3ML
0.63 SOLUTION RESPIRATORY (INHALATION) EVERY 8 HOURS PRN
Status: DISCONTINUED | OUTPATIENT
Start: 2024-02-27 | End: 2024-03-01 | Stop reason: HOSPADM

## 2024-02-27 RX ORDER — TORSEMIDE 20 MG/1
20 TABLET ORAL DAILY
Status: DISCONTINUED | OUTPATIENT
Start: 2024-02-28 | End: 2024-03-01 | Stop reason: HOSPADM

## 2024-02-27 RX ADMIN — B-COMPLEX W/ C & FOLIC ACID TAB 1 TABLET: TAB at 09:29

## 2024-02-27 RX ADMIN — METOPROLOL TARTRATE 25 MG: 25 TABLET, FILM COATED ORAL at 21:11

## 2024-02-27 RX ADMIN — APIXABAN 5 MG: 5 TABLET, FILM COATED ORAL at 09:29

## 2024-02-27 RX ADMIN — VANCOMYCIN HYDROCHLORIDE 1000 MG: 1 INJECTION, SOLUTION INTRAVENOUS at 21:11

## 2024-02-27 RX ADMIN — LORAZEPAM 0.5 MG: 0.5 TABLET ORAL at 21:13

## 2024-02-27 RX ADMIN — ATORVASTATIN CALCIUM 20 MG: 20 TABLET, FILM COATED ORAL at 16:26

## 2024-02-27 RX ADMIN — BUMETANIDE 1 MG: 0.25 INJECTION INTRAMUSCULAR; INTRAVENOUS at 17:35

## 2024-02-27 RX ADMIN — BUPROPION HYDROCHLORIDE 300 MG: 300 TABLET, EXTENDED RELEASE ORAL at 09:29

## 2024-02-27 RX ADMIN — FUROSEMIDE 40 MG: 10 INJECTION, SOLUTION INTRAMUSCULAR; INTRAVENOUS at 09:30

## 2024-02-27 RX ADMIN — APIXABAN 5 MG: 5 TABLET, FILM COATED ORAL at 17:35

## 2024-02-27 RX ADMIN — METOPROLOL TARTRATE 25 MG: 25 TABLET, FILM COATED ORAL at 09:29

## 2024-02-27 RX ADMIN — VANCOMYCIN HYDROCHLORIDE 1000 MG: 1 INJECTION, SOLUTION INTRAVENOUS at 09:29

## 2024-02-27 RX ADMIN — POTASSIUM CHLORIDE 40 MEQ: 1500 TABLET, EXTENDED RELEASE ORAL at 09:29

## 2024-02-27 RX ADMIN — CEFTRIAXONE 1000 MG: 1 INJECTION, SOLUTION INTRAVENOUS at 16:26

## 2024-02-27 RX ADMIN — VERAPAMIL HYDROCHLORIDE 240 MG: 240 TABLET ORAL at 21:21

## 2024-02-27 RX ADMIN — LOSARTAN POTASSIUM 100 MG: 50 TABLET, FILM COATED ORAL at 09:29

## 2024-02-27 RX ADMIN — SERTRALINE 200 MG: 100 TABLET, FILM COATED ORAL at 09:29

## 2024-02-27 RX ADMIN — DIPHENHYDRAMINE HYDROCHLORIDE 25 MG: 25 TABLET ORAL at 21:11

## 2024-02-27 RX ADMIN — FLUTICASONE FUROATE AND VILANTEROL TRIFENATATE 1 PUFF: 200; 25 POWDER RESPIRATORY (INHALATION) at 09:30

## 2024-02-27 NOTE — PHYSICAL THERAPY NOTE
PHYSICAL THERAPY EVALUATION     Patient Name: Kathi Ferris  Today's Date: 2/27/2024  AGE:   63 y.o.  Mrn:   [id number]  ADMIT DX:  Hypomagnesemia [E83.42]  Acute systolic congestive heart failure (HCC) [I50.21]  Chronic respiratory failure with hypoxia (HCC) [J96.11]  Non-small cell cancer of right lung (HCC) [C34.91]    Past Medical History:   Diagnosis Date    Cancer (HCC)     COPD (chronic obstructive pulmonary disease) (HCC)     Emphysema of lung (HCC)     Hyperlipidemia     Hypertension     Lung cancer (HCC) 06/26/2019    right lower lobe removed    Lung nodule     Pleural effusion         02/27/24 1120   PT Last Visit   PT Visit Date 02/27/24   Note Type   Note type Evaluation   Pain Assessment   Pain Assessment Tool 0-10   Pain Score No Pain   Restrictions/Precautions   Other Precautions Chair Alarm;Bed Alarm;Fall Risk;O2;Multiple lines   Home Living   Type of Home House   Home Layout Two level;1/2 bath on main level;Bed/bath upstairs;Stairs to enter without rails;Able to live on main level with bedroom/bathroom  (Pt has a stair glide to 2nd floor with full bathroom upstairs. Pt has 2 ELISA without handrails)   Home Equipment Walker;Stair glide  (new manual W/C in room, owns a rollator)   Additional Comments Pt reports possible hospital bed being ordered for home   Prior Function   Level of [place] Independent with ADLs  (modified independet with mobility using rollator for short distances with frequent seated rest breaks)   Lives With Spouse   Receives Help From Friend(s);Family  (spouse as able)   Falls in the last 6 months 0   Comments Pt's spouse was recently diagnosed with Esophageal CA and he currently has a NGT and is starting chemo today. His ability to help her is limited   General   Additional Pertinent History Pt is a 63 y.o. female with a PMH of metastatic lung cancer, COPD, malignant pleural effusion, HTN,  "HLD, on palliative chemotherapy, recent PE anticoagulated on eliquis  who presents with worsening SOB. She also c/o bilateral LE swelling. Patient states she has a dry cough and decreased appetite, however no fevers, chills, nausea, vomiting, diarrhea, or chest pain. Patient found to have superimposed pneumonia and moderate amount of ascites in the setting of carcinomatosis. No evidence of new PE. Patient will be admitted to the hospital for IV abx and IV diuresis.   Family/Caregiver Present No   Cognition   Overall Cognitive Status WFL   Arousal/Participation Cooperative  (and alert)   Attention Within functional limits   Orientation Level Oriented X4   Memory Within functional limits   Following Commands Follows all commands and directions without difficulty   Subjective   Subjective \"I'm tired\"   RUE Assessment   RUE Assessment WFL   LUE Assessment   LUE Assessment WFL   RLE Assessment   RLE Assessment WFL  (hip flex 3/5, otherwise 4-/5)   LLE Assessment   LLE Assessment WFL  (hip flex 3/5, otherwise 4-/5)   Light Touch   RLE Light Touch Grossly intact  (but reports dullness in foot)   LLE Light Touch Grossly intact  (but reports dullness in foot)   Bed Mobility   Rolling L 5  Supervision   Additional items HOB elevated;Bedrails  (performing log roll technique)   Supine to Sit 5  Supervision   Additional items HOB elevated;Bedrails;Increased time required  (with log roll technique)   Additional Comments at EOB, pt reported mild lightheaded/ dizziness. BP assessed. symptoms improved with time.   Transfers   Sit to Stand 5  Supervision   Additional items Verbal cues;Increased time required  (cues for hand placement)   Stand to Sit 5  Supervision   Additional items Verbal cues  (for body position and hand placement. pt transitions to sitting mildly impulsively due to fatigue and AQUINO.)   Ambulation/Elevation   Gait pattern Wide BINU;Foward flexed  (increased step length bilaterally. cues to increase proximity to RW " and for direction of ambulation trial)   Gait Assistance 4  Minimal assist  (for CG)   Additional items Verbal cues   Assistive Device Rolling walker   Distance 8ft   Balance   Static Sitting Good   Dynamic Sitting Fair +   Static Standing Fair +   Dynamic Standing Fair   Ambulatory Fair   Endurance Deficit   Endurance Deficit Yes   Endurance Deficit Description fatigue and AQUINO   Activity Tolerance   Activity Tolerance Patient limited by fatigue   Assessment   Prognosis Fair   Problem List Decreased strength;Decreased range of motion;Decreased endurance;Impaired balance;Decreased mobility;Decreased coordination;Decreased safety awareness;Impaired sensation   Assessment Pt is a 63 y.o. female with a PMH of metastatic lung cancer, COPD, malignant pleural effusion, HTN, HLD, on palliative chemotherapy, recent PE anticoagulated on eliquis who presents with worsening SOB. She also c/o bilateral LE swelling, dry cough and decreased appetite. Patient found to have superimposed pneumonia and moderate amount of ascites in the setting of carcinomatosis. Patient admitted for IV abx and IV diuresis. Pt presents to PT evaluation pleasant and cooperative but endorses fatigue with minimal exertion. Pt demonstrated decreased strength, endurance, balance, coordination, and safety awareness, as well as mild decrease in SpO2 values on exertion using 3L O2. Discharge recommendation at this time is Level III, minimum resource intensity. Skilled PT services will continue during remainder of hospitalization to maximize independence and functional gains, addressing her above mentioned deficits.   Barriers to Discharge Decreased caregiver support;Inaccessible home environment   Barriers to Discharge Comments spouse with limited ability to assist pt. Pt also has 2 entry stairs without a handrail.   Goals   Patient Goals to go home and feel better   STG Expiration Date 03/12/24   Short Term Goal #1 1. supine to sit modified independently. 2.  sit to stand modified independently. 3. ambulate x 25ft with RW with supervision. 4. ascend/ descend 1 step with min assist using least restrictive assistive device.   Plan   Treatment/Interventions Functional transfer training;LE strengthening/ROM;Therapeutic exercise;Endurance training;Patient/family training;Bed mobility;Gait training;Compensatory technique education;Spoke to nursing   PT Frequency 2-3x/wk   Discharge Recommendation   Rehab Resource Intensity Level, PT III (Minimum Resource Intensity)   Equipment Recommended   (pt already owns a W/C and Rollator)   Additional Comments Pt would benefit from a ramp to her home due to medical condition and limited family support.   AM-PAC Basic Mobility Inpatient   Turning in Flat Bed Without Bedrails 3   Lying on Back to Sitting on Edge of Flat Bed Without Bedrails 3   Moving Bed to Chair 3   Standing Up From Chair Using Arms 3   Walk in Room 3   Climb 3-5 Stairs With Railing 2   Basic Mobility Inpatient Raw Score 17   Basic Mobility Standardized Score 39.67   Highest Level Of Mobility   JH-HLM Goal 5: Stand one or more mins   JH-HLM Achieved 6: Walk 10 steps or more   Exercises   Ankle Pumps Sitting;5 reps  (reviewed technique with pt and encouraged her to perform them throughout the day as able.)   End of Consult   Patient Position at End of Consult Bedside chair;Bed/Chair alarm activated;All needs within reach  (BLE elevated, tray table in front)     Length Of Stay: 2  PHYSICAL THERAPY EVALUATION :   Patient's identity confirmed via 2 patient identifiers (full name and ) at start of session      The patient's AM-PAC Basic Mobility Inpatient Short Form Raw Score is 17, Standardized Score is 39.67. A standardized score greater than 38.32 (raw score of 16) suggests the patient may benefit from discharge to home which may not coincide with above PT recommendations. However please refer to therapist recommendation for discharge planning given other factors that  may influence destination.      Pt would benefit from skilled inpatient PT during this admission in order to facilitate progress towards goals to maximize functional independence    Jeancarlos Pelaez, PT, MSPT

## 2024-02-27 NOTE — PLAN OF CARE
Problem: PAIN - ADULT  Goal: Verbalizes/displays adequate comfort level or baseline comfort level  Description: Interventions:  - Encourage patient to monitor pain and request assistance  - Assess pain using appropriate pain scale  - Administer analgesics based on type and severity of pain and evaluate response  - Implement non-pharmacological measures as appropriate and evaluate response  - Consider cultural and social influences on pain and pain management  - Notify physician/advanced practitioner if interventions unsuccessful or patient reports new pain  Outcome: Progressing     Problem: INFECTION - ADULT  Goal: Absence or prevention of progression during hospitalization  Description: INTERVENTIONS:  - Assess and monitor for signs and symptoms of infection  - Monitor lab/diagnostic results  - Monitor all insertion sites, i.e. indwelling lines, tubes, and drains  - Monitor endotracheal if appropriate and nasal secretions for changes in amount and color  - [place] appropriate cooling/warming therapies per order  - Administer medications as ordered  - Instruct and encourage patient and family to use good hand hygiene technique  - Identify and instruct in appropriate isolation precautions for identified infection/condition  Outcome: Progressing  Goal: Absence of fever/infection during neutropenic period  Description: INTERVENTIONS:  - Monitor WBC    Outcome: Progressing     Problem: SAFETY ADULT  Goal: Patient will remain free of falls  Description: INTERVENTIONS:  - Educate patient/family on patient safety including physical limitations  - Instruct patient to call for assistance with activity   - Consult OT/PT to assist with strengthening/mobility   - Keep Call bell within reach  - Keep bed low and locked with side rails adjusted as appropriate  - Keep care items and personal belongings within reach  - Initiate and maintain comfort rounds  - Make Fall Risk Sign visible to staff  - Offer Toileting every 2 Hours,  in advance of need  - Initiate/Maintain bed/chair alarm  - Obtain necessary fall risk management equipment  - Apply yellow socks and bracelet for high fall risk patients  - Consider moving patient to room near nurses station  Outcome: Progressing  Goal: Maintain or return to baseline ADL function  Description: INTERVENTIONS:  -  Assess patient's ability to carry out ADLs; assess patient's baseline for ADL function and identify physical deficits which impact ability to perform ADLs (bathing, care of mouth/teeth, toileting, grooming, dressing, etc.)  - Assess/evaluate cause of self-care deficits   - Assess range of motion  - Assess patient's mobility; develop plan if impaired  - Assess patient's need for assistive devices and provide as appropriate  - Encourage maximum independence but intervene and supervise when necessary  - Involve family in performance of ADLs  - Assess for home care needs following discharge   - Consider OT consult to assist with ADL evaluation and planning for discharge  - Provide patient education as appropriate  Outcome: Progressing  Goal: Maintains/Returns to pre admission functional level  Description: INTERVENTIONS:  - Perform AM-PAC 6 Click Basic Mobility/ Daily Activity assessment daily.  - Set and communicate daily mobility goal to care team and patient/family/caregiver.   - Collaborate with rehabilitation services on mobility goals if consulted  - Perform Range of Motion 3 times a day.  - Reposition patient every 2 hours.  - Dangle patient 3 times a day  - Stand patient 3 times a day  - Ambulate patient 3 times a day  - Out of bed to chair 3 times a day   - Out of bed for meals 3 times a day  - Out of bed for toileting  - Record patient progress and toleration of activity level   Outcome: Progressing     Problem: DISCHARGE PLANNING  Goal: Discharge to home or other facility with appropriate resources  Description: INTERVENTIONS:  - Identify barriers to discharge w/patient and  caregiver  - Arrange for needed discharge resources and transportation as appropriate  - Identify discharge learning needs (meds, wound care, etc.)  - Arrange for interpretive services to assist at discharge as needed  - Refer to Case Management Department for coordinating discharge planning if the patient needs post-hospital services based on physician/advanced practitioner order or complex needs related to functional status, cognitive ability, or social support system  Outcome: Progressing     Problem: Knowledge Deficit  Goal: Patient/family/caregiver demonstrates understanding of disease process, treatment plan, medications, and discharge instructions  Description: Complete learning assessment and assess knowledge base.  Interventions:  - Provide teaching at level of understanding  - Provide teaching via preferred learning methods  Outcome: Progressing     Problem: Nutrition/Hydration-ADULT  Goal: Nutrient/Hydration intake appropriate for improving, restoring or maintaining nutritional needs  Description: Monitor and assess patient's nutrition/hydration status for malnutrition. Collaborate with interdisciplinary team and initiate plan and interventions as ordered.  Monitor patient's weight and dietary intake as ordered or per policy. Utilize nutrition screening tool and intervene as necessary. Determine patient's food preferences and provide high-protein, high-caloric foods as appropriate.     INTERVENTIONS:  - Monitor oral intake, urinary output, labs, and treatment plans  - Assess nutrition and hydration status and recommend course of action  - Evaluate amount of meals eaten  - Assist patient with eating if necessary   - Allow adequate time for meals  - Recommend/ encourage appropriate diets, oral nutritional supplements, and vitamin/mineral supplements  - Order, calculate, and assess calorie counts as needed  - Recommend, monitor, and adjust tube feedings and TPN/PPN based on assessed needs  - Assess need for  intravenous fluids  - Provide specific nutrition/hydration education as appropriate  - Include patient/family/caregiver in decisions related to nutrition  Outcome: Progressing     Problem: CARDIOVASCULAR - ADULT  Goal: Maintains optimal cardiac output and hemodynamic stability  Description: INTERVENTIONS:  - Monitor I/O, vital signs and rhythm  - Monitor for S/S and trends of decreased cardiac output  - Administer and titrate ordered vasoactive medications to optimize hemodynamic stability  - Assess quality of pulses, skin color and temperature  - Assess for signs of decreased coronary artery perfusion  - Instruct patient to report change in severity of symptoms  Outcome: Progressing     Problem: GASTROINTESTINAL - ADULT  Goal: Minimal or absence of nausea and/or vomiting  Description: INTERVENTIONS:  - Administer IV fluids if ordered to ensure adequate hydration  - Maintain NPO status until nausea and vomiting are resolved  - Nasogastric tube if ordered  - Administer ordered antiemetic medications as needed  - Provide nonpharmacologic comfort measures as appropriate  - Advance diet as tolerated, if ordered  - Consider nutrition services referral to assist patient with adequate nutrition and appropriate food choices  Outcome: Progressing  Goal: Maintains or returns to baseline bowel function  Description: INTERVENTIONS:  - Assess bowel function  - Encourage oral fluids to ensure adequate hydration  - Administer IV fluids if ordered to ensure adequate hydration  - Administer ordered medications as needed  - Encourage mobilization and activity  - Consider nutritional services referral to assist patient with adequate nutrition and appropriate food choices  Outcome: Progressing  Goal: Maintains adequate nutritional intake  Description: INTERVENTIONS:  - Monitor percentage of each meal consumed  - Identify factors contributing to decreased intake, treat as appropriate  - Assist with meals as needed  - Monitor I&O,  weight, and lab values if indicated  - Obtain nutrition services referral as needed  Outcome: Progressing  Goal: Establish and maintain optimal ostomy function  Description: INTERVENTIONS:  - Assess bowel function  - Encourage oral fluids to ensure adequate hydration  - Administer IV fluids if ordered to ensure adequate hydration   - Administer ordered medications as needed  - Encourage mobilization and activity  - Nutrition services referral to assist patient with appropriate food choices  - Assess stoma site  - Consider wound care consult   Outcome: Progressing  Goal: Oral mucous membranes remain intact  Description: INTERVENTIONS  - Assess oral mucosa and hygiene practices  - Implement preventative oral hygiene regimen  - Implement oral medicated treatments as ordered  - Initiate Nutrition services referral as needed  Outcome: Progressing     Problem: METABOLIC, FLUID AND ELECTROLYTES - ADULT  Goal: Electrolytes maintained within normal limits  Description: INTERVENTIONS:  - Monitor labs and assess patient for signs and symptoms of electrolyte imbalances  - Administer electrolyte replacement as ordered  - Monitor response to electrolyte replacements, including repeat lab results as appropriate  - Instruct patient on fluid and nutrition as appropriate  Outcome: Progressing  Goal: Fluid balance maintained  Description: INTERVENTIONS:  - Monitor labs   - Monitor I/O and WT  - Instruct patient on fluid and nutrition as appropriate  - Assess for signs & symptoms of volume excess or deficit  Outcome: Progressing     Problem: MUSCULOSKELETAL - ADULT  Goal: Maintain proper alignment of affected body part  Description: INTERVENTIONS:  - Support, maintain and protect limb and body alignment  - Provide patient/ family with appropriate education  Outcome: Progressing     Problem: Prexisting or High Potential for Compromised Skin Integrity  Goal: Skin integrity is maintained or improved  Description: INTERVENTIONS:  -  Identify patients at risk for skin breakdown  - Assess and monitor skin integrity  - Assess and monitor nutrition and hydration status  - Monitor labs   - Assess for incontinence   - Turn and reposition patient  - Assist with mobility/ambulation  - Relieve pressure over bony prominences  - Avoid friction and shearing  - Provide appropriate hygiene as needed including keeping skin clean and dry  - Evaluate need for skin moisturizer/barrier cream  - Collaborate with interdisciplinary team   - Patient/family teaching  - Consider wound care consult   Outcome: Progressing

## 2024-02-27 NOTE — PLAN OF CARE
Problem: PAIN - ADULT  Goal: Verbalizes/displays adequate comfort level or baseline comfort level  Description: Interventions:  - Encourage patient to monitor pain and request assistance  - Assess pain using appropriate pain scale  - Administer analgesics based on type and severity of pain and evaluate response  - Implement non-pharmacological measures as appropriate and evaluate response  - Consider cultural and social influences on pain and pain management  - Notify physician/advanced practitioner if interventions unsuccessful or patient reports new pain  Outcome: Progressing     Problem: INFECTION - ADULT  Goal: Absence or prevention of progression during hospitalization  Description: INTERVENTIONS:  - Assess and monitor for signs and symptoms of infection  - Monitor lab/diagnostic results  - Monitor all insertion sites, i.e. indwelling lines, tubes, and drains  - Monitor endotracheal if appropriate and nasal secretions for changes in amount and color  - [place] appropriate cooling/warming therapies per order  - Administer medications as ordered  - Instruct and encourage patient and family to use good hand hygiene technique  - Identify and instruct in appropriate isolation precautions for identified infection/condition  Outcome: Progressing  Goal: Absence of fever/infection during neutropenic period  Description: INTERVENTIONS:  - Monitor WBC    Outcome: Progressing     Problem: SAFETY ADULT  Goal: Patient will remain free of falls  Description: INTERVENTIONS:  - Educate patient/family on patient safety including physical limitations  - Instruct patient to call for assistance with activity   - Consult OT/PT to assist with strengthening/mobility   - Keep Call bell within reach  - Keep bed low and locked with side rails adjusted as appropriate  - Keep care items and personal belongings within reach  - Initiate and maintain comfort rounds  - Make Fall Risk Sign visible to staff  - Offer Toileting every x Hours,  in advance of need  - Initiate/Maintain xalarm  - Obtain necessary fall risk management equipment: x  - Apply yellow socks and bracelet for high fall risk patients  - Consider moving patient to room near nurses station  Outcome: Progressing  Goal: Maintain or return to baseline ADL function  Description: INTERVENTIONS:  -  Assess patient's ability to carry out ADLs; assess patient's baseline for ADL function and identify physical deficits which impact ability to perform ADLs (bathing, care of mouth/teeth, toileting, grooming, dressing, etc.)  - Assess/evaluate cause of self-care deficits   - Assess range of motion  - Assess patient's mobility; develop plan if impaired  - Assess patient's need for assistive devices and provide as appropriate  - Encourage maximum independence but intervene and supervise when necessary  - Involve family in performance of ADLs  - Assess for home care needs following discharge   - Consider OT consult to assist with ADL evaluation and planning for discharge  - Provide patient education as appropriate  Outcome: Progressing  Goal: Maintains/Returns to pre admission functional level  Description: INTERVENTIONS:  - Perform AM-PAC 6 Click Basic Mobility/ Daily Activity assessment daily.  - Set and communicate daily mobility goal to care team and patient/family/caregiver.   - Collaborate with rehabilitation services on mobility goals if consulted  - Perform Range of Motion x times a day.  - Reposition patient every x hours.  - Dangle patient x times a day  - Stand patient x times a day  - Ambulate patient x times a day  - Out of bed to chair x times a day   - Out of bed for meals x times a day  - Out of bed for toileting  - Record patient progress and toleration of activity level   Outcome: Progressing     Problem: DISCHARGE PLANNING  Goal: Discharge to home or other facility with appropriate resources  Description: INTERVENTIONS:  - Identify barriers to discharge w/patient and caregiver  -  Arrange for needed discharge resources and transportation as appropriate  - Identify discharge learning needs (meds, wound care, etc.)  - Arrange for interpretive services to assist at discharge as needed  - Refer to Case Management Department for coordinating discharge planning if the patient needs post-hospital services based on physician/advanced practitioner order or complex needs related to functional status, cognitive ability, or social support system  Outcome: Progressing     Problem: Knowledge Deficit  Goal: Patient/family/caregiver demonstrates understanding of disease process, treatment plan, medications, and discharge instructions  Description: Complete learning assessment and assess knowledge base.  Interventions:  - Provide teaching at level of understanding  - Provide teaching via preferred learning methods  Outcome: Progressing     Problem: Nutrition/Hydration-ADULT  Goal: Nutrient/Hydration intake appropriate for improving, restoring or maintaining nutritional needs  Description: Monitor and assess patient's nutrition/hydration status for malnutrition. Collaborate with interdisciplinary team and initiate plan and interventions as ordered.  Monitor patient's weight and dietary intake as ordered or per policy. Utilize nutrition screening tool and intervene as necessary. Determine patient's food preferences and provide high-protein, high-caloric foods as appropriate.     INTERVENTIONS:  - Monitor oral intake, urinary output, labs, and treatment plans  - Assess nutrition and hydration status and recommend course of action  - Evaluate amount of meals eaten  - Assist patient with eating if necessary   - Allow adequate time for meals  - Recommend/ encourage appropriate diets, oral nutritional supplements, and vitamin/mineral supplements  - Order, calculate, and assess calorie counts as needed  - Recommend, monitor, and adjust tube feedings and TPN/PPN based on assessed needs  - Assess need for intravenous  fluids  - Provide specific nutrition/hydration education as appropriate  - Include patient/family/caregiver in decisions related to nutrition  Outcome: Progressing     Problem: CARDIOVASCULAR - ADULT  Goal: Maintains optimal cardiac output and hemodynamic stability  Description: INTERVENTIONS:  - Monitor I/O, vital signs and rhythm  - Monitor for S/S and trends of decreased cardiac output  - Administer and titrate ordered vasoactive medications to optimize hemodynamic stability  - Assess quality of pulses, skin color and temperature  - Assess for signs of decreased coronary artery perfusion  - Instruct patient to report change in severity of symptoms  Outcome: Progressing     Problem: GASTROINTESTINAL - ADULT  Goal: Minimal or absence of nausea and/or vomiting  Description: INTERVENTIONS:  - Administer IV fluids if ordered to ensure adequate hydration  - Maintain NPO status until nausea and vomiting are resolved  - Nasogastric tube if ordered  - Administer ordered antiemetic medications as needed  - Provide nonpharmacologic comfort measures as appropriate  - Advance diet as tolerated, if ordered  - Consider nutrition services referral to assist patient with adequate nutrition and appropriate food choices  Outcome: Progressing  Goal: Maintains or returns to baseline bowel function  Description: INTERVENTIONS:  - Assess bowel function  - Encourage oral fluids to ensure adequate hydration  - Administer IV fluids if ordered to ensure adequate hydration  - Administer ordered medications as needed  - Encourage mobilization and activity  - Consider nutritional services referral to assist patient with adequate nutrition and appropriate food choices  Outcome: Progressing  Goal: Maintains adequate nutritional intake  Description: INTERVENTIONS:  - Monitor percentage of each meal consumed  - Identify factors contributing to decreased intake, treat as appropriate  - Assist with meals as needed  - Monitor I&O, weight, and lab  values if indicated  - Obtain nutrition services referral as needed  Outcome: Progressing  Goal: Establish and maintain optimal ostomy function  Description: INTERVENTIONS:  - Assess bowel function  - Encourage oral fluids to ensure adequate hydration  - Administer IV fluids if ordered to ensure adequate hydration   - Administer ordered medications as needed  - Encourage mobilization and activity  - Nutrition services referral to assist patient with appropriate food choices  - Assess stoma site  - Consider wound care consult   Outcome: Progressing  Goal: Oral mucous membranes remain intact  Description: INTERVENTIONS  - Assess oral mucosa and hygiene practices  - Implement preventative oral hygiene regimen  - Implement oral medicated treatments as ordered  - Initiate Nutrition services referral as needed  Outcome: Progressing     Problem: METABOLIC, FLUID AND ELECTROLYTES - ADULT  Goal: Electrolytes maintained within normal limits  Description: INTERVENTIONS:  - Monitor labs and assess patient for signs and symptoms of electrolyte imbalances  - Administer electrolyte replacement as ordered  - Monitor response to electrolyte replacements, including repeat lab results as appropriate  - Instruct patient on fluid and nutrition as appropriate  Outcome: Progressing  Goal: Fluid balance maintained  Description: INTERVENTIONS:  - Monitor labs   - Monitor I/O and WT  - Instruct patient on fluid and nutrition as appropriate  - Assess for signs & symptoms of volume excess or deficit  Outcome: Progressing     Problem: MUSCULOSKELETAL - ADULT  Goal: Maintain proper alignment of affected body part  Description: INTERVENTIONS:  - Support, maintain and protect limb and body alignment  - Provide patient/ family with appropriate education  Outcome: Progressing     Problem: Prexisting or High Potential for Compromised Skin Integrity  Goal: Skin integrity is maintained or improved  Description: INTERVENTIONS:  - Identify patients at  risk for skin breakdown  - Assess and monitor skin integrity  - Assess and monitor nutrition and hydration status  - Monitor labs   - Assess for incontinence   - Turn and reposition patient  - Assist with mobility/ambulation  - Relieve pressure over bony prominences  - Avoid friction and shearing  - Provide appropriate hygiene as needed including keeping skin clean and dry  - Evaluate need for skin moisturizer/barrier cream  - Collaborate with interdisciplinary team   - Patient/family teaching  - Consider wound care consult   Outcome: Progressing

## 2024-02-27 NOTE — PROGRESS NOTES
Kathi Ferris is a 63 y.o. female who is currently ordered Vancomycin IV with management by the Pharmacy Consult service.  Relevant clinical data and objective / subjective history reviewed.  Vancomycin Assessment:  Indication and Goal AUC/Trough: Bacteremia (goal -600, trough >10), -600, trough >10  Clinical Status: stable  Micro:     Renal Function:  SCr: 0.43 mg/dL  CrCl: 131.4 mL/min  Renal replacement: Not on dialysis  Days of Therapy: 2  Current Dose: 1000mg IV Q12H  Vancomycin Plan: Patient is currently on vancomycin 1000mg IV Q12h and most recent vancomycin level (random level) drawn today with morning labs is 19.9 ug/ml. Based on today's assessment will continue same dose as follows   New Dosing: No change  Estimated AUC: 418 mcg*hr/mL  Estimated Trough: 11.6 mcg/mL  Next Level: With Am labs at 0600 on 3/1/24  Renal Function Monitoring: Daily BMP and UOP  Pharmacy will continue to follow closely for s/sx of nephrotoxicity, infusion reactions and appropriateness of therapy.  BMP and CBC will be ordered per protocol. We will continue to follow the patient’s culture results and clinical progress daily.    Jazmine Mehta, Pharmacist

## 2024-02-27 NOTE — PLAN OF CARE
Problem: PHYSICAL THERAPY ADULT  Goal: Performs mobility at highest level of function for planned discharge setting.  See evaluation for individualized goals.  Description: Treatment/Interventions: Functional transfer training, LE strengthening/ROM, Therapeutic exercise, Endurance training, Patient/family training, Bed mobility, Gait training, Compensatory technique education, Spoke to nursing  Equipment Recommended:  (pt already owns a W/C and Rollator)       See flowsheet documentation for full assessment, interventions and recommendations.  Note: Prognosis: Fair  Problem List: Decreased strength, Decreased range of motion, Decreased endurance, Impaired balance, Decreased mobility, Decreased coordination, Decreased safety awareness, Impaired sensation  Assessment: Pt is a 63 y.o. female with a PMH of metastatic lung cancer, COPD, malignant pleural effusion, HTN, HLD, on palliative chemotherapy, recent PE anticoagulated on eliquis who presents with worsening SOB. She also c/o bilateral LE swelling, dry cough and decreased appetite. Patient found to have superimposed pneumonia and moderate amount of ascites in the setting of carcinomatosis. Patient admitted for IV abx and IV diuresis. Pt presents to PT evaluation pleasant and cooperative but endorses fatigue with minimal exertion. Pt demonstrated decreased strength, endurance, balance, coordination, and safety awareness, as well as mild decrease in SpO2 values on exertion using 3L O2. Discharge recommendation at this time is Level III, minimum resource intensity. Skilled PT services will continue during remainder of hospitalization to maximize independence and functional gains, addressing her above mentioned deficits.  Barriers to Discharge: Decreased caregiver support, Inaccessible home environment  Barriers to Discharge Comments: spouse with limited ability to assist pt. Pt also has 2 entry stairs without a handrail.  Rehab Resource Intensity Level, PT: III  (Minimum Resource Intensity)    See flowsheet documentation for full assessment.

## 2024-02-27 NOTE — PROGRESS NOTES
[facility]  Progress Note  Name: Kathi Ferris I  MRN: [id number]  Unit/Bed#: -01 I Date of Admission: 2/25/2024   Date of Service: 2/27/2024 I Hospital Day: 2    Assessment/Plan   * Acute on chronic respiratory failure with hypoxia (HCC)  Assessment & Plan  Chronically on 3L in setting of NSCLC  Presented with SOB/AQUINO and Spo2 86% on 3L   Secondary to malignancy, pna - see individual problems   Patient now stable on chronic 3L at rest however still AQUINO   Repeat CXR today     Non-small cell cancer of right lung (HCC)  Assessment & Plan  Stage 4, currently undergoing palliative chemotherapy (last 2/22)  Oncology and Palliative care consulted   Patient with multiple recent admissions, overall aware of her prognosis/advanced malignancy  Topic of hospice has been introduced - patient is considering this however would like to discuss with her family prior to further decisions at this time  For now continue to treat PNA and maintain hgb > 7    Hypervolemia  Assessment & Plan  Dyspneic, gross ascites, & 2+ LE edema   ECHO 01/2024: EF 60%; grade 1 diastolic dysfunction  CT Chest- Small to moderate right pleural effusion with Pleurx catheter in place with moderate left effusion unchanged  Home diuretic: Lasix 20 q day (2 days PTA, pt switched to torsemide 20 mg q day)    Overall suspect pleural effusion and ascites most likely related to malignancy > CHF  LE edema also more likely due to hypoalbuminemia/3rd spacing (album 2.7 on admission)    S/P IV lasix 40 mg BID without significant improvement in LE edema   DC IV lasix, likely less effective given low albumin  Repeat CXR to assess for pulm vas congestion   Will give IV bumex 1 mg tonight and resume PO torsemide tomorrow   SBP is currently stable at 115  Could consider additional  IV albumin as needed as well pending CXR  DANIEL stockings     Pulmonary embolism (HCC)  Assessment & Plan  Admitted in early 02/2023 for PE  Maintained  indefinitely on Eliquis 5 mg BID   Trend Hgb     COPD (chronic obstructive pulmonary disease) (HCC)  Assessment & Plan  No acute exacerbation   Continue home regimen   Continue Advair, supplemental O2, and PRN bronchodilators    Anemia  Assessment & Plan  Recent Labs     02/25/24  1114 02/26/24  0510 02/26/24  0939   HGB 7.9* 6.7* 7.1*     Baseline Hgb 9-10  Hgb remains borderline at 7.2 this morning  No overt evidence of bleeding   Iron/B12/Folate pending   Heme/onc following   Suspect chemotherapy contributing to cytopenias   Maintain Hgb > 7  Continue to trend       Pneumonia  Assessment & Plan  CT chest: suspected left lung superimposed pneumonia   Urine antigens negative  Continue Rocephin + vanc   DC vanc if MRSA neg    Ascites  Assessment & Plan  Etiology 2/2 malignancy  CT abd/pelvis:   Large amount of ascites increased from prior study with omental nodularity of carcinomatosis    IR consulted  S/P paracentesis 2/26 - >5L removed     SIRS (systemic inflammatory response syndrome) ([facility])  Assessment & Plan  POA, criteria met w/ tachycardia, tachypnea, & leukocytosis   Lactic WNL  Procal elevated   SOI: PNA   UA without infection  Viral swab neg  Urine antigens neg  Initial BC 1/2 with staph epidermidis - suspect contaminant  Repeat BC x 2 pending   Patient was initiated on vanc 2/26  Given immunosuppression will continue for now   DC if MRSA swab neg    Hyponatremia  Assessment & Plan  Na 130-132  S/P IV lasix without significant improvement  Suspect SIADH in setting of lung cancer   Labs pending   Trial PO fluid restriction     Essential hypertension  Assessment & Plan  Continue home regimen:   Metoprolol 25mg daily  Micardis 80mg daily  Verapamil 240mg daily                VTE Pharmacologic Prophylaxis: VTE Score: 6 High Risk (Score >/= 5) - Pharmacological DVT Prophylaxis Ordered: apixaban (Eliquis). Sequential Compression Devices Ordered.    Mobility:   Basic Mobility Inpatient Raw Score: 17  -[facility] Goal:  5: Stand one or more mins  -HLM Achieved: 1: Laying in bed  HLM Goal NOT achieved. Continue with multidisciplinary rounding and encourage appropriate mobility to improve upon HLM goals.    Patient Centered Rounds: I performed bedside rounds with nursing staff today.   Discussions with Specialists or Other Care Team Provider: None     Education and Discussions with Family / Patient: Updated  () via phone.    Total Time Spent on Date of Encounter in care of patient: 30 mins. This time was spent on one or more of the following: performing physical exam; counseling and coordination of care; obtaining or reviewing history; documenting in the medical record; reviewing/ordering tests, medications or procedures; communicating with other healthcare professionals and discussing with patient's family/caregivers.    Current Length of Stay: 2 day(s)  Current Patient Status: Inpatient   Certification Statement: The patient will continue to require additional inpatient hospital stay due to IV medications   Discharge Plan: Anticipate discharge in 48-72 hrs to pending further [facility] discussions     Code Status: Level 3 - DNAR and DNI    Subjective:   Patient reports her breathing is still definitely not at baseline, she notes persistent increased work of breathing which is worse with exertion.  She denies any abdominal symptoms or chest pain.    Objective:     Vitals:   Temp (24hrs), Av.8 °F (36.6 °C), Min:97.4 °F (36.3 °C), Max:98 °F (36.7 °C)    Temp:  [97.4 °F (36.3 °C)-98 °F (36.7 °C)] 98 °F (36.7 °C)  HR:  [] 105  Resp:  [18] 18  BP: ()/(56-79) 115/72  SpO2:  [88 %-96 %] 88 %  Body mass index is 27.62 kg/m².     Input and Output Summary (last 24 hours):     Intake/Output Summary (Last 24 hours) at 2024 1205  Last data filed at 2024 1033  Gross per 24 hour   Intake 960 ml   Output 2250 ml   Net -1290 ml       Physical Exam:   Physical Exam  Vitals and nursing note reviewed.    Constitutional:       General: She is not in acute distress.     Appearance: She is ill-appearing.   HENT:      Head: Normocephalic and atraumatic.   Eyes:      General:         Right eye: No discharge.         Left eye: No discharge.      Extraocular Movements: Extraocular movements intact.      Conjunctiva/sclera: Conjunctivae normal.   Cardiovascular:      Rate and Rhythm: Normal rate and regular rhythm.      Heart sounds: No murmur heard.  Pulmonary:      Effort: Pulmonary effort is normal. No respiratory distress.      Breath sounds: No wheezing, rhonchi or rales.      Comments: Diminished breath sounds at bases, right greater than left.  SpO2 stable on 3 L.  Abdominal:      General: Bowel sounds are normal. There is no distension.      Palpations: Abdomen is soft.      Tenderness: There is no abdominal tenderness.   Musculoskeletal:      Cervical back: Neck supple.      Right lower leg: Edema present.      Left lower leg: Edema present.      Comments: 1-2+ edema bilaterally   Skin:     General: Skin is warm and dry.      Capillary Refill: Capillary refill takes less than 2 seconds.   Neurological:      General: No focal deficit present.      Mental Status: She is alert and oriented to person, place, and time. Mental status is at baseline.      Cranial Nerves: No cranial nerve deficit.   Psychiatric:         Mood and Affect: Mood normal.         Behavior: Behavior normal.          Additional Data:     Labs:  Results from last 7 days   Lab Units 02/27/24  0637 02/27/24 0447 02/26/24  0510 02/25/24  1114   WBC Thousand/uL  --  3.00*   < > 12.39*   HEMOGLOBIN g/dL 7.2* 6.6*   < > 7.9*   HEMATOCRIT % 22.8* 20.7*   < > 25.1*   PLATELETS Thousands/uL  --  103*   < > 144*   LYMPHO PCT %  --   --   --  2*   MONO PCT %  --   --   --  0*   EOS PCT %  --   --   --  0    < > = values in this interval not displayed.     Results from last 7 days   Lab Units 02/27/24  0447 02/26/24  0510 02/25/24  1114   SODIUM mmol/L 131*    < > 130*   POTASSIUM mmol/L 3.9   < > 4.0   CHLORIDE mmol/L 98   < > 96   CO2 mmol/L 25   < > 24   BUN mg/dL 13   < > 21   CREATININE mg/dL 0.43*   < > 0.53*   ANION GAP mmol/L 8   < > 10   CALCIUM mg/dL 8.4   < > 9.1   ALBUMIN g/dL  --   --  2.7*   TOTAL BILIRUBIN mg/dL  --   --  0.58   ALK PHOS U/L  --   --  115*   ALT U/L  --   --  19   AST U/L  --   --  24   GLUCOSE RANDOM mg/dL 94   < > 97    < > = values in this interval not displayed.     Results from last 7 days   Lab Units 02/25/24  1114   INR  1.42*             Results from last 7 days   Lab Units 02/26/24  0510 02/25/24  1114   LACTIC ACID mmol/L  --  1.3   PROCALCITONIN ng/ml 0.57* 0.52*       Lines/Drains:  Invasive Devices       Central Venous Catheter Line  Duration             Port A Cath 04/18/23 Right Subclavian 315 days              Peripheral Intravenous Line  Duration             Peripheral IV 02/25/24 Dorsal (posterior);Right Forearm 2 days              Drain  Duration             Pleural Effusion Long-Term Catheter 15.5 Fr. 307 days                    Central Line:  Goal for removal: N/A - Chronic PICC      Imaging: Reviewed radiology reports from this admission including: chest CT scan and abdominal/pelvic CT    Recent Cultures (last 7 days):   Results from last 7 days   Lab Units 02/26/24  1418 02/26/24  1415 02/25/24  1606 02/25/24  1114   BLOOD CULTURE  Received in Microbiology Lab. Culture in Progress. Received in Microbiology Lab. Culture in Progress.  --  No Growth at 24 hrs.   GRAM STAIN RESULT   --   --   --  Gram positive cocci in clusters*   LEGIONELLA URINARY ANTIGEN   --   --  Negative  --        Last 24 Hours Medication List:   Current Facility-Administered Medications   Medication Dose Route Frequency Provider Last Rate    acetaminophen  650 mg Oral Q6H PRN Modesta Bentley PA-C      apixaban  5 mg Oral BID Modesta Bentley PA-C      atorvastatin  20 mg Oral Daily With Dinner Modesta Bentley PA-C      bumetanide  1 mg  Intravenous Once Kala Arita PA-C      buPROPion  300 mg Oral Daily Modesta Bentley PA-C      cefTRIAXone  1,000 mg Intravenous Q24H Modesta Bentley PA-C 1,000 mg (02/26/24 1730)    diphenhydrAMINE  25 mg Oral HS Modesta Bentley PA-C      fluticasone-vilanterol  1 puff Inhalation Daily Modesta Bentley PA-C      levalbuterol  0.63 mg Nebulization Q8H PRN Kala Arita PA-C      lidocaine   Topical Daily PRN Modesta Bentley PA-C      LORazepam  0.5 mg Oral Daily PRN Modesta Bentley PA-C      losartan  100 mg Oral Daily Modesta Bentley PA-C      metoprolol tartrate  25 mg Oral Q12H [facility] Modesta Bentley PA-C      multivitamin stress formula  1 tablet Oral Daily Modesta Bentley PA-C      potassium chloride  40 mEq Oral Daily Modesta Bentley PA-C      sertraline  200 mg Oral Daily Modesta Bentley PA-C      [START ON 2/28/2024] torsemide  20 mg Oral Daily Kala Arita PA-C      vancomycin  1,000 mg Intravenous Q12H Shani Hoffman PA-C 1,000 mg (02/27/24 0929)    verapamil  240 mg Oral HS Modesta Bentley PA-C          Today, Patient Was Seen By: Kala Arita PA-C    **Please Note: This note may have been constructed using a voice recognition system.**

## 2024-02-27 NOTE — CASE MANAGEMENT
Case Management Progress Note    Patient name Kathi Ferris  Location /-01 MRN [id number]  : 1960 Date 2024       LOS (days): 2  Geometric Mean LOS (GMLOS) (days):   Days to GMLOS:        OBJECTIVE:        Current admission status: Inpatient  Preferred Pharmacy:   [id number] [place] - [address]  [address]  [place] [address]  Phone: [phone] Fax: [phone]    [facility] [place] - [address]  [address]  [place] [address]  Phone: [phone] Fax: [phone]    Primary Care Provider: Keyon Miller MD    Primary Insurance: BLUE CROSS  Secondary Insurance: MEDICARE    PROGRESS NOTE:  Wheelchair [facility] delivered to Pt's bedside to take home.

## 2024-02-27 NOTE — QUICK NOTE
2/27/2024 3:14 PM -  Kathi Ferris's chart and case were reviewed by Marilyn Gaspar PA-C.    Palliative care will continue to follow along. Pt denies pain currently. Continue medical management with limit of DNR/DNI.         For urgent issues or any questions/concerns, please notify on-call provider via [employer].  You may also call our answering service 24/7 at [phone].    Marilyn Gaspar PA-C  Palliative and Supportive Care  Clinic/Answering Service: [phone]  You can find me on [employer]!

## 2024-02-28 LAB
ALBUMIN SERPL BCP-MCNC: 2.4 G/DL (ref 3.5–5)
ALP SERPL-CCNC: 89 U/L (ref 34–104)
ALT SERPL W P-5'-P-CCNC: 31 U/L (ref 7–52)
ANION GAP SERPL CALCULATED.3IONS-SCNC: 7 MMOL/L
ANISOCYTOSIS BLD QL SMEAR: PRESENT
AST SERPL W P-5'-P-CCNC: 38 U/L (ref 13–39)
BACTERIA BLD CULT: ABNORMAL
BASOPHILS # BLD MANUAL: 0 THOUSAND/UL (ref 0–0.1)
BASOPHILS NFR MAR MANUAL: 0 % (ref 0–1)
BILIRUB SERPL-MCNC: 0.2 MG/DL (ref 0.2–1)
BUN SERPL-MCNC: 10 MG/DL (ref 5–25)
CALCIUM ALBUM COR SERPL-MCNC: 9.8 MG/DL (ref 8.3–10.1)
CALCIUM SERPL-MCNC: 8.5 MG/DL (ref 8.4–10.2)
CHLORIDE SERPL-SCNC: 96 MMOL/L (ref 96–108)
CO2 SERPL-SCNC: 27 MMOL/L (ref 21–32)
CREAT SERPL-MCNC: 0.43 MG/DL (ref 0.6–1.3)
EOSINOPHIL # BLD MANUAL: 0.04 THOUSAND/UL (ref 0–0.4)
EOSINOPHIL NFR BLD MANUAL: 1 % (ref 0–6)
ERYTHROCYTE [DISTWIDTH] IN BLOOD BY AUTOMATED COUNT: 19.3 % (ref 11.6–15.1)
GFR SERPL CREATININE-BSD FRML MDRD: 107 ML/MIN/1.73SQ M
GLUCOSE SERPL-MCNC: 98 MG/DL (ref 65–140)
GRAM STN SPEC: ABNORMAL
HCT VFR BLD AUTO: 23 % (ref 34.8–46.1)
HGB BLD-MCNC: 7.3 G/DL (ref 11.5–15.4)
LYMPHOCYTES # BLD AUTO: 0.22 THOUSAND/UL (ref 0.6–4.47)
LYMPHOCYTES # BLD AUTO: 5 % (ref 14–44)
MAGNESIUM SERPL-MCNC: 1.6 MG/DL (ref 1.9–2.7)
MCH RBC QN AUTO: 27.1 PG (ref 26.8–34.3)
MCHC RBC AUTO-ENTMCNC: 31.7 G/DL (ref 31.4–37.4)
MCV RBC AUTO: 86 FL (ref 82–98)
MECA+MECC ISLT/SPM QL: DETECTED
MONOCYTES # BLD AUTO: 0.13 THOUSAND/UL (ref 0–1.22)
MONOCYTES NFR BLD: 3 % (ref 4–12)
MRSA NOSE QL CULT: NORMAL
NEUTROPHILS # BLD MANUAL: 3.96 THOUSAND/UL (ref 1.85–7.62)
NEUTS SEG NFR BLD AUTO: 91 % (ref 43–75)
PLATELET # BLD AUTO: 90 THOUSANDS/UL (ref 149–390)
PLATELET BLD QL SMEAR: ABNORMAL
PMV BLD AUTO: 9.5 FL (ref 8.9–12.7)
POLYCHROMASIA BLD QL SMEAR: PRESENT
POTASSIUM SERPL-SCNC: 4.1 MMOL/L (ref 3.5–5.3)
PROCALCITONIN SERPL-MCNC: 0.26 NG/ML
PROT SERPL-MCNC: 5.2 G/DL (ref 6.4–8.4)
RBC # BLD AUTO: 2.69 MILLION/UL (ref 3.81–5.12)
S EPIDERMIDIS DNA BLD POS QL NAA+NON-PRB: DETECTED
SODIUM SERPL-SCNC: 130 MMOL/L (ref 135–147)
WBC # BLD AUTO: 4.35 THOUSAND/UL (ref 4.31–10.16)

## 2024-02-28 PROCEDURE — 99232 SBSQ HOSP IP/OBS MODERATE 35: CPT | Performed by: PHYSICIAN ASSISTANT

## 2024-02-28 PROCEDURE — 85027 COMPLETE CBC AUTOMATED: CPT | Performed by: HOSPITALIST

## 2024-02-28 PROCEDURE — 84145 PROCALCITONIN (PCT): CPT | Performed by: HOSPITALIST

## 2024-02-28 PROCEDURE — 83735 ASSAY OF MAGNESIUM: CPT | Performed by: HOSPITALIST

## 2024-02-28 PROCEDURE — 80053 COMPREHEN METABOLIC PANEL: CPT | Performed by: HOSPITALIST

## 2024-02-28 PROCEDURE — 99233 SBSQ HOSP IP/OBS HIGH 50: CPT | Performed by: PHYSICIAN ASSISTANT

## 2024-02-28 PROCEDURE — 85007 BL SMEAR W/DIFF WBC COUNT: CPT | Performed by: HOSPITALIST

## 2024-02-28 RX ORDER — ALBUMIN (HUMAN) 12.5 G/50ML
25 SOLUTION INTRAVENOUS ONCE
Status: COMPLETED | OUTPATIENT
Start: 2024-02-28 | End: 2024-02-28

## 2024-02-28 RX ORDER — MAGNESIUM SULFATE HEPTAHYDRATE 40 MG/ML
2 INJECTION, SOLUTION INTRAVENOUS ONCE
Status: COMPLETED | OUTPATIENT
Start: 2024-02-28 | End: 2024-02-28

## 2024-02-28 RX ORDER — OXYCODONE HYDROCHLORIDE 5 MG/1
5 TABLET ORAL EVERY 4 HOURS PRN
Status: DISCONTINUED | OUTPATIENT
Start: 2024-02-28 | End: 2024-03-01 | Stop reason: HOSPADM

## 2024-02-28 RX ORDER — LOSARTAN POTASSIUM 50 MG/1
50 TABLET ORAL DAILY
Status: DISCONTINUED | OUTPATIENT
Start: 2024-02-29 | End: 2024-03-01 | Stop reason: HOSPADM

## 2024-02-28 RX ADMIN — DIPHENHYDRAMINE HYDROCHLORIDE 25 MG: 25 TABLET ORAL at 22:22

## 2024-02-28 RX ADMIN — SODIUM CHLORIDE, SODIUM LACTATE, POTASSIUM CHLORIDE, AND CALCIUM CHLORIDE 250 ML: .6; .31; .03; .02 INJECTION, SOLUTION INTRAVENOUS at 16:07

## 2024-02-28 RX ADMIN — POTASSIUM CHLORIDE 40 MEQ: 1500 TABLET, EXTENDED RELEASE ORAL at 08:06

## 2024-02-28 RX ADMIN — B-COMPLEX W/ C & FOLIC ACID TAB 1 TABLET: TAB at 08:06

## 2024-02-28 RX ADMIN — BUPROPION HYDROCHLORIDE 300 MG: 300 TABLET, EXTENDED RELEASE ORAL at 08:06

## 2024-02-28 RX ADMIN — LOSARTAN POTASSIUM 100 MG: 50 TABLET, FILM COATED ORAL at 08:06

## 2024-02-28 RX ADMIN — APIXABAN 5 MG: 5 TABLET, FILM COATED ORAL at 08:06

## 2024-02-28 RX ADMIN — VANCOMYCIN HYDROCHLORIDE 1000 MG: 1 INJECTION, SOLUTION INTRAVENOUS at 22:21

## 2024-02-28 RX ADMIN — ALBUMIN (HUMAN) 25 G: 0.25 INJECTION, SOLUTION INTRAVENOUS at 15:41

## 2024-02-28 RX ADMIN — VANCOMYCIN HYDROCHLORIDE 1000 MG: 1 INJECTION, SOLUTION INTRAVENOUS at 10:13

## 2024-02-28 RX ADMIN — SERTRALINE 200 MG: 100 TABLET, FILM COATED ORAL at 08:06

## 2024-02-28 RX ADMIN — APIXABAN 5 MG: 5 TABLET, FILM COATED ORAL at 17:08

## 2024-02-28 RX ADMIN — FERROUS SULFATE TAB 325 MG (65 MG ELEMENTAL FE) 325 MG: 325 (65 FE) TAB at 08:06

## 2024-02-28 RX ADMIN — ATORVASTATIN CALCIUM 20 MG: 20 TABLET, FILM COATED ORAL at 17:08

## 2024-02-28 RX ADMIN — ALBUMIN (HUMAN) 25 G: 0.25 INJECTION, SOLUTION INTRAVENOUS at 12:22

## 2024-02-28 RX ADMIN — METOPROLOL TARTRATE 25 MG: 25 TABLET, FILM COATED ORAL at 08:06

## 2024-02-28 RX ADMIN — TORSEMIDE 20 MG: 20 TABLET ORAL at 08:06

## 2024-02-28 RX ADMIN — LORAZEPAM 0.5 MG: 0.5 TABLET ORAL at 22:22

## 2024-02-28 RX ADMIN — CEFTRIAXONE 1000 MG: 1 INJECTION, SOLUTION INTRAVENOUS at 17:08

## 2024-02-28 RX ADMIN — FLUTICASONE FUROATE AND VILANTEROL TRIFENATATE 1 PUFF: 200; 25 POWDER RESPIRATORY (INHALATION) at 08:12

## 2024-02-28 RX ADMIN — MAGNESIUM SULFATE HEPTAHYDRATE 2 G: 2 INJECTION, SOLUTION INTRAVENOUS at 10:12

## 2024-02-28 NOTE — PROGRESS NOTES
Kathi Ferris is a 63 y.o. female who is currently ordered Vancomycin IV with management by the Pharmacy Consult service.  Relevant clinical data and objective / subjective history reviewed.  Vancomycin Assessment:  Indication and Goal AUC/Trough: Bacteremia (goal -600, trough >10), -600, trough >10  Clinical Status: stable  Micro:     Renal Function:  SCr: 0.43 mg/dL  CrCl: 131 mL/min  Renal replacement: Not on dialysis  Days of Therapy: 3  Current Dose: 1000 mg q12h  Vancomycin Plan:  New Dosing: continue the same  Estimated AUC: 418 mcg*hr/mL  Estimated Trough: 11.5 mcg/mL  Next Level: random trough with AM labs on 03/01/24  Renal Function Monitoring: Daily BMP and UOP  Pharmacy will continue to follow closely for s/sx of nephrotoxicity, infusion reactions and appropriateness of therapy.  BMP and CBC will be ordered per protocol. We will continue to follow the patient’s culture results and clinical progress daily.    Mary Sykes, Pharmacist

## 2024-02-28 NOTE — ASSESSMENT & PLAN NOTE
Na 130-132  S/P IV lasix without significant improvement  Suspect SIADH in setting of lung cancer/pulmonary process  stable

## 2024-02-28 NOTE — ASSESSMENT & PLAN NOTE
Dyspneic, gross ascites, & 2+ LE edema   ECHO 01/2024: EF 60%; grade 1 diastolic dysfunction  CT Chest- Small to moderate right pleural effusion with Pleurx catheter in place with moderate left effusion unchanged  Home diuretic: Lasix 20 q day (2 days PTA, pt switched to torsemide 20 mg q day)    Overall suspect pleural effusion and ascites most likely related to malignancy > CHF  LE edema also more likely due to hypoalbuminemia/3rd spacing (album 2.7 on admission)    S/P IV lasix 40 mg BID without significant improvement in LE edema   DC IV lasix, likely less effective given low albumin  Will give IV bumex 1 mg tonight and resume PO torsemide tomorrow   SBP is currently stable at 115  Improved with IV bumex, however patient is now describing orthostasis. Give additional albumin on 2/28.  DANIEL stockings

## 2024-02-28 NOTE — ASSESSMENT & PLAN NOTE
Recent Labs     02/27/24  0637 02/27/24  1845 02/28/24  0404   HGB 7.2* 7.5* 7.3*     Baseline Hgb 9-10  Hgb remains borderline at 7.2 this morning  No overt evidence of bleeding   Suspect anemia of chronic disease/chemotherapy induced  Iron 33, Ferritin 1800  Folate and B12 WNL  Heme/onc following   Suspect chemotherapy contributing to cytopenias   Maintain Hgb > 7  Continue to trend

## 2024-02-28 NOTE — ASSESSMENT & PLAN NOTE
Chronically on 3L in setting of NSCLC  Presented with SOB/AQUINO and Spo2 86% on 3L   Secondary to malignancy, pna - see individual problems   Patient now stable on chronic 3L at rest however still AQUINO   Repeat CXR unchanged

## 2024-02-28 NOTE — UTILIZATION REVIEW
Signed             NOTIFICATION OF INPATIENT ADMISSION   AUTHORIZATION REQUEST   SERVICING FACILITY:   [place] [address]  Tax ID: [id number]  NPI: [id number] ATTENDING PROVIDER:  Attending Name and NPI#: Angely Carmen Md [[id number]]  Address: [address] [place] [address]  Phone: [phone]   ADMISSION INFORMATION:  Place of Service: Inpatient [facility]  Place of Service Code: 21  Inpatient Admission Date/Time: 2/25/24  1:19 PM  Discharge Date/Time: No discharge date for patient encounter.  Admitting Diagnosis Code/Description:  Hypomagnesemia [E83.42]  Acute systolic congestive heart failure (HCC) [I50.21]  Chronic respiratory failure with hypoxia (HCC) [J96.11]  Non-small cell cancer of right lung (HCC) [C34.91]      UTILIZATION REVIEW CONTACT:  Johanna Cline Utilization   Network Utilization Review Department  Phone: [phone]  Fax [phone]  Email: [email or web address]  Contact for approvals/pending authorizations, clinical reviews, and discharge.      PHYSICIAN ADVISORY SERVICES:  Medical Necessity Denial & Qhok-hg-Hjxe Review  Phone: [phone]  Fax: [phone]  Email: [email or web address]      DISCHARGE SUPPORT TEAM:  For Patients Discharge Needs & Updates  Phone: [phone] opt. 2 Fax: [phone]  Email: [email or web address]          Gladis Reyes RN  Registered Nurse  Specialty: Utilization Review     Utilization Review     Addendum     Date of Service: 2/26/2024 10:47 AM     Expand All Collapse All    InterQual not available due to electronic issues.          Initial Clinical Review     Admission: Date/Time/Statement:   Admission Orders (From admission, onward)          Ordered         02/25/24 1319   INPATIENT ADMISSION  Once                                     Orders Placed This Encounter   Procedures    INPATIENT ADMISSION       Standing Status:    Standing       Number of Occurrences:   1       Order Specific Question:   Level of Care       Answer:   Med Surg [16]       Order Specific Question:   Estimated length of stay       Answer:   More than 2 Midnights       Order Specific Question:   Certification       Answer:   I certify that inpatient services are medically necessary for this patient for a duration of greater than two midnights. See H&P and MD Progress Notes for additional information about the patient's course of treatment.      ED Arrival Information         Expected   -    Arrival   2/25/2024 10:51    Acuity   Emergent                 Means of arrival   Wheelchair    Escorted by   Spouse    Service   Hospitalist    Admission type   Emergency                 Arrival complaint   SOB                     Chief Complaint   Patient presents with    Shortness of Breath       Pt is c/o increase sob. Pt has hx of lung ca. Usually on 3 L/min oxygen.          Initial Presentation: 63 y.o. female presents to ed from home for evaluation and treatment of shortness of breath.  Patient reports 3L O2nc is not helping.  PMHX: LUNG CA, CHRONIC COPD 3LO2 nc, malignant pleural effusion, RLL lobectomy, chemo, PE on eliquis.    Clinical assessment significant for ht rt , 86% O2 sat, tachypnea 22-24, increased pedal edema, increased abdominal girth, fluid wave, BLLE edema, rales.  Procal 0.52, WBC 12.39, HB 7.9, , .  UA: mod bacteria. Imaging shows large ascites, RLL mass extension to suture site in hilum, mod right pleural effusion with pleurx catheter in place, moderate left effusion., extensive ground glass opacity.  EKG: sinus tachycardia, non specific T abnormality.  Initially treated with iv Mag x1.  Admit to inpatient med surg for pneumonia, dyspnea, hypervolemia, anemia, ascites, hyponatremia..  Plan includes iv antibiotics for pneumonia, iv lasix bid for volume overload, supplemental oxygen      Date: 2-26-24   Day 2: inpatient med  surg  Continue iv rocephin for pneumonia and follow up sputum and blood cultures.  Plan IR paracentesis, iv lasix, iv albumin, daily wt for hypervolemia. Trend HH ( baseline 9-10) suspect dilution. Obtain folate vit B12, stool for occult blood and iron panel.  Continue baseline 3L o2nc. Oncology consult pending.       Addendum: successful IR paracentesis 5540 ml clear yellow ascites fluid removed.  1 set of positive blood cultures for gram positive cocci in clusters.  Add iv vancomycin.  Consult oncology: stave NSC LC, malignant pleural effusion, ascites and anemia.  Plan to transfuse 1U prbc for Hb 6.7.  Patientis considering stopping treatment.  He spouse is also undergoing chemoradiation for esophageal cancer.  Her dyspnea limits her mobility.  She is on 3rd line treatment with gemcitabine.  Recommend palliative consult. Palliative care consult completed.  Goals of care discussed .  Hospice is appropriate.  She will consider.                       ED Triage Vitals [02/25/24 1057]   Temperature Pulse Respirations Blood Pressure SpO2   97.8 °F (36.6 °C) (!) 108 (!) 24 121/63 (!) 86 %       Temporal Monitor             No Pain                  Wt Readings from Last 1 Encounters:   02/25/24 83.1 kg (183 lb 3.2 oz)      Additional Vital Signs:      Date/Time Temp Pulse Resp BP MAP (mmHg) SpO2 Nasal Cannula O2 Flow Rate (L/min) O2 Device   02/26/24 09:41:01 -- 105 -- 115/65 82 93 % -- --   02/26/24 07:28:05 98.2 °F (36.8 °C) 107 Abnormal  20 102/58 73 91 % -- --   02/26/24 05:08:04 97.8 °F (36.6 °C) 98 18 99/59 72 91 % 3 L/min Nasal cannula   02/26/24 0500 -- 98 -- -- -- 93 % -- --   02/26/24 0300 -- 98 -- -- -- 91 % -- --   02/26/24 0100 -- 96 -- -- -- 91 % -- --   02/25/24 2300 -- 116 Abnormal  -- -- -- 94 % -- --   02/25/24 2100 -- 116 Abnormal  -- -- -- 95 % -- --   02/25/24 20:33:32 98.7 °F (37.1 °C) 127 Abnormal  -- 119/63 82 94 % -- --   02/25/24 20:31:42 98.7 °F (37.1 °C) 127 Abnormal  18 95/61 72 93 % 3 L/min  Nasal cannula   02/25/24 1900 -- 118 Abnormal  -- -- -- 93 % -- --   02/25/24 17:47:57 -- 119 Abnormal  -- 104/64 77 94 % -- --   02/25/24 16:12:53 -- 123 Abnormal  -- 107/60 76 94 % -- --   02/25/24 14:57:57 97.2 °F (36.2 °C) Abnormal  118 Abnormal  19 110/66 81 93 % 3 L/min Nasal cannula   02/25/24 1400 -- 104 22 122/61 85 97 % -- --   02/25/24 1330 -- 101 24 Abnormal  105/66 81 98 % -- --   02/25/24 1300 -- 99 24 Abnormal  110/59 78 95 % -- --   02/25/24 1230 -- 101 22 107/52 75 98 % -- Nasal cannula   02/25/24 1200 -- 100 20 114/58 79 97 % -- --   02/25/24 1130 -- 100 20 117/59 83 91 % -- --   02/25/24 1100 -- 108 Abnormal  22 123/64 85 92 % -- --   02/25/24 1057 97.8 °F (36.6 °C) 108 Abnormal  24 Abnormal  121/63 85 86 % Abnormal  3 L/min Nasal cannula            Pertinent Labs/Diagnostic Test Results:      CTA chest (pe study) abdomen pelvis contrast   Final (02/25 1516)       1.Unchanged filling defect at the postsurgical site in the right distal pulmonary artery with no new embolus although limited by poor opacification.       2. Small to moderate right pleural effusion with Pleurx catheter in place with moderate left effusion unchanged.       3. Mostly unchanged right lower lung mass with extension to the suture site in the hilum.       4. Large amount of ascites increased from prior study with omental nodularity of carcinomatosis.   Previous obstructive pattern is resolved.       5. Unchanged right lung parenchymal changes with worsening groundglass parenchymal opacities throughout the left lung of superimposed pneumonia.           XR chest portable       Final (02/25 1450)       Right pleural drainage catheter with small loculated right effusion with no pneumothorax.       Left pleural effusion on subsequent CT not visible.       Extensive bilateral groundglass opacity.                   Results from last 7 days   Lab Units 02/25/24  1114   SARS-COV-2   Negative              Results from last 7 days   Lab  Units 02/26/24  0939 02/26/24  0510 02/25/24  1114 02/20/24  1100   WBC Thousand/uL  --  8.92 12.39* 13.34*   HEMOGLOBIN g/dL 7.1* 6.7* 7.9* 8.2*   HEMATOCRIT % 22.3* 21.0* 25.1* 25.8*   PLATELETS Thousands/uL  --  135* 144* 292   NEUTROS ABS Thousands/µL  --   --   --  12.91*                Results from last 7 days   Lab Units 02/26/24  0510 02/25/24  1114   SODIUM mmol/L 130* 130*   POTASSIUM mmol/L 4.6 4.0   CHLORIDE mmol/L 98 96   CO2 mmol/L 24 24   ANION GAP mmol/L 8 10   BUN mg/dL 21 21   CREATININE mg/dL 0.58* 0.53*   EGFR ml/min/1.73sq m 97 99   CALCIUM mg/dL 9.0 9.1   MAGNESIUM mg/dL  --  1.6*           Results from last 7 days   Lab Units 02/25/24  1114   AST U/L 24   ALT U/L 19   ALK PHOS U/L 115*   TOTAL PROTEIN g/dL 6.2*   ALBUMIN g/dL 2.7*   TOTAL BILIRUBIN mg/dL 0.58                Results from last 7 days   Lab Units 02/26/24  0510 02/25/24  1114   GLUCOSE RANDOM mg/dL 92 97            Results from last 7 days   Lab Units 02/25/24  1400 02/25/24  1114   HS TNI 0HR ng/L  --  20   HS TNI 2HR ng/L 19  --    HSTNI D2 ng/L -1  --                Results from last 7 days   Lab Units 02/25/24  1114   PROTIME seconds 17.8*   INR   1.42*   PTT seconds 40*                Results from last 7 days   Lab Units 02/26/24  0510 02/25/24  1114   PROCALCITONIN ng/ml 0.57* 0.52*           Results from last 7 days   Lab Units 02/25/24  1114   LACTIC ACID mmol/L 1.3              Results from last 7 days   Lab Units 02/25/24  1114   BNP pg/mL 99                  Results from last 7 days   Lab Units 02/25/24  1606   CLARITY UA   Clear   COLOR UA   Yellow   SPEC GRAV UA   1.010   PH UA   6.0   GLUCOSE UA mg/dl Negative   KETONES UA mg/dl Trace*   BLOOD UA   Negative   PROTEIN UA mg/dl Trace*   NITRITE UA   Negative   BILIRUBIN UA   Negative   UROBILINOGEN UA (BE) mg/dl <2.0   LEUKOCYTES UA   Trace*   WBC UA /hpf 2-4   RBC UA /hpf 0-1*   BACTERIA UA /hpf Moderate*   EPITHELIAL CELLS WET PREP /hpf Occasional   MUCUS THREADS    Occasional*            Results from last 7 days   Lab Units 02/25/24  1606 02/25/24  1114   STREP PNEUMONIAE ANTIGEN, URINE   Negative  --    LEGIONELLA URINARY ANTIGEN   Negative  --    INFLUENZA A PCR    --  Negative   INFLUENZA B PCR    --  Negative   RSV PCR    --  Negative              Results from last 7 days   Lab Units 02/25/24  1114   BLOOD CULTURE   Received in Microbiology Lab. Culture in Progress.    Received in Microbiology Lab. Culture in Progress.      ED Treatment:   Medication Administration from 02/25/2024 1051 to 02/25/2024 1448           Date/Time Order Dose Route Action       02/25/2024 1253 EST magnesium sulfate 2 g/50 mL IVPB (premix) 2 g 2 g Intravenous New Bag             Medical History        Past Medical History:   Diagnosis Date    Cancer (HCC)      COPD (chronic obstructive pulmonary disease) (HCC)      Emphysema of lung (HCC)      Hyperlipidemia      Hypertension      Lung cancer (HCC) 06/26/2019     right lower lobe removed    Lung nodule      Pleural effusion           Present on Admission:   COPD (chronic obstructive pulmonary disease) (HCC)   Essential hypertension   SIRS (systemic inflammatory response syndrome) (HCC)   Hyponatremia   Non-small cell cancer of right lung (HCC)   Pulmonary embolism (HCC)        Admitting Diagnosis:      Hypomagnesemia [E83.42]  Acute systolic congestive heart failure (HCC) [I50.21]  Chronic respiratory failure with hypoxia (HCC) [J96.11]  Non-small cell cancer of right lung (HCC) [C34.91]     Age/Sex: 63 y.o. female     Scheduled Medications:     apixaban, 5 mg, Oral, BID  atorvastatin, 20 mg, Oral, Daily With Dinner  buPROPion, 300 mg, Oral, Daily  cefTRIAXone, 1,000 mg, Intravenous, Q24H  diphenhydrAMINE, 25 mg, Oral, HS  fluticasone-vilanterol, 1 puff, Inhalation, Daily  furosemide, 40 mg, Intravenous, BID (diuretic)  losartan, 100 mg, Oral, Daily  metoprolol tartrate, 25 mg, Oral, Q12H ULISES  multivitamin stress formula, 1 tablet, Oral,  Daily  potassium chloride, 40 mEq, Oral, Daily  sertraline, 200 mg, Oral, Daily  verapamil, 240 mg, Oral, HS        Continuous IV Infusions:  PRN Meds:  acetaminophen, 650 mg, Oral, Q6H PRN  albuterol, 2 puff, Inhalation, Q4H PRN  lidocaine, , Topical, Daily PRN  LORazepam, 0.5 mg, Oral, Daily PRN           IP CONSULT TO ONCOLOGY  IP CONSULT TO CASE MANAGEMENT     Network Utilization Review Department  ATTENTION: Please call with any questions or concerns to [phone] and carefully listen to the prompts so that you are directed to the right person. All voicemails are confidential.   For Discharge needs, contact Care Management DC Support Team at [phone] opt. 2  Send all requests for admission clinical reviews, approved or denied determinations and any other requests to dedicated fax number below belonging to the [facility] where the patient is receiving treatment. List of dedicated fax numbers for the Facilities:  FACILITY NAME UR FAX NUMBER   ADMISSION DENIALS (Administrative/Medical Necessity) [phone]   DISCHARGE SUPPORT TEAM (NETWORK) [phone]   PARENT CHILD HEALTH (Maternity/NICU/Pediatrics) [phone]   [facility] [phone]   [facility] [phone]   [facility] [phone]   [facility] [phone]   [facility] [phone]   [facility] [phone]   [facility] [phone]   [facility] [phone]   [facility] [phone]   [facility] [phone]   [facility] [phone]   [facility] [phone]               Revision History

## 2024-02-28 NOTE — PLAN OF CARE
Problem: PAIN - ADULT  Goal: Verbalizes/displays adequate comfort level or baseline comfort level  Description: Interventions:  - Encourage patient to monitor pain and request assistance  - Assess pain using appropriate pain scale  - Administer analgesics based on type and severity of pain and evaluate response  - Implement non-pharmacological measures as appropriate and evaluate response  - Consider cultural and social influences on pain and pain management  - Notify physician/advanced practitioner if interventions unsuccessful or patient reports new pain  Outcome: Progressing     Problem: INFECTION - ADULT  Goal: Absence or prevention of progression during hospitalization  Description: INTERVENTIONS:  - Assess and monitor for signs and symptoms of infection  - Monitor lab/diagnostic results  - Monitor all insertion sites, i.e. indwelling lines, tubes, and drains  - Monitor endotracheal if appropriate and nasal secretions for changes in amount and color  - [place] appropriate cooling/warming therapies per order  - Administer medications as ordered  - Instruct and encourage patient and family to use good hand hygiene technique  - Identify and instruct in appropriate isolation precautions for identified infection/condition  Outcome: Progressing  Goal: Absence of fever/infection during neutropenic period  Description: INTERVENTIONS:  - Monitor WBC    Outcome: Progressing     Problem: SAFETY ADULT  Goal: Patient will remain free of falls  Description: INTERVENTIONS:  - Educate patient/family on patient safety including physical limitations  - Instruct patient to call for assistance with activity   - Consult OT/PT to assist with strengthening/mobility   - Keep Call bell within reach  - Keep bed low and locked with side rails adjusted as appropriate  - Keep care items and personal belongings within reach  - Initiate and maintain comfort rounds  - Make Fall Risk Sign visible to staff  - Offer Toileting every 2 Hours,  in advance of need  - Initiate/Maintain bed alarm  - Obtain necessary fall risk management equipment:   - Apply yellow socks and bracelet for high fall risk patients  - Consider moving patient to room near nurses station  Outcome: Progressing  Goal: Maintain or return to baseline ADL function  Description: INTERVENTIONS:  -  Assess patient's ability to carry out ADLs; assess patient's baseline for ADL function and identify physical deficits which impact ability to perform ADLs (bathing, care of mouth/teeth, toileting, grooming, dressing, etc.)  - Assess/evaluate cause of self-care deficits   - Assess range of motion  - Assess patient's mobility; develop plan if impaired  - Assess patient's need for assistive devices and provide as appropriate  - Encourage maximum independence but intervene and supervise when necessary  - Involve family in performance of ADLs  - Assess for home care needs following discharge   - Consider OT consult to assist with ADL evaluation and planning for discharge  - Provide patient education as appropriate  Outcome: Progressing  Goal: Maintains/Returns to pre admission functional level  Description: INTERVENTIONS:  - Perform AM-PAC 6 Click Basic Mobility/ Daily Activity assessment daily.  - Set and communicate daily mobility goal to care team and patient/family/caregiver.   - Collaborate with rehabilitation services on mobility goals if consulted  - Perform Range of Motion 2 times a day.  - Reposition patient every 2 hours.  - Dangle patient 2 times a day  - Stand patient 2 times a day  - Ambulate patient 2 times a day  - Out of bed to chair 2 times a day   - Out of bed for meals 2 times a day  - Out of bed for toileting  - Record patient progress and toleration of activity level   Outcome: Progressing     Problem: DISCHARGE PLANNING  Goal: Discharge to home or other facility with appropriate resources  Description: INTERVENTIONS:  - Identify barriers to discharge w/patient and caregiver  -  Arrange for needed discharge resources and transportation as appropriate  - Identify discharge learning needs (meds, wound care, etc.)  - Arrange for interpretive services to assist at discharge as needed  - Refer to Case Management Department for coordinating discharge planning if the patient needs post-hospital services based on physician/advanced practitioner order or complex needs related to functional status, cognitive ability, or social support system  Outcome: Progressing     Problem: Knowledge Deficit  Goal: Patient/family/caregiver demonstrates understanding of disease process, treatment plan, medications, and discharge instructions  Description: Complete learning assessment and assess knowledge base.  Interventions:  - Provide teaching at level of understanding  - Provide teaching via preferred learning methods  Outcome: Progressing     Problem: Nutrition/Hydration-ADULT  Goal: Nutrient/Hydration intake appropriate for improving, restoring or maintaining nutritional needs  Description: Monitor and assess patient's nutrition/hydration status for malnutrition. Collaborate with interdisciplinary team and initiate plan and interventions as ordered.  Monitor patient's weight and dietary intake as ordered or per policy. Utilize nutrition screening tool and intervene as necessary. Determine patient's food preferences and provide high-protein, high-caloric foods as appropriate.     INTERVENTIONS:  - Monitor oral intake, urinary output, labs, and treatment plans  - Assess nutrition and hydration status and recommend course of action  - Evaluate amount of meals eaten  - Assist patient with eating if necessary   - Allow adequate time for meals  - Recommend/ encourage appropriate diets, oral nutritional supplements, and vitamin/mineral supplements  - Order, calculate, and assess calorie counts as needed  - Recommend, monitor, and adjust tube feedings and TPN/PPN based on assessed needs  - Assess need for intravenous  fluids  - Provide specific nutrition/hydration education as appropriate  - Include patient/family/caregiver in decisions related to nutrition  Outcome: Progressing     Problem: CARDIOVASCULAR - ADULT  Goal: Maintains optimal cardiac output and hemodynamic stability  Description: INTERVENTIONS:  - Monitor I/O, vital signs and rhythm  - Monitor for S/S and trends of decreased cardiac output  - Administer and titrate ordered vasoactive medications to optimize hemodynamic stability  - Assess quality of pulses, skin color and temperature  - Assess for signs of decreased coronary artery perfusion  - Instruct patient to report change in severity of symptoms  Outcome: Progressing     Problem: GASTROINTESTINAL - ADULT  Goal: Minimal or absence of nausea and/or vomiting  Description: INTERVENTIONS:  - Administer IV fluids if ordered to ensure adequate hydration  - Maintain NPO status until nausea and vomiting are resolved  - Nasogastric tube if ordered  - Administer ordered antiemetic medications as needed  - Provide nonpharmacologic comfort measures as appropriate  - Advance diet as tolerated, if ordered  - Consider nutrition services referral to assist patient with adequate nutrition and appropriate food choices  Outcome: Progressing  Goal: Maintains or returns to baseline bowel function  Description: INTERVENTIONS:  - Assess bowel function  - Encourage oral fluids to ensure adequate hydration  - Administer IV fluids if ordered to ensure adequate hydration  - Administer ordered medications as needed  - Encourage mobilization and activity  - Consider nutritional services referral to assist patient with adequate nutrition and appropriate food choices  Outcome: Progressing  Goal: Maintains adequate nutritional intake  Description: INTERVENTIONS:  - Monitor percentage of each meal consumed  - Identify factors contributing to decreased intake, treat as appropriate  - Assist with meals as needed  - Monitor I&O, weight, and lab  values if indicated  - Obtain nutrition services referral as needed  Outcome: Progressing  Goal: Establish and maintain optimal ostomy function  Description: INTERVENTIONS:  - Assess bowel function  - Encourage oral fluids to ensure adequate hydration  - Administer IV fluids if ordered to ensure adequate hydration   - Administer ordered medications as needed  - Encourage mobilization and activity  - Nutrition services referral to assist patient with appropriate food choices  - Assess stoma site  - Consider wound care consult   Outcome: Progressing  Goal: Oral mucous membranes remain intact  Description: INTERVENTIONS  - Assess oral mucosa and hygiene practices  - Implement preventative oral hygiene regimen  - Implement oral medicated treatments as ordered  - Initiate Nutrition services referral as needed  Outcome: Progressing     Problem: METABOLIC, FLUID AND ELECTROLYTES - ADULT  Goal: Electrolytes maintained within normal limits  Description: INTERVENTIONS:  - Monitor labs and assess patient for signs and symptoms of electrolyte imbalances  - Administer electrolyte replacement as ordered  - Monitor response to electrolyte replacements, including repeat lab results as appropriate  - Instruct patient on fluid and nutrition as appropriate  Outcome: Progressing  Goal: Fluid balance maintained  Description: INTERVENTIONS:  - Monitor labs   - Monitor I/O and WT  - Instruct patient on fluid and nutrition as appropriate  - Assess for signs & symptoms of volume excess or deficit  Outcome: Progressing     Problem: MUSCULOSKELETAL - ADULT  Goal: Maintain proper alignment of affected body part  Description: INTERVENTIONS:  - Support, maintain and protect limb and body alignment  - Provide patient/ family with appropriate education  Outcome: Progressing     Problem: Prexisting or High Potential for Compromised Skin Integrity  Goal: Skin integrity is maintained or improved  Description: INTERVENTIONS:  - Identify patients at  risk for skin breakdown  - Assess and monitor skin integrity  - Assess and monitor nutrition and hydration status  - Monitor labs   - Assess for incontinence   - Turn and reposition patient  - Assist with mobility/ambulation  - Relieve pressure over bony prominences  - Avoid friction and shearing  - Provide appropriate hygiene as needed including keeping skin clean and dry  - Evaluate need for skin moisturizer/barrier cream  - Collaborate with interdisciplinary team   - Patient/family teaching  - Consider wound care consult   Outcome: Progressing

## 2024-02-28 NOTE — ASSESSMENT & PLAN NOTE
POA, criteria met w/ tachycardia, tachypnea, & leukocytosis   Lactic WNL  Procal elevated   SOI: PNA   UA without infection  Viral swab neg  Urine antigens neg  Initial BC 1/2 with staph epidermidis - suspect contaminant  Repeat BC x 2 negative @ 24hrs  Patient was initiated on vanc 2/26  Given immunosuppression will continue for now   DC if MRSA swab neg

## 2024-02-28 NOTE — PROGRESS NOTES
Progress Note - Palliative & Supportive Care  Kathi CHINCHILLA Cipolla  63 y.o.  female  /-01   MRN: [id number]  Encounter: [id number]     Assessment/Problems actively addressed:  Encounter for palliative and supportive care  Goals of care encounter   Malignant pleural effusion  Stage IV non-small cell lung cancer  Ascites  Anemia  Cancer related pain    PLAN:  1. Symptom management  Overall, symptoms are currently well-controlled on current regimen.  Kathi stated she did have pain last night and consider taking medication but decided not to.    Encouraged her to take medication if needed.   Continue acetaminophen 650MG Q6H PRN  Will add OxyIR 2.5-5MG Q4H PRN BT pain   Bowel regimen in place    2. Goals of care  I discussed goals of care with Kathi today at bedside.   She would like to keep her appointment with Dr Phipps on 3/6/24. At that point, she will decide if she would like to enroll with hospice or attempt further cancer treatment.   PSC will follow closely with her in the outpatient setting to facilitate symptom management.     24 History  Chart reviewed before visit. Kathi seen at bedside.  Currently receiving albumin.  States she believes she is doing better compared to yesterday as far as her breathing is concerned.  Did consider taking something for pain last night but decided not to as she is trying to avoid opioids.  Emotional support given.  We discussed palliative care versus hospice care and she would like to see her oncologist prior to making any further decisions.    Decisional apparatus:  Patient is competent on exam today.  If competence is lost, patient's substitute decision maker would default to spouse by [place] Act 169.  Review of Systems:   Review of Systems   HENT:  Negative for congestion.    Cardiovascular:  Positive for dyspnea on exertion.   Gastrointestinal:  Negative for nausea and vomiting.   Neurological:  Negative for light-headedness.   Psychiatric/Behavioral:  Negative  for altered mental status and depression.      Medications    Current Facility-Administered Medications:     acetaminophen (TYLENOL) tablet 650 mg, 650 mg, Oral, Q6H PRN, Modesta Bentley PA-C    [COMPLETED] albumin human (FLEXBUMIN) 25 % injection 25 g, 25 g, Intravenous, Once, 25 g at 02/28/24 1222 **FOLLOWED BY** albumin human (FLEXBUMIN) 25 % injection 25 g, 25 g, Intravenous, Once, John Muñiz PA-C    apixaban (ELIQUIS) tablet 5 mg, 5 mg, Oral, BID, Modesta Bentley PA-C, 5 mg at 02/28/24 0806    atorvastatin (LIPITOR) tablet 20 mg, 20 mg, Oral, Daily With Dinner, Modesta Bentley PA-C, 20 mg at 02/27/24 1626    buPROPion (WELLBUTRIN XL) 24 hr tablet 300 mg, 300 mg, Oral, Daily, Modesta Bentley PA-C, 300 mg at 02/28/24 0806    cefTRIAXone (ROCEPHIN) IVPB (premix in dextrose) 1,000 mg 50 mL, 1,000 mg, Intravenous, Q24H, Modesta Bentley PA-C, Last Rate: 100 mL/hr at 02/27/24 1626, 1,000 mg at 02/27/24 1626    diphenhydrAMINE (BENADRYL) tablet 25 mg, 25 mg, Oral, HS, Modesta Bentley PA-C, 25 mg at 02/27/24 2111    ferrous sulfate tablet 325 mg, 325 mg, Oral, Daily With Breakfast, Kala Arita PA-C, 325 mg at 02/28/24 0806    fluticasone-vilanterol 200-25 mcg/actuation 1 puff, 1 puff, Inhalation, Daily, Modesta Bentley PA-C, 1 puff at 02/28/24 0812    levalbuterol (XOPENEX) inhalation solution 0.63 mg, 0.63 mg, Nebulization, Q8H PRN, Kala Arita PA-C    lidocaine (LMX) 4 % cream, , Topical, Daily PRN, Modesta Bentley PA-C    LORazepam (ATIVAN) tablet 0.5 mg, 0.5 mg, Oral, Daily PRN, Modesta Bentley PA-C, 0.5 mg at 02/27/24 2113    losartan (COZAAR) tablet 100 mg, 100 mg, Oral, Daily, Modesta Bentley PA-C, 100 mg at 02/28/24 0806    metoprolol tartrate (LOPRESSOR) tablet 25 mg, 25 mg, Oral, Q12H ULISES, Modesta Bentley PA-C, 25 mg at 02/28/24 0806    multivitamin stress formula tablet 1 tablet, 1 tablet, Oral, Daily, Modesta Bentley PA-C, 1 tablet at  "02/28/24 0806    oxyCODONE (ROXICODONE) IR tablet 5 mg, 5 mg, Oral, Q4H PRN, Marilyn Gaspar PA-C    oxyCODONE (ROXICODONE) split tablet 2.5 mg, 2.5 mg, Oral, Q4H PRN, Marilyn Gaspar PA-C    potassium chloride (Klor-Con M20) CR tablet 40 mEq, 40 mEq, Oral, Daily, Modesta Bentley PA-C, 40 mEq at 02/28/24 0806    sertraline (ZOLOFT) tablet 200 mg, 200 mg, Oral, Daily, Modesta Bentley PA-C, 200 mg at 02/28/24 0806    torsemide (DEMADEX) tablet 20 mg, 20 mg, Oral, Daily, Kala Arita PA-C, 20 mg at 02/28/24 0806    vancomycin (VANCOCIN) IVPB (premix in dextrose) 1,000 mg 200 mL, 1,000 mg, Intravenous, Q12H, Shani Hoffman PA-C, Last Rate: 200 mL/hr at 02/28/24 1013, 1,000 mg at 02/28/24 1013    verapamil (CALAN-SR) CR tablet 240 mg, 240 mg, Oral, HS, Modesta Bentley PA-C, 240 mg at 02/27/24 2121    Objective  /71 (BP Location: Right arm)   Pulse 95   Temp 98.1 °F (36.7 °C) (Oral)   Resp 18   Ht 5' 5\" (1.651 m)   Wt 73.6 kg (162 lb 3.2 oz)   SpO2 97%   BMI 26.99 kg/m²     Physical Exam:   Physical Exam  Vitals and nursing note reviewed.   Constitutional:       General: She is not in acute distress.     Appearance: She is well-developed. She is ill-appearing.   HENT:      Head: Normocephalic and atraumatic.   Eyes:      General:         Right eye: No discharge.         Left eye: No discharge.      Conjunctiva/sclera: Conjunctivae normal.   Pulmonary:      Effort: Pulmonary effort is normal. No respiratory distress.      Breath sounds: Normal breath sounds.   Musculoskeletal:         General: No swelling.      Cervical back: Neck supple.   Skin:     General: Skin is warm.      Capillary Refill: Capillary refill takes less than 2 seconds.      Coloration: Skin is pale.   Neurological:      Mental Status: She is alert and oriented to person, place, and time.   Psychiatric:         Mood and Affect: Mood normal.       Lab Results: I have personally reviewed pertinent labs., " "CBC:   Lab Results   Component Value Date    WBC 4.35 02/28/2024    HGB 7.3 (L) 02/28/2024    HCT 23.0 (L) 02/28/2024    MCV 86 02/28/2024    PLT 90 (L) 02/28/2024    RBC 2.69 (L) 02/28/2024    MCH 27.1 02/28/2024    MCHC 31.7 02/28/2024    RDW 19.3 (H) 02/28/2024    MPV 9.5 02/28/2024   , CMP:   Lab Results   Component Value Date    SODIUM 130 (L) 02/28/2024    K 4.1 02/28/2024    CL 96 02/28/2024    CO2 27 02/28/2024    BUN 10 02/28/2024    CREATININE 0.43 (L) 02/28/2024    CALCIUM 8.5 02/28/2024    AST 38 02/28/2024    ALT 31 02/28/2024    ALKPHOS 89 02/28/2024    EGFR 107 02/28/2024     Imaging Studies: I have personally reviewed pertinent reports.  EKG, Pathology, and Other Studies: I have personally reviewed pertinent reports.    Counseling / Coordination of Care  Total floor / unit time spent today 45 minutes. Greater than 50% of total time was spent with the patient and / or family counseling and / or coordinating of care. A description of the counseling / coordination of care: reviewed chart, reviewed lab values, reviewed imaging, provided medical updates, discussed palliative care and symptom management, provided supportive listening, provided anticipatory guidance, provided psychosocial and emotional support, assessed competency and decision-making, and facilitated interdisciplinary communication. Reviewed with SLIM team, RN and CM.    Marilyn Gaspar PA-C  Palliative & Supportive Care    Portions of this document may have been created using dictation software and as such some \"sound alike\" terms may have been generated by the system. Do not hesitate to contact me with any questions or clarifications.    "

## 2024-02-28 NOTE — PROGRESS NOTES
[facility]  Progress Note  Name: Kathi Ferris I  MRN: [id number]  Unit/Bed#: -01 I Date of Admission: 2/25/2024   Date of Service: 2/28/2024 I Hospital Day: 3    Assessment/Plan   Hypervolemia  Assessment & Plan  Dyspneic, gross ascites, & 2+ LE edema   ECHO 01/2024: EF 60%; grade 1 diastolic dysfunction  CT Chest- Small to moderate right pleural effusion with Pleurx catheter in place with moderate left effusion unchanged  Home diuretic: Lasix 20 q day (2 days PTA, pt switched to torsemide 20 mg q day)    Overall suspect pleural effusion and ascites most likely related to malignancy > CHF  LE edema also more likely due to hypoalbuminemia/3rd spacing (album 2.7 on admission)    S/P IV lasix 40 mg BID without significant improvement in LE edema   DC IV lasix, likely less effective given low albumin  Will give IV bumex 1 mg tonight and resume PO torsemide tomorrow   SBP is currently stable at 115  Improved with IV bumex, however patient is now describing orthostasis. Give additional albumin on 2/28.  DANIEL stockings     Anemia  Assessment & Plan  Recent Labs     02/27/24  0637 02/27/24  1845 02/28/24  0404   HGB 7.2* 7.5* 7.3*     Baseline Hgb 9-10  Hgb remains borderline at 7.2 this morning  No overt evidence of bleeding   Suspect anemia of chronic disease/chemotherapy induced  Iron 33, Ferritin 1800  Folate and B12 WNL  Heme/onc following   Suspect chemotherapy contributing to cytopenias   Maintain Hgb > 7  Continue to trend       Pneumonia  Assessment & Plan  CT chest: suspected left lung superimposed pneumonia   Urine antigens negative  Continue Rocephin + vanc   DC vanc if MRSA neg    Ascites  Assessment & Plan  Etiology 2/2 malignancy  CT abd/pelvis:   Large amount of ascites increased from prior study with omental nodularity of carcinomatosis    IR consulted  S/P paracentesis 2/26 - >5L removed     SIRS (systemic inflammatory response syndrome) (HCC)  Assessment & Plan  POA,  criteria met w/ tachycardia, tachypnea, & leukocytosis   Lactic WNL  Procal elevated   SOI: PNA   UA without infection  Viral swab neg  Urine antigens neg  Initial BC 1/2 with staph epidermidis - suspect contaminant  Repeat BC x 2 negative @ 24hrs  Patient was initiated on vanc 2/26  Given immunosuppression will continue for now   DC if MRSA swab neg    Hyponatremia  Assessment & Plan  Na 130-132  S/P IV lasix without significant improvement  Suspect SIADH in setting of lung cancer/pulmonary process  stable    Pulmonary embolism (HCC)  Assessment & Plan  Admitted in early 02/2023 for PE  Maintained indefinitely on Eliquis 5 mg BID   Trend Hgb     Non-small cell cancer of right lung (HCC)  Assessment & Plan  Stage 4, currently undergoing palliative chemotherapy (last 2/22)  Oncology and Palliative care consulted   Patient with multiple recent admissions, overall aware of her prognosis/advanced malignancy  Topic of hospice has been introduced - patient is considering this however would like to discuss with her family prior to further decisions at this time  For now continue to treat PNA and maintain hgb > 7    Essential hypertension  Assessment & Plan  Continue home regimen:   Metoprolol 25mg daily  Micardis 80mg daily  Verapamil 240mg daily     COPD (chronic obstructive pulmonary disease) (HCC)  Assessment & Plan  No acute exacerbation   Continue home regimen   Continue Advair, supplemental O2, and PRN bronchodilators    * Acute on chronic respiratory failure with hypoxia (HCC)  Assessment & Plan  Chronically on 3L in setting of NSCLC  Presented with SOB/AQUINO and Spo2 86% on 3L   Secondary to malignancy, pna - see individual problems   Patient now stable on chronic 3L at rest however still AQUINO   Repeat CXR unchanged               VTE Pharmacologic Prophylaxis: VTE Score: 6 High Risk (Score >/= 5) - Pharmacological DVT Prophylaxis Ordered: apixaban (Eliquis). Sequential Compression Devices Ordered.    Mobility:   Basic  Mobility Inpatient Raw Score: 22  JH-HLM Goal: 7: Walk 25 feet or more  JH-HLM Achieved: 7: Walk 25 feet or more  HLM Goal achieved. Continue to encourage appropriate mobility.    Patient Centered Rounds: I performed bedside rounds with nursing staff today.   Discussions with Specialists or Other Care Team Provider: Will touch base with palliative/heme-onc    Education and Discussions with Family / Patient: Updated  () at bedside.    Total Time Spent on Date of Encounter in care of patient: 55 mins. This time was spent on one or more of the following: performing physical exam; counseling and coordination of care; obtaining or reviewing history; documenting in the medical record; reviewing/ordering tests, medications or procedures; communicating with other healthcare professionals and discussing with patient's family/caregivers.    Current Length of Stay: 3 day(s)  Current Patient Status: Inpatient   Certification Statement: The patient will continue to require additional inpatient hospital stay due to IV abx  Discharge Plan: Anticipate discharge in >72 hrs to home.    Code Status: Level 3 - DNAR and DNI    Subjective:   No acute events overnight. Pt is reporting some dizziness upon standing. Discussed giving further albumin, pt agrees.    Objective:     Vitals:   Temp (24hrs), Av.1 °F (36.7 °C), Min:98 °F (36.7 °C), Max:98.3 °F (36.8 °C)    Temp:  [98 °F (36.7 °C)-98.3 °F (36.8 °C)] 98.1 °F (36.7 °C)  HR:  [] 95  Resp:  [18] 18  BP: (111-120)/(65-71) 120/71  SpO2:  [90 %-97 %] 97 %  Body mass index is 26.99 kg/m².     Input and Output Summary (last 24 hours):     Intake/Output Summary (Last 24 hours) at 2024 1424  Last data filed at 2024 1329  Gross per 24 hour   Intake 840 ml   Output 1500 ml   Net -660 ml       Physical Exam:   Physical Exam  Vitals and nursing note reviewed.   Constitutional:       Appearance: Normal appearance. She is ill-appearing (chronic).   HENT:       Head: Normocephalic and atraumatic.      Mouth/Throat:      Mouth: Mucous membranes are moist.      Pharynx: Oropharynx is clear. No oropharyngeal exudate.   Eyes:      Extraocular Movements: Extraocular movements intact.   Cardiovascular:      Rate and Rhythm: Normal rate and regular rhythm.      Pulses: Normal pulses.      Heart sounds: Normal heart sounds. No murmur heard.     No friction rub. No gallop.   Pulmonary:      Effort: Pulmonary effort is normal. No respiratory distress.      Breath sounds: Normal breath sounds. No stridor. No wheezing or rales.   Abdominal:      General: Abdomen is flat. Bowel sounds are normal. There is no distension.      Palpations: Abdomen is soft.      Tenderness: There is no abdominal tenderness.   Musculoskeletal:      Right lower leg: Edema present.      Left lower leg: Edema present.   Skin:     General: Skin is warm and dry.   Neurological:      General: No focal deficit present.      Mental Status: She is alert and oriented to person, place, and time.          Additional Data:     Labs:  Results from last 7 days   Lab Units 02/28/24  0404   WBC Thousand/uL 4.35   HEMOGLOBIN g/dL 7.3*   HEMATOCRIT % 23.0*   PLATELETS Thousands/uL 90*   LYMPHO PCT % 5*   MONO PCT % 3*   EOS PCT % 1     Results from last 7 days   Lab Units 02/28/24  0404   SODIUM mmol/L 130*   POTASSIUM mmol/L 4.1   CHLORIDE mmol/L 96   CO2 mmol/L 27   BUN mg/dL 10   CREATININE mg/dL 0.43*   ANION GAP mmol/L 7   CALCIUM mg/dL 8.5   ALBUMIN g/dL 2.4*   TOTAL BILIRUBIN mg/dL 0.20   ALK PHOS U/L 89   ALT U/L 31   AST U/L 38   GLUCOSE RANDOM mg/dL 98     Results from last 7 days   Lab Units 02/25/24  1114   INR  1.42*             Results from last 7 days   Lab Units 02/28/24  0404 02/26/24  0510 02/25/24  1114   LACTIC ACID mmol/L  --   --  1.3   PROCALCITONIN ng/ml 0.26* 0.57* 0.52*       Lines/Drains:  Invasive Devices       Central Venous Catheter Line  Duration             Port A Cath 04/18/23 Right Subclavian  316 days              Peripheral Intravenous Line  Duration             Peripheral IV 02/25/24 Dorsal (posterior);Right Forearm 3 days              Drain  Duration             Pleural Effusion Long-Term Catheter 15.5 Fr. 309 days                    Central Line:  Goal for removal: N/A - Chronic PICC             Imaging: No pertinent imaging reviewed.    Recent Cultures (last 7 days):   Results from last 7 days   Lab Units 02/26/24  1418 02/26/24  1415 02/25/24  1606 02/25/24  1114   BLOOD CULTURE  No Growth at 24 hrs. No Growth at 24 hrs.  --  Staphylococcus epidermidis*  No Growth at 48 hrs.   GRAM STAIN RESULT   --   --   --  Gram positive cocci in clusters*   LEGIONELLA URINARY ANTIGEN   --   --  Negative  --        Last 24 Hours Medication List:   Current Facility-Administered Medications   Medication Dose Route Frequency Provider Last Rate    acetaminophen  650 mg Oral Q6H PRN Modesta Bentley PA-C      Albumin 25%  25 g Intravenous Once John Muñiz PA-C      apixaban  5 mg Oral BID Modesta Bentley PA-C      atorvastatin  20 mg Oral Daily With Dinner Modesta Bentley PA-C      buPROPion  300 mg Oral Daily Modesta Bentley PA-C      cefTRIAXone  1,000 mg Intravenous Q24H Modesta Bentley PA-C 1,000 mg (02/27/24 1626)    diphenhydrAMINE  25 mg Oral HS Modesta Bentley PA-C      ferrous sulfate  325 mg Oral Daily With Breakfast Kala Arita PA-C      fluticasone-vilanterol  1 puff Inhalation Daily Modesta Bentley PA-C      levalbuterol  0.63 mg Nebulization Q8H PRN Kala Arita PA-C      lidocaine   Topical Daily PRN Modesta Bentley PA-C      LORazepam  0.5 mg Oral Daily PRN Modesta Bentley PA-C      losartan  100 mg Oral Daily Modesta Bentlye PA-C      metoprolol tartrate  25 mg Oral Q12H [facility] Modesta Bentley PA-C      multivitamin stress formula  1 tablet Oral Daily Modesta Bentley PA-C      oxyCODONE  5 mg Oral Q4H PRN Marilyn Gaspar  JOHN      oxyCODONE  2.5 mg Oral Q4H PRN Marilyn Gaspar PA-C      potassium chloride  40 mEq Oral Daily Modesta Bentley PA-C      sertraline  200 mg Oral Daily Modesta Bentley PA-C      torsemide  20 mg Oral Daily Kala Arita PA-C      vancomycin  1,000 mg Intravenous Q12H Shani Hoffman PA-C 1,000 mg (02/28/24 1013)    verapamil  240 mg Oral HS Modesta Bentley PA-C          Today, Patient Was Seen By: John Muñiz PA-C    **Please Note: This note may have been constructed using a voice recognition system.**

## 2024-02-28 NOTE — CASE MANAGEMENT
Case Management Discharge Planning Note    Patient name Kathi Ferris  Location /-01 MRN [id number]  : 1960 Date 2024       Current Admission Date: 2024  Current Admission Diagnosis:Acute on chronic respiratory failure with hypoxia (HCC)   Patient Active Problem List    Diagnosis Date Noted    Anemia 2024    Hypervolemia 2024    Acute on chronic respiratory failure with hypoxia (HCC) 2024    Ascites 2024    Pneumonia 2024    Ambulatory dysfunction 2024    Chronic respiratory failure with hypoxia (HCC) 10/24/2023    Generalized anxiety disorder 10/06/2023    SIRS (systemic inflammatory response syndrome) ([facility]) 2023    Hyponatremia 2023    Palliative care patient 2023    Malignant pleural effusion 2023    Pulmonary embolism ([facility]) 2023    CINV (chemotherapy-induced nausea and vomiting) 2023    Non-small cell cancer of right lung (HCC) 2023    Closed fracture of multiple ribs of left side 2022    Cigarette nicotine dependence in remission 2022    COPD (chronic obstructive pulmonary disease) ([facility]) 2022    Hypercholesterolemia 2022    Essential hypertension 2022    Adhesive capsulitis of left shoulder 2021    Impingement syndrome of left shoulder 2021      LOS (days): 3  Geometric Mean LOS (GMLOS) (days):   Days to GMLOS:     OBJECTIVE:  Risk of Unplanned Readmission Score: 39.15         Current admission status: Inpatient   Preferred Pharmacy:   [id number] [place] - [address]  [address]  [place] [address]  Phone: [phone] Fax: [phone]    [facility] [place] - [address]  [address]  [place] [address]  Phone: [phone] Fax: [phone]    Primary Care Provider: Keyon Miller MD    Primary Insurance: BLUE CROSS  Secondary Insurance:  MEDICARE    DISCHARGE DETAILS:       IMM Given (Date):: 02/28/24  IMM Given to:: Patient       Met with pt and her son at bedside to give IMM. Pt signed at 2:32pm.

## 2024-02-29 ENCOUNTER — HOME CARE VISIT (OUTPATIENT)
Dept: HOME HEALTH SERVICES | Facility: HOME HEALTHCARE | Age: 64
End: 2024-02-29
Payer: COMMERCIAL

## 2024-02-29 LAB
ANION GAP SERPL CALCULATED.3IONS-SCNC: 9 MMOL/L
BUN SERPL-MCNC: 9 MG/DL (ref 5–25)
CALCIUM SERPL-MCNC: 8.4 MG/DL (ref 8.4–10.2)
CHLORIDE SERPL-SCNC: 97 MMOL/L (ref 96–108)
CO2 SERPL-SCNC: 24 MMOL/L (ref 21–32)
CREAT SERPL-MCNC: 0.53 MG/DL (ref 0.6–1.3)
ERYTHROCYTE [DISTWIDTH] IN BLOOD BY AUTOMATED COUNT: 19.7 % (ref 11.6–15.1)
GFR SERPL CREATININE-BSD FRML MDRD: 99 ML/MIN/1.73SQ M
GLUCOSE SERPL-MCNC: 99 MG/DL (ref 65–140)
HCT VFR BLD AUTO: 23.3 % (ref 34.8–46.1)
HGB BLD-MCNC: 7.3 G/DL (ref 11.5–15.4)
MCH RBC QN AUTO: 27.4 PG (ref 26.8–34.3)
MCHC RBC AUTO-ENTMCNC: 31.3 G/DL (ref 31.4–37.4)
MCV RBC AUTO: 88 FL (ref 82–98)
NRBC BLD AUTO-RTO: 0 /100 WBCS
PLATELET # BLD AUTO: 70 THOUSANDS/UL (ref 149–390)
PMV BLD AUTO: 9.5 FL (ref 8.9–12.7)
POTASSIUM SERPL-SCNC: 4 MMOL/L (ref 3.5–5.3)
PROCALCITONIN SERPL-MCNC: 0.25 NG/ML
RBC # BLD AUTO: 2.66 MILLION/UL (ref 3.81–5.12)
SODIUM SERPL-SCNC: 130 MMOL/L (ref 135–147)
WBC # BLD AUTO: 6.3 THOUSAND/UL (ref 4.31–10.16)

## 2024-02-29 PROCEDURE — 99233 SBSQ HOSP IP/OBS HIGH 50: CPT | Performed by: PHYSICIAN ASSISTANT

## 2024-02-29 PROCEDURE — 85027 COMPLETE CBC AUTOMATED: CPT | Performed by: PHYSICIAN ASSISTANT

## 2024-02-29 PROCEDURE — 99232 SBSQ HOSP IP/OBS MODERATE 35: CPT | Performed by: PHYSICIAN ASSISTANT

## 2024-02-29 PROCEDURE — 80048 BASIC METABOLIC PNL TOTAL CA: CPT | Performed by: PHYSICIAN ASSISTANT

## 2024-02-29 PROCEDURE — 84145 PROCALCITONIN (PCT): CPT | Performed by: PHYSICIAN ASSISTANT

## 2024-02-29 RX ORDER — ALBUMIN (HUMAN) 12.5 G/50ML
25 SOLUTION INTRAVENOUS ONCE
Status: COMPLETED | OUTPATIENT
Start: 2024-02-29 | End: 2024-02-29

## 2024-02-29 RX ORDER — LIDOCAINE 40 MG/G
CREAM TOPICAL DAILY PRN
Status: DISCONTINUED | OUTPATIENT
Start: 2024-02-29 | End: 2024-03-01 | Stop reason: HOSPADM

## 2024-02-29 RX ORDER — CEFTRIAXONE 1 G/50ML
1000 INJECTION, SOLUTION INTRAVENOUS EVERY 24 HOURS
Status: COMPLETED | OUTPATIENT
Start: 2024-03-01 | End: 2024-03-01

## 2024-02-29 RX ORDER — ALBUMIN (HUMAN) 12.5 G/50ML
25 SOLUTION INTRAVENOUS ONCE
Status: COMPLETED | OUTPATIENT
Start: 2024-02-29 | End: 2024-03-01

## 2024-02-29 RX ADMIN — DIPHENHYDRAMINE HYDROCHLORIDE 25 MG: 25 TABLET ORAL at 21:02

## 2024-02-29 RX ADMIN — SERTRALINE 200 MG: 100 TABLET, FILM COATED ORAL at 09:12

## 2024-02-29 RX ADMIN — ALBUMIN (HUMAN) 25 G: 0.25 INJECTION, SOLUTION INTRAVENOUS at 15:28

## 2024-02-29 RX ADMIN — APIXABAN 5 MG: 5 TABLET, FILM COATED ORAL at 17:42

## 2024-02-29 RX ADMIN — FLUTICASONE FUROATE AND VILANTEROL TRIFENATATE 1 PUFF: 200; 25 POWDER RESPIRATORY (INHALATION) at 09:12

## 2024-02-29 RX ADMIN — APIXABAN 5 MG: 5 TABLET, FILM COATED ORAL at 09:12

## 2024-02-29 RX ADMIN — LORAZEPAM 0.5 MG: 0.5 TABLET ORAL at 21:02

## 2024-02-29 RX ADMIN — TORSEMIDE 20 MG: 20 TABLET ORAL at 09:12

## 2024-02-29 RX ADMIN — ALBUMIN (HUMAN) 25 G: 0.25 INJECTION, SOLUTION INTRAVENOUS at 11:52

## 2024-02-29 RX ADMIN — BUPROPION HYDROCHLORIDE 300 MG: 300 TABLET, EXTENDED RELEASE ORAL at 09:12

## 2024-02-29 RX ADMIN — B-COMPLEX W/ C & FOLIC ACID TAB 1 TABLET: TAB at 09:12

## 2024-02-29 RX ADMIN — LOSARTAN POTASSIUM 50 MG: 50 TABLET, FILM COATED ORAL at 09:12

## 2024-02-29 RX ADMIN — CEFTRIAXONE 1000 MG: 1 INJECTION, SOLUTION INTRAVENOUS at 16:15

## 2024-02-29 RX ADMIN — METOPROLOL TARTRATE 25 MG: 25 TABLET, FILM COATED ORAL at 09:12

## 2024-02-29 RX ADMIN — ATORVASTATIN CALCIUM 20 MG: 20 TABLET, FILM COATED ORAL at 16:51

## 2024-02-29 RX ADMIN — POTASSIUM CHLORIDE 40 MEQ: 1500 TABLET, EXTENDED RELEASE ORAL at 09:12

## 2024-02-29 RX ADMIN — FERROUS SULFATE TAB 325 MG (65 MG ELEMENTAL FE) 325 MG: 325 (65 FE) TAB at 09:12

## 2024-02-29 RX ADMIN — ACETAMINOPHEN 650 MG: 325 TABLET ORAL at 15:29

## 2024-02-29 NOTE — PLAN OF CARE
Problem: PAIN - ADULT  Goal: Verbalizes/displays adequate comfort level or baseline comfort level  Description: Interventions:  - Encourage patient to monitor pain and request assistance  - Assess pain using appropriate pain scale  - Administer analgesics based on type and severity of pain and evaluate response  - Implement non-pharmacological measures as appropriate and evaluate response  - Consider cultural and social influences on pain and pain management  - Notify physician/advanced practitioner if interventions unsuccessful or patient reports new pain  Outcome: Progressing     Problem: INFECTION - ADULT  Goal: Absence or prevention of progression during hospitalization  Description: INTERVENTIONS:  - Assess and monitor for signs and symptoms of infection  - Monitor lab/diagnostic results  - Monitor all insertion sites, i.e. indwelling lines, tubes, and drains  - Monitor endotracheal if appropriate and nasal secretions for changes in amount and color  - [place] appropriate cooling/warming therapies per order  - Administer medications as ordered  - Instruct and encourage patient and family to use good hand hygiene technique  - Identify and instruct in appropriate isolation precautions for identified infection/condition  Outcome: Progressing  Goal: Absence of fever/infection during neutropenic period  Description: INTERVENTIONS:  - Monitor WBC    Outcome: Progressing     Problem: SAFETY ADULT  Goal: Patient will remain free of falls  Description: INTERVENTIONS:  - Educate patient/family on patient safety including physical limitations  - Instruct patient to call for assistance with activity   - Consult OT/PT to assist with strengthening/mobility   - Keep Call bell within reach  - Keep bed low and locked with side rails adjusted as appropriate  - Keep care items and personal belongings within reach  - Initiate and maintain comfort rounds  - Make Fall Risk Sign visible to staff  - Offer Toileting every 2 Hours,  in advance of need  - Initiate/Maintain bed alarm  - Obtain necessary fall risk management equipment:   - Apply yellow socks and bracelet for high fall risk patients  - Consider moving patient to room near nurses station  Outcome: Progressing  Goal: Maintain or return to baseline ADL function  Description: INTERVENTIONS:  -  Assess patient's ability to carry out ADLs; assess patient's baseline for ADL function and identify physical deficits which impact ability to perform ADLs (bathing, care of mouth/teeth, toileting, grooming, dressing, etc.)  - Assess/evaluate cause of self-care deficits   - Assess range of motion  - Assess patient's mobility; develop plan if impaired  - Assess patient's need for assistive devices and provide as appropriate  - Encourage maximum independence but intervene and supervise when necessary  - Involve family in performance of ADLs  - Assess for home care needs following discharge   - Consider OT consult to assist with ADL evaluation and planning for discharge  - Provide patient education as appropriate  Outcome: Progressing  Goal: Maintains/Returns to pre admission functional level  Description: INTERVENTIONS:  - Perform AM-PAC 6 Click Basic Mobility/ Daily Activity assessment daily.  - Set and communicate daily mobility goal to care team and patient/family/caregiver.   - Collaborate with rehabilitation services on mobility goals if consulted  - Perform Range of Motion 2 times a day.  - Reposition patient every 2 hours.  - Dangle patient 2 times a day  - Stand patient 2 times a day  - Ambulate patient 2 times a day  - Out of bed to chair 2 times a day   - Out of bed for meals 2 times a day  - Out of bed for toileting  - Record patient progress and toleration of activity level   Outcome: Progressing     Problem: DISCHARGE PLANNING  Goal: Discharge to home or other facility with appropriate resources  Description: INTERVENTIONS:  - Identify barriers to discharge w/patient and caregiver  -  Arrange for needed discharge resources and transportation as appropriate  - Identify discharge learning needs (meds, wound care, etc.)  - Arrange for interpretive services to assist at discharge as needed  - Refer to Case Management Department for coordinating discharge planning if the patient needs post-hospital services based on physician/advanced practitioner order or complex needs related to functional status, cognitive ability, or social support system  Outcome: Progressing     Problem: Knowledge Deficit  Goal: Patient/family/caregiver demonstrates understanding of disease process, treatment plan, medications, and discharge instructions  Description: Complete learning assessment and assess knowledge base.  Interventions:  - Provide teaching at level of understanding  - Provide teaching via preferred learning methods  Outcome: Progressing     Problem: Nutrition/Hydration-ADULT  Goal: Nutrient/Hydration intake appropriate for improving, restoring or maintaining nutritional needs  Description: Monitor and assess patient's nutrition/hydration status for malnutrition. Collaborate with interdisciplinary team and initiate plan and interventions as ordered.  Monitor patient's weight and dietary intake as ordered or per policy. Utilize nutrition screening tool and intervene as necessary. Determine patient's food preferences and provide high-protein, high-caloric foods as appropriate.     INTERVENTIONS:  - Monitor oral intake, urinary output, labs, and treatment plans  - Assess nutrition and hydration status and recommend course of action  - Evaluate amount of meals eaten  - Assist patient with eating if necessary   - Allow adequate time for meals  - Recommend/ encourage appropriate diets, oral nutritional supplements, and vitamin/mineral supplements  - Order, calculate, and assess calorie counts as needed  - Recommend, monitor, and adjust tube feedings and TPN/PPN based on assessed needs  - Assess need for intravenous  fluids  - Provide specific nutrition/hydration education as appropriate  - Include patient/family/caregiver in decisions related to nutrition  Outcome: Progressing     Problem: CARDIOVASCULAR - ADULT  Goal: Maintains optimal cardiac output and hemodynamic stability  Description: INTERVENTIONS:  - Monitor I/O, vital signs and rhythm  - Monitor for S/S and trends of decreased cardiac output  - Administer and titrate ordered vasoactive medications to optimize hemodynamic stability  - Assess quality of pulses, skin color and temperature  - Assess for signs of decreased coronary artery perfusion  - Instruct patient to report change in severity of symptoms  Outcome: Progressing     Problem: GASTROINTESTINAL - ADULT  Goal: Minimal or absence of nausea and/or vomiting  Description: INTERVENTIONS:  - Administer IV fluids if ordered to ensure adequate hydration  - Maintain NPO status until nausea and vomiting are resolved  - Nasogastric tube if ordered  - Administer ordered antiemetic medications as needed  - Provide nonpharmacologic comfort measures as appropriate  - Advance diet as tolerated, if ordered  - Consider nutrition services referral to assist patient with adequate nutrition and appropriate food choices  Outcome: Progressing  Goal: Maintains or returns to baseline bowel function  Description: INTERVENTIONS:  - Assess bowel function  - Encourage oral fluids to ensure adequate hydration  - Administer IV fluids if ordered to ensure adequate hydration  - Administer ordered medications as needed  - Encourage mobilization and activity  - Consider nutritional services referral to assist patient with adequate nutrition and appropriate food choices  Outcome: Progressing  Goal: Maintains adequate nutritional intake  Description: INTERVENTIONS:  - Monitor percentage of each meal consumed  - Identify factors contributing to decreased intake, treat as appropriate  - Assist with meals as needed  - Monitor I&O, weight, and lab  values if indicated  - Obtain nutrition services referral as needed  Outcome: Progressing  Goal: Establish and maintain optimal ostomy function  Description: INTERVENTIONS:  - Assess bowel function  - Encourage oral fluids to ensure adequate hydration  - Administer IV fluids if ordered to ensure adequate hydration   - Administer ordered medications as needed  - Encourage mobilization and activity  - Nutrition services referral to assist patient with appropriate food choices  - Assess stoma site  - Consider wound care consult   Outcome: Progressing  Goal: Oral mucous membranes remain intact  Description: INTERVENTIONS  - Assess oral mucosa and hygiene practices  - Implement preventative oral hygiene regimen  - Implement oral medicated treatments as ordered  - Initiate Nutrition services referral as needed  Outcome: Progressing     Problem: METABOLIC, FLUID AND ELECTROLYTES - ADULT  Goal: Electrolytes maintained within normal limits  Description: INTERVENTIONS:  - Monitor labs and assess patient for signs and symptoms of electrolyte imbalances  - Administer electrolyte replacement as ordered  - Monitor response to electrolyte replacements, including repeat lab results as appropriate  - Instruct patient on fluid and nutrition as appropriate  Outcome: Progressing  Goal: Fluid balance maintained  Description: INTERVENTIONS:  - Monitor labs   - Monitor I/O and WT  - Instruct patient on fluid and nutrition as appropriate  - Assess for signs & symptoms of volume excess or deficit  Outcome: Progressing     Problem: MUSCULOSKELETAL - ADULT  Goal: Maintain proper alignment of affected body part  Description: INTERVENTIONS:  - Support, maintain and protect limb and body alignment  - Provide patient/ family with appropriate education  Outcome: Progressing     Problem: Prexisting or High Potential for Compromised Skin Integrity  Goal: Skin integrity is maintained or improved  Description: INTERVENTIONS:  - Identify patients at  risk for skin breakdown  - Assess and monitor skin integrity  - Assess and monitor nutrition and hydration status  - Monitor labs   - Assess for incontinence   - Turn and reposition patient  - Assist with mobility/ambulation  - Relieve pressure over bony prominences  - Avoid friction and shearing  - Provide appropriate hygiene as needed including keeping skin clean and dry  - Evaluate need for skin moisturizer/barrier cream  - Collaborate with interdisciplinary team   - Patient/family teaching  - Consider wound care consult   Outcome: Progressing

## 2024-02-29 NOTE — ASSESSMENT & PLAN NOTE
Recent Labs     02/27/24  1845 02/28/24  0404 02/29/24  0524   HGB 7.5* 7.3* 7.3*     Baseline Hgb 9-10  Hgb remains borderline at 7.2 this morning  No overt evidence of bleeding   Suspect anemia of chronic disease/chemotherapy induced  Iron 33, Ferritin 1800  Folate and B12 WNL  Heme/onc following   Suspect chemotherapy contributing to cytopenias   Maintain Hgb > 7  Continue to trend

## 2024-02-29 NOTE — ASSESSMENT & PLAN NOTE
Dyspneic, gross ascites, & 2+ LE edema   ECHO 01/2024: EF 60%; grade 1 diastolic dysfunction  CT Chest- Small to moderate right pleural effusion with Pleurx catheter in place with moderate left effusion unchanged  Home diuretic: Lasix 20 q day (2 days PTA, pt switched to torsemide 20 mg q day)    Overall suspect pleural effusion and ascites most likely related to malignancy > CHF  LE edema also more likely due to hypoalbuminemia/3rd spacing (album 2.7 on admission)    S/P IV lasix 40 mg BID without significant improvement in LE edema   DC IV lasix, likely less effective given low albumin  Will give IV bumex 1 mg tonight and resume PO torsemide tomorrow   SBP is currently stable at 115  Improved with IV bumex, however patient is now describing orthostasis. Give additional albumin on 2/28.  Continue albumin, decreased losartan from 100mg to 50mg daily  DANIEL stockings

## 2024-02-29 NOTE — PHYSICAL THERAPY NOTE
PHYSICAL THERAPY DISCHARGE NOTE          Patient Name: Kathi Ferris  Today's Date: 2/29/2024 02/29/24 1601   Note Type   Note type Cancelled Session   Additional Comments Per updated PC note, pt will be  DC'ing home on hospice, will DC PT orders       Elizabeth Chávez, PT, DPT

## 2024-02-29 NOTE — CASE MANAGEMENT
Case Management Progress Note    Patient name Kathi Ferris  Location /-01 MRN [id number]  : 1960 Date 2024       LOS (days): 4  Geometric Mean LOS (GMLOS) (days):   Days to GMLOS:        OBJECTIVE:        Current admission status: Inpatient  Preferred Pharmacy:   [id number] [place] - [address]  [address]  [place] [address]  Phone: [phone] Fax: [phone]    [facility] [place] - [address]  [facility]  [place] [address]  Phone: [phone] Fax: [phone]    Primary Care Provider: Keyon Miller MD    Primary Insurance: BLUE CROSS  Secondary Insurance: MEDICARE    PROGRESS NOTE:  Spoke with Palliative Care, Pt is interested in hospice and hospice house. Referral sent to [facility] for hospice house eval via [facility]. CM to follow.         10/21/2022 @ 8:54 am-        Returned call to patient's father regarding denial for patient's low protein foods through [employer] under new insurance plan ([employer]). Patient's father inquired whether writer would be able to do a [id number] in effort to appeal denial. Writer will assist with this and encouraged patient's father to send over denial paperwork so we are able to proceed with [id number]. Patient's father verbalized understanding and appreciation. Will await paperwork and then attempt [id number].    BREN Byrne, CNP  Pediatric Genetics/Metabolism  [facility]  Direct phone: [phone]

## 2024-02-29 NOTE — ASSESSMENT & PLAN NOTE
CT chest: suspected left lung superimposed pneumonia   Urine antigens negative  Continue Rocephin + vanc   MRSA neg, d/c vano. Continue rocephin (D5)

## 2024-02-29 NOTE — ASSESSMENT & PLAN NOTE
Stage 4, currently undergoing palliative chemotherapy (last 2/22)  Oncology and Palliative care consulted   Patient with multiple recent admissions, overall aware of her prognosis/advanced malignancy  Pt would like to pursue hospice at this point. Hospice consulted.  For now continue to treat PNA and maintain hgb > 7

## 2024-02-29 NOTE — PLAN OF CARE
Problem: PAIN - ADULT  Goal: Verbalizes/displays adequate comfort level or baseline comfort level  Description: Interventions:  - Encourage patient to monitor pain and request assistance  - Assess pain using appropriate pain scale  - Administer analgesics based on type and severity of pain and evaluate response  - Implement non-pharmacological measures as appropriate and evaluate response  - Consider cultural and social influences on pain and pain management  - Notify physician/advanced practitioner if interventions unsuccessful or patient reports new pain  Outcome: Progressing     Problem: INFECTION - ADULT  Goal: Absence or prevention of progression during hospitalization  Description: INTERVENTIONS:  - Assess and monitor for signs and symptoms of infection  - Monitor lab/diagnostic results  - Monitor all insertion sites, i.e. indwelling lines, tubes, and drains  - Monitor endotracheal if appropriate and nasal secretions for changes in amount and color  - [place] appropriate cooling/warming therapies per order  - Administer medications as ordered  - Instruct and encourage patient and family to use good hand hygiene technique  - Identify and instruct in appropriate isolation precautions for identified infection/condition  Outcome: Progressing  Goal: Absence of fever/infection during neutropenic period  Description: INTERVENTIONS:  - Monitor WBC    Outcome: Progressing     Problem: SAFETY ADULT  Goal: Patient will remain free of falls  Description: INTERVENTIONS:  - Educate patient/family on patient safety including physical limitations  - Instruct patient to call for assistance with activity   - Consult OT/PT to assist with strengthening/mobility   - Keep Call bell within reach  - Keep bed low and locked with side rails adjusted as appropriate  - Keep care items and personal belongings within reach  - Initiate and maintain comfort rounds  - Make Fall Risk Sign visible to staff  - Offer Toileting every 2 Hours,  in advance of need  - Initiate/Maintain bed alarm  - Obtain necessary fall risk management equipment:   - Apply yellow socks and bracelet for high fall risk patients  - Consider moving patient to room near nurses station  Outcome: Progressing  Goal: Maintain or return to baseline ADL function  Description: INTERVENTIONS:  -  Assess patient's ability to carry out ADLs; assess patient's baseline for ADL function and identify physical deficits which impact ability to perform ADLs (bathing, care of mouth/teeth, toileting, grooming, dressing, etc.)  - Assess/evaluate cause of self-care deficits   - Assess range of motion  - Assess patient's mobility; develop plan if impaired  - Assess patient's need for assistive devices and provide as appropriate  - Encourage maximum independence but intervene and supervise when necessary  - Involve family in performance of ADLs  - Assess for home care needs following discharge   - Consider OT consult to assist with ADL evaluation and planning for discharge  - Provide patient education as appropriate  Outcome: Progressing  Goal: Maintains/Returns to pre admission functional level  Description: INTERVENTIONS:  - Perform AM-PAC 6 Click Basic Mobility/ Daily Activity assessment daily.  - Set and communicate daily mobility goal to care team and patient/family/caregiver.   - Collaborate with rehabilitation services on mobility goals if consulted  - Perform Range of Motion 2 times a day.  - Reposition patient every 2 hours.  - Dangle patient 2 times a day  - Stand patient 2 times a day  - Ambulate patient 2 times a day  - Out of bed to chair 2 times a day   - Out of bed for meals 2 times a day  - Out of bed for toileting  - Record patient progress and toleration of activity level   Outcome: Progressing     Problem: DISCHARGE PLANNING  Goal: Discharge to home or other facility with appropriate resources  Description: INTERVENTIONS:  - Identify barriers to discharge w/patient and caregiver  -  Arrange for needed discharge resources and transportation as appropriate  - Identify discharge learning needs (meds, wound care, etc.)  - Arrange for interpretive services to assist at discharge as needed  - Refer to Case Management Department for coordinating discharge planning if the patient needs post-hospital services based on physician/advanced practitioner order or complex needs related to functional status, cognitive ability, or social support system  Outcome: Progressing     Problem: Knowledge Deficit  Goal: Patient/family/caregiver demonstrates understanding of disease process, treatment plan, medications, and discharge instructions  Description: Complete learning assessment and assess knowledge base.  Interventions:  - Provide teaching at level of understanding  - Provide teaching via preferred learning methods  Outcome: Progressing     Problem: Nutrition/Hydration-ADULT  Goal: Nutrient/Hydration intake appropriate for improving, restoring or maintaining nutritional needs  Description: Monitor and assess patient's nutrition/hydration status for malnutrition. Collaborate with interdisciplinary team and initiate plan and interventions as ordered.  Monitor patient's weight and dietary intake as ordered or per policy. Utilize nutrition screening tool and intervene as necessary. Determine patient's food preferences and provide high-protein, high-caloric foods as appropriate.     INTERVENTIONS:  - Monitor oral intake, urinary output, labs, and treatment plans  - Assess nutrition and hydration status and recommend course of action  - Evaluate amount of meals eaten  - Assist patient with eating if necessary   - Allow adequate time for meals  - Recommend/ encourage appropriate diets, oral nutritional supplements, and vitamin/mineral supplements  - Order, calculate, and assess calorie counts as needed  - Recommend, monitor, and adjust tube feedings and TPN/PPN based on assessed needs  - Assess need for intravenous  fluids  - Provide specific nutrition/hydration education as appropriate  - Include patient/family/caregiver in decisions related to nutrition  Outcome: Progressing     Problem: CARDIOVASCULAR - ADULT  Goal: Maintains optimal cardiac output and hemodynamic stability  Description: INTERVENTIONS:  - Monitor I/O, vital signs and rhythm  - Monitor for S/S and trends of decreased cardiac output  - Administer and titrate ordered vasoactive medications to optimize hemodynamic stability  - Assess quality of pulses, skin color and temperature  - Assess for signs of decreased coronary artery perfusion  - Instruct patient to report change in severity of symptoms  Outcome: Progressing     Problem: GASTROINTESTINAL - ADULT  Goal: Minimal or absence of nausea and/or vomiting  Description: INTERVENTIONS:  - Administer IV fluids if ordered to ensure adequate hydration  - Maintain NPO status until nausea and vomiting are resolved  - Nasogastric tube if ordered  - Administer ordered antiemetic medications as needed  - Provide nonpharmacologic comfort measures as appropriate  - Advance diet as tolerated, if ordered  - Consider nutrition services referral to assist patient with adequate nutrition and appropriate food choices  Outcome: Progressing  Goal: Maintains or returns to baseline bowel function  Description: INTERVENTIONS:  - Assess bowel function  - Encourage oral fluids to ensure adequate hydration  - Administer IV fluids if ordered to ensure adequate hydration  - Administer ordered medications as needed  - Encourage mobilization and activity  - Consider nutritional services referral to assist patient with adequate nutrition and appropriate food choices  Outcome: Progressing  Goal: Maintains adequate nutritional intake  Description: INTERVENTIONS:  - Monitor percentage of each meal consumed  - Identify factors contributing to decreased intake, treat as appropriate  - Assist with meals as needed  - Monitor I&O, weight, and lab  values if indicated  - Obtain nutrition services referral as needed  Outcome: Progressing  Goal: Establish and maintain optimal ostomy function  Description: INTERVENTIONS:  - Assess bowel function  - Encourage oral fluids to ensure adequate hydration  - Administer IV fluids if ordered to ensure adequate hydration   - Administer ordered medications as needed  - Encourage mobilization and activity  - Nutrition services referral to assist patient with appropriate food choices  - Assess stoma site  - Consider wound care consult   Outcome: Progressing  Goal: Oral mucous membranes remain intact  Description: INTERVENTIONS  - Assess oral mucosa and hygiene practices  - Implement preventative oral hygiene regimen  - Implement oral medicated treatments as ordered  - Initiate Nutrition services referral as needed  Outcome: Progressing     Problem: METABOLIC, FLUID AND ELECTROLYTES - ADULT  Goal: Electrolytes maintained within normal limits  Description: INTERVENTIONS:  - Monitor labs and assess patient for signs and symptoms of electrolyte imbalances  - Administer electrolyte replacement as ordered  - Monitor response to electrolyte replacements, including repeat lab results as appropriate  - Instruct patient on fluid and nutrition as appropriate  Outcome: Progressing  Goal: Fluid balance maintained  Description: INTERVENTIONS:  - Monitor labs   - Monitor I/O and WT  - Instruct patient on fluid and nutrition as appropriate  - Assess for signs & symptoms of volume excess or deficit  Outcome: Progressing     Problem: MUSCULOSKELETAL - ADULT  Goal: Maintain proper alignment of affected body part  Description: INTERVENTIONS:  - Support, maintain and protect limb and body alignment  - Provide patient/ family with appropriate education  Outcome: Progressing     Problem: Prexisting or High Potential for Compromised Skin Integrity  Goal: Skin integrity is maintained or improved  Description: INTERVENTIONS:  - Identify patients at  risk for skin breakdown  - Assess and monitor skin integrity  - Assess and monitor nutrition and hydration status  - Monitor labs   - Assess for incontinence   - Turn and reposition patient  - Assist with mobility/ambulation  - Relieve pressure over bony prominences  - Avoid friction and shearing  - Provide appropriate hygiene as needed including keeping skin clean and dry  - Evaluate need for skin moisturizer/barrier cream  - Collaborate with interdisciplinary team   - Patient/family teaching  - Consider wound care consult   Outcome: Progressing

## 2024-02-29 NOTE — HOSPICE NOTE
Patient is approved for inpatient hospice. Liaison notified  Mynor and he would like to visit patient this evening to discuss.  We can get consents signed and transfer her tomorrow if they are in agreement.  Will follow up with  and CM in the AM.

## 2024-02-29 NOTE — HOSPICE NOTE
Hospice referral received this afternoon.  Liaison reached out to  who cannot care for patient at home on hospice.  Liaison awaiting private insurance verification for inpatient coverage.  Will update CM when clear.  Will also call  back with update.

## 2024-02-29 NOTE — OCCUPATIONAL THERAPY NOTE
Occupational Therapy Cx Note     Patient Name: Kathi Ferris  Today's Date: 2/29/2024  Problem List  Principal Problem:    Acute on chronic respiratory failure with hypoxia (HCC)  Active Problems:    COPD (chronic obstructive pulmonary disease) (HCC)    Essential hypertension    Non-small cell cancer of right lung (HCC)    Pulmonary embolism (HCC)    Hyponatremia    SIRS (systemic inflammatory response syndrome) (HCC)    Ascites    Pneumonia    Anemia    Hypervolemia              02/29/24 1312   OT Last Visit   OT Visit Date 02/29/24   Note Type   Note type Cancelled Session   Additional Comments Pt to be DCing on hospice. Will DC OT orders.       Mercedes Pyle, OTR/L

## 2024-02-29 NOTE — PROGRESS NOTES
Progress Note - Palliative & Supportive Care  Kathi CHINCHILLA Barrington  63 y.o.  female  /-01   MRN: [id number]  Encounter: [id number]     Assessment/Problems actively addressed:  Encounter for palliative and supportive care  Goals of care encounter   Malignant pleural effusion  Stage IV non-small cell lung cancer  Ascites  Anemia  Cancer related pain    PLAN:  1. Symptom management  Kathi has minimal pain and has not required any doses of oxycodone this admission.  She has significant dyspnea with exertion.  Continue pleural catheter.    2. Goals of care  Neither Kathi nor her family is comfortable with the idea of her returning home as she does not have family at home to care for her (spouse works and is undergoing his own cancer treatments).  Her sons are coming from out of the country to be with her however she would prefer to not have family be her caretakers.  After discussion today, Kathi is interested in hospice referral to start this process. She is not interested in further treatment and is aware she is a poor candidate for further cancer-directed therapies.   I spoke with CM, primary team.     24 History  Chart reviewed before visit. Patient seen at bedside. Symptoms stable at the current time.     Decisional apparatus:  Patient is competent on exam today.  If competence is lost, patient's substitute decision maker would default to spouse by [place] Act 169.  Advance Directive/Living Will/POLST: documents are on file.     Review of Systems:   Review of Systems   Constitutional: Positive for decreased appetite and malaise/fatigue.   Cardiovascular:  Positive for dyspnea on exertion. Negative for chest pain.   Respiratory:  Positive for shortness of breath. Negative for hemoptysis.    Musculoskeletal:  Negative for arthritis and back pain.   Gastrointestinal:  Positive for anorexia. Negative for bloating, abdominal pain, nausea and vomiting.   Psychiatric/Behavioral:  Negative for altered mental  status, depression and hallucinations.      Medications    Current Facility-Administered Medications:     acetaminophen (TYLENOL) tablet 650 mg, 650 mg, Oral, Q6H PRN, Modesta Bentley PA-C    apixaban (ELIQUIS) tablet 5 mg, 5 mg, Oral, BID, Modesta Bentley PA-C, 5 mg at 02/29/24 0912    atorvastatin (LIPITOR) tablet 20 mg, 20 mg, Oral, Daily With Dinner, Modesta Bentley PA-C, 20 mg at 02/28/24 1708    buPROPion (WELLBUTRIN XL) 24 hr tablet 300 mg, 300 mg, Oral, Daily, Modesta Bentley PA-C, 300 mg at 02/29/24 0912    cefTRIAXone (ROCEPHIN) IVPB (premix in dextrose) 1,000 mg 50 mL, 1,000 mg, Intravenous, Q24H, Modesta Bentley PA-C, Last Rate: 100 mL/hr at 02/28/24 1708, 1,000 mg at 02/28/24 1708    diphenhydrAMINE (BENADRYL) tablet 25 mg, 25 mg, Oral, HS, Modesta Bentley PA-C, 25 mg at 02/28/24 2222    ferrous sulfate tablet 325 mg, 325 mg, Oral, Daily With Breakfast, Kala Arita PA-C, 325 mg at 02/29/24 0912    fluticasone-vilanterol 200-25 mcg/actuation 1 puff, 1 puff, Inhalation, Daily, Modesta Bentley PA-C, 1 puff at 02/29/24 0912    levalbuterol (XOPENEX) inhalation solution 0.63 mg, 0.63 mg, Nebulization, Q8H PRN, Kala Arita PA-C    lidocaine (LMX) 4 % cream, , Topical, Daily PRN, Modesta Bentley PA-C    LORazepam (ATIVAN) tablet 0.5 mg, 0.5 mg, Oral, Daily PRN, Modesta Bentley PA-C, 0.5 mg at 02/28/24 2222    losartan (COZAAR) tablet 50 mg, 50 mg, Oral, Daily, John Muñiz PA-C, 50 mg at 02/29/24 0912    metoprolol tartrate (LOPRESSOR) tablet 25 mg, 25 mg, Oral, Q12H ULISES, Modesta Bentley PA-C, 25 mg at 02/29/24 0912    multivitamin stress formula tablet 1 tablet, 1 tablet, Oral, Daily, Modesta Bentley PA-C, 1 tablet at 02/29/24 0912    oxyCODONE (ROXICODONE) IR tablet 5 mg, 5 mg, Oral, Q4H PRN, Marilyn Gaspar PA-C    oxyCODONE (ROXICODONE) split tablet 2.5 mg, 2.5 mg, Oral, Q4H PRN, Marilyn Gaspar PA-C    potassium  "chloride (Klor-Con M20) CR tablet 40 mEq, 40 mEq, Oral, Daily, Modesta Bentley PA-C, 40 mEq at 02/29/24 0912    sertraline (ZOLOFT) tablet 200 mg, 200 mg, Oral, Daily, Modesta Bentley PA-C, 200 mg at 02/29/24 0912    torsemide (DEMADEX) tablet 20 mg, 20 mg, Oral, Daily, Kala Arita PA-C, 20 mg at 02/29/24 0912    vancomycin (VANCOCIN) IVPB (premix in dextrose) 1,000 mg 200 mL, 1,000 mg, Intravenous, Q12H, Shani Hoffman PA-C, Last Rate: 200 mL/hr at 02/28/24 2221, 1,000 mg at 02/28/24 2221    verapamil (CALAN-SR) CR tablet 240 mg, 240 mg, Oral, HS, Modesta Bentley PA-C, 240 mg at 02/27/24 2121    Objective  /74 (BP Location: Right arm)   Pulse (!) 110   Temp 97.5 °F (36.4 °C) (Oral)   Resp 16   Ht 5' 5\" (1.651 m)   Wt 77 kg (169 lb 12.1 oz)   SpO2 96%   BMI 28.25 kg/m²     Physical Exam:   Physical Exam  Vitals and nursing note reviewed. Exam conducted with a chaperone present.   Constitutional:       General: She is not in acute distress.     Appearance: She is underweight. She is ill-appearing.   HENT:      Head: Normocephalic and atraumatic.   Eyes:      Conjunctiva/sclera: Conjunctivae normal.   Cardiovascular:      Rate and Rhythm: Normal rate.   Pulmonary:      Effort: Pulmonary effort is normal. No respiratory distress.   Musculoskeletal:         General: No swelling.      Cervical back: Neck supple.   Skin:     General: Skin is warm.      Coloration: Skin is pale.   Neurological:      Mental Status: She is alert and oriented to person, place, and time. Mental status is at baseline.   Psychiatric:         Mood and Affect: Mood normal.         Behavior: Behavior normal.       Lab Results: I have personally reviewed pertinent labs., CBC:   Lab Results   Component Value Date    WBC 6.30 02/29/2024    HGB 7.3 (L) 02/29/2024    HCT 23.3 (L) 02/29/2024    MCV 88 02/29/2024    PLT 70 (L) 02/29/2024    RBC 2.66 (L) 02/29/2024    MCH 27.4 02/29/2024    MCHC 31.3 (L) 02/29/2024    " "RDW 19.7 (H) 02/29/2024    MPV 9.5 02/29/2024    NRBC 0 02/29/2024   , CMP:   Lab Results   Component Value Date    SODIUM 130 (L) 02/29/2024    K 4.0 02/29/2024    CL 97 02/29/2024    CO2 24 02/29/2024    BUN 9 02/29/2024    CREATININE 0.53 (L) 02/29/2024    CALCIUM 8.4 02/29/2024    EGFR 99 02/29/2024     Imaging Studies: I have personally reviewed pertinent reports.  EKG, Pathology, and Other Studies: I have personally reviewed pertinent reports.    Counseling / Coordination of Care  Total floor / unit time spent today 45 minutes. Greater than 50% of total time was spent with the patient and / or family counseling and / or coordinating of care. A description of the counseling / coordination of care: reviewed chart, reviewed lab values, reviewed imaging, provided medical updates, discussed palliative care and symptom management, discussed hospice care and comfort care, discussed goals of care, assessed POA/HCA, provided supportive listening, provided anticipatory guidance, provided psychosocial and emotional support, assessed competency and decision-making, and facilitated interdisciplinary communication. Reviewed with [facility] team, RN and CM.    Marilyn Gaspar PA-C  Palliative & Supportive Care    Portions of this document may have been created using dictation software and as such some \"sound alike\" terms may have been generated by the system. Do not hesitate to contact me with any questions or clarifications.    "

## 2024-02-29 NOTE — ASSESSMENT & PLAN NOTE
Continue home regimen:   Metoprolol 25mg daily  Micardis 80mg daily (Substituted for losartan 100mg which was decreased to 50mg on 2/28 due to soft BP and to allow for further diuresis)  Verapamil 240mg daily

## 2024-02-29 NOTE — PROGRESS NOTES
Kathi Ferris is a 63 y.o. female who is currently ordered Vancomycin IV with management by the Pharmacy Consult service.  Relevant clinical data and objective / subjective history reviewed.  Vancomycin Assessment:  Indication and Goal AUC/Trough: Bacteremia (goal -600, trough >10), -600, trough >10  Clinical Status: stable  Micro:     Renal Function:  SCr:0.53 mg/dL  CrCl: 108 mL/min  Renal replacement: Not on dialysis  Days of Therapy: 4  Current Dose: 1000 mg q12h  Vancomycin Plan:  New Dosing: continue the same  Estimated AUC: 474 mcg*hr/mL  Estimated Trough: 13.9 mcg/mL  Next Level: with AM labs on 03/01  Renal Function Monitoring: Daily BMP and UOP  Pharmacy will continue to follow closely for s/sx of nephrotoxicity, infusion reactions and appropriateness of therapy.  BMP and CBC will be ordered per protocol. We will continue to follow the patient’s culture results and clinical progress daily.    Mary Sykes, Pharmacist

## 2024-02-29 NOTE — PROGRESS NOTES
[facility]  Progress Note  Name: Kathi Ferris I  MRN: [id number]  Unit/Bed#: -01 I Date of Admission: 2/25/2024   Date of Service: 2/29/2024 I Hospital Day: 4    Assessment/Plan   Hypervolemia  Assessment & Plan  Dyspneic, gross ascites, & 2+ LE edema   ECHO 01/2024: EF 60%; grade 1 diastolic dysfunction  CT Chest- Small to moderate right pleural effusion with Pleurx catheter in place with moderate left effusion unchanged  Home diuretic: Lasix 20 q day (2 days PTA, pt switched to torsemide 20 mg q day)    Overall suspect pleural effusion and ascites most likely related to malignancy > CHF  LE edema also more likely due to hypoalbuminemia/3rd spacing (album 2.7 on admission)    S/P IV lasix 40 mg BID without significant improvement in LE edema   DC IV lasix, likely less effective given low albumin  Will give IV bumex 1 mg tonight and resume PO torsemide tomorrow   SBP is currently stable at 115  Improved with IV bumex, however patient is now describing orthostasis. Give additional albumin on 2/28.  Continue albumin, decreased losartan from 100mg to 50mg daily  DANIEL stockings     Anemia  Assessment & Plan  Recent Labs     02/27/24  1845 02/28/24  0404 02/29/24  0524   HGB 7.5* 7.3* 7.3*     Baseline Hgb 9-10  Hgb remains borderline at 7.2 this morning  No overt evidence of bleeding   Suspect anemia of chronic disease/chemotherapy induced  Iron 33, Ferritin 1800  Folate and B12 WNL  Heme/onc following   Suspect chemotherapy contributing to cytopenias   Maintain Hgb > 7  Continue to trend       Pneumonia  Assessment & Plan  CT chest: suspected left lung superimposed pneumonia   Urine antigens negative  Continue Rocephin + vanc   MRSA neg, d/c vano. Continue rocephin (D5)    Ascites  Assessment & Plan  Etiology 2/2 malignancy  CT abd/pelvis:   Large amount of ascites increased from prior study with omental nodularity of carcinomatosis    IR consulted  S/P paracentesis 2/26 - >5L  removed     SIRS (systemic inflammatory response syndrome) ([facility])  Assessment & Plan  POA, criteria met w/ tachycardia, tachypnea, & leukocytosis   Lactic WNL  Procal elevated   SOI: PNA   UA without infection  Viral swab neg  Urine antigens neg  Initial BC 1/2 with staph epidermidis - suspect contaminant  Repeat BC x 2 negative @ 48hrs  Patient was initiated on vanc 2/26, now dc'd and on ceftriaxone monotherapy    Hyponatremia  Assessment & Plan  Na 130-132  S/P IV lasix without significant improvement  Suspect SIADH in setting of lung cancer/pulmonary process  stable    Pulmonary embolism (HCC)  Assessment & Plan  Admitted in early 02/2023 for PE  Maintained indefinitely on Eliquis 5 mg BID   Trend Hgb     Non-small cell cancer of right lung (HCC)  Assessment & Plan  Stage 4, currently undergoing palliative chemotherapy (last 2/22)  Oncology and Palliative care consulted   Patient with multiple recent admissions, overall aware of her prognosis/advanced malignancy  Pt would like to pursue hospice at this point. Hospice consulted.  For now continue to treat PNA and maintain hgb > 7    Essential hypertension  Assessment & Plan  Continue home regimen:   Metoprolol 25mg daily  Micardis 80mg daily (Substituted for losartan 100mg which was decreased to 50mg on 2/28 due to soft BP and to allow for further diuresis)  Verapamil 240mg daily     COPD (chronic obstructive pulmonary disease) ([facility])  Assessment & Plan  No acute exacerbation   Continue home regimen   Continue Advair, supplemental O2, and PRN bronchodilators    * Acute on chronic respiratory failure with hypoxia (HCC)  Assessment & Plan  Chronically on 3L in setting of NSCLC  Presented with SOB/AQUINO and Spo2 86% on 3L   Secondary to malignancy, pna - see individual problems   Patient now stable on chronic 3L at rest however still AQUINO   Repeat CXR unchanged               VTE Pharmacologic Prophylaxis: VTE Score: 6 High Risk (Score >/= 5) - Pharmacological DVT  Prophylaxis Ordered: enoxaparin (Lovenox). Sequential Compression Devices Ordered.    Mobility:   Basic Mobility Inpatient Raw Score: 18  JH-HLM Goal: 6: Walk 10 steps or more  JH-HLM Achieved: 4: Move to chair/commode  HLM Goal NOT achieved. Continue with multidisciplinary rounding and encourage appropriate mobility to improve upon HLM goals.    Patient Centered Rounds: I performed bedside rounds with nursing staff today.   Discussions with Specialists or Other Care Team Provider: Case management, palliative, hematology/onc    Education and Discussions with Family / Patient: Updated  () via phone.    Total Time Spent on Date of Encounter in care of patient: 55 mins. This time was spent on one or more of the following: performing physical exam; counseling and coordination of care; obtaining or reviewing history; documenting in the medical record; reviewing/ordering tests, medications or procedures; communicating with other healthcare professionals and discussing with patient's family/caregivers.    Current Length of Stay: 4 day(s)  Current Patient Status: Inpatient   Certification Statement: The patient will continue to require additional inpatient hospital stay due to hospice planning  Discharge Plan:  Pending hospice evaluation    Code Status: Level 3 - DNAR and DNI    Subjective:   No acute events overnight.  Patient further talked with palliative and opted to pursue hospice.  This morning, continues to have some lightheadedness upon standing, otherwise no acute complaints.    Objective:     Vitals:   Temp (24hrs), Av.8 °F (36.6 °C), Min:97.5 °F (36.4 °C), Max:98.2 °F (36.8 °C)    Temp:  [97.5 °F (36.4 °C)-98.2 °F (36.8 °C)] 97.5 °F (36.4 °C)  HR:  [] 110  Resp:  [16-18] 16  BP: ()/(51-74) 129/74  SpO2:  [92 %-96 %] 96 %  Body mass index is 28.25 kg/m².     Input and Output Summary (last 24 hours):     Intake/Output Summary (Last 24 hours) at 2024 0848  Last data filed at  2/29/2024 0601  Gross per 24 hour   Intake 240 ml   Output 400 ml   Net -160 ml       Physical Exam:   Physical Exam  Vitals and nursing note reviewed.   Constitutional:       Appearance: Normal appearance.   HENT:      Head: Normocephalic and atraumatic.      Mouth/Throat:      Mouth: Mucous membranes are moist.      Pharynx: Oropharynx is clear. No oropharyngeal exudate.   Eyes:      Extraocular Movements: Extraocular movements intact.   Cardiovascular:      Rate and Rhythm: Normal rate and regular rhythm.      Pulses: Normal pulses.      Heart sounds: Normal heart sounds. No murmur heard.     No friction rub. No gallop.   Pulmonary:      Effort: Pulmonary effort is normal. No respiratory distress.      Breath sounds: Normal breath sounds. No stridor. No wheezing or rales.   Abdominal:      General: Abdomen is flat. Bowel sounds are normal. There is no distension.      Palpations: Abdomen is soft.      Tenderness: There is no abdominal tenderness.   Musculoskeletal:      Right lower leg: Edema (Improved) present.      Left lower leg: Edema (Improved) present.   Skin:     General: Skin is warm and dry.   Neurological:      General: No focal deficit present.      Mental Status: She is alert and oriented to person, place, and time.          Additional Data:     Labs:  Results from last 7 days   Lab Units 02/29/24  0524 02/28/24  0404   WBC Thousand/uL 6.30 4.35   HEMOGLOBIN g/dL 7.3* 7.3*   HEMATOCRIT % 23.3* 23.0*   PLATELETS Thousands/uL 70* 90*   LYMPHO PCT %  --  5*   MONO PCT %  --  3*   EOS PCT %  --  1     Results from last 7 days   Lab Units 02/29/24  0524 02/28/24  0404   SODIUM mmol/L 130* 130*   POTASSIUM mmol/L 4.0 4.1   CHLORIDE mmol/L 97 96   CO2 mmol/L 24 27   BUN mg/dL 9 10   CREATININE mg/dL 0.53* 0.43*   ANION GAP mmol/L 9 7   CALCIUM mg/dL 8.4 8.5   ALBUMIN g/dL  --  2.4*   TOTAL BILIRUBIN mg/dL  --  0.20   ALK PHOS U/L  --  89   ALT U/L  --  31   AST U/L  --  38   GLUCOSE RANDOM mg/dL 99 98      Results from last 7 days   Lab Units 02/25/24  1114   INR  1.42*             Results from last 7 days   Lab Units 02/29/24  0524 02/28/24  0404 02/26/24  0510 02/25/24  1114   LACTIC ACID mmol/L  --   --   --  1.3   PROCALCITONIN ng/ml 0.25 0.26* 0.57* 0.52*       Lines/Drains:  Invasive Devices       Central Venous Catheter Line  Duration             Port A Cath 04/18/23 Right Subclavian 317 days              Peripheral Intravenous Line  Duration             Peripheral IV 02/29/24 Dorsal (posterior);Left Hand <1 day              Drain  Duration             Pleural Effusion Long-Term Catheter 15.5 Fr. 310 days                    Central Line:  Goal for removal: N/A - Chronic PICC             Imaging: No pertinent imaging reviewed.    Recent Cultures (last 7 days):   Results from last 7 days   Lab Units 02/26/24  1418 02/26/24  1415 02/25/24  1606 02/25/24  1114   BLOOD CULTURE  No Growth at 48 hrs. No Growth at 48 hrs.  --  No Growth at 72 hrs.  Staphylococcus epidermidis*   GRAM STAIN RESULT   --   --   --  Gram positive cocci in clusters*   LEGIONELLA URINARY ANTIGEN   --   --  Negative  --        Last 24 Hours Medication List:   Current Facility-Administered Medications   Medication Dose Route Frequency Provider Last Rate    acetaminophen  650 mg Oral Q6H PRN Modesta Bentley PA-C      Albumin 25%  25 g Intravenous Once John Muñiz PA-C      apixaban  5 mg Oral BID Modesta Bentley PA-C      atorvastatin  20 mg Oral Daily With Dinner Modesta Bentley PA-C      buPROPion  300 mg Oral Daily Modesta Bentley PA-C      cefTRIAXone  1,000 mg Intravenous Q24H Modesta Bentley PA-C 1,000 mg (02/28/24 1708)    diphenhydrAMINE  25 mg Oral HS Modesta Bentley PA-C      ferrous sulfate  325 mg Oral Daily With Breakfast Kala Arita PA-C      fluticasone-vilanterol  1 puff Inhalation Daily Modesta Bentley PA-C      levalbuterol  0.63 mg Nebulization Q8H PRN Kala Arita  JOHN      lidocaine   Topical Daily PRN Modesta Bentley PA-C      LORazepam  0.5 mg Oral Daily PRN Modesta Bentley PA-C      losartan  50 mg Oral Daily John Muñiz PA-C      metoprolol tartrate  25 mg Oral Q12H ULISES Modesta Bentley PA-C      multivitamin stress formula  1 tablet Oral Daily Modesta Bentley PA-C      oxyCODONE  5 mg Oral Q4H PRN Marilyn Gaspar PA-C      oxyCODONE  2.5 mg Oral Q4H PRN Marilyn Gaspar PA-C      potassium chloride  40 mEq Oral Daily Modesta Bentley PA-C      sertraline  200 mg Oral Daily Modesta Bentley PA-C      torsemide  20 mg Oral Daily Kala Arita PA-C      verapamil  240 mg Oral HS Modesta Bentley PA-C          Today, Patient Was Seen By: John Muñiz PA-C    **Please Note: This note may have been constructed using a voice recognition system.**

## 2024-02-29 NOTE — ASSESSMENT & PLAN NOTE
POA, criteria met w/ tachycardia, tachypnea, & leukocytosis   Lactic WNL  Procal elevated   SOI: PNA   UA without infection  Viral swab neg  Urine antigens neg  Initial BC 1/2 with staph epidermidis - suspect contaminant  Repeat BC x 2 negative @ 48hrs  Patient was initiated on vanc 2/26, now dc'd and on ceftriaxone monotherapy

## 2024-03-01 ENCOUNTER — HOME CARE VISIT (OUTPATIENT)
Dept: HOME HEALTH SERVICES | Facility: HOME HEALTHCARE | Age: 64
End: 2024-03-01
Payer: COMMERCIAL

## 2024-03-01 VITALS
RESPIRATION RATE: 18 BRPM | WEIGHT: 173.5 LBS | OXYGEN SATURATION: 97 % | BODY MASS INDEX: 28.91 KG/M2 | HEIGHT: 65 IN | DIASTOLIC BLOOD PRESSURE: 77 MMHG | TEMPERATURE: 97.8 F | HEART RATE: 106 BPM | SYSTOLIC BLOOD PRESSURE: 133 MMHG

## 2024-03-01 PROBLEM — Z51.5 HOSPICE CARE: Status: ACTIVE | Noted: 2024-03-01

## 2024-03-01 LAB
ANION GAP SERPL CALCULATED.3IONS-SCNC: 8 MMOL/L
BACTERIA BLD CULT: NORMAL
BUN SERPL-MCNC: 10 MG/DL (ref 5–25)
CALCIUM SERPL-MCNC: 8.9 MG/DL (ref 8.4–10.2)
CHLORIDE SERPL-SCNC: 96 MMOL/L (ref 96–108)
CO2 SERPL-SCNC: 28 MMOL/L (ref 21–32)
CREAT SERPL-MCNC: 0.62 MG/DL (ref 0.6–1.3)
GFR SERPL CREATININE-BSD FRML MDRD: 94 ML/MIN/1.73SQ M
GLUCOSE SERPL-MCNC: 98 MG/DL (ref 65–140)
POTASSIUM SERPL-SCNC: 3.8 MMOL/L (ref 3.5–5.3)
SODIUM SERPL-SCNC: 132 MMOL/L (ref 135–147)

## 2024-03-01 PROCEDURE — 80048 BASIC METABOLIC PNL TOTAL CA: CPT | Performed by: HOSPITALIST

## 2024-03-01 PROCEDURE — 99239 HOSP IP/OBS DSCHRG MGMT >30: CPT | Performed by: PHYSICIAN ASSISTANT

## 2024-03-01 RX ORDER — OXYCODONE HYDROCHLORIDE 5 MG/1
5 TABLET ORAL EVERY 4 HOURS PRN
Status: ON HOLD
Start: 2024-03-01 | End: 2024-03-11

## 2024-03-01 RX ORDER — TELMISARTAN 80 MG/1
40 TABLET ORAL DAILY
Status: ON HOLD
Start: 2024-03-01

## 2024-03-01 RX ORDER — OXYCODONE HYDROCHLORIDE 5 MG/1
2.5 TABLET ORAL EVERY 4 HOURS PRN
Status: ON HOLD
Start: 2024-03-01 | End: 2024-03-11

## 2024-03-01 RX ADMIN — FLUTICASONE FUROATE AND VILANTEROL TRIFENATATE 1 PUFF: 200; 25 POWDER RESPIRATORY (INHALATION) at 09:31

## 2024-03-01 RX ADMIN — B-COMPLEX W/ C & FOLIC ACID TAB 1 TABLET: TAB at 09:30

## 2024-03-01 RX ADMIN — POTASSIUM CHLORIDE 40 MEQ: 1500 TABLET, EXTENDED RELEASE ORAL at 09:30

## 2024-03-01 RX ADMIN — FERROUS SULFATE TAB 325 MG (65 MG ELEMENTAL FE) 325 MG: 325 (65 FE) TAB at 09:30

## 2024-03-01 RX ADMIN — LOSARTAN POTASSIUM 50 MG: 50 TABLET, FILM COATED ORAL at 09:30

## 2024-03-01 RX ADMIN — CEFTRIAXONE 1000 MG: 1 INJECTION, SOLUTION INTRAVENOUS at 09:30

## 2024-03-01 RX ADMIN — SERTRALINE 200 MG: 100 TABLET, FILM COATED ORAL at 09:30

## 2024-03-01 RX ADMIN — APIXABAN 5 MG: 5 TABLET, FILM COATED ORAL at 09:30

## 2024-03-01 RX ADMIN — TORSEMIDE 20 MG: 20 TABLET ORAL at 09:30

## 2024-03-01 RX ADMIN — METOPROLOL TARTRATE 25 MG: 25 TABLET, FILM COATED ORAL at 09:30

## 2024-03-01 RX ADMIN — BUPROPION HYDROCHLORIDE 300 MG: 300 TABLET, EXTENDED RELEASE ORAL at 09:30

## 2024-03-01 NOTE — DISCHARGE SUMMARY
[facility]  Discharge- Kathi CHINCHILLA Cipolla 1960, 63 y.o. female MRN: [id number]  Unit/Bed#: -Kieran Encounter: [id number]  Primary Care Provider: Keyon Miller MD   Date and time admitted to hospital: 2/25/2024 10:59 AM    Non-small cell cancer of right lung (HCC)  Assessment & Plan  Stage 4, currently undergoing palliative chemotherapy (last 2/22)  Oncology and Palliative care consulted   Patient with multiple recent admissions, overall aware of her prognosis/advanced malignancy  Pt would like to pursue hospice at this point. Hospice consulted.  Discharged to  inpatient hospice    * Hospice care  Assessment & Plan  Patient being discharged to [facility] due to worsening metastasis on imaging without further chemotherapy/interventions available with oncology.    Acute on chronic respiratory failure with hypoxia (HCC)  Assessment & Plan  Chronically on 3L in setting of NSCLC  Presented with SOB/AQUINO and Spo2 86% on 3L   Secondary to malignancy, pna - see individual problems   Patient now stable on chronic 3L at rest however still AQUINO   Repeat CXR unchanged    Hypervolemia  Assessment & Plan  Dyspneic, gross ascites, & 2+ LE edema   ECHO 01/2024: EF 60%; grade 1 diastolic dysfunction  CT Chest- Small to moderate right pleural effusion with Pleurx catheter in place with moderate left effusion unchanged  Home diuretic: Lasix 20 q day (2 days PTA, pt switched to torsemide 20 mg q day)    Overall suspect pleural effusion and ascites most likely related to malignancy > CHF  LE edema also more likely due to hypoalbuminemia/3rd spacing (album 2.7 on admission)    S/P IV lasix 40 mg BID without significant improvement in LE edema   DC IV lasix, likely less effective given low albumin  Will give IV bumex 1 mg tonight and resume PO torsemide tomorrow   SBP is currently stable at 115  Improved with IV bumex, however patient is now describing orthostasis. Give additional albumin on  2/28.  Continue albumin, decreased losartan from 100mg to 50mg daily  DANIEL stockings     Anemia  Assessment & Plan  Recent Labs     02/27/24  1845 02/28/24  0404 02/29/24  0524   HGB 7.5* 7.3* 7.3*     Baseline Hgb 9-10  Hgb remains borderline at 7.2 this morning  No overt evidence of bleeding   Suspect anemia of chronic disease/chemotherapy induced  Iron 33, Ferritin 1800  Folate and B12 WNL  Heme/onc following   Suspect chemotherapy contributing to cytopenias   Stable      Pneumonia  Assessment & Plan  CT chest: suspected left lung superimposed pneumonia   Urine antigens negative  Continue Rocephin + vanc   Completed 5 day course of IV abx    Ascites  Assessment & Plan  Etiology 2/2 malignancy  CT abd/pelvis:   Large amount of ascites increased from prior study with omental nodularity of carcinomatosis    IR consulted  S/P paracentesis 2/26 - >5L removed     SIRS (systemic inflammatory response syndrome) ([facility])  Assessment & Plan  POA, criteria met w/ tachycardia, tachypnea, & leukocytosis   Lactic WNL  Procal elevated   SOI: PNA   UA without infection  Viral swab neg  Urine antigens neg  Initial BC 1/2 with staph epidermidis - suspect contaminant  Repeat BC x 2 negative @ 48hrs  S/p IV vanco/ceftriaxone  Discharged on hospice    Hyponatremia  Assessment & Plan  Na 130-132  S/P IV lasix without significant improvement  Suspect SIADH in setting of lung cancer/pulmonary process  stable    Pulmonary embolism (HCC)  Assessment & Plan  Admitted in early 02/2023 for PE  Maintained indefinitely on Eliquis 5 mg BID     Essential hypertension  Assessment & Plan  Continue home regimen:   Metoprolol 25mg daily  Micardis 80mg daily (Substituted for losartan 100mg which was decreased to 50mg on 2/28 due to soft BP and to allow for further diuresis)  Verapamil 240mg daily     COPD (chronic obstructive pulmonary disease) ([facility])  Assessment & Plan  No acute exacerbation   Continue home regimen   Continue Advair, supplemental O2, and  PRN bronchodilators        Medical Problems       Resolved Problems  Date Reviewed: 3/1/2024   None       Discharging Physician / Practitioner: John Muñiz PA-C  PCP: Keyon Miller MD  Admission Date:   Admission Orders (From admission, onward)       Ordered        02/25/24 1319  INPATIENT ADMISSION  Once                          Discharge Date: 03/01/24    Consultations During Hospital Stay:  Palliative  Oncology    Procedures Performed:   Paracentesis 2/26    Significant Findings / Test Results:   Satting 86% on baseline 3 L on arrival  Procalcitonin 0.5  WBC 12  CT 2/25:  1.Unchanged filling defect at the postsurgical site in the right distal pulmonary artery with no new embolus although limited by poor opacification.Measured RV/LV ratio is within normal limits at less than 0.9.    2. Small to moderate right pleural effusion with Pleurx catheter in place with moderate left effusion unchanged.     3. Mostly unchanged right lower lung mass with extension to the suture site in the hilum.    4. Large amount of ascites increased from prior study with omental nodularity of carcinomatosis.Previous obstructive pattern is resolved.    5. Unchanged right lung parenchymal changes with worsening groundglass parenchymal opacities throughout the left lung of superimposed pneumonia.    Incidental Findings:   None      Test Results Pending at Discharge (will require follow up):   None     Outpatient Tests Requested:  None    Complications: None    Reason for Admission: Shortness of breath    Hospital Course:   Kathi Ferris is a 63 y.o. female patient with PMH metastatic lung cancer, COPD, malignant pleural effusion, HTN, HLD, recent PE on Eliquis who originally presented to the hospital on 2/25/2024 due to worsening shortness of breath with associated lower extremity swelling.  She was found to be satting 86% on her baseline 3 L nasal cannula.  She underwent CTA imaging on admission which showed superimposed  "pneumonia with worsening metastasis and ascites with carcinomatosis.  Patient underwent 5 days of IV antibiotics for her pneumonia with downtrending of procalcitonin, leukocytosis and no further fevers.  Oncology was consulted given her worsening metastasis and unfortunately do not have any more chemo agents or interventions to offer and recommended hospice.  Patient is in agreement and is being discharged to [facility] hospice house.  Of note, patient had centesis on 2/26 with 5 L fluid removed.    Please see above list of diagnoses and related plan for additional information.     Condition at Discharge: terminal    Discharge Day Visit / Exam:   Subjective: Patient is in good spirits.  Reports that her lightheadedness upon standing is improved.  Her lower extremity swelling is also improved.  Vitals: Blood Pressure: 133/77 (03/01/24 0610)  Pulse: (!) 106 (03/01/24 0700)  Temperature: 97.8 °F (36.6 °C) (03/01/24 0610)  Temp Source: Oral (02/29/24 2009)  Respirations: 18 (03/01/24 0700)  Height: 5' 5\" (165.1 cm) (02/25/24 2033)  Weight - Scale: 78.7 kg (173 lb 8 oz) (03/01/24 0600)  SpO2: 97 % (03/01/24 0700)  Exam:   Physical Exam  Vitals and nursing note reviewed.   Constitutional:       Appearance: Normal appearance.   HENT:      Head: Normocephalic and atraumatic.      Mouth/Throat:      Mouth: Mucous membranes are moist.      Pharynx: Oropharynx is clear. No oropharyngeal exudate.   Eyes:      Extraocular Movements: Extraocular movements intact.   Cardiovascular:      Rate and Rhythm: Normal rate and regular rhythm.      Pulses: Normal pulses.      Heart sounds: Normal heart sounds. No murmur heard.     No friction rub. No gallop.   Pulmonary:      Effort: Pulmonary effort is normal. No respiratory distress.      Breath sounds: Normal breath sounds. No stridor. No wheezing or rales.      Comments: Diminished breath sounds  Abdominal:      General: Abdomen is flat. Bowel sounds are normal. There is no " distension.      Palpations: Abdomen is soft.      Tenderness: There is no abdominal tenderness.   Musculoskeletal:      Right lower leg: Edema (+1) present.      Left lower leg: Edema (+1) present.   Skin:     General: Skin is warm and dry.   Neurological:      General: No focal deficit present.      Mental Status: She is alert and oriented to person, place, and time.          Discussion with Family: Patient declined call to .     Discharge instructions/Information to patient and family:   See after visit summary for information provided to patient and family.      Provisions for Follow-Up Care:  See after visit summary for information related to follow-up care and any pertinent home health orders.      Mobility at time of Discharge:   Basic Mobility Inpatient Raw Score: 19  JH-HLM Goal: 6: Walk 10 steps or more  JH-HLM Achieved: 4: Move to chair/commode  HLM Goal NOT achieved. Continue to encourage mobility in post discharge setting.     Disposition:   Other: Hospice at [facility]    Planned Readmission: At [facility]     Discharge Statement:  I spent 60 minutes discharging the patient. This time was spent on the day of discharge. I had direct contact with the patient on the day of discharge. Greater than 50% of the total time was spent examining patient, answering all patient questions, arranging and discussing plan of care with patient as well as directly providing post-discharge instructions.  Additional time then spent on discharge activities.    Discharge Medications:  See after visit summary for reconciled discharge medications provided to patient and/or family.      **Please Note: This note may have been constructed using a voice recognition system**

## 2024-03-01 NOTE — CASE MANAGEMENT
[facility] received request for transport authorization from Care Manager.   Type of transport: BLS  Date of transport: 3/1  Transport company:  [employer]   NPI: [id number]  Start Location: [facility]  End Location: [facility]  Med Necessity: fall risk, 3L o2, min assist mobility  Authorization initiated by contacting: Radha Via: Phone    DC Support Center received approval for transport authorization from: [employer]  Type of transport: S  Date of transport:    3/1  End Location: [facility]  Approved auth #: [id number]  CM notified:  Odette Barrientos

## 2024-03-01 NOTE — ASSESSMENT & PLAN NOTE
Patient being discharged to [facility] due to worsening metastasis on imaging without further chemotherapy/interventions available with oncology.

## 2024-03-01 NOTE — ASSESSMENT & PLAN NOTE
Chronically on 3L in setting of NSCLC  Presented with SOB/AQIUNO and Spo2 86% on 3L   Secondary to malignancy, pna - see individual problems   Patient now stable on chronic 3L at rest however still AQUINO   Repeat CXR unchanged

## 2024-03-01 NOTE — ASSESSMENT & PLAN NOTE
Stage 4, currently undergoing palliative chemotherapy (last 2/22)  Oncology and Palliative care consulted   Patient with multiple recent admissions, overall aware of her prognosis/advanced malignancy  Pt would like to pursue hospice at this point. Hospice consulted.  Discharged to  inpatient hospice

## 2024-03-01 NOTE — PLAN OF CARE
Problem: PAIN - ADULT  Goal: Verbalizes/displays adequate comfort level or baseline comfort level  Description: Interventions:  - Encourage patient to monitor pain and request assistance  - Assess pain using appropriate pain scale  - Administer analgesics based on type and severity of pain and evaluate response  - Implement non-pharmacological measures as appropriate and evaluate response  - Consider cultural and social influences on pain and pain management  - Notify physician/advanced practitioner if interventions unsuccessful or patient reports new pain  Outcome: Progressing     Problem: INFECTION - ADULT  Goal: Absence or prevention of progression during hospitalization  Description: INTERVENTIONS:  - Assess and monitor for signs and symptoms of infection  - Monitor lab/diagnostic results  - Monitor all insertion sites, i.e. indwelling lines, tubes, and drains  - Monitor endotracheal if appropriate and nasal secretions for changes in amount and color  - [place] appropriate cooling/warming therapies per order  - Administer medications as ordered  - Instruct and encourage patient and family to use good hand hygiene technique  - Identify and instruct in appropriate isolation precautions for identified infection/condition  Outcome: Progressing  Goal: Absence of fever/infection during neutropenic period  Description: INTERVENTIONS:  - Monitor WBC    Outcome: Progressing     Problem: SAFETY ADULT  Goal: Patient will remain free of falls  Description: INTERVENTIONS:  - Educate patient/family on patient safety including physical limitations  - Instruct patient to call for assistance with activity   - Consult OT/PT to assist with strengthening/mobility   - Keep Call bell within reach  - Keep bed low and locked with side rails adjusted as appropriate  - Keep care items and personal belongings within reach  - Initiate and maintain comfort rounds  - Make Fall Risk Sign visible to staff  - Offer Toileting every 2 Hours,  in advance of need  - Initiate/Maintain bed alarm  - Obtain necessary fall risk management equipment:   - Apply yellow socks and bracelet for high fall risk patients  - Consider moving patient to room near nurses station  Outcome: Progressing  Goal: Maintain or return to baseline ADL function  Description: INTERVENTIONS:  -  Assess patient's ability to carry out ADLs; assess patient's baseline for ADL function and identify physical deficits which impact ability to perform ADLs (bathing, care of mouth/teeth, toileting, grooming, dressing, etc.)  - Assess/evaluate cause of self-care deficits   - Assess range of motion  - Assess patient's mobility; develop plan if impaired  - Assess patient's need for assistive devices and provide as appropriate  - Encourage maximum independence but intervene and supervise when necessary  - Involve family in performance of ADLs  - Assess for home care needs following discharge   - Consider OT consult to assist with ADL evaluation and planning for discharge  - Provide patient education as appropriate  Outcome: Progressing  Goal: Maintains/Returns to pre admission functional level  Description: INTERVENTIONS:  - Perform AM-PAC 6 Click Basic Mobility/ Daily Activity assessment daily.  - Set and communicate daily mobility goal to care team and patient/family/caregiver.   - Collaborate with rehabilitation services on mobility goals if consulted  - Perform Range of Motion 2 times a day.  - Reposition patient every 2 hours.  - Dangle patient 2 times a day  - Stand patient 2 times a day  - Ambulate patient 2 times a day  - Out of bed to chair 2 times a day   - Out of bed for meals 2 times a day  - Out of bed for toileting  - Record patient progress and toleration of activity level   Outcome: Progressing     Problem: DISCHARGE PLANNING  Goal: Discharge to home or other facility with appropriate resources  Description: INTERVENTIONS:  - Identify barriers to discharge w/patient and caregiver  -  Arrange for needed discharge resources and transportation as appropriate  - Identify discharge learning needs (meds, wound care, etc.)  - Arrange for interpretive services to assist at discharge as needed  - Refer to Case Management Department for coordinating discharge planning if the patient needs post-hospital services based on physician/advanced practitioner order or complex needs related to functional status, cognitive ability, or social support system  Outcome: Progressing     Problem: Knowledge Deficit  Goal: Patient/family/caregiver demonstrates understanding of disease process, treatment plan, medications, and discharge instructions  Description: Complete learning assessment and assess knowledge base.  Interventions:  - Provide teaching at level of understanding  - Provide teaching via preferred learning methods  Outcome: Progressing     Problem: Nutrition/Hydration-ADULT  Goal: Nutrient/Hydration intake appropriate for improving, restoring or maintaining nutritional needs  Description: Monitor and assess patient's nutrition/hydration status for malnutrition. Collaborate with interdisciplinary team and initiate plan and interventions as ordered.  Monitor patient's weight and dietary intake as ordered or per policy. Utilize nutrition screening tool and intervene as necessary. Determine patient's food preferences and provide high-protein, high-caloric foods as appropriate.     INTERVENTIONS:  - Monitor oral intake, urinary output, labs, and treatment plans  - Assess nutrition and hydration status and recommend course of action  - Evaluate amount of meals eaten  - Assist patient with eating if necessary   - Allow adequate time for meals  - Recommend/ encourage appropriate diets, oral nutritional supplements, and vitamin/mineral supplements  - Order, calculate, and assess calorie counts as needed  - Recommend, monitor, and adjust tube feedings and TPN/PPN based on assessed needs  - Assess need for intravenous  fluids  - Provide specific nutrition/hydration education as appropriate  - Include patient/family/caregiver in decisions related to nutrition  Outcome: Progressing     Problem: CARDIOVASCULAR - ADULT  Goal: Maintains optimal cardiac output and hemodynamic stability  Description: INTERVENTIONS:  - Monitor I/O, vital signs and rhythm  - Monitor for S/S and trends of decreased cardiac output  - Administer and titrate ordered vasoactive medications to optimize hemodynamic stability  - Assess quality of pulses, skin color and temperature  - Assess for signs of decreased coronary artery perfusion  - Instruct patient to report change in severity of symptoms  Outcome: Progressing     Problem: GASTROINTESTINAL - ADULT  Goal: Minimal or absence of nausea and/or vomiting  Description: INTERVENTIONS:  - Administer IV fluids if ordered to ensure adequate hydration  - Maintain NPO status until nausea and vomiting are resolved  - Nasogastric tube if ordered  - Administer ordered antiemetic medications as needed  - Provide nonpharmacologic comfort measures as appropriate  - Advance diet as tolerated, if ordered  - Consider nutrition services referral to assist patient with adequate nutrition and appropriate food choices  Outcome: Progressing  Goal: Maintains or returns to baseline bowel function  Description: INTERVENTIONS:  - Assess bowel function  - Encourage oral fluids to ensure adequate hydration  - Administer IV fluids if ordered to ensure adequate hydration  - Administer ordered medications as needed  - Encourage mobilization and activity  - Consider nutritional services referral to assist patient with adequate nutrition and appropriate food choices  Outcome: Progressing  Goal: Maintains adequate nutritional intake  Description: INTERVENTIONS:  - Monitor percentage of each meal consumed  - Identify factors contributing to decreased intake, treat as appropriate  - Assist with meals as needed  - Monitor I&O, weight, and lab  values if indicated  - Obtain nutrition services referral as needed  Outcome: Progressing  Goal: Establish and maintain optimal ostomy function  Description: INTERVENTIONS:  - Assess bowel function  - Encourage oral fluids to ensure adequate hydration  - Administer IV fluids if ordered to ensure adequate hydration   - Administer ordered medications as needed  - Encourage mobilization and activity  - Nutrition services referral to assist patient with appropriate food choices  - Assess stoma site  - Consider wound care consult   Outcome: Progressing  Goal: Oral mucous membranes remain intact  Description: INTERVENTIONS  - Assess oral mucosa and hygiene practices  - Implement preventative oral hygiene regimen  - Implement oral medicated treatments as ordered  - Initiate Nutrition services referral as needed  Outcome: Progressing     Problem: METABOLIC, FLUID AND ELECTROLYTES - ADULT  Goal: Electrolytes maintained within normal limits  Description: INTERVENTIONS:  - Monitor labs and assess patient for signs and symptoms of electrolyte imbalances  - Administer electrolyte replacement as ordered  - Monitor response to electrolyte replacements, including repeat lab results as appropriate  - Instruct patient on fluid and nutrition as appropriate  Outcome: Progressing  Goal: Fluid balance maintained  Description: INTERVENTIONS:  - Monitor labs   - Monitor I/O and WT  - Instruct patient on fluid and nutrition as appropriate  - Assess for signs & symptoms of volume excess or deficit  Outcome: Progressing     Problem: MUSCULOSKELETAL - ADULT  Goal: Maintain proper alignment of affected body part  Description: INTERVENTIONS:  - Support, maintain and protect limb and body alignment  - Provide patient/ family with appropriate education  Outcome: Progressing     Problem: Prexisting or High Potential for Compromised Skin Integrity  Goal: Skin integrity is maintained or improved  Description: INTERVENTIONS:  - Identify patients at  risk for skin breakdown  - Assess and monitor skin integrity  - Assess and monitor nutrition and hydration status  - Monitor labs   - Assess for incontinence   - Turn and reposition patient  - Assist with mobility/ambulation  - Relieve pressure over bony prominences  - Avoid friction and shearing  - Provide appropriate hygiene as needed including keeping skin clean and dry  - Evaluate need for skin moisturizer/barrier cream  - Collaborate with interdisciplinary team   - Patient/family teaching  - Consider wound care consult   Outcome: Progressing

## 2024-03-01 NOTE — ASSESSMENT & PLAN NOTE
Recent Labs     02/27/24  1845 02/28/24  0404 02/29/24  0524   HGB 7.5* 7.3* 7.3*     Baseline Hgb 9-10  Hgb remains borderline at 7.2 this morning  No overt evidence of bleeding   Suspect anemia of chronic disease/chemotherapy induced  Iron 33, Ferritin 1800  Folate and B12 WNL  Heme/onc following   Suspect chemotherapy contributing to cytopenias   Stable

## 2024-03-01 NOTE — ASSESSMENT & PLAN NOTE
CT chest: suspected left lung superimposed pneumonia   Urine antigens negative  Continue Rocephin + vanc   Completed 5 day course of IV abx

## 2024-03-01 NOTE — HOSPICE NOTE
Hospice Liaison talked with  Mynor this AM.  He is in support of transfer to our inpatient unit today.  Consents sent to him via [employer].  RN to please call a 1/2 hr prior with report  [phone].  Keep IV access and medicate appropriately for the trip.

## 2024-03-01 NOTE — PLAN OF CARE
Problem: PAIN - ADULT  Goal: Verbalizes/displays adequate comfort level or baseline comfort level  Description: Interventions:  - Encourage patient to monitor pain and request assistance  - Assess pain using appropriate pain scale  - Administer analgesics based on type and severity of pain and evaluate response  - Implement non-pharmacological measures as appropriate and evaluate response  - Consider cultural and social influences on pain and pain management  - Notify physician/advanced practitioner if interventions unsuccessful or patient reports new pain  Outcome: Progressing     Problem: INFECTION - ADULT  Goal: Absence or prevention of progression during hospitalization  Description: INTERVENTIONS:  - Assess and monitor for signs and symptoms of infection  - Monitor lab/diagnostic results  - Monitor all insertion sites, i.e. indwelling lines, tubes, and drains  - Monitor endotracheal if appropriate and nasal secretions for changes in amount and color  - [place] appropriate cooling/warming therapies per order  - Administer medications as ordered  - Instruct and encourage patient and family to use good hand hygiene technique  - Identify and instruct in appropriate isolation precautions for identified infection/condition  Outcome: Progressing  Goal: Absence of fever/infection during neutropenic period  Description: INTERVENTIONS:  - Monitor WBC    Outcome: Progressing     Problem: SAFETY ADULT  Goal: Patient will remain free of falls  Description: INTERVENTIONS:  - Educate patient/family on patient safety including physical limitations  - Instruct patient to call for assistance with activity   - Consult OT/PT to assist with strengthening/mobility   - Keep Call bell within reach  - Keep bed low and locked with side rails adjusted as appropriate  - Keep care items and personal belongings within reach  - Initiate and maintain comfort rounds  - Make Fall Risk Sign visible to staff  - Offer Toileting every 2 Hours,  in advance of need  - Initiate/Maintain bed alarm  - Obtain necessary fall risk management equipment:   - Apply yellow socks and bracelet for high fall risk patients  - Consider moving patient to room near nurses station  Outcome: Progressing  Goal: Maintain or return to baseline ADL function  Description: INTERVENTIONS:  -  Assess patient's ability to carry out ADLs; assess patient's baseline for ADL function and identify physical deficits which impact ability to perform ADLs (bathing, care of mouth/teeth, toileting, grooming, dressing, etc.)  - Assess/evaluate cause of self-care deficits   - Assess range of motion  - Assess patient's mobility; develop plan if impaired  - Assess patient's need for assistive devices and provide as appropriate  - Encourage maximum independence but intervene and supervise when necessary  - Involve family in performance of ADLs  - Assess for home care needs following discharge   - Consider OT consult to assist with ADL evaluation and planning for discharge  - Provide patient education as appropriate  Outcome: Progressing  Goal: Maintains/Returns to pre admission functional level  Description: INTERVENTIONS:  - Perform AM-PAC 6 Click Basic Mobility/ Daily Activity assessment daily.  - Set and communicate daily mobility goal to care team and patient/family/caregiver.   - Collaborate with rehabilitation services on mobility goals if consulted  - Perform Range of Motion 2 times a day.  - Reposition patient every 2 hours.  - Dangle patient 2 times a day  - Stand patient 2 times a day  - Ambulate patient 2 times a day  - Out of bed to chair 2 times a day   - Out of bed for meals 2 times a day  - Out of bed for toileting  - Record patient progress and toleration of activity level   Outcome: Progressing     Problem: DISCHARGE PLANNING  Goal: Discharge to home or other facility with appropriate resources  Description: INTERVENTIONS:  - Identify barriers to discharge w/patient and caregiver  -  Arrange for needed discharge resources and transportation as appropriate  - Identify discharge learning needs (meds, wound care, etc.)  - Arrange for interpretive services to assist at discharge as needed  - Refer to Case Management Department for coordinating discharge planning if the patient needs post-hospital services based on physician/advanced practitioner order or complex needs related to functional status, cognitive ability, or social support system  Outcome: Progressing     Problem: Knowledge Deficit  Goal: Patient/family/caregiver demonstrates understanding of disease process, treatment plan, medications, and discharge instructions  Description: Complete learning assessment and assess knowledge base.  Interventions:  - Provide teaching at level of understanding  - Provide teaching via preferred learning methods  Outcome: Progressing     Problem: Nutrition/Hydration-ADULT  Goal: Nutrient/Hydration intake appropriate for improving, restoring or maintaining nutritional needs  Description: Monitor and assess patient's nutrition/hydration status for malnutrition. Collaborate with interdisciplinary team and initiate plan and interventions as ordered.  Monitor patient's weight and dietary intake as ordered or per policy. Utilize nutrition screening tool and intervene as necessary. Determine patient's food preferences and provide high-protein, high-caloric foods as appropriate.     INTERVENTIONS:  - Monitor oral intake, urinary output, labs, and treatment plans  - Assess nutrition and hydration status and recommend course of action  - Evaluate amount of meals eaten  - Assist patient with eating if necessary   - Allow adequate time for meals  - Recommend/ encourage appropriate diets, oral nutritional supplements, and vitamin/mineral supplements  - Order, calculate, and assess calorie counts as needed  - Recommend, monitor, and adjust tube feedings and TPN/PPN based on assessed needs  - Assess need for intravenous  fluids  - Provide specific nutrition/hydration education as appropriate  - Include patient/family/caregiver in decisions related to nutrition  Outcome: Progressing     Problem: CARDIOVASCULAR - ADULT  Goal: Maintains optimal cardiac output and hemodynamic stability  Description: INTERVENTIONS:  - Monitor I/O, vital signs and rhythm  - Monitor for S/S and trends of decreased cardiac output  - Administer and titrate ordered vasoactive medications to optimize hemodynamic stability  - Assess quality of pulses, skin color and temperature  - Assess for signs of decreased coronary artery perfusion  - Instruct patient to report change in severity of symptoms  Outcome: Progressing     Problem: GASTROINTESTINAL - ADULT  Goal: Minimal or absence of nausea and/or vomiting  Description: INTERVENTIONS:  - Administer IV fluids if ordered to ensure adequate hydration  - Maintain NPO status until nausea and vomiting are resolved  - Nasogastric tube if ordered  - Administer ordered antiemetic medications as needed  - Provide nonpharmacologic comfort measures as appropriate  - Advance diet as tolerated, if ordered  - Consider nutrition services referral to assist patient with adequate nutrition and appropriate food choices  Outcome: Progressing  Goal: Maintains or returns to baseline bowel function  Description: INTERVENTIONS:  - Assess bowel function  - Encourage oral fluids to ensure adequate hydration  - Administer IV fluids if ordered to ensure adequate hydration  - Administer ordered medications as needed  - Encourage mobilization and activity  - Consider nutritional services referral to assist patient with adequate nutrition and appropriate food choices  Outcome: Progressing  Goal: Maintains adequate nutritional intake  Description: INTERVENTIONS:  - Monitor percentage of each meal consumed  - Identify factors contributing to decreased intake, treat as appropriate  - Assist with meals as needed  - Monitor I&O, weight, and lab  values if indicated  - Obtain nutrition services referral as needed  Outcome: Progressing  Goal: Establish and maintain optimal ostomy function  Description: INTERVENTIONS:  - Assess bowel function  - Encourage oral fluids to ensure adequate hydration  - Administer IV fluids if ordered to ensure adequate hydration   - Administer ordered medications as needed  - Encourage mobilization and activity  - Nutrition services referral to assist patient with appropriate food choices  - Assess stoma site  - Consider wound care consult   Outcome: Progressing  Goal: Oral mucous membranes remain intact  Description: INTERVENTIONS  - Assess oral mucosa and hygiene practices  - Implement preventative oral hygiene regimen  - Implement oral medicated treatments as ordered  - Initiate Nutrition services referral as needed  Outcome: Progressing     Problem: METABOLIC, FLUID AND ELECTROLYTES - ADULT  Goal: Electrolytes maintained within normal limits  Description: INTERVENTIONS:  - Monitor labs and assess patient for signs and symptoms of electrolyte imbalances  - Administer electrolyte replacement as ordered  - Monitor response to electrolyte replacements, including repeat lab results as appropriate  - Instruct patient on fluid and nutrition as appropriate  Outcome: Progressing  Goal: Fluid balance maintained  Description: INTERVENTIONS:  - Monitor labs   - Monitor I/O and WT  - Instruct patient on fluid and nutrition as appropriate  - Assess for signs & symptoms of volume excess or deficit  Outcome: Progressing     Problem: MUSCULOSKELETAL - ADULT  Goal: Maintain proper alignment of affected body part  Description: INTERVENTIONS:  - Support, maintain and protect limb and body alignment  - Provide patient/ family with appropriate education  Outcome: Progressing     Problem: Prexisting or High Potential for Compromised Skin Integrity  Goal: Skin integrity is maintained or improved  Description: INTERVENTIONS:  - Identify patients at  risk for skin breakdown  - Assess and monitor skin integrity  - Assess and monitor nutrition and hydration status  - Monitor labs   - Assess for incontinence   - Turn and reposition patient  - Assist with mobility/ambulation  - Relieve pressure over bony prominences  - Avoid friction and shearing  - Provide appropriate hygiene as needed including keeping skin clean and dry  - Evaluate need for skin moisturizer/barrier cream  - Collaborate with interdisciplinary team   - Patient/family teaching  - Consider wound care consult   Outcome: Progressing

## 2024-03-01 NOTE — CASE MANAGEMENT
Case Management Discharge Planning Note    Patient name Kathi Ferris  Location /-01 MRN [id number]  : 1960 Date 3/1/2024       Current Admission Date: 2024  Current Admission Diagnosis:Acute on chronic respiratory failure with hypoxia (HCC)   Patient Active Problem List    Diagnosis Date Noted    Anemia 2024    Hypervolemia 2024    Acute on chronic respiratory failure with hypoxia (HCC) 2024    Ascites 2024    Pneumonia 2024    Ambulatory dysfunction 2024    Chronic respiratory failure with hypoxia (HCC) 10/24/2023    Generalized anxiety disorder 10/06/2023    SIRS (systemic inflammatory response syndrome) ([facility]) 2023    Hyponatremia 2023    Palliative care patient 2023    Malignant pleural effusion 2023    Pulmonary embolism ([facility]) 2023    CINV (chemotherapy-induced nausea and vomiting) 2023    Non-small cell cancer of right lung (HCC) 2023    Closed fracture of multiple ribs of left side 2022    Cigarette nicotine dependence in remission 2022    COPD (chronic obstructive pulmonary disease) ([facility]) 2022    Hypercholesterolemia 2022    Essential hypertension 2022    Adhesive capsulitis of left shoulder 2021    Impingement syndrome of left shoulder 2021      LOS (days): 5  Geometric Mean LOS (GMLOS) (days):   Days to GMLOS:     OBJECTIVE:  Risk of Unplanned Readmission Score: 39.62         Current admission status: Inpatient   Preferred Pharmacy:   [id number] [place] - [address]  [address]  [place] [address]  Phone: [phone] Fax: [phone]    [facility] [place] - [address]  [address]  [place] [address]  Phone: [phone] Fax: [phone]    Primary Care Provider: Keyon Miller MD    Primary Insurance: BLUE CROSS  Secondary Insurance:  MEDICARE    DISCHARGE DETAILS:    Additional Comments: [facility] can accept Pt today. [facility] transport Pt to [facility]. Pt's nurse(Aamir), [facility] liaison informed of transport time. [facility] liaison to inform Pt's  of transport time. Ambulance auth request sent to  discharge support.

## 2024-03-01 NOTE — ASSESSMENT & PLAN NOTE
POA, criteria met w/ tachycardia, tachypnea, & leukocytosis   Lactic WNL  Procal elevated   SOI: PNA   UA without infection  Viral swab neg  Urine antigens neg  Initial BC 1/2 with staph epidermidis - suspect contaminant  Repeat BC x 2 negative @ 48hrs  S/p IV vanco/ceftriaxone  Discharged on hospice

## 2024-03-02 NOTE — CASE MANAGEMENT
Case Management Progress Note    Patient name Kathi Ferris  Location /-01 MRN [id number]  : 1960 Date 3/2/2024       LOS (days): 5  Geometric Mean LOS (GMLOS) (days):   Days to GMLOS:        OBJECTIVE:        Current admission status: Inpatient  Preferred Pharmacy:   [id number] [place] - [address]  [address]  [place] [address]  Phone: [phone] Fax: [phone]    [facility] [place] - [address]  [address]  [place] [address]  Phone: [phone] Fax: [phone]    Primary Care Provider: Keyon Miller MD    Primary Insurance: BLUE CROSS  Secondary Insurance: MEDICARE    PROGRESS NOTE:  Ambulance auth given to SLE .

## 2024-03-03 LAB
BACTERIA BLD CULT: NORMAL
BACTERIA BLD CULT: NORMAL

## 2024-03-04 NOTE — ASSESSMENT & PLAN NOTE
Home medications Ativan, Wellbutrin, Zoloft   restart   It was a pleasure to see you today.  You presented to the clinic for follow up    Plan:  Please check your blood pressures at home and let me know how they are going. We will increase your amlodipine to 10mg daily    You can consider Debrox to help with wax build up    I provided referral to ENT doctor for the ear pain that you experience and hearing changes. I included some helpful tips above. It may be beneficial to follow up with dentist and see. If pain persists, please let us know and we can trial other therapies such as a muscle relaxant      Isometric jaw exercises are particularly useful for patients with temporomandibular joint dysfunction syndrome. These exercises are performed by applying resistance with an open or loosely fisted hand. In the isometric jaw opening exercise, the patient begins with her mouth open about an inch. The resistance and muscle contraction are held for 5 to 10 seconds before relaxing. This is repeated five times per session. Exercises can be performed with moderate resistance applied several sessions per day, or with maximum resistance one session per day.      The isometric jaw forward thrust exercise is performed by pushing the jaw forward against the hand, holding and then relaxing. This is repeated five times per session.    Follow up appointments:  No follow-ups on file.     Health Maintenance Due  Health Maintenance Due   Topic Date Due    Diabetes Eye Exam  Never done    Colorectal Cancer Screen-  Never done    Medicare Advantage- Medicare Wellness Visit  01/01/2024       Further Instructions:  Please schedule a follow-up with us today before you leave.     For any labs completed today, the results can be accessed through the patient portal on the [facility].  Please sign up!      You may see your test results before I do.  I will be contacting you regarding their interpretation.    If you are unable to get labs completed today, the lab is open Monday through Friday from  8 am to 4:00 pm.  Call to schedule a lab visit.  Please fast at least 8 hours prior to your lab draw if having cholesterol checked.    If you require an imaging study and need to schedule an appointment, call central scheduling at [phone] to schedule all appointments.  Please ask for the location address at the time of the phone call.    If you require a referral to see a specialist, our staff will complete this on your behalf and provide you with the referral prior to you leaving.    Mary Carmona, DO

## 2024-03-04 NOTE — UTILIZATION REVIEW
NOTIFICATION OF ADMISSION DISCHARGE   This is a Notification of Discharge from [facility]. Please be advised that this patient has been discharge from our facility. Below you will find the admission and discharge date and time including the patient’s disposition.   UTILIZATION REVIEW CONTACT:  Johanna Cline  Utilization   Network Utilization Review Department  Phone: [phone] x carefully listen to the prompts. All voicemails are confidential.  Email: [email or web address]     ADMISSION INFORMATION  PRESENTATION DATE: 2/25/2024 10:59 AM  OBERVATION ADMISSION DATE:   INPATIENT ADMISSION DATE: 2/25/24  1:19 PM   DISCHARGE DATE: 3/1/2024 11:52 AM   DISPOSITION:[facility]Swing Bed    Network Utilization Review Department  ATTENTION: Please call with any questions or concerns to [phone] and carefully listen to the prompts so that you are directed to the right person. All voicemails are confidential.   For Discharge needs, contact Care Management DC Support Team at [phone] opt. 2  Send all requests for admission clinical reviews, approved or denied determinations and any other requests to dedicated fax number below belonging to the campus where the patient is receiving treatment. List of dedicated fax numbers for the Facilities:  FACILITY NAME UR FAX NUMBER   ADMISSION DENIALS (Administrative/Medical Necessity) [phone]   DISCHARGE SUPPORT TEAM ([facility]) [phone]   PARENT CHILD HEALTH (Maternity/NICU/Pediatrics) [phone]   [facility] [phone]   [facility] [phone]   [facility] [phone]   [facility] [phone]   [facility] [phone]   [facility] [phone]   [facility] [phone]   [facility]  Patient accompanied by:mother  DIET:      both breast and bottle expressed milk      Frequency: 5 minutes, 3-4 times daily, supplementing with expressed milk 6 oz 4 times daily when at      APPETITE: none  SLEEPING     length 5.5 hours per noc  STOOLS: normal  CONCERNS:   Coughing x 5 days.  C/o eczema on trunk of body, mom is using Eucerin.  Slight improvement.   Pharmacy verified  School excuse n/a  Medications reviewed and updated.  Denies known Latex allergy or symptoms of Latex sensitivity.  Health Maintenance Due   Topic Date Due   • IPV Vaccine (2 of 4 - 4-dose series) 2019   • HIB Vaccine (2 of 4 - Standard series) 2019   • Rotavirus Vaccine (2 of 3 - 3-dose series) 2019   • DTaP/Tdap/Td Vaccine (2 - DTaP) 2019   • Pneumococcal Vaccine 0-64 (2 of 4) 2019       Patient is due for the topics as listed above and wishes to proceed with them. Education provided for Immunization(s)                      [phone]   [facility] [phone]   [facility] [phone]   [facility] [phone]   [facility] [phone]

## 2024-03-05 ENCOUNTER — HOSPITAL ENCOUNTER (OUTPATIENT)
Dept: INFUSION CENTER | Facility: HOSPITAL | Age: 64
Discharge: HOME | End: 2024-03-05

## 2024-03-05 ENCOUNTER — TELEPHONE (OUTPATIENT)
Dept: FAMILY MEDICINE CLINIC | Facility: HOSPITAL | Age: 64
End: 2024-03-05

## 2024-03-07 ENCOUNTER — HOSPITAL ENCOUNTER (OUTPATIENT)
Dept: INFUSION CENTER | Facility: HOSPITAL | Age: 64
Discharge: HOME | End: 2024-03-07
Attending: INTERNAL MEDICINE

## 2024-03-14 ENCOUNTER — PATIENT OUTREACH (OUTPATIENT)
Dept: HEMATOLOGY ONCOLOGY | Facility: CLINIC | Age: 64
End: 2024-03-14

## 2024-03-14 NOTE — PROGRESS NOTES
Pt has transitioned to the hospice house.  LSW will close the case to follow up as she has social work services available to her through the hospice team.

## 2024-03-16 DIAGNOSIS — I10 ESSENTIAL HYPERTENSION: ICD-10-CM

## 2024-03-18 RX ORDER — TORSEMIDE 20 MG/1
20 TABLET ORAL EVERY MORNING
Qty: 90 TABLET | Refills: 2 | Status: SHIPPED | OUTPATIENT
Start: 2024-03-18 | End: 2024-03-26

## 2024-03-23 ENCOUNTER — HOME CARE VISIT (OUTPATIENT)
Dept: HOME HOSPICE | Facility: HOSPICE | Age: 64
End: 2024-03-23
Payer: COMMERCIAL

## 2024-03-28 ENCOUNTER — HOME CARE VISIT (OUTPATIENT)
Dept: HOME HOSPICE | Facility: HOSPICE | Age: 64
End: 2024-03-28
Payer: COMMERCIAL

## 2024-03-28 NOTE — CASE COMMUNICATION
"Kathi Ferris, Bereavement IDG 3/27/24 (2MR, 1LR) Due: 24   : 1960  SOC: 3/1/24  DOD: 3/25/24  Diagnosis: Lung Cancer  Primary:  - Mynor Armenta was a 63 year old woman at the [facility].  of 33 years is Mynor. Mynor has been diagnosed with cancer. He has a feeding tube and had difficulty caring for Kathi at home. Mynor reported struggling with Kathi's sudden decline and with his diagnosis. However, he voic ed understanding Kathi being at EOL. Mynor does not have any biological children but raised Kathi's sons as her own. Son Solomon lives locally and son Eric lives in [place]. Eric came from [place] with his wife and their 4 year old son, Fredi. There is also a 9 year old grandchild. [facility] provided children's educational materials and books about death and grief to Solomon. Kathi was raised [employer], but fell away from the Faith. She voiced  finding meaning in her family. She worried about her family and shared wanting them to live happy, healthy lives. She wanted to complete some legacy work for her grandchildren and wanted and needed to know her sons will be okay when she is gone. She wanted a family discussion and mentioned specifically wanting to talk to Eric as she had stated she was quite hard on him throughout his life and was more of Solomon's protector. Eric had expr essed his thoughts as hospice being for people who are dying and voiced feeling his mom was doing well. Family discussion was facilitated with Mynor, Eric, Solomon and his wife Mey. Kathi apologized to Eric about being \"left out.\" Family discussed the challenges of Kathi's divorce and meeting Mynor.  Solomon spoke about being able to process his grief openly. Eric mentioned tendency to push feelings down and build a wall. He said he does  not want this for his 4 year old son. All voiced love for each other and Mynor reassured sons he will still be there father, while sons said Mynor will continue to be their sons " grandfather. They discussed heads and hearts being in different places. Mynor, Solomon and Mey provided information for bereavement follow-up.    TOD: Solomon and Eric were present at TO. Mynor had gone home for a few minutes. Solomon was having a tough time, difficulty br eathing.  Eric provided a lot of support and doors were opened for fresh air. Mynor returned and was informed of Kathi's passing. He was tearful, anxious and sad. He gave her a kiss and were open to staff support. He expressed her long and hard illness and how seeing her decline was difficult. He requested some alone time with Kathi.    Call Assignments:  Bereavement Counselor to reassess  Mynor Ferris and son Solomon Ferris at MR . (Due: 4/8/24)  francois Orozco to reassess daughter-in-law Mey Souza at LR. (Due: 4/22/24)  *Mailings to be sent under Solomon's chart for Solomon and Pam as they live together.*

## 2024-06-09 NOTE — PLAN OF CARE
Problem: Knowledge Deficit  Goal: Patient/family/caregiver demonstrates understanding of disease process, treatment plan, medications, and discharge instructions  Description: Complete learning assessment and assess knowledge base.   Interventions:  - Provide teaching at level of understanding  - Provide teaching via preferred learning methods  Outcome: Progressing Pt c/o left sided tooth pain that started last night. Pt has been taking ibuprofen with no relief.

## 2025-02-28 NOTE — ASSESSMENT & PLAN NOTE
Patient gets anxious at times. Since she will be on a prolonged prednisone taper I prescribed lorazepam in case her anxiety gets worse. I reviewed [place] PDMP.   Her last lorazepam prescription was filled in 2022 Fall [facility]